# Patient Record
Sex: FEMALE | Race: WHITE | NOT HISPANIC OR LATINO | Employment: OTHER | URBAN - METROPOLITAN AREA
[De-identification: names, ages, dates, MRNs, and addresses within clinical notes are randomized per-mention and may not be internally consistent; named-entity substitution may affect disease eponyms.]

---

## 2017-01-03 ENCOUNTER — RADIANT APPOINTMENT (OUTPATIENT)
Dept: GENERAL RADIOLOGY | Facility: CLINIC | Age: 67
End: 2017-01-03
Attending: PHYSICIAN ASSISTANT
Payer: COMMERCIAL

## 2017-01-03 ENCOUNTER — OFFICE VISIT (OUTPATIENT)
Dept: FAMILY MEDICINE | Facility: CLINIC | Age: 67
End: 2017-01-03
Payer: COMMERCIAL

## 2017-01-03 VITALS
BODY MASS INDEX: 22.33 KG/M2 | HEIGHT: 65 IN | OXYGEN SATURATION: 98 % | HEART RATE: 64 BPM | TEMPERATURE: 97 F | SYSTOLIC BLOOD PRESSURE: 177 MMHG | WEIGHT: 134 LBS | DIASTOLIC BLOOD PRESSURE: 82 MMHG

## 2017-01-03 DIAGNOSIS — F17.200 SMOKER: ICD-10-CM

## 2017-01-03 DIAGNOSIS — M54.5 CHRONIC LOW BACK PAIN, UNSPECIFIED BACK PAIN LATERALITY, WITH SCIATICA PRESENCE UNSPECIFIED: ICD-10-CM

## 2017-01-03 DIAGNOSIS — M25.551 HIP PAIN, RIGHT: ICD-10-CM

## 2017-01-03 DIAGNOSIS — J20.9 ACUTE BRONCHITIS, UNSPECIFIED ORGANISM: Primary | ICD-10-CM

## 2017-01-03 DIAGNOSIS — G89.29 CHRONIC LOW BACK PAIN, UNSPECIFIED BACK PAIN LATERALITY, WITH SCIATICA PRESENCE UNSPECIFIED: ICD-10-CM

## 2017-01-03 DIAGNOSIS — I10 HTN (HYPERTENSION), BENIGN: ICD-10-CM

## 2017-01-03 PROCEDURE — 99214 OFFICE O/P EST MOD 30 MIN: CPT | Performed by: PHYSICIAN ASSISTANT

## 2017-01-03 PROCEDURE — 72170 X-RAY EXAM OF PELVIS: CPT

## 2017-01-03 RX ORDER — CEFUROXIME AXETIL 500 MG/1
500 TABLET ORAL 2 TIMES DAILY
Qty: 20 TABLET | Refills: 0 | Status: SHIPPED | OUTPATIENT
Start: 2017-01-03 | End: 2017-08-08

## 2017-01-03 RX ORDER — PREDNISONE 20 MG/1
20 TABLET ORAL 2 TIMES DAILY
Qty: 14 TABLET | Refills: 0 | Status: SHIPPED | OUTPATIENT
Start: 2017-01-03 | End: 2017-08-08

## 2017-01-03 RX ORDER — LEVOFLOXACIN 500 MG/1
500 TABLET, FILM COATED ORAL DAILY
Qty: 10 TABLET | Refills: 0 | Status: SHIPPED | OUTPATIENT
Start: 2017-01-03 | End: 2017-01-03

## 2017-01-03 ASSESSMENT — ANXIETY QUESTIONNAIRES
IF YOU CHECKED OFF ANY PROBLEMS ON THIS QUESTIONNAIRE, HOW DIFFICULT HAVE THESE PROBLEMS MADE IT FOR YOU TO DO YOUR WORK, TAKE CARE OF THINGS AT HOME, OR GET ALONG WITH OTHER PEOPLE: NOT DIFFICULT AT ALL
5. BEING SO RESTLESS THAT IT IS HARD TO SIT STILL: SEVERAL DAYS
7. FEELING AFRAID AS IF SOMETHING AWFUL MIGHT HAPPEN: SEVERAL DAYS
6. BECOMING EASILY ANNOYED OR IRRITABLE: SEVERAL DAYS
1. FEELING NERVOUS, ANXIOUS, OR ON EDGE: SEVERAL DAYS
GAD7 TOTAL SCORE: 7
3. WORRYING TOO MUCH ABOUT DIFFERENT THINGS: SEVERAL DAYS
2. NOT BEING ABLE TO STOP OR CONTROL WORRYING: SEVERAL DAYS

## 2017-01-03 ASSESSMENT — PATIENT HEALTH QUESTIONNAIRE - PHQ9: 5. POOR APPETITE OR OVEREATING: SEVERAL DAYS

## 2017-01-03 NOTE — NURSING NOTE
"Chief Complaint   Patient presents with     Cough     Recheck Medication     Hip right     pain      Hypertension     BP check  good readings at home on wrist cuff 120-130 / 70-80       Initial /82 mmHg  Pulse 64  Temp(Src) 97  F (36.1  C) (Oral)  Ht 5' 4.5\" (1.638 m)  Wt 134 lb (60.782 kg)  BMI 22.65 kg/m2  SpO2 98%  Breastfeeding? No Estimated body mass index is 22.65 kg/(m^2) as calculated from the following:    Height as of this encounter: 5' 4.5\" (1.638 m).    Weight as of this encounter: 134 lb (60.782 kg).  BP completed using cuff size: regular    "

## 2017-01-03 NOTE — MR AVS SNAPSHOT
After Visit Summary   1/3/2017    Karen Caban    MRN: [id number]           Patient Information     Date Of Birth          1950        Visit Information        Provider Department      1/3/2017 11:00 AM Lyudmila Escobar PA-C [facility]        Today's Diagnoses     Hip pain, right    -  1     Acute bronchitis, unspecified organism            Follow-ups after your visit        Future tests that were ordered for you today     Open Future Orders        Priority Expected Expires Ordered    XR Pelvis 1/2 Views Routine 1/3/2017 1/3/2018 1/3/2017            Who to contact     If you have questions or need follow up information about today's clinic visit or your schedule please contact [facility] directly at [phone].  Normal or non-critical lab and imaging results will be communicated to you by [employer], letter or phone within 4 business days after the clinic has received the results. If you do not hear from us within 7 days, please contact the clinic through [employer] or phone. If you have a critical or abnormal lab result, we will notify you by phone as soon as possible.  Submit refill requests through [employer] or call your pharmacy and they will forward the refill request to us. Please allow 3 business days for your refill to be completed.          Additional Information About Your Visit        MyChart Information     [employer] gives you secure access to your electronic health record. If you see a primary care provider, you can also send messages to your care team and make appointments. If you have questions, please call your primary care clinic.  If you do not have a primary care provider, please call [phone] and they will assist you.        Care EveryWhere ID     This is your Care EveryWhere ID. This could be used by other organizations to access your [facility] medical records  [id number]        Your Vitals Were     Pulse Temperature Height BMI (Body Mass Index)  "Pulse Oximetry Breastfeeding?    64 97  F (36.1  C) (Oral) 5' 4.5\" (1.638 m) 22.65 kg/m2 98% No       Blood Pressure from Last 3 Encounters:   01/03/17 177/82   12/13/16 138/76   12/05/16 120/70    Weight from Last 3 Encounters:   01/03/17 134 lb (60.782 kg)   12/13/16 136 lb (61.689 kg)   12/05/16 134 lb (60.782 kg)                 Today's Medication Changes          These changes are accurate as of: 1/3/17 11:30 AM.  If you have any questions, ask your nurse or doctor.               Start taking these medicines.        Dose/Directions    cefUROXime 500 MG tablet   Commonly known as:  CEFTIN   Used for:  Acute bronchitis, unspecified organism   Started by:  Lyudmila Escobar PA-C        Dose:  500 mg   Take 1 tablet (500 mg) by mouth 2 times daily   Quantity:  20 tablet   Refills:  0       predniSONE 20 MG tablet   Commonly known as:  DELTASONE   Used for:  Acute bronchitis, unspecified organism   Started by:  Lyudmila Escobar PA-C        Dose:  20 mg   Take 1 tablet (20 mg) by mouth 2 times daily   Quantity:  14 tablet   Refills:  0            Where to get your medicines      These medications were sent to [place], [place] - [address], [address]  [address], [address], [facility] [address]     Phone:  [phone]    - cefUROXime 500 MG tablet  - predniSONE 20 MG tablet             Primary Care Provider Office Phone # Fax #    Lyudmila Escobar PA-C [phone]       [facility] [address]  [facility] [address]        Thank you!     Thank you for choosing [facility]  for your care. Our goal is always to provide you with excellent care. Hearing back from our patients is one way we can continue to improve our services. Please take a few minutes to complete the written survey that you may receive in the mail after your visit with us. Thank you!             Your Updated Medication List - Protect others around you: Learn how to safely " use, store and throw away your medicines at www.disposemymeds.org.          This list is accurate as of: 1/3/17 11:30 AM.  Always use your most recent med list.                   Brand Name Dispense Instructions for use    aspirin 81 MG tablet      Take 2 tablets by mouth daily.       atorvastatin 40 MG tablet    LIPITOR    90 tablet    Take 1 tablet (40 mg) by mouth daily       cefUROXime 500 MG tablet    CEFTIN    20 tablet    Take 1 tablet (500 mg) by mouth 2 times daily       fluticasone 50 MCG/ACT spray    FLONASE    16 mL    SPRAY 1-2 SPRAYS INTO BOTH NOSTRILS DAILY       LORazepam 0.5 MG tablet    ATIVAN    20 tablet    Take 1 tablet (0.5 mg) by mouth every 8 hours as needed for anxiety - Due for office visit in December please       metoprolol 25 MG 24 hr tablet    TOPROL-XL    90 tablet    Take 1 tablet (25 mg) by mouth daily       omeprazole 40 MG capsule    priLOSEC    90 capsule    Take 1 capsule (40 mg) by mouth daily Take 30-60 minutes before a meal.       predniSONE 20 MG tablet    DELTASONE    14 tablet    Take 1 tablet (20 mg) by mouth 2 times daily       Vitamin D-3 5000 UNITS Tabs      Take 1 tablet by mouth daily.

## 2017-01-03 NOTE — PROGRESS NOTES
HPI: 65 yo female here with ongoing cough and yellow sputum prod x 2 weeks  She was tx for bronchitis with Zpack last month but never did take the prednisone that was prescribed.  She felt better for a week before her sxs returned.  Has R ear pain and chills  Also intermittent diarrhea x 2 weeks  Having 2 loose stools per day  Denies any blood in the stool.  She also has sore throat  Denies hemoptysis  She is not sob but is wheezing  She has tickle in her throat and then starts coughing and has a hard time catching her breath.    Also R hip pain kelly going up stairs  occas this feels like it is going out on her  She is leaving for [place] on 1/12/16 and wants to make sure she won't have a dislocated hip with snorkeling etc.    Past Medical History   Diagnosis Date     PUD (peptic ulcer disease) 1980s     DU     PAD (peripheral artery disease) (H)      s/p fem pop bypass; embolectomy     Chronic back pain      Fx low femur epiphy-closed (H)      HTN (hypertension), benign      Osteopenia      Vitamin D deficiency      Hyperlipidemia LDL goal < 100      Ankle fracture 2009     L     Normal nuclear stress test 12/09     EF 67%     Smoker      Colon polyp 2012     repeat colonoscopy 5 years.     GERD (gastroesophageal reflux disease)      Anxiety      Past Surgical History   Procedure Laterality Date     Tubal ligation and reversal       Miscarriages x 3       L achilles repair  12/4/08     Bypass graft aortofemoral  5/02     Dr. Turner     Embolectomy lower extremity  12/16/2011     Procedure:EMBOLECTOMY LOWER EXTREMITY; EMBOLECTOMY, RIGHT POPLITEAL WITH PATCH ANGIOPLASTY. EXCISION SKIN LESION RIGHT LEG. ; Surgeon:PARMINDER VELAZCO; Location: OR     Excise lesion lower extremity  12/16/2011     Procedure:EXCISE LESION LOWER EXTREMITY; Surgeon:PARMINDER VELAZCO; Location: OR     Bypass graft femoropopliteal  12/16/2011     Laparoscopic oophorectomy       L for cyst     Blood clot removal from stent       " 2011     Hernia repair  11/4/08     x2     Social History   Substance Use Topics     Smoking status: Current Every Day Smoker     Types: Cigarettes     Smokeless tobacco: Never Used      Comment: Trying to quit, using  Commit long's     Alcohol Use: 2.4 - 3.0 oz/week     4-5 Standard drinks or equivalent per week      Comment: Beer 3 times per week     Current Outpatient Prescriptions   Medication Sig Dispense Refill     predniSONE (DELTASONE) 20 MG tablet Take 1 tablet (20 mg) by mouth 2 times daily 14 tablet 0     cefUROXime (CEFTIN) 500 MG tablet Take 1 tablet (500 mg) by mouth 2 times daily 20 tablet 0     fluticasone (FLONASE) 50 MCG/ACT nasal spray SPRAY 1-2 SPRAYS INTO BOTH NOSTRILS DAILY 16 mL 0     LORazepam (ATIVAN) 0.5 MG tablet Take 1 tablet (0.5 mg) by mouth every 8 hours as needed for anxiety - Due for office visit in December please 20 tablet 1     atorvastatin (LIPITOR) 40 MG tablet Take 1 tablet (40 mg) by mouth daily 90 tablet 3     metoprolol (TOPROL-XL) 25 MG 24 hr tablet Take 1 tablet (25 mg) by mouth daily 90 tablet 3     aspirin 81 MG tablet Take 2 tablets by mouth daily.       Cholecalciferol (VITAMIN D-3) 5000 UNIT TABS Take 1 tablet by mouth daily.       omeprazole (PRILOSEC) 40 MG capsule Take 1 capsule (40 mg) by mouth daily Take 30-60 minutes before a meal. 90 capsule 3     Allergies   Allergen Reactions     Chantix [Varenicline] Nausea     Ciprofloxacin Other (See Comments)     hypertension     Decongestant [Cvs]      Erythromycin Nausea     Plavix [Clopidogrel Bisulfate]      Felt as if she was having a heart attack     Vioxx      FAMILY HISTORY NOTED AND REVIEWED    PHYSICAL EXAM:    /82 mmHg  Pulse 64  Temp(Src) 97  F (36.1  C) (Oral)  Ht 5' 4.5\" (1.638 m)  Wt 134 lb (60.782 kg)  BMI 22.65 kg/m2  SpO2 98%  Breastfeeding? No    Patient appears non toxic  R hip: able to elicit pain with int and ext rotation of the R hip  SLR pos on R.  Sensory exam in LE intact.  Gait is " normal.  Ears: TMs pearly grey  Throat: +erythematous without exudates  Neck: no LA  Lungs: bilat coarse wheezing throughout.  Heart: RRR  Extr: no edema.    Assessment and Plan:     (J20.9) Acute bronchitis, unspecified organism  (primary encounter diagnosis)  Comment: she was on a zpack last month and better for a week, so this may be new or continuation of that illness. She has a lot of wheezing so recd she take the prednisone now which may also help with her back and hip pain.  Plan: predniSONE (DELTASONE) 20 MG tablet, cefUROXime        (CEFTIN) 500 MG tablet BID x 10d.    (M25.551) Hip pain, right  Comment:   Plan: XR Pelvis 1/2 Views            (F17.200) Smoker  Comment:   Plan: pt not interested in quitting.    (M54.5,  G89.29) Chronic low back pain, unspecified back pain laterality, with sciatica presence unspecified  Comment:   Plan: Chronic, unchanged.    (I10) HTN (hypertension), benign  Comment: she brings in home wrist monitor and list of reading which are mostly around 125/70-80.  She is much higher on her cuff here supporting dx of white coat HTN.  Plan: No changes with her BP medication as home readings normal and her cuff appears to be accurate as matches the higher readings we are getting here.      Lyudmila Escobar PA-C

## 2017-01-04 ASSESSMENT — ANXIETY QUESTIONNAIRES: GAD7 TOTAL SCORE: 7

## 2017-01-04 NOTE — PROGRESS NOTES
Quick Note:    Humaira,    The radiologist read your hip xray as normal so that is good.   I suspect the pain you have is radiating from your back.  Hopefully the prednisone will help with that.    Lyudmila Escobar PA-C    ______

## 2017-01-25 DIAGNOSIS — F41.9 ANXIETY: Primary | ICD-10-CM

## 2017-01-25 RX ORDER — LORAZEPAM 0.5 MG/1
0.5 TABLET ORAL EVERY 8 HOURS PRN
Qty: 20 TABLET | Refills: 0 | Status: SHIPPED | OUTPATIENT
Start: 2017-01-25 | End: 2017-03-15

## 2017-01-25 NOTE — TELEPHONE ENCOUNTER
LORazepam (ATIVAN) 0.5 MG tablet      Last Written Prescription Date: 11/4/2016  Last Fill Quantity: 20,  # refills: 1   Last Office Visit with [facility], [facility] or [facility] prescribing provider: 1/3/2017

## 2017-03-02 DIAGNOSIS — I10 HTN (HYPERTENSION), BENIGN: ICD-10-CM

## 2017-03-02 DIAGNOSIS — E78.5 HYPERLIPIDEMIA LDL GOAL <100: ICD-10-CM

## 2017-03-02 RX ORDER — METOPROLOL SUCCINATE 25 MG/1
25 TABLET, EXTENDED RELEASE ORAL DAILY
Qty: 90 TABLET | Refills: 3 | Status: SHIPPED | OUTPATIENT
Start: 2017-03-02 | End: 2017-04-27 | Stop reason: DRUGHIGH

## 2017-03-02 RX ORDER — ATORVASTATIN CALCIUM 40 MG/1
40 TABLET, FILM COATED ORAL DAILY
Qty: 90 TABLET | Refills: 3 | Status: SHIPPED | OUTPATIENT
Start: 2017-03-02 | End: 2017-12-14

## 2017-03-02 NOTE — TELEPHONE ENCOUNTER
metoprolol (TOPROL-XL) 25 MG 24 hr tablet      Last Written Prescription Date: 12/8/2015  Last Fill Quantity: 90, # refills: 3    Last Office Visit with [facility], [facility] or [facility] prescribing provider:  1/3/2017   Future Office Visit:        BP Readings from Last 3 Encounters:   01/03/17 177/82   12/13/16 138/76   12/05/16 120/70     atorvastatin (LIPITOR) 40 MG tablet     Last Written Prescription Date: 12/8/2015  Last Fill Quantity: 90, # refills: 3  Last Office Visit with [facility], [facility] or [facility] prescribing provider: 1/3/2017       Lab Results   Component Value Date    CHOL 131 12/06/2016     Lab Results   Component Value Date    HDL 43 12/06/2016     Lab Results   Component Value Date    LDL 74 12/06/2016     Lab Results   Component Value Date    TRIG 72 12/06/2016     Lab Results   Component Value Date    CHOLHDLRATIO 2.1 11/07/2014

## 2017-03-15 DIAGNOSIS — F41.9 ANXIETY: ICD-10-CM

## 2017-03-15 RX ORDER — LORAZEPAM 0.5 MG/1
0.5 TABLET ORAL EVERY 8 HOURS PRN
Qty: 20 TABLET | Refills: 0 | Status: SHIPPED | OUTPATIENT
Start: 2017-03-15 | End: 2017-04-26

## 2017-03-15 NOTE — TELEPHONE ENCOUNTER
Pending Prescriptions:                       Disp   Refills    LORazepam (ATIVAN) 0.5 MG tablet          20 tab*0            Sig: Take 1 tablet (0.5 mg) by mouth every 8 hours as           needed for anxiety          Last Written Prescription Date: 1/25/17  Last Fill Quantity: 20,  # refills: 0   Last Office Visit with [facility], [facility] or [facility] prescribing provider: 1/3/17

## 2017-03-30 DIAGNOSIS — Z91.89 AT RISK FOR DENTAL PROBLEMS: ICD-10-CM

## 2017-03-30 RX ORDER — AMOXICILLIN 500 MG/1
CAPSULE ORAL
Qty: 4 CAPSULE | Refills: 0 | Status: SHIPPED | OUTPATIENT
Start: 2017-03-30 | End: 2017-08-08

## 2017-03-30 NOTE — TELEPHONE ENCOUNTER
Patient called back and I spoke with her.     She had 2 crowns drilled 1 week ago and needed the 3/15/17 script  Now needs additional ABX script because she goes back next Thursday to have a partial placed    Routing to PCP for refill of ABX    Annette Nicolas RN

## 2017-03-30 NOTE — TELEPHONE ENCOUNTER
Left VM for patient. This script was sent to pharmacy on 3/15/17 to Tonya  Looks like prescribed for Dental work  Is patient really needing another refill or was this an automated refill request coming from Pharmacy?    Annette Nicolas RN

## 2017-03-30 NOTE — TELEPHONE ENCOUNTER
amoxicillin (AMOXIL) 500 MG capsule 4 capsule 0 3/15/2017     Last Written Prescription Date: 03/15/2017  Last Fill Quantity: 4,  # refills: 0   Last Office Visit with [facility], [facility] or [facility] prescribing provider: 01/03/2017

## 2017-04-26 DIAGNOSIS — F41.9 ANXIETY: ICD-10-CM

## 2017-04-26 RX ORDER — LORAZEPAM 0.5 MG/1
0.5 TABLET ORAL EVERY 8 HOURS PRN
Qty: 20 TABLET | Refills: 0 | Status: SHIPPED | OUTPATIENT
Start: 2017-04-26 | End: 2017-06-06

## 2017-04-26 NOTE — TELEPHONE ENCOUNTER
LORazepam (ATIVAN) 0.5 MG tablet      Last Written Prescription Date: 3/15/2017  Last Fill Quantity: 20,  # refills: 0   Last Office Visit with [facility], [facility] or [facility] prescribing provider: 1/3/2017

## 2017-06-06 DIAGNOSIS — F41.9 ANXIETY: ICD-10-CM

## 2017-06-07 RX ORDER — LORAZEPAM 0.5 MG/1
0.5 TABLET ORAL EVERY 8 HOURS PRN
Qty: 20 TABLET | Refills: 0 | Status: SHIPPED | OUTPATIENT
Start: 2017-06-07 | End: 2017-07-19

## 2017-06-07 NOTE — TELEPHONE ENCOUNTER
Pending Prescriptions:                       Disp   Refills    LORazepam (ATIVAN) 0.5 MG tablet          20 tab*0            Sig: Take 1 tablet (0.5 mg) by mouth every 8 hours as           needed for anxiety          Last Written Prescription Date:  4/26/17  Last Fill Quantity: 20,   # refills: 0  Last Office Visit with [facility], [facility] or [facility] prescribing provider: 1/3/17 Luz  Future Office visit:       Routing refill request to provider for review/approval because:  Drug not on the [facility], [facility] or [facility] refill protocol or controlled substance    RT Familia(R)

## 2017-06-17 ENCOUNTER — HEALTH MAINTENANCE LETTER (OUTPATIENT)
Age: 67
End: 2017-06-17

## 2017-07-10 ENCOUNTER — ALLIED HEALTH/NURSE VISIT (OUTPATIENT)
Dept: NURSING | Facility: CLINIC | Age: 67
End: 2017-07-10
Payer: COMMERCIAL

## 2017-07-10 DIAGNOSIS — Z23 NEED FOR VACCINATION: Primary | ICD-10-CM

## 2017-07-10 PROCEDURE — 90471 IMMUNIZATION ADMIN: CPT

## 2017-07-10 PROCEDURE — 90632 HEPA VACCINE ADULT IM: CPT

## 2017-07-10 NOTE — MR AVS SNAPSHOT
After Visit Summary   7/10/2017    Karen Caban    MRN: [id number]           Patient Information     Date Of Birth          1950        Visit Information        Provider Department      7/10/2017 10:45 AM CS NURSE [place] [facility]        Today's Diagnoses     Need for vaccination    -  1       Follow-ups after your visit        Who to contact     If you have questions or need follow up information about today's clinic visit or your schedule please contact [facility] directly at [phone].  Normal or non-critical lab and imaging results will be communicated to you by [employer], letter or phone within 4 business days after the clinic has received the results. If you do not hear from us within 7 days, please contact the clinic through [employer] or phone. If you have a critical or abnormal lab result, we will notify you by phone as soon as possible.  Submit refill requests through [employer] or call your pharmacy and they will forward the refill request to us. Please allow 3 business days for your refill to be completed.          Additional Information About Your Visit        MyChart Information     [employer] gives you secure access to your electronic health record. If you see a primary care provider, you can also send messages to your care team and make appointments. If you have questions, please call your primary care clinic.  If you do not have a primary care provider, please call [phone] and they will assist you.        Care EveryWhere ID     This is your Care EveryWhere ID. This could be used by other organizations to access your [facility] medical records  [id number]         Blood Pressure from Last 3 Encounters:   01/03/17 177/82   12/13/16 138/76   12/05/16 120/70    Weight from Last 3 Encounters:   01/03/17 134 lb (60.8 kg)   12/13/16 136 lb (61.7 kg)   12/05/16 134 lb (60.8 kg)              We Performed the Following     1st  Administration  [88077]     HEPATITIS A  VACCINE, ADULT  [[id number]]        Primary Care Provider Office Phone # Fax #    Lyudmila Escobar PA-C [phone]       [facility] [address] [facility] [address]  [facility] [address]        Equal Access to Services     LUIS VERMA : Hadii aad ku hadasho Soomaali, waaxda luqadaha, qaybta kaalmada adeegyada, waxay idiin hayaan adevirginia lujanotisthomas lajeremy dowling. So [facility] [phone].    ATENCIÓN: Si habla español, tiene a umana disposición servicios gratuitos de asistencia lingüística. Llame al [phone].    We comply with applicable federal civil rights laws and [place] laws. We do not discriminate on the basis of race, color, national origin, age, disability sex, sexual orientation or gender identity.            Thank you!     Thank you for choosing [facility]  for your care. Our goal is always to provide you with excellent care. Hearing back from our patients is one way we can continue to improve our services. Please take a few minutes to complete the written survey that you may receive in the mail after your visit with us. Thank you!             Your Updated Medication List - Protect others around you: Learn how to safely use, store and throw away your medicines at www.disposemymeds.org.          This list is accurate as of: 7/10/17 10:52 AM.  Always use your most recent med list.                   Brand Name Dispense Instructions for use Diagnosis    amoxicillin 500 MG capsule    AMOXIL    4 capsule    TAKE 4 CAPSULES(2000 MG) BY MOUTH 1 TIME FOR 1 DOSE    At risk for dental problems       aspirin 81 MG tablet      Take 2 tablets by mouth daily.        atorvastatin 40 MG tablet    LIPITOR    90 tablet    Take 1 tablet (40 mg) by mouth daily    Hyperlipidemia LDL goal <100       cefuroxime 500 MG tablet    CEFTIN    20 tablet    Take 1 tablet (500 mg) by mouth 2 times daily    Acute bronchitis, unspecified organism       fluticasone 50 MCG/ACT spray    FLONASE    16 mL    SPRAY 1-2 SPRAYS INTO BOTH  NOSTRILS DAILY    Chronic rhinitis       LORazepam 0.5 MG tablet    ATIVAN    20 tablet    Take 1 tablet (0.5 mg) by mouth every 8 hours as needed for anxiety    Anxiety       metoprolol 50 MG 24 hr tablet    TOPROL-XL    90 tablet    Take 1 tablet (50 mg) by mouth daily    Benign essential hypertension       omeprazole 40 MG capsule    priLOSEC    90 capsule    Take 1 capsule (40 mg) by mouth daily Take 30-60 minutes before a meal.    Gastroesophageal reflux disease without esophagitis       predniSONE 20 MG tablet    DELTASONE    14 tablet    Take 1 tablet (20 mg) by mouth 2 times daily    Acute bronchitis, unspecified organism       Vitamin D-3 5000 UNITS Tabs      Take 1 tablet by mouth daily.

## 2017-07-10 NOTE — PROGRESS NOTES
Screening Questionnaire for Adult Immunization    Are you sick today?   No   Do you have allergies to medications, food, a vaccine component or latex?   No   Have you ever had a serious reaction after receiving a vaccination?   No   Do you have a long-term health problem with heart disease, lung disease, asthma, kidney disease, metabolic disease (e.g. diabetes), anemia, or other blood disorder?   No   Do you have cancer, leukemia, HIV/AIDS, or any other immune system problem?   No   In the past 3 months, have you taken medications that affect  your immune system, such as prednisone, other steroids, or anticancer drugs; drugs for the treatment of rheumatoid arthritis, Crohn s disease, or psoriasis; or have you had radiation treatments?   No   Have you had a seizure, or a brain or other nervous system problem?   No   During the past year, have you received a transfusion of blood or blood     products, or been given immune (gamma) globulin or antiviral drug?   No   For women: Are you pregnant or is there a chance you could become        pregnant during the next month?   No   Have you received any vaccinations in the past 4 weeks?   No     Immunization questionnaire answers were all negative.      MNVFC doesn't apply on this patient    Per orders of Dr. Lyudmila Escobar, injection of Hep A given by Hannae Reveles. Patient instructed to remain in clinic for 15 minutes afterwards, and to report any adverse reaction to me immediately.       Screening performed by Hanane Reveles on 7/10/2017 at 10:51 AM.      Prior to injection verified patient identity using patient's name and date of birth.

## 2017-07-19 DIAGNOSIS — F41.9 ANXIETY: ICD-10-CM

## 2017-07-19 RX ORDER — LORAZEPAM 0.5 MG/1
0.5 TABLET ORAL EVERY 8 HOURS PRN
Qty: 20 TABLET | Refills: 0 | Status: SHIPPED | OUTPATIENT
Start: 2017-07-19 | End: 2017-08-24

## 2017-08-08 ENCOUNTER — OFFICE VISIT (OUTPATIENT)
Dept: FAMILY MEDICINE | Facility: CLINIC | Age: 67
End: 2017-08-08
Payer: COMMERCIAL

## 2017-08-08 VITALS
TEMPERATURE: 98.2 F | OXYGEN SATURATION: 98 % | SYSTOLIC BLOOD PRESSURE: 175 MMHG | DIASTOLIC BLOOD PRESSURE: 93 MMHG | WEIGHT: 136 LBS | HEIGHT: 65 IN | BODY MASS INDEX: 22.66 KG/M2 | HEART RATE: 58 BPM

## 2017-08-08 DIAGNOSIS — F41.9 ANXIETY: ICD-10-CM

## 2017-08-08 DIAGNOSIS — R30.0 DYSURIA: Primary | ICD-10-CM

## 2017-08-08 DIAGNOSIS — F17.200 SMOKER: ICD-10-CM

## 2017-08-08 DIAGNOSIS — I10 HTN (HYPERTENSION), BENIGN: ICD-10-CM

## 2017-08-08 LAB
ALBUMIN UR-MCNC: NEGATIVE MG/DL
APPEARANCE UR: CLEAR
BILIRUB UR QL STRIP: NEGATIVE
COLOR UR AUTO: YELLOW
GLUCOSE UR STRIP-MCNC: NEGATIVE MG/DL
HGB UR QL STRIP: ABNORMAL
KETONES UR STRIP-MCNC: NEGATIVE MG/DL
LEUKOCYTE ESTERASE UR QL STRIP: NEGATIVE
NITRATE UR QL: NEGATIVE
PH UR STRIP: 6 PH (ref 5–7)
RBC #/AREA URNS AUTO: NORMAL /HPF (ref 0–2)
SP GR UR STRIP: <=1.005 (ref 1–1.03)
URN SPEC COLLECT METH UR: ABNORMAL
UROBILINOGEN UR STRIP-ACNC: 0.2 EU/DL (ref 0.2–1)
WBC #/AREA URNS AUTO: NORMAL /HPF (ref 0–2)

## 2017-08-08 PROCEDURE — 81001 URINALYSIS AUTO W/SCOPE: CPT | Performed by: PHYSICIAN ASSISTANT

## 2017-08-08 PROCEDURE — 99213 OFFICE O/P EST LOW 20 MIN: CPT | Performed by: PHYSICIAN ASSISTANT

## 2017-08-08 RX ORDER — SULFAMETHOXAZOLE/TRIMETHOPRIM 800-160 MG
1 TABLET ORAL 2 TIMES DAILY
Qty: 6 TABLET | Refills: 0 | Status: SHIPPED | OUTPATIENT
Start: 2017-08-08 | End: 2018-01-03

## 2017-08-08 NOTE — NURSING NOTE
"Chief Complaint   Patient presents with     UTI       Initial BP (!) 175/93 (BP Location: Left arm, Patient Position: Chair, Cuff Size: Adult Regular)  Pulse 58  Temp 98.2  F (36.8  C) (Oral)  Ht 5' 4.5\" (1.638 m)  Wt 136 lb (61.7 kg)  SpO2 98%  BMI 22.98 kg/m2 Estimated body mass index is 22.98 kg/(m^2) as calculated from the following:    Height as of this encounter: 5' 4.5\" (1.638 m).    Weight as of this encounter: 136 lb (61.7 kg).  Medication Reconciliation: complete   Donna Card MA  "

## 2017-08-08 NOTE — PATIENT INSTRUCTIONS
Try Pyridium (AZO) for 2 days  Push fluids  Fill rx for antibiotic to bring on the trip if worse  Lorazepam may help    Check more BP readings and send me a mychart msg with what you are getting after your trip

## 2017-08-08 NOTE — MR AVS SNAPSHOT
After Visit Summary   8/8/2017    Karen Caban    MRN: [id number]           Patient Information     Date Of Birth          1950        Visit Information        Provider Department      8/8/2017 12:00 PM Lyudmila Escobar PA-C [facility]        Today's Diagnoses     Dysuria    -  1      Care Instructions    Try Pyridium (AZO) for 2 days  Push fluids  Fill rx for antibiotic to bring on the trip if worse  Lorazepam may help    Check more BP readings and send me a MyRealTrip msg with what you are getting after your trip          Follow-ups after your visit        Who to contact     If you have questions or need follow up information about today's clinic visit or your schedule please contact [facility] directly at [phone].  Normal or non-critical lab and imaging results will be communicated to you by MyChart, letter or phone within 4 business days after the clinic has received the results. If you do not hear from us within 7 days, please contact the clinic through [employer] or phone. If you have a critical or abnormal lab result, we will notify you by phone as soon as possible.  Submit refill requests through [employer] or call your pharmacy and they will forward the refill request to us. Please allow 3 business days for your refill to be completed.          Additional Information About Your Visit        MyChart Information     [employer] gives you secure access to your electronic health record. If you see a primary care provider, you can also send messages to your care team and make appointments. If you have questions, please call your primary care clinic.  If you do not have a primary care provider, please call [phone] and they will assist you.        Care EveryWhere ID     This is your Care EveryWhere ID. This could be used by other organizations to access your [facility] medical records  [id number]        Your Vitals Were     Pulse Temperature Height Pulse Oximetry BMI  "(Body Mass Index)       58 98.2  F (36.8  C) (Oral) 5' 4.5\" (1.638 m) 98% 22.98 kg/m2        Blood Pressure from Last 3 Encounters:   08/08/17 (!) 175/93   01/03/17 177/82   12/13/16 138/76    Weight from Last 3 Encounters:   08/08/17 136 lb (61.7 kg)   01/03/17 134 lb (60.8 kg)   12/13/16 136 lb (61.7 kg)              We Performed the Following     UA reflex to Microscopic and Culture     Urine Microscopic          Today's Medication Changes          These changes are accurate as of: 8/8/17 12:04 PM.  If you have any questions, ask your nurse or doctor.               Start taking these medicines.        Dose/Directions    sulfamethoxazole-trimethoprim 800-160 MG per tablet   Commonly known as:  BACTRIM DS/SEPTRA DS   Used for:  Dysuria   Started by:  Lyudmila Escobar PA-C        Dose:  1 tablet   Take 1 tablet by mouth 2 times daily for 3 days   Quantity:  6 tablet   Refills:  0            Where to get your medicines      These medications were sent to [facility] [address] [place] [address] & [address], [place] [address]     Phone:  [phone]     sulfamethoxazole-trimethoprim 800-160 MG per tablet                Primary Care Provider Office Phone # Fax #    Lyudmila Escobar PA-C [phone]       [facility] [address] [address]        Equal Access to Services     [facility]: Hadii claudia parikh hadasho Soomaali, waaxda luqadaha, qaybta kaalmada adeegyasalma, layla mcconenll . So [facility] [phone].    ATENCIÓN: Si habla español, tiene a umana disposición servicios gratuitos de asistencia lingüística. Llame al [phone].    We comply with applicable federal civil rights laws and [place] laws. We do not discriminate on the basis of race, color, national origin, age, disability sex, sexual orientation or gender identity.            Thank you!     Thank you for choosing [facility]  for your care. Our goal is always to provide you with excellent care. Hearing back from our patients is one way we can continue to improve our services. Please take a few minutes to complete the written survey that you may receive in the mail after your visit with us. Thank you!             Your Updated Medication List - Protect others around you: Learn how to safely use, store and throw away your medicines at www.disposemymeds.org.          This list is accurate as of: 8/8/17 12:04 PM.  Always use your most recent med list.                   Brand Name Dispense Instructions for use Diagnosis    aspirin 81 MG tablet      Take 2 tablets by mouth daily.        atorvastatin 40 MG tablet    LIPITOR    90 tablet    Take 1 tablet (40 mg) by mouth daily    Hyperlipidemia LDL goal <100       fluticasone 50 MCG/ACT spray    FLONASE    16 mL    SPRAY 1-2 SPRAYS INTO BOTH NOSTRILS DAILY    Chronic rhinitis       LORazepam 0.5 MG tablet    ATIVAN    20 tablet    Take 1 tablet (0.5 mg) by mouth every 8 hours as needed for anxiety    Anxiety       metoprolol 50 MG 24 hr tablet    TOPROL-XL    90 tablet    Take 1 tablet (50 mg) by mouth daily    Benign essential hypertension       omeprazole 40 MG capsule    priLOSEC    90 capsule    Take 1 capsule (40 mg) by mouth daily Take 30-60 minutes before a meal.    Gastroesophageal reflux disease without esophagitis       sulfamethoxazole-trimethoprim 800-160 MG per tablet    BACTRIM DS/SEPTRA DS    6 tablet    Take 1 tablet by mouth 2 times daily for 3 days    Dysuria       Vitamin D-3 5000 UNITS Tabs      Take 1 tablet by mouth daily.

## 2017-08-08 NOTE — PROGRESS NOTES
HPI:   This is a 65 yo female smoker here with urinary urgency and dysuria x 2 weeks  Denies bladder spasms or pain in flank area  Denies vaginal d/c, odor or itching  She is not sexually active; no partners.  Denies any gross hematuria  Increased fluids without relief.  Denies fever, but has had chills at times  Has urinary frea q at [facility].  She drinks decaf soda and coffee     She is leaving for [place] in 3 days so a little nervous about that  Denies personal or FH of bladder ca.    BP at home running 135-140/80 on toprol xl 50mg/d.    Past Medical History:   Diagnosis Date     Ankle fracture 2009    L     Anxiety      Chronic back pain      Colon polyp 2012    repeat colonoscopy 5 years.     Fx low femur epiphy-closed (H)      GERD (gastroesophageal reflux disease)      HTN (hypertension), benign      Hyperlipidemia LDL goal < 100      Normal nuclear stress test 12/09    EF 67%     Osteopenia      PAD (peripheral artery disease) (H)     s/p fem pop bypass; embolectomy     PUD (peptic ulcer disease) 1980s    DU     Smoker      Vitamin D deficiency      Past Surgical History:   Procedure Laterality Date     Blood clot removal from Stent      2011     BYPASS GRAFT AORTOFEMORAL  5/02    Dr. Turner     BYPASS GRAFT FEMOROPOPLITEAL  12/16/2011     EMBOLECTOMY LOWER EXTREMITY  12/16/2011    Procedure:EMBOLECTOMY LOWER EXTREMITY; EMBOLECTOMY, RIGHT POPLITEAL WITH PATCH ANGIOPLASTY. EXCISION SKIN LESION RIGHT LEG. ; Surgeon:PARMINDER VELAZCO; Location: OR     EXCISE LESION LOWER EXTREMITY  12/16/2011    Procedure:EXCISE LESION LOWER EXTREMITY; Surgeon:PARMINDER VELAZCO; Location:[facility] OR     HERNIA REPAIR  11/4/08    x2     L achilles repair  12/4/08     LAPAROSCOPIC OOPHORECTOMY      L for cyst     miscarriages x 3       tubal ligation and reversal       Social History   Substance Use Topics     Smoking status: Current Every Day Smoker     Types: Cigarettes     Smokeless tobacco: Never Used      Comment: Trying to  "quit, using  Commit long's     Alcohol use 2.4 - 3.0 oz/week     4 - 5 Standard drinks or equivalent per week      Comment: Beer 3 times per week     Current Outpatient Prescriptions   Medication Sig Dispense Refill     sulfamethoxazole-trimethoprim (BACTRIM DS/SEPTRA DS) 800-160 MG per tablet Take 1 tablet by mouth 2 times daily for 3 days 6 tablet 0     LORazepam (ATIVAN) 0.5 MG tablet Take 1 tablet (0.5 mg) by mouth every 8 hours as needed for anxiety 20 tablet 0     metoprolol (TOPROL-XL) 50 MG 24 hr tablet Take 1 tablet (50 mg) by mouth daily 90 tablet 1     atorvastatin (LIPITOR) 40 MG tablet Take 1 tablet (40 mg) by mouth daily 90 tablet 3     omeprazole (PRILOSEC) 40 MG capsule Take 1 capsule (40 mg) by mouth daily Take 30-60 minutes before a meal. 90 capsule 3     fluticasone (FLONASE) 50 MCG/ACT nasal spray SPRAY 1-2 SPRAYS INTO BOTH NOSTRILS DAILY 16 mL 0     aspirin 81 MG tablet Take 2 tablets by mouth daily.       Cholecalciferol (VITAMIN D-3) 5000 UNIT TABS Take 1 tablet by mouth daily.       Allergies   Allergen Reactions     Chantix [Varenicline] Nausea     Ciprofloxacin Other (See Comments)     hypertension     Decongestant [Cvs]      Erythromycin Nausea     Plavix [Clopidogrel Bisulfate]      Felt as if she was having a heart attack     Vioxx      FAMILY HISTORY NOTED AND REVIEWED  PHYSICAL EXAM:    BP (!) 175/93 (BP Location: Left arm, Patient Position: Chair, Cuff Size: Adult Regular)  Pulse 58  Temp 98.2  F (36.8  C) (Oral)  Ht 5' 4.5\" (1.638 m)  Wt 136 lb (61.7 kg)  SpO2 98%  BMI 22.98 kg/m2    Patient appears non toxic  Rechecked BP and this remained elevated.  She does have known white coat HTN   : no vulvar lesions  +vaginal atrophy with some pain with speculum exam  No d/c or odor noted    Results for orders placed or performed in visit on 08/08/17   UA reflex to Microscopic and Culture   Result Value Ref Range    Color Urine Yellow     Appearance Urine Clear     Glucose Urine " Negative NEG mg/dL    Bilirubin Urine Negative NEG    Ketones Urine Negative NEG mg/dL    Specific Gravity Urine <=1.005 1.003 - 1.035    Blood Urine Trace (A) NEG    pH Urine 6.0 5.0 - 7.0 pH    Protein Albumin Urine Negative NEG mg/dL    Urobilinogen Urine 0.2 0.2 - 1.0 EU/dL    Nitrite Urine Negative NEG    Leukocyte Esterase Urine Negative NEG    Source Midstream Urine    Urine Microscopic   Result Value Ref Range    WBC Urine O - 2 0 - 2 /HPF    RBC Urine O - 2 0 - 2 /HPF     Assessment and Plan:     (R30.0) Dysuria  (primary encounter diagnosis)  Comment: ? Due to anxiety related to upcoming trip?  Try Pyridium for 2 days.  She did take lorazepam today and urinary sxs persist.  See urol for cyst if sxs persist.  Plan: UA reflex to Microscopic and Culture, Urine         Microscopic, sulfamethoxazole-trimethoprim         (BACTRIM DS/SEPTRA DS) 800-160 MG per tablet        One bid x 3d ONLY IF SXS WORSEN OR PERSIST VALERY WHEN OUT OF TOWN    (I10) HTN (hypertension), benign  Comment:   Plan: has known white coat HTN. Runs higher here. Will get more home readings and f/u via [employer]    (F17.200) Smoker  Comment:   Plan: increases her risk for bladder ca so see urol if sxs persist    (F41.9) Anxiety  Comment:   Plan: she takes prn lorazepam      Lyudmila Escobar PA-C

## 2017-08-23 DIAGNOSIS — R30.0 DYSURIA: ICD-10-CM

## 2017-08-23 DIAGNOSIS — N39.0 URINARY TRACT INFECTION, SITE UNSPECIFIED: Primary | ICD-10-CM

## 2017-08-23 LAB
ALBUMIN UR-MCNC: 100 MG/DL
APPEARANCE UR: CLEAR
BACTERIA #/AREA URNS HPF: ABNORMAL /HPF
BILIRUB UR QL STRIP: NEGATIVE
COLOR UR AUTO: YELLOW
GLUCOSE UR STRIP-MCNC: NEGATIVE MG/DL
HGB UR QL STRIP: ABNORMAL
KETONES UR STRIP-MCNC: NEGATIVE MG/DL
LEUKOCYTE ESTERASE UR QL STRIP: ABNORMAL
NITRATE UR QL: NEGATIVE
NON-SQ EPI CELLS #/AREA URNS LPF: ABNORMAL /LPF
PH UR STRIP: 5.5 PH (ref 5–7)
RBC #/AREA URNS AUTO: ABNORMAL /HPF
SOURCE: ABNORMAL
SP GR UR STRIP: 1.01 (ref 1–1.03)
UROBILINOGEN UR STRIP-ACNC: 0.2 EU/DL (ref 0.2–1)
WBC #/AREA URNS AUTO: ABNORMAL /HPF

## 2017-08-23 PROCEDURE — 81001 URINALYSIS AUTO W/SCOPE: CPT | Performed by: PHYSICIAN ASSISTANT

## 2017-08-23 PROCEDURE — 87088 URINE BACTERIA CULTURE: CPT | Performed by: PHYSICIAN ASSISTANT

## 2017-08-23 PROCEDURE — 87186 SC STD MICRODIL/AGAR DIL: CPT | Performed by: PHYSICIAN ASSISTANT

## 2017-08-23 PROCEDURE — 87086 URINE CULTURE/COLONY COUNT: CPT | Performed by: PHYSICIAN ASSISTANT

## 2017-08-24 DIAGNOSIS — F41.9 ANXIETY: ICD-10-CM

## 2017-08-24 RX ORDER — LORAZEPAM 0.5 MG/1
0.5 TABLET ORAL EVERY 8 HOURS PRN
Qty: 20 TABLET | Refills: 0 | Status: SHIPPED | OUTPATIENT
Start: 2017-08-24 | End: 2017-10-04

## 2017-08-24 NOTE — TELEPHONE ENCOUNTER
LORazepam (ATIVAN) 0.5 MG tablet      Last Written Prescription Date:  7/19/17  Last Fill Quantity: 20,   # refills: 0  [place] Visit with [facility] or [facility] prescribing provider: 8/8/17  Future Office visit:       Routing refill request to provider for review/approval because:  Drug not on the [facility], [facility] or [facility] refill protocol or controlled substance          Pati VÁSQUEZ(R)

## 2017-08-25 LAB
BACTERIA SPEC CULT: ABNORMAL
BACTERIA SPEC CULT: ABNORMAL
SPECIMEN SOURCE: ABNORMAL

## 2017-08-25 NOTE — PROGRESS NOTES
Humaira,    Your urine culture did grow out a bacteria that should be susceptible the antibiotic that I prescribed.    Lyudmila Escobar PA-C

## 2017-09-11 ENCOUNTER — TELEPHONE (OUTPATIENT)
Dept: FAMILY MEDICINE | Facility: CLINIC | Age: 67
End: 2017-09-11

## 2017-09-11 NOTE — TELEPHONE ENCOUNTER
Reason for Call:  Other call back    Detailed comments: Pt called this afternoon and would like to be traiged for a possible bladder infection. Pt had one 3 weeks ago and it went away for a little bit, but then her same exact symptoms came back on Friday 09/08/2017. Please give pt a call to triage and see if we need to have her come in or what. Thank you.    Phone Number Patient can be reached at: Home number on file [phone] (home)    Best Time:     Can we leave a detailed message on this number? YES    Call taken on 9/11/2017 at 1:18 PM by Margarita Tran

## 2017-09-11 NOTE — TELEPHONE ENCOUNTER
PT had similar sx on 8/8 and 8/23  Did complete round of abx, Keflex  This episode sx have been present since Friday, 9/8/17  Reports Intermittent burning, pressure, irritation, frequency, chills.    Denies fever, checked today.  Denies flank pain  Drinking plenty of fluids.    PCP,  Please advise next step for this pt. Another UA, Ov, etc?  Brenda Matthews RN

## 2017-09-12 ENCOUNTER — OFFICE VISIT (OUTPATIENT)
Dept: FAMILY MEDICINE | Facility: CLINIC | Age: 67
End: 2017-09-12
Payer: COMMERCIAL

## 2017-09-12 VITALS
HEIGHT: 65 IN | DIASTOLIC BLOOD PRESSURE: 86 MMHG | WEIGHT: 134 LBS | BODY MASS INDEX: 22.33 KG/M2 | TEMPERATURE: 98.3 F | HEART RATE: 60 BPM | OXYGEN SATURATION: 98 % | SYSTOLIC BLOOD PRESSURE: 172 MMHG

## 2017-09-12 DIAGNOSIS — N95.2 ATROPHIC VAGINITIS: ICD-10-CM

## 2017-09-12 DIAGNOSIS — R30.0 DYSURIA: Primary | ICD-10-CM

## 2017-09-12 DIAGNOSIS — I10 BENIGN ESSENTIAL HYPERTENSION: ICD-10-CM

## 2017-09-12 LAB
ALBUMIN UR-MCNC: NEGATIVE MG/DL
APPEARANCE UR: CLEAR
BILIRUB UR QL STRIP: NEGATIVE
COLOR UR AUTO: YELLOW
GLUCOSE UR STRIP-MCNC: NEGATIVE MG/DL
HGB UR QL STRIP: ABNORMAL
KETONES UR STRIP-MCNC: NEGATIVE MG/DL
LEUKOCYTE ESTERASE UR QL STRIP: NEGATIVE
NITRATE UR QL: NEGATIVE
NON-SQ EPI CELLS #/AREA URNS LPF: NORMAL /LPF
PH UR STRIP: 5.5 PH (ref 5–7)
RBC #/AREA URNS AUTO: NORMAL /HPF
SOURCE: ABNORMAL
SP GR UR STRIP: 1.02 (ref 1–1.03)
UROBILINOGEN UR STRIP-ACNC: 0.2 EU/DL (ref 0.2–1)
WBC #/AREA URNS AUTO: NORMAL /HPF

## 2017-09-12 PROCEDURE — 99213 OFFICE O/P EST LOW 20 MIN: CPT | Performed by: PHYSICIAN ASSISTANT

## 2017-09-12 PROCEDURE — 81001 URINALYSIS AUTO W/SCOPE: CPT | Performed by: PHYSICIAN ASSISTANT

## 2017-09-12 PROCEDURE — 87086 URINE CULTURE/COLONY COUNT: CPT | Performed by: PHYSICIAN ASSISTANT

## 2017-09-12 RX ORDER — AMOXICILLIN 500 MG/1
CAPSULE ORAL
Qty: 4 CAPSULE | Refills: 0 | Status: SHIPPED | OUTPATIENT
Start: 2017-09-12 | End: 2018-01-03

## 2017-09-12 RX ORDER — METOPROLOL SUCCINATE 50 MG/1
50 TABLET, EXTENDED RELEASE ORAL DAILY
Qty: 135 TABLET | Refills: 1 | Status: SHIPPED | OUTPATIENT
Start: 2017-09-12 | End: 2017-12-14 | Stop reason: DRUGHIGH

## 2017-09-12 NOTE — NURSING NOTE
"Chief Complaint   Patient presents with     Urinary Problem       Initial /86 (BP Location: Left arm, Patient Position: Sitting, Cuff Size: Adult Regular)  Pulse 60  Temp 98.3  F (36.8  C) (Oral)  Ht 5' 4.5\" (1.638 m)  Wt 134 lb (60.8 kg)  SpO2 98%  Breastfeeding? No  BMI 22.65 kg/m2 Estimated body mass index is 22.65 kg/(m^2) as calculated from the following:    Height as of this encounter: 5' 4.5\" (1.638 m).    Weight as of this encounter: 134 lb (60.8 kg).  Medication Reconciliation: complete   Colleen Faust- RACHNA      "

## 2017-09-12 NOTE — PROGRESS NOTES
HPI: This is a 65 yo female here with complaint of another UTI. She has had 2 positive cultures since July  Pt here with recurrent urinary sxs x 4d  Has burning, irritation, freq, small voids  Denies gross hematuria  She has chills, no fever  Denies flank pain  Smoker    Past Medical History:   Diagnosis Date     Ankle fracture 2009    L     Anxiety      Chronic back pain      Colon polyp 2012    repeat colonoscopy 5 years.     Fx low femur epiphy-closed (H)      GERD (gastroesophageal reflux disease)      HTN (hypertension), benign      Hyperlipidemia LDL goal < 100      Normal nuclear stress test 12/09    EF 67%     Osteopenia      PAD (peripheral artery disease) (H)     s/p fem pop bypass; embolectomy     PUD (peptic ulcer disease) 1980s    DU     Smoker      Vitamin D deficiency      Past Surgical History:   Procedure Laterality Date     Blood clot removal from Stent      2011     BYPASS GRAFT AORTOFEMORAL  5/02    Dr. Turner     BYPASS GRAFT FEMOROPOPLITEAL  12/16/2011     EMBOLECTOMY LOWER EXTREMITY  12/16/2011    Procedure:EMBOLECTOMY LOWER EXTREMITY; EMBOLECTOMY, RIGHT POPLITEAL WITH PATCH ANGIOPLASTY. EXCISION SKIN LESION RIGHT LEG. ; Surgeon:PARMINDER VELAZCO; Location:[facility] OR     EXCISE LESION LOWER EXTREMITY  12/16/2011    Procedure:EXCISE LESION LOWER EXTREMITY; Surgeon:PARMINDER VELAZCO; Location:[facility] OR     HERNIA REPAIR  11/4/08    x2     L achilles repair  12/4/08     LAPAROSCOPIC OOPHORECTOMY      L for cyst     miscarriages x 3       tubal ligation and reversal       Social History   Substance Use Topics     Smoking status: Current Every Day Smoker     Types: Cigarettes     Smokeless tobacco: Never Used      Comment: Trying to quit, using  Commit long's     Alcohol use 2.4 - 3.0 oz/week     4 - 5 Standard drinks or equivalent per week      Comment: Beer 3 times per week     Current Outpatient Prescriptions   Medication Sig Dispense Refill     metoprolol (TOPROL-XL) 50 MG 24 hr tablet Take 1  "tablet (50 mg) by mouth daily Patient is taking 1.5 tablets (75mg) daily 135 tablet 1     ESTRADIOL 0.1MG/GM CREAM Place vaginally At Bedtime Insert 1 gram vaginally 2-3 times per week 30 g 3     LORazepam (ATIVAN) 0.5 MG tablet Take 1 tablet (0.5 mg) by mouth every 8 hours as needed for anxiety 20 tablet 0     atorvastatin (LIPITOR) 40 MG tablet Take 1 tablet (40 mg) by mouth daily 90 tablet 3     omeprazole (PRILOSEC) 40 MG capsule Take 1 capsule (40 mg) by mouth daily Take 30-60 minutes before a meal. 90 capsule 3     fluticasone (FLONASE) 50 MCG/ACT nasal spray SPRAY 1-2 SPRAYS INTO BOTH NOSTRILS DAILY 16 mL 0     aspirin 81 MG tablet Take 2 tablets by mouth daily.       Cholecalciferol (VITAMIN D-3) 5000 UNIT TABS Take 1 tablet by mouth daily.       [DISCONTINUED] metoprolol (TOPROL-XL) 50 MG 24 hr tablet Take 1 tablet (50 mg) by mouth daily (Patient taking differently: Take 50 mg by mouth daily Patient is taking 1.5 tablets (75mg) daily) 90 tablet 1     Allergies   Allergen Reactions     Chantix [Varenicline] Nausea     Ciprofloxacin Other (See Comments)     hypertension     Decongestant [Cvs]      Erythromycin Nausea     Plavix [Clopidogrel Bisulfate]      Felt as if she was having a heart attack     Vioxx        PHYSICAL EXAM:    /86 (BP Location: Left arm, Patient Position: Sitting, Cuff Size: Adult Regular)  Pulse 60  Temp 98.3  F (36.8  C) (Oral)  Ht 5' 4.5\" (1.638 m)  Wt 134 lb (60.8 kg)  SpO2 98%  Breastfeeding? No  BMI 22.65 kg/m2    Patient appears non toxic  Rechecked BP and this did come down to 152/80  Home BPs that she brings in: 122/76, 129/70, 128/69, 128/80    Results for orders placed or performed in visit on 09/12/17   *UA reflex to Microscopic and Culture ([place] [facility] (except [place] and [place])   Result Value Ref Range    Color Urine Yellow     Appearance Urine Clear     Glucose Urine Negative NEG^Negative mg/dL    Bilirubin Urine Negative NEG^Negative    " Ketones Urine Negative NEG^Negative mg/dL    Specific Gravity Urine 1.020 1.003 - 1.035    Blood Urine Trace (A) NEG^Negative    pH Urine 5.5 5.0 - 7.0 pH    Protein Albumin Urine Negative NEG^Negative mg/dL    Urobilinogen Urine 0.2 0.2 - 1.0 EU/dL    Nitrite Urine Negative NEG^Negative    Leukocyte Esterase Urine Negative NEG^Negative    Source Midstream Urine    Urine Microscopic   Result Value Ref Range    WBC Urine O - 2 OTO2^O - 2 /HPF    RBC Urine O - 2 OTO2^O - 2 /HPF    Squamous Epithelial /LPF Urine Few FEW^Few /LPF     Assessment and Plan:     (R30.0) Dysuria  (primary encounter diagnosis)  Comment: she has leftover bactrim that I want her to take bid for 3 days while awaiting UC. Start vaginal estrogen cream twice per week for atrophic vaginitis and this may help prevent more UTIs  Plan: *UA reflex to Microscopic and Culture ([place]         and [facility] (except [place] and         [place]), Urine Microscopic, Urine Culture         Aerobic Bacterial            (I10) Benign essential hypertension  Comment: home readings in normal range  Plan: metoprolol (TOPROL-XL) 50 MG 24 hr tablet        Taking 75mg qd without side effects, cont same.    (N95.2) Atrophic vaginitis  Comment:   Plan: ESTRADIOL 0.1MG/GM CREAM              Lyudmila Escobar PA-C

## 2017-09-12 NOTE — PROGRESS NOTES
Humaira,    Your urine was negative but I did have them send for a culture.  I will let you know what that shows.  Start the estrogen cream twice per week right away.  You can take the bactrim that you have on hand while we are awaiting the culture.    Lyudmila Escobar PA-C

## 2017-09-12 NOTE — TELEPHONE ENCOUNTER
Pending Prescriptions:                       Disp   Refills    amoxicillin (AMOXIL) 500 MG capsule       4 caps*0            Sig: Take 4 capsules (2000mg) by mouth 1 time for 1           dose          Last Written Prescription Date: 3/30/17  Last Fill Quantity: 4,  # refills: 0   Last Office Visit with [facility] or [facility] prescribing provider: 8/8/17 Luz Flowers, RT(R)

## 2017-09-12 NOTE — TELEPHONE ENCOUNTER
Routing refill request to provider for review/approval because:  Drug not on the [facility] refill protocol   Drug not active on patient's medication list  Looks like pt uses for dental prophylaxis per last Rx's  Vilma JC RN

## 2017-09-12 NOTE — MR AVS SNAPSHOT
After Visit Summary   9/12/2017    Karen Caban    MRN: [id number]           Patient Information     Date Of Birth          1950        Visit Information        Provider Department      9/12/2017 11:00 AM Lyudmila Escobar PA-C [facility]        Today's Diagnoses     Dysuria    -  1    Benign essential hypertension        Atrophic vaginitis          Care Instructions      Preventive Health Recommendations    Female Ages 65 +    Yearly exam:     See your health care provider every year in order to  o Review health changes.   o Discuss preventive care.    o Review your medicines if your doctor has prescribed any.      You no longer need a yearly Pap test unless you've had an abnormal Pap test in the past 10 years. If you have vaginal symptoms, such as bleeding or discharge, be sure to talk with your provider about a Pap test.      Every 1 to 2 years, have a mammogram.  If you are over 69, talk with your health care provider about whether or not you want to continue having screening mammograms.      Every 10 years, have a colonoscopy. Or, have a yearly FIT test (stool test). These exams will check for colon cancer.       Have a cholesterol test every 5 years, or more often if your doctor advises it.       Have a diabetes test (fasting glucose) every three years. If you are at risk for diabetes, you should have this test more often.       At age 65, have a bone density scan (DEXA) to check for osteoporosis (brittle bone disease).    Shots:    Get a flu shot each year.    Get a tetanus shot every 10 years.    Talk to your doctor about your pneumonia vaccines. There are now two you should receive - Pneumovax (PPSV 23) and Prevnar (PCV 13).    Talk to your doctor about the shingles vaccine.    Talk to your doctor about the hepatitis B vaccine.    Nutrition:     Eat at least 5 servings of fruits and vegetables each day.      Eat whole-grain bread, whole-wheat pasta and brown rice instead  of white grains and rice.      Talk to your provider about Calcium and Vitamin D.     Lifestyle    Exercise at least 150 minutes a week (30 minutes a day, 5 days a week). This will help you control your weight and prevent disease.      Limit alcohol to one drink per day.      No smoking.       Wear sunscreen to prevent skin cancer.       See your dentist twice a year for an exam and cleaning.      See your eye doctor every 1 to 2 years to screen for conditions such as glaucoma, macular degeneration and cataracts.          Follow-ups after your visit        Who to contact     If you have questions or need follow up information about today's clinic visit or your schedule please contact [facility] directly at [phone].  Normal or non-critical lab and imaging results will be communicated to you by [employer], letter or phone within 4 business days after the clinic has received the results. If you do not hear from us within 7 days, please contact the clinic through Simple[employer]ot or phone. If you have a critical or abnormal lab result, we will notify you by phone as soon as possible.  Submit refill requests through Filement or call your pharmacy and they will forward the refill request to us. Please allow 3 business days for your refill to be completed.          Additional Information About Your Visit        Filement Information     Filement gives you secure access to your electronic health record. If you see a primary care provider, you can also send messages to your care team and make appointments. If you have questions, please call your primary care clinic.  If you do not have a primary care provider, please call [phone] and they will assist you.        Care EveryWhere ID     This is your Care EveryWhere ID. This could be used by other organizations to access your [place] medical records  [id number]        Your Vitals Were     Pulse Temperature Height Pulse Oximetry Breastfeeding? BMI (Body Mass Index)     "60 98.3  F (36.8  C) (Oral) 5' 4.5\" (1.638 m) 98% No 22.65 kg/m2       Blood Pressure from Last 3 Encounters:   09/12/17 172/86   08/08/17 (!) 175/93   01/03/17 177/82    Weight from Last 3 Encounters:   09/12/17 134 lb (60.8 kg)   08/08/17 136 lb (61.7 kg)   01/03/17 134 lb (60.8 kg)              We Performed the Following     *UA reflex to Microscopic and Culture ([place] [facility] (except [place] and [place])     Urine Culture Aerobic Bacterial     Urine Microscopic          Today's Medication Changes          These changes are accurate as of: 9/12/17 11:33 AM.  If you have any questions, ask your nurse or doctor.               Start taking these medicines.        Dose/Directions    ESTRADIOL 0.1MG/GM CREAM   Used for:  Atrophic vaginitis   Started by:  Lyudmila Escobar PA-C        Place vaginally At Bedtime Insert 1 gram vaginally 2-3 times per week   Quantity:  30 g   Refills:  3         These medicines have changed or have updated prescriptions.        Dose/Directions    metoprolol 50 MG 24 hr tablet   Commonly known as:  TOPROL-XL   This may have changed:  additional instructions   Used for:  Benign essential hypertension   Changed by:  Lyudmila Escobar PA-C        Dose:  50 mg   Take 1 tablet (50 mg) by mouth daily Patient is taking 1.5 tablets (75mg) daily   Quantity:  135 tablet   Refills:  1            Where to get your medicines      These medications were sent to [facility] - [address] [address]    Hours:  Mon-Fri 8:30am-5:00pm Toll Free [phone] Phone:  [phone]     metoprolol 50 MG 24 hr tablet         These medications were sent to [facility] [place], [place] - [address] [place] [address] & [address], [place] [address]     Phone:  [phone]     ESTRADIOL 0.1MG/GM CREAM                Primary Care Provider Office Phone # Fax #    Lyudmila Escobar, " KAMILAH [phone]       [address]  [facility] [address]        Equal Access to Services     LUIS VERMA : Hadsalomon claudia parikh sheila Rodney, nardada fermíndelmy, sergeyta kashainada minh, layla cotanargis dowling. So [facility] [phone].    ATENCIÓN: Si habla español, tiene a umana disposición servicios gratuitos de asistencia lingüística. Llame al [phone].    We comply with applicable federal civil rights laws and [place] laws. We do not discriminate on the basis of race, color, national origin, age, disability sex, sexual orientation or gender identity.            Thank you!     Thank you for choosing [facility]  for your care. Our goal is always to provide you with excellent care. Hearing back from our patients is one way we can continue to improve our services. Please take a few minutes to complete the written survey that you may receive in the mail after your visit with us. Thank you!             Your Updated Medication List - Protect others around you: Learn how to safely use, store and throw away your medicines at www.disposemymeds.org.          This list is accurate as of: 9/12/17 11:33 AM.  Always use your most recent med list.                   Brand Name Dispense Instructions for use Diagnosis    aspirin 81 MG tablet      Take 2 tablets by mouth daily.        atorvastatin 40 MG tablet    LIPITOR    90 tablet    Take 1 tablet (40 mg) by mouth daily    Hyperlipidemia LDL goal <100       ESTRADIOL 0.1MG/GM CREAM     30 g    Place vaginally At Bedtime Insert 1 gram vaginally 2-3 times per week    Atrophic vaginitis       fluticasone 50 MCG/ACT spray    FLONASE    16 mL    SPRAY 1-2 SPRAYS INTO BOTH NOSTRILS DAILY    Chronic rhinitis       LORazepam 0.5 MG tablet    ATIVAN    20 tablet    Take 1 tablet (0.5 mg) by mouth every 8 hours as needed for anxiety    Anxiety       metoprolol 50 MG 24 hr tablet    TOPROL-XL    135 tablet    Take 1 tablet (50 mg) by mouth  daily Patient is taking 1.5 tablets (75mg) daily    Benign essential hypertension       omeprazole 40 MG capsule    priLOSEC    90 capsule    Take 1 capsule (40 mg) by mouth daily Take 30-60 minutes before a meal.    Gastroesophageal reflux disease without esophagitis       Vitamin D-3 5000 UNITS Tabs      Take 1 tablet by mouth daily.

## 2017-09-13 LAB
BACTERIA SPEC CULT: NORMAL
SPECIMEN SOURCE: NORMAL

## 2017-09-13 NOTE — PROGRESS NOTES
Humaira,    The urine culture is negative.  How are you feeling?  You can stop the antibiotic.  If you have ongoing symptoms I want you to see urology so they can look around inside your bladder.  Keep me posted please.    Lyudmila Escobar PA-C

## 2017-10-04 DIAGNOSIS — F41.9 ANXIETY: ICD-10-CM

## 2017-10-04 RX ORDER — LORAZEPAM 0.5 MG/1
0.5 TABLET ORAL EVERY 8 HOURS PRN
Qty: 20 TABLET | Refills: 0 | Status: SHIPPED | OUTPATIENT
Start: 2017-10-04 | End: 2017-11-13

## 2017-10-04 NOTE — TELEPHONE ENCOUNTER
Lorazepam      Last Written Prescription Date:  8/24/2017  Last Fill Quantity: 20,   # refills: 0  Last Office Visit with [facility], [facility] or  Health prescribing provider: 9/12/2017  Future Office visit:       Routing refill request to provider for review/approval because:  Drug not on the [facility], [facility] or  [facility] refill protocol or controlled substance

## 2017-10-20 ENCOUNTER — TRANSFERRED RECORDS (OUTPATIENT)
Dept: HEALTH INFORMATION MANAGEMENT | Facility: CLINIC | Age: 67
End: 2017-10-20

## 2017-11-07 ENCOUNTER — DOCUMENTATION ONLY (OUTPATIENT)
Dept: LAB | Facility: CLINIC | Age: 67
End: 2017-11-07

## 2017-11-07 DIAGNOSIS — I10 HTN (HYPERTENSION), BENIGN: ICD-10-CM

## 2017-11-07 DIAGNOSIS — Z00.00 ENCOUNTER FOR ROUTINE ADULT HEALTH EXAMINATION WITHOUT ABNORMAL FINDINGS: Primary | ICD-10-CM

## 2017-11-07 DIAGNOSIS — E55.9 VITAMIN D DEFICIENCY: ICD-10-CM

## 2017-11-07 DIAGNOSIS — E78.5 HYPERLIPIDEMIA WITH TARGET LDL LESS THAN 100: ICD-10-CM

## 2017-11-07 NOTE — PROGRESS NOTES
This patient is coming in for pre physical labs . Please review, associate diagnosis and sign the orders. Thanks!  -Lab Staff

## 2017-11-13 DIAGNOSIS — F41.9 ANXIETY: ICD-10-CM

## 2017-11-13 RX ORDER — LORAZEPAM 0.5 MG/1
0.5 TABLET ORAL EVERY 8 HOURS PRN
Qty: 20 TABLET | Refills: 0 | Status: SHIPPED | OUTPATIENT
Start: 2017-11-13 | End: 2017-12-18

## 2017-11-13 NOTE — TELEPHONE ENCOUNTER
Pending Prescriptions:                       Disp   Refills    LORazepam (ATIVAN) 0.5 MG tablet          20 tab*0            Sig: Take 1 tablet (0.5 mg) by mouth every 8 hours as           needed for anxiety          LOV 9-12-17 Luz  Last Written Prescription Date:  10-4-17  Last Fill Quantity: 20,   # refills: 0  Future Office visit:    Next 5 appointments (look out 90 days)     Dec 14, 2017  9:30 AM CST   PHYSICAL with Lyudmila Escobar PA-C   [facility] ([facility])    [address] [facility] [address]   [phone]                   Routing refill request to provider for review/approval because:  Drug not on the [facility], [facility] or  Health refill protocol or controlled substance    RT Shamika (R)

## 2017-12-08 DIAGNOSIS — E55.9 VITAMIN D DEFICIENCY: ICD-10-CM

## 2017-12-08 DIAGNOSIS — I10 HTN (HYPERTENSION), BENIGN: ICD-10-CM

## 2017-12-08 DIAGNOSIS — E78.5 HYPERLIPIDEMIA WITH TARGET LDL LESS THAN 100: ICD-10-CM

## 2017-12-08 LAB
ALBUMIN SERPL-MCNC: 4 G/DL (ref 3.4–5)
ALP SERPL-CCNC: 94 U/L (ref 40–150)
ALT SERPL W P-5'-P-CCNC: 33 U/L (ref 0–50)
ANION GAP SERPL CALCULATED.3IONS-SCNC: 9 MMOL/L (ref 3–14)
AST SERPL W P-5'-P-CCNC: 29 U/L (ref 0–45)
BILIRUB SERPL-MCNC: 0.5 MG/DL (ref 0.2–1.3)
BUN SERPL-MCNC: 16 MG/DL (ref 7–30)
CALCIUM SERPL-MCNC: 9.1 MG/DL (ref 8.5–10.1)
CHLORIDE SERPL-SCNC: 110 MMOL/L (ref 94–109)
CHOLEST SERPL-MCNC: 165 MG/DL
CO2 SERPL-SCNC: 26 MMOL/L (ref 20–32)
CREAT SERPL-MCNC: 0.72 MG/DL (ref 0.52–1.04)
ERYTHROCYTE [DISTWIDTH] IN BLOOD BY AUTOMATED COUNT: 13.9 % (ref 10–15)
GFR SERPL CREATININE-BSD FRML MDRD: 81 ML/MIN/1.7M2
GLUCOSE SERPL-MCNC: 90 MG/DL (ref 70–99)
HCT VFR BLD AUTO: 49.4 % (ref 35–47)
HDLC SERPL-MCNC: 61 MG/DL
HGB BLD-MCNC: 16.5 G/DL (ref 11.7–15.7)
LDLC SERPL CALC-MCNC: 87 MG/DL
MCH RBC QN AUTO: 32.7 PG (ref 26.5–33)
MCHC RBC AUTO-ENTMCNC: 33.4 G/DL (ref 31.5–36.5)
MCV RBC AUTO: 98 FL (ref 78–100)
NONHDLC SERPL-MCNC: 104 MG/DL
PLATELET # BLD AUTO: 185 10E9/L (ref 150–450)
POTASSIUM SERPL-SCNC: 4 MMOL/L (ref 3.4–5.3)
PROT SERPL-MCNC: 6.9 G/DL (ref 6.8–8.8)
RBC # BLD AUTO: 5.05 10E12/L (ref 3.8–5.2)
SODIUM SERPL-SCNC: 145 MMOL/L (ref 133–144)
TRIGL SERPL-MCNC: 85 MG/DL
TSH SERPL DL<=0.005 MIU/L-ACNC: 1.38 MU/L (ref 0.4–4)
WBC # BLD AUTO: 5.8 10E9/L (ref 4–11)

## 2017-12-08 PROCEDURE — 80061 LIPID PANEL: CPT | Performed by: PHYSICIAN ASSISTANT

## 2017-12-08 PROCEDURE — 80053 COMPREHEN METABOLIC PANEL: CPT | Performed by: PHYSICIAN ASSISTANT

## 2017-12-08 PROCEDURE — 82306 VITAMIN D 25 HYDROXY: CPT | Performed by: PHYSICIAN ASSISTANT

## 2017-12-08 PROCEDURE — 36415 COLL VENOUS BLD VENIPUNCTURE: CPT | Performed by: PHYSICIAN ASSISTANT

## 2017-12-08 PROCEDURE — 85027 COMPLETE CBC AUTOMATED: CPT | Performed by: PHYSICIAN ASSISTANT

## 2017-12-08 PROCEDURE — 84443 ASSAY THYROID STIM HORMONE: CPT | Performed by: PHYSICIAN ASSISTANT

## 2017-12-11 LAB — DEPRECATED CALCIDIOL+CALCIFEROL SERPL-MC: 38 UG/L (ref 20–75)

## 2017-12-14 ENCOUNTER — HOSPITAL ENCOUNTER (OUTPATIENT)
Dept: MAMMOGRAPHY | Facility: CLINIC | Age: 67
Discharge: HOME | End: 2017-12-14
Attending: PHYSICIAN ASSISTANT | Admitting: PHYSICIAN ASSISTANT
Payer: MEDICARE

## 2017-12-14 ENCOUNTER — OFFICE VISIT (OUTPATIENT)
Dept: FAMILY MEDICINE | Facility: CLINIC | Age: 67
End: 2017-12-14
Payer: COMMERCIAL

## 2017-12-14 ENCOUNTER — HOSPITAL ENCOUNTER (OUTPATIENT)
Dept: MAMMOGRAPHY | Facility: CLINIC | Age: 67
End: 2017-12-14
Attending: PHYSICIAN ASSISTANT
Payer: MEDICARE

## 2017-12-14 VITALS
DIASTOLIC BLOOD PRESSURE: 83 MMHG | HEIGHT: 64 IN | TEMPERATURE: 97.3 F | SYSTOLIC BLOOD PRESSURE: 146 MMHG | WEIGHT: 135 LBS | BODY MASS INDEX: 23.05 KG/M2 | OXYGEN SATURATION: 95 % | HEART RATE: 61 BPM

## 2017-12-14 DIAGNOSIS — R92.8 ABNORMAL MAMMOGRAM: ICD-10-CM

## 2017-12-14 DIAGNOSIS — Z12.11 SCREEN FOR COLON CANCER: ICD-10-CM

## 2017-12-14 DIAGNOSIS — I10 HTN (HYPERTENSION), BENIGN: ICD-10-CM

## 2017-12-14 DIAGNOSIS — F17.200 SMOKER: ICD-10-CM

## 2017-12-14 DIAGNOSIS — Z00.00 ENCOUNTER FOR ROUTINE ADULT HEALTH EXAMINATION WITHOUT ABNORMAL FINDINGS: Primary | ICD-10-CM

## 2017-12-14 DIAGNOSIS — Z12.31 VISIT FOR SCREENING MAMMOGRAM: ICD-10-CM

## 2017-12-14 DIAGNOSIS — E78.5 HYPERLIPIDEMIA WITH TARGET LDL LESS THAN 100: ICD-10-CM

## 2017-12-14 PROCEDURE — 76642 ULTRASOUND BREAST LIMITED: CPT | Mod: LT

## 2017-12-14 PROCEDURE — G0202 SCR MAMMO BI INCL CAD: HCPCS

## 2017-12-14 PROCEDURE — G0439 PPPS, SUBSEQ VISIT: HCPCS | Performed by: PHYSICIAN ASSISTANT

## 2017-12-14 RX ORDER — ATORVASTATIN CALCIUM 40 MG/1
40 TABLET, FILM COATED ORAL DAILY
Qty: 90 TABLET | Refills: 3 | Status: SHIPPED | OUTPATIENT
Start: 2017-12-14 | End: 2018-12-17

## 2017-12-14 RX ORDER — UBIDECARENONE 200 MG
200 CAPSULE ORAL DAILY
COMMUNITY

## 2017-12-14 RX ORDER — METOPROLOL SUCCINATE 100 MG/1
100 TABLET, EXTENDED RELEASE ORAL DAILY
Qty: 90 TABLET | Refills: 3 | Status: SHIPPED | OUTPATIENT
Start: 2017-12-14 | End: 2018-12-04

## 2017-12-14 NOTE — NURSING NOTE
"No chief complaint on file.      Initial /83 (BP Location: Right arm, Cuff Size: Adult Regular)  Pulse 61  Temp 97.3  F (36.3  C) (Tympanic)  Ht 5' 4\" (1.626 m)  Wt 135 lb (61.2 kg)  SpO2 95%  Breastfeeding? No  BMI 23.17 kg/m2 Estimated body mass index is 23.17 kg/(m^2) as calculated from the following:    Height as of this encounter: 5' 4\" (1.626 m).    Weight as of this encounter: 135 lb (61.2 kg).  Medication Reconciliation: complete   Sara Cuevas MA      "

## 2017-12-14 NOTE — MR AVS SNAPSHOT
After Visit Summary   12/14/2017    Karen Caban    MRN: [id number]           Patient Information     Date Of Birth          1950        Visit Information        Provider Department      12/14/2017 9:30 AM Lyudmila Escobar PA-C [facility]        Today's Diagnoses     Encounter for routine adult health examination without abnormal findings    -  1    HTN (hypertension), benign        Hyperlipidemia with target LDL less than 100        Smoker        Screen for colon cancer        Hyperlipidemia LDL goal <100          Care Instructions      Preventive Health Recommendations    Female Ages 65 +    Yearly exam:     See your health care provider every year in order to  o Review health changes.   o Discuss preventive care.    o Review your medicines if your doctor has prescribed any.      You no longer need a yearly Pap test unless you've had an abnormal Pap test in the past 10 years. If you have vaginal symptoms, such as bleeding or discharge, be sure to talk with your provider about a Pap test.      Every 1 to 2 years, have a mammogram.  If you are over 69, talk with your health care provider about whether or not you want to continue having screening mammograms.      Every 10 years, have a colonoscopy. Or, have a yearly FIT test (stool test). These exams will check for colon cancer.       Have a cholesterol test every 5 years, or more often if your doctor advises it.       Have a diabetes test (fasting glucose) every three years. If you are at risk for diabetes, you should have this test more often.       At age 65, have a bone density scan (DEXA) to check for osteoporosis (brittle bone disease).    Shots:    Get a flu shot each year.    Get a tetanus shot every 10 years.    Talk to your doctor about your pneumonia vaccines. There are now two you should receive - Pneumovax (PPSV 23) and Prevnar (PCV 13).    Talk to your doctor about the shingles vaccine.    Talk to your doctor about  the hepatitis B vaccine.    Nutrition:     Eat at least 5 servings of fruits and vegetables each day.      Eat whole-grain bread, whole-wheat pasta and brown rice instead of white grains and rice.      Talk to your provider about Calcium and Vitamin D.     Lifestyle    Exercise at least 150 minutes a week (30 minutes a day, 5 days a week). This will help you control your weight and prevent disease.      Limit alcohol to one drink per day.      No smoking.       Wear sunscreen to prevent skin cancer.       See your dentist twice a year for an exam and cleaning.      See your eye doctor every 1 to 2 years to screen for conditions such as glaucoma, macular degeneration and cataracts.          Follow-ups after your visit        Your next 10 appointments already scheduled     Jan 16, 2018   Procedure with Efrain Hernadez MD   [facility] ([facility])    [address]  [place] [address] [phone]           [facility] is located at [address]              Who to contact     If you have questions or need follow up information about today's clinic visit or your schedule please contact [facility] directly at [phone].  Normal or non-critical lab and imaging results will be communicated to you by MyChart, letter or phone within 4 business days after the clinic has received the results. If you do not hear from us within 7 days, please contact the clinic through MyCoophart or phone. If you have a critical or abnormal lab result, we will notify you by phone as soon as possible.  Submit refill requests through dscovered or call your pharmacy and they will forward the refill request to us. Please allow 3 business days for your refill to be completed.          Additional Information About Your Visit        MyCoopharPersonally Information     dscovered gives you secure access to your electronic health record. If you see a primary care provider, you can  "also send messages to your care team and make appointments. If you have questions, please call your primary care clinic.  If you do not have a primary care provider, please call [phone] and they will assist you.        Care EveryWhere ID     This is your Care EveryWhere ID. This could be used by other organizations to access your [place] medical records  [id number]        Your Vitals Were     Pulse Temperature Height Pulse Oximetry Breastfeeding? BMI (Body Mass Index)    61 97.3  F (36.3  C) (Tympanic) 5' 4\" (1.626 m) 95% No 23.17 kg/m2       Blood Pressure from Last 3 Encounters:   12/14/17 146/83   09/12/17 172/86   08/08/17 (!) 175/93    Weight from Last 3 Encounters:   12/14/17 135 lb (61.2 kg)   09/12/17 134 lb (60.8 kg)   08/08/17 136 lb (61.7 kg)              Today, you had the following     No orders found for display         Today's Medication Changes          These changes are accurate as of: 12/14/17  9:46 AM.  If you have any questions, ask your nurse or doctor.               These medicines have changed or have updated prescriptions.        Dose/Directions    metoprolol 100 MG 24 hr tablet   Commonly known as:  TOPROL-XL   This may have changed:    - medication strength  - how much to take  - additional instructions   Used for:  HTN (hypertension), benign   Changed by:  Lyudmila Escobar PA-C        Dose:  100 mg   Take 1 tablet (100 mg) by mouth daily   Quantity:  90 tablet   Refills:  3            Where to get your medicines      These medications were sent to [facility] - [place], [place] - [address]   [address] [place] , [facility] [address]    Hours:  Mon-Fri 8:30am-5:00pm Toll Free [phone] Phone:  [phone]     atorvastatin 40 MG tablet    metoprolol 100 MG 24 hr tablet                Primary Care Provider Office Phone # Fax #    Lyudmila Escobar PA-C [phone] [address]  [facility] [address]        Equal Access " to Services     JAS [facility] Center[facility] : Areli Rodney, wagenia woodruff, qagerardo kaalmalayla rivas. So [facility] [phone].    ATENCIÓN: Si giancarlola mary, tiene a umana disposición servicios gratuitos de asistencia lingüística. Llame al [phone].    We comply with applicable federal civil rights laws and [place] laws. We do not discriminate on the basis of race, color, national origin, age, disability, sex, sexual orientation, or gender identity.            Thank you!     Thank you for choosing [facility]  for your care. Our goal is always to provide you with excellent care. Hearing back from our patients is one way we can continue to improve our services. Please take a few minutes to complete the written survey that you may receive in the mail after your visit with us. Thank you!             Your Updated Medication List - Protect others around you: Learn how to safely use, store and throw away your medicines at www.disposemymeds.org.          This list is accurate as of: 12/14/17  9:46 AM.  Always use your most recent med list.                   Brand Name Dispense Instructions for use Diagnosis    amoxicillin 500 MG capsule    AMOXIL    4 capsule    Take 4 capsules (2000mg) by mouth 1 time for 1 dose    Antibiotic prophylaxis for dental procedure indicated due to prior joint replacement       aspirin 81 MG tablet      Take 2 tablets by mouth daily.        atorvastatin 40 MG tablet    LIPITOR    90 tablet    Take 1 tablet (40 mg) by mouth daily    Hyperlipidemia LDL goal <100       coenzyme Q-10 200 MG Caps           ESTRADIOL 0.1MG/GM CREAM     30 g    Place vaginally At Bedtime Insert 1 gram vaginally 2-3 times per week    Atrophic vaginitis       fluticasone 50 MCG/ACT spray    FLONASE    16 mL    SPRAY 1-2 SPRAYS INTO BOTH NOSTRILS DAILY    Chronic rhinitis       LORazepam 0.5 MG tablet    ATIVAN    20 tablet    Take 1 tablet (0.5 mg) by mouth every  8 hours as needed for anxiety    Anxiety       metoprolol 100 MG 24 hr tablet    TOPROL-XL    90 tablet    Take 1 tablet (100 mg) by mouth daily    HTN (hypertension), benign       omeprazole 40 MG capsule    priLOSEC    90 capsule    Take 1 capsule (40 mg) by mouth daily Take 30-60 minutes before a meal.    Gastroesophageal reflux disease without esophagitis       PROBIOTIC ACIDOPHILUS PO           Vitamin D-3 5000 UNITS Tabs      Take 1 tablet by mouth daily.

## 2017-12-14 NOTE — PROGRESS NOTES
Corazon Gerardo, glad to see that your breast ultrasound was fine.    Thank you for all of the goodies!  You are amazing!    Lyudmila Escobar PA-C

## 2017-12-14 NOTE — PROGRESS NOTES
SUBJECTIVE:   Karen Caban is a 67 year old female who presents for Preventive Visit.  Pt feels well, retired  Not walking but will start with her sister who lives nearby  Smoker not interested in quitting.  Had mammogram earlier today and scheduled for colonoscopy next month.  Are you in the first 12 months of your Medicare Part B coverage?  No    Healthy Habits:    Do you get at least three servings of calcium containing foods daily (dairy, green leafy vegetables, etc.)? yes    Amount of exercise or daily activities, outside of work: 7 day(s) per week    Problems taking medications regularly No    Medication side effects: No    Have you had an eye exam in the past two years? yes    Do you see a dentist twice per year? yes    Do you have sleep apnea, excessive snoring or daytime drowsiness?no      Ability to successfully perform activities of daily living: Yes, no assistance needed    Home safety:  none identified     Hearing impairment: No    Fall risk:           COGNITIVE SCREEN  1) Repeat 3 items (Banana, Sunrise, Chair)    2) Clock draw: NORMAL  3) 3 item recall: Recalls 3 objects  Results: 3 items recalled: COGNITIVE IMPAIRMENT LESS LIKELY    Mini-CogTM Copyright S Alex. Licensed by the author for use in [facility]; reprinted with permission (soob@[email or web address]). All rights reserved.        Tobacco  Allergies         Reviewed and updated as needed this visit by Provider        Social History   Substance Use Topics     Smoking status: Current Every Day Smoker     Types: Cigarettes     Smokeless tobacco: Never Used      Comment: Trying to quit, using  Commit long's     Alcohol use 2.4 - 3.0 oz/week     4 - 5 Standard drinks or equivalent per week      Comment: Beer 3 times per week       If you drink alcohol do you typically have >3 drinks per day or >7 drinks per week? No                        Today's PHQ-2 Score:   PHQ-2 ( 1999 [employer]) 8/8/2017 1/3/2017   Q1: Little interest or pleasure in  doing things 0 0   Q2: Feeling down, depressed or hopeless 0 0   PHQ-2 Score 0 0         Do you feel safe in your environment - Yes    Do you have a Health Care Directive?: Yes: Patient states has Advance Directive and will bring in a copy to clinic.    Current providers sharing in care for this patient include: Patient Care Team:  Lyudmila Escobar PA-C as PCP - General (Internal Medicine)  Past Medical History:   Diagnosis Date     Ankle fracture 2009    L     Anxiety      Chronic back pain      Colon polyp 2012    repeat colonoscopy 5 years.     Fx low femur epiphy-closed (H)      GERD (gastroesophageal reflux disease)      History of UTI 2017    Cysto by Dr Agustin neg     HTN (hypertension), benign      Hyperlipidemia LDL goal < 100      Normal nuclear stress test 12/09    EF 67%     Osteopenia      PAD (peripheral artery disease) (H)     s/p fem pop bypass; embolectomy     PUD (peptic ulcer disease) 1980s    DU     Smoker      Vitamin D deficiency      Past Surgical History:   Procedure Laterality Date     Blood clot removal from Stent      2011     BYPASS GRAFT AORTOFEMORAL  5/02    Dr. Turner     BYPASS GRAFT FEMOROPOPLITEAL  12/16/2011     EMBOLECTOMY LOWER EXTREMITY  12/16/2011    Procedure:EMBOLECTOMY LOWER EXTREMITY; EMBOLECTOMY, RIGHT POPLITEAL WITH PATCH ANGIOPLASTY. EXCISION SKIN LESION RIGHT LEG. ; Surgeon:PARMINDER VELAZCO; Location:[facility] OR     EXCISE LESION LOWER EXTREMITY  12/16/2011    Procedure:EXCISE LESION LOWER EXTREMITY; Surgeon:PARMINDER VELAZCO; Location:[facility] OR     HERNIA REPAIR  11/4/08    x2     L achilles repair  12/4/08     LAPAROSCOPIC OOPHORECTOMY      L for cyst     miscarriages x 3       tubal ligation and reversal       Current Outpatient Prescriptions   Medication Sig Dispense Refill     coenzyme Q-10 200 MG CAPS        Lactobacillus (PROBIOTIC ACIDOPHILUS PO)        metoprolol (TOPROL-XL) 100 MG 24 hr tablet Take 1 tablet (100 mg) by mouth daily 90 tablet 3      "atorvastatin (LIPITOR) 40 MG tablet Take 1 tablet (40 mg) by mouth daily 90 tablet 3     LORazepam (ATIVAN) 0.5 MG tablet Take 1 tablet (0.5 mg) by mouth every 8 hours as needed for anxiety 20 tablet 0     amoxicillin (AMOXIL) 500 MG capsule Take 4 capsules (2000mg) by mouth 1 time for 1 dose 4 capsule 0     ESTRADIOL 0.1MG/GM CREAM Place vaginally At Bedtime Insert 1 gram vaginally 2-3 times per week 30 g 3     omeprazole (PRILOSEC) 40 MG capsule Take 1 capsule (40 mg) by mouth daily Take 30-60 minutes before a meal. 90 capsule 3     fluticasone (FLONASE) 50 MCG/ACT nasal spray SPRAY 1-2 SPRAYS INTO BOTH NOSTRILS DAILY 16 mL 0     aspirin 81 MG tablet Take 2 tablets by mouth daily.       Cholecalciferol (VITAMIN D-3) 5000 UNIT TABS Take 1 tablet by mouth daily.       [DISCONTINUED] metoprolol (TOPROL-XL) 50 MG 24 hr tablet Take 1 tablet (50 mg) by mouth daily Patient is taking 1.5 tablets (75mg) daily 135 tablet 1     [DISCONTINUED] atorvastatin (LIPITOR) 40 MG tablet Take 1 tablet (40 mg) by mouth daily 90 tablet 3         ROS:  Constitutional, HEENT, cardiovascular, pulmonary, GI, , musculoskeletal, neuro, skin, endocrine and psych systems are negative, except as otherwise noted.      OBJECTIVE:   /83 (BP Location: Right arm, Cuff Size: Adult Regular)  Pulse 61  Temp 97.3  F (36.3  C) (Tympanic)  Ht 5' 4\" (1.626 m)  Wt 135 lb (61.2 kg)  SpO2 95%  Breastfeeding? No  BMI 23.17 kg/m2 Estimated body mass index is 23.17 kg/(m^2) as calculated from the following:    Height as of this encounter: 5' 4\" (1.626 m).    Weight as of this encounter: 135 lb (61.2 kg).  EXAM:   GENERAL APPEARANCE: healthy, alert and no distress  EYES: Eyes grossly normal to inspection, PERRL and conjunctivae and sclerae normal  HENT: ear canals with some dry cerumen and TM's normal, nose and mouth without ulcers or lesions, oropharynx clear and oral mucous membranes moist  NECK: no adenopathy, no asymmetry, masses, or scars and " thyroid normal to palpation  RESP: lungs clear to auscultation - no rales, rhonchi or wheezes  BREAST: normal without masses, tenderness or nipple discharge and no palpable axillary masses or adenopathy  CV: regular rate and rhythm, normal S1 S2, no S3 or S4, no murmur, click or rub, no peripheral edema and peripheral pulses strong  ABDOMEN: soft, nontender, no hepatosplenomegaly, no masses and bowel sounds normal  MS: no musculoskeletal defects are noted and gait is age appropriate without ataxia  SKIN: no suspicious lesions or rashes  NEURO: Normal strength and tone, sensory exam grossly normal, mentation intact and speech normal  PSYCH: mentation appears normal and affect normal/bright    Results for orders placed or performed during the hospital encounter of 12/14/17   MA Screen Bilateral w/Jay    Narrative    SCREENING MAMMOGRAM, BILATERAL, DIGITAL w/CAD and TOMOSYNTHESIS -  December 14, 2017 8:32 AM    BREAST SYMPTOMS: No current breast complaints.     COMPARISON: 12/13/2016, 12/8/2015, 12/1/2014, 11/6/2013.    BREAST DENSITY: Scattered fibroglandular densities.    COMMENTS: There is a partially obscured asymmetry in the retroareolar  left breast, best seen on the CC view. This should be evaluated with  ultrasound. No finding of suspicion in the right breast.        Impression    IMPRESSION: BI-RADS CATEGORY: 0 - Incomplete - Need Additional Imaging  Evaluation and/or Prior Mammograms for Comparison.    RECOMMENDED FOLLOW-UP: Ultrasound.    Exam results letter mailed to patient.         ASSESSMENT / PLAN:   Assessment and Plan:     (Z00.00) Encounter for routine adult health examination without abnormal findings  (primary encounter diagnosis)  Comment:   Plan: Previsit labs reviewed with pt and copy given. Recd for high Na increase water intake.    (I10) HTN (hypertension), benign  Comment: she is getting some BP readings 130s-140 at home so increasing toprol from 75 to 100mg qd. Check BPs closely and let me  "know what she is getting  Plan: metoprolol (TOPROL-XL) 100 MG 24 hr tablet            (E78.5) Hyperlipidemia with target LDL less than 100  Comment: at goal on lipitor   Plan: cont current dose    (F17.200) Smoker  Comment:   Plan: not interested in quitting    (Z12.11) Screen for colon cancer  Comment: +hx of polyps  Plan: has colonoscopy scheduled for next month        End of Life Planning:  Patient currently has an advanced directive: No.  I have verified the patient's ablity to prepare an advanced directive/make health care decisions.  Literature was provided to assist patient in preparing an advanced directive.    COUNSELING:  Reviewed preventive health counseling, as reflected in patient instructions        Estimated body mass index is 23.17 kg/(m^2) as calculated from the following:    Height as of this encounter: 5' 4\" (1.626 m).    Weight as of this encounter: 135 lb (61.2 kg).       reports that she has been smoking Cigarettes.  She has never used smokeless tobacco.  Tobacco Cessation Action Plan: Information offered: Patient not interested at this time    Appropriate preventive services were discussed with this patient, including applicable screening as appropriate for cardiovascular disease, diabetes, osteopenia/osteoporosis, and glaucoma.  As appropriate for age/gender, discussed screening for colorectal cancer, prostate cancer, breast cancer, and cervical cancer. Checklist reviewing preventive services available has been given to the patient.    Reviewed patients plan of care and provided an AVS. The Basic Care Plan (routine screening as documented in Health Maintenance) for Karen meets the Care Plan requirement. This Care Plan has been established and reviewed with the Patient.    Counseling Resources:  ATP IV Guidelines  Pooled Cohorts Equation Calculator  Breast Cancer Risk Calculator  FRAX Risk Assessment  ICSI Preventive Guidelines  Dietary Guidelines for Americans, 2010  USDA's MyPlate  ASA " Prophylaxis  Lung CA Screening    Lyudmila Escobar PA-C  [facility]

## 2017-12-14 NOTE — LETTER
[facility]  [address], [address]  [place] [address]                                                                                                            Karen Caban  [address] [address]      December 14, 2017  Date of Exam:     Dear Karen:    Thank you for your recent visit.  Breast Imaging Result: We are pleased to inform you that the results of your recent breast imaging show no evidence of malignancy (cancer).    If you are experiencing any breast problems such as a lump or localized pain we request that you discuss this with your health care provider if you haven t already done so, as additional testing may be necessary.    As you know, early detection of cancer is very important. Although mammography is the most accurate method for early detection, not all cancers are found through mammography. A thorough examination includes a combination of mammography, physical examination and breast self-examination. Currently the American College of Radiology and the Society of Breast Imaging recommend an annual mammogram for all women beginning at the age of 40.    A report of your breast imaging results was sent to: Lyudmila Escobar    Your breast imaging will become part of your medical file here at [facility] for at least 10 years. You are responsible for informing any new health care provider or breast imaging facility of the date and location of this examination.    We appreciate the opportunity to participate in your health care.    Sincerely,    Amira Harris MD  Interpreting Radiologist  [facility]

## 2017-12-18 DIAGNOSIS — K21.9 GASTROESOPHAGEAL REFLUX DISEASE WITHOUT ESOPHAGITIS: ICD-10-CM

## 2017-12-18 DIAGNOSIS — F41.9 ANXIETY: ICD-10-CM

## 2017-12-20 ENCOUNTER — TELEPHONE (OUTPATIENT)
Dept: FAMILY MEDICINE | Facility: CLINIC | Age: 67
End: 2017-12-20

## 2017-12-20 ENCOUNTER — HOSPITAL ENCOUNTER (EMERGENCY)
Facility: CLINIC | Age: 67
Discharge: HOME | End: 2017-12-20
Attending: EMERGENCY MEDICINE | Admitting: EMERGENCY MEDICINE
Payer: MEDICARE

## 2017-12-20 VITALS
WEIGHT: 134 LBS | BODY MASS INDEX: 22.33 KG/M2 | SYSTOLIC BLOOD PRESSURE: 166 MMHG | OXYGEN SATURATION: 94 % | HEART RATE: 64 BPM | DIASTOLIC BLOOD PRESSURE: 103 MMHG | RESPIRATION RATE: 20 BRPM | TEMPERATURE: 98 F | HEIGHT: 65 IN

## 2017-12-20 DIAGNOSIS — I10 HYPERTENSION, UNSPECIFIED TYPE: ICD-10-CM

## 2017-12-20 DIAGNOSIS — K21.9 GASTROESOPHAGEAL REFLUX DISEASE WITHOUT ESOPHAGITIS: ICD-10-CM

## 2017-12-20 DIAGNOSIS — I10 BENIGN ESSENTIAL HYPERTENSION: ICD-10-CM

## 2017-12-20 LAB
ANION GAP SERPL CALCULATED.3IONS-SCNC: 8 MMOL/L (ref 3–14)
BASOPHILS # BLD AUTO: 0 10E9/L (ref 0–0.2)
BASOPHILS NFR BLD AUTO: 0.4 %
BUN SERPL-MCNC: 12 MG/DL (ref 7–30)
CALCIUM SERPL-MCNC: 8.7 MG/DL (ref 8.5–10.1)
CHLORIDE SERPL-SCNC: 109 MMOL/L (ref 94–109)
CO2 SERPL-SCNC: 25 MMOL/L (ref 20–32)
CREAT SERPL-MCNC: 0.65 MG/DL (ref 0.52–1.04)
DIFFERENTIAL METHOD BLD: ABNORMAL
EOSINOPHIL # BLD AUTO: 0.1 10E9/L (ref 0–0.7)
EOSINOPHIL NFR BLD AUTO: 1.2 %
ERYTHROCYTE [DISTWIDTH] IN BLOOD BY AUTOMATED COUNT: 13.1 % (ref 10–15)
GFR SERPL CREATININE-BSD FRML MDRD: >90 ML/MIN/1.7M2
GLUCOSE SERPL-MCNC: 125 MG/DL (ref 70–99)
HCT VFR BLD AUTO: 48.6 % (ref 35–47)
HGB BLD-MCNC: 16.9 G/DL (ref 11.7–15.7)
IMM GRANULOCYTES # BLD: 0 10E9/L (ref 0–0.4)
IMM GRANULOCYTES NFR BLD: 0.1 %
INTERPRETATION ECG - MUSE: NORMAL
LYMPHOCYTES # BLD AUTO: 2 10E9/L (ref 0.8–5.3)
LYMPHOCYTES NFR BLD AUTO: 27.6 %
MCH RBC QN AUTO: 33.3 PG (ref 26.5–33)
MCHC RBC AUTO-ENTMCNC: 34.8 G/DL (ref 31.5–36.5)
MCV RBC AUTO: 96 FL (ref 78–100)
MONOCYTES # BLD AUTO: 0.5 10E9/L (ref 0–1.3)
MONOCYTES NFR BLD AUTO: 6.4 %
NEUTROPHILS # BLD AUTO: 4.7 10E9/L (ref 1.6–8.3)
NEUTROPHILS NFR BLD AUTO: 64.3 %
NRBC # BLD AUTO: 0 10*3/UL
NRBC BLD AUTO-RTO: 0 /100
PLATELET # BLD AUTO: 197 10E9/L (ref 150–450)
POTASSIUM SERPL-SCNC: 3.8 MMOL/L (ref 3.4–5.3)
RBC # BLD AUTO: 5.08 10E12/L (ref 3.8–5.2)
SODIUM SERPL-SCNC: 142 MMOL/L (ref 133–144)
TROPONIN I SERPL-MCNC: <0.015 UG/L (ref 0–0.04)
WBC # BLD AUTO: 7.3 10E9/L (ref 4–11)

## 2017-12-20 PROCEDURE — 99284 EMERGENCY DEPT VISIT MOD MDM: CPT

## 2017-12-20 PROCEDURE — 84484 ASSAY OF TROPONIN QUANT: CPT | Performed by: EMERGENCY MEDICINE

## 2017-12-20 PROCEDURE — 80048 BASIC METABOLIC PNL TOTAL CA: CPT | Performed by: EMERGENCY MEDICINE

## 2017-12-20 PROCEDURE — 93005 ELECTROCARDIOGRAM TRACING: CPT

## 2017-12-20 PROCEDURE — 85025 COMPLETE CBC W/AUTO DIFF WBC: CPT | Performed by: EMERGENCY MEDICINE

## 2017-12-20 RX ORDER — OMEPRAZOLE 40 MG/1
40 CAPSULE, DELAYED RELEASE ORAL DAILY
Qty: 30 CAPSULE | Refills: 0 | Status: SHIPPED | OUTPATIENT
Start: 2017-12-20 | End: 2017-12-20

## 2017-12-20 ASSESSMENT — ENCOUNTER SYMPTOMS
SHORTNESS OF BREATH: 0
HEADACHES: 0
NERVOUS/ANXIOUS: 1
TROUBLE SWALLOWING: 1

## 2017-12-20 NOTE — ED AVS SNAPSHOT
Emergency Department    [address] [place] [address]    Phone:  [phone]    Fax:  [phone]                                       Karen Caban   MRN: [id number]    Department:   Emergency Department   Date of Visit:  12/20/2017           After Visit Summary Signature Page     I have received my discharge instructions, and my questions have been answered. I have discussed any challenges I see with this plan with the nurse or doctor.    ..........................................................................................................................................  Patient/Patient Representative Signature      ..........................................................................................................................................  Patient Representative Print Name and Relationship to Patient    ..................................................               ................................................  Date                                            Time    ..........................................................................................................................................  Reviewed by Signature/Title    ...................................................              ..............................................  Date                                                            Time

## 2017-12-20 NOTE — TELEPHONE ENCOUNTER
"    Metoprolol increased from 75 mg to 100 mg last Thurs  Reports feeling \"funny\" this AM, some tightness in chest, but partially relieved by lorazepam taken 1 hour ago.    This AM BP  7 am 209/120   6 am 200/103   5 am 145/82    Yesterday  AM: 174/118, recheck 194/126  Last night 151/85    Denies any sx such as SOB, CP, radiating pain, HA, light headedness, dizziness, n/v, blurred vision.    BP Readings from Last 6 Encounters:   12/14/17 146/83   09/12/17 172/86   08/08/17 (!) 175/93   01/03/17 177/82   12/13/16 138/76   12/05/16 120/70     Call back:   [phone]    Huddled with PCP, pt was sent to ER for eval.  Brenda Matthews RN          "

## 2017-12-20 NOTE — TELEPHONE ENCOUNTER
Medication is being filled for 1 time refill only due to:  Patient needs to be seen because it has been more than one year since last visit. - omeprazole

## 2017-12-20 NOTE — ED AVS SNAPSHOT
Emergency Department    [address] [address]    Phone:  [phone]    Fax:  [phone]                                       Karen Caban   MRN: [id number]    Department:   Emergency Department   Date of Visit:  12/20/2017           Patient Information     Date Of Birth          1950        Your diagnoses for this visit were:     Hypertension, unspecified type        You were seen by Lalo Joya DO.      Follow-up Information     Follow up with Lyudmila Escobar PA-C In 5 days.    Specialty:  Internal Medicine    Contact information:    [address]  [place] [address] [phone]          Follow up with  Emergency Department.    Specialty:  EMERGENCY MEDICINE    Why:  If symptoms worsen    Contact information:    [address] [address] [phone]      Discharge References/Attachments     HYPERTENSION, ESTABLISHED (ENGLISH)      24 Hour Appointment Hotline       To make an appointment at any [facility], call [phone] ([phone]). If you don't have a family doctor or clinic, we will help you find one. [facility] clinics are conveniently located to serve the needs of you and your family.             Review of your medicines      Our records show that you are taking the medicines listed below. If these are incorrect, please call your family doctor or clinic.        Dose / Directions Last dose taken    amoxicillin 500 MG capsule   Commonly known as:  AMOXIL   Quantity:  4 capsule        Take 4 capsules (2000mg) by mouth 1 time for 1 dose   Refills:  0        aspirin 81 MG tablet   Dose:  2 tablet        Take 2 tablets by mouth daily.   Refills:  0        atorvastatin 40 MG tablet   Commonly known as:  LIPITOR   Dose:  40 mg   Quantity:  90 tablet        Take 1 tablet (40 mg) by mouth daily   Refills:  3        coenzyme Q-10 200 MG Caps        Refills:  0        ESTRADIOL 0.1MG/GM CREAM   Quantity:   30 g        Place vaginally At Bedtime Insert 1 gram vaginally 2-3 times per week   Refills:  3        fluticasone 50 MCG/ACT spray   Commonly known as:  FLONASE   Quantity:  16 mL        SPRAY 1-2 SPRAYS INTO BOTH NOSTRILS DAILY   Refills:  0        LORazepam 0.5 MG tablet   Commonly known as:  ATIVAN   Dose:  0.5 mg   Quantity:  20 tablet        Take 1 tablet (0.5 mg) by mouth every 8 hours as needed for anxiety   Refills:  0        metoprolol 100 MG 24 hr tablet   Commonly known as:  TOPROL-XL   Dose:  100 mg   Quantity:  90 tablet        Take 1 tablet (100 mg) by mouth daily   Refills:  3        omeprazole 40 MG capsule   Commonly known as:  priLOSEC   Dose:  40 mg   Quantity:  90 capsule        Take 1 capsule (40 mg) by mouth daily Take 30-60 minutes before a meal.   Refills:  3        PROBIOTIC ACIDOPHILUS PO        Refills:  0        Vitamin D-3 5000 UNITS Tabs   Dose:  1 tablet        Take 1 tablet by mouth daily.   Refills:  0                Procedures and tests performed during your visit     Basic metabolic panel    CBC with platelets + differential    EKG 12 lead    Troponin I (now)      Orders Needing Specimen Collection     None      Pending Results     No orders found from 12/18/2017 to 12/21/2017.            Pending Culture Results     No orders found from 12/18/2017 to 12/21/2017.            Pending Results Instructions     If you had any lab results that were not finalized at the time of your Discharge, you can call the ED Lab Result RN at [phone]. You will be contacted by this team for any positive Lab results or changes in treatment. The nurses are available 7 days a week from 10A to 6:30P.  You can leave a message 24 hours per day and they will return your call.        Test Results From Your Hospital Stay        12/20/2017 10:01 AM      Component Results     Component Value Ref Range & Units Status    WBC 7.3 4.0 - 11.0 10e9/L Final    RBC Count 5.08 3.8 - 5.2 10e12/L Final    Hemoglobin  16.9 (H) 11.7 - 15.7 g/dL Final    Hematocrit 48.6 (H) 35.0 - 47.0 % Final    MCV 96 78 - 100 fl Final    MCH 33.3 (H) 26.5 - 33.0 pg Final    MCHC 34.8 31.5 - 36.5 g/dL Final    RDW 13.1 10.0 - 15.0 % Final    Platelet Count 197 150 - 450 10e9/L Final    Diff Method Automated Method  Final    % Neutrophils 64.3 % Final    % Lymphocytes 27.6 % Final    % Monocytes 6.4 % Final    % Eosinophils 1.2 % Final    % Basophils 0.4 % Final    % Immature Granulocytes 0.1 % Final    Nucleated RBCs 0 0 /100 Final    Absolute Neutrophil 4.7 1.6 - 8.3 10e9/L Final    Absolute Lymphocytes 2.0 0.8 - 5.3 10e9/L Final    Absolute Monocytes 0.5 0.0 - 1.3 10e9/L Final    Absolute Eosinophils 0.1 0.0 - 0.7 10e9/L Final    Absolute Basophils 0.0 0.0 - 0.2 10e9/L Final    Abs Immature Granulocytes 0.0 0 - 0.4 10e9/L Final    Absolute Nucleated RBC 0.0  Final         12/20/2017 10:17 AM      Component Results     Component Value Ref Range & Units Status    Sodium 142 133 - 144 mmol/L Final    Potassium 3.8 3.4 - 5.3 mmol/L Final    Chloride 109 94 - 109 mmol/L Final    Carbon Dioxide 25 20 - 32 mmol/L Final    Anion Gap 8 3 - 14 mmol/L Final    Glucose 125 (H) 70 - 99 mg/dL Final    Urea Nitrogen 12 7 - 30 mg/dL Final    Creatinine 0.65 0.52 - 1.04 mg/dL Final    GFR Estimate >90 >60 mL/min/1.7m2 Final    Non  GFR Calc    GFR Estimate If Black >90 >60 mL/min/1.7m2 Final    African American GFR Calc    Calcium 8.7 8.5 - 10.1 mg/dL Final         12/20/2017 10:21 AM      Component Results     Component Value Ref Range & Units Status    Troponin I ES <0.015 0.000 - 0.045 ug/L Final    The 99th percentile for upper reference range is 0.045 ug/L.  Troponin values   in the range of 0.045 - 0.120 ug/L may be associated with risks of adverse   clinical events.                  Clinical Quality Measure: Blood Pressure Screening     Your blood pressure was checked while you were in the emergency department today. The last reading we  obtained was  BP: (!) 160/94 . Please read the guidelines below about what these numbers mean and what you should do about them.  If your systolic blood pressure (the top number) is less than 120 and your diastolic blood pressure (the bottom number) is less than 80, then your blood pressure is normal. There is nothing more that you need to do about it.  If your systolic blood pressure (the top number) is 120-139 or your diastolic blood pressure (the bottom number) is 80-89, your blood pressure may be higher than it should be. You should have your blood pressure rechecked within a year by a primary care provider.  If your systolic blood pressure (the top number) is 140 or greater or your diastolic blood pressure (the bottom number) is 90 or greater, you may have high blood pressure. High blood pressure is treatable, but if left untreated over time it can put you at risk for heart attack, stroke, or kidney failure. You should have your blood pressure rechecked by a primary care provider within the next 4 weeks.  If your provider in the emergency department today gave you specific instructions to follow-up with your doctor or provider even sooner than that, you should follow that instruction and not wait for up to 4 weeks for your follow-up visit.        Thank you for choosing [facility]       Thank you for choosing [facility] for your care. Our goal is always to provide you with excellent care. Hearing back from our patients is one way we can continue to improve our services. Please take a few minutes to complete the written survey that you may receive in the mail after you visit with us. Thank you!        [employer] Information     [employer] gives you secure access to your electronic health record. If you see a primary care provider, you can also send messages to your care team and make appointments. If you have questions, please call your primary care clinic.  If you do not have a primary care provider, please call  [phone] and they will assist you.        Care EveryWhere ID     This is your Care EveryWhere ID. This could be used by other organizations to access your [facility] medical records  [id number]        Equal Access to Services     LUIS DOWLING: Areli Rodney, wagenia woodruff, qagerardo kaalmasalma wilkinson, layla dowling. So [facility] [phone].    ATENCIÓN: Si habla español, tiene a umana disposición servicios gratuitos de asistencia lingüística. Llame al [phone].    We comply with applicable federal civil rights laws and [place] laws. We do not discriminate on the basis of race, color, national origin, age, disability, sex, sexual orientation, or gender identity.            After Visit Summary       This is your record. Keep this with you and show to your community pharmacist(s) and doctor(s) at your next visit.

## 2017-12-20 NOTE — ED PROVIDER NOTES
History     Chief Complaint:  Hypertension     HPI   Karen Caban is a 67 year old female who presents to the emergency department for evaluation of her hypertension. The patient was told to come here by her primary care provider for evaluation because of her heightened blood pressure. The patient has been documenting her blood pressures since the medication changes. Her Metoprolol was recently increased to 100mg per day; she was originally on 75mg pre day. She endorses some new difficulty with swallowing and increased anxiety. The patient denies headache, chest pain and shortness of breath.     Primary PA-C: Lyudmila Escobar    Allergies:  Chantix [Varenicline]  Ciprofloxacin  Decongestant [Cvs]  Erythromycin  Plavix [Clopidogrel Bisulfate]  Vioxx      Medications:    Lactobacillus  Metoprolol  Atorvastatin  Ativan  Estradiol  Prilosec  Aspirin     Past Medical History:    Ankle surgery  Anxiety  Chronic back pain  Colon polyp  Low femur fracture epiphy-closed  GERD  UTI  Hypertension  Hyperlipidemia   Normal nuclear stress test  Osteopenia  Peripheral artery disease  Peptic ulcer disease  Smoker  Vitamin D deficiency     Past Surgical History:    Blood clot removal from stent  Bypass graft aortofemoral  Bypass graft femoropopliteal  Embolectomy lower extremity   Excise lesion lower extremity  Hernia repair x2  L achilles repair  Laparoscopic oophorectomy   Miscarriages x3  Tubal ligation and reversal    Family History:    Alzheimer's Disease - Mother  Osteoporosis - Mother  Liver cancer - Father     Social History:  The patient was alone.  Smoking Status: Current every day smoker  Smokeless Tobacco: Never  Alcohol Use: Beer three times per week   Marital Status:        Review of Systems   HENT: Positive for trouble swallowing.    Respiratory: Negative for shortness of breath.    Cardiovascular: Negative for chest pain.   Neurological: Negative for headaches.   Psychiatric/Behavioral: The patient is  "nervous/anxious.    All other systems reviewed and are negative.    Physical Exam     Patient Vitals for the past 24 hrs:   BP Temp Pulse Resp SpO2 Height Weight   12/20/17 1015 (!) 160/94 - - - 97 % - -   12/20/17 1000 (!) 192/135 - - - 96 % - -   12/20/17 0953 (!) 178/104 - - - 96 % - -   12/20/17 0940 (!) 218/102 - - - 98 % - -   12/20/17 0938 (!) 224/103 98  F (36.7  C) 64 20 99 % 1.651 m (5' 5\") 60.8 kg (134 lb)      Physical Exam  General: Alert and cooperative with exam. Patient in mild distress. Normal mentation. Patient is mildly anxious appearing.   Head:  Scalp is NC/AT  Eyes:  No scleral icterus, PERRL  ENT:  The external nose and ears are normal. The oropharynx is normal and without erythema; mucus membranes are moist. Uvula midline, no evidence of deep space infection.  Neck:  Normal range of motion without rigidity.  CV:  Regular rate and rhythm    No pathologic murmur   Resp:  Breath sounds are clear bilaterally    Non-labored, no retractions or accessory muscle use  GI:  Abdomen is soft, no distension, no tenderness. No peritoneal signs  MS:  No lower extremity edema   Skin:  Warm and dry, No rash or lesions noted.    Emergency Department Course     ECG:  Indication: Hypertension   Completed at 0948.  Read at 1000.   Normal sinus rhythm  Anteroseptal infarct, age undetermined  Abnormal ECG  Compared to old ECG   Rate 61 bpm. TX interval 142. QRS duration 80. QT/QTc 430/432. P-R-T axes 25 4 58.    Laboratory:  Laboratory findings were communicated with the patient who voiced understanding of the findings.    CBC: HGB 16.9 (H) o/w WNL. (WBC 7.3, )   BMP: Glucose 125 (H), o/w WNL (Creatinine 0.65)    Troponin (Collected 0950): <0.015    Emergency Department Course:  Nursing notes and vitals reviewed.  EKG obtained in the ED, see results above.   IV was inserted and blood was drawn for laboratory testing, results above.  0942: I performed an exam of the patient as documented above.   1038: " Patient rechecked and updated.   Findings and plan explained to the Patient. Patient discharged home with instructions regarding supportive care, medications, and reasons to return. The importance of close follow-up was reviewed.  I personally reviewed the laboratory results with the Patient and answered all related questions prior to discharge.    Impression & Plan      Medical Decision Making:  Karen Caban is a 67 year old female who presents for evaluation of elevated blood pressure.  There is a history of hypertension in the past.  The workup here is negative and the patient does not have any clinical, laboratory, ECG or historical signs of end-organ dysfunction.  There is no signs of hypertensive emergency.  Supportive outpatient management is therefore indicated with close follow-up of primary care physician.  Given data obtained here in ED, will encouraged serial blood pressure monitoring at home to aid primary in decision making regarding hypertension.      Diagnosis:    ICD-10-CM    1. Hypertension, unspecified type I10        Disposition:  Discharged to home.    Scribe Disclosure:  Annmarie BRODY, am serving as a scribe at 9:42 AM on 12/20/2017 to document services personally performed by Lalo Joya DO based on my observations and the provider's statements to me.   12/20/2017    EMERGENCY DEPARTMENT       Lalo Joya DO  12/21/17 2239

## 2017-12-21 RX ORDER — LORAZEPAM 0.5 MG/1
0.5 TABLET ORAL EVERY 8 HOURS PRN
Qty: 20 TABLET | Refills: 0 | Status: SHIPPED | OUTPATIENT
Start: 2017-12-21 | End: 2018-01-03

## 2017-12-21 NOTE — TELEPHONE ENCOUNTER
Lorazepam  Last Written Prescription Date:  11/13/17  Last Fill Quantity: 20,   # refills: 0  Last Office Visit: 12/14/17    Routing refill request to provider for review/approval because:  Drug not on the [facility], [facility] or [facility] refill protocol or controlled substance    Kailey KRUASE RN

## 2017-12-22 RX ORDER — OMEPRAZOLE 40 MG/1
CAPSULE, DELAYED RELEASE ORAL
Qty: 90 CAPSULE | Refills: 3 | Status: SHIPPED | OUTPATIENT
Start: 2017-12-22 | End: 2018-12-17

## 2017-12-22 RX ORDER — LISINOPRIL 5 MG/1
TABLET ORAL
OUTPATIENT
Start: 2017-12-22

## 2017-12-22 NOTE — TELEPHONE ENCOUNTER
Omeprazole Prescription [place] per [facility] [place].  Lisinopril Rx refused. Duplicate request. Pharmacy notified. Rx sent 12/20/17 #30 with 1 refill - gerardo KRAUSE RN            Requested Prescriptions   Pending Prescriptions Disp Refills     lisinopril (PRINIVIL/ZESTRIL) 5 MG tablet [Pharmacy Med Name: LISINOPRIL 5MG TABLETS] 90 tablet 1     Sig: TAKE 1 TABLET(5 MG) BY MOUTH DAILY    ACE Inhibitors (Including Combos) Protocol Failed    12/21/2017  9:36 AM       Failed - Blood pressure under 140/90    BP Readings from Last 3 Encounters:   12/20/17 (!) 166/103   12/14/17 146/83   09/12/17 172/86            Passed - Recent or future visit with authorizing provider's specialty    Patient had office visit in the last year or has a visit in the next 30 days with authorizing provider.  See chart review.              Passed - Patient is age 18 or older       Passed - No active pregnancy on record       Passed - Normal serum creatinine on file in past 12 months    Recent Labs   Lab Test  12/20/17   0950   CR  0.65            Passed - Normal serum potassium on file in past 12 months    Recent Labs   Lab Test  12/20/17   0950   POTASSIUM  3.8            Passed - No positive pregnancy test in past 12 months        omeprazole (PRILOSEC) 40 MG capsule [Pharmacy Med Name: OMEPRAZOLE 40MG CAPSULES] 90 capsule 0     Sig: TAKE ONE CAPSULE BY MOUTH EVERY DAY 30 TO 60 MINUTES BEFORE A MEAL    PPI Protocol Passed    12/21/2017  9:36 AM       Passed - Not on Clopidogrel (unless Pantoprazole ordered)       Passed - No diagnosis of osteoporosis on record       Passed - Recent or future visit with authorizing provider's specialty    Patient had office visit in the last year or has a visit in the next 30 days with authorizing provider.  See chart review.              Passed - Patient is age 18 or older       Passed - No active pregnacy on record       Passed - No positive pregnancy test in past 12 months

## 2018-01-03 DIAGNOSIS — F41.9 ANXIETY: ICD-10-CM

## 2018-01-03 DIAGNOSIS — R30.0 DYSURIA: ICD-10-CM

## 2018-01-03 NOTE — TELEPHONE ENCOUNTER
lorazepam      Last Written Prescription Date:  12/21/17  Last Fill Quantity: 20,   # refills: 0  Last Office Visit: 12/14/17  Future Office visit:       Routing refill request to provider for review/approval because:  Drug not on the [facility], [facility] or [facility] refill protocol or controlled substance    Bactrim ds      Last Written Prescription Date:    Last Fill Quantity: ,   # refills:   Last Office Visit: 12/14/17  Future Office visit:       Routing refill request to provider for review/approval because:  Drug not active on patient's medication list    amoxicillin      Last Written Prescription Date:  09/12/17  Last Fill Quantity: 4,   # refills: 0  Last Office Visit: 12/14/17  Future Office visit:       Routing refill request to provider for review/approval because:  Drug not on the [facility], [facility] or [facility] refill protocol or controlled substance

## 2018-01-04 DIAGNOSIS — R30.0 DYSURIA: ICD-10-CM

## 2018-01-05 DIAGNOSIS — R30.0 DYSURIA: ICD-10-CM

## 2018-01-05 RX ORDER — SULFAMETHOXAZOLE/TRIMETHOPRIM 800-160 MG
TABLET ORAL
Qty: 6 TABLET | Refills: 0 | Status: SHIPPED | OUTPATIENT
Start: 2018-01-05 | End: 2018-01-25

## 2018-01-05 RX ORDER — SULFAMETHOXAZOLE/TRIMETHOPRIM 800-160 MG
TABLET ORAL
Start: 2018-01-05

## 2018-01-05 RX ORDER — LORAZEPAM 0.5 MG/1
TABLET ORAL
Qty: 20 TABLET | Refills: 0 | Status: SHIPPED | OUTPATIENT
Start: 2018-01-05 | End: 2018-03-07

## 2018-01-05 RX ORDER — AMOXICILLIN 500 MG/1
CAPSULE ORAL
Qty: 4 CAPSULE | Refills: 0 | Status: SHIPPED | OUTPATIENT
Start: 2018-01-05 | End: 2018-01-29

## 2018-01-05 NOTE — TELEPHONE ENCOUNTER
Last Rx from Aug 2017 for dysuria  Denied  Needs appt for refill  Vilma JC RN      Requested Prescriptions   Pending Prescriptions Disp Refills     sulfamethoxazole-trimethoprim (BACTRIM DS/SEPTRA DS) 800-160 MG per tablet [Pharmacy Med Name: SULFAMETH//160MG TB] 6 tablet 0     Sig: TAKE 1 TABLET BY MOUTH TWICE DAILY FOR 3 DAYS    Oral Acne/Rosacea Medications Protocol Failed    1/4/2018  3:38 AM       Failed - Confirmation of diagnosis is required    Please confirm diagnosis is acne or rosacea.     If NOT acne or rosacea; refer request to provider for further evaluation.    If diagnosis IS acne or rosacea, OK to refill BASED ON PREVIOUS REFILL CLINICAL NOTE RECOMMENDATION.         Passed - Patient is 12 years of age or older       Passed - Recent or future visit with authorizing provider's specialty    Patient had office visit in the last year or has a visit in the next 30 days with authorizing provider.  See chart review.              Passed - No active pregnancy on record       Passed - No positive prenancy test is past 12 months

## 2018-01-06 DIAGNOSIS — R30.0 DYSURIA: ICD-10-CM

## 2018-01-06 NOTE — TELEPHONE ENCOUNTER
Requested Prescriptions   Pending Prescriptions Disp Refills     sulfamethoxazole-trimethoprim (BACTRIM DS/SEPTRA DS) 800-160 MG per tablet [Pharmacy Med Name: SULFAMETH//160MG TB] 6 tablet 0    Last Written Prescription Date:  1/05/18  Last Fill Quantity: 6 tablet,  # refills: 0   Last Office Visit with [facility] [place] [facility] or [facility] prescribing provider:  12/14/17 Luz  Future Office Visit:      Sig: TAKE 1 TABLET BY MOUTH TWICE DAILY FOR 3 DAYS    Oral Acne/Rosacea Medications Protocol Failed    1/6/2018  3:38 AM       Failed - Confirmation of diagnosis is required    Please confirm diagnosis is acne or rosacea.     If NOT acne or rosacea; refer request to provider for further evaluation.    If diagnosis IS acne or rosacea, OK to refill BASED ON PREVIOUS REFILL CLINICAL NOTE RECOMMENDATION.         Passed - Patient is 12 years of age or older       Passed - Recent or future visit with authorizing provider's specialty    Patient had office visit in the last year or has a visit in the next 30 days with authorizing provider.  See chart review.              Passed - No active pregnancy on record       Passed - No positive prenancy test is past 12 months

## 2018-01-08 RX ORDER — SULFAMETHOXAZOLE/TRIMETHOPRIM 800-160 MG
TABLET ORAL
Qty: 6 TABLET | Refills: 0 | OUTPATIENT
Start: 2018-01-08

## 2018-01-09 ENCOUNTER — MYC MEDICAL ADVICE (OUTPATIENT)
Dept: FAMILY MEDICINE | Facility: CLINIC | Age: 68
End: 2018-01-09

## 2018-01-09 DIAGNOSIS — I10 BENIGN ESSENTIAL HYPERTENSION: ICD-10-CM

## 2018-01-09 RX ORDER — SULFAMETHOXAZOLE/TRIMETHOPRIM 800-160 MG
TABLET ORAL
Qty: 6 TABLET | Refills: 0
Start: 2018-01-09

## 2018-01-09 RX ORDER — LISINOPRIL 10 MG/1
10 TABLET ORAL DAILY
Qty: 90 TABLET | Refills: 1 | Status: SHIPPED | OUTPATIENT
Start: 2018-01-09 | End: 2018-01-25

## 2018-01-09 NOTE — TELEPHONE ENCOUNTER
PCP see below BP readings   Pt asking if she should continue at 10mg?   Noted previous Rx was sent for 5mg tablets, 1 tab daily    Rx pended for 10mg tabs, 1 tab daily    Kailey KRAUSE RN

## 2018-01-17 RX ORDER — ONDANSETRON 2 MG/ML
4 INJECTION INTRAMUSCULAR; INTRAVENOUS
Status: CANCELLED | OUTPATIENT
Start: 2018-01-17

## 2018-01-17 RX ORDER — LIDOCAINE 40 MG/G
CREAM TOPICAL
Status: CANCELLED | OUTPATIENT
Start: 2018-01-17

## 2018-01-18 ENCOUNTER — HOSPITAL ENCOUNTER (OUTPATIENT)
Facility: CLINIC | Age: 68
Discharge: HOME | End: 2018-01-18
Attending: COLON & RECTAL SURGERY | Admitting: COLON & RECTAL SURGERY
Payer: MEDICARE

## 2018-01-18 ENCOUNTER — SURGERY (OUTPATIENT)
Age: 68
End: 2018-01-18

## 2018-01-18 VITALS
OXYGEN SATURATION: 96 % | SYSTOLIC BLOOD PRESSURE: 112 MMHG | DIASTOLIC BLOOD PRESSURE: 76 MMHG | RESPIRATION RATE: 12 BRPM

## 2018-01-18 LAB — COLONOSCOPY: NORMAL

## 2018-01-18 PROCEDURE — 45380 COLONOSCOPY AND BIOPSY: CPT | Performed by: COLON & RECTAL SURGERY

## 2018-01-18 PROCEDURE — 88305 TISSUE EXAM BY PATHOLOGIST: CPT | Mod: 26 | Performed by: COLON & RECTAL SURGERY

## 2018-01-18 PROCEDURE — 25000128 H RX IP 250 OP 636: Performed by: COLON & RECTAL SURGERY

## 2018-01-18 PROCEDURE — 88305 TISSUE EXAM BY PATHOLOGIST: CPT | Performed by: COLON & RECTAL SURGERY

## 2018-01-18 PROCEDURE — G0500 MOD SEDAT ENDO SERVICE >5YRS: HCPCS | Performed by: COLON & RECTAL SURGERY

## 2018-01-18 RX ORDER — ONDANSETRON 4 MG/1
4 TABLET, ORALLY DISINTEGRATING ORAL EVERY 6 HOURS PRN
Status: CANCELLED | OUTPATIENT
Start: 2018-01-18

## 2018-01-18 RX ORDER — FENTANYL CITRATE 50 UG/ML
INJECTION, SOLUTION INTRAMUSCULAR; INTRAVENOUS PRN
Status: DISCONTINUED | OUTPATIENT
Start: 2018-01-18 | End: 2018-01-18 | Stop reason: HOSPADM

## 2018-01-18 RX ORDER — NALOXONE HYDROCHLORIDE 0.4 MG/ML
.1-.4 INJECTION, SOLUTION INTRAMUSCULAR; INTRAVENOUS; SUBCUTANEOUS
Status: CANCELLED | OUTPATIENT
Start: 2018-01-18 | End: 2018-01-19

## 2018-01-18 RX ORDER — FLUMAZENIL 0.1 MG/ML
0.2 INJECTION, SOLUTION INTRAVENOUS
Status: CANCELLED | OUTPATIENT
Start: 2018-01-18 | End: 2018-01-19

## 2018-01-18 RX ORDER — ONDANSETRON 2 MG/ML
4 INJECTION INTRAMUSCULAR; INTRAVENOUS EVERY 6 HOURS PRN
Status: CANCELLED | OUTPATIENT
Start: 2018-01-18

## 2018-01-18 RX ADMIN — MIDAZOLAM 1 MG: 1 INJECTION INTRAMUSCULAR; INTRAVENOUS at 13:48

## 2018-01-18 RX ADMIN — FENTANYL CITRATE 50 MCG: 50 INJECTION, SOLUTION INTRAMUSCULAR; INTRAVENOUS at 13:48

## 2018-01-18 NOTE — H&P
[facility]    History and Physical  Colon and Rectal Surgery     Date of Admission:  1/18/2018      Assessment & Plan   Karen Caban is a 67 year old female who presents for colonoscopy.    Indication: history of colon polyps  Plan for Colonoscopy with possible biopsy, possible polypectomy. We discussed the risks, benefits and alternatives and the patient wished to proceed.    The above has been forwarded to the consulting provider.      Efrain Hernadez MD  [facility], [facility]  [phone]        Code Status   Full Code    Primary Care Physician   Lyudmila Escobar      History is obtained from the patient    History of Present Illness   Karen Caban is a 67 year old female who presents with a history of colon polyps    Past Medical History    I have reviewed this patient's medical history and updated it with pertinent information if needed.   Past Medical History:   Diagnosis Date     Ankle fracture 2009    L     Anxiety      Chronic back pain      Colon polyp 2012    repeat colonoscopy 5 years.     Fx low femur epiphy-closed (H)      GERD (gastroesophageal reflux disease)      History of UTI 2017    Cysto by Dr Agustin neg     HTN (hypertension), benign      Hyperlipidemia LDL goal < 100      Normal nuclear stress test 12/09    EF 67%     Osteopenia      PAD (peripheral artery disease) (H)     s/p fem pop bypass; embolectomy     PUD (peptic ulcer disease) 1980s    DU     Smoker      Vitamin D deficiency        Past Surgical History   I have reviewed this patient's surgical history and updated it with pertinent information if needed.  Past Surgical History:   Procedure Laterality Date     Blood clot removal from Stent      2011     BYPASS GRAFT AORTOFEMORAL  5/02    Dr. Turner     BYPASS GRAFT FEMOROPOPLITEAL  12/16/2011     EMBOLECTOMY LOWER EXTREMITY  12/16/2011    Procedure:EMBOLECTOMY LOWER EXTREMITY; EMBOLECTOMY, RIGHT POPLITEAL WITH PATCH ANGIOPLASTY.  EXCISION SKIN LESION RIGHT LEG. ; Surgeon:PARMINDER VELAZCO; Location:[facility] OR     EXCISE LESION LOWER EXTREMITY  12/16/2011    Procedure:EXCISE LESION LOWER EXTREMITY; Surgeon:PARMINDER VELAZCO; Location:[facility] OR     HERNIA REPAIR  11/4/08    x2     L achilles repair  12/4/08     LAPAROSCOPIC OOPHORECTOMY      L for cyst     miscarriages x 3       tubal ligation and reversal         Prior to Admission Medications   Prior to Admission Medications   Prescriptions Last Dose Informant Patient Reported? Taking?   Cholecalciferol (VITAMIN D-3) 5000 UNIT TABS   Yes No   Sig: Take 1 tablet by mouth daily.   ESTRADIOL 0.1MG/GM CREAM   No No   Sig: Place vaginally At Bedtime Insert 1 gram vaginally 2-3 times per week   LORazepam (ATIVAN) 0.5 MG tablet   No No   Sig: TAKE 1 TABLET(0.5 MG) BY MOUTH EVERY 8 HOURS AS NEEDED FOR ANXIETY   Lactobacillus (PROBIOTIC ACIDOPHILUS PO)   Yes No   amoxicillin (AMOXIL) 500 MG capsule   No No   Sig: TAKE 4 CAPSULES(2000 MG) BY MOUTH 1 TIME FOR 1 DOSE   aspirin 81 MG tablet   Yes No   Sig: Take 2 tablets by mouth daily.   atorvastatin (LIPITOR) 40 MG tablet   No No   Sig: Take 1 tablet (40 mg) by mouth daily   coenzyme Q-10 200 MG CAPS   Yes No   fluticasone (FLONASE) 50 MCG/ACT nasal spray   No No   Sig: SPRAY 1-2 SPRAYS INTO BOTH NOSTRILS DAILY   lisinopril (PRINIVIL/ZESTRIL) 10 MG tablet   No No   Sig: Take 1 tablet (10 mg) by mouth daily   metoprolol (TOPROL-XL) 100 MG 24 hr tablet   No No   Sig: Take 1 tablet (100 mg) by mouth daily   omeprazole (PRILOSEC) 40 MG capsule   No No   Sig: TAKE ONE CAPSULE BY MOUTH EVERY DAY 30 TO 60 MINUTES BEFORE A MEAL   sulfamethoxazole-trimethoprim (BACTRIM DS/SEPTRA DS) 800-160 MG per tablet   No No   Sig: TAKE 1 TABLET BY MOUTH TWICE DAILY FOR 3 DAYS      Facility-Administered Medications: None     Allergies   Allergies   Allergen Reactions     Chantix [Varenicline] Nausea     Ciprofloxacin Other (See Comments)     hypertension     Decongestant  [[facility]]      Erythromycin Nausea     Plavix [Clopidogrel Bisulfate]      Felt as if she was having a heart attack     Vioxx        Social History   I have reviewed this patient's social history and updated it with pertinent information if needed. Karen Caban  reports that she has been smoking Cigarettes.  She has never used smokeless tobacco. She reports that she drinks about 2.4 - 3.0 oz of alcohol per week  She reports that she does not use illicit drugs.    Family History   I have reviewed this patient's family history and updated it with pertinent information if needed.   Family History   Problem Relation Age of Onset     Alzheimer Disease Mother       of panc/GI ca age 83     OSTEOPOROSIS Mother      with hip fx     CANCER Father       age 64 liver ca       Review of Systems   C: NEGATIVE for fever, chills, change in weight  E/M: NEGATIVE for ear, mouth and throat problems  R: NEGATIVE for significant cough or SOB  CV: NEGATIVE for chest pain, palpitations or peripheral edema    Physical Exam                      Vital Signs with Ranges     0 lbs 0 oz    Constitutional: awake, alert, cooperative, no apparent distress, and appears stated age  AIRWAY EXAM: Mallampatti Class I (visualization of the soft palate, fauces, uvula, anterior and posterior pillars)  Respiratory: No increased work of breathing, good air exchange, clear to auscultation bilaterally, no crackles or wheezing  Cardiovascular: Normal apical impulse, regular rate and rhythm, normal S1 and S2, no S3 or S4, and no murmur noted  ASA Class: 2 - Mild systemic disease

## 2018-01-18 NOTE — BRIEF OP NOTE
[facility] Brief Operative Note    Pre-operative diagnosis: PERSONAL HISTORY OF POLYPS, FAMILY HISTORY OF COLORECTAL CANCER    Post-operative diagnosis Two rectal polyps   Procedure: Procedure(s):  COLONOSCOPY - Wound Class: II-Clean Contaminated   Surgeon(s): Surgeon(s) and Role:     * Efrain Hernadez MD - Primary   Estimated blood loss: * No values recorded between 1/18/2018 12:00 AM and 1/18/2018  2:06 PM *    Specimens:   ID Type Source Tests Collected by Time Destination   A : poolyps times 2 Polyp Large Intestine, Rectum SURGICAL PATHOLOGY EXAM Efrain Hernadez MD 1/18/2018  2:03 PM       Findings: Two rectal polyps  See provation procedure note in chart review.    Efrain Hernadez MD  [facility]  [phone]

## 2018-01-24 LAB — COPATH REPORT: NORMAL

## 2018-01-25 ENCOUNTER — OFFICE VISIT (OUTPATIENT)
Dept: FAMILY MEDICINE | Facility: CLINIC | Age: 68
End: 2018-01-25
Payer: COMMERCIAL

## 2018-01-25 ENCOUNTER — RADIANT APPOINTMENT (OUTPATIENT)
Dept: GENERAL RADIOLOGY | Facility: CLINIC | Age: 68
End: 2018-01-25
Attending: PHYSICIAN ASSISTANT
Payer: COMMERCIAL

## 2018-01-25 VITALS
DIASTOLIC BLOOD PRESSURE: 88 MMHG | SYSTOLIC BLOOD PRESSURE: 162 MMHG | BODY MASS INDEX: 21.99 KG/M2 | HEART RATE: 52 BPM | OXYGEN SATURATION: 99 % | TEMPERATURE: 96.6 F | WEIGHT: 132 LBS | HEIGHT: 65 IN

## 2018-01-25 DIAGNOSIS — F17.200 SMOKER: ICD-10-CM

## 2018-01-25 DIAGNOSIS — J30.0 CHRONIC VASOMOTOR RHINITIS: ICD-10-CM

## 2018-01-25 DIAGNOSIS — R05.9 COUGH: Primary | ICD-10-CM

## 2018-01-25 DIAGNOSIS — I10 HTN (HYPERTENSION), BENIGN: ICD-10-CM

## 2018-01-25 DIAGNOSIS — R05.9 COUGH: ICD-10-CM

## 2018-01-25 PROCEDURE — 71046 X-RAY EXAM CHEST 2 VIEWS: CPT

## 2018-01-25 PROCEDURE — 99213 OFFICE O/P EST LOW 20 MIN: CPT | Performed by: PHYSICIAN ASSISTANT

## 2018-01-25 RX ORDER — VALSARTAN 320 MG/1
320 TABLET ORAL DAILY
Qty: 30 TABLET | Refills: 1 | Status: SHIPPED | OUTPATIENT
Start: 2018-01-25 | End: 2018-03-20

## 2018-01-25 RX ORDER — FLUTICASONE PROPIONATE 50 MCG
SPRAY, SUSPENSION (ML) NASAL
Qty: 16 ML | Refills: 3 | Status: SHIPPED | OUTPATIENT
Start: 2018-01-25 | End: 2019-05-14

## 2018-01-25 NOTE — PROGRESS NOTES
HPI: 68yo female here with cough starting when dose of lisinopril increased to 10mg  She also has a runny nose which is new  She has chills, but no fever  Has slight sore throat from the cough  Cough worse in the day  Appetite is less as well  Cough is prod of clear phlegm  She feels hoarse, but not sob   not ill  Has slight wheezing, no hemoptysis  Smoker but less now down from 1 ppd to 1/2 ppd  She denies any chest pain    Past Medical History:   Diagnosis Date     Ankle fracture 2009    L     Anxiety      Chronic back pain      Colon polyp 2012    repeat colonoscopy 5 years.     Fx low femur epiphy-closed (H)      GERD (gastroesophageal reflux disease)      History of UTI 2017    Cysto by Dr Agustin neg     HTN (hypertension), benign      Hyperlipidemia LDL goal < 100      Normal nuclear stress test 12/09    EF 67%     Osteopenia      PAD (peripheral artery disease) (H)     s/p fem pop bypass; embolectomy     PUD (peptic ulcer disease) 1980s    DU     Smoker      Vitamin D deficiency      Past Surgical History:   Procedure Laterality Date     Blood clot removal from Stent      2011     BYPASS GRAFT AORTOFEMORAL  5/02    Dr. Turner     BYPASS GRAFT FEMOROPOPLITEAL  12/16/2011     COLONOSCOPY N/A 1/18/2018    Procedure: COMBINED COLONOSCOPY, SINGLE OR MULTIPLE BIOPSY/POLYPECTOMY BY BIOPSY;  COLONOSCOPY;  Surgeon: Efrain Hernadez MD;  Location:  GI     EMBOLECTOMY LOWER EXTREMITY  12/16/2011    Procedure:EMBOLECTOMY LOWER EXTREMITY; EMBOLECTOMY, RIGHT POPLITEAL WITH PATCH ANGIOPLASTY. EXCISION SKIN LESION RIGHT LEG. ; Surgeon:PARMINDER VELAZCO; Location: OR     EXCISE LESION LOWER EXTREMITY  12/16/2011    Procedure:EXCISE LESION LOWER EXTREMITY; Surgeon:PARMINDER VELAZCO; Location: OR     HERNIA REPAIR  11/4/08    x2     L achilles repair  12/4/08     LAPAROSCOPIC OOPHORECTOMY      L for cyst     miscarriages x 3       tubal ligation and reversal       Social History   Substance Use  "Topics     Smoking status: Current Every Day Smoker     Types: Cigarettes     Smokeless tobacco: Never Used      Comment: Trying to quit, using  Commit long's     Alcohol use 2.4 - 3.0 oz/week     4 - 5 Standard drinks or equivalent per week      Comment: Beer 3 times per week     Current Outpatient Prescriptions   Medication Sig Dispense Refill     fluticasone (FLONASE) 50 MCG/ACT spray SPRAY 1-2 SPRAYS INTO BOTH NOSTRILS DAILY 16 mL 3     valsartan (DIOVAN) 320 MG tablet Take 1 tablet (320 mg) by mouth daily 30 tablet 1     LORazepam (ATIVAN) 0.5 MG tablet TAKE 1 TABLET(0.5 MG) BY MOUTH EVERY 8 HOURS AS NEEDED FOR ANXIETY 20 tablet 0     amoxicillin (AMOXIL) 500 MG capsule TAKE 4 CAPSULES(2000 MG) BY MOUTH 1 TIME FOR 1 DOSE 4 capsule 0     omeprazole (PRILOSEC) 40 MG capsule TAKE ONE CAPSULE BY MOUTH EVERY DAY 30 TO 60 MINUTES BEFORE A MEAL 90 capsule 3     coenzyme Q-10 200 MG CAPS        Lactobacillus (PROBIOTIC ACIDOPHILUS PO)        metoprolol (TOPROL-XL) 100 MG 24 hr tablet Take 1 tablet (100 mg) by mouth daily 90 tablet 3     atorvastatin (LIPITOR) 40 MG tablet Take 1 tablet (40 mg) by mouth daily 90 tablet 3     ESTRADIOL 0.1MG/GM CREAM Place vaginally At Bedtime Insert 1 gram vaginally 2-3 times per week 30 g 3     aspirin 81 MG tablet Take 2 tablets by mouth daily.       Cholecalciferol (VITAMIN D-3) 5000 UNIT TABS Take 1 tablet by mouth daily.       Allergies   Allergen Reactions     Chantix [Varenicline] Nausea     Ciprofloxacin Other (See Comments)     hypertension     Decongestant [Cvs]      Erythromycin Nausea     Plavix [Clopidogrel Bisulfate]      Felt as if she was having a heart attack     Vioxx        PHYSICAL EXAM:    /88 (BP Location: Left arm, Patient Position: Sitting, Cuff Size: Adult Regular)  Pulse 52  Temp 96.6  F (35.9  C) (Oral)  Ht 5' 5\" (1.651 m)  Wt 132 lb (59.9 kg)  SpO2 99%  Breastfeeding? No  BMI 21.97 kg/m2    Patient appears non toxic  Ears: cerumen on L, clear TM " on R  Throat: slightly erythematous without exudates  Neck: supple without LA  Lungs: CTA bilat  Heart: RRR    Assessment and Plan:     (R05) Cough  (primary encounter diagnosis)  Comment: suspect due to ace inh. DC lisinopril.   Plan: XR Chest 2 Views            (F17.200) Smoker  Comment:   Plan: XR Chest 2 Views            (I10) HTN (hypertension), benign  Comment: cough likely due to lisinopril.  DC lisinopril and start diovan 320mg qhs.  Monitor BPs and cough and f/u via Mychart  Plan: valsartan (DIOVAN) 320 MG tablet            (J30.0) Chronic vasomotor rhinitis  Comment:   Plan: fluticasone (FLONASE) 50 MCG/ACT spray        Refilled.      Lyudmila Escobar PA-C

## 2018-01-25 NOTE — MR AVS SNAPSHOT
After Visit Summary   1/25/2018    Karen Caban    MRN: [id number]           Patient Information     Date Of Birth          1950        Visit Information        Provider Department      1/25/2018 9:30 AM Lyudmila Escobar PA-C [facility]        Today's Diagnoses     Cough    -  1    Smoker        HTN (hypertension), benign        Chronic vasomotor rhinitis           Follow-ups after your visit        Future tests that were ordered for you today     Open Future Orders        Priority Expected Expires Ordered    XR Chest 2 Views Routine 1/25/2018 1/25/2019 1/25/2018            Who to contact     If you have questions or need follow up information about today's clinic visit or your schedule please contact [facility] directly at [phone].  Normal or non-critical lab and imaging results will be communicated to you by Glycodehart, letter or phone within 4 business days after the clinic has received the results. If you do not hear from us within 7 days, please contact the clinic through Glycodehart or phone. If you have a critical or abnormal lab result, we will notify you by phone as soon as possible.  Submit refill requests through [employer] or call your pharmacy and they will forward the refill request to us. Please allow 3 business days for your refill to be completed.          Additional Information About Your Visit        MyChart Information     [employer] gives you secure access to your electronic health record. If you see a primary care provider, you can also send messages to your care team and make appointments. If you have questions, please call your primary care clinic.  If you do not have a primary care provider, please call [phone] and they will assist you.        Care EveryWhere ID     This is your Care EveryWhere ID. This could be used by other organizations to access your [place] medical records  [id number]        Your Vitals Were     Pulse Temperature Height  "Pulse Oximetry Breastfeeding? BMI (Body Mass Index)    52 96.6  F (35.9  C) (Oral) 5' 5\" (1.651 m) 99% No 21.97 kg/m2       Blood Pressure from Last 3 Encounters:   01/25/18 162/88   01/18/18 112/76   12/20/17 (!) 166/103    Weight from Last 3 Encounters:   01/25/18 132 lb (59.9 kg)   12/20/17 134 lb (60.8 kg)   12/14/17 135 lb (61.2 kg)                 Today's Medication Changes          These changes are accurate as of 1/25/18  9:44 AM.  If you have any questions, ask your nurse or doctor.               Start taking these medicines.        Dose/Directions    valsartan 320 MG tablet   Commonly known as:  DIOVAN   Used for:  HTN (hypertension), benign   Started by:  Lyudmila Escobar PA-C        Dose:  320 mg   Take 1 tablet (320 mg) by mouth daily   Quantity:  30 tablet   Refills:  1         These medicines have changed or have updated prescriptions.        Dose/Directions    fluticasone 50 MCG/ACT spray   Commonly known as:  FLONASE   This may have changed:  See the new instructions.   Used for:  Chronic vasomotor rhinitis   Changed by:  Lyudmila Escobar PA-C        SPRAY 1-2 SPRAYS INTO BOTH NOSTRILS DAILY   Quantity:  16 mL   Refills:  3         Stop taking these medicines if you haven't already. Please contact your care team if you have questions.     lisinopril 10 MG tablet   Commonly known as:  PRINIVIL/ZESTRIL   Stopped by:  Lyudmila Escobar PA-C                Where to get your medicines      These medications were sent to [facility] [id number] [address] & [address], [place] [address]     Phone:  [phone]     fluticasone 50 MCG/ACT spray    valsartan 320 MG tablet                Primary Care Provider Office Phone # Fax #    Lyudmila Escobar PA-C [phone] [address]  [facility] [address]        Equal Access to Services     JAS VERMA AH: jeff Barros, maximo croft " layla wilkinsonvirginia chen'aan ah. So [facility] [phone].    ATENCIÓN: Si giancarlola mary, tiene a umana disposición servicios gratuitos de asistencia lingüística. Tarun al [phone].    We comply with applicable federal civil rights laws and [place] laws. We do not discriminate on the basis of race, color, national origin, age, disability, sex, sexual orientation, or gender identity.            Thank you!     Thank you for choosing [facility]  for your care. Our goal is always to provide you with excellent care. Hearing back from our patients is one way we can continue to improve our services. Please take a few minutes to complete the written survey that you may receive in the mail after your visit with us. Thank you!             Your Updated Medication List - Protect others around you: Learn how to safely use, store and throw away your medicines at www.disposemymeds.org.          This list is accurate as of 1/25/18  9:44 AM.  Always use your most recent med list.                   Brand Name Dispense Instructions for use Diagnosis    amoxicillin 500 MG capsule    AMOXIL    4 capsule    TAKE 4 CAPSULES(2000 MG) BY MOUTH 1 TIME FOR 1 DOSE    Antibiotic prophylaxis for dental procedure indicated due to prior joint replacement       aspirin 81 MG tablet      Take 2 tablets by mouth daily.        atorvastatin 40 MG tablet    LIPITOR    90 tablet    Take 1 tablet (40 mg) by mouth daily        coenzyme Q-10 200 MG Caps           ESTRADIOL 0.1MG/GM CREAM     30 g    Place vaginally At Bedtime Insert 1 gram vaginally 2-3 times per week    Atrophic vaginitis       fluticasone 50 MCG/ACT spray    FLONASE    16 mL    SPRAY 1-2 SPRAYS INTO BOTH NOSTRILS DAILY    Chronic vasomotor rhinitis       LORazepam 0.5 MG tablet    ATIVAN    20 tablet    TAKE 1 TABLET(0.5 MG) BY MOUTH EVERY 8 HOURS AS NEEDED FOR ANXIETY    Anxiety       metoprolol succinate 100 MG 24 hr tablet    TOPROL-XL    90 tablet     Take 1 tablet (100 mg) by mouth daily    HTN (hypertension), benign       omeprazole 40 MG capsule    priLOSEC    90 capsule    TAKE ONE CAPSULE BY MOUTH EVERY DAY 30 TO 60 MINUTES BEFORE A MEAL    Gastroesophageal reflux disease without esophagitis       PROBIOTIC ACIDOPHILUS PO           valsartan 320 MG tablet    DIOVAN    30 tablet    Take 1 tablet (320 mg) by mouth daily    HTN (hypertension), benign       Vitamin D-3 5000 UNITS Tabs      Take 1 tablet by mouth daily.

## 2018-01-25 NOTE — NURSING NOTE
"Chief Complaint   Patient presents with     RECHECK     HTN       Initial /88 (BP Location: Left arm, Patient Position: Sitting, Cuff Size: Adult Regular)  Pulse 52  Temp 96.6  F (35.9  C) (Oral)  Ht 5' 5\" (1.651 m)  Wt 132 lb (59.9 kg)  SpO2 99%  Breastfeeding? No  BMI 21.97 kg/m2 Estimated body mass index is 21.97 kg/(m^2) as calculated from the following:    Height as of this encounter: 5' 5\" (1.651 m).    Weight as of this encounter: 132 lb (59.9 kg).  Medication Reconciliation: complete    "

## 2018-01-30 NOTE — TELEPHONE ENCOUNTER
"Last Written Prescription Date:  1/05/18  Last Fill Quantity: 4 capsule,  # refills: 0   Last Office Visit with [facility] provider:  1/25/18 Luz   Future Office Visit:       Requested Prescriptions   Pending Prescriptions Disp Refills     amoxicillin (AMOXIL) 500 MG capsule [Pharmacy Med Name: AMOXICILLIN 500MG CAPSULES] 4 capsule 0     Sig: TAKE 4 CAPSULES(2000 MG) BY MOUTH 1 TIME FOR 1 DOSE    Oral Acne/Rosacea Medications Protocol Failed    1/29/2018  8:40 AM       Failed - Confirmation of diagnosis is required    Please confirm diagnosis is acne or rosacea.     If NOT acne or rosacea; refer request to provider for further evaluation.    If diagnosis IS acne or rosacea, OK to refill BASED ON PREVIOUS REFILL CLINICAL NOTE RECOMMENDATION.         Passed - Patient is 12 years of age or older       Passed - Recent or future visit with authorizing provider's specialty    Patient had office visit in the last year or has a visit in the next 30 days with authorizing provider.  See \"Patient Info\" tab in inbasket, or \"Choose Columns\" in Meds & Orders section of the refill encounter.            Passed - No active pregnancy on record       Passed - No positive prenancy test is past 12 months          "

## 2018-01-31 RX ORDER — AMOXICILLIN 500 MG/1
CAPSULE ORAL
Qty: 4 CAPSULE | Refills: 0 | Status: SHIPPED | OUTPATIENT
Start: 2018-01-31 | End: 2018-03-16

## 2018-01-31 NOTE — TELEPHONE ENCOUNTER
Routing refill request to provider for review/approval because:  Drug not on the [facility] refill protocol   See below message  Vilma JC RN

## 2018-01-31 NOTE — TELEPHONE ENCOUNTER
Pt called back states she is having a root canal next Monday and after that  She will need the tooth pulled if they cannot do the root canal   Or a new crown and new partial

## 2018-01-31 NOTE — TELEPHONE ENCOUNTER
Rx was for dental procedure - was refilled 1/5/2018  Left message for patient to callback.  Is she having another dental procedure soon she needs this for?  Vilma JC RN

## 2018-03-07 DIAGNOSIS — F41.9 ANXIETY: ICD-10-CM

## 2018-03-08 RX ORDER — LORAZEPAM 0.5 MG/1
TABLET ORAL
Qty: 20 TABLET | Refills: 0 | Status: SHIPPED | OUTPATIENT
Start: 2018-03-08 | End: 2018-04-09

## 2018-03-08 NOTE — TELEPHONE ENCOUNTER
Requested Prescriptions   Pending Prescriptions Disp Refills     LORazepam (ATIVAN) 0.5 MG tablet [Pharmacy Med Name: LORAZEPAM 0.5MG TABLETS] 20 tablet 0     Sig: TAKE 1 TABLET BY MOUTH EVERY 8 HOURS AS NEEDED FOR ANXIETY    There is no refill protocol information for this order            Last Written Prescription Date:  1/05/18  Last Fill Quantity: 20 tablet,   # refills: 0  Last Office Visit: 1/25/18 (Luz)  Future Office visit:       Routing refill request to provider for review/approval because:  Drug not on the [facility], P or [facility] refill protocol or controlled substance

## 2018-03-16 ENCOUNTER — MEDICAL CORRESPONDENCE (OUTPATIENT)
Dept: HEALTH INFORMATION MANAGEMENT | Facility: CLINIC | Age: 68
End: 2018-03-16

## 2018-03-17 NOTE — TELEPHONE ENCOUNTER
"Last Written Prescription Date:  1/31/18  Last Fill Quantity: 4 capsule,  # refills: 0   Last office visit: 1/25/2018 with prescribing provider:  Luz   Future Office Visit:   Next 5 appointments (look out 90 days)     Mar 20, 2018 10:00 AM CDT   Office Visit with Lyudmila Escobar PA-C   [facility]    [address] [facility] [address] [phone]                 Requested Prescriptions   Pending Prescriptions Disp Refills     amoxicillin (AMOXIL) 500 MG capsule [Pharmacy Med Name: AMOXICILLIN 500MG CAPSULES] 4 capsule 0     Sig: TAKE 4 CAPSULES(2000 MG) BY MOUTH 1 TIME FOR 1 DOSE    Oral Acne/Rosacea Medications Protocol Failed    3/16/2018  6:42 PM       Failed - Confirmation of diagnosis is required    Please confirm diagnosis is acne or rosacea.     If NOT acne or rosacea; refer request to provider for further evaluation.    If diagnosis IS acne or rosacea, OK to refill BASED ON PREVIOUS REFILL CLINICAL NOTE RECOMMENDATION.         Passed - Patient is 12 years of age or older       Passed - Recent (12 mo) or future (30 days) visit within the authorizing provider's specialty    Patient had office visit in the last 12 months or has a visit in the next 30 days with authorizing provider or within the authorizing provider's specialty.  See \"Patient Info\" tab in inbasket, or \"Choose Columns\" in Meds & Orders section of the refill encounter.           Passed - No active pregnancy on record       Passed - No positive prenancy test is past 12 months          "

## 2018-03-20 ENCOUNTER — OFFICE VISIT (OUTPATIENT)
Dept: FAMILY MEDICINE | Facility: CLINIC | Age: 68
End: 2018-03-20
Payer: COMMERCIAL

## 2018-03-20 VITALS
WEIGHT: 131 LBS | DIASTOLIC BLOOD PRESSURE: 76 MMHG | OXYGEN SATURATION: 98 % | SYSTOLIC BLOOD PRESSURE: 117 MMHG | HEIGHT: 65 IN | HEART RATE: 56 BPM | BODY MASS INDEX: 21.83 KG/M2 | TEMPERATURE: 97.9 F

## 2018-03-20 DIAGNOSIS — Z78.9 VEGETARIAN DIET: ICD-10-CM

## 2018-03-20 DIAGNOSIS — I10 HTN (HYPERTENSION), BENIGN: Primary | ICD-10-CM

## 2018-03-20 LAB
ANION GAP SERPL CALCULATED.3IONS-SCNC: 5 MMOL/L (ref 3–14)
BUN SERPL-MCNC: 14 MG/DL (ref 7–30)
CALCIUM SERPL-MCNC: 9.3 MG/DL (ref 8.5–10.1)
CHLORIDE SERPL-SCNC: 104 MMOL/L (ref 94–109)
CO2 SERPL-SCNC: 31 MMOL/L (ref 20–32)
CREAT SERPL-MCNC: 0.67 MG/DL (ref 0.52–1.04)
GFR SERPL CREATININE-BSD FRML MDRD: 89 ML/MIN/1.7M2
GLUCOSE SERPL-MCNC: 79 MG/DL (ref 70–99)
POTASSIUM SERPL-SCNC: 3.7 MMOL/L (ref 3.4–5.3)
SODIUM SERPL-SCNC: 140 MMOL/L (ref 133–144)
VIT B12 SERPL-MCNC: 418 PG/ML (ref 193–986)

## 2018-03-20 PROCEDURE — 80048 BASIC METABOLIC PNL TOTAL CA: CPT | Performed by: PHYSICIAN ASSISTANT

## 2018-03-20 PROCEDURE — 82607 VITAMIN B-12: CPT | Performed by: PHYSICIAN ASSISTANT

## 2018-03-20 PROCEDURE — 36415 COLL VENOUS BLD VENIPUNCTURE: CPT | Performed by: PHYSICIAN ASSISTANT

## 2018-03-20 PROCEDURE — 99213 OFFICE O/P EST LOW 20 MIN: CPT | Performed by: PHYSICIAN ASSISTANT

## 2018-03-20 RX ORDER — CHLORTHALIDONE 25 MG/1
12.5 TABLET ORAL DAILY
Qty: 45 TABLET | Refills: 3 | Status: SHIPPED | OUTPATIENT
Start: 2018-03-20 | End: 2018-12-17

## 2018-03-20 RX ORDER — AMOXICILLIN 500 MG/1
CAPSULE ORAL
Qty: 4 CAPSULE | Refills: 1 | Status: SHIPPED | OUTPATIENT
Start: 2018-03-20 | End: 2018-11-27

## 2018-03-20 NOTE — TELEPHONE ENCOUNTER
Routing refill request to provider for review/approval because:  Drug not on the [facility] refill protocol   Please authorize if appropriate.  Thanks,  Stacey Sullivan RN

## 2018-03-20 NOTE — MR AVS SNAPSHOT
"              After Visit Summary   3/20/2018    Karen Caban    MRN: [id number]           Patient Information     Date Of Birth          1950        Visit Information        Provider Department      3/20/2018 10:00 AM Lyudmila Escobar PA-C [facility]        Today's Diagnoses     HTN (hypertension), benign    -  1    Vegetarian diet           Follow-ups after your visit        Who to contact     If you have questions or need follow up information about today's clinic visit or your schedule please contact [facility] directly at [phone].  Normal or non-critical lab and imaging results will be communicated to you by Picreelhart, letter or phone within 4 business days after the clinic has received the results. If you do not hear from us within 7 days, please contact the clinic through [employer] or phone. If you have a critical or abnormal lab result, we will notify you by phone as soon as possible.  Submit refill requests through [employer] or call your pharmacy and they will forward the refill request to us. Please allow 3 business days for your refill to be completed.          Additional Information About Your Visit        MyChart Information     [employer] gives you secure access to your electronic health record. If you see a primary care provider, you can also send messages to your care team and make appointments. If you have questions, please call your primary care clinic.  If you do not have a primary care provider, please call [phone] and they will assist you.        Care EveryWhere ID     This is your Care EveryWhere ID. This could be used by other organizations to access your [place] medical records  [id number]        Your Vitals Were     Pulse Temperature Height Pulse Oximetry Breastfeeding? BMI (Body Mass Index)    56 97.9  F (36.6  C) (Tympanic) 5' 5\" (1.651 m) 98% No 21.8 kg/m2       Blood Pressure from Last 3 Encounters:   03/20/18 117/76   01/25/18 162/88   01/18/18 " 112/76    Weight from Last 3 Encounters:   03/20/18 131 lb (59.4 kg)   01/25/18 132 lb (59.9 kg)   12/20/17 134 lb (60.8 kg)              We Performed the Following     Basic metabolic panel     Vitamin B12          Today's Medication Changes          These changes are accurate as of 3/20/18 10:27 AM.  If you have any questions, ask your nurse or doctor.               These medicines have changed or have updated prescriptions.        Dose/Directions    chlorthalidone 25 MG tablet   Commonly known as:  HYGROTON   This may have changed:  how much to take   Used for:  HTN (hypertension), benign   Changed by:  Lyudmila Escobar PA-C        Dose:  12.5 mg   Take 0.5 tablets (12.5 mg) by mouth daily   Quantity:  45 tablet   Refills:  3            Where to get your medicines      These medications were sent to [facility] [address] [place] [address] & [address], [place] [address]     Phone:  [phone]     chlorthalidone 25 MG tablet                Primary Care Provider Office Phone # Fax #    Lyudmila Escobar PA-C [phone] [address] [address]        Equal Access to Services     LUIS VERMA AH: Hadii claudia ku hadasho Soomaali, waaxda luqadaha, qaybta kaalmada adeegyada, waxay jaimein hayharris dowling. So [facility] [phone].    ATENCIÓN: Si habla español, tiene a umana disposición servicios gratuitos de asistencia lingüística. Llame al [phone].    We comply with applicable federal civil rights laws and [place] laws. We do not discriminate on the basis of race, color, national origin, age, disability, sex, sexual orientation, or gender identity.            Thank you!     Thank you for choosing [facility]  for your care. Our goal is always to provide you with excellent care. Hearing back from our patients is one way we can continue to improve our services. Please take a few minutes to complete the  written survey that you may receive in the mail after your visit with us. Thank you!             Your Updated Medication List - Protect others around you: Learn how to safely use, store and throw away your medicines at www.[employer]eds.org.          This list is accurate as of 3/20/18 10:27 AM.  Always use your most recent med list.                   Brand Name Dispense Instructions for use Diagnosis    amoxicillin 500 MG capsule    AMOXIL    4 capsule    TAKE 4 CAPSULES(2000 MG) BY MOUTH 1 TIME FOR 1 DOSE    Antibiotic prophylaxis for dental procedure indicated due to prior joint replacement       aspirin 81 MG tablet      Take 2 tablets by mouth daily.        atorvastatin 40 MG tablet    LIPITOR    90 tablet    Take 1 tablet (40 mg) by mouth daily        B-12 1000 MCG Tbcr     100 tablet    Take 1,000 mcg by mouth daily    Vegetarian diet       chlorthalidone 25 MG tablet    HYGROTON    45 tablet    Take 0.5 tablets (12.5 mg) by mouth daily    HTN (hypertension), benign       coenzyme Q-10 200 MG Caps           ESTRADIOL 0.1MG/GM CREAM     30 g    Place vaginally At Bedtime Insert 1 gram vaginally 2-3 times per week    Atrophic vaginitis       fluticasone 50 MCG/ACT spray    FLONASE    16 mL    SPRAY 1-2 SPRAYS INTO BOTH NOSTRILS DAILY    Chronic vasomotor rhinitis       LORazepam 0.5 MG tablet    ATIVAN    20 tablet    TAKE 1 TABLET BY MOUTH EVERY 8 HOURS AS NEEDED FOR ANXIETY    Anxiety       metoprolol succinate 100 MG 24 hr tablet    TOPROL-XL    90 tablet    Take 1 tablet (100 mg) by mouth daily    HTN (hypertension), benign       omeprazole 40 MG capsule    priLOSEC    90 capsule    TAKE ONE CAPSULE BY MOUTH EVERY DAY 30 TO 60 MINUTES BEFORE A MEAL    Gastroesophageal reflux disease without esophagitis       PROBIOTIC ACIDOPHILUS PO           Vitamin D-3 5000 UNITS Tabs      Take 1 tablet by mouth daily.

## 2018-03-20 NOTE — NURSING NOTE
"Chief Complaint   Patient presents with     RECHECK       Initial /76 (BP Location: Right arm, Cuff Size: Adult Regular)  Pulse 56  Temp 97.9  F (36.6  C) (Tympanic)  Ht 5' 5\" (1.651 m)  Wt 131 lb (59.4 kg)  SpO2 98%  Breastfeeding? No  BMI 21.8 kg/m2 Estimated body mass index is 21.8 kg/(m^2) as calculated from the following:    Height as of this encounter: 5' 5\" (1.651 m).    Weight as of this encounter: 131 lb (59.4 kg).  Medication Reconciliation: complete   Sara Cuevas MA      "

## 2018-03-20 NOTE — PROGRESS NOTES
HPI: This is a 66 yo female here for f/u HTN  We stopped her lisinopril due to cough and cough did resolve  She was prescribed chlorthalidone as her BP was running very high and this dropped her BP so much that she decreased the dose to 1/2 tab and was also able to go off of diovan since SBP<100.  She feels well on current regimen of toprol xl 100mg qd and chlorthalidone 12.5mg qd.  She brings in a list of BP readings with average 111/60  She denies any side effects from the chlorthalidone besides a slight increase in urination  Denies any chest pain, sob or palpit.  She is a vegetarian so does get plenty of potassium in her diet.   She is on a vitamin B12 supplement.    She is interested in getting Shingrex    Past Medical History:   Diagnosis Date     Ankle fracture 2009    L     Anxiety      Chronic back pain      Colon polyp 2012    repeat colonoscopy 5 years.     Fx low femur epiphy-closed (H)      GERD (gastroesophageal reflux disease)      History of UTI 2017    Cysto by Dr Agustin neg     HTN (hypertension), benign      Hyperlipidemia LDL goal < 100      Normal nuclear stress test 12/09    EF 67%     Osteopenia      PAD (peripheral artery disease) (H)     s/p fem pop bypass; embolectomy     PUD (peptic ulcer disease) 1980s    DU     Smoker      Vitamin D deficiency      Past Surgical History:   Procedure Laterality Date     Blood clot removal from Stent      2011     BYPASS GRAFT AORTOFEMORAL  5/02    Dr. Turner     BYPASS GRAFT FEMOROPOPLITEAL  12/16/2011     COLONOSCOPY N/A 1/18/2018    Procedure: COMBINED COLONOSCOPY, SINGLE OR MULTIPLE BIOPSY/POLYPECTOMY BY BIOPSY;  COLONOSCOPY;  Surgeon: Efrain Hernadez MD;  Location:  GI     EMBOLECTOMY LOWER EXTREMITY  12/16/2011    Procedure:EMBOLECTOMY LOWER EXTREMITY; EMBOLECTOMY, RIGHT POPLITEAL WITH PATCH ANGIOPLASTY. EXCISION SKIN LESION RIGHT LEG. ; Surgeon:PARMINDER VELAZCO; Location: OR     EXCISE LESION LOWER EXTREMITY  12/16/2011     Procedure:EXCISE LESION LOWER EXTREMITY; Surgeon:PARMINDER VELAZCO; Location:[facility] OR     HERNIA REPAIR  11/4/08    x2     L achilles repair  12/4/08     LAPAROSCOPIC OOPHORECTOMY      L for cyst     miscarriages x 3       tubal ligation and reversal       Social History   Substance Use Topics     Smoking status: Current Every Day Smoker     Types: Cigarettes     Smokeless tobacco: Never Used      Comment: Trying to quit, using  Commit long's     Alcohol use 2.4 - 3.0 oz/week     4 - 5 Standard drinks or equivalent per week      Comment: Beer 3 times per week     Current Outpatient Prescriptions   Medication Sig Dispense Refill     amoxicillin (AMOXIL) 500 MG capsule TAKE 4 CAPSULES(2000 MG) BY MOUTH 1 TIME FOR 1 DOSE 4 capsule 1     chlorthalidone (HYGROTON) 25 MG tablet Take 0.5 tablets (12.5 mg) by mouth daily 45 tablet 3     Cyanocobalamin (B-12) 1000 MCG TBCR Take 1,000 mcg by mouth daily 100 tablet 1     LORazepam (ATIVAN) 0.5 MG tablet TAKE 1 TABLET BY MOUTH EVERY 8 HOURS AS NEEDED FOR ANXIETY 20 tablet 0     fluticasone (FLONASE) 50 MCG/ACT spray SPRAY 1-2 SPRAYS INTO BOTH NOSTRILS DAILY 16 mL 3     omeprazole (PRILOSEC) 40 MG capsule TAKE ONE CAPSULE BY MOUTH EVERY DAY 30 TO 60 MINUTES BEFORE A MEAL 90 capsule 3     coenzyme Q-10 200 MG CAPS        Lactobacillus (PROBIOTIC ACIDOPHILUS PO)        metoprolol (TOPROL-XL) 100 MG 24 hr tablet Take 1 tablet (100 mg) by mouth daily 90 tablet 3     atorvastatin (LIPITOR) 40 MG tablet Take 1 tablet (40 mg) by mouth daily 90 tablet 3     ESTRADIOL 0.1MG/GM CREAM Place vaginally At Bedtime Insert 1 gram vaginally 2-3 times per week 30 g 3     aspirin 81 MG tablet Take 2 tablets by mouth daily.       Cholecalciferol (VITAMIN D-3) 5000 UNIT TABS Take 1 tablet by mouth daily.       [DISCONTINUED] chlorthalidone (HYGROTON) 25 MG tablet Take 1 tablet (25 mg) by mouth daily 30 tablet 3     Allergies   Allergen Reactions     Chantix [Varenicline] Nausea     Ciprofloxacin  "Other (See Comments)     hypertension     Decongestant [Cvs]      Erythromycin Nausea     Lisinopril      cough     Plavix [Clopidogrel Bisulfate]      Felt as if she was having a heart attack     Vioxx      FAMILY HISTORY NOTED AND REVIEWED    PHYSICAL EXAM:    /76 (BP Location: Right arm, Cuff Size: Adult Regular)  Pulse 56  Temp 97.9  F (36.6  C) (Tympanic)  Ht 5' 5\" (1.651 m)  Wt 131 lb (59.4 kg)  SpO2 98%  Breastfeeding? No  BMI 21.8 kg/m2    Patient appears non toxic  Heart: slightly bradycardic, no murmur, normal rhythm    Assessment and Plan:     (I10) HTN (hypertension), benign  (primary encounter diagnosis)  Comment: cont same. Will check labs  Plan: Basic metabolic panel, chlorthalidone         (HYGROTON) 25 MG tablet        Taking 1/2 tab QD    (Z78.9) Vegetarian diet  Comment:   Plan: Vitamin B12, Cyanocobalamin (B-12) 1000 MCG         TBCR              Lyudmila Escobar PA-C      "

## 2018-03-22 NOTE — PROGRESS NOTES
Humaira,    Your blood sugar, kidney function and electrolytes were all normal.   Your vitamin B12 level was low normal at 418.    Switch to the sublingual vitamin B12 1000mcg/day.    So happy to see your blood pressure under such good control.    Lyudmila Escobar PA-C

## 2018-04-09 DIAGNOSIS — F41.9 ANXIETY: ICD-10-CM

## 2018-04-10 RX ORDER — LORAZEPAM 0.5 MG/1
TABLET ORAL
Qty: 20 TABLET | Refills: 0 | Status: SHIPPED | OUTPATIENT
Start: 2018-04-10 | End: 2018-05-16

## 2018-04-10 NOTE — TELEPHONE ENCOUNTER
Ativan      Last Written Prescription Date:  3/8/2018  Last Fill Quantity: 20,   # refills: 0  Last Office Visit: 3/20/2018  Future Office visit:       Routing refill request to provider for review/approval because:  Drug not on the [facility], P or [facility] refill protocol or controlled substance

## 2018-04-25 DIAGNOSIS — F41.9 ANXIETY: ICD-10-CM

## 2018-04-25 RX ORDER — LORAZEPAM 0.5 MG/1
TABLET ORAL
Qty: 20 TABLET | Refills: 0 | OUTPATIENT
Start: 2018-04-25

## 2018-05-16 ENCOUNTER — MYC MEDICAL ADVICE (OUTPATIENT)
Dept: FAMILY MEDICINE | Facility: CLINIC | Age: 68
End: 2018-05-16

## 2018-05-16 DIAGNOSIS — F41.9 ANXIETY: ICD-10-CM

## 2018-05-16 RX ORDER — LORAZEPAM 0.5 MG/1
TABLET ORAL
Qty: 20 TABLET | Refills: 0 | Status: SHIPPED | OUTPATIENT
Start: 2018-05-16 | End: 2018-07-24

## 2018-05-16 NOTE — TELEPHONE ENCOUNTER
Requested Prescriptions   Pending Prescriptions Disp Refills     LORazepam (ATIVAN) 0.5 MG tablet 20 tablet 0     Sig: TAKE 1 TABLET BY MOUTH EVERY 8 HOURS AS NEEDED FOR ANXIETY    There is no refill protocol information for this order        Last Written Prescription Date:  4/20/18  Last Fill Quantity: 20,  # refills: 0   Last office visit: 3/20/2018 with prescribing provider:  3/20/18    Future Office Visit:

## 2018-07-24 DIAGNOSIS — F41.9 ANXIETY: ICD-10-CM

## 2018-07-25 NOTE — TELEPHONE ENCOUNTER
Pending Prescriptions:                       Disp   Refills    LORazepam (ATIVAN) 0.5 MG tablet [Pharmac*20 tab*0            Sig: TAKE 1 TABLET BY MOUTH EVERY 8 HOURS AS NEEDED           FOR ANXIETY      Last Written Prescription Date:  05/16/2018  Last Fill Quantity: 20,  # refills: 0   Last office visit: 3/20/2018 with prescribing provider:  Lyudmila Escobar   Future Office Visit:           Routing refill request to provider for review/approval because:  Drug not on the [facility] or [facility] refill protocol or controlled substance

## 2018-07-26 RX ORDER — LORAZEPAM 0.5 MG/1
TABLET ORAL
Qty: 20 TABLET | Refills: 0 | Status: SHIPPED | OUTPATIENT
Start: 2018-07-26 | End: 2018-09-24

## 2018-09-24 DIAGNOSIS — F41.9 ANXIETY: ICD-10-CM

## 2018-09-24 RX ORDER — LORAZEPAM 0.5 MG/1
TABLET ORAL
Qty: 20 TABLET | Refills: 0 | Status: SHIPPED | OUTPATIENT
Start: 2018-09-24 | End: 2018-11-28

## 2018-09-24 NOTE — TELEPHONE ENCOUNTER
Pending Prescriptions:                       Disp   Refills    LORazepam (ATIVAN) 0.5 MG tablet          20 tab*0            Sig: TAKE 1 TABLET BY MOUTH EVERY 8 HOURS AS NEEDED           FOR ANXIETY          Last Written Prescription Date:  7-26-18  Last Fill Quantity: 20,   # refills: 0  Last Office Visit: 3-20-18 Luz  Future Office visit:       Routing refill request to provider for review/approval because:  Drug not on the [facility] or [facility] refill protocol or controlled substance    RT Shamika (R)

## 2018-11-05 ENCOUNTER — OFFICE VISIT (OUTPATIENT)
Dept: FAMILY MEDICINE | Facility: CLINIC | Age: 68
End: 2018-11-05
Payer: COMMERCIAL

## 2018-11-05 VITALS
DIASTOLIC BLOOD PRESSURE: 76 MMHG | BODY MASS INDEX: 23.66 KG/M2 | WEIGHT: 142 LBS | HEIGHT: 65 IN | TEMPERATURE: 97.7 F | SYSTOLIC BLOOD PRESSURE: 130 MMHG | OXYGEN SATURATION: 97 %

## 2018-11-05 DIAGNOSIS — R35.0 URINARY FREQUENCY: Primary | ICD-10-CM

## 2018-11-05 DIAGNOSIS — Z23 NEED FOR VACCINATION: ICD-10-CM

## 2018-11-05 LAB

## 2018-11-05 PROCEDURE — 90471 IMMUNIZATION ADMIN: CPT | Performed by: PHYSICIAN ASSISTANT

## 2018-11-05 PROCEDURE — 99213 OFFICE O/P EST LOW 20 MIN: CPT | Mod: 25 | Performed by: PHYSICIAN ASSISTANT

## 2018-11-05 PROCEDURE — 81001 URINALYSIS AUTO W/SCOPE: CPT | Performed by: PHYSICIAN ASSISTANT

## 2018-11-05 PROCEDURE — 90714 TD VACC NO PRESV 7 YRS+ IM: CPT | Performed by: PHYSICIAN ASSISTANT

## 2018-11-05 ASSESSMENT — PATIENT HEALTH QUESTIONNAIRE - PHQ9
5. POOR APPETITE OR OVEREATING: SEVERAL DAYS
SUM OF ALL RESPONSES TO PHQ QUESTIONS 1-9: 0

## 2018-11-05 ASSESSMENT — ANXIETY QUESTIONNAIRES
3. WORRYING TOO MUCH ABOUT DIFFERENT THINGS: SEVERAL DAYS
GAD7 TOTAL SCORE: 6
1. FEELING NERVOUS, ANXIOUS, OR ON EDGE: SEVERAL DAYS
6. BECOMING EASILY ANNOYED OR IRRITABLE: SEVERAL DAYS
IF YOU CHECKED OFF ANY PROBLEMS ON THIS QUESTIONNAIRE, HOW DIFFICULT HAVE THESE PROBLEMS MADE IT FOR YOU TO DO YOUR WORK, TAKE CARE OF THINGS AT HOME, OR GET ALONG WITH OTHER PEOPLE: NOT DIFFICULT AT ALL
5. BEING SO RESTLESS THAT IT IS HARD TO SIT STILL: SEVERAL DAYS
2. NOT BEING ABLE TO STOP OR CONTROL WORRYING: SEVERAL DAYS
7. FEELING AFRAID AS IF SOMETHING AWFUL MIGHT HAPPEN: NOT AT ALL

## 2018-11-05 NOTE — MR AVS SNAPSHOT
After Visit Summary   11/5/2018    Karen Caban    MRN: [id number]           Patient Information     Date Of Birth          1950        Visit Information        Provider Department      11/5/2018 12:00 PM Lyudmila Escobar PA-C [facility]        Today's Diagnoses     Urinary frequency    -  1    Need for vaccination           Follow-ups after your visit        Your next 10 appointments already scheduled     Dec 12, 2018  7:15 AM CST   [facility]    [address] [id number] [phone]           Please do not eat 10-12 hours before your appointment if you are coming in fasting for labs on lipids, cholesterol, or glucose (sugar). This does not apply to pregnant women. Water, hot tea and black coffee (with nothing added) are okay. Do not drink other fluids, diet soda or chew gum.            Dec 17, 2018 10:00 AM CST   [facility] with [facility]    [address] [id number] [phone]              Who to contact     If you have questions or need follow up information about today's clinic visit or your schedule please contact [facility] directly at [phone].  Normal or non-critical lab and imaging results will be communicated to you by MyChart, letter or phone within 4 business days after the clinic has received the results. If you do not hear from us within 7 days, please contact the clinic through MyChart or phone. If you have a critical or abnormal lab result, we will notify you by phone as soon as possible.  Submit refill requests through [employer] or call your pharmacy and they will forward the refill request to us. Please allow 3 business days for your refill to be completed.          Additional Information About Your Visit        [employer] Information     [employer] gives you secure access to your electronic  "health record. If you see a primary care provider, you can also send messages to your care team and make appointments. If you have questions, please call your primary care clinic.  If you do not have a primary care provider, please call [phone] and they will assist you.        Care EveryWhere ID     This is your Care EveryWhere ID. This could be used by other organizations to access your [facility] medical records  [id number]        Your Vitals Were     Temperature Height Pulse Oximetry Breastfeeding? BMI (Body Mass Index)       97.7  F (36.5  C) (Tympanic) 5' 5\" (1.651 m) 97% No 23.63 kg/m2        Blood Pressure from Last 3 Encounters:   11/05/18 130/76   03/20/18 117/76   01/25/18 162/88    Weight from Last 3 Encounters:   11/05/18 142 lb (64.4 kg)   03/20/18 131 lb (59.4 kg)   01/25/18 132 lb (59.9 kg)              We Performed the Following     *UA reflex to Microscopic and Culture ([facility] (except [place] and [place])     1st  Administration  [[id number]]     TD PRSERV FREE >=7 YRS ADS IM [[id number]]     Urine Microscopic          Today's Medication Changes          These changes are accurate as of 11/5/18  3:25 PM.  If you have any questions, ask your nurse or doctor.               Start taking these medicines.        Dose/Directions    cephalexin 250 MG capsule   Commonly known as:  KEFLEX   Used for:  Urinary frequency   Started by:  Lyudmila Escobar PA-C        Dose:  250 mg   Take 1 capsule (250 mg) by mouth 3 times daily   Quantity:  21 capsule   Refills:  0         These medicines have changed or have updated prescriptions.        Dose/Directions    amoxicillin 500 MG capsule   Commonly known as:  AMOXIL   This may have changed:  See the new instructions.   Used for:  Antibiotic prophylaxis for dental procedure indicated due to prior joint replacement        TAKE 4 CAPSULES(2000 MG) BY MOUTH 1 TIME FOR 1 DOSE   Quantity:  4 capsule   Refills:  1       ESTRADIOL 0.1MG/GM CREAM   This " may have changed:  additional instructions   Used for:  Atrophic vaginitis        Place vaginally At Bedtime Insert 1 gram vaginally 2-3 times per week   Quantity:  30 g   Refills:  3            Where to get your medicines      These medications were sent to [facility], [place] - [address]  [address], [place] [address]     Phone:  [phone]     cephalexin 250 MG capsule                Primary Care Provider Office Phone # Fax #    Lyudmila Escobar PA-C [phone] [address]  [place] [address]        Equal Access to Services     [facility]: Hadii aad ku hadasho Soomaali, waaxda luqadaha, qaybta kaalmada adeegyada, waxdana mcconnell . So [facility] [phone].    ATENCIÓN: Si habla español, tiene a umana disposición servicios gratuitos de asistencia lingüística. Llame al [phone].    We comply with applicable federal civil rights laws and [place] laws. We do not discriminate on the basis of race, color, national origin, age, disability, sex, sexual orientation, or gender identity.            Thank you!     Thank you for choosing [facility]  for your care. Our goal is always to provide you with excellent care. Hearing back from our patients is one way we can continue to improve our services. Please take a few minutes to complete the written survey that you may receive in the mail after your visit with us. Thank you!             Your Updated Medication List - Protect others around you: Learn how to safely use, store and throw away your medicines at www.disposemymeds.org.          This list is accurate as of 11/5/18  3:25 PM.  Always use your most recent med list.                   Brand Name Dispense Instructions for use Diagnosis    amoxicillin 500 MG capsule    AMOXIL    4 capsule    TAKE 4 CAPSULES(2000 MG) BY MOUTH 1 TIME FOR 1 DOSE    Antibiotic prophylaxis for dental procedure  indicated due to prior joint replacement       aspirin 81 MG tablet      Take 2 tablets by mouth daily.        atorvastatin 40 MG tablet    LIPITOR    90 tablet    Take 1 tablet (40 mg) by mouth daily        B-12 1000 MCG Tbcr     100 tablet    Take 1,000 mcg by mouth daily    Vegetarian diet       cephalexin 250 MG capsule    KEFLEX    21 capsule    Take 1 capsule (250 mg) by mouth 3 times daily    Urinary frequency       chlorthalidone 25 MG tablet    HYGROTON    45 tablet    Take 0.5 tablets (12.5 mg) by mouth daily    HTN (hypertension), benign       coenzyme Q-10 200 MG Caps           ESTRADIOL 0.1MG/GM CREAM     30 g    Place vaginally At Bedtime Insert 1 gram vaginally 2-3 times per week    Atrophic vaginitis       fluticasone 50 MCG/ACT spray    FLONASE    16 mL    SPRAY 1-2 SPRAYS INTO BOTH NOSTRILS DAILY    Chronic vasomotor rhinitis       LORazepam 0.5 MG tablet    ATIVAN    20 tablet    TAKE 1 TABLET BY MOUTH EVERY 8 HOURS AS NEEDED FOR ANXIETY    Anxiety       metoprolol succinate 100 MG 24 hr tablet    TOPROL-XL    90 tablet    Take 1 tablet (100 mg) by mouth daily    HTN (hypertension), benign       omeprazole 40 MG capsule    priLOSEC    90 capsule    TAKE ONE CAPSULE BY MOUTH EVERY DAY 30 TO 60 MINUTES BEFORE A MEAL    Gastroesophageal reflux disease without esophagitis       PROBIOTIC ACIDOPHILUS PO           Vitamin D-3 5000 units Tabs      Take 1 tablet by mouth daily.

## 2018-11-05 NOTE — NURSING NOTE
Screening Questionnaire for Adult Immunization     Are you sick today?   No    Do you have allergies to medications, food or any vaccine?   Yes    Have you ever had a serious reaction after receiving a vaccination?   No    Do you have a long-term health problem with heart disease, lung disease,  asthma, kidney disease, diabetes, anemia, metabolic or blood disease?   No    Do you have cancer, leukemia, AIDS, or any immune system problem?   No    Do you take cortisone, prednisone, other steroids, or anticancer drugs, or  have you had any x-ray (radiation) treatments?   No    Have you had a seizure, brain, or other nervous system problem?   No    During the past year, have you received a transfusion of blood or blood       products, or been given a medicine called immune (gamma) globulin?   No    For women: Are you pregnant or is there a chance you could become         pregnant during the next month?   No    Have you received any vaccinations in the past 4 weeks?   No     Immunization questionnaire was positive for at least one answer.  Notified no one.      MNVFC doesn't apply on this patient         Screening performed by Maddie Comer on 11/5/2018 at 12:50 PM.

## 2018-11-05 NOTE — PROGRESS NOTES
HPI: 68 yo female here with one week of burning on urination, small void, no blood, is cloudy  Denies any flank pain   Tried Azo without relief  Denies vaginal discharge or odor  She notes her BP at home has been good 109/60    Past Medical History:   Diagnosis Date     Ankle fracture 2009    L     Anxiety      Chronic back pain      Colon polyp 2012    repeat colonoscopy 5 years.     Fx low femur epiphy-closed (H)      GERD (gastroesophageal reflux disease)      History of UTI 2017    Cysto by Dr Agustin neg     HTN (hypertension), benign      Hyperlipidemia LDL goal < 100      Normal nuclear stress test 12/09    EF 67%     Osteopenia      PAD (peripheral artery disease) (H)     s/p fem pop bypass; embolectomy     PUD (peptic ulcer disease) 1980s    DU     Smoker      Vitamin D deficiency      Past Surgical History:   Procedure Laterality Date     Blood clot removal from Stent      2011     BYPASS GRAFT AORTOFEMORAL  5/02    Dr. Turner     BYPASS GRAFT FEMOROPOPLITEAL  12/16/2011     COLONOSCOPY N/A 1/18/2018    Procedure: COMBINED COLONOSCOPY, SINGLE OR MULTIPLE BIOPSY/POLYPECTOMY BY BIOPSY;  COLONOSCOPY;  Surgeon: Efrain Hernadez MD;  Location:  GI     EMBOLECTOMY LOWER EXTREMITY  12/16/2011    Procedure:EMBOLECTOMY LOWER EXTREMITY; EMBOLECTOMY, RIGHT POPLITEAL WITH PATCH ANGIOPLASTY. EXCISION SKIN LESION RIGHT LEG. ; Surgeon:PARMINDER VELAZCO; Location: OR     EXCISE LESION LOWER EXTREMITY  12/16/2011    Procedure:EXCISE LESION LOWER EXTREMITY; Surgeon:PARMINDER VELAZCO; Location: OR     HERNIA REPAIR  11/4/08    x2     L achilles repair  12/4/08     LAPAROSCOPIC OOPHORECTOMY      L for cyst     miscarriages x 3       tubal ligation and reversal       Social History   Substance Use Topics     Smoking status: Current Every Day Smoker     Types: Cigarettes     Smokeless tobacco: Never Used      Comment: Trying to quit, using  Commit long's     Alcohol use 2.4 - 3.0 oz/week     4 - 5 Standard  "drinks or equivalent per week      Comment: Beer 3 times per week     Current Outpatient Prescriptions   Medication Sig Dispense Refill     amoxicillin (AMOXIL) 500 MG capsule TAKE 4 CAPSULES(2000 MG) BY MOUTH 1 TIME FOR 1 DOSE (Patient taking differently: TAKE 4 CAPSULES(2000 MG) BY MOUTH 1 TIME FOR 1 DOSE pre dental) 4 capsule 1     aspirin 81 MG tablet Take 2 tablets by mouth daily.       atorvastatin (LIPITOR) 40 MG tablet Take 1 tablet (40 mg) by mouth daily 90 tablet 3     chlorthalidone (HYGROTON) 25 MG tablet Take 0.5 tablets (12.5 mg) by mouth daily 45 tablet 3     Cholecalciferol (VITAMIN D-3) 5000 UNIT TABS Take 1 tablet by mouth daily.       coenzyme Q-10 200 MG CAPS        Cyanocobalamin (B-12) 1000 MCG TBCR Take 1,000 mcg by mouth daily 100 tablet 1     ESTRADIOL 0.1MG/GM CREAM Place vaginally At Bedtime Insert 1 gram vaginally 2-3 times per week (Patient taking differently: Place vaginally At Bedtime Insert 1 gram vaginally 2-3 times per week as needed) 30 g 3     fluticasone (FLONASE) 50 MCG/ACT spray SPRAY 1-2 SPRAYS INTO BOTH NOSTRILS DAILY 16 mL 3     Lactobacillus (PROBIOTIC ACIDOPHILUS PO)        LORazepam (ATIVAN) 0.5 MG tablet TAKE 1 TABLET BY MOUTH EVERY 8 HOURS AS NEEDED FOR ANXIETY 20 tablet 0     metoprolol (TOPROL-XL) 100 MG 24 hr tablet Take 1 tablet (100 mg) by mouth daily 90 tablet 3     omeprazole (PRILOSEC) 40 MG capsule TAKE ONE CAPSULE BY MOUTH EVERY DAY 30 TO 60 MINUTES BEFORE A MEAL 90 capsule 3     Allergies   Allergen Reactions     Chantix [Varenicline] Nausea     Ciprofloxacin Other (See Comments)     hypertension     Decongestant [Cvs]      Erythromycin Nausea     Lisinopril      cough     Plavix [Clopidogrel Bisulfate]      Felt as if she was having a heart attack     Vioxx      FAMILY HISTORY NOTED AND REVIEWED    PHYSICAL EXAM:    /76  Temp 97.7  F (36.5  C) (Tympanic)  Ht 5' 5\" (1.651 m)  Wt 142 lb (64.4 kg)  SpO2 97%  Breastfeeding? No  BMI 23.63 " kg/m2    Patient appears non toxic    Results for orders placed or performed in visit on 11/05/18   *UA reflex to Microscopic and Culture ([facility] (except [place] [facility])   Result Value Ref Range    Color Urine Yellow     Appearance Urine Clear     Glucose Urine Negative NEG^Negative mg/dL    Bilirubin Urine Negative NEG^Negative    Ketones Urine Negative NEG^Negative mg/dL    Specific Gravity Urine 1.015 1.003 - 1.035    Blood Urine Trace (A) NEG^Negative    pH Urine 7.5 (H) 5.0 - 7.0 pH    Protein Albumin Urine Negative NEG^Negative mg/dL    Urobilinogen Urine 0.2 0.2 - 1.0 EU/dL    Nitrite Urine Negative NEG^Negative    Leukocyte Esterase Urine Trace (A) NEG^Negative    Source Midstream Urine    Urine Microscopic   Result Value Ref Range    WBC Urine 5-10 (A) OTO5^0 - 5 /HPF    RBC Urine O - 2 OTO2^O - 2 /HPF    Squamous Epithelial /LPF Urine Few FEW^Few /LPF    Bacteria Urine Few (A) NEG^Negative /HPF     Assessment and Plan:     (R35.0) Urinary frequency  (primary encounter diagnosis)  Comment:   Plan: *UA reflex to Microscopic and Culture ([place]         [facility] (except [place] and         [facility]), Urine Microscopic, cephalexin         (KEFLEX) 250 MG capsule        Tid x 7d, push fluids    (Z23) Need for vaccination  Comment:   Plan: TD PRSERV FREE >=7 YRS ADS IM [63154], 1st          Administration  [76641]              Lyudmila Escobar PA-C

## 2018-11-06 ASSESSMENT — ANXIETY QUESTIONNAIRES: GAD7 TOTAL SCORE: 6

## 2018-11-27 DIAGNOSIS — N95.2 ATROPHIC VAGINITIS: ICD-10-CM

## 2018-11-27 NOTE — TELEPHONE ENCOUNTER
"amoxicillin (AMOXIL) 500 MG capsule  Last Written Prescription Date:  3/20/2018  Last Fill Quantity: 4 ,  # refills: 1   Last office visit: 11/5/2018 with prescribing provider:  Lyudmila Escobar   Future Office Visit:   Next 5 appointments (look out 90 days)     Dec 17, 2018 10:00 AM CST   PHYSICAL with Lyudmila Escobar PA-C   [facility]    [address] [address]   [phone]                 ESTRADIOL 0.1MG/GM CREAM  Last Written Prescription Date:  9/12/2017  Last Fill Quantity: 30 g,  # refills: 3   Last office visit: 11/5/2018 with prescribing provider:  Lyudmila Escobar   Future Office Visit:   Next 5 appointments (look out 90 days)     Dec 17, 2018 10:00 AM CST   PHYSICAL with Lyudmila Escobar PA-C   [facility]    [address] [address]   [phone]                 Requested Prescriptions   Pending Prescriptions Disp Refills     amoxicillin (AMOXIL) 500 MG capsule [Pharmacy Med Name: AMOXICILLIN 500MG CAPSULES] 4 capsule 0     Sig: TAKE 4 CAPSULES(2000 MG) BY MOUTH 1 TIME FOR 1 DOSE    Oral Acne/Rosacea Medications Protocol Failed    11/27/2018  7:05 AM       Failed - Confirmation of diagnosis is required    Please confirm diagnosis is acne or rosacea.     If NOT acne or rosacea; refer request to provider for further evaluation.    If diagnosis IS acne or rosacea, OK to refill BASED ON PREVIOUS REFILL CLINICAL NOTE RECOMMENDATION.         Passed - Patient is 12 years of age or older       Passed - Recent (12 mo) or future (30 days) visit within the authorizing provider's specialty    Patient had office visit in the last 12 months or has a visit in the next 30 days with authorizing provider or within the authorizing provider's specialty.  See \"Patient Info\" tab in inbasket, or \"Choose Columns\" in Meds & Orders section of the refill encounter.             Passed - No active pregnancy on record       Passed - No " "positive prenancy test is past 12 months        ESTRACE VAGINAL 0.1 MG/GM vaginal cream [Pharmacy Med Name: ESTRACE VAGINAL CREAM 42.5GM] 42.5 g 0     Sig: INSERT 1 GRAM VAGINALLY TWICE TO THREE TIMES PER WEEK    Hormone Replacement Therapy Passed    11/27/2018  7:05 AM       Passed - Blood pressure under 140/90 in past 12 months    BP Readings from Last 3 Encounters:   11/05/18 130/76   03/20/18 117/76   01/25/18 162/88                Passed - Recent (12 mo) or future (30 days) visit within the authorizing provider's specialty    Patient had office visit in the last 12 months or has a visit in the next 30 days with authorizing provider or within the authorizing provider's specialty.  See \"Patient Info\" tab in inbasket, or \"Choose Columns\" in Meds & Orders section of the refill encounter.             Passed - Patient has mammogram in past 2 years on file if age 50-75       Passed - Patient is 18 years of age or older       Passed - No active pregnancy on record       Passed - No positive pregnancy test on record in past 12 months          "

## 2018-11-28 ENCOUNTER — DOCUMENTATION ONLY (OUTPATIENT)
Dept: LAB | Facility: CLINIC | Age: 68
End: 2018-11-28

## 2018-11-28 DIAGNOSIS — F41.9 ANXIETY: ICD-10-CM

## 2018-11-28 DIAGNOSIS — Z00.00 ENCOUNTER FOR ROUTINE ADULT HEALTH EXAMINATION WITHOUT ABNORMAL FINDINGS: Primary | ICD-10-CM

## 2018-11-28 DIAGNOSIS — I10 HTN (HYPERTENSION), BENIGN: ICD-10-CM

## 2018-11-28 DIAGNOSIS — E78.5 HYPERLIPIDEMIA WITH TARGET LDL LESS THAN 100: ICD-10-CM

## 2018-11-28 NOTE — TELEPHONE ENCOUNTER
needs to be checked  CSA 1- with Lyudmila Escobar PAC    Pending Prescriptions:                       Disp   Refills    LORazepam (ATIVAN) 0.5 MG tablet          20 tab*0            Sig: TAKE 1 TABLET BY MOUTH EVERY 8 HOURS AS NEEDED           FOR ANXIETY          Last Written Prescription Date:  9-24-18  Last Fill Quantity: 20,   # refills: 0  Last Office Visit: 11-5-18 Luz  Future Office visit:    Next 5 appointments (look out 90 days)     Dec 17, 2018 10:00 AM CST   PHYSICAL with Lyudmila Escobar PA-C   [facility]    [address] [address]   [phone]                   Routing refill request to provider for review/approval because:  Drug not on the [facility], [facility] or  Health refill protocol or controlled substance    Ekaterina Goldberg RT (R)

## 2018-11-29 RX ORDER — ESTRADIOL 0.1 MG/G
CREAM VAGINAL
Qty: 42.5 G | Refills: 0 | Status: SHIPPED | OUTPATIENT
Start: 2018-11-29 | End: 2020-12-22

## 2018-11-29 RX ORDER — AMOXICILLIN 500 MG/1
CAPSULE ORAL
Qty: 4 CAPSULE | Refills: 0 | Status: SHIPPED | OUTPATIENT
Start: 2018-11-29 | End: 2019-03-13

## 2018-11-29 NOTE — TELEPHONE ENCOUNTER
TO PCP:     Routing refill request to provider for review/approval because:  Pt is requesting refill on Abx for prophylactic dental appts (is this still indicated/necessary based on current research?)     Estradiol is also pended. Has upcoming OV 12/18    Please review and authorize if appropriate,     Thank you,   Tahmina HERZOG RN

## 2018-11-30 RX ORDER — LORAZEPAM 0.5 MG/1
TABLET ORAL
Qty: 20 TABLET | Refills: 0 | Status: SHIPPED | OUTPATIENT
Start: 2018-11-30 | End: 2019-02-04

## 2018-11-30 NOTE — TELEPHONE ENCOUNTER
Patient is followed by JOSELITO BALDERAS for ongoing prescription of benzodiazepines.  All refills should be approved by this provider, or covering partner.    Medication(s): Lorazepam.   Maximum quantity per month: 20  Clinic visit frequency required:  Q6months    Benzodiazepine use reviewed by psychiatry:  No  CSA: 1/22/14    Last [facility] website verification:  11/30/2018 [place]    [email or web address]

## 2018-12-04 DIAGNOSIS — I10 HTN (HYPERTENSION), BENIGN: ICD-10-CM

## 2018-12-05 RX ORDER — METOPROLOL SUCCINATE 100 MG/1
100 TABLET, EXTENDED RELEASE ORAL DAILY
Qty: 90 TABLET | Refills: 0 | Status: SHIPPED | OUTPATIENT
Start: 2018-12-05 | End: 2019-02-08

## 2018-12-05 NOTE — TELEPHONE ENCOUNTER
Prescription approved per [facility] [place].  Vilma JC RN

## 2018-12-05 NOTE — TELEPHONE ENCOUNTER
"metoprolol (TOPROL-XL) 100 MG 24 hr tablet  Last Written Prescription Date:  12/14/17  Last Fill Quantity: 90,  # refills: 3   Last office visit: 11/5/2018 with prescribing provider:  tnug   Future Office Visit:   Next 5 appointments (look out 90 days)     Dec 17, 2018 10:00 AM CST   PHYSICAL with Lyudmila Escobar PA-C   [facility]    [address] [address] [phone]                     Requested Prescriptions   Pending Prescriptions Disp Refills     metoprolol succinate ER (TOPROL-XL) 100 MG 24 hr tablet 90 tablet 3     Sig: Take 1 tablet (100 mg) by mouth daily    Beta-Blockers Protocol Passed    12/4/2018  7:12 PM       Passed - Blood pressure under 140/90 in past 12 months    BP Readings from Last 3 Encounters:   11/05/18 130/76   03/20/18 117/76   01/25/18 162/88                Passed - Patient is age 6 or older       Passed - Recent (12 mo) or future (30 days) visit within the authorizing provider's specialty    Patient had office visit in the last 12 months or has a visit in the next 30 days with authorizing provider or within the authorizing provider's specialty.  See \"Patient Info\" tab in inbasket, or \"Choose Columns\" in Meds & Orders section of the refill encounter.                "

## 2018-12-12 DIAGNOSIS — Z00.00 ENCOUNTER FOR ROUTINE ADULT HEALTH EXAMINATION WITHOUT ABNORMAL FINDINGS: ICD-10-CM

## 2018-12-12 DIAGNOSIS — Z12.31 VISIT FOR SCREENING MAMMOGRAM: ICD-10-CM

## 2018-12-12 DIAGNOSIS — E78.5 HYPERLIPIDEMIA WITH TARGET LDL LESS THAN 100: ICD-10-CM

## 2018-12-12 DIAGNOSIS — I10 HTN (HYPERTENSION), BENIGN: ICD-10-CM

## 2018-12-12 LAB
ALBUMIN SERPL-MCNC: 3.9 G/DL (ref 3.4–5)
ALP SERPL-CCNC: 77 U/L (ref 40–150)
ALT SERPL W P-5'-P-CCNC: 32 U/L (ref 0–50)
ANION GAP SERPL CALCULATED.3IONS-SCNC: 7 MMOL/L (ref 3–14)
AST SERPL W P-5'-P-CCNC: 23 U/L (ref 0–45)
BILIRUB SERPL-MCNC: 0.6 MG/DL (ref 0.2–1.3)
BUN SERPL-MCNC: 13 MG/DL (ref 7–30)
CALCIUM SERPL-MCNC: 9.5 MG/DL (ref 8.5–10.1)
CHLORIDE SERPL-SCNC: 103 MMOL/L (ref 94–109)
CHOLEST SERPL-MCNC: 152 MG/DL
CO2 SERPL-SCNC: 29 MMOL/L (ref 20–32)
CREAT SERPL-MCNC: 0.76 MG/DL (ref 0.52–1.04)
ERYTHROCYTE [DISTWIDTH] IN BLOOD BY AUTOMATED COUNT: 13.6 % (ref 10–15)
GFR SERPL CREATININE-BSD FRML MDRD: 76 ML/MIN/1.7M2
GLUCOSE SERPL-MCNC: 87 MG/DL (ref 70–99)
HCT VFR BLD AUTO: 49.3 % (ref 35–47)
HDLC SERPL-MCNC: 54 MG/DL
HGB BLD-MCNC: 16.5 G/DL (ref 11.7–15.7)
LDLC SERPL CALC-MCNC: 83 MG/DL
MCH RBC QN AUTO: 32.7 PG (ref 26.5–33)
MCHC RBC AUTO-ENTMCNC: 33.5 G/DL (ref 31.5–36.5)
MCV RBC AUTO: 98 FL (ref 78–100)
NONHDLC SERPL-MCNC: 98 MG/DL
PLATELET # BLD AUTO: 195 10E9/L (ref 150–450)
POTASSIUM SERPL-SCNC: 4 MMOL/L (ref 3.4–5.3)
PROT SERPL-MCNC: 7 G/DL (ref 6.8–8.8)
RBC # BLD AUTO: 5.04 10E12/L (ref 3.8–5.2)
SODIUM SERPL-SCNC: 139 MMOL/L (ref 133–144)
TRIGL SERPL-MCNC: 75 MG/DL
WBC # BLD AUTO: 7.7 10E9/L (ref 4–11)

## 2018-12-12 PROCEDURE — 36415 COLL VENOUS BLD VENIPUNCTURE: CPT | Performed by: PHYSICIAN ASSISTANT

## 2018-12-12 PROCEDURE — 85027 COMPLETE CBC AUTOMATED: CPT | Performed by: PHYSICIAN ASSISTANT

## 2018-12-12 PROCEDURE — 80061 LIPID PANEL: CPT | Performed by: PHYSICIAN ASSISTANT

## 2018-12-12 PROCEDURE — 80053 COMPREHEN METABOLIC PANEL: CPT | Performed by: PHYSICIAN ASSISTANT

## 2018-12-17 ENCOUNTER — OFFICE VISIT (OUTPATIENT)
Dept: FAMILY MEDICINE | Facility: CLINIC | Age: 68
End: 2018-12-17
Payer: COMMERCIAL

## 2018-12-17 ENCOUNTER — HOSPITAL ENCOUNTER (OUTPATIENT)
Dept: MAMMOGRAPHY | Facility: CLINIC | Age: 68
Discharge: HOME | End: 2018-12-17
Attending: PHYSICIAN ASSISTANT | Admitting: PHYSICIAN ASSISTANT
Payer: MEDICARE

## 2018-12-17 VITALS
HEIGHT: 65 IN | BODY MASS INDEX: 23.49 KG/M2 | HEART RATE: 57 BPM | SYSTOLIC BLOOD PRESSURE: 112 MMHG | TEMPERATURE: 96.9 F | WEIGHT: 141 LBS | DIASTOLIC BLOOD PRESSURE: 64 MMHG | OXYGEN SATURATION: 98 %

## 2018-12-17 DIAGNOSIS — Z00.00 ENCOUNTER FOR MEDICARE ANNUAL WELLNESS EXAM: Primary | ICD-10-CM

## 2018-12-17 DIAGNOSIS — I10 HTN (HYPERTENSION), BENIGN: ICD-10-CM

## 2018-12-17 DIAGNOSIS — F17.200 SMOKER: ICD-10-CM

## 2018-12-17 DIAGNOSIS — J20.9 ACUTE BRONCHITIS, UNSPECIFIED ORGANISM: ICD-10-CM

## 2018-12-17 DIAGNOSIS — F17.210 CIGARETTE NICOTINE DEPENDENCE, UNCOMPLICATED: ICD-10-CM

## 2018-12-17 DIAGNOSIS — E78.5 HYPERLIPIDEMIA LDL GOAL <100: ICD-10-CM

## 2018-12-17 DIAGNOSIS — E53.8 VITAMIN B12 DEFICIENCY (NON ANEMIC): ICD-10-CM

## 2018-12-17 DIAGNOSIS — I73.9 PAD (PERIPHERAL ARTERY DISEASE) (H): ICD-10-CM

## 2018-12-17 DIAGNOSIS — K21.9 GASTROESOPHAGEAL REFLUX DISEASE WITHOUT ESOPHAGITIS: ICD-10-CM

## 2018-12-17 LAB — VIT B12 SERPL-MCNC: 2410 PG/ML (ref 193–986)

## 2018-12-17 PROCEDURE — G0296 VISIT TO DETERM LDCT ELIG: HCPCS | Performed by: PHYSICIAN ASSISTANT

## 2018-12-17 PROCEDURE — 36415 COLL VENOUS BLD VENIPUNCTURE: CPT | Performed by: PHYSICIAN ASSISTANT

## 2018-12-17 PROCEDURE — 77063 BREAST TOMOSYNTHESIS BI: CPT

## 2018-12-17 PROCEDURE — 99213 OFFICE O/P EST LOW 20 MIN: CPT | Mod: 25 | Performed by: PHYSICIAN ASSISTANT

## 2018-12-17 PROCEDURE — 99397 PER PM REEVAL EST PAT 65+ YR: CPT | Performed by: PHYSICIAN ASSISTANT

## 2018-12-17 PROCEDURE — 82607 VITAMIN B-12: CPT | Performed by: PHYSICIAN ASSISTANT

## 2018-12-17 RX ORDER — AZITHROMYCIN 250 MG/1
TABLET, FILM COATED ORAL
Qty: 6 TABLET | Refills: 0 | Status: SHIPPED | OUTPATIENT
Start: 2018-12-17 | End: 2019-05-14

## 2018-12-17 RX ORDER — OMEPRAZOLE 40 MG/1
CAPSULE, DELAYED RELEASE ORAL
Qty: 90 CAPSULE | Refills: 3 | Status: SHIPPED | OUTPATIENT
Start: 2018-12-17 | End: 2019-12-02

## 2018-12-17 RX ORDER — ATORVASTATIN CALCIUM 40 MG/1
40 TABLET, FILM COATED ORAL DAILY
Qty: 90 TABLET | Refills: 3 | Status: SHIPPED | OUTPATIENT
Start: 2018-12-17 | End: 2019-12-18

## 2018-12-17 RX ORDER — CHLORTHALIDONE 25 MG/1
25 TABLET ORAL DAILY
Qty: 90 TABLET | Refills: 3 | Status: SHIPPED | OUTPATIENT
Start: 2018-12-17 | End: 2019-12-18

## 2018-12-17 RX ORDER — CHLORTHALIDONE 25 MG/1
12.5 TABLET ORAL DAILY
Qty: 45 TABLET | Refills: 3 | Status: CANCELLED | OUTPATIENT
Start: 2018-12-17

## 2018-12-17 ASSESSMENT — MIFFLIN-ST. JEOR: SCORE: 1175.45

## 2018-12-17 NOTE — PROGRESS NOTES
"  SUBJECTIVE:   Karen Caban is a 68 year old female who presents for Preventive Visit.    Cough x 2 weeks  50 pack year hx smoking  Has sinus pressure with clear nasal discharge  Coughs up clear sputum  Denies fever or chills  Denies sxs of claudication or chest pain/sob.   Started on vitamin B12 and would like to see if increased  Are you in the first 12 months of your Medicare Part B coverage?  No    Physical Health:    In general, how would you rate your overall physical health? good    Outside of work, how many days during the week do you exercise? 6-7 days/week    Outside of work, approximately how many minutes a day do you exercise?45-60 minutes    If you drink alcohol do you typically have >3 drinks per day or >7 drinks per week? No    Do you usually eat at least 4 servings of fruit and vegetables a day, include whole grains & fiber and avoid regularly eating high fat or \"junk\" foods? Yes    Do you have any problems taking medications regularly?  No    Do you have any side effects from medications? none    Needs assistance for the following daily activities: no assistance needed    Which of the following safety concerns are present in your home?  none identified     Hearing impairment: No    In the past 6 months, have you been bothered by leaking of urine? no    Mental Health:    In general, how would you rate your overall mental or emotional health? good  PHQ-2 Score:      Do you feel safe in your environment? Yes    Do you have a Health Care Directive? Yes: Advance Directive has been received and scanned.    Additional concerns to address?  No    Fall risk:  Fallen 2 or more times in the past year?: No  Any fall with injury in the past year?: No    Cognitive Screenin) Repeat 3 items (Leader, Season, Table)    2) Clock draw: NORMAL  3) 3 item recall: Recalls 3 objects  Results: 3 items recalled: COGNITIVE IMPAIRMENT LESS LIKELY    Mini-CogTM Copyright S Alex. Licensed by the author for use in " [facility]; reprinted with permission (soob@[facility]). All rights reserved.      Do you have sleep apnea, excessive snoring or daytime drowsiness?: no        Reviewed and updated as needed this visit by clinical staff  Allergies  Meds         Reviewed and updated as needed this visit by Provider  Allergies        Social History     Tobacco Use     Smoking status: Current Every Day Smoker     Types: Cigarettes     Smokeless tobacco: Never Used     Tobacco comment: Trying to quit, using  Commit long's   Substance Use Topics     Alcohol use: Yes     Alcohol/week: 2.4 - 3.0 oz     Types: 4 - 5 Standard drinks or equivalent per week     Comment: Beer 3 times per week                           Current providers sharing in care for this patient include:   Patient Care Team:  Lyudmila Escobar PA-C as PCP - General (Internal Medicine)    The following health maintenance items are reviewed in Epic and correct as of today:  Health Maintenance   Topic Date Due     ADVANCE DIRECTIVE PLANNING Q5 YRS  11/26/2017     FALL RISK ASSESSMENT  12/13/2017     AIDA QUESTIONNAIRE 1 YEAR  11/05/2019     PHQ-9 Q1YR  11/05/2019     MAMMO SCREEN Q2 YR (SYSTEM ASSIGNED)  12/14/2019     COLONOSCOPY Q5 YR  01/18/2023     LIPID SCREEN Q5 YR FEMALE (SYSTEM ASSIGNED)  12/12/2023     DTAP/TDAP/TD IMMUNIZATION (2 - Td) 11/05/2028     DEXA SCAN SCREENING (SYSTEM ASSIGNED)  Completed     INFLUENZA VACCINE  Completed     ZOSTER IMMUNIZATION  Completed     HEPATITIS C SCREENING  Completed     IPV IMMUNIZATION  Aged Out     MENINGITIS IMMUNIZATION  Aged Out       Past Medical History:   Diagnosis Date     Ankle fracture 2009    L     Anxiety      Chronic back pain      Colon polyp 2012    repeat colonoscopy 5 years.     Fx low femur epiphy-closed (H)      GERD (gastroesophageal reflux disease)      History of UTI 2017    Cysto by Dr Agustin neg     HTN (hypertension), benign      Hyperlipidemia LDL goal < 100      Normal nuclear stress test  12/09    EF 67%     Osteopenia      PAD (peripheral artery disease) (H)     s/p fem pop bypass; embolectomy     PUD (peptic ulcer disease) 1980s    DU     Smoker      Vitamin D deficiency      Past Surgical History:   Procedure Laterality Date     Blood clot removal from Stent      2011     BYPASS GRAFT AORTOFEMORAL  5/02    Dr. Turner     BYPASS GRAFT FEMOROPOPLITEAL  12/16/2011     COLONOSCOPY N/A 1/18/2018    Procedure: COMBINED COLONOSCOPY, SINGLE OR MULTIPLE BIOPSY/POLYPECTOMY BY BIOPSY;  COLONOSCOPY;  Surgeon: Efrain Hernadez MD;  Location: [facility]     EMBOLECTOMY LOWER EXTREMITY  12/16/2011    Procedure:EMBOLECTOMY LOWER EXTREMITY; EMBOLECTOMY, RIGHT POPLITEAL WITH PATCH ANGIOPLASTY. EXCISION SKIN LESION RIGHT LEG. ; Surgeon:PARMINDER VELAZCO; Location:[facility]     EXCISE LESION LOWER EXTREMITY  12/16/2011    Procedure:EXCISE LESION LOWER EXTREMITY; Surgeon:PARMINDER VELAZCO; Location:[facility]     HERNIA REPAIR  11/4/08    x2     L achilles repair  12/4/08     LAPAROSCOPIC OOPHORECTOMY      L for cyst     miscarriages x 3       tubal ligation and reversal       Current Outpatient Medications   Medication Sig Dispense Refill     atorvastatin (LIPITOR) 40 MG tablet Take 1 tablet (40 mg) by mouth daily 90 tablet 3     azithromycin (ZITHROMAX) 250 MG tablet Two tablets first day, then one tablet daily for four days. 6 tablet 0     chlorthalidone (HYGROTON) 25 MG tablet Take 1 tablet (25 mg) by mouth daily 90 tablet 3     omeprazole (PRILOSEC) 40 MG DR capsule TAKE ONE CAPSULE BY MOUTH EVERY DAY 30 TO 60 MINUTES BEFORE A MEAL 90 capsule 3     amoxicillin (AMOXIL) 500 MG capsule TAKE 4 CAPSULES(2000 MG) BY MOUTH 1 TIME FOR 1 DOSE 4 capsule 0     aspirin 81 MG tablet Take 2 tablets by mouth daily.       Cholecalciferol (VITAMIN D-3) 5000 UNIT TABS Take 1 tablet by mouth daily.       coenzyme Q-10 200 MG CAPS        Cyanocobalamin (B-12) 1000 MCG TBCR Take 1,000 mcg by mouth daily 100 tablet 1     ESTRACE  "VAGINAL 0.1 MG/GM vaginal cream INSERT 1 GRAM VAGINALLY TWICE TO THREE TIMES PER WEEK 42.5 g 0     fluticasone (FLONASE) 50 MCG/ACT spray SPRAY 1-2 SPRAYS INTO BOTH NOSTRILS DAILY 16 mL 3     Lactobacillus (PROBIOTIC ACIDOPHILUS PO)        LORazepam (ATIVAN) 0.5 MG tablet TAKE 1 TABLET BY MOUTH EVERY 8 HOURS AS NEEDED FOR ANXIETY 20 tablet 0     metoprolol succinate ER (TOPROL-XL) 100 MG 24 hr tablet Take 1 tablet (100 mg) by mouth daily 90 tablet 0         ROS:  Constitutional, HEENT, cardiovascular, pulmonary, GI, , musculoskeletal, neuro, skin, endocrine and psych systems are negative, except as otherwise noted.    OBJECTIVE:   There were no vitals taken for this visit. Estimated body mass index is 23.63 kg/m  as calculated from the following:    Height as of 11/5/18: 1.651 m (5' 5\").    Weight as of 11/5/18: 64.4 kg (142 lb).  EXAM:   GENERAL APPEARANCE: healthy, alert and no distress  EYES: Eyes grossly normal to inspection, PERRL and conjunctivae and sclerae normal  HENT: ear canals and TM's normal, nose and mouth without ulcers or lesions, oropharynx clear and oral mucous membranes moist  NECK: no adenopathy, no asymmetry, masses, or scars and thyroid normal to palpation  RESP: lungs clear to auscultation - no rales, rhonchi or wheezes  BREAST: normal without masses, tenderness or nipple discharge and no palpable axillary masses or adenopathy  CV: regular rate and rhythm, normal S1 S2, no S3 or S4, no murmur, click or rub, no peripheral edema and peripheral pulses strong  ABDOMEN: soft, nontender, no hepatosplenomegaly, no masses and bowel sounds normal  MS: no musculoskeletal defects are noted and gait is age appropriate without ataxia  SKIN: no suspicious lesions or rashes  NEURO: Normal strength and tone, sensory exam grossly normal, mentation intact and speech normal  PSYCH: mentation appears normal and affect normal/bright        ASSESSMENT / PLAN:   Assessment and Plan:     (Z00.00) Encounter for " Medicare annual wellness exam  (primary encounter diagnosis)  Comment: immunizations up to date. Had her mammogram today. Colonoscopy up to date.  Plan: dexa next year. Has ear wax R ear that will be flushed    (I73.9) PAD (peripheral artery disease) (H)  Comment:   Plan: denies claudication. Not interested in quitting smoking. Walks for exercise    (I10) HTN (hypertension), benign  Comment:   Plan: chlorthalidone (HYGROTON) 25 MG tablet        Refilled. Changed from 1/2 to whole tab as this crumbles when she breaks it and runs out early    (K21.9) Gastroesophageal reflux disease without esophagitis  Comment:   Plan: omeprazole (PRILOSEC) 40 MG DR capsule        refillec    (F17.200) Smoker  Comment:   Plan: Prof fee: Shared Decisionmaking for Lung Cancer        Screening, CT Chest Lung Cancer Scrn Low Dose         wo, Okay for Smoking Cessation Study (PLUTO) to        Contact Patient            (E78.5) Hyperlipidemia LDL goal <100  Comment: at goal  Plan: atorvastatin (LIPITOR) 40 MG tablet        refilled    (E53.8) Vitamin B12 deficiency (non anemic)  Comment:   Plan: Vitamin B12            (F17.210) Cigarette nicotine dependence, uncomplicated   Comment:   Plan: Prof fee: Shared Decisionmaking for Lung Cancer        Screening, CT Chest Lung Cancer Scrn Low Dose         wo, Okay for Smoking Cessation Study (PLUTO) to        Contact Patient            (J20.9) Acute bronchitis, unspecified organism  Comment:   Plan: azithromycin (ZITHROMAX) 250 MG tablet        tad          End of Life Planning:  Patient currently has an advanced directive: No.  I have verified the patient's ablity to prepare an advanced directive/make health care decisions.  Literature was provided to assist patient in preparing an advanced directive.    COUNSELING:  Reviewed preventive health counseling, as reflected in patient instructions    BP Readings from Last 1 Encounters:   11/05/18 130/76     Estimated body mass index is 23.63 kg/m  as  "calculated from the following:    Height as of 11/5/18: 1.651 m (5' 5\").    Weight as of 11/5/18: 64.4 kg (142 lb).           reports that she has been smoking cigarettes.  she has never used smokeless tobacco.      Appropriate preventive services were discussed with this patient, including applicable screening as appropriate for cardiovascular disease, diabetes, osteopenia/osteoporosis, and glaucoma.  As appropriate for age/gender, discussed screening for colorectal cancer, prostate cancer, breast cancer, and cervical cancer. Checklist reviewing preventive services available has been given to the patient.    Reviewed patients plan of care and provided an AVS. The Basic Care Plan (routine screening as documented in Health Maintenance) for Karen meets the Care Plan requirement. This Care Plan has been established and reviewed with the Patient.    Counseling Resources:  ATP IV Guidelines  Pooled Cohorts Equation Calculator  Breast Cancer Risk Calculator  FRAX Risk Assessment  ICSI Preventive Guidelines  Dietary Guidelines for Americans, 2010  USDA's MyPlate  ASA Prophylaxis  Lung CA Screening    Lyudmila Escobar PA-C  [facility]  "

## 2018-12-17 NOTE — PROGRESS NOTES
Lung Cancer Screening Shared Decision Making Visit     Karen Caban is eligible for lung cancer screening on the basis of the information provided in my signed lung cancer screening order.     I have discussed with patient the risks and benefits of screening for lung cancer with low-dose CT.     The risks include:  radiation exposure: one low dose chest CT has as much ionizing radiation as about 15 chest x-rays or 6 months of background radiation living in [place]    false positives: 96% of positive findings/nodules are NOT cancer, but some might still require additional diagnostic evaluation, including biopsy  over-diagnosis: some slow growing cancers that might never have been clinically significant will be detected and treated unnecessarily     The benefit of early detection of lung cancer is contingent upon adherence to annual screening or more frequent follow up if indicated.     Furthermore, reaping the benefits of screening requires Karen Caban to be willing and physically able to undergo diagnostic procedures, if indicated. Although no specific guide is available for determining severity of comorbidities, it is reasonable to withhold screening in patients who have greater mortality risk from other diseases.     We did discuss that the only way to prevent lung cancer is to not smoke. Smoking cessation assistance was offered.    I did not offer risk estimation using a calculator such as this one:    ShouldIScreen

## 2018-12-17 NOTE — NURSING NOTE
Right ear wash attempted.  2 bottles plain warm water thru elephant ear.  Pt will try debrox at home.   Denisa MCKINLEY MA

## 2018-12-17 NOTE — PATIENT INSTRUCTIONS
Preventive Health Recommendations    See your health care provider every year to    Review health changes.     Discuss preventive care.      Review your medicines if your doctor has prescribed any.      You no longer need a yearly Pap test unless you've had an abnormal Pap test in the past 10 years. If you have vaginal symptoms, such as bleeding or discharge, be sure to talk with your provider about a Pap test.      Every 1 to 2 years, have a mammogram.  If you are over 69, talk with your health care provider about whether or not you want to continue having screening mammograms.      Every 10 years, have a colonoscopy. Or, have a yearly FIT test (stool test). These exams will check for colon cancer.       Have a cholesterol test every 5 years, or more often if your doctor advises it.       Have a diabetes test (fasting glucose) every three years. If you are at risk for diabetes, you should have this test more often.       At age 65, have a bone density scan (DEXA) to check for osteoporosis (brittle bone disease).    Shots:    Get a flu shot each year.    Get a tetanus shot every 10 years.    Talk to your doctor about your pneumonia vaccines. There are now two you should receive - Pneumovax (PPSV 23) and Prevnar (PCV 13).    Talk to your pharmacist about the shingles vaccine.    Talk to your doctor about the hepatitis B vaccine.    Nutrition:     Eat at least 5 servings of fruits and vegetables each day.      Eat whole-grain bread, whole-wheat pasta and brown rice instead of white grains and rice.      Get adequate Calcium and Vitamin D.     Lifestyle    Exercise at least 150 minutes a week (30 minutes a day, 5 days a week). This will help you control your weight and prevent disease.      Limit alcohol to one drink per day.      No smoking.       Wear sunscreen to prevent skin cancer.       See your dentist twice a year for an exam and cleaning.      See your eye doctor every 1 to 2 years to screen for conditions  such as glaucoma, macular degeneration and cataracts.    Personalized Prevention Plan  You are due for the preventive services outlined below.  Your care team is available to assist you in scheduling these services.  If you have already completed any of these items, please share that information with your care team to update in your medical record.  Health Maintenance Due   Topic Date Due     Discuss Advance Directive Planning  11/26/2017     FALL RISK ASSESSMENT  12/13/2017     Lung Cancer Screening   Frequently Asked Questions  If you are at high-risk for lung cancer, getting screened with low-dose computed tomography (LDCT) every year can help save your life. This handout offers answers to some of the most common questions about lung cancer screening. If you have other questions, please call [phone] ([phone]).     What is it?  Lung cancer screening uses special X-ray technology to create an image of your lung tissue. The exam is quick and easy and takes less than 10 seconds. We don t give you any medicine or use any needles. You can eat before and after the exam. You don t need to change your clothes as long as the clothing on your chest doesn t contain metal. But, you do need to be able to hold your breath for at least 6 seconds during the exam.    What is the goal of lung cancer screening?  The goal of lung cancer screening is to save lives. Many times, lung cancer is not found until a person starts having physical symptoms. Lung cancer screening can help detect lung cancer in the earliest stages when it may be easier to treat.    Who should be screened for lung cancer?  We suggest lung cancer screening for anyone who is at high-risk for lung cancer. You are in the high-risk group if you:      are between the ages of 55 and 79, and    have smoked at least 1 pack of cigarettes a day for 30 or more years, and    still smoke or have quit within the past 15 years.    However, if you have a new  cough or shortness of breath, you should talk to your doctor before being screened.    Some national lung health advocacy groups also recommend screening for people ages 50 to 79 who have smoked an average of 1 pack of cigarettes a day for 20 years. They must also have at least 1 other risk factor for lung cancer, not including exposure to secondhand smoke. Other risk factors are having had cancer in the past, emphysema, pulmonary fibrosis, COPD, a family history of lung cancer, or exposure to certain materials such as arsenic, asbestos, beryllium, cadmium, chromium, diesel fumes, nickel, radon or silica. Your care team can help you know if you have one of these risk factors.     Why does it matter if I have symptoms?  Certain symptoms can be a sign that you have a condition in your lungs that should be checked and treated by your doctor. These symptoms include fever, chest pain, a new or changing cough, shortness of breath that you have never felt before, coughing up blood or unexplained weight loss. Having any of these symptoms can greatly affect the results of lung cancer screening.       Should all smokers get an LDCT lung cancer screening exam?  It depends. Lung cancer screening is for a very specific group of men and women who have a history of heavy smoking over a long period of time (see  Who should be screened for lung cancer  above).  I am in the high-risk group, but have been diagnosed with cancer in the past. Is LDCT lung cancer screening right for me?  In some cases, you should not have LDCT lung screening, such as when your doctor is already following your cancer with CT scan studies. Your doctor will help you decide if LDCT lung screening is right for you.  Do I need to have a screening exam every year?  Yes. If you are in the high-risk group described earlier, you should get an LDCT lung cancer screening exam every year until you are 79, or are no longer willing or able to undergo screening and  possible procedures to diagnose and treat lung cancer.  How effective is LDCT at preventing death from lung cancer?  Studies have shown that LDCT lung cancer screening can lower the risk of death from lung cancer by 20 percent in people who are at high-risk.  What are the risks?  There are some risks and limitations of LDCT lung cancer screening. We want to make sure you understand the risks and benefits, so please let us know if you have any questions. Your doctor may want to talk with you more about these risks.    Radiation exposure: As with any exam that uses radiation, there is a very small increased risk of cancer. The amount of radiation in LDCT is small--about the same amount a person would get from a mammogram. Your doctor orders the exam when he or she feels the potential benefits outweigh the risks.    False negatives: No test is perfect, including LDCT. It is possible that you may have a medical condition, including lung cancer, that is not found during your exam. This is called a false negative result.    False positives and more testing: LDCT very often finds something in the lung that could be cancer, but in fact is not. This is called a false positive result. False positive tests often cause anxiety. To make sure these findings are not cancer, you may need to have more tests. These tests will be done only if you give us permission. Sometimes patients need a treatment that can have side effects, such as a biopsy. For more information on false positives, see  What can I expect from the results?     Findings not related to lung cancer: Your LDCT exam also takes pictures of areas of your body next to your lungs. In a very small number of cases, the CT scan will show an abnormal finding in one of these areas, such as your kidneys, adrenal glands, liver or thyroid. This finding may not be serious, but you may need more tests. Your doctor can help you decide what other tests you may need, if any.  What can  I expect from the results?  About 1 out of 4 LDCT exams will find something that may need more tests. Most of the time, these findings are lung nodules. Lung nodules are very small collections of tissue in the lung. These nodules are very common, and the vast majority--more than 97 percent--are not cancer (benign). Most are normal lymph nodes or small areas of scarring from past infections.  But, if a small lung nodule is found to be cancer, the cancer can be cured more than 90 percent of the time. To know if the nodule is cancer, we may need to get more images before your next yearly screening exam. If the nodule has suspicious features (for example, it is large, has an odd shape or grows over time), we will refer you to a specialist for further testing.  Will my doctor also get the results?  Yes. Your doctor will get a copy of your results.  Is it okay to keep smoking now that there s a cancer screening exam?  No. Tobacco is one of the strongest cancer-causing agents. It causes not only lung cancer, but other cancers and cardiovascular (heart) diseases as well. The damage caused by smoking builds over time. This means that the longer you smoke, the higher your risk of disease. While it is never too late to quit, the sooner you quit, the better.  Where can I find help to quit smoking?  The best way to prevent lung cancer is to stop smoking. If you have already quit smoking, congratulations and keep it up! For help on quitting smoking, please call [employer] at 3-176-992-BHFC ([phone]) or the American Cancer Society at 1-538.840.7144 to find local resources near you.  One-on-one health coaching:  If you d prefer to work individually with a health care provider on tobacco cessation, we offer:      Medication Therapy Management:  Our specially trained pharmacists work closely with you and your doctor to help you quit smoking.  Call [phone] or [phone] (toll free).     Can Do: Health coaching offered by [facility].  [email or web address]

## 2018-12-18 NOTE — RESULT ENCOUNTER NOTE
It was a pleasure seeing you.  I wanted to get back to you with your test results.     Your vitamin B12 level is very high now.  You can decrease the dose to 3 times per week.  Do not take the B12 for a few weeks to let it drift down some.    We are loving the goodies!  Thanks again!    Lyudmila Escobar PA-C

## 2019-01-01 DIAGNOSIS — K21.9 GASTROESOPHAGEAL REFLUX DISEASE WITHOUT ESOPHAGITIS: ICD-10-CM

## 2019-01-02 RX ORDER — OMEPRAZOLE 40 MG/1
CAPSULE, DELAYED RELEASE ORAL
Start: 2019-01-02

## 2019-01-02 NOTE — TELEPHONE ENCOUNTER
"omeprazole (PRILOSEC) 40 MG DR capsule 90 capsule 3 12/17/2018     Last Written Prescription Date:  12/17/18  Last Fill Quantity: 90,  # refills: 3   Last office visit: 12/17/2018 with prescribing provider:  Luz   Future Office Visit:  None  Requested Prescriptions   Pending Prescriptions Disp Refills     omeprazole (PRILOSEC) 40 MG DR capsule [Pharmacy Med Name: OMEPRAZOLE 40MG CAPSULES] 90 capsule 0     Sig: TAKE 1 CAPSULE BY MOUTH EVERY DAY 30 TO 60 MINUTES BEFORE A MEAL    PPI Protocol Passed - 1/1/2019  3:38 AM       Passed - Not on Clopidogrel (unless Pantoprazole ordered)       Passed - No diagnosis of osteoporosis on record       Passed - Recent (12 mo) or future (30 days) visit within the authorizing provider's specialty    Patient had office visit in the last 12 months or has a visit in the next 30 days with authorizing provider or within the authorizing provider's specialty.  See \"Patient Info\" tab in inbasket, or \"Choose Columns\" in Meds & Orders section of the refill encounter.             Passed - Patient is age 18 or older       Passed - No active pregnacy on record       Passed - No positive pregnancy test in past 12 months        [facility] Follow-up to PHQ 11/5/2018   PHQ-9 9. Suicide Ideation past 2 weeks Not at all         "

## 2019-01-09 ENCOUNTER — HOSPITAL ENCOUNTER (OUTPATIENT)
Dept: CT IMAGING | Facility: CLINIC | Age: 69
Discharge: HOME | End: 2019-01-09
Attending: PHYSICIAN ASSISTANT | Admitting: PHYSICIAN ASSISTANT
Payer: COMMERCIAL

## 2019-01-09 DIAGNOSIS — F17.200 SMOKER: ICD-10-CM

## 2019-01-09 DIAGNOSIS — F17.210 CIGARETTE NICOTINE DEPENDENCE, UNCOMPLICATED: ICD-10-CM

## 2019-01-09 PROCEDURE — G0297 LDCT FOR LUNG CA SCREEN: HCPCS

## 2019-01-10 ENCOUNTER — TELEPHONE (OUTPATIENT)
Dept: FAMILY MEDICINE | Facility: CLINIC | Age: 69
End: 2019-01-10

## 2019-01-10 DIAGNOSIS — Z87.891 PERSONAL HISTORY OF TOBACCO USE, PRESENTING HAZARDS TO HEALTH: Primary | ICD-10-CM

## 2019-01-10 DIAGNOSIS — R91.8 ABNORMAL CT LUNG SCREENING: ICD-10-CM

## 2019-01-10 NOTE — TELEPHONE ENCOUNTER
[facility] Lung Cancer Screening CT result notification:     LDCT/Lung Cancer Screening CT Exam date: 1/9/19  Radiologist Impression AND Recommendations:    ACR Assessment Category:  Lung-RADS Category 3. Probably benign finding(s)- short term follow up suggested with 6 month low dose CT (please order exam code IMG 2163).    Pertinent Findings:  Lungs: The lungs are mildly emphysematous. There is a new 3 mm nodule in the right lung apex (series 6, image 58). There is a new 4 mm nodule in the left upper lobe (series 6, image 112). No other pulmonary nodule or mass.     Ordering Provider: Lyudmila Escobar PA-C Geraldine A Frank did not receive the remaining radiology results from her provider.   CT Chest order placed to be completed within 6 months (Yes/No):  Yes, Image schedulers will call her 1 month prior to schedule  RN communicated the lung nodule finding to the patient (Yes/No):  No, left message requesting call back  RN ordered Lung nodule program referral (Yes/NA): No  The patient had the following questions: NA  Correct letter sent as per Lung nodule protocol (Yes/No):  Yes  Miscellaneous information:  NA    If scheduling LDCT only, RN will contact Image Scheduling Team  Hours available (all sites below):  Mon-Fri 7A to 8P; Sat 7A to 3P.  No schedulers available on Sunday.    [facility] and [place]): [phone]    North region ([place], [place], [place]): [phone]    South region ([facility]): [phone]    Left voicemail message requesting a call back to [phone] between 10 a.m. and 6:30 p.m. to discuss radiology finding.    [email or web address] RN  Lung Nodule and ED Lab Result F/u RN  Ph# [phone]

## 2019-01-10 NOTE — TELEPHONE ENCOUNTER
[place] Incidental Finding result - Return call    Patient/parent Name  Karen    Reason for call  Notify of CT result and recommendations    Radiology Result  See report  Recommendations/Instructions  Notified of CT results and RN reviewed information about Lung nodules.  CT order placed and image scheduling will call you 1 month prior to the due date.    [RN Name]  Brian Gilliam RN  [place] [facility] RN  Lung Nodule and ED Lab Result RN  Epic pool (ED late result f/u RN): [id number]  [facility] INCIDENTAL RADIOLOGY F/U NURSES: [id number]  # [phone]

## 2019-01-11 NOTE — RESULT ENCOUNTER NOTE
The following letter pertains to your most recent diagnostic tests:    Your CT scan did not show lung cancer, but it did show two small nodules that need to be rechecked in 6 months.  Please call [facility] at [phone] to schedule your follow up CT scan for July of 2019.      Sincerely,    Dr. Mejia, covering for Lyudmila Luz who is out of town this week

## 2019-01-11 NOTE — TELEPHONE ENCOUNTER
Patient calling back saying we left her a Message not seeing any notes to who  Called, please check and call pt back at # [phone]    Thank You

## 2019-02-04 DIAGNOSIS — E78.5 HYPERLIPIDEMIA LDL GOAL <100: ICD-10-CM

## 2019-02-04 DIAGNOSIS — F41.9 ANXIETY: ICD-10-CM

## 2019-02-05 RX ORDER — ATORVASTATIN CALCIUM 40 MG/1
TABLET, FILM COATED ORAL
Refills: 0
Start: 2019-02-05

## 2019-02-05 NOTE — TELEPHONE ENCOUNTER
Lipitor was a duplicate- message sent to pharm:  Duplicate- was ordered 12/17/18 #90 R3    Stacey Sullivan RN

## 2019-02-05 NOTE — TELEPHONE ENCOUNTER
"LORazepam (ATIVAN) 0.5 MG tablet  Last Written Prescription Date:  11/30/18  Last Fill Quantity: 20,  # refills: 0   Last office visit: 12/17/2018 with prescribing provider:  SHERRIE   Future Office Visit:            Requested Prescriptions   Pending Prescriptions Disp Refills     LORazepam (ATIVAN) 0.5 MG tablet [Pharmacy Med Name: LORAZEPAM 0.5MG TABLETS] 20 tablet 0     Sig: TAKE 1 TABLET BY MOUTH EVERY 8 HOURS AS NEEDED FOR ANXIETY    There is no refill protocol information for this order      Refused Prescriptions Disp Refills     atorvastatin (LIPITOR) 40 MG tablet [Pharmacy Med Name: ATORVASTATIN 40MG TABLETS]  0     Sig: TAKE 1 TABLET BY MOUTH EVERY DAY    Statins Protocol Passed - 2/5/2019  8:03 AM       Passed - LDL on file in past 12 months    Recent Labs   Lab Test 12/12/18  0715   LDL 83            Passed - No abnormal creatine kinase in past 12 months    No lab results found.            Passed - Recent (12 mo) or future (30 days) visit within the authorizing provider's specialty    Patient had office visit in the last 12 months or has a visit in the next 30 days with authorizing provider or within the authorizing provider's specialty.  See \"Patient Info\" tab in inbasket, or \"Choose Columns\" in Meds & Orders section of the refill encounter.             Passed - Medication is active on med list       Passed - Patient is age 18 or older       Passed - No active pregnancy on record       Passed - No positive pregnancy test in past 12 months          "

## 2019-02-06 ENCOUNTER — MYC REFILL (OUTPATIENT)
Dept: FAMILY MEDICINE | Facility: CLINIC | Age: 69
End: 2019-02-06

## 2019-02-06 DIAGNOSIS — I10 HTN (HYPERTENSION), BENIGN: ICD-10-CM

## 2019-02-06 DIAGNOSIS — E78.5 HYPERLIPIDEMIA LDL GOAL <100: ICD-10-CM

## 2019-02-06 RX ORDER — LORAZEPAM 0.5 MG/1
TABLET ORAL
Qty: 20 TABLET | Refills: 0 | Status: SHIPPED | OUTPATIENT
Start: 2019-02-06 | End: 2019-05-06

## 2019-02-06 NOTE — TELEPHONE ENCOUNTER
Last [facility] website verification:  11/30/2018 [place]    [email or web address]    Routing refill request to provider for review/approval because:  Drug not on the [facility] refill protocol     Kailey KRAUSE RN

## 2019-02-07 RX ORDER — CHLORTHALIDONE 25 MG/1
25 TABLET ORAL DAILY
Qty: 90 TABLET | Refills: 3 | OUTPATIENT
Start: 2019-02-07

## 2019-02-07 RX ORDER — ATORVASTATIN CALCIUM 40 MG/1
40 TABLET, FILM COATED ORAL DAILY
Qty: 90 TABLET | Refills: 3 | OUTPATIENT
Start: 2019-02-07

## 2019-02-07 NOTE — TELEPHONE ENCOUNTER
"chlorthalidone (HYGROTON) 25 MG tablet 90 tablet 3 12/17/2018     atorvastatin (LIPITOR) 40 MG tablet 90 tablet 3 12/17/2018         Last Written Prescription Date:  12/17/2018  Last Fill Quantity: 90,  # refills: 3   Last office visit: 12/17/2018 with prescribing provider: Lyudmila Escobar    Future Office Visit:  Unknown    Requested Prescriptions   Pending Prescriptions Disp Refills     atorvastatin (LIPITOR) 40 MG tablet 90 tablet 3     Sig: Take 1 tablet (40 mg) by mouth daily    Statins Protocol Passed - 2/7/2019  9:03 AM       Passed - LDL on file in past 12 months    Recent Labs   Lab Test 12/12/18  0715   LDL 83            Passed - No abnormal creatine kinase in past 12 months    No lab results found.            Passed - Recent (12 mo) or future (30 days) visit within the authorizing provider's specialty    Patient had office visit in the last 12 months or has a visit in the next 30 days with authorizing provider or within the authorizing provider's specialty.  See \"Patient Info\" tab in inbasket, or \"Choose Columns\" in Meds & Orders section of the refill encounter.             Passed - Medication is active on med list       Passed - Patient is age 18 or older       Passed - No active pregnancy on record       Passed - No positive pregnancy test in past 12 months        chlorthalidone (HYGROTON) 25 MG tablet 90 tablet 3     Sig: Take 1 tablet (25 mg) by mouth daily    Diuretics (Including Combos) Protocol Passed - 2/7/2019  9:03 AM       Passed - Blood pressure under 140/90 in past 12 months    BP Readings from Last 3 Encounters:   12/17/18 112/64   11/05/18 130/76   03/20/18 117/76                Passed - Recent (12 mo) or future (30 days) visit within the authorizing provider's specialty    Patient had office visit in the last 12 months or has a visit in the next 30 days with authorizing provider or within the authorizing provider's specialty.  See \"Patient Info\" tab in inbasket, or \"Choose Columns\" in Meds & " Orders section of the refill encounter.             Passed - Medication is active on med list       Passed - Patient is age 18 or older       Passed - No active pregancy on record       Passed - Normal serum creatinine on file in past 12 months    Recent Labs   Lab Test 12/12/18  0715   CR 0.76             Passed - Normal serum potassium on file in past 12 months    Recent Labs   Lab Test 12/12/18  0715   POTASSIUM 4.0                   Passed - Normal serum sodium on file in past 12 months    Recent Labs   Lab Test 12/12/18  0715                Passed - No positive pregnancy test in past 12 months

## 2019-04-04 NOTE — TELEPHONE ENCOUNTER
"Last Written Prescription Date:  3/14/19  Last Fill Quantity: 4 capsule,  # refills: 0   Last office visit: 12/17/2018 with prescribing provider:  Luz   Future Office Visit:      Requested Prescriptions   Pending Prescriptions Disp Refills     amoxicillin (AMOXIL) 500 MG capsule [Pharmacy Med Name: AMOXICILLIN 500MG CAPSULES] 4 capsule 0     Sig: TAKE 4 CAPSULES(2000 MG) BY MOUTH 1 TIME FOR 1 DOSE    Oral Acne/Rosacea Medications Protocol Failed - 4/4/2019  9:21 AM       Failed - Confirmation of diagnosis is required    Please confirm diagnosis is acne or rosacea.     If NOT acne or rosacea; refer request to provider for further evaluation.    If diagnosis IS acne or rosacea, OK to refill BASED ON PREVIOUS REFILL CLINICAL NOTE RECOMMENDATION.         Passed - Patient is 12 years of age or older       Passed - Recent (12 mo) or future (30 days) visit within the authorizing provider's specialty    Patient had office visit in the last 12 months or has a visit in the next 30 days with authorizing provider or within the authorizing provider's specialty.  See \"Patient Info\" tab in inbasket, or \"Choose Columns\" in Meds & Orders section of the refill encounter.             Passed - Medication is active on med list       Passed - No active pregnancy on record       Passed - No positive prenancy test is past 12 months          "

## 2019-04-05 NOTE — TELEPHONE ENCOUNTER
To PCP:     Pt requests abx for Dental Appt.     Is this still indicated for this patient?     Thank you,   Tahmina CHENG RN

## 2019-04-05 NOTE — TELEPHONE ENCOUNTER
Why is she taking it, does she have valve issues, prior joint replacement?    Jony Valentine M.D.

## 2019-04-05 NOTE — TELEPHONE ENCOUNTER
Spoke with patient     Taking for stents - placed about 3 years ago. 15 years ago also had [place]-bladimir into arteries     Kailey KRAUSE RN

## 2019-04-07 RX ORDER — AMOXICILLIN 500 MG/1
CAPSULE ORAL
Qty: 4 CAPSULE | Refills: 0 | Status: SHIPPED | OUTPATIENT
Start: 2019-04-07 | End: 2019-09-21

## 2019-05-06 DIAGNOSIS — F41.9 ANXIETY: ICD-10-CM

## 2019-05-07 RX ORDER — LORAZEPAM 0.5 MG/1
TABLET ORAL
Qty: 20 TABLET | Refills: 0 | Status: SHIPPED | OUTPATIENT
Start: 2019-05-07 | End: 2019-07-09

## 2019-05-07 NOTE — TELEPHONE ENCOUNTER
LORazepam (ATIVAN) 0.5 MG tablet 20 tablet 0 2/6/2019  No   Sig: TAKE 1 TABLET BY MOUTH EVERY 8 HOURS AS NEEDED FOR ANXIETY     Last Written Prescription Date:  02/06/2019  Last Fill Quantity: 20,  # refills: 0   Last office visit: 12/17/2018 with prescribing provider:     Future Office Visit:      Routing refill request to provider for review/approval because:  Drug not on the [facility], [facility] or [facility] refill protocol or controlled substance

## 2019-05-14 ENCOUNTER — OFFICE VISIT (OUTPATIENT)
Dept: FAMILY MEDICINE | Facility: CLINIC | Age: 69
End: 2019-05-14
Payer: COMMERCIAL

## 2019-05-14 VITALS
SYSTOLIC BLOOD PRESSURE: 135 MMHG | HEIGHT: 64 IN | OXYGEN SATURATION: 97 % | TEMPERATURE: 97.2 F | WEIGHT: 142 LBS | HEART RATE: 70 BPM | DIASTOLIC BLOOD PRESSURE: 85 MMHG | BODY MASS INDEX: 24.24 KG/M2

## 2019-05-14 DIAGNOSIS — J30.0 CHRONIC VASOMOTOR RHINITIS: ICD-10-CM

## 2019-05-14 DIAGNOSIS — H61.21 IMPACTED CERUMEN OF RIGHT EAR: Primary | ICD-10-CM

## 2019-05-14 PROCEDURE — 99212 OFFICE O/P EST SF 10 MIN: CPT | Mod: 25 | Performed by: PHYSICIAN ASSISTANT

## 2019-05-14 PROCEDURE — 69209 REMOVE IMPACTED EAR WAX UNI: CPT | Mod: RT | Performed by: PHYSICIAN ASSISTANT

## 2019-05-14 RX ORDER — FLUTICASONE PROPIONATE 50 MCG
SPRAY, SUSPENSION (ML) NASAL
Qty: 16 ML | Refills: 3 | Status: SHIPPED | OUTPATIENT
Start: 2019-05-14 | End: 2019-05-14

## 2019-05-14 ASSESSMENT — MIFFLIN-ST. JEOR: SCORE: 1163.79

## 2019-05-14 NOTE — PROGRESS NOTES
HPI: Humaira is a pleasant 69 yo female here with R ear feels fullness  Has hx of ear wax  She used Debrox and used that for the past 2 weeks.  L ear feels fine  No cold sxs.  Has allergies so has some eye tearing and congestion  When she made the appt she was having some R sided rib pain but took ibuprofen and that resolved  She felt this was pleurisy from a recent cold.    Past Medical History:   Diagnosis Date     Ankle fracture 2009    L     Anxiety      Chronic back pain      Colon polyp 2012    repeat colonoscopy 5 years.     Fx low femur epiphy-closed (H)      GERD (gastroesophageal reflux disease)      History of UTI 2017    Cysto by Dr Agustin neg     HTN (hypertension), benign      Hyperlipidemia LDL goal < 100      Normal nuclear stress test 12/09    EF 67%     Osteopenia      PAD (peripheral artery disease) (H)     s/p fem pop bypass; embolectomy     PUD (peptic ulcer disease) 1980s    DU     Smoker      Vitamin D deficiency      Past Surgical History:   Procedure Laterality Date     Blood clot removal from Stent      2011     BYPASS GRAFT AORTOFEMORAL  5/02    Dr. Turner     BYPASS GRAFT FEMOROPOPLITEAL  12/16/2011     COLONOSCOPY N/A 1/18/2018    Procedure: COMBINED COLONOSCOPY, SINGLE OR MULTIPLE BIOPSY/POLYPECTOMY BY BIOPSY;  COLONOSCOPY;  Surgeon: Efrain Hernadez MD;  Location:  GI     EMBOLECTOMY LOWER EXTREMITY  12/16/2011    Procedure:EMBOLECTOMY LOWER EXTREMITY; EMBOLECTOMY, RIGHT POPLITEAL WITH PATCH ANGIOPLASTY. EXCISION SKIN LESION RIGHT LEG. ; Surgeon:PARMINDER VELAZCO; Location: OR     EXCISE LESION LOWER EXTREMITY  12/16/2011    Procedure:EXCISE LESION LOWER EXTREMITY; Surgeon:PARMINDER VELAZCO; Location:[facility] OR     HERNIA REPAIR  11/4/08    x2     L achilles repair  12/4/08     LAPAROSCOPIC OOPHORECTOMY      L for cyst     miscarriages x 3       tubal ligation and reversal       Social History     Tobacco Use     Smoking status: Current Every Day Smoker     Types:  Cigarettes     Smokeless tobacco: Never Used     Tobacco comment: Trying to quit, using  Commit long's   Substance Use Topics     Alcohol use: Yes     Alcohol/week: 2.4 - 3.0 oz     Types: 4 - 5 Standard drinks or equivalent per week     Comment: Beer 3 times per week     Current Outpatient Medications   Medication Sig Dispense Refill     amoxicillin (AMOXIL) 500 MG capsule TAKE 4 CAPSULES(2000 MG) BY MOUTH 1 TIME FOR 1 DOSE (Patient taking differently: TAKE 4 CAPSULES(2000 MG) BY MOUTH 1 TIME FOR 1 DOSE PRIOR DENTAL WORK) 4 capsule 0     aspirin 81 MG tablet Take 2 tablets by mouth daily.       atorvastatin (LIPITOR) 40 MG tablet Take 1 tablet (40 mg) by mouth daily 90 tablet 3     chlorthalidone (HYGROTON) 25 MG tablet Take 1 tablet (25 mg) by mouth daily 90 tablet 3     Cholecalciferol (VITAMIN D-3) 5000 UNIT TABS Take 1 tablet by mouth daily.       coenzyme Q-10 200 MG CAPS        Cyanocobalamin (B-12) 1000 MCG TBCR Take 1,000 mcg by mouth daily 100 tablet 1     ESTRACE VAGINAL 0.1 MG/GM vaginal cream INSERT 1 GRAM VAGINALLY TWICE TO THREE TIMES PER WEEK 42.5 g 0     fluticasone (FLONASE) 50 MCG/ACT nasal spray SPRAY 1-2 SPRAYS INTO BOTH NOSTRILS DAILY 16 mL 3     Lactobacillus (PROBIOTIC ACIDOPHILUS PO)        LORazepam (ATIVAN) 0.5 MG tablet TAKE 1 TABLET BY MOUTH EVERY 8 HOURS AS NEEDED FOR ANXIETY 20 tablet 0     metoprolol succinate ER (TOPROL-XL) 100 MG 24 hr tablet TAKE 1 TABLET(100 MG) BY MOUTH DAILY 90 tablet 2     omeprazole (PRILOSEC) 40 MG DR capsule TAKE ONE CAPSULE BY MOUTH EVERY DAY 30 TO 60 MINUTES BEFORE A MEAL 90 capsule 3     Allergies   Allergen Reactions     Chantix [Varenicline] Nausea     Ciprofloxacin Other (See Comments)     hypertension     Decongestant [Cvs]      Erythromycin Nausea     Lisinopril      cough     Plavix [Clopidogrel Bisulfate]      Felt as if she was having a heart attack     Vioxx        PHYSICAL EXAM:    /85 (BP Location: Right arm, Patient Position: Sitting,  "Cuff Size: Adult Regular)   Pulse 70   Temp 97.2  F (36.2  C) (Oral)   Ht 1.625 m (5' 3.98\")   Wt 64.4 kg (142 lb)   SpO2 97%   BMI 24.39 kg/m      Patient appears non toxic  R ear: warm water irrig removed a large light yellow cerumen plug without complications. TM pearly grey.  L ear: normal exam.    Assessment and Plan:     (H61.21) Impacted cerumen of right ear  (primary encounter diagnosis)  Comment:   Plan: Irrig done and wax removed    (J30.0) Chronic vasomotor rhinitis  Comment:   Plan: fluticasone (FLONASE) 50 MCG/ACT nasal spray        Use regularly and add zyrtec 10mg at bedtime if needed      Lyudmila Escobar PA-C        "

## 2019-05-15 RX ORDER — FLUTICASONE PROPIONATE 50 MCG
SPRAY, SUSPENSION (ML) NASAL
Qty: 48 ML | Refills: 0 | Status: SHIPPED | OUTPATIENT
Start: 2019-05-15 | End: 2019-12-18

## 2019-05-15 NOTE — TELEPHONE ENCOUNTER
Pharmacy requesting 90 day  Prescription approved per [facility] Refill Protocol.  Vilma JC RN

## 2019-07-09 DIAGNOSIS — F41.9 ANXIETY: ICD-10-CM

## 2019-07-09 NOTE — TELEPHONE ENCOUNTER
Controlled Substance Refill Request for Pending Prescriptions:                       Disp   Refills    LORazepam (ATIVAN) 0.5 MG tablet [Pharmac*20 tab*0            Sig: TAKE 1 TABLET BY MOUTH EVERY 8 HOURS AS NEEDED           FOR ANXIETY.      Problem List Complete:  No         Last Written Prescription Date:  5/7/19  Last Fill Quantity: 20,   # refills: 0        Last Office Visit with [facility] primary care provider: 5/14/19    Future Office visit:     Controlled substance agreement:   Encounter-Level CSA - 01/22/2014:    Controlled Substance Agreement - Scan on 10/24/2014 10:51 AM: CONTROLLED MEDICATION AGREEMENT   1/22/14 (below)       Patient-Level CSA:    There are no patient-level csa.         Last Urine Drug Screen: No results found for: CDAUT, No results found for: COMDAT, No results found for: THC13, PCP13, COC13, MAMP13, OPI13, AMP13, BZO13, TCA13, MTD13, BAR13, OXY13, PPX13, BUP13     Processing:  Fax Rx to [facility]     [email or web address]       checked in past 3 months?  No, route to RN

## 2019-07-10 RX ORDER — LORAZEPAM 0.5 MG/1
TABLET ORAL
Qty: 20 TABLET | Refills: 0 | Status: SHIPPED | OUTPATIENT
Start: 2019-07-10 | End: 2019-09-06

## 2019-07-10 NOTE — TELEPHONE ENCOUNTER
Routing refill request to provider for review/approval because:  Drug not on the [facility] refill protocol     RX [place] reviewed:  reviewed- no concerns    [place]:  [email or web address]    Meghana NANCE RN,BSN

## 2019-07-15 ENCOUNTER — HOSPITAL ENCOUNTER (OUTPATIENT)
Dept: CT IMAGING | Facility: CLINIC | Age: 69
Discharge: HOME | End: 2019-07-15
Attending: INTERNAL MEDICINE | Admitting: INTERNAL MEDICINE
Payer: COMMERCIAL

## 2019-07-15 DIAGNOSIS — Z87.891 PERSONAL HISTORY OF TOBACCO USE, PRESENTING HAZARDS TO HEALTH: ICD-10-CM

## 2019-07-15 PROCEDURE — 71250 CT THORAX DX C-: CPT

## 2019-07-15 NOTE — LETTER
"[facility]  [address]  [address]  [place]  [address]  Tel: [phone]    July 16, 2019    Karen ZAPATA Arsh  [address] [place] [address] [address]        Dear Ms. Caban,    Good news! Your follow up lung cancer screening CT is stable.  The radiologist recommends rechecking in one year.    If you have any further questions or problems, please contact our office.      Sincerely,    Leodan Mejia MD/shannon    Enclosure: Radiology Results  Results for orders placed or performed during the hospital encounter of 07/15/19   CT Chest Low Dose Non Contrast    Narrative    CT CHEST LOW DOSE NONCONTRAST  7/15/2019 10:01 AM    HISTORY:  Lung cancer screen, asymptomatic. Smoker history within last  15 years (min. 30 pack-yrs). Personal history of tobacco use,  presenting hazards to health.    TECHNIQUE:  Scans obtained from the apices through the diaphragm  without IV contrast. Low dose CT chest technique was utilized.  Radiation dose for this scan was reduced using automated exposure  control, adjustment of the mA and/or kV according to patient size, or  iterative reconstruction technique.    COMPARISON:  None.    FINDINGS:    Lungs: The lungs are mildly emphysematous. There is a 3 mm nodule in  the right upper lobe (series 4, image 61). There is a 5 mm nodule in  the left upper lobe (series 4, image 38). No other pulmonary nodule or  mass.    Additional findings: No pleural or pericardial effusion. No thoracic  or axillary adenopathy. Minimal aortic atherosclerosis. Thyroid gland  is unremarkable. Visualized bones and upper abdomen are unremarkable.      Impression    IMPRESSION:   1. ACR Assessment Category:  Lung-RADS Category 2. Benign appearance  or behavior. Recommendation: Continue annual screening (please order  exam code IMG 2290).   2. Significant Incidental Finding(s):  Category S: No.    Download the \"LungRADS Assessment Categories\" table at this site: "   http://www.acr.org/Quality-Safety/Resources/LungRADS    PANCHO MATIAS MD

## 2019-07-16 NOTE — RESULT ENCOUNTER NOTE
The following letter pertains to your most recent diagnostic tests:    Good news! Your follow up lung cancer screening CT is stable.  The radiologist recommends rechecking in one year.        Sincerely,    Dr. Mejia

## 2019-09-06 DIAGNOSIS — F41.9 ANXIETY: ICD-10-CM

## 2019-09-06 NOTE — TELEPHONE ENCOUNTER
LORazepam (ATIVAN) 0.5 MG tablet  Last Written Prescription Date:  7/10/19  Last Fill Quantity: 20,   # refills: 0  Last Office Visit: 5/14/19 Luz  Future Office visit:       Routing refill request to provider for review/approval because:  Drug not on the [facility], [facility] or [facility] refill protocol or controlled substance    Overview Addendum 7/10/2019 11:45 AM by Meghana Montes RN   Patient is followed by JOSELITO BALDERAS for ongoing prescription of benzodiazepines.  All refills should be approved by this provider, or covering partner.     Medication(s): Lorazepam.   Maximum quantity per month: 20  Clinic visit frequency required:  Q6months     Benzodiazepine use reviewed by psychiatry:  [place]  CSA: 1/22/14     Last [facility] website verification:  7-10-19

## 2019-09-07 RX ORDER — LORAZEPAM 0.5 MG/1
TABLET ORAL
Qty: 20 TABLET | Refills: 0 | Status: SHIPPED | OUTPATIENT
Start: 2019-09-07 | End: 2019-10-20

## 2019-09-21 DIAGNOSIS — Z79.2 PROPHYLACTIC ANTIBIOTIC: Primary | ICD-10-CM

## 2019-09-23 RX ORDER — AMOXICILLIN 500 MG/1
CAPSULE ORAL
Qty: 4 CAPSULE | Refills: 0 | Status: SHIPPED | OUTPATIENT
Start: 2019-09-23 | End: 2019-12-18

## 2019-10-20 DIAGNOSIS — F41.9 ANXIETY: ICD-10-CM

## 2019-10-22 RX ORDER — LORAZEPAM 0.5 MG/1
TABLET ORAL
Qty: 20 TABLET | Refills: 0 | Status: SHIPPED | OUTPATIENT
Start: 2019-10-22 | End: 2019-12-02

## 2019-10-22 NOTE — TELEPHONE ENCOUNTER
Mn  checked today; no concerns noted    Lorazepam pended for review and sign PCP. Last OV 5/14/19.      Last RF #20 9/7/19.    Genevieve Ramirez RN on 10/22/2019 at 1:41 PM

## 2019-12-02 DIAGNOSIS — F41.9 ANXIETY: ICD-10-CM

## 2019-12-02 DIAGNOSIS — K21.9 GASTROESOPHAGEAL REFLUX DISEASE WITHOUT ESOPHAGITIS: ICD-10-CM

## 2019-12-03 RX ORDER — LORAZEPAM 0.5 MG/1
TABLET ORAL
Qty: 20 TABLET | Refills: 0 | Status: SHIPPED | OUTPATIENT
Start: 2019-12-03 | End: 2019-12-18

## 2019-12-03 RX ORDER — OMEPRAZOLE 40 MG/1
CAPSULE, DELAYED RELEASE ORAL
Qty: 90 CAPSULE | Refills: 3 | Status: SHIPPED | OUTPATIENT
Start: 2019-12-03 | End: 2020-11-09

## 2019-12-03 NOTE — TELEPHONE ENCOUNTER
LORazepam (ATIVAN) 0.5 MG tablet 20 tablet 0 10/22/2019   No   Sig: TAKE 1 TABLET BY MOUTH EVERY 8 HOURS AS NEEDED FOR ANXIETY      Last Written Prescription Date:  10/22/2019  Last Fill Quantity: 20,  # refills: 0   Last office visit: 05/14/2019 with prescribing provider:     Future Office Visit:  12/18/2019 with Lyudmila Escobar     Routing refill request to provider for review/approval because:  Drug not on the [facility] or [facility] refill protocol or controlled substance    Please refill as appropriate.  Unable to check .  Joanne Velasquez RN

## 2019-12-04 NOTE — TELEPHONE ENCOUNTER
Call to patient and informed er that she needs to  Ativan RX at 5th floor desk. She agrees to do that.  Joanne Velasquez RN

## 2019-12-05 ENCOUNTER — DOCUMENTATION ONLY (OUTPATIENT)
Dept: FAMILY MEDICINE | Facility: CLINIC | Age: 69
End: 2019-12-05

## 2019-12-05 DIAGNOSIS — I10 HTN (HYPERTENSION), BENIGN: Primary | ICD-10-CM

## 2019-12-05 DIAGNOSIS — E78.5 HYPERLIPIDEMIA WITH TARGET LDL LESS THAN 100: ICD-10-CM

## 2019-12-05 DIAGNOSIS — Z00.00 ENCOUNTER FOR MEDICARE ANNUAL WELLNESS EXAM: ICD-10-CM

## 2019-12-11 DIAGNOSIS — E78.5 HYPERLIPIDEMIA WITH TARGET LDL LESS THAN 100: ICD-10-CM

## 2019-12-11 DIAGNOSIS — I10 HTN (HYPERTENSION), BENIGN: ICD-10-CM

## 2019-12-11 DIAGNOSIS — Z00.00 ENCOUNTER FOR MEDICARE ANNUAL WELLNESS EXAM: ICD-10-CM

## 2019-12-11 LAB
ERYTHROCYTE [DISTWIDTH] IN BLOOD BY AUTOMATED COUNT: 14.1 % (ref 10–15)
HCT VFR BLD AUTO: 47.7 % (ref 35–47)
HGB BLD-MCNC: 16.1 G/DL (ref 11.7–15.7)
MCH RBC QN AUTO: 33.2 PG (ref 26.5–33)
MCHC RBC AUTO-ENTMCNC: 33.8 G/DL (ref 31.5–36.5)
MCV RBC AUTO: 98 FL (ref 78–100)
PLATELET # BLD AUTO: 180 10E9/L (ref 150–450)
RBC # BLD AUTO: 4.85 10E12/L (ref 3.8–5.2)
WBC # BLD AUTO: 9.8 10E9/L (ref 4–11)

## 2019-12-11 PROCEDURE — 80053 COMPREHEN METABOLIC PANEL: CPT | Performed by: PHYSICIAN ASSISTANT

## 2019-12-11 PROCEDURE — 36415 COLL VENOUS BLD VENIPUNCTURE: CPT | Performed by: PHYSICIAN ASSISTANT

## 2019-12-11 PROCEDURE — 80061 LIPID PANEL: CPT | Performed by: PHYSICIAN ASSISTANT

## 2019-12-11 PROCEDURE — 85027 COMPLETE CBC AUTOMATED: CPT | Performed by: PHYSICIAN ASSISTANT

## 2019-12-12 LAB
ALBUMIN SERPL-MCNC: 3.8 G/DL (ref 3.4–5)
ALP SERPL-CCNC: 77 U/L (ref 40–150)
ALT SERPL W P-5'-P-CCNC: 30 U/L (ref 0–50)
ANION GAP SERPL CALCULATED.3IONS-SCNC: 6 MMOL/L (ref 3–14)
AST SERPL W P-5'-P-CCNC: 26 U/L (ref 0–45)
BILIRUB SERPL-MCNC: 0.8 MG/DL (ref 0.2–1.3)
BUN SERPL-MCNC: 10 MG/DL (ref 7–30)
CALCIUM SERPL-MCNC: 8.9 MG/DL (ref 8.5–10.1)
CHLORIDE SERPL-SCNC: 101 MMOL/L (ref 94–109)
CHOLEST SERPL-MCNC: 152 MG/DL
CO2 SERPL-SCNC: 30 MMOL/L (ref 20–32)
CREAT SERPL-MCNC: 0.64 MG/DL (ref 0.52–1.04)
GFR SERPL CREATININE-BSD FRML MDRD: >90 ML/MIN/{1.73_M2}
GLUCOSE SERPL-MCNC: 93 MG/DL (ref 70–99)
HDLC SERPL-MCNC: 61 MG/DL
LDLC SERPL CALC-MCNC: 79 MG/DL
NONHDLC SERPL-MCNC: 91 MG/DL
POTASSIUM SERPL-SCNC: 3.4 MMOL/L (ref 3.4–5.3)
PROT SERPL-MCNC: 6.9 G/DL (ref 6.8–8.8)
SODIUM SERPL-SCNC: 137 MMOL/L (ref 133–144)
TRIGL SERPL-MCNC: 62 MG/DL

## 2019-12-16 ASSESSMENT — ACTIVITIES OF DAILY LIVING (ADL): CURRENT_FUNCTION: NO ASSISTANCE NEEDED

## 2019-12-18 ENCOUNTER — OFFICE VISIT (OUTPATIENT)
Dept: FAMILY MEDICINE | Facility: CLINIC | Age: 69
End: 2019-12-18
Payer: COMMERCIAL

## 2019-12-18 ENCOUNTER — HOSPITAL ENCOUNTER (OUTPATIENT)
Dept: MAMMOGRAPHY | Facility: CLINIC | Age: 69
Discharge: HOME | End: 2019-12-18
Attending: PHYSICIAN ASSISTANT | Admitting: PHYSICIAN ASSISTANT
Payer: COMMERCIAL

## 2019-12-18 VITALS
HEIGHT: 64 IN | SYSTOLIC BLOOD PRESSURE: 128 MMHG | OXYGEN SATURATION: 98 % | BODY MASS INDEX: 23.22 KG/M2 | WEIGHT: 136 LBS | TEMPERATURE: 97.6 F | HEART RATE: 54 BPM | DIASTOLIC BLOOD PRESSURE: 86 MMHG

## 2019-12-18 DIAGNOSIS — I73.9 PAD (PERIPHERAL ARTERY DISEASE) (H): ICD-10-CM

## 2019-12-18 DIAGNOSIS — I10 HTN (HYPERTENSION), BENIGN: ICD-10-CM

## 2019-12-18 DIAGNOSIS — Z00.00 ENCOUNTER FOR MEDICARE ANNUAL WELLNESS EXAM: Primary | ICD-10-CM

## 2019-12-18 DIAGNOSIS — F41.9 ANXIETY: ICD-10-CM

## 2019-12-18 DIAGNOSIS — E78.5 HYPERLIPIDEMIA LDL GOAL <100: ICD-10-CM

## 2019-12-18 DIAGNOSIS — Z12.31 VISIT FOR SCREENING MAMMOGRAM: ICD-10-CM

## 2019-12-18 PROCEDURE — 77063 BREAST TOMOSYNTHESIS BI: CPT

## 2019-12-18 PROCEDURE — 99397 PER PM REEVAL EST PAT 65+ YR: CPT | Performed by: PHYSICIAN ASSISTANT

## 2019-12-18 PROCEDURE — 99207 C PAF COMPLETED  NO CHARGE: CPT | Mod: 25 | Performed by: PHYSICIAN ASSISTANT

## 2019-12-18 RX ORDER — ATORVASTATIN CALCIUM 40 MG/1
40 TABLET, FILM COATED ORAL DAILY
Qty: 90 TABLET | Refills: 3 | Status: SHIPPED | OUTPATIENT
Start: 2019-12-18 | End: 2020-12-22

## 2019-12-18 RX ORDER — CLONAZEPAM 0.5 MG/1
0.5 TABLET, ORALLY DISINTEGRATING ORAL 2 TIMES DAILY PRN
Qty: 60 TABLET | Refills: 0 | Status: SHIPPED | OUTPATIENT
Start: 2019-12-18 | End: 2020-04-13

## 2019-12-18 RX ORDER — CHLORTHALIDONE 25 MG/1
25 TABLET ORAL DAILY
Qty: 90 TABLET | Refills: 3 | Status: SHIPPED | OUTPATIENT
Start: 2019-12-18 | End: 2020-12-22

## 2019-12-18 SDOH — HEALTH STABILITY: MENTAL HEALTH: HOW MANY STANDARD DRINKS CONTAINING ALCOHOL DO YOU HAVE ON A TYPICAL DAY?: 1 OR 2

## 2019-12-18 SDOH — HEALTH STABILITY: MENTAL HEALTH: HOW OFTEN DO YOU HAVE 6 OR MORE DRINKS ON ONE OCCASION?: NEVER

## 2019-12-18 SDOH — HEALTH STABILITY: MENTAL HEALTH: HOW OFTEN DO YOU HAVE A DRINK CONTAINING ALCOHOL?: 2-3 TIMES A WEEK

## 2019-12-18 ASSESSMENT — MIFFLIN-ST. JEOR: SCORE: 1131.89

## 2019-12-18 ASSESSMENT — ACTIVITIES OF DAILY LIVING (ADL): CURRENT_FUNCTION: NO ASSISTANCE NEEDED

## 2019-12-18 NOTE — PROGRESS NOTES
"SUBJECTIVE:   Karen Caban is a 69 year old female who presents for Preventive Visit.  Are you in the first 12 months of your Medicare coverage?  No    She is retired and no concerns.  She would refuse medication for osteopenia so declines dexa.  She is a smoker and not interested in quitting  She walks for exercise and denies claudication  She had her mammogram earlier today    Healthy Habits:     In general, how would you rate your overall health?  Good    Frequency of exercise:  4-5 days/week    Duration of exercise:  45-60 minutes    Do you usually eat at least 4 servings of fruit and vegetables a day, include whole grains    & fiber and avoid regularly eating high fat or \"junk\" foods?  Yes    Taking medications regularly:  Yes    Barriers to taking medications:  None    Medication side effects:  None    Ability to successfully perform activities of daily living:  No assistance needed    Home Safety:  No safety concerns identified    Hearing Impairment:  No hearing concerns    In the past 6 months, have you been bothered by leaking of urine?  No    In general, how would you rate your overall mental or emotional health?  Good      PHQ-2 Total Score: 0    Additional concerns today:  No       Do you feel safe in your environment? YES    Have you ever done Advance Care Planning? (For example, a Health Directive, POLST, or a discussion with a medical provider or your loved ones about your wishes): Yes, advance care planning is on file.      Fall risk  Fallen 2 or more times in the past year?: No  Any fall with injury in the past year?: No    Cognitive Screening   1) Repeat 3 items (Leader, Season, Table)    2) Clock draw: NORMAL  3) 3 item recall: Recalls 2 objects   Results: NORMAL clock, 2 items recalled: COGNITIVE IMPAIRMENT LESS LIKELY    Mini-CogTM Copyright TRAY Johnson. Licensed by the author for use in [employer]; reprinted with permission (jennifer@.[facility]). All rights reserved.      Do you have " sleep apnea, excessive snoring or daytime drowsiness?: no    Reviewed and updated as needed this visit by clinical staff  Tobacco  Allergies  Meds  Soc Hx        Reviewed and updated as needed this visit by Provider        Social History     Tobacco Use     Smoking status: Current Every Day Smoker     Types: Cigarettes     Smokeless tobacco: Never Used     Tobacco comment: Trying to quit, using  Commit long's   Substance Use Topics     Alcohol use: Yes     Alcohol/week: 4.0 - 5.0 standard drinks     Types: 4 - 5 Standard drinks or equivalent per week     Frequency: 2-3 times a week     Drinks per session: 1 or 2     Binge frequency: Never     Comment: Beer 3 times per week     If you drink alcohol do you typically have >3 drinks per day or >7 drinks per week? No    Alcohol Use 12/16/2019   Prescreen: >3 drinks/day or >7 drinks/week? No   Prescreen: >3 drinks/day or >7 drinks/week? -       Mammogram done on this date: 12/18/2019 (approximately), by this group: [facility], results were PENDING.         Current providers sharing in care for this patient include:   Patient Care Team:  Lyudmila Escobar PA-C as PCP - General (Internal Medicine)  Lyudmila Escobar PA-C as Assigned PCP    The following health maintenance items are reviewed in Epic and correct as of today:  Health Maintenance   Topic Date Due     ADVANCE CARE PLANNING  11/26/2017     PNEUMOCOCCAL IMMUNIZATION 65+ LOW/MEDIUM RISK (2 of 2 - PPSV23) 12/13/2017     MEDICARE ANNUAL WELLNESS VISIT  12/17/2019     FALL RISK ASSESSMENT  05/14/2020     LUNG CANCER SCREENING ANNUAL  07/15/2020     MAMMO SCREENING  12/17/2020     COLONOSCOPY  01/18/2023     LIPID  12/11/2024     DTAP/TDAP/TD IMMUNIZATION (3 - Td) 11/05/2028     DEXA  Completed     HEPATITIS C SCREENING  Completed     PHQ-2  Completed     INFLUENZA VACCINE  Completed     ZOSTER IMMUNIZATION  Completed     IPV IMMUNIZATION  Aged Out     MENINGITIS IMMUNIZATION  Aged Out     Past  Medical History:   Diagnosis Date     Ankle fracture 2009    L     Anxiety      Chronic back pain      Colon polyp 2012    repeat colonoscopy 5 years.     Fx low femur epiphy-closed (H)      GERD (gastroesophageal reflux disease)      History of UTI 2017    Cysto by Dr Agustin neg     HTN (hypertension), benign      Hyperlipidemia LDL goal < 100      Normal nuclear stress test 12/09    EF 67%     Osteopenia      PAD (peripheral artery disease) (H)     s/p fem pop bypass; embolectomy     PUD (peptic ulcer disease) 1980s    DU     Smoker      Vitamin D deficiency      Past Surgical History:   Procedure Laterality Date     Blood clot removal from Stent      2011     BYPASS GRAFT AORTOFEMORAL  5/02    Dr. Turner     BYPASS GRAFT FEMOROPOPLITEAL  12/16/2011     COLONOSCOPY N/A 1/18/2018    Procedure: COMBINED COLONOSCOPY, SINGLE OR MULTIPLE BIOPSY/POLYPECTOMY BY BIOPSY;  COLONOSCOPY;  Surgeon: Efrain Hernadez MD;  Location:  GI     EMBOLECTOMY LOWER EXTREMITY  12/16/2011    Procedure:EMBOLECTOMY LOWER EXTREMITY; EMBOLECTOMY, RIGHT POPLITEAL WITH PATCH ANGIOPLASTY. EXCISION SKIN LESION RIGHT LEG. ; Surgeon:PARMINDER VELAZCO; Location: OR     EXCISE LESION LOWER EXTREMITY  12/16/2011    Procedure:EXCISE LESION LOWER EXTREMITY; Surgeon:PARMINDER VELAZCO; Location: OR     HERNIA REPAIR  11/4/08    x2     L achilles repair  12/4/08     LAPAROSCOPIC OOPHORECTOMY      L for cyst     miscarriages x 3       tubal ligation and reversal       Current Outpatient Medications   Medication Sig Dispense Refill     aspirin 81 MG tablet Take 2 tablets by mouth daily.       atorvastatin (LIPITOR) 40 MG tablet Take 1 tablet (40 mg) by mouth daily 90 tablet 3     chlorthalidone (HYGROTON) 25 MG tablet Take 1 tablet (25 mg) by mouth daily 90 tablet 3     Cholecalciferol (VITAMIN D-3) 5000 UNIT TABS Take 1 tablet by mouth daily.       clonazePAM (KLONOPIN) 0.5 MG ODT Take 1 tablet (0.5 mg) by mouth 2 times daily as  "needed for anxiety 60 tablet 0     coenzyme Q-10 200 MG CAPS        Cyanocobalamin (B-12) 1000 MCG TBCR Take 1,000 mcg by mouth daily 100 tablet 1     ESTRACE VAGINAL 0.1 MG/GM vaginal cream INSERT 1 GRAM VAGINALLY TWICE TO THREE TIMES PER WEEK 42.5 g 0     Lactobacillus (PROBIOTIC ACIDOPHILUS PO)        metoprolol succinate ER (TOPROL-XL) 100 MG 24 hr tablet TAKE 1 TABLET(100 MG) BY MOUTH DAILY 90 tablet 2     omeprazole (PRILOSEC) 40 MG DR capsule TAKE 1 CAPSULE BY MOUTH EVERY DAY 30 TO 60 MINUTES BEFORE A MEAL 90 capsule 3         Review of Systems  Constitutional, HEENT, cardiovascular, pulmonary, GI, , musculoskeletal, neuro, skin, endocrine and psych systems are negative, except as otherwise noted.    OBJECTIVE:   /86 (BP Location: Right arm, Patient Position: Sitting, Cuff Size: Adult Regular)   Pulse 54   Temp 97.6  F (36.4  C) (Oral)   Ht 1.626 m (5' 4\")   Wt 61.7 kg (136 lb)   SpO2 98%   Breastfeeding No   BMI 23.34 kg/m   Estimated body mass index is 23.34 kg/m  as calculated from the following:    Height as of this encounter: 1.626 m (5' 4\").    Weight as of this encounter: 61.7 kg (136 lb).  Physical Exam  GENERAL APPEARANCE: healthy, alert and no distress  EYES: Eyes grossly normal to inspection, PERRL and conjunctivae and sclerae normal  HENT: ear canals and TM's normal, nose and mouth without ulcers or lesions, oropharynx clear and oral mucous membranes moist  NECK: no adenopathy, no asymmetry, masses, or scars and thyroid normal to palpation  RESP: lungs clear to auscultation - no rales, rhonchi or wheezes  BREAST: normal without masses, tenderness or nipple discharge and no palpable axillary masses or adenopathy  CV: regular rate and rhythm, normal S1 S2, no S3 or S4, no murmur, click or rub, no peripheral edema and peripheral pulses strong  ABDOMEN: soft, nontender, no hepatosplenomegaly, no masses and bowel sounds normal  MS: no musculoskeletal defects are noted and gait is age " "appropriate without ataxia  SKIN: no suspicious lesions or rashes  NEURO: Normal strength and tone, sensory exam grossly normal, mentation intact and speech normal  PSYCH: mentation appears normal and affect normal/bright      ASSESSMENT / PLAN:   Assessment and Plan:     (Z00.00) Encounter for Medicare annual wellness exam  (primary encounter diagnosis)  Comment:  Plan: pre visit labs discussed with pt and copy given. Mammogram and colonoscopy up to date. Immun up to date.  Declines dexa as not interested in medication for this    (I73.9) PAD (peripheral artery disease) (H)  Comment:   Plan: she continues to walk. Not interested in smoking cessation      (I10) HTN (hypertension), benign  Comment: BP well controlled, cont colt  Plan: chlorthalidone (HYGROTON) 25 MG tablet            (E78.5) Hyperlipidemia LDL goal <100  Comment:   Plan: atorvastatin (LIPITOR) 40 MG tablet            (F41.9) Anxiety  Comment: insurance will not cover lorazepam starting in January but clonazepam will be covered  Plan: clonazePAM (KLONOPIN) 0.5 MG ODT        #60 one bid prn        COUNSELING:  Reviewed preventive health counseling, as reflected in patient instructions    Estimated body mass index is 23.34 kg/m  as calculated from the following:    Height as of this encounter: 1.626 m (5' 4\").    Weight as of this encounter: 61.7 kg (136 lb).         reports that she has been smoking cigarettes. She has never used smokeless tobacco.      Appropriate preventive services were discussed with this patient, including applicable screening as appropriate for cardiovascular disease, diabetes, osteopenia/osteoporosis, and glaucoma.  As appropriate for age/gender, discussed screening for colorectal cancer, prostate cancer, breast cancer, and cervical cancer. Checklist reviewing preventive services available has been given to the patient.    Reviewed patients plan of care and provided an AVS. The Basic Care Plan (routine screening as documented in " Health Maintenance) for Karen meets the Care Plan requirement. This Care Plan has been established and reviewed with the Patient.    Counseling Resources:  ATP IV Guidelines  Pooled Cohorts Equation Calculator  Breast Cancer Risk Calculator  FRAX Risk Assessment  ICSI Preventive Guidelines  Dietary Guidelines for Americans, 2010  USDA's MyPlate  ASA Prophylaxis  Lung CA Screening    Lyudmila Escobar PA-C  [facility]    Identified Health Risks:

## 2019-12-18 NOTE — LETTER
[place] [address] [facility] [address]  Phone: [phone]    December 18, 2019        Karen ZAPATA Arsh  [address] [facility] [address]          To whom it may concern:    RE: Karen ZAPATA Arsh    Patient does not need prophylactic antibiotics prior to dental appointments/procedures.    Please contact me for questions or concerns.      Sincerely,        Lyudmila Escobar PA-C

## 2020-04-13 DIAGNOSIS — F41.9 ANXIETY: ICD-10-CM

## 2020-04-13 RX ORDER — CLONAZEPAM 0.5 MG/1
TABLET, ORALLY DISINTEGRATING ORAL
Qty: 60 TABLET | Refills: 0 | Status: SHIPPED | OUTPATIENT
Start: 2020-04-13 | End: 2020-09-24

## 2020-04-13 NOTE — TELEPHONE ENCOUNTER
clonazePAM (KLONOPIN) 0.5 MG ODT  60 tablet  0  12/18/2019            Last Written Prescription Date:  12/18/2019  Last Fill Quantity: 60,   # refills: 0  Last Office Visit: 12/18/2019  Future Office visit:   Unknown    Routing refill request to provider for review/approval because:  Drug not on the [facility], [facility] or [facility] refill protocol or controlled substance

## 2020-07-23 ENCOUNTER — HOSPITAL ENCOUNTER (OUTPATIENT)
Dept: CT IMAGING | Facility: CLINIC | Age: 70
Discharge: HOME | End: 2020-07-23
Attending: PHYSICIAN ASSISTANT | Admitting: PHYSICIAN ASSISTANT
Payer: COMMERCIAL

## 2020-07-23 ENCOUNTER — TELEPHONE (OUTPATIENT)
Dept: FAMILY MEDICINE | Facility: CLINIC | Age: 70
End: 2020-07-23

## 2020-07-23 DIAGNOSIS — R91.8 PULMONARY NODULES: ICD-10-CM

## 2020-07-23 DIAGNOSIS — F17.200 SMOKER: ICD-10-CM

## 2020-07-23 LAB — RADIOLOGIST FLAGS: ABNORMAL

## 2020-07-23 PROCEDURE — 71250 CT THORAX DX C-: CPT

## 2020-07-23 NOTE — TELEPHONE ENCOUNTER
See Chest CT results, called over as urgent finding.  Brenda Hensley RN                                                                    IMPRESSION:   1. ACR Assessment Category:  Lung-RADS Category 3. By criteria this is  technically category 3 assessment, and this relates to the finding of  numerous new but very small pulmonary nodules noted bilaterally.  However, neoplasm is within the differential including metastatic  disease versus an infectious or inflammatory cause. The category 3  short-term imaging follow-up suggest 6 months as the next CT scan, but  consideration should be made for shorter interval follow-up such as  within 3 months and/or further workup(please order exam code IMG  2163).   2. Significant Incidental Finding(s):  Category S: No.

## 2020-07-24 DIAGNOSIS — R91.8 PULMONARY NODULES: Primary | ICD-10-CM

## 2020-07-27 NOTE — TELEPHONE ENCOUNTER
Oncology/Surgical Oncology Referral Request:     Specialty Requested: Medical Oncology     Referring Provider: Lyudmila Escobar PA-C    Referring Clinic/Organization: [facility]    [facility]     Requested Provider (if specified): Not Specified

## 2020-07-28 NOTE — TELEPHONE ENCOUNTER
RECORDS STATUS - ALL OTHER DIAGNOSIS      RECORDS RECEIVED FROM: [facility] - Internal Referral   DATE RECEIVED: 7/28/20   NOTES STATUS DETAILS   OFFICE NOTE from referring provider Epic    OFFICE NOTE from medical oncologist     DISCHARGE SUMMARY from hospital     DISCHARGE REPORT from the ER     OPERATIVE REPORT     MEDICATION LIST     CLINICAL TRIAL TREATMENTS TO DATE     LABS     PATHOLOGY REPORTS     ANYTHING RELATED TO DIAGNOSIS     GENONOMIC TESTING     TYPE:     IMAGING (NEED IMAGES & REPORT)     CT SCANS PACS 7/23/20, 7/15/19, 1/9/19   MRI     MAMMO     ULTRASOUND     PET

## 2020-08-01 ENCOUNTER — HOSPITAL ENCOUNTER (EMERGENCY)
Facility: CLINIC | Age: 70
Discharge: HOME | End: 2020-08-01
Attending: EMERGENCY MEDICINE | Admitting: EMERGENCY MEDICINE
Payer: COMMERCIAL

## 2020-08-01 VITALS
SYSTOLIC BLOOD PRESSURE: 140 MMHG | BODY MASS INDEX: 22.82 KG/M2 | DIASTOLIC BLOOD PRESSURE: 83 MMHG | TEMPERATURE: 98.2 F | WEIGHT: 137 LBS | HEIGHT: 65 IN | RESPIRATION RATE: 17 BRPM | HEART RATE: 54 BPM | OXYGEN SATURATION: 95 %

## 2020-08-01 DIAGNOSIS — I10 HYPERTENSION, UNSPECIFIED TYPE: ICD-10-CM

## 2020-08-01 LAB — INTERPRETATION ECG - MUSE: NORMAL

## 2020-08-01 PROCEDURE — 93005 ELECTROCARDIOGRAM TRACING: CPT

## 2020-08-01 PROCEDURE — 99283 EMERGENCY DEPT VISIT LOW MDM: CPT

## 2020-08-01 RX ORDER — METOPROLOL TARTRATE 100 MG
100 TABLET ORAL
COMMUNITY
Start: 2020-03-22 | End: 2020-12-22

## 2020-08-01 RX ORDER — ALBUTEROL SULFATE 90 UG/1
AEROSOL, METERED RESPIRATORY (INHALATION)
COMMUNITY
Start: 2020-03-01 | End: 2020-12-22

## 2020-08-01 ASSESSMENT — ENCOUNTER SYMPTOMS
HEADACHES: 1
ARTHRALGIAS: 1
ABDOMINAL PAIN: 0

## 2020-08-01 ASSESSMENT — MIFFLIN-ST. JEOR: SCORE: 1152.31

## 2020-08-01 NOTE — ED AVS SNAPSHOT
Emergency Department  [address] [place] [address]  Phone:  [phone]  Fax:  [phone]                                    Karen Caban   MRN: [id number]    Department:   Emergency Department   Date of Visit:  8/1/2020           After Visit Summary Signature Page    I have received my discharge instructions, and my questions have been answered. I have discussed any challenges I see with this plan with the nurse or doctor.    ..........................................................................................................................................  Patient/Patient Representative Signature      ..........................................................................................................................................  Patient Representative Print Name and Relationship to Patient    ..................................................               ................................................  Date                                   Time    ..........................................................................................................................................  Reviewed by Signature/Title    ...................................................              ..............................................  Date                                               Time          22EPIC Rev 08/18

## 2020-08-01 NOTE — ED PROVIDER NOTES
History     Chief Complaint:  Hypertension      The history is provided by the patient.      Karen Caban is a 69 year old female who presents with hypertension. The patient reports she typically takes her blood pressure every day, but noted an elevated blood pressure both yesterday and today. This morning, she recorded a BP of 210/134, and called the nurse line, who recommended she present to the ED for evaluation. She had taken 100 mg metoprolol and another half pill prior to arrival to try and control her symptoms. The patient also complains of intermittent headaches yesterday and today, and here complains of stiff shoulders. She reports she had taken Tylenol for this prior to arrival. She denies any chest pain, abdominal pain, vision problems, and she is taking all of her meds as prescribed.      Allergies:  Varenicline  Ciprofloxacin  Clopidogrel  Decongestant  Erythromycin  Lisinopril  Plavix  Rofecoxib  Vioxx    Medications:    Albuterol inhaler  Aspirin 81 mg   Atorvastatin  Chlorthalidone  Coenzyme Q-10  Metoprolol succinate  Metoprolol tartrate  Omeprazole  Clonazepam  Vitamin D    Past Medical History:    Ankle fracture  Anxiety  Chronic back pain  Colon polyp  Low femur fracture  GERD   UTI Hypertension  Hyperlipidemia  Osteopenia  Peripheral Artery Disease  Peptic Ulcer Disease  Tobacco use  Vitamin D deficiency    Past Surgical History:    Blood clot removal from stent  Aortofemoral bypass graft  Femoropopliteal bypass graft  Colonoscopy  Lower extremity embolectomy  Excision of lesion in lower extremity  Hernia repair x2  Left achilles tendon repair  Oophorectomy  Miscarriages x3  Tubal ligation and reversal  Leg stent for pad    Family History:    Mother: Alzheimer's disease, Osteoporosis  Father: Cancer    Social History:  The patient was not accompanied to the ED.  Smoking Status: Current every day smoker  Smokeless Tobacco: Never used  Alcohol Use: Yes  Drug Use: No  Marital Status:   "    Review of Systems   Eyes: Negative for visual disturbance.   Cardiovascular: Negative for chest pain.   Gastrointestinal: Negative for abdominal pain.   Musculoskeletal: Positive for arthralgias (bilateral shoulders).   Neurological: Positive for headaches.   All other systems reviewed and are negative.    Physical Exam     Patient Vitals for the past 24 hrs:   BP Temp Temp src Pulse Heart Rate Resp SpO2 Height Weight   08/01/20 1900 (!) 140/83 -- -- 54 -- -- 95 % -- --   08/01/20 1840 (!) 164/95 -- -- 60 -- -- 95 % -- --   08/01/20 1812 -- 98.2  F (36.8  C) Oral -- -- -- -- -- --   08/01/20 1806 (!) 178/95 -- -- 66 66 17 98 % 1.651 m (5' 5\") 62.1 kg (137 lb)       Physical Exam  Vitals: reviewed by me  General: Pt seen on [facility], pleasant, cooperative, and alert to conversation  Eyes: Tracking well, clear conjunctiva BL  ENT: MMM, midline trachea.   Lungs: No tachypnea, no accessory muscle use. No respiratory distress.   CV: Rate as above, regular rhythm.    MSK: no peripheral edema or joint effusion.  No evidence of trauma  Skin: No rash, normal turgor and temperature  Neuro: Clear speech and no facial droop.  Psych: Not RIS, no e/o AH/      Emergency Department Course     ECG:  Indication: Hypertension  Completed at 1810.  Sinus rhythm  Possible Anteroseptal infarct (cited on or before 06-DEC-2009)  Abnormal ECG  When compared with ECG of 20-DEC-2017 09:48,  Questionable change in initial forces of Anterior leads.   Rate 66 bpm. WI interval 190. QRS duration 84. QT/QTc 418/438. P-R-T axes 70 41 83.  Agree with computer interpretation. Read by Dr. Coreas.    Procedures  None.    Emergency Department Course:  Past medical records, nursing notes, and vitals reviewed.    1836 I performed an exam of the patient as documented above.     EKG obtained in the ED, see results above.     1922 I rechecked the patient and discussed the results of her workup thus far.     Findings and plan explained " to the Patient. Patient discharged home with instructions regarding supportive care, medications, and reasons to return. The importance of close follow-up was reviewed.    I personally answered all related questions prior to discharge.     Impression & Plan     Medical Decision Making:  Karen Caban is a very pleasant 69 year old female who presents to the emergency department today with high blood pressure. She has no other symptoms apart from a mild headache that she sometimes gets independently of her blood pressure. This is not a thunderclap or severe headache, and she does not want medication for it. Her blood pressure has come don nicely here, without intervention, and the patient is feeling well enough to go home. We talked about how she may have to have her blood pressure medication titrated up, and she feels comfortable doing this in the outpatient setting in the coming week. Red flags for her to come back were discussed.    Diagnosis:    ICD-10-CM    1. Hypertension, unspecified type  I10        Disposition:  Discharged to home.    Discharge Medications:  None.      Scribe Disclosure:  I, Keisha Vergara, am serving as a scribe at 6:36 PM on 8/1/2020 to document services personally performed by Ez Coreas MD based on my observations and the provider's statements to me.     Keisha Vergara  8/1/2020    EMERGENCY DEPARTMENT       Ez Coreas MD  08/01/20 1602

## 2020-08-01 NOTE — ED NOTES
Pt reports over last 2 days she has had mira blood pressure with headache and neck pain. Pt has HTN hx and takes medication. Pt kept a log of her BPs Friday and today. Highest BP today at home was 206/138, pt repeatedly checked her BP multiple times over last 2 days. Pt denies chest pain or SOB. Pt did take an extra 1/2 pill of metoprolol this morning for her BP. BP's in the morning were relatively WNL and as she kept rechecking they went up.

## 2020-08-02 NOTE — DISCHARGE INSTRUCTIONS
It is important you see your regular doctor in the next 48 to 72 hours to see if you need to increase any of her blood pressure medications.  Come back to the ER with any other concerns.

## 2020-08-03 ENCOUNTER — MYC MEDICAL ADVICE (OUTPATIENT)
Dept: FAMILY MEDICINE | Facility: CLINIC | Age: 70
End: 2020-08-03

## 2020-08-03 DIAGNOSIS — I10 HTN (HYPERTENSION), BENIGN: Primary | ICD-10-CM

## 2020-08-10 ENCOUNTER — VIRTUAL VISIT (OUTPATIENT)
Dept: PULMONOLOGY | Facility: CLINIC | Age: 70
End: 2020-08-10
Attending: PHYSICIAN ASSISTANT
Payer: COMMERCIAL

## 2020-08-10 ENCOUNTER — PRE VISIT (OUTPATIENT)
Dept: PULMONOLOGY | Facility: CLINIC | Age: 70
End: 2020-08-10

## 2020-08-10 DIAGNOSIS — R91.8 PULMONARY NODULES: ICD-10-CM

## 2020-08-10 DIAGNOSIS — I10 HTN (HYPERTENSION), BENIGN: ICD-10-CM

## 2020-08-10 NOTE — LETTER
8/10/2020       RE: Karen Caban  [address]  [facility] [address]     Dear Colleague,    Thank you for referring your patient, Karen Caban, to the [facility] at [facility]. Please see a copy of my visit note below.    Karen Caban is a 69 year old female who is being evaluated via a billable telephone visit.        Patient did not have any vitals to report.  0/10 pain scale.     No refills needed.   No additional concerns.     Marybel Willson, CMA    69-year-old woman with a long history of cigarette smoking, previous small pulmonary nodules referred here for multiple pulmonary nodules on lung cancer screening CT.  She has a history of bronchitis in the past and had a chest cold in March that resolved after 2 courses of antibiotics, no prednisone.  Currently feeling okay.  Her CT scan had multiple nodules and radiology reports that some of them were new.  Because of there being multiple they recommended a CT scan in 3 months instead of 6.    We also discussed her cigarette smoking.  She is quit for 2 days.  She is using the patch at 7 mg and is thinking about using gum or lozenges as well.  I congratulated her on this start.    I discussed these findings with Seun.  We will repeat her CT scan in 3 months.  Based on these results we can consider going back to yearly scanning versus other follow-up.    CT ordered, 3-month follow-up with me.    Delonte Strong    11 minutes spent on this telephone call.        Again, thank you for allowing me to participate in the care of your patient.      Sincerely,    Delonte Strong MD

## 2020-08-10 NOTE — PROGRESS NOTES
"Karen Caban is a 69 year old female who is being evaluated via a billable telephone visit.      The patient has been notified of following:     \"This telephone visit will be conducted via a call between you and your physician/provider. We have found that certain health care needs can be provided without the need for a physical exam.  This service lets us provide the care you need with a short phone conversation.  If a prescription is necessary we can send it directly to your pharmacy.  If lab work is needed we can place an order for that and you can then stop by our lab to have the test done at a later time.    Telephone visits are billed at different rates depending on your insurance coverage. During this emergency period, for some insurers they may be billed the same as an in-person visit.  Please reach out to your insurance provider with any questions.    If during the course of the call the physician/provider feels a telephone visit is not appropriate, you will not be charged for this service.\"    Patient has given verbal consent for Telephone visit?  Yes    What phone number would you like to be contacted at? [phone]    How would you like to obtain your AVS? Guillermo    Patient did not have any vitals to report.  0/10 pain scale.     No refills needed.   No additional concerns.     Marybel Willson, CMA    69-year-old woman with a long history of cigarette smoking, previous small pulmonary nodules referred here for multiple pulmonary nodules on lung cancer screening CT.  She has a history of bronchitis in the past and had a chest cold in March that resolved after 2 courses of antibiotics, no prednisone.  Currently feeling okay.  Her CT scan had multiple nodules and radiology reports that some of them were new.  Because of there being multiple they recommended a CT scan in 3 months instead of 6.    We also discussed her cigarette smoking.  She is quit for 2 days.  She is using the patch at 7 mg and is " thinking about using gum or lozenges as well.  I congratulated her on this start.    I discussed these findings with Seun.  We will repeat her CT scan in 3 months.  Based on these results we can consider going back to yearly scanning versus other follow-up.    CT ordered, 3-month follow-up with me.    Delonte Strong    11 minutes spent on this telephone call.

## 2020-08-11 RX ORDER — METOPROLOL SUCCINATE 100 MG/1
TABLET, EXTENDED RELEASE ORAL
Qty: 90 TABLET | Refills: 1 | Status: SHIPPED | OUTPATIENT
Start: 2020-08-11 | End: 2020-12-22

## 2020-08-31 RX ORDER — METOPROLOL SUCCINATE 100 MG/1
150 TABLET, EXTENDED RELEASE ORAL DAILY
Qty: 135 TABLET | Refills: 1 | Status: SHIPPED | OUTPATIENT
Start: 2020-08-31 | End: 2020-12-22

## 2020-08-31 NOTE — TELEPHONE ENCOUNTER
See cornel with blood pressures.  Pt asking if she should continue on metoprolol 150mg, if so pended for review  Was previously rx'ed 100mg   Brenda Hensley RN

## 2020-09-24 DIAGNOSIS — F41.9 ANXIETY: ICD-10-CM

## 2020-09-25 NOTE — TELEPHONE ENCOUNTER
Clonazepam 0.5mg       Last Written Prescription Date:  4/13/2020  Last Fill Quantity: 60,   # refills: 0  Last Office Visit: 12/18/19 - follow up 1 year   Future Office visit:       Last [facility] website verification: 09/25/2020 [place]    [email or web address]    Routing refill request to provider for review/approval because:  Drug not on the [facility], UMP or  Health refill protocol or controlled substance

## 2020-09-28 RX ORDER — CLONAZEPAM 0.5 MG/1
TABLET, ORALLY DISINTEGRATING ORAL
Qty: 60 TABLET | Refills: 0 | Status: SHIPPED | OUTPATIENT
Start: 2020-09-28 | End: 2020-12-14

## 2020-11-09 DIAGNOSIS — K21.9 GASTROESOPHAGEAL REFLUX DISEASE WITHOUT ESOPHAGITIS: ICD-10-CM

## 2020-11-09 RX ORDER — OMEPRAZOLE 40 MG/1
CAPSULE, DELAYED RELEASE ORAL
Qty: 90 CAPSULE | Refills: 0 | Status: SHIPPED | OUTPATIENT
Start: 2020-11-09 | End: 2020-12-22

## 2020-11-09 NOTE — TELEPHONE ENCOUNTER
Next 5 appointments (look out 90 days)    Dec 22, 2020 10:30 AM  PHYSICAL with Lyudmila Escobar PA-C  [facility] ([facility]) [address] [facility] [address]  [phone]        RT Shamika (R)

## 2020-11-09 NOTE — TELEPHONE ENCOUNTER
Prescription approved per [facility] Refill Protocol.  Vilma JC RN

## 2020-11-10 ENCOUNTER — HOSPITAL ENCOUNTER (OUTPATIENT)
Dept: CT IMAGING | Facility: CLINIC | Age: 70
Discharge: HOME | End: 2020-11-10
Attending: INTERNAL MEDICINE | Admitting: INTERNAL MEDICINE
Payer: COMMERCIAL

## 2020-11-10 DIAGNOSIS — R91.8 PULMONARY NODULES: ICD-10-CM

## 2020-11-10 PROCEDURE — 71250 CT THORAX DX C-: CPT

## 2020-12-01 ENCOUNTER — VIRTUAL VISIT (OUTPATIENT)
Dept: PULMONOLOGY | Facility: CLINIC | Age: 70
End: 2020-12-01
Attending: INTERNAL MEDICINE
Payer: COMMERCIAL

## 2020-12-01 DIAGNOSIS — R91.1 LUNG NODULE: Primary | ICD-10-CM

## 2020-12-01 PROCEDURE — 99212 OFFICE O/P EST SF 10 MIN: CPT | Mod: TEL | Performed by: INTERNAL MEDICINE

## 2020-12-01 NOTE — LETTER
"12/1/2020       RE: Karen Caban  [address]  [facility] [address]     Dear Colleague,    Thank you for referring your patient, Karen Caban, to [facility] Please see a copy of my visit note below.    Karen Caban is a 69 year old female who is being evaluated via a billable telephone visit.      The patient has been notified of following:     \"This telephone visit will be conducted via a call between you and your physician/provider. We have found that certain health care needs can be provided without the need for a physical exam.  This service lets us provide the care you need with a short phone conversation.  If a prescription is necessary we can send it directly to your pharmacy.  If lab work is needed we can place an order for that and you can then stop by our lab to have the test done at a later time.    Telephone visits are billed at different rates depending on your insurance coverage. During this emergency period, for some insurers they may be billed the same as an in-person visit.  Please reach out to your insurance provider with any questions.    If during the course of the call the physician/provider feels a telephone visit is not appropriate, you will not be charged for this service.\"    Patient has given verbal consent for Telephone visit?  Yes    What phone number would you like to be contacted at? [phone]    How would you like to obtain your AVS? Guillermo    I have reviewed and updated the patient's allergies and medication list. Patient was asked if they had any patient reported vital signs to present, if yes, please see documented vitals.  Patient was also asked for their current weight and height, if presented, documented in vitals.      Concerns: No concerns    Refills: No refills       Guadalupe Morocho CMA (AAMA)    69-year-old woman with history of smoking here for follow-up of nodules.  " Previously identified nodules have resolved.  She still qualifies for yearly lung cancer screening.  We will order this now.  We also discussed smoking cessation and various methods.    CT scan reviewed and interpreted.    14 minutes spent on this telephone visit.      Again, thank you for allowing me to participate in the care of your patient.      Sincerely,    Delonte Strong MD

## 2020-12-01 NOTE — PROGRESS NOTES
"Karen Caban is a 69 year old female who is being evaluated via a billable telephone visit.      The patient has been notified of following:     \"This telephone visit will be conducted via a call between you and your physician/provider. We have found that certain health care needs can be provided without the need for a physical exam.  This service lets us provide the care you need with a short phone conversation.  If a prescription is necessary we can send it directly to your pharmacy.  If lab work is needed we can place an order for that and you can then stop by our lab to have the test done at a later time.    Telephone visits are billed at different rates depending on your insurance coverage. During this emergency period, for some insurers they may be billed the same as an in-person visit.  Please reach out to your insurance provider with any questions.    If during the course of the call the physician/provider feels a telephone visit is not appropriate, you will not be charged for this service.\"    Patient has given verbal consent for Telephone visit?  Yes    What phone number would you like to be contacted at? [phone]    How would you like to obtain your AVS? MyChart    I have reviewed and updated the patient's allergies and medication list. Patient was asked if they had any patient reported vital signs to present, if yes, please see documented vitals.  Patient was also asked for their current weight and height, if presented, documented in vitals.      Concerns: No concerns    Refills: No refills       Guadalupe Morocho CMA ([facility])    69-year-old woman with history of smoking here for follow-up of nodules.  Previously identified nodules have resolved.  She still qualifies for yearly lung cancer screening.  We will order this now.  We also discussed smoking cessation and various methods.    CT scan reviewed and interpreted.    14 minutes spent on this telephone visit.  "

## 2020-12-03 ENCOUNTER — DOCUMENTATION ONLY (OUTPATIENT)
Dept: FAMILY MEDICINE | Facility: CLINIC | Age: 70
End: 2020-12-03

## 2020-12-03 DIAGNOSIS — E78.5 HYPERLIPIDEMIA LDL GOAL <100: ICD-10-CM

## 2020-12-03 DIAGNOSIS — Z00.00 ENCOUNTER FOR MEDICARE ANNUAL WELLNESS EXAM: ICD-10-CM

## 2020-12-03 DIAGNOSIS — I10 HTN (HYPERTENSION), BENIGN: Primary | ICD-10-CM

## 2020-12-10 DIAGNOSIS — F41.9 ANXIETY: ICD-10-CM

## 2020-12-10 NOTE — TELEPHONE ENCOUNTER
clonazePAM (KLONOPIN) 0.5 MG ODT 60 tablet 0 9/28/2020  No   Sig: DISSOLVE 1 TABLET(0.5 MG) ON THE TONGUE TWICE DAILY AS NEEDED FOR ANXIETY     Controlled Substance Refill Request for clonazePAM  Problem List Complete:  Yes    Last Written Prescription Date:  9-  Last Fill Quantity: 60,   # refills: 0    THE MOST RECENT OFFICE VISIT MUST BE WITHIN THE PAST 3 MONTHS. AT LEAST ONE FACE TO FACE VISIT MUST OCCUR EVERY 6 MONTHS. ADDITIONAL VISITS CAN BE VIRTUAL.  (THIS STATEMENT SHOULD BE DELETED.)    Last Office Visit with [facility] primary care provider: 12-    Future Office visit:   Next 5 appointments (look out 90 days)    Dec 22, 2020 10:30 AM  PHYSICAL with Lyudmila Escobar PA-C  [facility] ([facility] [address] [facility] [address]  [phone]          Controlled substance agreement:   Encounter-Level CSA - 01/22/2014:    Controlled Substance Agreement - Scan on 10/24/2014 10:51 AM: CONTROLLED MEDICATION AGREEMENT   1/22/14     Patient-Level CSA:    There are no patient-level csa.         Last Urine Drug Screen: No results found for: CDAUT, No results found for: COMDAT, No results found for: THC13, PCP13, COC13, MAMP13, OPI13, AMP13, BZO13, TCA13, MTD13, BAR13, OXY13, PPX13, BUP13     Processing:  Rx to be electronically transmitted to pharmacy by provider     [email or web address]   checked in past 3 months?  No, route to RN

## 2020-12-14 RX ORDER — CLONAZEPAM 0.5 MG/1
TABLET, ORALLY DISINTEGRATING ORAL
Qty: 60 TABLET | Refills: 0 | Status: SHIPPED | OUTPATIENT
Start: 2020-12-14 | End: 2021-04-08

## 2020-12-17 DIAGNOSIS — Z00.00 ENCOUNTER FOR MEDICARE ANNUAL WELLNESS EXAM: ICD-10-CM

## 2020-12-17 DIAGNOSIS — E78.5 HYPERLIPIDEMIA LDL GOAL <100: ICD-10-CM

## 2020-12-17 DIAGNOSIS — I10 HTN (HYPERTENSION), BENIGN: ICD-10-CM

## 2020-12-17 LAB
ALBUMIN SERPL-MCNC: 3.8 G/DL (ref 3.4–5)
ALP SERPL-CCNC: 74 U/L (ref 40–150)
ALT SERPL W P-5'-P-CCNC: 32 U/L (ref 0–50)
ANION GAP SERPL CALCULATED.3IONS-SCNC: 3 MMOL/L (ref 3–14)
AST SERPL W P-5'-P-CCNC: 23 U/L (ref 0–45)
BILIRUB SERPL-MCNC: 0.6 MG/DL (ref 0.2–1.3)
BUN SERPL-MCNC: 11 MG/DL (ref 7–30)
CALCIUM SERPL-MCNC: 8.6 MG/DL (ref 8.5–10.1)
CHLORIDE SERPL-SCNC: 103 MMOL/L (ref 94–109)
CHOLEST SERPL-MCNC: 142 MG/DL
CO2 SERPL-SCNC: 31 MMOL/L (ref 20–32)
CREAT SERPL-MCNC: 0.7 MG/DL (ref 0.52–1.04)
ERYTHROCYTE [DISTWIDTH] IN BLOOD BY AUTOMATED COUNT: 13.8 % (ref 10–15)
GFR SERPL CREATININE-BSD FRML MDRD: 89 ML/MIN/{1.73_M2}
GLUCOSE SERPL-MCNC: 94 MG/DL (ref 70–99)
HCT VFR BLD AUTO: 47.1 % (ref 35–47)
HDLC SERPL-MCNC: 50 MG/DL
HGB BLD-MCNC: 16 G/DL (ref 11.7–15.7)
LDLC SERPL CALC-MCNC: 78 MG/DL
MCH RBC QN AUTO: 33.3 PG (ref 26.5–33)
MCHC RBC AUTO-ENTMCNC: 34 G/DL (ref 31.5–36.5)
MCV RBC AUTO: 98 FL (ref 78–100)
NONHDLC SERPL-MCNC: 92 MG/DL
PLATELET # BLD AUTO: 198 10E9/L (ref 150–450)
POTASSIUM SERPL-SCNC: 3.3 MMOL/L (ref 3.4–5.3)
PROT SERPL-MCNC: 6.7 G/DL (ref 6.8–8.8)
RBC # BLD AUTO: 4.8 10E12/L (ref 3.8–5.2)
SODIUM SERPL-SCNC: 137 MMOL/L (ref 133–144)
TRIGL SERPL-MCNC: 70 MG/DL
WBC # BLD AUTO: 16.3 10E9/L (ref 4–11)

## 2020-12-17 PROCEDURE — 85027 COMPLETE CBC AUTOMATED: CPT | Performed by: PHYSICIAN ASSISTANT

## 2020-12-17 PROCEDURE — 80053 COMPREHEN METABOLIC PANEL: CPT | Performed by: PHYSICIAN ASSISTANT

## 2020-12-17 PROCEDURE — 36415 COLL VENOUS BLD VENIPUNCTURE: CPT | Performed by: PHYSICIAN ASSISTANT

## 2020-12-17 PROCEDURE — 80061 LIPID PANEL: CPT | Performed by: PHYSICIAN ASSISTANT

## 2020-12-17 NOTE — RESULT ENCOUNTER NOTE
Humaira,    I tried to call you earlier today.  Your white blood cell count is elevated. This can be sign of infection.  Have your been sick or had a fever?  Have you recently been on a steroid like prednisone or given a cortisone shot?    Lyudmila Escobar PA-C

## 2020-12-19 ASSESSMENT — ACTIVITIES OF DAILY LIVING (ADL): CURRENT_FUNCTION: NO ASSISTANCE NEEDED

## 2020-12-22 ENCOUNTER — OFFICE VISIT (OUTPATIENT)
Dept: FAMILY MEDICINE | Facility: CLINIC | Age: 70
End: 2020-12-22
Payer: COMMERCIAL

## 2020-12-22 VITALS
HEART RATE: 69 BPM | BODY MASS INDEX: 21.99 KG/M2 | HEIGHT: 65 IN | SYSTOLIC BLOOD PRESSURE: 123 MMHG | TEMPERATURE: 97.3 F | WEIGHT: 132 LBS | DIASTOLIC BLOOD PRESSURE: 83 MMHG | OXYGEN SATURATION: 98 %

## 2020-12-22 DIAGNOSIS — Z00.00 ENCOUNTER FOR MEDICARE ANNUAL WELLNESS EXAM: Primary | ICD-10-CM

## 2020-12-22 DIAGNOSIS — F17.200 SMOKER: ICD-10-CM

## 2020-12-22 DIAGNOSIS — D72.829 LEUKOCYTOSIS, UNSPECIFIED TYPE: ICD-10-CM

## 2020-12-22 DIAGNOSIS — N95.2 ATROPHIC VAGINITIS: ICD-10-CM

## 2020-12-22 DIAGNOSIS — E78.5 HYPERLIPIDEMIA LDL GOAL <100: ICD-10-CM

## 2020-12-22 DIAGNOSIS — J20.9 ACUTE BRONCHITIS, UNSPECIFIED ORGANISM: ICD-10-CM

## 2020-12-22 DIAGNOSIS — Z78.0 POSTMENOPAUSAL STATUS: ICD-10-CM

## 2020-12-22 DIAGNOSIS — I73.9 PAD (PERIPHERAL ARTERY DISEASE) (H): ICD-10-CM

## 2020-12-22 DIAGNOSIS — K21.9 GASTROESOPHAGEAL REFLUX DISEASE WITHOUT ESOPHAGITIS: ICD-10-CM

## 2020-12-22 DIAGNOSIS — I10 HTN (HYPERTENSION), BENIGN: ICD-10-CM

## 2020-12-22 PROCEDURE — 99213 OFFICE O/P EST LOW 20 MIN: CPT | Mod: 25 | Performed by: PHYSICIAN ASSISTANT

## 2020-12-22 PROCEDURE — U0003 INFECTIOUS AGENT DETECTION BY NUCLEIC ACID (DNA OR RNA); SEVERE ACUTE RESPIRATORY SYNDROME CORONAVIRUS 2 (SARS-COV-2) (CORONAVIRUS DISEASE [COVID-19]), AMPLIFIED PROBE TECHNIQUE, MAKING USE OF HIGH THROUGHPUT TECHNOLOGIES AS DESCRIBED BY CMS-2020-01-R: HCPCS | Performed by: PHYSICIAN ASSISTANT

## 2020-12-22 PROCEDURE — 99397 PER PM REEVAL EST PAT 65+ YR: CPT | Performed by: PHYSICIAN ASSISTANT

## 2020-12-22 RX ORDER — ESTRADIOL 0.1 MG/G
CREAM VAGINAL
Qty: 42.5 G | Refills: 3 | Status: SHIPPED | OUTPATIENT
Start: 2020-12-22 | End: 2021-12-07

## 2020-12-22 RX ORDER — ALBUTEROL SULFATE 90 UG/1
1-2 POWDER, METERED RESPIRATORY (INHALATION) EVERY 4 HOURS PRN
Qty: 1 EACH | Refills: 3 | Status: SHIPPED | OUTPATIENT
Start: 2020-12-22 | End: 2022-12-28

## 2020-12-22 RX ORDER — ATORVASTATIN CALCIUM 40 MG/1
40 TABLET, FILM COATED ORAL DAILY
Qty: 90 TABLET | Refills: 3 | Status: SHIPPED | OUTPATIENT
Start: 2020-12-22 | End: 2021-12-07

## 2020-12-22 RX ORDER — METOPROLOL SUCCINATE 100 MG/1
150 TABLET, EXTENDED RELEASE ORAL DAILY
Qty: 135 TABLET | Refills: 1 | Status: SHIPPED | OUTPATIENT
Start: 2020-12-22 | End: 2021-06-14

## 2020-12-22 RX ORDER — OMEPRAZOLE 40 MG/1
CAPSULE, DELAYED RELEASE ORAL
Qty: 90 CAPSULE | Refills: 3 | Status: SHIPPED | OUTPATIENT
Start: 2020-12-22 | End: 2022-01-04

## 2020-12-22 RX ORDER — CHLORTHALIDONE 25 MG/1
25 TABLET ORAL DAILY
Qty: 90 TABLET | Refills: 3 | Status: SHIPPED | OUTPATIENT
Start: 2020-12-22 | End: 2021-12-17

## 2020-12-22 ASSESSMENT — ACTIVITIES OF DAILY LIVING (ADL): CURRENT_FUNCTION: NO ASSISTANCE NEEDED

## 2020-12-22 ASSESSMENT — MIFFLIN-ST. JEOR: SCORE: 1124.63

## 2020-12-22 NOTE — PROGRESS NOTES
"SUBJECTIVE:   Karen Caban is a 70 year old female who presents for Preventive Visit.    Mammogram is scheduled, colonoscopy up to date  She had previsit labs and had WBC of 16,300  Because if this decided to have a saliva covid test over the w/e which was neg  She was asymptomatic at that time.    However, since the w/e she has developed sxs of a sinus infection including HA and sinsu pressure.  She has wheezing and chest heaviness  She is using her Neti pot  Denies loss of smell or taste  Denies fever or chills or body aches or cough  She is a current smoker      Patient has been advised of split billing requirements and indicates understanding: Yes   Are you in the first 12 months of your Medicare coverage?  No    Healthy Habits:     In general, how would you rate your overall health?  Good    Frequency of exercise:  2-3 days/week    Do you usually eat at least 4 servings of fruit and vegetables a day, include whole grains    & fiber and avoid regularly eating high fat or \"junk\" foods?  Yes    Taking medications regularly:  Yes    Medication side effects:  Not applicable    Ability to successfully perform activities of daily living:  No assistance needed    Home Safety:  No safety concerns identified    Hearing Impairment:  No hearing concerns    In the past 6 months, have you been bothered by leaking of urine?  No    In general, how would you rate your overall mental or emotional health?  Good      PHQ-2 Total Score: 0    Additional concerns today:  Yes    Do you feel safe in your environment? Yes    Have you ever done Advance Care Planning? (For example, a Health Directive, POLST, or a discussion with a medical provider or your loved ones about your wishes): Yes, advance care planning is on file.      Fall risk  Fallen 2 or more times in the past year?: No  Any fall with injury in the past year?: No    Cognitive Screening   1) Repeat 3 items (Leader, Season, Table)    2) Clock draw: NORMAL  3) 3 item " recall: Recalls 3 objects  Results: 3 items recalled: COGNITIVE IMPAIRMENT LESS LIKELY    Mini-CogTM Copyright TRAY Johnson. Licensed by the author for use in [facility]; reprinted with permission (jennifer@[facility]). All rights reserved.      Do you have sleep apnea, excessive snoring or daytime drowsiness?: no    Reviewed and updated as needed this visit by clinical staff  Tobacco  Allergies  Meds              Reviewed and updated as needed this visit by Provider                Social History     Tobacco Use     Smoking status: Current Every Day Smoker     Packs/day: 0.00     Years: 0.00     Pack years: 0.00     Types: Cigarettes     Smokeless tobacco: Never Used   Substance Use Topics     Alcohol use: Yes     Alcohol/week: 4.0 - 5.0 standard drinks     Frequency: 2-3 times a week     Drinks per session: 1 or 2     Binge frequency: Never     Comment: Beer 3 times per week     If you drink alcohol do you typically have >3 drinks per day or >7 drinks per week? No    Alcohol Use 12/22/2020   Prescreen: >3 drinks/day or >7 drinks/week? -   Prescreen: >3 drinks/day or >7 drinks/week? No   No flowsheet data found.            Current providers sharing in care for this patient include:   Patient Care Team:  Lyudmila Escobar PA-C as PCP - General (Internal Medicine)  Lyudmila Escobar PA-C as Assigned PCP  Delonte Strong MD as Assigned Pulmonology Provider    The following health maintenance items are reviewed in Epic and correct as of today:  Health Maintenance   Topic Date Due     FALL RISK ASSESSMENT  12/18/2020     LUNG CANCER SCREENING ANNUAL  11/10/2021     MAMMO SCREENING  12/18/2021     MEDICARE ANNUAL WELLNESS VISIT  12/22/2021     COLORECTAL CANCER SCREENING  01/18/2023     LIPID  12/17/2025     ADVANCE CARE PLANNING  12/22/2025     DTAP/TDAP/TD IMMUNIZATION (3 - Td) 11/05/2028     DEXA  12/01/2029     HEPATITIS C SCREENING  Completed     PHQ-2  Completed     INFLUENZA VACCINE  Completed      Pneumococcal Vaccine: 65+ Years  Completed     ZOSTER IMMUNIZATION  Completed     Pneumococcal Vaccine: Pediatrics (0 to 5 Years) and At-Risk Patients (6 to 64 Years)  Aged Out     IPV IMMUNIZATION  Aged Out     MENINGITIS IMMUNIZATION  Aged Out     HEPATITIS B IMMUNIZATION  Aged Out       Past Medical History:   Diagnosis Date     Ankle fracture 2009    L     Anxiety      Chronic back pain      Colon polyp 2012    repeat colonoscopy 5 years.     Fx low femur epiphy-closed (H)      GERD (gastroesophageal reflux disease)      History of UTI 2017    Cysto by Dr Agustin neg     HTN (hypertension), benign      Hyperlipidemia LDL goal < 100      Normal nuclear stress test 12/09    EF 67%     Osteopenia      PAD (peripheral artery disease) (H)     s/p fem pop bypass; embolectomy     PUD (peptic ulcer disease) 1980s    DU     Smoker      Vitamin D deficiency      Past Surgical History:   Procedure Laterality Date     Blood clot removal from Stent      2011     BYPASS GRAFT AORTOFEMORAL  5/02    Dr. Turner     BYPASS GRAFT FEMOROPOPLITEAL  12/16/2011     COLONOSCOPY N/A 1/18/2018    Procedure: COMBINED COLONOSCOPY, SINGLE OR MULTIPLE BIOPSY/POLYPECTOMY BY BIOPSY;  COLONOSCOPY;  Surgeon: Efrain Hernadez MD;  Location:  GI     EMBOLECTOMY LOWER EXTREMITY  12/16/2011    Procedure:EMBOLECTOMY LOWER EXTREMITY; EMBOLECTOMY, RIGHT POPLITEAL WITH PATCH ANGIOPLASTY. EXCISION SKIN LESION RIGHT LEG. ; Surgeon:PARMINDER VELAZCO; Location: OR     EXCISE LESION LOWER EXTREMITY  12/16/2011    Procedure:EXCISE LESION LOWER EXTREMITY; Surgeon:PARMINDER VELAZCO; Location: OR     HERNIA REPAIR  11/4/08    x2     L achilles repair  12/4/08     LAPAROSCOPIC OOPHORECTOMY      L for cyst     miscarriages x 3       tubal ligation and reversal       Current Outpatient Medications   Medication Sig Dispense Refill     albuterol (PROAIR RESPICLICK) 108 (90 Base) MCG/ACT inhaler Inhale 1-2 puffs into the lungs every 4 hours as  "needed 1 each 3     amoxicillin-clavulanate (AUGMENTIN) 875-125 MG tablet Take 1 tablet by mouth 2 times daily 20 tablet 0     aspirin 81 MG tablet Take 2 tablets by mouth daily.       atorvastatin (LIPITOR) 40 MG tablet Take 1 tablet (40 mg) by mouth daily 90 tablet 3     chlorthalidone (HYGROTON) 25 MG tablet Take 1 tablet (25 mg) by mouth daily 90 tablet 3     Cholecalciferol (VITAMIN D-3) 5000 UNIT TABS Take 1 tablet by mouth daily.       clonazePAM (KLONOPIN) 0.5 MG ODT DISSOLVE 1 TABLET(0.5 MG) ON THE TONGUE TWICE DAILY AS NEEDED FOR ANXIETY 60 tablet 0     coenzyme Q-10 200 MG CAPS        Cyanocobalamin (B-12) 1000 MCG TBCR Take 1,000 mcg by mouth daily 100 tablet 1     estradiol (ESTRACE VAGINAL) 0.1 MG/GM vaginal cream INSERT 1 GRAM VAGINALLY TWICE TO THREE TIMES PER WEEK 42.5 g 3     Lactobacillus (PROBIOTIC ACIDOPHILUS PO)        metoprolol succinate ER (TOPROL-XL) 100 MG 24 hr tablet Take 1.5 tablets (150 mg) by mouth daily 135 tablet 1     omeprazole (PRILOSEC) 40 MG DR capsule TAKE 1 CAPSULE BY MOUTH EVERY DAY 30 TO 60 MINUTES BEFORE A MEAL 90 capsule 3         Review of Systems  Constitutional, HEENT, cardiovascular, pulmonary, GI, , musculoskeletal, neuro, skin, endocrine and psych systems are negative, except as otherwise noted.    OBJECTIVE:   /83 (BP Location: Right arm, Patient Position: Sitting)   Pulse 69   Temp 97.3  F (36.3  C) (Tympanic)   Ht 1.651 m (5' 5\")   Wt 59.9 kg (132 lb)   SpO2 98%   BMI 21.97 kg/m   Estimated body mass index is 21.97 kg/m  as calculated from the following:    Height as of this encounter: 1.651 m (5' 5\").    Weight as of this encounter: 59.9 kg (132 lb).  Physical Exam  GENERAL APPEARANCE: sounds somewhat congested, alert and no distress  EYES: Eyes grossly normal to inspection, PERRL and conjunctivae and sclerae normal  HENT: ear canals and TM's normal, nose and mouth without ulcers or lesions, oropharynx clear and oral mucous membranes moist  NECK: " no adenopathy, no asymmetry, masses, or scars and thyroid normal to palpation  RESP: lungs clear to auscultation - no rales, rhonchi or wheezes  BREAST: normal without masses, tenderness or nipple discharge and no palpable axillary masses or adenopathy  CV: regular rate and rhythm, normal S1 S2, no S3 or S4, no murmur, click or rub, no peripheral edema and peripheral pulses strong  ABDOMEN: soft, nontender, no hepatosplenomegaly, no masses and bowel sounds normal  MS: no musculoskeletal defects are noted and gait is age appropriate without ataxia  SKIN: no suspicious lesions or rashes  NEURO: Normal strength and tone, sensory exam grossly normal, mentation intact and speech normal  PSYCH: mentation appears normal and affect normal/bright    ASSESSMENT / PLAN:   Assessment and Plan:     (Z00.00) Encounter for Medicare annual wellness exam  (primary encounter diagnosis)  Comment:  Immun up to date.  Plan: mammogram is scheduled for next month. Reviewed previsit labs with pt. Colonoscopy is up to date.     (J20.9) Acute bronchitis, unspecified organism  Comment: will repeat covid test today  Plan: albuterol (PROAIR RESPICLICK) 108 (90 Base)         MCG/ACT inhaler, amoxicillin-clavulanate         (AUGMENTIN) 875-125 MG tablet one bid x 10d with food, Symptomatic         COVID-19 Virus (Coronavirus) by PCR            (D72.829) Leukocytosis, unspecified type  Comment:   Plan: CBC with platelets and differential (repeat 2 weeks.            (F17.200) Smoker  Comment:   Plan: not interested in quitting    (I73.9) PAD (peripheral artery disease) (H)  Comment:   Plan: denies sxs of claudication.  Pt on statin and asa.    (I10) HTN (hypertension), benign  Comment: well controlled, cont same.  Plan: chlorthalidone (HYGROTON) 25 MG tablet,         metoprolol succinate ER (TOPROL-XL) 100 MG 24         hr tablet            (E78.5) Hyperlipidemia LDL goal <100  Comment:   Plan: atorvastatin (LIPITOR) 40 MG tablet            (N95.2)  "Atrophic vaginitis  Comment:   Plan: estradiol (ESTRACE VAGINAL) 0.1 MG/GM vaginal         cream        refilled    (K21.9) Gastroesophageal reflux disease without esophagitis  Comment:   Plan: omeprazole (PRILOSEC) 40 MG DR capsule        refilled    (Z78.0) Postmenopausal status  Comment:   Plan: DX Hip/Pelvis/Spine                Patient has been advised of split billing requirements and indicates understanding: Yes  COUNSELING:  Reviewed preventive health counseling, as reflected in patient instructions    Estimated body mass index is 21.97 kg/m  as calculated from the following:    Height as of this encounter: 1.651 m (5' 5\").    Weight as of this encounter: 59.9 kg (132 lb).        She reports that she has been smoking cigarettes. She has been smoking about 0.00 packs per day for the past 0.00 years. She has never used smokeless tobacco.  Tobacco Cessation Action Plan:   Information offered: Patient not interested at this time      Appropriate preventive services were discussed with this patient, including applicable screening as appropriate for cardiovascular disease, diabetes, osteopenia/osteoporosis, and glaucoma.  As appropriate for age/gender, discussed screening for colorectal cancer, prostate cancer, breast cancer, and cervical cancer. Checklist reviewing preventive services available has been given to the patient.    Reviewed patients plan of care and provided an AVS. The Basic Care Plan (routine screening as documented in Health Maintenance) for Karen meets the Care Plan requirement. This Care Plan has been established and reviewed with the Patient.    Counseling Resources:  ATP IV Guidelines  Pooled Cohorts Equation Calculator  Breast Cancer Risk Calculator  Breast Cancer: Medication to Reduce Risk  FRAX Risk Assessment  ICSI Preventive Guidelines  Dietary Guidelines for Americans, 2010  USDA's MyPlate  ASA Prophylaxis  Lung CA Screening    Lyudmila Escobar PA-C  [facility] " [facility]    Identified Health Risks:

## 2020-12-23 LAB
SARS-COV-2 RNA SPEC QL NAA+PROBE: NOT DETECTED
SPECIMEN SOURCE: NORMAL

## 2020-12-23 NOTE — RESULT ENCOUNTER NOTE
Humaira,    Thank goodness your COVID test was negative!  Now I can take all of those goodies home and eat them:)    Thank you for your kindness.  Feel better soon.  Let me know if your breathing is worse as you may need steroids.      Lyudmila Escobar PA-C

## 2021-01-06 DIAGNOSIS — D72.829 LEUKOCYTOSIS, UNSPECIFIED TYPE: ICD-10-CM

## 2021-01-06 PROCEDURE — 85025 COMPLETE CBC W/AUTO DIFF WBC: CPT | Performed by: PHYSICIAN ASSISTANT

## 2021-01-06 PROCEDURE — 88189 FLOWCYTOMETRY/READ 16 & >: CPT | Performed by: PATHOLOGY

## 2021-01-06 PROCEDURE — 36415 COLL VENOUS BLD VENIPUNCTURE: CPT | Performed by: PHYSICIAN ASSISTANT

## 2021-01-07 ENCOUNTER — PRE VISIT (OUTPATIENT)
Facility: CLINIC | Age: 71
End: 2021-01-07

## 2021-01-07 DIAGNOSIS — D72.820 LYMPHOCYTOSIS: Primary | ICD-10-CM

## 2021-01-07 LAB
DIFFERENTIAL METHOD BLD: ABNORMAL
EOSINOPHIL # BLD AUTO: 0.7 10E9/L (ref 0–0.7)
EOSINOPHIL NFR BLD AUTO: 4 %
ERYTHROCYTE [DISTWIDTH] IN BLOOD BY AUTOMATED COUNT: 13.6 % (ref 10–15)
HCT VFR BLD AUTO: 47.9 % (ref 35–47)
HGB BLD-MCNC: 16.5 G/DL (ref 11.7–15.7)
LYMPHOCYTES # BLD AUTO: 13.8 10E9/L (ref 0.8–5.3)
LYMPHOCYTES NFR BLD AUTO: 74 %
MCH RBC QN AUTO: 33.3 PG (ref 26.5–33)
MCHC RBC AUTO-ENTMCNC: 34.4 G/DL (ref 31.5–36.5)
MCV RBC AUTO: 97 FL (ref 78–100)
MONOCYTES # BLD AUTO: 0.6 10E9/L (ref 0–1.3)
MONOCYTES NFR BLD AUTO: 3 %
NEUTROPHILS # BLD AUTO: 3.6 10E9/L (ref 1.6–8.3)
NEUTROPHILS NFR BLD AUTO: 19 %
PLATELET # BLD AUTO: 206 10E9/L (ref 150–450)
PLATELET # BLD EST: ABNORMAL 10*3/UL
RBC # BLD AUTO: 4.96 10E12/L (ref 3.8–5.2)
RBC MORPH BLD: NORMAL
SMUDGE CELLS BLD QL SMEAR: PRESENT
WBC # BLD AUTO: 18.7 10E9/L (ref 4–11)

## 2021-01-07 NOTE — TELEPHONE ENCOUNTER
ONCOLOGY INTAKE: Records Information      APPT INFORMATION:  Referring provider:  RISHABH Escobar  Referring provider s clinic:  [facility]  Reason for visit/diagnosis:  Lymphocytosis  Has patient been notified of appointment date and time?: yes    RECORDS INFORMATION:  Were the records received with the referral (via [employer])? no    Has patient been seen for any external appt for this diagnosis? no    If yes, where? n/a    Has patient had any imaging or procedures outside of [place] for this condition? no      If Yes, where? n/a    ADDITIONAL INFORMATION:  none

## 2021-01-07 NOTE — RESULT ENCOUNTER NOTE
Humaira,    Your white count is higher now.  How are you feeling? Any cough, fever, body aches, night sweats, weight loss?  Did you sinus infection resolve with the antibiotic?  Did you ever need to go on prednisone?    Lyudmila Escobar PA-C

## 2021-01-08 LAB — COPATH REPORT: NORMAL

## 2021-01-13 ENCOUNTER — PATIENT OUTREACH (OUTPATIENT)
Dept: ONCOLOGY | Facility: CLINIC | Age: 71
End: 2021-01-13

## 2021-01-13 NOTE — PROGRESS NOTES
New Patient Oncology Nurse Navigator Note     Referring provider:   Lyudmila Escobar PA-C [facility]     Referral Placed: January 7, 2021     Evaluation for : D72.820 (ICD-10-CM) - Lymphocytosis   Hematology  Preferred Location:  [facility] [place]    Referral updates and Plan:   January 13, 2021 call to pt who is currently scheduled at [facility] with Dr Sol, benign hematology. Introduced my role as nurse navigator with [facility] [place] [facility] and that we have recd the referral for dx of elevated white count. 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 We will have scheduling offer her [facility] options and find out if telephone visit is okay as she does not have video capabilities. Pt verbalized understanding and denied further questions, has our call-back number if needed.    1/27/2021  4:00 PM Maddie Rowland MD [facility]  - telephone consult, okayed by MD Graciela Garcia, RN, BSN, OCN  RN Care Coordinator / Oncology Nurse Navigator   [facility]  [phone]

## 2021-01-20 ENCOUNTER — HOSPITAL ENCOUNTER (OUTPATIENT)
Dept: MAMMOGRAPHY | Facility: CLINIC | Age: 71
End: 2021-01-20
Attending: PHYSICIAN ASSISTANT
Payer: COMMERCIAL

## 2021-01-20 ENCOUNTER — HOSPITAL ENCOUNTER (OUTPATIENT)
Dept: BONE DENSITY | Facility: CLINIC | Age: 71
End: 2021-01-20
Attending: PHYSICIAN ASSISTANT
Payer: COMMERCIAL

## 2021-01-20 DIAGNOSIS — Z12.31 VISIT FOR SCREENING MAMMOGRAM: ICD-10-CM

## 2021-01-20 DIAGNOSIS — Z78.0 POSTMENOPAUSAL STATUS: ICD-10-CM

## 2021-01-20 PROCEDURE — 77080 DXA BONE DENSITY AXIAL: CPT

## 2021-01-20 PROCEDURE — 77063 BREAST TOMOSYNTHESIS BI: CPT

## 2021-01-21 NOTE — RESULT ENCOUNTER NOTE
Humaira,    Your bone density shows that you have mild osteopenia (thinning of the bones) in your hips  Your spine score is normal but I suspect this is falsely elevated due to arthritis.     I would recommend you try to get 1200mg of calcium in your diet.  Continue your vitamin D supplement.  Stay active.     Let's repeat this in 2 years.    Lyudmila Escobar PA-C

## 2021-01-27 ENCOUNTER — VIRTUAL VISIT (OUTPATIENT)
Dept: ONCOLOGY | Facility: CLINIC | Age: 71
End: 2021-01-27
Attending: PHYSICIAN ASSISTANT
Payer: COMMERCIAL

## 2021-01-27 DIAGNOSIS — Z11.59 ENCOUNTER FOR SCREENING FOR OTHER VIRAL DISEASES: Primary | ICD-10-CM

## 2021-01-27 DIAGNOSIS — Z11.59 ENCOUNTER FOR SCREENING FOR OTHER VIRAL DISEASES: ICD-10-CM

## 2021-01-27 DIAGNOSIS — C91.10 CLL (CHRONIC LYMPHOCYTIC LEUKEMIA) (H): Primary | ICD-10-CM

## 2021-01-27 DIAGNOSIS — D72.820 LYMPHOCYTOSIS: ICD-10-CM

## 2021-01-27 PROCEDURE — 999N001193 HC VIDEO/TELEPHONE VISIT; NO CHARGE

## 2021-01-27 PROCEDURE — 99203 OFFICE O/P NEW LOW 30 MIN: CPT | Mod: 95 | Performed by: INTERNAL MEDICINE

## 2021-01-27 ASSESSMENT — PAIN SCALES - GENERAL: PAINLEVEL: NO PAIN (0)

## 2021-01-27 NOTE — LETTER
1/27/2021         RE: Karen Caban  [address]  [facility] [address]        Dear Colleague,    Thank you for referring your patient, Karen Caban, to the [facility] [place]. Please see a copy of my visit note below.    Karen is a 70 year old who is being evaluated via a billable telephone visit.      What phone number would you like to be contacted at? [phone]  How would you like to obtain your AVS? Guillermo     [facility]    Hematology/Oncology New Patient Note      Today's Date: 01/27/21    Reason for Consult: lymphocytosis      HISTORY OF PRESENT ILLNESS: Karen Caban is a 70 year old female with PMHx of PAD s/p fem pop bypass, chronic back pain, HTN, smoking history, who presents with lymphocytosis.   She saw her PCP in December 2020 for a sinus infection.  She took antibiotics, and the infection has resolved.  She did not take steroids.  On lab evaluation, she was found to have elevated WBC of 16.  Repeat CBC in January 2021 showed WBC of 18.7, with ALC of 13.8.  There was note of smudge cells and atypical lymphocytes.  Peripheral flow showed CD5-positive kappa monotypic B-cells, favoring CLL.      Karen says that she feels fine currently.  She doesn't get frequent infections.  She denies fever/chills, night sweats, unintentional weight loss, lymphadenopathy.          REVIEW OF SYSTEMS:   14 point ROS was reviewed and is negative other than as noted above in HPI.       HOME MEDICATIONS:  Current Outpatient Medications   Medication Sig Dispense Refill     albuterol (PROAIR RESPICLICK) 108 (90 Base) MCG/ACT inhaler Inhale 1-2 puffs into the lungs every 4 hours as needed 1 each 3     amoxicillin-clavulanate (AUGMENTIN) 875-125 MG tablet Take 1 tablet by mouth 2 times daily 20 tablet 0     aspirin 81 MG tablet Take 2 tablets by mouth daily.       atorvastatin (LIPITOR) 40 MG tablet Take 1 tablet (40 mg) by mouth daily 90 tablet 3      chlorthalidone (HYGROTON) 25 MG tablet Take 1 tablet (25 mg) by mouth daily 90 tablet 3     Cholecalciferol (VITAMIN D-3) 5000 UNIT TABS Take 1 tablet by mouth daily.       clonazePAM (KLONOPIN) 0.5 MG ODT DISSOLVE 1 TABLET(0.5 MG) ON THE TONGUE TWICE DAILY AS NEEDED FOR ANXIETY 60 tablet 0     coenzyme Q-10 200 MG CAPS        Cyanocobalamin (B-12) 1000 MCG TBCR Take 1,000 mcg by mouth daily 100 tablet 1     estradiol (ESTRACE VAGINAL) 0.1 MG/GM vaginal cream INSERT 1 GRAM VAGINALLY TWICE TO THREE TIMES PER WEEK 42.5 g 3     Lactobacillus (PROBIOTIC ACIDOPHILUS PO)        metoprolol succinate ER (TOPROL-XL) 100 MG 24 hr tablet Take 1.5 tablets (150 mg) by mouth daily 135 tablet 1     omeprazole (PRILOSEC) 40 MG DR capsule TAKE 1 CAPSULE BY MOUTH EVERY DAY 30 TO 60 MINUTES BEFORE A MEAL 90 capsule 3         ALLERGIES:  Allergies   Allergen Reactions     Chantix [Varenicline] Nausea     Ciprofloxacin Other (See Comments)     hypertension     Clopidogrel      Other reaction(s): Hypertension     Decongestant [Cvs]      Erythromycin Nausea     Lisinopril      cough     Plavix [Clopidogrel Bisulfate]      Felt as if she was having a heart attack     Rofecoxib Unknown     Vioxx          PAST MEDICAL HISTORY:  Past Medical History:   Diagnosis Date     Ankle fracture 2009    L     Anxiety      Chronic back pain      Colon polyp 2012    repeat colonoscopy 5 years.     Fx low femur epiphy-closed (H)      GERD (gastroesophageal reflux disease)      History of UTI 2017    Cysto by Dr Agustin neg     HTN (hypertension), benign      Hyperlipidemia LDL goal < 100      Normal nuclear stress test 12/09    EF 67%     Osteopenia      PAD (peripheral artery disease) (H)     s/p fem pop bypass; embolectomy     PUD (peptic ulcer disease) 1980s    DU     Smoker      Vitamin D deficiency          PAST SURGICAL HISTORY:  Past Surgical History:   Procedure Laterality Date     Blood clot removal from Stent      2011     BYPASS GRAFT  AORTOFEMORAL  5/02    Dr. Turner     BYPASS GRAFT FEMOROPOPLITEAL  12/16/2011     COLONOSCOPY N/A 1/18/2018    Procedure: COMBINED COLONOSCOPY, SINGLE OR MULTIPLE BIOPSY/POLYPECTOMY BY BIOPSY;  COLONOSCOPY;  Surgeon: Efrain Hernadez MD;  Location:  GI     EMBOLECTOMY LOWER EXTREMITY  12/16/2011    Procedure:EMBOLECTOMY LOWER EXTREMITY; EMBOLECTOMY, RIGHT POPLITEAL WITH PATCH ANGIOPLASTY. EXCISION SKIN LESION RIGHT LEG. ; Surgeon:PARMINDER VELAZCO; Location: OR     EXCISE LESION LOWER EXTREMITY  12/16/2011    Procedure:EXCISE LESION LOWER EXTREMITY; Surgeon:PARMINDER VELAZCO; Location: OR     HERNIA REPAIR  11/4/08    x2     L achilles repair  12/4/08     LAPAROSCOPIC OOPHORECTOMY      L for cyst     miscarriages x 3       tubal ligation and reversal           SOCIAL HISTORY:  Social History     Socioeconomic History     Marital status:      Spouse name: Not on file     Number of children: Not on file     Years of education: Not on file     Highest education level: Not on file   Occupational History     Not on file   Social Needs     Financial resource strain: Not on file     Food insecurity     Worry: Not on file     Inability: Not on file     Transportation needs     Medical: Not on file     Non-medical: Not on file   Tobacco Use     Smoking status: Current Every Day Smoker     Packs/day: 0.00     Years: 0.00     Pack years: 0.00     Types: Cigarettes     Smokeless tobacco: Never Used   Substance and Sexual Activity     Alcohol use: Yes     Alcohol/week: 4.0 - 5.0 standard drinks     Frequency: 2-3 times a week     Drinks per session: 1 or 2     Binge frequency: Never     Comment: Beer 3 times per week     Drug use: No     Sexual activity: Not Currently     Partners: Male   Lifestyle     Physical activity     Days per week: Not on file     Minutes per session: Not on file     Stress: Not on file   Relationships     Social connections     Talks on phone: Not on file     Gets together:  Not on file     Attends Quaker service: Not on file     Active member of club or organization: Not on file     Attends meetings of clubs or organizations: Not on file     Relationship status: Not on file     Intimate partner violence     Fear of current or ex partner: Not on file     Emotionally abused: Not on file     Physically abused: Not on file     Forced sexual activity: Not on file   Other Topics Concern     Parent/sibling w/ CABG, MI or angioplasty before 65F 55M? Not Asked   Social History Narrative    , working FT for a [employer] company.  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  Had 3 miscarriages.  No living children.   is Dustin.  Walks 2miles per day.         FAMILY HISTORY:  Family History   Problem Relation Age of Onset     Alzheimer Disease Mother          of panc/GI ca age 83     Osteoporosis Mother         with hip fx     Other Cancer Mother      Cancer Father          age 64 liver ca     Colon Cancer Maternal Grandfather          PHYSICAL EXAM:  Phone visit.      LABS:  CBC RESULTS:   Recent Labs   Lab Test 21  0957   WBC 18.7*   RBC 4.96   HGB 16.5*   HCT 47.9*   MCV 97   MCH 33.3*   MCHC 34.4   RDW 13.6          Recent Labs   Lab Test 20  0858 19  0715    137   POTASSIUM 3.3* 3.4   CHLORIDE 103 101   CO2 31 30   ANIONGAP 3 6   GLC 94 93   BUN 11 10   CR 0.70 0.64   CHANDAN 8.6 8.9         PATHOLOGY:  Peripheral flow 21:  INTERPRETATION:   Blood:        CD5-positive kappa monotypic B cells (28%)        No aberrant immunophenotype on T cells        See comment     COMMENT:   A monotypic B cell population with the immunophenotype of chronic   lymphocytic leukemia/small lymphocytic   lymphoma (CLL/SLL) is present. Based on the reported WBC of 18.7 x 10^9/L   (absolute count of monotypic B   cells 5.2), CLL is favored.   Clinical correlation to evaluate for lymph node involvement is   Recommended.      ASSESSMENT/PLAN:  Karen Caban is a 70 year old female  with:    1) Lymphocytosis: Likely chronic lymphocytic anemia.  She has a mild lymphocytosis, no anemia, no thrombocytopenia, no B-symptoms.  We will proceed with bone marrow biopsy, as well as obtain CT c/a/p to assess for lymphadenopathy and organomegaly, to complete staging.  Most likely, she will not need treatment, and we can proceed with watch-and-wait approach.    I discussed this with patient, and she expressed understanding and agrees to proceed with plans for further diagnostic evaluation and staging.    -labs: CBC/diff, CMP, reticulocyte count, LDH, uric acid, hepatitis B screen, serum immunoglobulins  -CT c/a/p  -bone marrow biopsy  -RTC after the above is resulted    2) Polycythemia: suspect that it is secondary to her smoking.  -will monitor CBC/diff, check iron, ferritin  -getting bone marrow biopsy for CLL anyway as above    3) Pulmonary nodules: long history of smoking and current smoker  -following with lung nodule clinic    Maddie Rowland MD  Hematology/Oncology  [facility]      Phone call duration: 11 minutes    Total time spent on day of visit, including review of tests, obtaining/reviewing separately obtained history, ordering medications/tests/procedures, communicating with PCP/consultants, and documenting in electronic medical record: 45 minutes        Again, thank you for allowing me to participate in the care of your patient.        Sincerely,        Maddie Rowland MD

## 2021-01-27 NOTE — H&P (VIEW-ONLY)
Karen is a 70 year old who is being evaluated via a billable telephone visit.      What phone number would you like to be contacted at? [phone]  How would you like to obtain your AVS? Guillermo     [facility]    Hematology/Oncology New Patient Note      Today's Date: 01/27/21    Reason for Consult: lymphocytosis      HISTORY OF PRESENT ILLNESS: Karen Caban is a 70 year old female with PMHx of PAD s/p fem pop bypass, chronic back pain, HTN, smoking history, who presents with lymphocytosis.   She saw her PCP in December 2020 for a sinus infection.  She took antibiotics, and the infection has resolved.  She did not take steroids.  On lab evaluation, she was found to have elevated WBC of 16.  Repeat CBC in January 2021 showed WBC of 18.7, with ALC of 13.8.  There was note of smudge cells and atypical lymphocytes.  Peripheral flow showed CD5-positive kappa monotypic B-cells, favoring CLL.      Karen says that she feels fine currently.  She doesn't get frequent infections.  She denies fever/chills, night sweats, unintentional weight loss, lymphadenopathy.          REVIEW OF SYSTEMS:   14 point ROS was reviewed and is negative other than as noted above in HPI.       HOME MEDICATIONS:  Current Outpatient Medications   Medication Sig Dispense Refill     albuterol (PROAIR RESPICLICK) 108 (90 Base) MCG/ACT inhaler Inhale 1-2 puffs into the lungs every 4 hours as needed 1 each 3     amoxicillin-clavulanate (AUGMENTIN) 875-125 MG tablet Take 1 tablet by mouth 2 times daily 20 tablet 0     aspirin 81 MG tablet Take 2 tablets by mouth daily.       atorvastatin (LIPITOR) 40 MG tablet Take 1 tablet (40 mg) by mouth daily 90 tablet 3     chlorthalidone (HYGROTON) 25 MG tablet Take 1 tablet (25 mg) by mouth daily 90 tablet 3     Cholecalciferol (VITAMIN D-3) 5000 UNIT TABS Take 1 tablet by mouth daily.       clonazePAM (KLONOPIN) 0.5 MG ODT DISSOLVE 1 TABLET(0.5 MG) ON THE TONGUE TWICE DAILY AS  NEEDED FOR ANXIETY 60 tablet 0     coenzyme Q-10 200 MG CAPS        Cyanocobalamin (B-12) 1000 MCG TBCR Take 1,000 mcg by mouth daily 100 tablet 1     estradiol (ESTRACE VAGINAL) 0.1 MG/GM vaginal cream INSERT 1 GRAM VAGINALLY TWICE TO THREE TIMES PER WEEK 42.5 g 3     Lactobacillus (PROBIOTIC ACIDOPHILUS PO)        metoprolol succinate ER (TOPROL-XL) 100 MG 24 hr tablet Take 1.5 tablets (150 mg) by mouth daily 135 tablet 1     omeprazole (PRILOSEC) 40 MG DR capsule TAKE 1 CAPSULE BY MOUTH EVERY DAY 30 TO 60 MINUTES BEFORE A MEAL 90 capsule 3         ALLERGIES:  Allergies   Allergen Reactions     Chantix [Varenicline] Nausea     Ciprofloxacin Other (See Comments)     hypertension     Clopidogrel      Other reaction(s): Hypertension     Decongestant [Cvs]      Erythromycin Nausea     Lisinopril      cough     Plavix [Clopidogrel Bisulfate]      Felt as if she was having a heart attack     Rofecoxib Unknown     Vioxx          PAST MEDICAL HISTORY:  Past Medical History:   Diagnosis Date     Ankle fracture 2009    L     Anxiety      Chronic back pain      Colon polyp 2012    repeat colonoscopy 5 years.     Fx low femur epiphy-closed (H)      GERD (gastroesophageal reflux disease)      History of UTI 2017    Cysto by Dr Agustin neg     HTN (hypertension), benign      Hyperlipidemia LDL goal < 100      Normal nuclear stress test 12/09    EF 67%     Osteopenia      PAD (peripheral artery disease) (H)     s/p fem pop bypass; embolectomy     PUD (peptic ulcer disease) 1980s    DU     Smoker      Vitamin D deficiency          PAST SURGICAL HISTORY:  Past Surgical History:   Procedure Laterality Date     Blood clot removal from Stent      2011     BYPASS GRAFT AORTOFEMORAL  5/02    Dr. Turner     BYPASS GRAFT FEMOROPOPLITEAL  12/16/2011     COLONOSCOPY N/A 1/18/2018    Procedure: COMBINED COLONOSCOPY, SINGLE OR MULTIPLE BIOPSY/POLYPECTOMY BY BIOPSY;  COLONOSCOPY;  Surgeon: Efrain Hernadez MD;  Location:  GI      EMBOLECTOMY LOWER EXTREMITY  12/16/2011    Procedure:EMBOLECTOMY LOWER EXTREMITY; EMBOLECTOMY, RIGHT POPLITEAL WITH PATCH ANGIOPLASTY. EXCISION SKIN LESION RIGHT LEG. ; Surgeon:PARMINDER VELAZCO; Location:[facility] OR     EXCISE LESION LOWER EXTREMITY  12/16/2011    Procedure:EXCISE LESION LOWER EXTREMITY; Surgeon:PARMINDER VELAZCO; Location:[facility] OR     HERNIA REPAIR  11/4/08    x2     L achilles repair  12/4/08     LAPAROSCOPIC OOPHORECTOMY      L for cyst     miscarriages x 3       tubal ligation and reversal           SOCIAL HISTORY:  Social History     Socioeconomic History     Marital status:      Spouse name: Not on file     Number of children: Not on file     Years of education: Not on file     Highest education level: Not on file   Occupational History     Not on file   Social Needs     Financial resource strain: Not on file     Food insecurity     Worry: Not on file     Inability: Not on file     Transportation needs     Medical: Not on file     Non-medical: Not on file   Tobacco Use     Smoking status: Current Every Day Smoker     Packs/day: 0.00     Years: 0.00     Pack years: 0.00     Types: Cigarettes     Smokeless tobacco: Never Used   Substance and Sexual Activity     Alcohol use: Yes     Alcohol/week: 4.0 - 5.0 standard drinks     Frequency: 2-3 times a week     Drinks per session: 1 or 2     Binge frequency: Never     Comment: Beer 3 times per week     Drug use: No     Sexual activity: Not Currently     Partners: Male   Lifestyle     Physical activity     Days per week: Not on file     Minutes per session: Not on file     Stress: Not on file   Relationships     Social connections     Talks on phone: Not on file     Gets together: Not on file     Attends Oriental orthodox service: Not on file     Active member of club or organization: Not on file     Attends meetings of clubs or organizations: Not on file     Relationship status: Not on file     Intimate partner violence     Fear of current or ex  partner: Not on file     Emotionally abused: Not on file     Physically abused: Not on file     Forced sexual activity: Not on file   Other Topics Concern     Parent/sibling w/ CABG, MI or angioplasty before 65F 55M? 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  Had 3 miscarriages.  No living children.   is Dustin.  Walks 2miles per day.         FAMILY HISTORY:  Family History   Problem Relation Age of Onset     Alzheimer Disease Mother          of panc/GI ca age 83     Osteoporosis Mother         with hip fx     Other Cancer Mother      Cancer Father          age 64 liver ca     Colon Cancer Maternal Grandfather          PHYSICAL EXAM:  Phone visit.      LABS:  CBC RESULTS:   Recent Labs   Lab Test 21  0957   WBC 18.7*   RBC 4.96   HGB 16.5*   HCT 47.9*   MCV 97   MCH 33.3*   MCHC 34.4   RDW 13.6          Recent Labs   Lab Test 20  0858 19  0715    137   POTASSIUM 3.3* 3.4   CHLORIDE 103 101   CO2 31 30   ANIONGAP 3 6   GLC 94 93   BUN 11 10   CR 0.70 0.64   CHANDAN 8.6 8.9         PATHOLOGY:  Peripheral flow 21:  INTERPRETATION:   Blood:        CD5-positive kappa monotypic B cells (28%)        No aberrant immunophenotype on T cells        See comment     COMMENT:   A monotypic B cell population with the immunophenotype of chronic   lymphocytic leukemia/small lymphocytic   lymphoma (CLL/SLL) is present. Based on the reported WBC of 18.7 x 10^9/L   (absolute count of monotypic B   cells 5.2), CLL is favored.   Clinical correlation to evaluate for lymph node involvement is   Recommended.      ASSESSMENT/PLAN:  Karen Caban is a 70 year old female with:    1) Lymphocytosis: Likely chronic lymphocytic anemia.  She has a mild lymphocytosis, no anemia, no thrombocytopenia, no B-symptoms.  We will proceed with bone marrow biopsy, as well as obtain CT c/a/p to assess for lymphadenopathy and organomegaly, to  complete staging.  Most likely, she will not need treatment, and we can proceed with watch-and-wait approach.    I discussed this with patient, and she expressed understanding and agrees to proceed with plans for further diagnostic evaluation and staging.    -labs: CBC/diff, CMP, reticulocyte count, LDH, uric acid, hepatitis B screen, serum immunoglobulins  -CT c/a/p  -bone marrow biopsy  -RTC after the above is resulted    2) Polycythemia: suspect that it is secondary to her smoking.  -will monitor CBC/diff, check iron, ferritin  -getting bone marrow biopsy for CLL anyway as above    3) Pulmonary nodules: long history of smoking and current smoker  -following with lung nodule clinic    Maddie Rowland MD  Hematology/Oncology  [facility]      Phone call duration: 11 minutes    Total time spent on day of visit, including review of tests, obtaining/reviewing separately obtained history, ordering medications/tests/procedures, communicating with PCP/consultants, and documenting in electronic medical record: 45 minutes

## 2021-01-27 NOTE — LETTER
1/27/2021         RE: Karen Caban  [address]  [facility] [address]        Dear Colleague,    Thank you for referring your patient, Karen Caban, to the [facility] [place]. Please see a copy of my visit note below.    Karen is a 70 year old who is being evaluated via a billable telephone visit.      What phone number would you like to be contacted at? [phone]  How would you like to obtain your AVS? Guillermo     [facility]    Hematology/Oncology New Patient Note      Today's Date: 01/27/21    Reason for Consult: lymphocytosis      HISTORY OF PRESENT ILLNESS: Karen Caban is a 70 year old female with PMHx of PAD s/p fem pop bypass, chronic back pain, HTN, smoking history, who presents with lymphocytosis.   She saw her PCP in December 2020 for a sinus infection.  She took antibiotics, and the infection has resolved.  She did not take steroids.  On lab evaluation, she was found to have elevated WBC of 16.  Repeat CBC in January 2021 showed WBC of 18.7, with ALC of 13.8.  There was note of smudge cells and atypical lymphocytes.  Peripheral flow showed CD5-positive kappa monotypic B-cells, favoring CLL.      Karen says that she feels fine currently.  She doesn't get frequent infections.  She denies fever/chills, night sweats, unintentional weight loss, lymphadenopathy.          REVIEW OF SYSTEMS:   14 point ROS was reviewed and is negative other than as noted above in HPI.       HOME MEDICATIONS:  Current Outpatient Medications   Medication Sig Dispense Refill     albuterol (PROAIR RESPICLICK) 108 (90 Base) MCG/ACT inhaler Inhale 1-2 puffs into the lungs every 4 hours as needed 1 each 3     amoxicillin-clavulanate (AUGMENTIN) 875-125 MG tablet Take 1 tablet by mouth 2 times daily 20 tablet 0     aspirin 81 MG tablet Take 2 tablets by mouth daily.       atorvastatin (LIPITOR) 40 MG tablet Take 1 tablet (40 mg) by mouth daily 90 tablet 3      chlorthalidone (HYGROTON) 25 MG tablet Take 1 tablet (25 mg) by mouth daily 90 tablet 3     Cholecalciferol (VITAMIN D-3) 5000 UNIT TABS Take 1 tablet by mouth daily.       clonazePAM (KLONOPIN) 0.5 MG ODT DISSOLVE 1 TABLET(0.5 MG) ON THE TONGUE TWICE DAILY AS NEEDED FOR ANXIETY 60 tablet 0     coenzyme Q-10 200 MG CAPS        Cyanocobalamin (B-12) 1000 MCG TBCR Take 1,000 mcg by mouth daily 100 tablet 1     estradiol (ESTRACE VAGINAL) 0.1 MG/GM vaginal cream INSERT 1 GRAM VAGINALLY TWICE TO THREE TIMES PER WEEK 42.5 g 3     Lactobacillus (PROBIOTIC ACIDOPHILUS PO)        metoprolol succinate ER (TOPROL-XL) 100 MG 24 hr tablet Take 1.5 tablets (150 mg) by mouth daily 135 tablet 1     omeprazole (PRILOSEC) 40 MG DR capsule TAKE 1 CAPSULE BY MOUTH EVERY DAY 30 TO 60 MINUTES BEFORE A MEAL 90 capsule 3         ALLERGIES:  Allergies   Allergen Reactions     Chantix [Varenicline] Nausea     Ciprofloxacin Other (See Comments)     hypertension     Clopidogrel      Other reaction(s): Hypertension     Decongestant [Cvs]      Erythromycin Nausea     Lisinopril      cough     Plavix [Clopidogrel Bisulfate]      Felt as if she was having a heart attack     Rofecoxib Unknown     Vioxx          PAST MEDICAL HISTORY:  Past Medical History:   Diagnosis Date     Ankle fracture 2009    L     Anxiety      Chronic back pain      Colon polyp 2012    repeat colonoscopy 5 years.     Fx low femur epiphy-closed (H)      GERD (gastroesophageal reflux disease)      History of UTI 2017    Cysto by Dr Agustin neg     HTN (hypertension), benign      Hyperlipidemia LDL goal < 100      Normal nuclear stress test 12/09    EF 67%     Osteopenia      PAD (peripheral artery disease) (H)     s/p fem pop bypass; embolectomy     PUD (peptic ulcer disease) 1980s    DU     Smoker      Vitamin D deficiency          PAST SURGICAL HISTORY:  Past Surgical History:   Procedure Laterality Date     Blood clot removal from Stent      2011     BYPASS GRAFT  AORTOFEMORAL  5/02    Dr. Turner     BYPASS GRAFT FEMOROPOPLITEAL  12/16/2011     COLONOSCOPY N/A 1/18/2018    Procedure: COMBINED COLONOSCOPY, SINGLE OR MULTIPLE BIOPSY/POLYPECTOMY BY BIOPSY;  COLONOSCOPY;  Surgeon: Efrain Hernadez MD;  Location:  GI     EMBOLECTOMY LOWER EXTREMITY  12/16/2011    Procedure:EMBOLECTOMY LOWER EXTREMITY; EMBOLECTOMY, RIGHT POPLITEAL WITH PATCH ANGIOPLASTY. EXCISION SKIN LESION RIGHT LEG. ; Surgeon:PARMINDER VELAZCO; Location: OR     EXCISE LESION LOWER EXTREMITY  12/16/2011    Procedure:EXCISE LESION LOWER EXTREMITY; Surgeon:PARMINDER VELAZCO; Location: OR     HERNIA REPAIR  11/4/08    x2     L achilles repair  12/4/08     LAPAROSCOPIC OOPHORECTOMY      L for cyst     miscarriages x 3       tubal ligation and reversal           SOCIAL HISTORY:  Social History     Socioeconomic History     Marital status:      Spouse name: Not on file     Number of children: Not on file     Years of education: Not on file     Highest education level: Not on file   Occupational History     Not on file   Social Needs     Financial resource strain: Not on file     Food insecurity     Worry: Not on file     Inability: Not on file     Transportation needs     Medical: Not on file     Non-medical: Not on file   Tobacco Use     Smoking status: Current Every Day Smoker     Packs/day: 0.00     Years: 0.00     Pack years: 0.00     Types: Cigarettes     Smokeless tobacco: Never Used   Substance and Sexual Activity     Alcohol use: Yes     Alcohol/week: 4.0 - 5.0 standard drinks     Frequency: 2-3 times a week     Drinks per session: 1 or 2     Binge frequency: Never     Comment: Beer 3 times per week     Drug use: No     Sexual activity: Not Currently     Partners: Male   Lifestyle     Physical activity     Days per week: Not on file     Minutes per session: Not on file     Stress: Not on file   Relationships     Social connections     Talks on phone: Not on file     Gets together:  Not on file     Attends Nondenominational service: Not on file     Active member of club or organization: Not on file     Attends meetings of clubs or organizations: Not on file     Relationship status: Not on file     Intimate partner violence     Fear of current or ex partner: Not on file     Emotionally abused: Not on file     Physically abused: Not on file     Forced sexual activity: Not on file   Other Topics Concern     Parent/sibling w/ CABG, MI or angioplasty before 65F 55M? Not Asked   Social History Narrative    , working FT for a [employer] company.  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  Had 3 miscarriages.  No living children.   is Dustin.  Walks 2miles per day.         FAMILY HISTORY:  Family History   Problem Relation Age of Onset     Alzheimer Disease Mother          of panc/GI ca age 83     Osteoporosis Mother         with hip fx     Other Cancer Mother      Cancer Father          age 64 liver ca     Colon Cancer Maternal Grandfather          PHYSICAL EXAM:  Phone visit.      LABS:  CBC RESULTS:   Recent Labs   Lab Test 21  0957   WBC 18.7*   RBC 4.96   HGB 16.5*   HCT 47.9*   MCV 97   MCH 33.3*   MCHC 34.4   RDW 13.6          Recent Labs   Lab Test 20  0858 19  0715    137   POTASSIUM 3.3* 3.4   CHLORIDE 103 101   CO2 31 30   ANIONGAP 3 6   GLC 94 93   BUN 11 10   CR 0.70 0.64   CHANDAN 8.6 8.9         PATHOLOGY:  Peripheral flow 21:  INTERPRETATION:   Blood:        CD5-positive kappa monotypic B cells (28%)        No aberrant immunophenotype on T cells        See comment     COMMENT:   A monotypic B cell population with the immunophenotype of chronic   lymphocytic leukemia/small lymphocytic   lymphoma (CLL/SLL) is present. Based on the reported WBC of 18.7 x 10^9/L   (absolute count of monotypic B   cells 5.2), CLL is favored.   Clinical correlation to evaluate for lymph node involvement is   Recommended.      ASSESSMENT/PLAN:  Karen Caban is a 70 year old female  with:    1) Lymphocytosis: Likely chronic lymphocytic anemia.  She has a mild lymphocytosis, no anemia, no thrombocytopenia, no B-symptoms.  We will proceed with bone marrow biopsy, as well as obtain CT c/a/p to assess for lymphadenopathy and organomegaly, to complete staging.  Most likely, she will not need treatment, and we can proceed with watch-and-wait approach.    I discussed this with patient, and she expressed understanding and agrees to proceed with plans for further diagnostic evaluation and staging.    -labs: CBC/diff, CMP, reticulocyte count, LDH, uric acid, hepatitis B screen, serum immunoglobulins  -CT c/a/p  -bone marrow biopsy  -RTC after the above is resulted    2) Polycythemia: suspect that it is secondary to her smoking.  -will monitor CBC/diff, check iron, ferritin  -getting bone marrow biopsy for CLL anyway as above    3) Pulmonary nodules: long history of smoking and current smoker  -following with lung nodule clinic    Maddie Rowland MD  Hematology/Oncology  [facility]      Phone call duration: 11 minutes    Total time spent on day of visit, including review of tests, obtaining/reviewing separately obtained history, ordering medications/tests/procedures, communicating with PCP/consultants, and documenting in electronic medical record: 45 minutes        Again, thank you for allowing me to participate in the care of your patient.        Sincerely,        Maddie Rowland MD

## 2021-01-27 NOTE — PROGRESS NOTES
Karen is a 70 year old who is being evaluated via a billable telephone visit.      What phone number would you like to be contacted at? [phone]  How would you like to obtain your AVS? Guillermo     [facility]    Hematology/Oncology New Patient Note      Today's Date: 01/27/21    Reason for Consult: lymphocytosis      HISTORY OF PRESENT ILLNESS: Karen Caban is a 70 year old female with PMHx of PAD s/p fem pop bypass, chronic back pain, HTN, smoking history, who presents with lymphocytosis.   She saw her PCP in December 2020 for a sinus infection.  She took antibiotics, and the infection has resolved.  She did not take steroids.  On lab evaluation, she was found to have elevated WBC of 16.  Repeat CBC in January 2021 showed WBC of 18.7, with ALC of 13.8.  There was note of smudge cells and atypical lymphocytes.  Peripheral flow showed CD5-positive kappa monotypic B-cells, favoring CLL.      Karen says that she feels fine currently.  She doesn't get frequent infections.  She denies fever/chills, night sweats, unintentional weight loss, lymphadenopathy.          REVIEW OF SYSTEMS:   14 point ROS was reviewed and is negative other than as noted above in HPI.       HOME MEDICATIONS:  Current Outpatient Medications   Medication Sig Dispense Refill     albuterol (PROAIR RESPICLICK) 108 (90 Base) MCG/ACT inhaler Inhale 1-2 puffs into the lungs every 4 hours as needed 1 each 3     amoxicillin-clavulanate (AUGMENTIN) 875-125 MG tablet Take 1 tablet by mouth 2 times daily 20 tablet 0     aspirin 81 MG tablet Take 2 tablets by mouth daily.       atorvastatin (LIPITOR) 40 MG tablet Take 1 tablet (40 mg) by mouth daily 90 tablet 3     chlorthalidone (HYGROTON) 25 MG tablet Take 1 tablet (25 mg) by mouth daily 90 tablet 3     Cholecalciferol (VITAMIN D-3) 5000 UNIT TABS Take 1 tablet by mouth daily.       clonazePAM (KLONOPIN) 0.5 MG ODT DISSOLVE 1 TABLET(0.5 MG) ON THE TONGUE TWICE DAILY AS  NEEDED FOR ANXIETY 60 tablet 0     coenzyme Q-10 200 MG CAPS        Cyanocobalamin (B-12) 1000 MCG TBCR Take 1,000 mcg by mouth daily 100 tablet 1     estradiol (ESTRACE VAGINAL) 0.1 MG/GM vaginal cream INSERT 1 GRAM VAGINALLY TWICE TO THREE TIMES PER WEEK 42.5 g 3     Lactobacillus (PROBIOTIC ACIDOPHILUS PO)        metoprolol succinate ER (TOPROL-XL) 100 MG 24 hr tablet Take 1.5 tablets (150 mg) by mouth daily 135 tablet 1     omeprazole (PRILOSEC) 40 MG DR capsule TAKE 1 CAPSULE BY MOUTH EVERY DAY 30 TO 60 MINUTES BEFORE A MEAL 90 capsule 3         ALLERGIES:  Allergies   Allergen Reactions     Chantix [Varenicline] Nausea     Ciprofloxacin Other (See Comments)     hypertension     Clopidogrel      Other reaction(s): Hypertension     Decongestant [Cvs]      Erythromycin Nausea     Lisinopril      cough     Plavix [Clopidogrel Bisulfate]      Felt as if she was having a heart attack     Rofecoxib Unknown     Vioxx          PAST MEDICAL HISTORY:  Past Medical History:   Diagnosis Date     Ankle fracture 2009    L     Anxiety      Chronic back pain      Colon polyp 2012    repeat colonoscopy 5 years.     Fx low femur epiphy-closed (H)      GERD (gastroesophageal reflux disease)      History of UTI 2017    Cysto by Dr Agustin neg     HTN (hypertension), benign      Hyperlipidemia LDL goal < 100      Normal nuclear stress test 12/09    EF 67%     Osteopenia      PAD (peripheral artery disease) (H)     s/p fem pop bypass; embolectomy     PUD (peptic ulcer disease) 1980s    DU     Smoker      Vitamin D deficiency          PAST SURGICAL HISTORY:  Past Surgical History:   Procedure Laterality Date     Blood clot removal from Stent      2011     BYPASS GRAFT AORTOFEMORAL  5/02    Dr. Turner     BYPASS GRAFT FEMOROPOPLITEAL  12/16/2011     COLONOSCOPY N/A 1/18/2018    Procedure: COMBINED COLONOSCOPY, SINGLE OR MULTIPLE BIOPSY/POLYPECTOMY BY BIOPSY;  COLONOSCOPY;  Surgeon: Efrain Hernadez MD;  Location:  GI      EMBOLECTOMY LOWER EXTREMITY  12/16/2011    Procedure:EMBOLECTOMY LOWER EXTREMITY; EMBOLECTOMY, RIGHT POPLITEAL WITH PATCH ANGIOPLASTY. EXCISION SKIN LESION RIGHT LEG. ; Surgeon:PARMINDER VELAZCO; Location:[facility] OR     EXCISE LESION LOWER EXTREMITY  12/16/2011    Procedure:EXCISE LESION LOWER EXTREMITY; Surgeon:PARMINDER VELAZCO; Location:[facility] OR     HERNIA REPAIR  11/4/08    x2     L achilles repair  12/4/08     LAPAROSCOPIC OOPHORECTOMY      L for cyst     miscarriages x 3       tubal ligation and reversal           SOCIAL HISTORY:  Social History     Socioeconomic History     Marital status:      Spouse name: Not on file     Number of children: Not on file     Years of education: Not on file     Highest education level: Not on file   Occupational History     Not on file   Social Needs     Financial resource strain: Not on file     Food insecurity     Worry: Not on file     Inability: Not on file     Transportation needs     Medical: Not on file     Non-medical: Not on file   Tobacco Use     Smoking status: Current Every Day Smoker     Packs/day: 0.00     Years: 0.00     Pack years: 0.00     Types: Cigarettes     Smokeless tobacco: Never Used   Substance and Sexual Activity     Alcohol use: Yes     Alcohol/week: 4.0 - 5.0 standard drinks     Frequency: 2-3 times a week     Drinks per session: 1 or 2     Binge frequency: Never     Comment: Beer 3 times per week     Drug use: No     Sexual activity: Not Currently     Partners: Male   Lifestyle     Physical activity     Days per week: Not on file     Minutes per session: Not on file     Stress: Not on file   Relationships     Social connections     Talks on phone: Not on file     Gets together: Not on file     Attends Restorationism service: Not on file     Active member of club or organization: Not on file     Attends meetings of clubs or organizations: Not on file     Relationship status: Not on file     Intimate partner violence     Fear of current or ex  partner: Not on file     Emotionally abused: Not on file     Physically abused: Not on file     Forced sexual activity: Not on file   Other Topics Concern     Parent/sibling w/ CABG, MI or angioplasty before 65F 55M? 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  Had 3 miscarriages.  No living children.   is Dustin.  Walks 2miles per day.         FAMILY HISTORY:  Family History   Problem Relation Age of Onset     Alzheimer Disease Mother          of panc/GI ca age 83     Osteoporosis Mother         with hip fx     Other Cancer Mother      Cancer Father          age 64 liver ca     Colon Cancer Maternal Grandfather          PHYSICAL EXAM:  Phone visit.      LABS:  CBC RESULTS:   Recent Labs   Lab Test 21  0957   WBC 18.7*   RBC 4.96   HGB 16.5*   HCT 47.9*   MCV 97   MCH 33.3*   MCHC 34.4   RDW 13.6          Recent Labs   Lab Test 20  0858 19  0715    137   POTASSIUM 3.3* 3.4   CHLORIDE 103 101   CO2 31 30   ANIONGAP 3 6   GLC 94 93   BUN 11 10   CR 0.70 0.64   CHANDAN 8.6 8.9         PATHOLOGY:  Peripheral flow 21:  INTERPRETATION:   Blood:        CD5-positive kappa monotypic B cells (28%)        No aberrant immunophenotype on T cells        See comment     COMMENT:   A monotypic B cell population with the immunophenotype of chronic   lymphocytic leukemia/small lymphocytic   lymphoma (CLL/SLL) is present. Based on the reported WBC of 18.7 x 10^9/L   (absolute count of monotypic B   cells 5.2), CLL is favored.   Clinical correlation to evaluate for lymph node involvement is   Recommended.      ASSESSMENT/PLAN:  Karen Caban is a 70 year old female with:    1) Lymphocytosis: Likely chronic lymphocytic anemia.  She has a mild lymphocytosis, no anemia, no thrombocytopenia, no B-symptoms.  We will proceed with bone marrow biopsy, as well as obtain CT c/a/p to assess for lymphadenopathy and organomegaly, to  complete staging.  Most likely, she will not need treatment, and we can proceed with watch-and-wait approach.    I discussed this with patient, and she expressed understanding and agrees to proceed with plans for further diagnostic evaluation and staging.    -labs: CBC/diff, CMP, reticulocyte count, LDH, uric acid, hepatitis B screen, serum immunoglobulins  -CT c/a/p  -bone marrow biopsy  -RTC after the above is resulted    2) Polycythemia: suspect that it is secondary to her smoking.  -will monitor CBC/diff, check iron, ferritin  -getting bone marrow biopsy for CLL anyway as above    3) Pulmonary nodules: long history of smoking and current smoker  -following with lung nodule clinic    Maddie Rowland MD  Hematology/Oncology  [facility]      Phone call duration: 11 minutes    Total time spent on day of visit, including review of tests, obtaining/reviewing separately obtained history, ordering medications/tests/procedures, communicating with PCP/consultants, and documenting in electronic medical record: 45 minutes

## 2021-01-29 DIAGNOSIS — Z11.59 ENCOUNTER FOR SCREENING FOR OTHER VIRAL DISEASES: ICD-10-CM

## 2021-01-29 LAB
LABORATORY COMMENT REPORT: NORMAL
SARS-COV-2 RNA RESP QL NAA+PROBE: NEGATIVE
SARS-COV-2 RNA RESP QL NAA+PROBE: NORMAL
SPECIMEN SOURCE: NORMAL
SPECIMEN SOURCE: NORMAL

## 2021-01-29 PROCEDURE — U0003 INFECTIOUS AGENT DETECTION BY NUCLEIC ACID (DNA OR RNA); SEVERE ACUTE RESPIRATORY SYNDROME CORONAVIRUS 2 (SARS-COV-2) (CORONAVIRUS DISEASE [COVID-19]), AMPLIFIED PROBE TECHNIQUE, MAKING USE OF HIGH THROUGHPUT TECHNOLOGIES AS DESCRIBED BY CMS-2020-01-R: HCPCS | Performed by: PATHOLOGY

## 2021-01-29 PROCEDURE — U0005 INFEC AGEN DETEC AMPLI PROBE: HCPCS | Performed by: PATHOLOGY

## 2021-02-02 ENCOUNTER — ANESTHESIA EVENT (OUTPATIENT)
Dept: GASTROENTEROLOGY | Facility: CLINIC | Age: 71
End: 2021-02-02
Payer: COMMERCIAL

## 2021-02-02 ENCOUNTER — ANESTHESIA (OUTPATIENT)
Dept: GASTROENTEROLOGY | Facility: CLINIC | Age: 71
End: 2021-02-02
Payer: COMMERCIAL

## 2021-02-02 ENCOUNTER — HOSPITAL ENCOUNTER (OUTPATIENT)
Facility: CLINIC | Age: 71
Discharge: HOME | End: 2021-02-02
Attending: PATHOLOGY | Admitting: PATHOLOGY
Payer: COMMERCIAL

## 2021-02-02 VITALS
RESPIRATION RATE: 24 BRPM | OXYGEN SATURATION: 93 % | SYSTOLIC BLOOD PRESSURE: 122 MMHG | HEART RATE: 55 BPM | DIASTOLIC BLOOD PRESSURE: 70 MMHG | HEIGHT: 65 IN | BODY MASS INDEX: 21.66 KG/M2 | WEIGHT: 130 LBS | TEMPERATURE: 97.3 F

## 2021-02-02 DIAGNOSIS — D72.820 LYMPHOCYTOSIS: Primary | ICD-10-CM

## 2021-02-02 LAB
BASOPHILS # BLD AUTO: 0 10E9/L (ref 0–0.2)
BASOPHILS NFR BLD AUTO: 0 %
DIFFERENTIAL METHOD BLD: ABNORMAL
EOSINOPHIL # BLD AUTO: 0.1 10E9/L (ref 0–0.7)
EOSINOPHIL NFR BLD AUTO: 1 %
ERYTHROCYTE [DISTWIDTH] IN BLOOD BY AUTOMATED COUNT: 13.2 % (ref 10–15)
HCT VFR BLD AUTO: 47.2 % (ref 35–47)
HGB BLD-MCNC: 16 G/DL (ref 11.7–15.7)
LYMPHOCYTES # BLD AUTO: 9.8 10E9/L (ref 0.8–5.3)
LYMPHOCYTES NFR BLD AUTO: 74 %
MCH RBC QN AUTO: 32.1 PG (ref 26.5–33)
MCHC RBC AUTO-ENTMCNC: 33.9 G/DL (ref 31.5–36.5)
MCV RBC AUTO: 95 FL (ref 78–100)
MONOCYTES # BLD AUTO: 0.1 10E9/L (ref 0–1.3)
MONOCYTES NFR BLD AUTO: 1 %
NEUTROPHILS # BLD AUTO: 3.2 10E9/L (ref 1.6–8.3)
NEUTROPHILS NFR BLD AUTO: 24 %
PLATELET # BLD AUTO: 190 10E9/L (ref 150–450)
PLATELET # BLD EST: ABNORMAL 10*3/UL
RBC # BLD AUTO: 4.99 10E12/L (ref 3.8–5.2)
RBC MORPH BLD: ABNORMAL
RETICS # AUTO: 77.8 10E9/L (ref 25–95)
RETICS/RBC NFR AUTO: 1.6 % (ref 0.5–2)
SMUDGE CELLS BLD QL SMEAR: PRESENT
WBC # BLD AUTO: 13.2 10E9/L (ref 4–11)

## 2021-02-02 PROCEDURE — 88185 FLOWCYTOMETRY/TC ADD-ON: CPT | Mod: TC | Performed by: PATHOLOGY

## 2021-02-02 PROCEDURE — 88275 CYTOGENETICS 100-300: CPT | Performed by: PATHOLOGY

## 2021-02-02 PROCEDURE — 88305 TISSUE EXAM BY PATHOLOGIST: CPT | Mod: 26 | Performed by: PATHOLOGY

## 2021-02-02 PROCEDURE — 999N001102 HC STATISTIC DNA PROCESS AND HOLD: Performed by: PATHOLOGY

## 2021-02-02 PROCEDURE — 88342 IMHCHEM/IMCYTCHM 1ST ANTB: CPT | Mod: 26 | Performed by: PATHOLOGY

## 2021-02-02 PROCEDURE — 88280 CHROMOSOME KARYOTYPE STUDY: CPT | Performed by: PATHOLOGY

## 2021-02-02 PROCEDURE — 250N000009 HC RX 250: Performed by: NURSE ANESTHETIST, CERTIFIED REGISTERED

## 2021-02-02 PROCEDURE — 999N001136 HC STATISTIC FLOW INT 9-15 ABY TC 88188: Performed by: PATHOLOGY

## 2021-02-02 PROCEDURE — 999N001002 HC STATISTIC ADDL BM ASP PERF PF 38220: Performed by: INTERNAL MEDICINE

## 2021-02-02 PROCEDURE — 38221 DX BONE MARROW BIOPSIES: CPT | Performed by: PATHOLOGY

## 2021-02-02 PROCEDURE — 88313 SPECIAL STAINS GROUP 2: CPT | Mod: 26 | Performed by: PATHOLOGY

## 2021-02-02 PROCEDURE — 36415 COLL VENOUS BLD VENIPUNCTURE: CPT | Performed by: PATHOLOGY

## 2021-02-02 PROCEDURE — 88237 TISSUE CULTURE BONE MARROW: CPT | Performed by: PATHOLOGY

## 2021-02-02 PROCEDURE — 88184 FLOWCYTOMETRY/ TC 1 MARKER: CPT | Mod: TC | Performed by: PATHOLOGY

## 2021-02-02 PROCEDURE — 85045 AUTOMATED RETICULOCYTE COUNT: CPT | Performed by: PATHOLOGY

## 2021-02-02 PROCEDURE — 88313 SPECIAL STAINS GROUP 2: CPT | Mod: TC | Performed by: INTERNAL MEDICINE

## 2021-02-02 PROCEDURE — 88311 DECALCIFY TISSUE: CPT | Mod: 26 | Performed by: PATHOLOGY

## 2021-02-02 PROCEDURE — 88264 CHROMOSOME ANALYSIS 20-25: CPT | Performed by: PATHOLOGY

## 2021-02-02 PROCEDURE — 85025 COMPLETE CBC W/AUTO DIFF WBC: CPT | Performed by: PATHOLOGY

## 2021-02-02 PROCEDURE — 88341 IMHCHEM/IMCYTCHM EA ADD ANTB: CPT | Mod: 26 | Performed by: PATHOLOGY

## 2021-02-02 PROCEDURE — 88311 DECALCIFY TISSUE: CPT | Mod: TC | Performed by: INTERNAL MEDICINE

## 2021-02-02 PROCEDURE — 88188 FLOWCYTOMETRY/READ 9-15: CPT | Performed by: PATHOLOGY

## 2021-02-02 PROCEDURE — 999N000010 HC STATISTIC ANES STAT CODE-CRNA PER MINUTE: Performed by: PATHOLOGY

## 2021-02-02 PROCEDURE — 88341 IMHCHEM/IMCYTCHM EA ADD ANTB: CPT | Mod: TC | Performed by: INTERNAL MEDICINE

## 2021-02-02 PROCEDURE — 250N000009 HC RX 250: Performed by: PATHOLOGY

## 2021-02-02 PROCEDURE — 88342 IMHCHEM/IMCYTCHM 1ST ANTB: CPT | Mod: TC,XU | Performed by: INTERNAL MEDICINE

## 2021-02-02 PROCEDURE — 88271 CYTOGENETICS DNA PROBE: CPT | Performed by: PATHOLOGY

## 2021-02-02 PROCEDURE — 88305 TISSUE EXAM BY PATHOLOGIST: CPT | Mod: TC | Performed by: INTERNAL MEDICINE

## 2021-02-02 PROCEDURE — 85097 BONE MARROW INTERPRETATION: CPT | Performed by: PATHOLOGY

## 2021-02-02 PROCEDURE — 38222 DX BONE MARROW BX & ASPIR: CPT | Performed by: PATHOLOGY

## 2021-02-02 PROCEDURE — 999N001124 HC STATISTIC BONE MARROW ASP TC 85097: Performed by: INTERNAL MEDICINE

## 2021-02-02 PROCEDURE — 999N001068 HC STATISTIC BONE MARROW CORE PERF TC 38221: Performed by: INTERNAL MEDICINE

## 2021-02-02 PROCEDURE — 999N001109 HC STATISTIC MORPHOLOGY W/INTERP HISTOLOGY TC 85060: Performed by: INTERNAL MEDICINE

## 2021-02-02 PROCEDURE — 250N000011 HC RX IP 250 OP 636: Performed by: NURSE ANESTHETIST, CERTIFIED REGISTERED

## 2021-02-02 PROCEDURE — 258N000003 HC RX IP 258 OP 636: Performed by: NURSE ANESTHETIST, CERTIFIED REGISTERED

## 2021-02-02 PROCEDURE — 370N000017 HC ANESTHESIA TECHNICAL FEE, PER MIN: Performed by: PATHOLOGY

## 2021-02-02 PROCEDURE — 999N001010 HC STATISTIC BONE MARROW ASP PERF TC 38220: Performed by: INTERNAL MEDICINE

## 2021-02-02 RX ORDER — NALOXONE HYDROCHLORIDE 0.4 MG/ML
0.2 INJECTION, SOLUTION INTRAMUSCULAR; INTRAVENOUS; SUBCUTANEOUS
Status: DISCONTINUED | OUTPATIENT
Start: 2021-02-02 | End: 2021-02-02 | Stop reason: HOSPADM

## 2021-02-02 RX ORDER — NALOXONE HYDROCHLORIDE 0.4 MG/ML
0.4 INJECTION, SOLUTION INTRAMUSCULAR; INTRAVENOUS; SUBCUTANEOUS
Status: DISCONTINUED | OUTPATIENT
Start: 2021-02-02 | End: 2021-02-02 | Stop reason: HOSPADM

## 2021-02-02 RX ORDER — SODIUM CHLORIDE, SODIUM LACTATE, POTASSIUM CHLORIDE, CALCIUM CHLORIDE 600; 310; 30; 20 MG/100ML; MG/100ML; MG/100ML; MG/100ML
INJECTION, SOLUTION INTRAVENOUS CONTINUOUS PRN
Status: DISCONTINUED | OUTPATIENT
Start: 2021-02-02 | End: 2021-02-02

## 2021-02-02 RX ORDER — ASPIRIN 81 MG/1
162 TABLET, CHEWABLE ORAL DAILY
Status: ON HOLD | COMMUNITY
Start: 2020-03-22 | End: 2022-11-18

## 2021-02-02 RX ORDER — ASCORBIC ACID 1000 MG
10 TABLET ORAL
Status: ON HOLD | COMMUNITY
Start: 2020-03-22 | End: 2021-02-02

## 2021-02-02 RX ORDER — CHLORTHALIDONE 25 MG/1
25 TABLET ORAL
Status: ON HOLD | COMMUNITY
Start: 2020-03-22 | End: 2021-02-02

## 2021-02-02 RX ORDER — ONDANSETRON 2 MG/ML
INJECTION INTRAMUSCULAR; INTRAVENOUS PRN
Status: DISCONTINUED | OUTPATIENT
Start: 2021-02-02 | End: 2021-02-02

## 2021-02-02 RX ORDER — LIDOCAINE HYDROCHLORIDE 10 MG/ML
8-10 INJECTION, SOLUTION EPIDURAL; INFILTRATION; INTRACAUDAL; PERINEURAL
Status: DISCONTINUED | OUTPATIENT
Start: 2021-02-02 | End: 2021-02-02 | Stop reason: HOSPADM

## 2021-02-02 RX ORDER — FLUMAZENIL 0.1 MG/ML
0.2 INJECTION, SOLUTION INTRAVENOUS
Status: DISCONTINUED | OUTPATIENT
Start: 2021-02-02 | End: 2021-02-02 | Stop reason: HOSPADM

## 2021-02-02 RX ORDER — ATORVASTATIN CALCIUM 40 MG/1
40 TABLET, FILM COATED ORAL
Status: ON HOLD | COMMUNITY
Start: 2020-03-22 | End: 2021-02-02

## 2021-02-02 RX ORDER — PROPOFOL 10 MG/ML
INJECTION, EMULSION INTRAVENOUS PRN
Status: DISCONTINUED | OUTPATIENT
Start: 2021-02-02 | End: 2021-02-02

## 2021-02-02 RX ORDER — LIDOCAINE HYDROCHLORIDE 20 MG/ML
INJECTION, SOLUTION INFILTRATION; PERINEURAL PRN
Status: DISCONTINUED | OUTPATIENT
Start: 2021-02-02 | End: 2021-02-02

## 2021-02-02 RX ORDER — PROPOFOL 10 MG/ML
INJECTION, EMULSION INTRAVENOUS CONTINUOUS PRN
Status: DISCONTINUED | OUTPATIENT
Start: 2021-02-02 | End: 2021-02-02

## 2021-02-02 RX ADMIN — LIDOCAINE HYDROCHLORIDE 40 MG: 20 INJECTION, SOLUTION INFILTRATION; PERINEURAL at 12:30

## 2021-02-02 RX ADMIN — PROPOFOL 150 MCG/KG/MIN: 10 INJECTION, EMULSION INTRAVENOUS at 12:30

## 2021-02-02 RX ADMIN — SODIUM CHLORIDE, POTASSIUM CHLORIDE, SODIUM LACTATE AND CALCIUM CHLORIDE: 600; 310; 30; 20 INJECTION, SOLUTION INTRAVENOUS at 12:27

## 2021-02-02 RX ADMIN — PROPOFOL 30 MG: 10 INJECTION, EMULSION INTRAVENOUS at 12:32

## 2021-02-02 RX ADMIN — ONDANSETRON 4 MG: 2 INJECTION INTRAMUSCULAR; INTRAVENOUS at 12:30

## 2021-02-02 RX ADMIN — PROPOFOL 10 MG: 10 INJECTION, EMULSION INTRAVENOUS at 12:39

## 2021-02-02 RX ADMIN — DEXMEDETOMIDINE HYDROCHLORIDE 8 MCG: 100 INJECTION, SOLUTION INTRAVENOUS at 12:27

## 2021-02-02 ASSESSMENT — MIFFLIN-ST. JEOR: SCORE: 1115.56

## 2021-02-02 ASSESSMENT — ENCOUNTER SYMPTOMS: SEIZURES: 0

## 2021-02-02 ASSESSMENT — LIFESTYLE VARIABLES: TOBACCO_USE: 0

## 2021-02-02 NOTE — ANESTHESIA CARE TRANSFER NOTE
Patient: Karen Caban    Procedure(s):  bone marrow biopsy    Diagnosis: Lymphocytosis [D72.820]  Diagnosis Additional Information: No value filed.    Anesthesia Type:   MAC     Note:    Oropharynx: oropharynx clear of all foreign objects and spontaneously breathing  Level of Consciousness: awake  Oxygen Supplementation: room air    Independent Airway: airway patency satisfactory and stable  Dentition: dentition unchanged  Vital Signs Stable: post-procedure vital signs reviewed and stable  Report to RN Given: handoff report given  Patient transferred to: PACU    Handoff Report: Identifed the Patient, Identified the Reponsible Provider, Reviewed the pertinent medical history, Discussed the surgical course, Reviewed Intra-OP anesthesia mangement and issues during anesthesia, Set expectations for post-procedure period and Allowed opportunity for questions and acknowledgement of understanding      Vitals: (Last set prior to Anesthesia Care Transfer)  CRNA VITALS  2/2/2021 1224 - 2/2/2021 1257      2/2/2021             Resp Rate (set):  10        Electronically Signed By: KWESI Jeong CRNA  February 2, 2021  12:57 PM

## 2021-02-02 NOTE — ANESTHESIA PREPROCEDURE EVALUATION
Anesthesia Pre-Procedure Evaluation    Patient: Karen Caban   MRN: [id number] : 1950        Preoperative Diagnosis: Lymphocytosis [D72.820]   Procedure : Procedure(s):  bone marrow biopsy     Past Medical History:   Diagnosis Date     Ankle fracture     L     Anxiety      Chronic back pain      Colon polyp     repeat colonoscopy 5 years.     Fx low femur epiphy-closed (H)      GERD (gastroesophageal reflux disease)      History of UTI     Cysto by Dr Agustin neg     HTN (hypertension), benign      Hyperlipidemia LDL goal < 100      Normal nuclear stress test     EF 67%     Osteopenia      PAD (peripheral artery disease) (H)     s/p fem pop bypass; embolectomy     PUD (peptic ulcer disease)     DU     Smoker      Vitamin D deficiency       Past Surgical History:   Procedure Laterality Date     Blood clot removal from Stent           BYPASS GRAFT AORTOFEMORAL      Dr. Turner     BYPASS GRAFT FEMOROPOPLITEAL  2011     COLONOSCOPY N/A 2018    Procedure: COMBINED COLONOSCOPY, SINGLE OR MULTIPLE BIOPSY/POLYPECTOMY BY BIOPSY;  COLONOSCOPY;  Surgeon: Efrain Hernadez MD;  Location:  GI     EMBOLECTOMY LOWER EXTREMITY  2011    Procedure:EMBOLECTOMY LOWER EXTREMITY; EMBOLECTOMY, RIGHT POPLITEAL WITH PATCH ANGIOPLASTY. EXCISION SKIN LESION RIGHT LEG. ; Surgeon:PARMINDER VELAZCO; Location: OR     EXCISE LESION LOWER EXTREMITY  2011    Procedure:EXCISE LESION LOWER EXTREMITY; Surgeon:PARMINDER VELAZCO; Location: OR     HERNIA REPAIR  11/4/08    x2     L achilles repair  08     LAPAROSCOPIC OOPHORECTOMY      L for cyst     miscarriages x 3       tubal ligation and reversal        Allergies   Allergen Reactions     Chantix [Varenicline] Nausea     Ciprofloxacin Other (See Comments)     hypertension     Clopidogrel      Other reaction(s): Hypertension     Decongestant [Cvs]      Erythromycin Nausea     Lisinopril      cough     Plavix  [Clopidogrel Bisulfate]      Felt as if she was having a heart attack     Rofecoxib Unknown     Vioxx       Social History     Tobacco Use     Smoking status: Current Every Day Smoker     Packs/day: 1.00     Years: 50.00     Pack years: 50.00     Types: Cigarettes     Smokeless tobacco: Never Used     Tobacco comment: Nicorette gum and patch, trying to quit 2021   Substance Use Topics     Alcohol use: Yes     Alcohol/week: 4.0 - 5.0 standard drinks     Frequency: 2-3 times a week     Drinks per session: 1 or 2     Binge frequency: Never     Comment: Beer once in a while      Wt Readings from Last 1 Encounters:   02/02/21 59 kg (130 lb)        Anesthesia Evaluation   Pt has had prior anesthetic.     No history of anesthetic complications       ROS/MED HX  ENT/Pulmonary:    (-) tobacco use, asthma and sleep apnea   Neurologic:    (-) no seizures and no CVA   Cardiovascular:     (+) hypertension-----    METS/Exercise Tolerance:     Hematologic:       Musculoskeletal:       GI/Hepatic:     (+) GERD,     Renal/Genitourinary:       Endo:    (-) Type II DM   Psychiatric/Substance Use:       Infectious Disease:       Malignancy:       Other:            Physical Exam    Airway        Mallampati: II       Respiratory Devices and Support         Dental  no notable dental history         Cardiovascular   cardiovascular exam normal          Pulmonary           breath sounds clear to auscultation           OUTSIDE LABS:  CBC:   Lab Results   Component Value Date    WBC 13.2 (H) 02/02/2021    WBC 18.7 (H) 01/06/2021    HGB 16.0 (H) 02/02/2021    HGB 16.5 (H) 01/06/2021    HCT 47.2 (H) 02/02/2021    HCT 47.9 (H) 01/06/2021     02/02/2021     01/06/2021     BMP:   Lab Results   Component Value Date     12/17/2020     12/11/2019    POTASSIUM 3.3 (L) 12/17/2020    POTASSIUM 3.4 12/11/2019    CHLORIDE 103 12/17/2020    CHLORIDE 101 12/11/2019    CO2 31 12/17/2020    CO2 30 12/11/2019    BUN 11 12/17/2020     BUN 10 12/11/2019    CR 0.70 12/17/2020    CR 0.64 12/11/2019    GLC 94 12/17/2020    GLC 93 12/11/2019     COAGS:   Lab Results   Component Value Date    PTT 30 12/10/2012    INR 0.99 12/10/2012     POC:   Lab Results   Component Value Date    BGM 82 12/17/2011     HEPATIC:   Lab Results   Component Value Date    ALBUMIN 3.8 12/17/2020    PROTTOTAL 6.7 (L) 12/17/2020    ALT 32 12/17/2020    AST 23 12/17/2020    ALKPHOS 74 12/17/2020    BILITOTAL 0.6 12/17/2020     OTHER:   Lab Results   Component Value Date    A1C 5.2 12/16/2011    CHANDAN 8.6 12/17/2020    PHOS 4.2 10/23/2012    TSH 1.38 12/08/2017       Anesthesia Plan     History & Physical Review      ASA Status:  2.   Plan for MAC with Intravenous induction.         PONV prophylaxis:  Ondansetron (or other 5HT-3) and Dexamethasone or Solumedrol.       Consents  Anesthesia Plan(s) and associated risks, benefits, and realistic alternatives discussed.    Questions answered and patient/representative(s) expressed understanding.    Discussed with:  Patient.       Extended Intubation/Ventilatory Support Discussed No No     Use of blood products discussed: No.          Postoperative Care  Postoperative pain management: IV analgesics and Oral pain medications.           Anjelica Bruce

## 2021-02-02 NOTE — ANESTHESIA POSTPROCEDURE EVALUATION
Patient: Karen Caban    Procedure(s):  bone marrow biopsy    Diagnosis:Lymphocytosis [D72.820]  Diagnosis Additional Information: No value filed.    Anesthesia Type:  MAC    Note:  Disposition: Outpatient   Postop Pain Control: Uneventful            Sign Out: Well controlled pain   PONV: No   Neuro/Psych: Uneventful            Sign Out: Acceptable/Baseline neuro status   Airway/Respiratory: Uneventful            Sign Out: Acceptable/Baseline resp. status   CV/Hemodynamics: Uneventful            Sign Out: Acceptable CV status   Other NRE: NONE   DID A NON-ROUTINE EVENT OCCUR? No         Last vitals:  Vitals:    02/02/21 1258 02/02/21 1300 02/02/21 1301   BP:  122/70    Pulse:   55   Resp: 18  24   Temp:      SpO2:   93%       Electronically Signed By: Anjelica Bruce  February 2, 2021  1:12 PM

## 2021-02-02 NOTE — PROCEDURES
The patient was positively identified and informed consent was obtained (see the completed Affirmation of Consent for Bone Marrow Aspiration and/or Biopsy Procedure(s) form in the patient's chart). The patient was placed in the prone position and the bony landmarks of the pelvis were identified. Medical staff reconfirmed the patient's name, date of birth and procedure. The skin over the posterior iliac crest was scrubbed and draped in a sterile fashion. The local area of the procedure was anesthetized with a total of 10 mL of 1% Lidocaine and a small incision was made.  The patient did receive conscious sedation.    Trephine bone marrow core(s) was/were obtained from the left posterior iliac crest. Bone marrow aspirate was obtained from the left posterior iliac crest for: morphology with immunophenotyping, cytogenetics, and molecular diagnostics (process and hold).    Direct pressure was applied to the biopsy site with sterile gauze. The biopsy site was cleaned with alcohol and a sterile dressing was placed over the biopsy incision using a pressure bandage. The patient was then placed in the supine position to maintain pressure on the biopsy site. Post-procedure wound care instructions, including routine dressing instructions and analgesia, were given to the patient. The procedure was completed without complication.

## 2021-02-03 ENCOUNTER — HOSPITAL ENCOUNTER (OUTPATIENT)
Dept: LAB | Facility: CLINIC | Age: 71
End: 2021-02-03
Attending: INTERNAL MEDICINE
Payer: COMMERCIAL

## 2021-02-03 ENCOUNTER — HOSPITAL ENCOUNTER (OUTPATIENT)
Dept: CT IMAGING | Facility: CLINIC | Age: 71
End: 2021-02-03
Attending: INTERNAL MEDICINE
Payer: COMMERCIAL

## 2021-02-03 DIAGNOSIS — C91.10 CLL (CHRONIC LYMPHOCYTIC LEUKEMIA) (H): ICD-10-CM

## 2021-02-03 DIAGNOSIS — Z11.59 ENCOUNTER FOR SCREENING FOR OTHER VIRAL DISEASES: ICD-10-CM

## 2021-02-03 LAB
ALBUMIN SERPL-MCNC: 3.8 G/DL (ref 3.4–5)
ALP SERPL-CCNC: 75 U/L (ref 40–150)
ALT SERPL W P-5'-P-CCNC: 29 U/L (ref 0–50)
ANION GAP SERPL CALCULATED.3IONS-SCNC: 4 MMOL/L (ref 3–14)
AST SERPL W P-5'-P-CCNC: 22 U/L (ref 0–45)
BASOPHILS # BLD AUTO: 0 10E9/L (ref 0–0.2)
BASOPHILS NFR BLD AUTO: 0 %
BILIRUB SERPL-MCNC: 0.8 MG/DL (ref 0.2–1.3)
BUN SERPL-MCNC: 10 MG/DL (ref 7–30)
CALCIUM SERPL-MCNC: 9 MG/DL (ref 8.5–10.1)
CHLORIDE SERPL-SCNC: 100 MMOL/L (ref 94–109)
CO2 SERPL-SCNC: 33 MMOL/L (ref 20–32)
COPATH REPORT: NORMAL
CREAT BLD-MCNC: 0.8 MG/DL (ref 0.52–1.04)
CREAT SERPL-MCNC: 0.71 MG/DL (ref 0.52–1.04)
DAT IGG-SP REAG RBC-IMP: NORMAL
DIFFERENTIAL METHOD BLD: ABNORMAL
EOSINOPHIL # BLD AUTO: 0 10E9/L (ref 0–0.7)
EOSINOPHIL NFR BLD AUTO: 0 %
ERYTHROCYTE [DISTWIDTH] IN BLOOD BY AUTOMATED COUNT: 13.3 % (ref 10–15)
FERRITIN SERPL-MCNC: 11 NG/ML (ref 8–252)
GFR SERPL CREATININE-BSD FRML MDRD: 71 ML/MIN/{1.73_M2}
GFR SERPL CREATININE-BSD FRML MDRD: 87 ML/MIN/{1.73_M2}
GLUCOSE SERPL-MCNC: 85 MG/DL (ref 70–99)
HBV CORE AB SERPL QL IA: NONREACTIVE
HBV SURFACE AB SERPL IA-ACNC: 0 M[IU]/ML
HBV SURFACE AG SERPL QL IA: NONREACTIVE
HCT VFR BLD AUTO: 47.8 % (ref 35–47)
HGB BLD-MCNC: 16.3 G/DL (ref 11.7–15.7)
IRON SATN MFR SERPL: 29 % (ref 15–46)
IRON SERPL-MCNC: 121 UG/DL (ref 35–180)
LDH SERPL L TO P-CCNC: 208 U/L (ref 81–234)
LYMPHOCYTES # BLD AUTO: 8.8 10E9/L (ref 0.8–5.3)
LYMPHOCYTES NFR BLD AUTO: 55 %
MCH RBC QN AUTO: 32.7 PG (ref 26.5–33)
MCHC RBC AUTO-ENTMCNC: 34.1 G/DL (ref 31.5–36.5)
MCV RBC AUTO: 96 FL (ref 78–100)
MONOCYTES # BLD AUTO: 1 10E9/L (ref 0–1.3)
MONOCYTES NFR BLD AUTO: 6 %
NEUTROPHILS # BLD AUTO: 6.2 10E9/L (ref 1.6–8.3)
NEUTROPHILS NFR BLD AUTO: 39 %
PLATELET # BLD AUTO: 209 10E9/L (ref 150–450)
PLATELET # BLD EST: ABNORMAL 10*3/UL
POTASSIUM SERPL-SCNC: 2.8 MMOL/L (ref 3.4–5.3)
PROT SERPL-MCNC: 6.8 G/DL (ref 6.8–8.8)
RBC # BLD AUTO: 4.98 10E12/L (ref 3.8–5.2)
RBC MORPH BLD: NORMAL
RETICS # AUTO: 75.7 10E9/L (ref 25–95)
RETICS/RBC NFR AUTO: 1.5 % (ref 0.5–2)
SMUDGE CELLS BLD QL SMEAR: PRESENT
SODIUM SERPL-SCNC: 137 MMOL/L (ref 133–144)
TIBC SERPL-MCNC: 417 UG/DL (ref 240–430)
URATE SERPL-MCNC: 4.1 MG/DL (ref 2.6–6)
WBC # BLD AUTO: 16 10E9/L (ref 4–11)

## 2021-02-03 PROCEDURE — 250N000009 HC RX 250: Performed by: INTERNAL MEDICINE

## 2021-02-03 PROCEDURE — 82784 ASSAY IGA/IGD/IGG/IGM EACH: CPT | Performed by: INTERNAL MEDICINE

## 2021-02-03 PROCEDURE — 86706 HEP B SURFACE ANTIBODY: CPT | Performed by: INTERNAL MEDICINE

## 2021-02-03 PROCEDURE — 80053 COMPREHEN METABOLIC PANEL: CPT | Performed by: INTERNAL MEDICINE

## 2021-02-03 PROCEDURE — 83010 ASSAY OF HAPTOGLOBIN QUANT: CPT | Performed by: INTERNAL MEDICINE

## 2021-02-03 PROCEDURE — 82565 ASSAY OF CREATININE: CPT

## 2021-02-03 PROCEDURE — 86704 HEP B CORE ANTIBODY TOTAL: CPT | Mod: GZ | Performed by: INTERNAL MEDICINE

## 2021-02-03 PROCEDURE — 87340 HEPATITIS B SURFACE AG IA: CPT | Mod: GZ | Performed by: INTERNAL MEDICINE

## 2021-02-03 PROCEDURE — 83540 ASSAY OF IRON: CPT | Performed by: INTERNAL MEDICINE

## 2021-02-03 PROCEDURE — 250N000011 HC RX IP 250 OP 636: Performed by: INTERNAL MEDICINE

## 2021-02-03 PROCEDURE — 71260 CT THORAX DX C+: CPT

## 2021-02-03 PROCEDURE — 82728 ASSAY OF FERRITIN: CPT | Performed by: INTERNAL MEDICINE

## 2021-02-03 PROCEDURE — 36415 COLL VENOUS BLD VENIPUNCTURE: CPT | Performed by: INTERNAL MEDICINE

## 2021-02-03 PROCEDURE — 83550 IRON BINDING TEST: CPT | Performed by: INTERNAL MEDICINE

## 2021-02-03 PROCEDURE — 86880 COOMBS TEST DIRECT: CPT | Performed by: INTERNAL MEDICINE

## 2021-02-03 PROCEDURE — 85025 COMPLETE CBC W/AUTO DIFF WBC: CPT | Performed by: INTERNAL MEDICINE

## 2021-02-03 PROCEDURE — 83615 LACTATE (LD) (LDH) ENZYME: CPT | Performed by: INTERNAL MEDICINE

## 2021-02-03 PROCEDURE — 85045 AUTOMATED RETICULOCYTE COUNT: CPT | Performed by: INTERNAL MEDICINE

## 2021-02-03 PROCEDURE — 84550 ASSAY OF BLOOD/URIC ACID: CPT | Performed by: INTERNAL MEDICINE

## 2021-02-03 RX ORDER — IOPAMIDOL 755 MG/ML
64 INJECTION, SOLUTION INTRAVASCULAR ONCE
Status: COMPLETED | OUTPATIENT
Start: 2021-02-03 | End: 2021-02-03

## 2021-02-03 RX ADMIN — IOPAMIDOL 64 ML: 755 INJECTION, SOLUTION INTRAVENOUS at 11:20

## 2021-02-03 RX ADMIN — SODIUM CHLORIDE 58 ML: 9 INJECTION, SOLUTION INTRAVENOUS at 11:20

## 2021-02-04 LAB
HAPTOGLOB SERPL-MCNC: 116 MG/DL (ref 32–197)
IGA SERPL-MCNC: 119 MG/DL (ref 84–499)
IGG SERPL-MCNC: 675 MG/DL (ref 610–1616)
IGM SERPL-MCNC: 50 MG/DL (ref 35–242)

## 2021-02-17 LAB — COPATH REPORT: NORMAL

## 2021-02-19 ENCOUNTER — VIRTUAL VISIT (OUTPATIENT)
Dept: ONCOLOGY | Facility: CLINIC | Age: 71
End: 2021-02-19
Attending: INTERNAL MEDICINE
Payer: COMMERCIAL

## 2021-02-19 DIAGNOSIS — C91.10 CLL (CHRONIC LYMPHOCYTIC LEUKEMIA) (H): Primary | ICD-10-CM

## 2021-02-19 PROCEDURE — G0463 HOSPITAL OUTPT CLINIC VISIT: HCPCS

## 2021-02-19 PROCEDURE — 99214 OFFICE O/P EST MOD 30 MIN: CPT | Mod: 95 | Performed by: INTERNAL MEDICINE

## 2021-02-19 ASSESSMENT — PAIN SCALES - GENERAL: PAINLEVEL: NO PAIN (0)

## 2021-02-19 NOTE — LETTER
2/19/2021         RE: Karen Caban  [address]  [facility] [address]        Dear Colleague,    Thank you for referring your patient, Karen Caban, to the [facility] [place]. Please see a copy of my visit note below.    Karen is a 70 year old who is being evaluated via a billable telephone visit.      What phone number would you like to be contacted at? [phone]  How would you like to obtain your AVS? Guillermo       [facility]    Hematology/Oncology Established Patient Note      Today's Date: 02/19/21    Reason for Follow-up: CLL      HISTORY OF PRESENT ILLNESS: Karen Caban is a 70 year old female with PMHx of PAD s/p fem pop bypass, chronic back pain, HTN, smoking history, who presents with lymphocytosis.   She saw her PCP in December 2020 for a sinus infection.  She took antibiotics, and the infection has resolved.  She did not take steroids.  On lab evaluation, she was found to have elevated WBC of 16.  Repeat CBC in January 2021 showed WBC of 18.7, with ALC of 13.8.  There was note of smudge cells and atypical lymphocytes.  Peripheral flow showed CD5-positive kappa monotypic B-cells, favoring CLL.      She doesn't get frequent infections.  She denies fever/chills, night sweats, unintentional weight loss, lymphadenopathy.        INTERIM HISTORY: Karen says that she is doing well.  She did fine with the bone marrow biopsy.      REVIEW OF SYSTEMS:   14 point ROS was reviewed and is negative other than as noted above in HPI.       HOME MEDICATIONS:  Current Outpatient Medications   Medication Sig Dispense Refill     albuterol (PROAIR RESPICLICK) 108 (90 Base) MCG/ACT inhaler Inhale 1-2 puffs into the lungs every 4 hours as needed 1 each 3     aspirin (ASA) 81 MG chewable tablet Take 81 mg by mouth       atorvastatin (LIPITOR) 40 MG tablet Take 1 tablet (40 mg) by mouth daily 90 tablet 3     chlorthalidone (HYGROTON) 25 MG tablet Take  1 tablet (25 mg) by mouth daily 90 tablet 3     Cholecalciferol (VITAMIN D-3) 5000 UNIT TABS Take 1 tablet by mouth daily.       clonazePAM (KLONOPIN) 0.5 MG ODT DISSOLVE 1 TABLET(0.5 MG) ON THE TONGUE TWICE DAILY AS NEEDED FOR ANXIETY 60 tablet 0     coenzyme Q-10 200 MG CAPS        Cyanocobalamin (B-12) 1000 MCG TBCR Take 1,000 mcg by mouth daily 100 tablet 1     estradiol (ESTRACE VAGINAL) 0.1 MG/GM vaginal cream INSERT 1 GRAM VAGINALLY TWICE TO THREE TIMES PER WEEK 42.5 g 3     Lactobacillus (PROBIOTIC ACIDOPHILUS PO)        metoprolol succinate ER (TOPROL-XL) 100 MG 24 hr tablet Take 1.5 tablets (150 mg) by mouth daily 135 tablet 1     omeprazole (PRILOSEC) 40 MG DR capsule TAKE 1 CAPSULE BY MOUTH EVERY DAY 30 TO 60 MINUTES BEFORE A MEAL 90 capsule 3     vitamin D3 (CHOLECALCIFEROL) 125 MCG (5000 UT) tablet Take 5,000 Units by mouth       amoxicillin-clavulanate (AUGMENTIN) 875-125 MG tablet Take 1 tablet by mouth 2 times daily (Patient not taking: Reported on 2/19/2021) 20 tablet 0         ALLERGIES:  Allergies   Allergen Reactions     Chantix [Varenicline] Nausea     Ciprofloxacin Other (See Comments)     hypertension     Clopidogrel      Other reaction(s): Hypertension     Decongestant [Cvs]      Erythromycin Nausea     Lisinopril      cough     Plavix [Clopidogrel Bisulfate]      Felt as if she was having a heart attack     Rofecoxib Unknown     Vioxx          PAST MEDICAL HISTORY:  Past Medical History:   Diagnosis Date     Ankle fracture 2009    L     Anxiety      Chronic back pain      Colon polyp 2012    repeat colonoscopy 5 years.     Fx low femur epiphy-closed (H)      GERD (gastroesophageal reflux disease)      History of UTI 2017    Cysto by Dr Agustin neg     HTN (hypertension), benign      Hyperlipidemia LDL goal < 100      Normal nuclear stress test 12/09    EF 67%     Osteopenia      PAD (peripheral artery disease) (H)     s/p fem pop bypass; embolectomy     PUD (peptic ulcer disease) 1980s    TYRA      Smoker      Vitamin D deficiency          PAST SURGICAL HISTORY:  Past Surgical History:   Procedure Laterality Date     Blood clot removal from Stent      2011     BONE MARROW BIOPSY, BONE SPECIMEN, NEEDLE/TROCAR N/A 2/2/2021    Procedure: bone marrow biopsy;  Surgeon: Kailey Cota MD;  Location:  GI     BYPASS GRAFT AORTOFEMORAL  5/02    Dr. Turner     BYPASS GRAFT FEMOROPOPLITEAL  12/16/2011     COLONOSCOPY N/A 1/18/2018    Procedure: COMBINED COLONOSCOPY, SINGLE OR MULTIPLE BIOPSY/POLYPECTOMY BY BIOPSY;  COLONOSCOPY;  Surgeon: Efrain Hernadez MD;  Location:  GI     EMBOLECTOMY LOWER EXTREMITY  12/16/2011    Procedure:EMBOLECTOMY LOWER EXTREMITY; EMBOLECTOMY, RIGHT POPLITEAL WITH PATCH ANGIOPLASTY. EXCISION SKIN LESION RIGHT LEG. ; Surgeon:PARMINDER VELAZCO; Location: OR     EXCISE LESION LOWER EXTREMITY  12/16/2011    Procedure:EXCISE LESION LOWER EXTREMITY; Surgeon:PARMINDER VELAZCO; Location: OR     HERNIA REPAIR  11/4/08    x2     L achilles repair  12/4/08     LAPAROSCOPIC OOPHORECTOMY      L for cyst     miscarriages x 3       tubal ligation and reversal           SOCIAL HISTORY:  Social History     Socioeconomic History     Marital status:      Spouse name: Not on file     Number of children: Not on file     Years of education: Not on file     Highest education level: Not on file   Occupational History     Not on file   Social Needs     Financial resource strain: Not on file     Food insecurity     Worry: Not on file     Inability: Not on file     Transportation needs     Medical: Not on file     Non-medical: Not on file   Tobacco Use     Smoking status: Current Every Day Smoker     Packs/day: 1.00     Years: 50.00     Pack years: 50.00     Types: Cigarettes     Smokeless tobacco: Never Used     Tobacco comment: Nicorette gum and patch, trying to quit 2021   Substance and Sexual Activity     Alcohol use: Yes     Alcohol/week: 4.0 - 5.0 standard drinks      Frequency: 2-3 times a week     Drinks per session: 1 or 2     Binge frequency: Never     Comment: Beer once in a while     Drug use: No     Sexual activity: Not Currently     Partners: Male   Lifestyle     Physical activity     Days per week: Not on file     Minutes per session: Not on file     Stress: Not on file   Relationships     Social connections     Talks on phone: Not on file     Gets together: Not on file     Attends Bahai service: Not on file     Active member of club or organization: Not on file     Attends meetings of clubs or organizations: Not on file     Relationship status: Not on file     Intimate partner violence     Fear of current or ex partner: Not on file     Emotionally abused: Not on file     Physically abused: Not on file     Forced sexual activity: Not on file   Other Topics Concern     Parent/sibling w/ CABG, MI or angioplasty before 65F 55M? 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  Had 3 miscarriages.  No living children.   is Dustin.  Walks 2miles per day.         FAMILY HISTORY:  Family History   Problem Relation Age of Onset     Alzheimer Disease Mother          of panc/GI ca age 83     Osteoporosis Mother         with hip fx     Other Cancer Mother      Cancer Father          age 64 lymphoma     Colon Cancer Maternal Grandfather          PHYSICAL EXAM:  Phone visit.      LABS:  CBC RESULTS:   Recent Labs   Lab Test 21  1028   WBC 16.0*   RBC 4.98   HGB 16.3*   HCT 47.8*   MCV 96   MCH 32.7   MCHC 34.1   RDW 13.3        Recent Labs   Lab Test 21  1028 20  0858    137   POTASSIUM 2.8* 3.3*   CHLORIDE 100 103   CO2 33* 31   ANIONGAP 4 3   GLC 85 94   BUN 10 11   CR 0.71 0.70   CHANDAN 9.0 8.6     Lab Results   Component Value Date    AST 2021     Lab Results   Component Value Date    ALT 2021     Lab Results   Component Value Date    BILICONJ 0.0 2009       Lab Results   Component Value Date    BILITOTAL 0.8 02/03/2021     Lab Results   Component Value Date    ALBUMIN 3.8 02/03/2021     Lab Results   Component Value Date    PROTTOTAL 6.8 02/03/2021      Lab Results   Component Value Date    ALKPHOS 75 02/03/2021     Component      Latest Ref Rng & Units 2/3/2021   IGG      610 - 1,616 mg/dL 675   IGA      84 - 499 mg/dL 119   IGM      35 - 242 mg/dL 50   Iron      35 - 180 ug/dL 121   Iron Binding Cap      240 - 430 ug/dL 417   Iron Saturation Index      15 - 46 % 29   % Retic      0.5 - 2.0 % 1.5   Absolute Retic      25 - 95 10e9/L 75.7   Hepatitis B Surface Antibody      <8.00 m[IU]/mL 0.00   Hep B Surface Agn      NR:Nonreactive Nonreactive   Hepatitis B Core Mayela      NR:Nonreactive Nonreactive   Haptoglobin      32 - 197 mg/dL 116   Uric Acid      2.6 - 6.0 mg/dL 4.1   Lactate Dehydrogenase      81 - 234 U/L 208       PATHOLOGY:  Bone marrow biopsy 2/2/21:  FINAL DIAGNOSIS:   Bone marrow aspirate, biopsy, clot section, and peripheral blood:     - Chronic lymphocytic leukemia (CLL), kappa light chain restricted,   involving approximately 40% of the marrow   cellularity.  See comment.   - Hypercellular bone marrow with trilineage hematopoiesis.   - Cytogenetic studies are pending and results will be reported separately   by the performing laboratory.   - Peripheral blood with mild leukocytosis and absolute lymphocytosis.     COMMENT: These findings support B-cell lymphoma/leukemia morphologically   and immunophenotypically consistent   with chronic lymphocytic leukemia (CLL).  Cytogenetic studies are pending.     The degree of marrow involvement   by the CLL in this case varies by method (manual aspirate differential,   flow cytometry, and   immunohistochemistry on both the core and clot sections) but overall is   estimated to involve approximately 40%   of the marrow cellularity.     FISH:  RESULTS:   ABNORMAL   - Loss of E15E694 (13q14.3) (56%)     NORMAL   - No  losses of MYB (6q), FRANCIS (11q) or TP53 (17p)   - No gain of chromosome 12   - No IGH-CCND1 gene fusion     INTERPRETATION:   These findings are consistent with a diagnosis of chronic lymphocytic   leukemia/small lymphocytic lymphoma.   Loss of 13q14 is a well-documented albeit nonspecific abnormality in   CLL/SLL.     INTERPRETATION:   Bone Marrow, Left:     CD5-positive kappa-monotypic B cells (38%)        See comment     COMMENT:   The immunophenotypic findings are consistent with the patient's recently   diagnosed chronic lymphocytic   leukemia (CLL). Final interpretation requires correlation with results of   other ancillary studies,   morphologic, and clinical features.       IMAGING:  CT c/a/p 2/3/21:  1.  Scattered mildly prominent lymph nodes in the chest, abdomen, and  pelvis. No lymphadenopathy by size criteria. Normal spleen.         ASSESSMENT/PLAN:  Karen Caban is a 70 year old female with:    1) Chronic lymphocytic leukemia: MASSEY stage 0. She has a mild lymphocytosis, no anemia, no thrombocytopenia, no B-symptoms.  CT c/a/p on 2/3/21 was done, and I reviewed the images.  There is no lymphadenopathy, and spleen is normal.  Bone marrow biopsy was done on 2/2/21, which showed CLL, kappa light chain restricted, involving ~40% of the marrow cellularity.  FISH found loss of 13q (favorable).  No loss of 11q or 17p, no gain of chromosome 12.  No IGH-CCND1 fusion.      We discussed the diagnosis and staging.  There is no indication for treatment at this point.  She would be appropriate for watch-and-wait approach.  We discussed things to watch for that might indicate treatment is needed, including worsening cytopenias, worsening splenomegaly, progressive or symptomatic lymphadenopathy, lymphocyte doubling time of <6 months, constitutional symptoms.    Patient expressed understanding, and we will continue to monitor her labs.    -RTC in ~3-4 months with CBC/diff, CMP, LDH.  If she remains stable, can  potentially go out to every 6 months after that.    2) Polycythemia: suspect that it is secondary to her smoking. Stable.    3) Pulmonary nodules: long history of smoking and current smoker  -following with lung nodule clinic    Maddie Rowland MD  Hematology/Oncology  [facility]      Phone call duration: 5 minutes    Total time spent on day of visit, including review of tests, obtaining/reviewing separately obtained history, ordering medications/tests/procedures, communicating with PCP/consultants, and documenting in electronic medical record: 25 minutes        Again, thank you for allowing me to participate in the care of your patient.        Sincerely,        Maddie Rowland MD

## 2021-02-19 NOTE — LETTER
2/19/2021         RE: Karen Caban  [address]  [facility] [address]        Dear Colleague,    Thank you for referring your patient, Karen Caban, to the [facility] [place]. Please see a copy of my visit note below.    Karen is a 70 year old who is being evaluated via a billable telephone visit.      What phone number would you like to be contacted at? [phone]  How would you like to obtain your AVS? Guillermo       [facility]    Hematology/Oncology Established Patient Note      Today's Date: 02/19/21    Reason for Follow-up: CLL      HISTORY OF PRESENT ILLNESS: Karen Caban is a 70 year old female with PMHx of PAD s/p fem pop bypass, chronic back pain, HTN, smoking history, who presents with lymphocytosis.   She saw her PCP in December 2020 for a sinus infection.  She took antibiotics, and the infection has resolved.  She did not take steroids.  On lab evaluation, she was found to have elevated WBC of 16.  Repeat CBC in January 2021 showed WBC of 18.7, with ALC of 13.8.  There was note of smudge cells and atypical lymphocytes.  Peripheral flow showed CD5-positive kappa monotypic B-cells, favoring CLL.      She doesn't get frequent infections.  She denies fever/chills, night sweats, unintentional weight loss, lymphadenopathy.        INTERIM HISTORY: Karen says that she is doing well.  She did fine with the bone marrow biopsy.      REVIEW OF SYSTEMS:   14 point ROS was reviewed and is negative other than as noted above in HPI.       HOME MEDICATIONS:  Current Outpatient Medications   Medication Sig Dispense Refill     albuterol (PROAIR RESPICLICK) 108 (90 Base) MCG/ACT inhaler Inhale 1-2 puffs into the lungs every 4 hours as needed 1 each 3     aspirin (ASA) 81 MG chewable tablet Take 81 mg by mouth       atorvastatin (LIPITOR) 40 MG tablet Take 1 tablet (40 mg) by mouth daily 90 tablet 3     chlorthalidone (HYGROTON) 25 MG tablet Take  1 tablet (25 mg) by mouth daily 90 tablet 3     Cholecalciferol (VITAMIN D-3) 5000 UNIT TABS Take 1 tablet by mouth daily.       clonazePAM (KLONOPIN) 0.5 MG ODT DISSOLVE 1 TABLET(0.5 MG) ON THE TONGUE TWICE DAILY AS NEEDED FOR ANXIETY 60 tablet 0     coenzyme Q-10 200 MG CAPS        Cyanocobalamin (B-12) 1000 MCG TBCR Take 1,000 mcg by mouth daily 100 tablet 1     estradiol (ESTRACE VAGINAL) 0.1 MG/GM vaginal cream INSERT 1 GRAM VAGINALLY TWICE TO THREE TIMES PER WEEK 42.5 g 3     Lactobacillus (PROBIOTIC ACIDOPHILUS PO)        metoprolol succinate ER (TOPROL-XL) 100 MG 24 hr tablet Take 1.5 tablets (150 mg) by mouth daily 135 tablet 1     omeprazole (PRILOSEC) 40 MG DR capsule TAKE 1 CAPSULE BY MOUTH EVERY DAY 30 TO 60 MINUTES BEFORE A MEAL 90 capsule 3     vitamin D3 (CHOLECALCIFEROL) 125 MCG (5000 UT) tablet Take 5,000 Units by mouth       amoxicillin-clavulanate (AUGMENTIN) 875-125 MG tablet Take 1 tablet by mouth 2 times daily (Patient not taking: Reported on 2/19/2021) 20 tablet 0         ALLERGIES:  Allergies   Allergen Reactions     Chantix [Varenicline] Nausea     Ciprofloxacin Other (See Comments)     hypertension     Clopidogrel      Other reaction(s): Hypertension     Decongestant [Cvs]      Erythromycin Nausea     Lisinopril      cough     Plavix [Clopidogrel Bisulfate]      Felt as if she was having a heart attack     Rofecoxib Unknown     Vioxx          PAST MEDICAL HISTORY:  Past Medical History:   Diagnosis Date     Ankle fracture 2009    L     Anxiety      Chronic back pain      Colon polyp 2012    repeat colonoscopy 5 years.     Fx low femur epiphy-closed (H)      GERD (gastroesophageal reflux disease)      History of UTI 2017    Cysto by Dr Agustin neg     HTN (hypertension), benign      Hyperlipidemia LDL goal < 100      Normal nuclear stress test 12/09    EF 67%     Osteopenia      PAD (peripheral artery disease) (H)     s/p fem pop bypass; embolectomy     PUD (peptic ulcer disease) 1980s    TYRA      Smoker      Vitamin D deficiency          PAST SURGICAL HISTORY:  Past Surgical History:   Procedure Laterality Date     Blood clot removal from Stent      2011     BONE MARROW BIOPSY, BONE SPECIMEN, NEEDLE/TROCAR N/A 2/2/2021    Procedure: bone marrow biopsy;  Surgeon: Kailey Cota MD;  Location:  GI     BYPASS GRAFT AORTOFEMORAL  5/02    Dr. Turner     BYPASS GRAFT FEMOROPOPLITEAL  12/16/2011     COLONOSCOPY N/A 1/18/2018    Procedure: COMBINED COLONOSCOPY, SINGLE OR MULTIPLE BIOPSY/POLYPECTOMY BY BIOPSY;  COLONOSCOPY;  Surgeon: Efrain Hernadez MD;  Location:  GI     EMBOLECTOMY LOWER EXTREMITY  12/16/2011    Procedure:EMBOLECTOMY LOWER EXTREMITY; EMBOLECTOMY, RIGHT POPLITEAL WITH PATCH ANGIOPLASTY. EXCISION SKIN LESION RIGHT LEG. ; Surgeon:PARMINDER VELAZCO; Location: OR     EXCISE LESION LOWER EXTREMITY  12/16/2011    Procedure:EXCISE LESION LOWER EXTREMITY; Surgeon:PARMINDER VELAZCO; Location: OR     HERNIA REPAIR  11/4/08    x2     L achilles repair  12/4/08     LAPAROSCOPIC OOPHORECTOMY      L for cyst     miscarriages x 3       tubal ligation and reversal           SOCIAL HISTORY:  Social History     Socioeconomic History     Marital status:      Spouse name: Not on file     Number of children: Not on file     Years of education: Not on file     Highest education level: Not on file   Occupational History     Not on file   Social Needs     Financial resource strain: Not on file     Food insecurity     Worry: Not on file     Inability: Not on file     Transportation needs     Medical: Not on file     Non-medical: Not on file   Tobacco Use     Smoking status: Current Every Day Smoker     Packs/day: 1.00     Years: 50.00     Pack years: 50.00     Types: Cigarettes     Smokeless tobacco: Never Used     Tobacco comment: Nicorette gum and patch, trying to quit 2021   Substance and Sexual Activity     Alcohol use: Yes     Alcohol/week: 4.0 - 5.0 standard drinks      Frequency: 2-3 times a week     Drinks per session: 1 or 2     Binge frequency: Never     Comment: Beer once in a while     Drug use: No     Sexual activity: Not Currently     Partners: Male   Lifestyle     Physical activity     Days per week: Not on file     Minutes per session: Not on file     Stress: Not on file   Relationships     Social connections     Talks on phone: Not on file     Gets together: Not on file     Attends Confucianist service: Not on file     Active member of club or organization: Not on file     Attends meetings of clubs or organizations: Not on file     Relationship status: Not on file     Intimate partner violence     Fear of current or ex partner: Not on file     Emotionally abused: Not on file     Physically abused: Not on file     Forced sexual activity: Not on file   Other Topics Concern     Parent/sibling w/ CABG, MI or angioplasty before 65F 55M? 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  Had 3 miscarriages.  No living children.   is Dust[place].  Walks 2miles per day.         FAMILY HISTORY:  Family History   Problem Relation Age of Onset     Alzheimer Disease Mother          of panc/GI ca age 83     Osteoporosis Mother         with hip fx     Other Cancer Mother      Cancer Father          age 64 lymphoma     Colon Cancer Maternal Grandfather          PHYSICAL EXAM:  Phone visit.      LABS:  CBC RESULTS:   Recent Labs   Lab Test 21  1028   WBC 16.0*   RBC 4.98   HGB 16.3*   HCT 47.8*   MCV 96   MCH 32.7   MCHC 34.1   RDW 13.3        Recent Labs   Lab Test 21  1028 20  0858    137   POTASSIUM 2.8* 3.3*   CHLORIDE 100 103   CO2 33* 31   ANIONGAP 4 3   GLC 85 94   BUN 10 11   CR 0.71 0.70   CHANDAN 9.0 8.6     Lab Results   Component Value Date    AST 2021     Lab Results   Component Value Date    ALT 2021     Lab Results   Component Value Date    BILICONJ 0.0 2009       Lab Results   Component Value Date    BILITOTAL 0.8 02/03/2021     Lab Results   Component Value Date    ALBUMIN 3.8 02/03/2021     Lab Results   Component Value Date    PROTTOTAL 6.8 02/03/2021      Lab Results   Component Value Date    ALKPHOS 75 02/03/2021     Component      Latest Ref Rng & Units 2/3/2021   IGG      610 - 1,616 mg/dL 675   IGA      84 - 499 mg/dL 119   IGM      35 - 242 mg/dL 50   Iron      35 - 180 ug/dL 121   Iron Binding Cap      240 - 430 ug/dL 417   Iron Saturation Index      15 - 46 % 29   % Retic      0.5 - 2.0 % 1.5   Absolute Retic      25 - 95 10e9/L 75.7   Hepatitis B Surface Antibody      <8.00 m[IU]/mL 0.00   Hep B Surface Agn      NR:Nonreactive Nonreactive   Hepatitis B Core Mayela      NR:Nonreactive Nonreactive   Haptoglobin      32 - 197 mg/dL 116   Uric Acid      2.6 - 6.0 mg/dL 4.1   Lactate Dehydrogenase      81 - 234 U/L 208       PATHOLOGY:  Bone marrow biopsy 2/2/21:  FINAL DIAGNOSIS:   Bone marrow aspirate, biopsy, clot section, and peripheral blood:     - Chronic lymphocytic leukemia (CLL), kappa light chain restricted,   involving approximately 40% of the marrow   cellularity.  See comment.   - Hypercellular bone marrow with trilineage hematopoiesis.   - Cytogenetic studies are pending and results will be reported separately   by the performing laboratory.   - Peripheral blood with mild leukocytosis and absolute lymphocytosis.     COMMENT: These findings support B-cell lymphoma/leukemia morphologically   and immunophenotypically consistent   with chronic lymphocytic leukemia (CLL).  Cytogenetic studies are pending.     The degree of marrow involvement   by the CLL in this case varies by method (manual aspirate differential,   flow cytometry, and   immunohistochemistry on both the core and clot sections) but overall is   estimated to involve approximately 40%   of the marrow cellularity.     FISH:  RESULTS:   ABNORMAL   - Loss of C83V275 (13q14.3) (56%)     NORMAL   - No  losses of MYB (6q), FRANCIS (11q) or TP53 (17p)   - No gain of chromosome 12   - No IGH-CCND1 gene fusion     INTERPRETATION:   These findings are consistent with a diagnosis of chronic lymphocytic   leukemia/small lymphocytic lymphoma.   Loss of 13q14 is a well-documented albeit nonspecific abnormality in   CLL/SLL.     INTERPRETATION:   Bone Marrow, Left:     CD5-positive kappa-monotypic B cells (38%)        See comment     COMMENT:   The immunophenotypic findings are consistent with the patient's recently   diagnosed chronic lymphocytic   leukemia (CLL). Final interpretation requires correlation with results of   other ancillary studies,   morphologic, and clinical features.       IMAGING:  CT c/a/p 2/3/21:  1.  Scattered mildly prominent lymph nodes in the chest, abdomen, and  pelvis. No lymphadenopathy by size criteria. Normal spleen.         ASSESSMENT/PLAN:  Karen Caban is a 70 year old female with:    1) Chronic lymphocytic leukemia: MASSEY stage 0. She has a mild lymphocytosis, no anemia, no thrombocytopenia, no B-symptoms.  CT c/a/p on 2/3/21 was done, and I reviewed the images.  There is no lymphadenopathy, and spleen is normal.  Bone marrow biopsy was done on 2/2/21, which showed CLL, kappa light chain restricted, involving ~40% of the marrow cellularity.  FISH found loss of 13q (favorable).  No loss of 11q or 17p, no gain of chromosome 12.  No IGH-CCND1 fusion.      We discussed the diagnosis and staging.  There is no indication for treatment at this point.  She would be appropriate for watch-and-wait approach.  We discussed things to watch for that might indicate treatment is needed, including worsening cytopenias, worsening splenomegaly, progressive or symptomatic lymphadenopathy, lymphocyte doubling time of <6 months, constitutional symptoms.    Patient expressed understanding, and we will continue to monitor her labs.    -RTC in ~3-4 months with CBC/diff, CMP, LDH.  If she remains stable, can  potentially go out to every 6 months after that.    2) Polycythemia: suspect that it is secondary to her smoking. Stable.    3) Pulmonary nodules: long history of smoking and current smoker  -following with lung nodule clinic    Maddie Rowland MD  Hematology/Oncology  [facility]      Phone call duration: 5 minutes    Total time spent on day of visit, including review of tests, obtaining/reviewing separately obtained history, ordering medications/tests/procedures, communicating with PCP/consultants, and documenting in electronic medical record: 25 minutes        Again, thank you for allowing me to participate in the care of your patient.        Sincerely,        Maddie Rowland MD

## 2021-02-19 NOTE — PROGRESS NOTES
Karen is a 70 year old who is being evaluated via a billable telephone visit.      What phone number would you like to be contacted at? [phone]  How would you like to obtain your AVS? Guillermo       [facility]    Hematology/Oncology Established Patient Note      Today's Date: 02/19/21    Reason for Follow-up: CLL      HISTORY OF PRESENT ILLNESS: Karen Caban is a 70 year old female with PMHx of PAD s/p fem pop bypass, chronic back pain, HTN, smoking history, who presents with lymphocytosis.   She saw her PCP in December 2020 for a sinus infection.  She took antibiotics, and the infection has resolved.  She did not take steroids.  On lab evaluation, she was found to have elevated WBC of 16.  Repeat CBC in January 2021 showed WBC of 18.7, with ALC of 13.8.  There was note of smudge cells and atypical lymphocytes.  Peripheral flow showed CD5-positive kappa monotypic B-cells, favoring CLL.      She doesn't get frequent infections.  She denies fever/chills, night sweats, unintentional weight loss, lymphadenopathy.        INTERIM HISTORY: Karen says that she is doing well.  She did fine with the bone marrow biopsy.      REVIEW OF SYSTEMS:   14 point ROS was reviewed and is negative other than as noted above in HPI.       HOME MEDICATIONS:  Current Outpatient Medications   Medication Sig Dispense Refill     albuterol (PROAIR RESPICLICK) 108 (90 Base) MCG/ACT inhaler Inhale 1-2 puffs into the lungs every 4 hours as needed 1 each 3     aspirin (ASA) 81 MG chewable tablet Take 81 mg by mouth       atorvastatin (LIPITOR) 40 MG tablet Take 1 tablet (40 mg) by mouth daily 90 tablet 3     chlorthalidone (HYGROTON) 25 MG tablet Take 1 tablet (25 mg) by mouth daily 90 tablet 3     Cholecalciferol (VITAMIN D-3) 5000 UNIT TABS Take 1 tablet by mouth daily.       clonazePAM (KLONOPIN) 0.5 MG ODT DISSOLVE 1 TABLET(0.5 MG) ON THE TONGUE TWICE DAILY AS NEEDED FOR ANXIETY 60 tablet 0     coenzyme  Q-10 200 MG CAPS        Cyanocobalamin (B-12) 1000 MCG TBCR Take 1,000 mcg by mouth daily 100 tablet 1     estradiol (ESTRACE VAGINAL) 0.1 MG/GM vaginal cream INSERT 1 GRAM VAGINALLY TWICE TO THREE TIMES PER WEEK 42.5 g 3     Lactobacillus (PROBIOTIC ACIDOPHILUS PO)        metoprolol succinate ER (TOPROL-XL) 100 MG 24 hr tablet Take 1.5 tablets (150 mg) by mouth daily 135 tablet 1     omeprazole (PRILOSEC) 40 MG DR capsule TAKE 1 CAPSULE BY MOUTH EVERY DAY 30 TO 60 MINUTES BEFORE A MEAL 90 capsule 3     vitamin D3 (CHOLECALCIFEROL) 125 MCG (5000 UT) tablet Take 5,000 Units by mouth       amoxicillin-clavulanate (AUGMENTIN) 875-125 MG tablet Take 1 tablet by mouth 2 times daily (Patient not taking: Reported on 2/19/2021) 20 tablet 0         ALLERGIES:  Allergies   Allergen Reactions     Chantix [Varenicline] Nausea     Ciprofloxacin Other (See Comments)     hypertension     Clopidogrel      Other reaction(s): Hypertension     Decongestant [Cvs]      Erythromycin Nausea     Lisinopril      cough     Plavix [Clopidogrel Bisulfate]      Felt as if she was having a heart attack     Rofecoxib Unknown     Vioxx          PAST MEDICAL HISTORY:  Past Medical History:   Diagnosis Date     Ankle fracture 2009    L     Anxiety      Chronic back pain      Colon polyp 2012    repeat colonoscopy 5 years.     Fx low femur epiphy-closed (H)      GERD (gastroesophageal reflux disease)      History of UTI 2017    Cysto by Dr Agustin neg     HTN (hypertension), benign      Hyperlipidemia LDL goal < 100      Normal nuclear stress test 12/09    EF 67%     Osteopenia      PAD (peripheral artery disease) (H)     s/p fem pop bypass; embolectomy     PUD (peptic ulcer disease) 1980s    DU     Smoker      Vitamin D deficiency          PAST SURGICAL HISTORY:  Past Surgical History:   Procedure Laterality Date     Blood clot removal from Stent      2011     BONE MARROW BIOPSY, BONE SPECIMEN, NEEDLE/TROCAR N/A 2/2/2021    Procedure: bone marrow  biopsy;  Surgeon: Kailey Cota MD;  Location:  GI     BYPASS GRAFT AORTOFEMORAL  5/02    Dr. Turner     BYPASS GRAFT FEMOROPOPLITEAL  12/16/2011     COLONOSCOPY N/A 1/18/2018    Procedure: COMBINED COLONOSCOPY, SINGLE OR MULTIPLE BIOPSY/POLYPECTOMY BY BIOPSY;  COLONOSCOPY;  Surgeon: Efrain Hernadez MD;  Location:  GI     EMBOLECTOMY LOWER EXTREMITY  12/16/2011    Procedure:EMBOLECTOMY LOWER EXTREMITY; EMBOLECTOMY, RIGHT POPLITEAL WITH PATCH ANGIOPLASTY. EXCISION SKIN LESION RIGHT LEG. ; Surgeon:PARMINDER VELAZCO; Location: OR     EXCISE LESION LOWER EXTREMITY  12/16/2011    Procedure:EXCISE LESION LOWER EXTREMITY; Surgeon:PARMINDER VELAZCO; Location: OR     HERNIA REPAIR  11/4/08    x2     L achilles repair  12/4/08     LAPAROSCOPIC OOPHORECTOMY      L for cyst     miscarriages x 3       tubal ligation and reversal           SOCIAL HISTORY:  Social History     Socioeconomic History     Marital status:      Spouse name: Not on file     Number of children: Not on file     Years of education: Not on file     Highest education level: Not on file   Occupational History     Not on file   Social Needs     Financial resource strain: Not on file     Food insecurity     Worry: Not on file     Inability: Not on file     Transportation needs     Medical: Not on file     Non-medical: Not on file   Tobacco Use     Smoking status: Current Every Day Smoker     Packs/day: 1.00     Years: 50.00     Pack years: 50.00     Types: Cigarettes     Smokeless tobacco: Never Used     Tobacco comment: Nicorette gum and patch, trying to quit 2021   Substance and Sexual Activity     Alcohol use: Yes     Alcohol/week: 4.0 - 5.0 standard drinks     Frequency: 2-3 times a week     Drinks per session: 1 or 2     Binge frequency: Never     Comment: Beer once in a while     Drug use: No     Sexual activity: Not Currently     Partners: Male   Lifestyle     Physical activity     Days per week: Not on file      Minutes per session: Not on file     Stress: Not on file   Relationships     Social connections     Talks on phone: Not on file     Gets together: Not on file     Attends Quaker service: Not on file     Active member of club or organization: Not on file     Attends meetings of clubs or organizations: Not on file     Relationship status: Not on file     Intimate partner violence     Fear of current or ex partner: Not on file     Emotionally abused: Not on file     Physically abused: Not on file     Forced sexual activity: Not on file   Other Topics Concern     Parent/sibling w/ CABG, MI or angioplasty before 65F 55M? 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  Had 3 miscarriages.  No living children.   is Dust[place].  Walks 2miles per day.         FAMILY HISTORY:  Family History   Problem Relation Age of Onset     Alzheimer Disease Mother          of panc/GI ca age 83     Osteoporosis Mother         with hip fx     Other Cancer Mother      Cancer Father          age 64 lymphoma     Colon Cancer Maternal Grandfather          PHYSICAL EXAM:  Phone visit.      LABS:  CBC RESULTS:   Recent Labs   Lab Test 21  1028   WBC 16.0*   RBC 4.98   HGB 16.3*   HCT 47.8*   MCV 96   MCH 32.7   MCHC 34.1   RDW 13.3        Recent Labs   Lab Test 21  1028 20  0858    137   POTASSIUM 2.8* 3.3*   CHLORIDE 100 103   CO2 33* 31   ANIONGAP 4 3   GLC 85 94   BUN 10 11   CR 0.71 0.70   CHANDAN 9.0 8.6     Lab Results   Component Value Date    AST 22 2021     Lab Results   Component Value Date    ALT 29 2021     Lab Results   Component Value Date    BILICONJ 0.0 2009      Lab Results   Component Value Date    BILITOTAL 0.8 2021     Lab Results   Component Value Date    ALBUMIN 3.8 2021     Lab Results   Component Value Date    PROTTOTAL 6.8 2021      Lab Results   Component Value Date    ALKPHOS 75  02/03/2021     Component      Latest Ref Rng & Units 2/3/2021   IGG      610 - 1,616 mg/dL 675   IGA      84 - 499 mg/dL 119   IGM      35 - 242 mg/dL 50   Iron      35 - 180 ug/dL 121   Iron Binding Cap      240 - 430 ug/dL 417   Iron Saturation Index      15 - 46 % 29   % Retic      0.5 - 2.0 % 1.5   Absolute Retic      25 - 95 10e9/L 75.7   Hepatitis B Surface Antibody      <8.00 m[IU]/mL 0.00   Hep B Surface Agn      NR:Nonreactive Nonreactive   Hepatitis B Core Mayela      NR:Nonreactive Nonreactive   Haptoglobin      32 - 197 mg/dL 116   Uric Acid      2.6 - 6.0 mg/dL 4.1   Lactate Dehydrogenase      81 - 234 U/L 208       PATHOLOGY:  Bone marrow biopsy 2/2/21:  FINAL DIAGNOSIS:   Bone marrow aspirate, biopsy, clot section, and peripheral blood:     - Chronic lymphocytic leukemia (CLL), kappa light chain restricted,   involving approximately 40% of the marrow   cellularity.  See comment.   - Hypercellular bone marrow with trilineage hematopoiesis.   - Cytogenetic studies are pending and results will be reported separately   by the performing laboratory.   - Peripheral blood with mild leukocytosis and absolute lymphocytosis.     COMMENT: These findings support B-cell lymphoma/leukemia morphologically   and immunophenotypically consistent   with chronic lymphocytic leukemia (CLL).  Cytogenetic studies are pending.     The degree of marrow involvement   by the CLL in this case varies by method (manual aspirate differential,   flow cytometry, and   immunohistochemistry on both the core and clot sections) but overall is   estimated to involve approximately 40%   of the marrow cellularity.     FISH:  RESULTS:   ABNORMAL   - Loss of [id number] (13q14.3) (56%)     NORMAL   - No losses of MYB (6q), FRANCIS (11q) or TP53 (17p)   - No gain of chromosome 12   - No IGH-CCND1 gene fusion     INTERPRETATION:   These findings are consistent with a diagnosis of chronic lymphocytic   leukemia/small lymphocytic lymphoma.   Loss of 13q14  is a well-documented albeit nonspecific abnormality in   CLL/SLL.     INTERPRETATION:   Bone Marrow, Left:     CD5-positive kappa-monotypic B cells (38%)        See comment     COMMENT:   The immunophenotypic findings are consistent with the patient's recently   diagnosed chronic lymphocytic   leukemia (CLL). Final interpretation requires correlation with results of   other ancillary studies,   morphologic, and clinical features.       IMAGING:  CT c/a/p 2/3/21:  1.  Scattered mildly prominent lymph nodes in the chest, abdomen, and  pelvis. No lymphadenopathy by size criteria. Normal spleen.         ASSESSMENT/PLAN:  Karen Caban is a 70 year old female with:    1) Chronic lymphocytic leukemia: MASSEY stage 0. She has a mild lymphocytosis, no anemia, no thrombocytopenia, no B-symptoms.  CT c/a/p on 2/3/21 was done, and I reviewed the images.  There is no lymphadenopathy, and spleen is normal.  Bone marrow biopsy was done on 2/2/21, which showed CLL, kappa light chain restricted, involving ~40% of the marrow cellularity.  FISH found loss of 13q (favorable).  No loss of 11q or 17p, no gain of chromosome 12.  No IGH-CCND1 fusion.      We discussed the diagnosis and staging.  There is no indication for treatment at this point.  She would be appropriate for watch-and-wait approach.  We discussed things to watch for that might indicate treatment is needed, including worsening cytopenias, worsening splenomegaly, progressive or symptomatic lymphadenopathy, lymphocyte doubling time of <6 months, constitutional symptoms.    Patient expressed understanding, and we will continue to monitor her labs.    -RTC in ~3-4 months with CBC/diff, CMP, LDH.  If she remains stable, can potentially go out to every 6 months after that.    2) Polycythemia: suspect that it is secondary to her smoking. Stable.    3) Pulmonary nodules: long history of smoking and current smoker  -following with lung nodule clinic    Maddie Rowland  MD  Hematology/Oncology  [facility]      Phone call duration: 5 minutes    Total time spent on day of visit, including review of tests, obtaining/reviewing separately obtained history, ordering medications/tests/procedures, communicating with PCP/consultants, and documenting in electronic medical record: 25 minutes

## 2021-02-22 LAB — COPATH REPORT: NORMAL

## 2021-04-08 DIAGNOSIS — F41.9 ANXIETY: ICD-10-CM

## 2021-04-08 RX ORDER — CLONAZEPAM 0.5 MG/1
TABLET, ORALLY DISINTEGRATING ORAL
Qty: 60 TABLET | Refills: 0 | Status: SHIPPED | OUTPATIENT
Start: 2021-04-08 | End: 2021-08-16

## 2021-04-08 NOTE — TELEPHONE ENCOUNTER
Routing refill request to provider for review/approval because:  Drug not on the [facility] refill protocol     Pending Prescriptions:                       Disp   Refills    clonazePAM (KLONOPIN) 0.5 MG ODT          60 tab*0            Sig: DISSOLVE 1 TABLET(0.5 MG) ON THE TONGUE TWICE           DAILY AS NEEDED FOR ANXIETY    Last Written Prescription Date:  12/14/2020  Last Fill Quantity: 60,  # refills: 0   Last office visit: 12/22/2020 with prescribing provider:  Lyudmila KEATING   Future Office Visit:      Daniel Melchor RN  [facility]

## 2021-05-24 ENCOUNTER — TELEPHONE (OUTPATIENT)
Dept: NURSING | Facility: CLINIC | Age: 71
End: 2021-05-24

## 2021-05-24 NOTE — TELEPHONE ENCOUNTER
Karen calls in to report that she has a urinary infection and has an elevated white count. She is not supposed to do labs with these issues so she is wondering if she should keep next week's appointments for lab and visit with Dr. Rowland. She would like a call from Dr. Rowland's nurse.  Can call at [phone].

## 2021-05-24 NOTE — TELEPHONE ENCOUNTER
Returned call. Patient reports she is being treated for UTI (Cephalexin 500mg, will be done with ABX 5/30/21) and wondering if she should adjust lab appointment currently scheduled 6/1/21.     Will review with RISHABH Martinez who is currently covering for Dr. Rowland and update patient on plan.     Kaila Fulton, JJN, RN, PHN, OCN  Oncology Care Coordinator  [facility]

## 2021-06-01 ENCOUNTER — HOSPITAL ENCOUNTER (OUTPATIENT)
Facility: CLINIC | Age: 71
Setting detail: SPECIMEN
Discharge: HOME | End: 2021-06-01
Attending: INTERNAL MEDICINE | Admitting: INTERNAL MEDICINE
Payer: COMMERCIAL

## 2021-06-01 ENCOUNTER — INFUSION THERAPY VISIT (OUTPATIENT)
Dept: INFUSION THERAPY | Facility: CLINIC | Age: 71
End: 2021-06-01
Attending: INTERNAL MEDICINE
Payer: COMMERCIAL

## 2021-06-01 DIAGNOSIS — C91.10 CLL (CHRONIC LYMPHOCYTIC LEUKEMIA) (H): ICD-10-CM

## 2021-06-01 LAB
ALBUMIN SERPL-MCNC: 3.9 G/DL (ref 3.4–5)
ALP SERPL-CCNC: 73 U/L (ref 40–150)
ALT SERPL W P-5'-P-CCNC: 31 U/L (ref 0–50)
ANION GAP SERPL CALCULATED.3IONS-SCNC: 3 MMOL/L (ref 3–14)
AST SERPL W P-5'-P-CCNC: 23 U/L (ref 0–45)
BASOPHILS # BLD AUTO: 0 10E9/L (ref 0–0.2)
BASOPHILS NFR BLD AUTO: 0 %
BILIRUB SERPL-MCNC: 0.8 MG/DL (ref 0.2–1.3)
BUN SERPL-MCNC: 10 MG/DL (ref 7–30)
CALCIUM SERPL-MCNC: 9.1 MG/DL (ref 8.5–10.1)
CHLORIDE SERPL-SCNC: 101 MMOL/L (ref 94–109)
CO2 SERPL-SCNC: 35 MMOL/L (ref 20–32)
CREAT SERPL-MCNC: 0.71 MG/DL (ref 0.52–1.04)
DIFFERENTIAL METHOD BLD: ABNORMAL
EOSINOPHIL # BLD AUTO: 0.5 10E9/L (ref 0–0.7)
EOSINOPHIL NFR BLD AUTO: 3 %
ERYTHROCYTE [DISTWIDTH] IN BLOOD BY AUTOMATED COUNT: 13.2 % (ref 10–15)
GFR SERPL CREATININE-BSD FRML MDRD: 87 ML/MIN/{1.73_M2}
GLUCOSE SERPL-MCNC: 105 MG/DL (ref 70–99)
HCT VFR BLD AUTO: 47.2 % (ref 35–47)
HGB BLD-MCNC: 15.9 G/DL (ref 11.7–15.7)
LDH SERPL L TO P-CCNC: 232 U/L (ref 81–234)
LYMPHOCYTES # BLD AUTO: 13.1 10E9/L (ref 0.8–5.3)
LYMPHOCYTES NFR BLD AUTO: 74 %
MCH RBC QN AUTO: 31.7 PG (ref 26.5–33)
MCHC RBC AUTO-ENTMCNC: 33.7 G/DL (ref 31.5–36.5)
MCV RBC AUTO: 94 FL (ref 78–100)
MONOCYTES # BLD AUTO: 0.5 10E9/L (ref 0–1.3)
MONOCYTES NFR BLD AUTO: 3 %
NEUTROPHILS # BLD AUTO: 3.5 10E9/L (ref 1.6–8.3)
NEUTROPHILS NFR BLD AUTO: 20 %
PLATELET # BLD AUTO: 209 10E9/L (ref 150–450)
PLATELET # BLD EST: ABNORMAL 10*3/UL
POTASSIUM SERPL-SCNC: 3.1 MMOL/L (ref 3.4–5.3)
PROT SERPL-MCNC: 6.9 G/DL (ref 6.8–8.8)
RBC # BLD AUTO: 5.01 10E12/L (ref 3.8–5.2)
RBC MORPH BLD: NORMAL
SMUDGE CELLS BLD QL SMEAR: PRESENT
SODIUM SERPL-SCNC: 139 MMOL/L (ref 133–144)
WBC # BLD AUTO: 17.7 10E9/L (ref 4–11)

## 2021-06-01 PROCEDURE — 36415 COLL VENOUS BLD VENIPUNCTURE: CPT

## 2021-06-01 PROCEDURE — 80053 COMPREHEN METABOLIC PANEL: CPT | Performed by: INTERNAL MEDICINE

## 2021-06-01 PROCEDURE — 83615 LACTATE (LD) (LDH) ENZYME: CPT | Performed by: INTERNAL MEDICINE

## 2021-06-01 PROCEDURE — 85025 COMPLETE CBC W/AUTO DIFF WBC: CPT | Performed by: INTERNAL MEDICINE

## 2021-06-01 NOTE — PROGRESS NOTES
Medical Assistant Note:  Karen Caban presents today for blood draw.    Patient seen by provider today: No.   present during visit today: Not Applicable.    Concerns: No Concerns.    Procedure:  Lab draw site: rac, Needle type: bf, Gauge: 23.    Post Assessment:  Labs drawn without difficulty: Yes.    Discharge Plan:  Departure Mode: Ambulatory.    Face to Face Time: 5 min  .    Jamaica Bruno, CMA

## 2021-06-02 ENCOUNTER — VIRTUAL VISIT (OUTPATIENT)
Dept: ONCOLOGY | Facility: CLINIC | Age: 71
End: 2021-06-02
Attending: INTERNAL MEDICINE
Payer: COMMERCIAL

## 2021-06-02 VITALS — BODY MASS INDEX: 21.8 KG/M2 | WEIGHT: 131 LBS

## 2021-06-02 DIAGNOSIS — C91.10 CLL (CHRONIC LYMPHOCYTIC LEUKEMIA) (H): Primary | ICD-10-CM

## 2021-06-02 DIAGNOSIS — E87.6 HYPOKALEMIA: ICD-10-CM

## 2021-06-02 PROCEDURE — 99214 OFFICE O/P EST MOD 30 MIN: CPT | Mod: TEL | Performed by: INTERNAL MEDICINE

## 2021-06-02 RX ORDER — POTASSIUM CHLORIDE 1500 MG/1
20 TABLET, EXTENDED RELEASE ORAL DAILY
Qty: 3 TABLET | Refills: 0 | Status: SHIPPED | OUTPATIENT
Start: 2021-06-02 | End: 2021-06-05

## 2021-06-02 ASSESSMENT — PAIN SCALES - GENERAL: PAINLEVEL: NO PAIN (0)

## 2021-06-02 NOTE — PROGRESS NOTES
Humaira is a 70 year old who is being evaluated via a billable telephone visit.      What phone number would you like to be contacted at? # [phone]  How would you like to obtain your AVS? Mail a copy       Iram Cannon CMA        [facility]    Hematology/Oncology Established Patient Note      Today's Date: 06/02/21    Reason for Follow-up: CLL      HISTORY OF PRESENT ILLNESS: Karen Caban is a 70 year old female with PMHx of PAD s/p fem pop bypass, chronic back pain, HTN, smoking history, who presents with lymphocytosis.   She saw her PCP in December 2020 for a sinus infection.  She took antibiotics, and the infection has resolved.  She did not take steroids.  On lab evaluation, she was found to have elevated WBC of 16.  Repeat CBC in January 2021 showed WBC of 18.7, with ALC of 13.8.  There was note of smudge cells and atypical lymphocytes.  Peripheral flow showed CD5-positive kappa monotypic B-cells, favoring CLL.      She doesn't get frequent infections.  She denies fever/chills, night sweats, unintentional weight loss, lymphadenopathy.        INTERIM HISTORY: Karen says that she feels fine.  She notes that she had a UTI about a week and half ago.  She went to urgent care, finished antibiotics about 4 days ago, and it is completely resolved.          REVIEW OF SYSTEMS:   14 point ROS was reviewed and is negative other than as noted above in HPI.       HOME MEDICATIONS:  Current Outpatient Medications   Medication Sig Dispense Refill     albuterol (PROAIR RESPICLICK) 108 (90 Base) MCG/ACT inhaler Inhale 1-2 puffs into the lungs every 4 hours as needed 1 each 3     amoxicillin-clavulanate (AUGMENTIN) 875-125 MG tablet Take 1 tablet by mouth 2 times daily 20 tablet 0     aspirin (ASA) 81 MG chewable tablet Take 81 mg by mouth       atorvastatin (LIPITOR) 40 MG tablet Take 1 tablet (40 mg) by mouth daily 90 tablet 3     chlorthalidone (HYGROTON) 25 MG tablet Take 1 tablet (25  mg) by mouth daily 90 tablet 3     Cholecalciferol (VITAMIN D-3) 5000 UNIT TABS Take 1 tablet by mouth daily.       clonazePAM (KLONOPIN) 0.5 MG ODT DISSOLVE 1 TABLET(0.5 MG) ON THE TONGUE TWICE DAILY AS NEEDED FOR ANXIETY 60 tablet 0     coenzyme Q-10 200 MG CAPS        Cyanocobalamin (B-12) 1000 MCG TBCR Take 1,000 mcg by mouth daily 100 tablet 1     estradiol (ESTRACE VAGINAL) 0.1 MG/GM vaginal cream INSERT 1 GRAM VAGINALLY TWICE TO THREE TIMES PER WEEK 42.5 g 3     Lactobacillus (PROBIOTIC ACIDOPHILUS PO)        metoprolol succinate ER (TOPROL-XL) 100 MG 24 hr tablet Take 1.5 tablets (150 mg) by mouth daily 135 tablet 1     omeprazole (PRILOSEC) 40 MG DR capsule TAKE 1 CAPSULE BY MOUTH EVERY DAY 30 TO 60 MINUTES BEFORE A MEAL 90 capsule 3     vitamin D3 (CHOLECALCIFEROL) 125 MCG (5000 UT) tablet Take 5,000 Units by mouth           ALLERGIES:  Allergies   Allergen Reactions     Chantix [Varenicline] Nausea     Ciprofloxacin Other (See Comments)     hypertension     Clopidogrel      Other reaction(s): Hypertension     Decongestant [Cvs]      Erythromycin Nausea     Lisinopril      cough     Plavix [Clopidogrel Bisulfate]      Felt as if she was having a heart attack     Rofecoxib Unknown     Vioxx          PAST MEDICAL HISTORY:  Past Medical History:   Diagnosis Date     Ankle fracture 2009    L     Anxiety      Chronic back pain      Colon polyp 2012    repeat colonoscopy 5 years.     Fx low femur epiphy-closed (H)      GERD (gastroesophageal reflux disease)      History of UTI 2017    Cysto by Dr Agustin neg     HTN (hypertension), benign      Hyperlipidemia LDL goal < 100      Normal nuclear stress test 12/09    EF 67%     Osteopenia      PAD (peripheral artery disease) (H)     s/p fem pop bypass; embolectomy     PUD (peptic ulcer disease) 1980s    DU     Smoker      Vitamin D deficiency          PAST SURGICAL HISTORY:  Past Surgical History:   Procedure Laterality Date     Blood clot removal from Stent       2011     BONE MARROW BIOPSY, BONE SPECIMEN, NEEDLE/TROCAR N/A 2/2/2021    Procedure: bone marrow biopsy;  Surgeon: Kailey Cota MD;  Location:  GI     BYPASS GRAFT AORTOFEMORAL  5/02    Dr. Turner     BYPASS GRAFT FEMOROPOPLITEAL  12/16/2011     COLONOSCOPY N/A 1/18/2018    Procedure: COMBINED COLONOSCOPY, SINGLE OR MULTIPLE BIOPSY/POLYPECTOMY BY BIOPSY;  COLONOSCOPY;  Surgeon: Efrain Hernadez MD;  Location:  GI     EMBOLECTOMY LOWER EXTREMITY  12/16/2011    Procedure:EMBOLECTOMY LOWER EXTREMITY; EMBOLECTOMY, RIGHT POPLITEAL WITH PATCH ANGIOPLASTY. EXCISION SKIN LESION RIGHT LEG. ; Surgeon:PARMINDER VELAZCO; Location: OR     EXCISE LESION LOWER EXTREMITY  12/16/2011    Procedure:EXCISE LESION LOWER EXTREMITY; Surgeon:PARMINDER VELAZCO; Location: OR     HERNIA REPAIR  11/4/08    x2     L achilles repair  12/4/08     LAPAROSCOPIC OOPHORECTOMY      L for cyst     miscarriages x 3       tubal ligation and reversal           SOCIAL HISTORY:  Social History     Socioeconomic History     Marital status:      Spouse name: Not on file     Number of children: Not on file     Years of education: Not on file     Highest education level: Not on file   Occupational History     Not on file   Social Needs     Financial resource strain: Not on file     Food insecurity     Worry: Not on file     Inability: Not on file     Transportation needs     Medical: Not on file     Non-medical: Not on file   Tobacco Use     Smoking status: Current Every Day Smoker     Packs/day: 1.00     Years: 50.00     Pack years: 50.00     Types: Cigarettes     Smokeless tobacco: Never Used     Tobacco comment: Nicorette gum and patch, trying to quit 2021   Substance and Sexual Activity     Alcohol use: Yes     Alcohol/week: 4.0 - 5.0 standard drinks     Frequency: 2-3 times a week     Drinks per session: 1 or 2     Binge frequency: Never     Comment: Beer once in a while     Drug use: No     Sexual activity: Not  Currently     Partners: Male   Lifestyle     Physical activity     Days per week: Not on file     Minutes per session: Not on file     Stress: Not on file   Relationships     Social connections     Talks on phone: Not on file     Gets together: Not on file     Attends Mormonism service: Not on file     Active member of club or organization: Not on file     Attends meetings of clubs or organizations: Not on file     Relationship status: Not on file     Intimate partner violence     Fear of current or ex partner: Not on file     Emotionally abused: Not on file     Physically abused: Not on file     Forced sexual activity: Not on file   Other Topics Concern     Parent/sibling w/ CABG, MI or angioplasty before 65F 55M? 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  Had 3 miscarriages.  No living children.   is Dustin.  Walks 2miles per day.         FAMILY HISTORY:  Family History   Problem Relation Age of Onset     Alzheimer Disease Mother          of panc/GI ca age 83     Osteoporosis Mother         with hip fx     Other Cancer Mother      Cancer Father          age 64 lymphoma     Colon Cancer Maternal Grandfather          PHYSICAL EXAM:  Phone visit.      LABS:  CBC RESULTS:   Recent Labs   Lab Test 21  0909   WBC 17.7*   RBC 5.01   HGB 15.9*   HCT 47.2*   MCV 94   MCH 31.7   MCHC 33.7   RDW 13.2        Recent Labs   Lab Test 21  0909 21  1028    137   POTASSIUM 3.1* 2.8*   CHLORIDE 101 100   CO2 35* 33*   ANIONGAP 3 4   * 85   BUN 10 10   CR 0.71 0.71   CHANDAN 9.1 9.0     Lab Results   Component Value Date    AST 23 2021     Lab Results   Component Value Date    ALT 31 2021     Lab Results   Component Value Date    BILICONJ 0.0 2009      Lab Results   Component Value Date    BILITOTAL 0.8 2021     Lab Results   Component Value Date    ALBUMIN 3.9 2021     Lab Results   Component  Value Date    PROTTOTAL 6.9 06/01/2021      Lab Results   Component Value Date    ALKPHOS 73 06/01/2021     Component      Latest Ref Rng & Units 6/1/2021   Lactate Dehydrogenase      81 - 234 U/L 232           PATHOLOGY:  Bone marrow biopsy 2/2/21:  FINAL DIAGNOSIS:   Bone marrow aspirate, biopsy, clot section, and peripheral blood:     - Chronic lymphocytic leukemia (CLL), kappa light chain restricted,   involving approximately 40% of the marrow   cellularity.  See comment.   - Hypercellular bone marrow with trilineage hematopoiesis.   - Cytogenetic studies are pending and results will be reported separately   by the performing laboratory.   - Peripheral blood with mild leukocytosis and absolute lymphocytosis.     COMMENT: These findings support B-cell lymphoma/leukemia morphologically   and immunophenotypically consistent   with chronic lymphocytic leukemia (CLL).  Cytogenetic studies are pending.     The degree of marrow involvement   by the CLL in this case varies by method (manual aspirate differential,   flow cytometry, and   immunohistochemistry on both the core and clot sections) but overall is   estimated to involve approximately 40%   of the marrow cellularity.     FISH:  RESULTS:   ABNORMAL   - Loss of [id number] (13q14.3) (56%)     NORMAL   - No losses of MYB (6q), FRANCIS (11q) or TP53 (17p)   - No gain of chromosome 12   - No IGH-CCND1 gene fusion     INTERPRETATION:   These findings are consistent with a diagnosis of chronic lymphocytic   leukemia/small lymphocytic lymphoma.   Loss of 13q14 is a well-documented albeit nonspecific abnormality in   CLL/SLL.     INTERPRETATION:   Bone Marrow, Left:     CD5-positive kappa-monotypic B cells (38%)        See comment     COMMENT:   The immunophenotypic findings are consistent with the patient's recently   diagnosed chronic lymphocytic   leukemia (CLL). Final interpretation requires correlation with results of   other ancillary studies,   morphologic, and clinical  features.       IMAGING:  CT c/a/p 2/3/21:  1.  Scattered mildly prominent lymph nodes in the chest, abdomen, and  pelvis. No lymphadenopathy by size criteria. Normal spleen.         ASSESSMENT/PLAN:  Karen Caban is a 70 year old female with:    1) Chronic lymphocytic leukemia: MASSEY stage 0. She has a mild lymphocytosis, no anemia, no thrombocytopenia, no B-symptoms.  CT c/a/p on 2/3/21 was done.  There is no lymphadenopathy, and spleen is normal.  Bone marrow biopsy was done on 2/2/21, which showed CLL, kappa light chain restricted, involving ~40% of the marrow cellularity.  FISH found loss of 13q (favorable).  No loss of 11q or 17p, no gain of chromosome 12.  No IGH-CCND1 fusion.      We discussed the diagnosis and staging.  There is no indication for treatment at this point.  She would be appropriate for watch-and-wait approach.  We discussed things to watch for that might indicate treatment is needed, including worsening cytopenias, worsening splenomegaly, progressive or symptomatic lymphadenopathy, lymphocyte doubling time of <6 months, constitutional symptoms.    Patient expressed understanding, and we will continue to monitor her labs.    CBC/diff on 6/1/21 is overall stable.  Lymphocyte count may be up a bit due to recent infection.      -RTC in ~6 months with CBC/diff, CMP, LDH.      2) Polycythemia: suspect that it is secondary to her smoking. Stable.  -advised smoking cessation    3) Pulmonary nodules: long history of smoking and current smoker  -following with lung nodule clinic    4) Hypokalemia: mild  -replaced  -follow-up with PCP      Maddie Rowland MD  Hematology/Oncology  [facility]      Phone call duration: 5 minutes    Total time spent on day of visit, including review of tests, obtaining/reviewing separately obtained history, ordering medications/tests/procedures, communicating with PCP/consultants, and documenting in electronic medical record: 20 minutes

## 2021-06-02 NOTE — LETTER
6/2/2021         RE: Karen Caban  [address]  [facility] [address]        Dear Colleague,    Thank you for referring your patient, Karen Caban, to the [facility] [place]. Please see a copy of my visit note below.    Humaira is a 70 year old who is being evaluated via a billable telephone visit.      What phone number would you like to be contacted at? # [phone]  How would you like to obtain your AVS? Mail a copy       Iram Cannon CMA        [facility]    Hematology/Oncology Established Patient Note      Today's Date: 06/02/21    Reason for Follow-up: CLL      HISTORY OF PRESENT ILLNESS: Karen Caban is a 70 year old female with PMHx of PAD s/p fem pop bypass, chronic back pain, HTN, smoking history, who presents with lymphocytosis.   She saw her PCP in December 2020 for a sinus infection.  She took antibiotics, and the infection has resolved.  She did not take steroids.  On lab evaluation, she was found to have elevated WBC of 16.  Repeat CBC in January 2021 showed WBC of 18.7, with ALC of 13.8.  There was note of smudge cells and atypical lymphocytes.  Peripheral flow showed CD5-positive kappa monotypic B-cells, favoring CLL.      She doesn't get frequent infections.  She denies fever/chills, night sweats, unintentional weight loss, lymphadenopathy.        INTERIM HISTORY: Karen says that she feels fine.  She notes that she had a UTI about a week and half ago.  She went to urgent care, finished antibiotics about 4 days ago, and it is completely resolved.          REVIEW OF SYSTEMS:   14 point ROS was reviewed and is negative other than as noted above in HPI.       HOME MEDICATIONS:  Current Outpatient Medications   Medication Sig Dispense Refill     albuterol (PROAIR RESPICLICK) 108 (90 Base) MCG/ACT inhaler Inhale 1-2 puffs into the lungs every 4 hours as needed 1 each 3     amoxicillin-clavulanate (AUGMENTIN) 875-125 MG tablet  Take 1 tablet by mouth 2 times daily 20 tablet 0     aspirin (ASA) 81 MG chewable tablet Take 81 mg by mouth       atorvastatin (LIPITOR) 40 MG tablet Take 1 tablet (40 mg) by mouth daily 90 tablet 3     chlorthalidone (HYGROTON) 25 MG tablet Take 1 tablet (25 mg) by mouth daily 90 tablet 3     Cholecalciferol (VITAMIN D-3) 5000 UNIT TABS Take 1 tablet by mouth daily.       clonazePAM (KLONOPIN) 0.5 MG ODT DISSOLVE 1 TABLET(0.5 MG) ON THE TONGUE TWICE DAILY AS NEEDED FOR ANXIETY 60 tablet 0     coenzyme Q-10 200 MG CAPS        Cyanocobalamin (B-12) 1000 MCG TBCR Take 1,000 mcg by mouth daily 100 tablet 1     estradiol (ESTRACE VAGINAL) 0.1 MG/GM vaginal cream INSERT 1 GRAM VAGINALLY TWICE TO THREE TIMES PER WEEK 42.5 g 3     Lactobacillus (PROBIOTIC ACIDOPHILUS PO)        metoprolol succinate ER (TOPROL-XL) 100 MG 24 hr tablet Take 1.5 tablets (150 mg) by mouth daily 135 tablet 1     omeprazole (PRILOSEC) 40 MG DR capsule TAKE 1 CAPSULE BY MOUTH EVERY DAY 30 TO 60 MINUTES BEFORE A MEAL 90 capsule 3     vitamin D3 (CHOLECALCIFEROL) 125 MCG (5000 UT) tablet Take 5,000 Units by mouth           ALLERGIES:  Allergies   Allergen Reactions     Chantix [Varenicline] Nausea     Ciprofloxacin Other (See Comments)     hypertension     Clopidogrel      Other reaction(s): Hypertension     Decongestant [Cvs]      Erythromycin Nausea     Lisinopril      cough     Plavix [Clopidogrel Bisulfate]      Felt as if she was having a heart attack     Rofecoxib Unknown     Vioxx          PAST MEDICAL HISTORY:  Past Medical History:   Diagnosis Date     Ankle fracture 2009    L     Anxiety      Chronic back pain      Colon polyp 2012    repeat colonoscopy 5 years.     Fx low femur epiphy-closed (H)      GERD (gastroesophageal reflux disease)      History of UTI 2017    Cysto by Dr Agustin neg     HTN (hypertension), benign      Hyperlipidemia LDL goal < 100      Normal nuclear stress test 12/09    EF 67%     Osteopenia      PAD (peripheral  artery disease) (H)     s/p fem pop bypass; embolectomy     PUD (peptic ulcer disease) 1980s    DU     Smoker      Vitamin D deficiency          PAST SURGICAL HISTORY:  Past Surgical History:   Procedure Laterality Date     Blood clot removal from Stent      2011     BONE MARROW BIOPSY, BONE SPECIMEN, NEEDLE/TROCAR N/A 2/2/2021    Procedure: bone marrow biopsy;  Surgeon: Kailey Cota MD;  Location:  GI     BYPASS GRAFT AORTOFEMORAL  5/02    Dr. Turner     BYPASS GRAFT FEMOROPOPLITEAL  12/16/2011     COLONOSCOPY N/A 1/18/2018    Procedure: COMBINED COLONOSCOPY, SINGLE OR MULTIPLE BIOPSY/POLYPECTOMY BY BIOPSY;  COLONOSCOPY;  Surgeon: Efrain Hernadez MD;  Location:  GI     EMBOLECTOMY LOWER EXTREMITY  12/16/2011    Procedure:EMBOLECTOMY LOWER EXTREMITY; EMBOLECTOMY, RIGHT POPLITEAL WITH PATCH ANGIOPLASTY. EXCISION SKIN LESION RIGHT LEG. ; Surgeon:PARMINDER VELAZCO; Location: OR     EXCISE LESION LOWER EXTREMITY  12/16/2011    Procedure:EXCISE LESION LOWER EXTREMITY; Surgeon:PARMINDER VELAZCO; Location: OR     HERNIA REPAIR  11/4/08    x2     L achilles repair  12/4/08     LAPAROSCOPIC OOPHORECTOMY      L for cyst     miscarriages x 3       tubal ligation and reversal           SOCIAL HISTORY:  Social History     Socioeconomic History     Marital status:      Spouse name: Not on file     Number of children: Not on file     Years of education: Not on file     Highest education level: Not on file   Occupational History     Not on file   Social Needs     Financial resource strain: Not on file     Food insecurity     Worry: Not on file     Inability: Not on file     Transportation needs     Medical: Not on file     Non-medical: Not on file   Tobacco Use     Smoking status: Current Every Day Smoker     Packs/day: 1.00     Years: 50.00     Pack years: 50.00     Types: Cigarettes     Smokeless tobacco: Never Used     Tobacco comment: Nicorette gum and patch, trying to quit 2021    Substance and Sexual Activity     Alcohol use: Yes     Alcohol/week: 4.0 - 5.0 standard drinks     Frequency: 2-3 times a week     Drinks per session: 1 or 2     Binge frequency: Never     Comment: Beer once in a while     Drug use: No     Sexual activity: Not Currently     Partners: Male   Lifestyle     Physical activity     Days per week: Not on file     Minutes per session: Not on file     Stress: Not on file   Relationships     Social connections     Talks on phone: Not on file     Gets together: Not on file     Attends Christianity service: Not on file     Active member of club or organization: Not on file     Attends meetings of clubs or organizations: Not on file     Relationship status: Not on file     Intimate partner violence     Fear of current or ex partner: Not on file     Emotionally abused: Not on file     Physically abused: Not on file     Forced sexual activity: Not on file   Other Topics Concern     Parent/sibling w/ CABG, MI or angioplasty before 65F 55M? 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  Had 3 miscarriages.  No living children.   is Dustin.  Walks 2miles per day.         FAMILY HISTORY:  Family History   Problem Relation Age of Onset     Alzheimer Disease Mother          of panc/GI ca age 83     Osteoporosis Mother         with hip fx     Other Cancer Mother      Cancer Father          age 64 lymphoma     Colon Cancer Maternal Grandfather          PHYSICAL EXAM:  Phone visit.      LABS:  CBC RESULTS:   Recent Labs   Lab Test 21  0909   WBC 17.7*   RBC 5.01   HGB 15.9*   HCT 47.2*   MCV 94   MCH 31.7   MCHC 33.7   RDW 13.2        Recent Labs   Lab Test 21  0909 21  1028    137   POTASSIUM 3.1* 2.8*   CHLORIDE 101 100   CO2 35* 33*   ANIONGAP 3 4   * 85   BUN 10 10   CR 0.71 0.71   CHANDAN 9.1 9.0     Lab Results   Component Value Date    AST 23 2021     Lab Results    Component Value Date    ALT 31 06/01/2021     Lab Results   Component Value Date    BILICONJ 0.0 12/06/2009      Lab Results   Component Value Date    BILITOTAL 0.8 06/01/2021     Lab Results   Component Value Date    ALBUMIN 3.9 06/01/2021     Lab Results   Component Value Date    PROTTOTAL 6.9 06/01/2021      Lab Results   Component Value Date    ALKPHOS 73 06/01/2021     Component      Latest Ref Rng & Units 6/1/2021   Lactate Dehydrogenase      81 - 234 U/L 232           PATHOLOGY:  Bone marrow biopsy 2/2/21:  FINAL DIAGNOSIS:   Bone marrow aspirate, biopsy, clot section, and peripheral blood:     - Chronic lymphocytic leukemia (CLL), kappa light chain restricted,   involving approximately 40% of the marrow   cellularity.  See comment.   - Hypercellular bone marrow with trilineage hematopoiesis.   - Cytogenetic studies are pending and results will be reported separately   by the performing laboratory.   - Peripheral blood with mild leukocytosis and absolute lymphocytosis.     COMMENT: These findings support B-cell lymphoma/leukemia morphologically   and immunophenotypically consistent   with chronic lymphocytic leukemia (CLL).  Cytogenetic studies are pending.     The degree of marrow involvement   by the CLL in this case varies by method (manual aspirate differential,   flow cytometry, and   immunohistochemistry on both the core and clot sections) but overall is   estimated to involve approximately 40%   of the marrow cellularity.     FISH:  RESULTS:   ABNORMAL   - Loss of B30Q122 (13q14.3) (56%)     NORMAL   - No losses of MYB (6q), FRANCIS (11q) or TP53 (17p)   - No gain of chromosome 12   - No IGH-CCND1 gene fusion     INTERPRETATION:   These findings are consistent with a diagnosis of chronic lymphocytic   leukemia/small lymphocytic lymphoma.   Loss of 13q14 is a well-documented albeit nonspecific abnormality in   CLL/SLL.     INTERPRETATION:   Bone Marrow, Left:     CD5-positive kappa-monotypic B cells (38%)         See comment     COMMENT:   The immunophenotypic findings are consistent with the patient's recently   diagnosed chronic lymphocytic   leukemia (CLL). Final interpretation requires correlation with results of   other ancillary studies,   morphologic, and clinical features.       IMAGING:  CT c/a/p 2/3/21:  1.  Scattered mildly prominent lymph nodes in the chest, abdomen, and  pelvis. No lymphadenopathy by size criteria. Normal spleen.         ASSESSMENT/PLAN:  Karen Caban is a 70 year old female with:    1) Chronic lymphocytic leukemia: MASSEY stage 0. She has a mild lymphocytosis, no anemia, no thrombocytopenia, no B-symptoms.  CT c/a/p on 2/3/21 was done.  There is no lymphadenopathy, and spleen is normal.  Bone marrow biopsy was done on 2/2/21, which showed CLL, kappa light chain restricted, involving ~40% of the marrow cellularity.  FISH found loss of 13q (favorable).  No loss of 11q or 17p, no gain of chromosome 12.  No IGH-CCND1 fusion.      We discussed the diagnosis and staging.  There is no indication for treatment at this point.  She would be appropriate for watch-and-wait approach.  We discussed things to watch for that might indicate treatment is needed, including worsening cytopenias, worsening splenomegaly, progressive or symptomatic lymphadenopathy, lymphocyte doubling time of <6 months, constitutional symptoms.    Patient expressed understanding, and we will continue to monitor her labs.    CBC/diff on 6/1/21 is overall stable.  Lymphocyte count may be up a bit due to recent infection.      -RTC in ~6 months with CBC/diff, CMP, LDH.      2) Polycythemia: suspect that it is secondary to her smoking. Stable.  -advised smoking cessation    3) Pulmonary nodules: long history of smoking and current smoker  -following with lung nodule clinic    4) Hypokalemia: mild  -replaced  -follow-up with PCP      Maddie Rowland MD  Hematology/Oncology  [facility]      Phone call  duration: 5 minutes    Total time spent on day of visit, including review of tests, obtaining/reviewing separately obtained history, ordering medications/tests/procedures, communicating with PCP/consultants, and documenting in electronic medical record: 20 minutes          Again, thank you for allowing me to participate in the care of your patient.        Sincerely,        Maddie Rowland MD

## 2021-06-02 NOTE — LETTER
6/2/2021         RE: Karen Caban  [address]  [facility] [address]        Dear Colleague,    Thank you for referring your patient, Karen Caban, to the [facility] [place]. Please see a copy of my visit note below.    Humaira is a 70 year old who is being evaluated via a billable telephone visit.      What phone number would you like to be contacted at? # [phone]  How would you like to obtain your AVS? Mail a copy       Iram Cannon CMA        [facility]    Hematology/Oncology Established Patient Note      Today's Date: 06/02/21    Reason for Follow-up: CLL      HISTORY OF PRESENT ILLNESS: Karen Caban is a 70 year old female with PMHx of PAD s/p fem pop bypass, chronic back pain, HTN, smoking history, who presents with lymphocytosis.   She saw her PCP in December 2020 for a sinus infection.  She took antibiotics, and the infection has resolved.  She did not take steroids.  On lab evaluation, she was found to have elevated WBC of 16.  Repeat CBC in January 2021 showed WBC of 18.7, with ALC of 13.8.  There was note of smudge cells and atypical lymphocytes.  Peripheral flow showed CD5-positive kappa monotypic B-cells, favoring CLL.      She doesn't get frequent infections.  She denies fever/chills, night sweats, unintentional weight loss, lymphadenopathy.        INTERIM HISTORY: Karen says that she feels fine.  She notes that she had a UTI about a week and half ago.  She went to urgent care, finished antibiotics about 4 days ago, and it is completely resolved.          REVIEW OF SYSTEMS:   14 point ROS was reviewed and is negative other than as noted above in HPI.       HOME MEDICATIONS:  Current Outpatient Medications   Medication Sig Dispense Refill     albuterol (PROAIR RESPICLICK) 108 (90 Base) MCG/ACT inhaler Inhale 1-2 puffs into the lungs every 4 hours as needed 1 each 3     amoxicillin-clavulanate (AUGMENTIN) 875-125 MG tablet  Take 1 tablet by mouth 2 times daily 20 tablet 0     aspirin (ASA) 81 MG chewable tablet Take 81 mg by mouth       atorvastatin (LIPITOR) 40 MG tablet Take 1 tablet (40 mg) by mouth daily 90 tablet 3     chlorthalidone (HYGROTON) 25 MG tablet Take 1 tablet (25 mg) by mouth daily 90 tablet 3     Cholecalciferol (VITAMIN D-3) 5000 UNIT TABS Take 1 tablet by mouth daily.       clonazePAM (KLONOPIN) 0.5 MG ODT DISSOLVE 1 TABLET(0.5 MG) ON THE TONGUE TWICE DAILY AS NEEDED FOR ANXIETY 60 tablet 0     coenzyme Q-10 200 MG CAPS        Cyanocobalamin (B-12) 1000 MCG TBCR Take 1,000 mcg by mouth daily 100 tablet 1     estradiol (ESTRACE VAGINAL) 0.1 MG/GM vaginal cream INSERT 1 GRAM VAGINALLY TWICE TO THREE TIMES PER WEEK 42.5 g 3     Lactobacillus (PROBIOTIC ACIDOPHILUS PO)        metoprolol succinate ER (TOPROL-XL) 100 MG 24 hr tablet Take 1.5 tablets (150 mg) by mouth daily 135 tablet 1     omeprazole (PRILOSEC) 40 MG DR capsule TAKE 1 CAPSULE BY MOUTH EVERY DAY 30 TO 60 MINUTES BEFORE A MEAL 90 capsule 3     vitamin D3 (CHOLECALCIFEROL) 125 MCG (5000 UT) tablet Take 5,000 Units by mouth           ALLERGIES:  Allergies   Allergen Reactions     Chantix [Varenicline] Nausea     Ciprofloxacin Other (See Comments)     hypertension     Clopidogrel      Other reaction(s): Hypertension     Decongestant [Cvs]      Erythromycin Nausea     Lisinopril      cough     Plavix [Clopidogrel Bisulfate]      Felt as if she was having a heart attack     Rofecoxib Unknown     Vioxx          PAST MEDICAL HISTORY:  Past Medical History:   Diagnosis Date     Ankle fracture 2009    L     Anxiety      Chronic back pain      Colon polyp 2012    repeat colonoscopy 5 years.     Fx low femur epiphy-closed (H)      GERD (gastroesophageal reflux disease)      History of UTI 2017    Cysto by Dr Agustin neg     HTN (hypertension), benign      Hyperlipidemia LDL goal < 100      Normal nuclear stress test 12/09    EF 67%     Osteopenia      PAD (peripheral  artery disease) (H)     s/p fem pop bypass; embolectomy     PUD (peptic ulcer disease) 1980s    DU     Smoker      Vitamin D deficiency          PAST SURGICAL HISTORY:  Past Surgical History:   Procedure Laterality Date     Blood clot removal from Stent      2011     BONE MARROW BIOPSY, BONE SPECIMEN, NEEDLE/TROCAR N/A 2/2/2021    Procedure: bone marrow biopsy;  Surgeon: Kailey Cota MD;  Location:  GI     BYPASS GRAFT AORTOFEMORAL  5/02    Dr. Turner     BYPASS GRAFT FEMOROPOPLITEAL  12/16/2011     COLONOSCOPY N/A 1/18/2018    Procedure: COMBINED COLONOSCOPY, SINGLE OR MULTIPLE BIOPSY/POLYPECTOMY BY BIOPSY;  COLONOSCOPY;  Surgeon: Efrain Hernadez MD;  Location:  GI     EMBOLECTOMY LOWER EXTREMITY  12/16/2011    Procedure:EMBOLECTOMY LOWER EXTREMITY; EMBOLECTOMY, RIGHT POPLITEAL WITH PATCH ANGIOPLASTY. EXCISION SKIN LESION RIGHT LEG. ; Surgeon:PARMINDER VELAZCO; Location: OR     EXCISE LESION LOWER EXTREMITY  12/16/2011    Procedure:EXCISE LESION LOWER EXTREMITY; Surgeon:PARMINDER VELAZCO; Location: OR     HERNIA REPAIR  11/4/08    x2     L achilles repair  12/4/08     LAPAROSCOPIC OOPHORECTOMY      L for cyst     miscarriages x 3       tubal ligation and reversal           SOCIAL HISTORY:  Social History     Socioeconomic History     Marital status:      Spouse name: Not on file     Number of children: Not on file     Years of education: Not on file     Highest education level: Not on file   Occupational History     Not on file   Social Needs     Financial resource strain: Not on file     Food insecurity     Worry: Not on file     Inability: Not on file     Transportation needs     Medical: Not on file     Non-medical: Not on file   Tobacco Use     Smoking status: Current Every Day Smoker     Packs/day: 1.00     Years: 50.00     Pack years: 50.00     Types: Cigarettes     Smokeless tobacco: Never Used     Tobacco comment: Nicorette gum and patch, trying to quit 2021    Substance and Sexual Activity     Alcohol use: Yes     Alcohol/week: 4.0 - 5.0 standard drinks     Frequency: 2-3 times a week     Drinks per session: 1 or 2     Binge frequency: Never     Comment: Beer once in a while     Drug use: No     Sexual activity: Not Currently     Partners: Male   Lifestyle     Physical activity     Days per week: Not on file     Minutes per session: Not on file     Stress: Not on file   Relationships     Social connections     Talks on phone: Not on file     Gets together: Not on file     Attends Anabaptist service: Not on file     Active member of club or organization: Not on file     Attends meetings of clubs or organizations: Not on file     Relationship status: Not on file     Intimate partner violence     Fear of current or ex partner: Not on file     Emotionally abused: Not on file     Physically abused: Not on file     Forced sexual activity: Not on file   Other Topics Concern     Parent/sibling w/ CABG, MI or angioplasty before 65F 55M? 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  Had 3 miscarriages.  No living children.   is Dust[place].  Walks 2miles per day.         FAMILY HISTORY:  Family History   Problem Relation Age of Onset     Alzheimer Disease Mother          of panc/GI ca age 83     Osteoporosis Mother         with hip fx     Other Cancer Mother      Cancer Father          age 64 lymphoma     Colon Cancer Maternal Grandfather          PHYSICAL EXAM:  Phone visit.      LABS:  CBC RESULTS:   Recent Labs   Lab Test 21  0909   WBC 17.7*   RBC 5.01   HGB 15.9*   HCT 47.2*   MCV 94   MCH 31.7   MCHC 33.7   RDW 13.2        Recent Labs   Lab Test 21  0909 21  1028    137   POTASSIUM 3.1* 2.8*   CHLORIDE 101 100   CO2 35* 33*   ANIONGAP 3 4   * 85   BUN 10 10   CR 0.71 0.71   CHANDAN 9.1 9.0     Lab Results   Component Value Date    AST 23 2021     Lab Results    Component Value Date    ALT 31 06/01/2021     Lab Results   Component Value Date    BILICONJ 0.0 12/06/2009      Lab Results   Component Value Date    BILITOTAL 0.8 06/01/2021     Lab Results   Component Value Date    ALBUMIN 3.9 06/01/2021     Lab Results   Component Value Date    PROTTOTAL 6.9 06/01/2021      Lab Results   Component Value Date    ALKPHOS 73 06/01/2021     Component      Latest Ref Rng & Units 6/1/2021   Lactate Dehydrogenase      81 - 234 U/L 232           PATHOLOGY:  Bone marrow biopsy 2/2/21:  FINAL DIAGNOSIS:   Bone marrow aspirate, biopsy, clot section, and peripheral blood:     - Chronic lymphocytic leukemia (CLL), kappa light chain restricted,   involving approximately 40% of the marrow   cellularity.  See comment.   - Hypercellular bone marrow with trilineage hematopoiesis.   - Cytogenetic studies are pending and results will be reported separately   by the performing laboratory.   - Peripheral blood with mild leukocytosis and absolute lymphocytosis.     COMMENT: These findings support B-cell lymphoma/leukemia morphologically   and immunophenotypically consistent   with chronic lymphocytic leukemia (CLL).  Cytogenetic studies are pending.     The degree of marrow involvement   by the CLL in this case varies by method (manual aspirate differential,   flow cytometry, and   immunohistochemistry on both the core and clot sections) but overall is   estimated to involve approximately 40%   of the marrow cellularity.     FISH:  RESULTS:   ABNORMAL   - Loss of [id number] (13q14.3) (56%)     NORMAL   - No losses of MYB (6q), FRANCIS (11q) or TP53 (17p)   - No gain of chromosome 12   - No IGH-CCND1 gene fusion     INTERPRETATION:   These findings are consistent with a diagnosis of chronic lymphocytic   leukemia/small lymphocytic lymphoma.   Loss of 13q14 is a well-documented albeit nonspecific abnormality in   CLL/SLL.     INTERPRETATION:   Bone Marrow, Left:     CD5-positive kappa-monotypic B cells (38%)         See comment     COMMENT:   The immunophenotypic findings are consistent with the patient's recently   diagnosed chronic lymphocytic   leukemia (CLL). Final interpretation requires correlation with results of   other ancillary studies,   morphologic, and clinical features.       IMAGING:  CT c/a/p 2/3/21:  1.  Scattered mildly prominent lymph nodes in the chest, abdomen, and  pelvis. No lymphadenopathy by size criteria. Normal spleen.         ASSESSMENT/PLAN:  Karen Caban is a 70 year old female with:    1) Chronic lymphocytic leukemia: MASSEY stage 0. She has a mild lymphocytosis, no anemia, no thrombocytopenia, no B-symptoms.  CT c/a/p on 2/3/21 was done.  There is no lymphadenopathy, and spleen is normal.  Bone marrow biopsy was done on 2/2/21, which showed CLL, kappa light chain restricted, involving ~40% of the marrow cellularity.  FISH found loss of 13q (favorable).  No loss of 11q or 17p, no gain of chromosome 12.  No IGH-CCND1 fusion.      We discussed the diagnosis and staging.  There is no indication for treatment at this point.  She would be appropriate for watch-and-wait approach.  We discussed things to watch for that might indicate treatment is needed, including worsening cytopenias, worsening splenomegaly, progressive or symptomatic lymphadenopathy, lymphocyte doubling time of <6 months, constitutional symptoms.    Patient expressed understanding, and we will continue to monitor her labs.    CBC/diff on 6/1/21 is overall stable.  Lymphocyte count may be up a bit due to recent infection.      -RTC in ~6 months with CBC/diff, CMP, LDH.      2) Polycythemia: suspect that it is secondary to her smoking. Stable.  -advised smoking cessation    3) Pulmonary nodules: long history of smoking and current smoker  -following with lung nodule clinic    4) Hypokalemia: mild  -replaced  -follow-up with PCP      Maddie Rowland MD  Hematology/Oncology  [facility]      Phone call  duration: 5 minutes    Total time spent on day of visit, including review of tests, obtaining/reviewing separately obtained history, ordering medications/tests/procedures, communicating with PCP/consultants, and documenting in electronic medical record: 20 minutes          Again, thank you for allowing me to participate in the care of your patient.        Sincerely,        Maddie Rowland MD

## 2021-06-12 DIAGNOSIS — I10 HTN (HYPERTENSION), BENIGN: ICD-10-CM

## 2021-06-12 NOTE — TELEPHONE ENCOUNTER
Metoprolol ER succinate 100 mg tablet    Summary: Take 1.5 tablets (150 mg) by mouth daily, Disp-135 tablet, R-1, E-Prescribe  Profile only, fill when pt calls     Dose, Route, Frequency: 150 mg, Oral, DAILY  Start: 12/22/2020  Ord/Sold: 12/22/2020

## 2021-06-14 RX ORDER — METOPROLOL SUCCINATE 100 MG/1
150 TABLET, EXTENDED RELEASE ORAL DAILY
Qty: 135 TABLET | Refills: 0 | Status: SHIPPED | OUTPATIENT
Start: 2021-06-14 | End: 2021-09-13

## 2021-08-16 DIAGNOSIS — F41.9 ANXIETY: ICD-10-CM

## 2021-08-16 RX ORDER — CLONAZEPAM 0.5 MG/1
TABLET, ORALLY DISINTEGRATING ORAL
Qty: 60 TABLET | Refills: 0 | Status: SHIPPED | OUTPATIENT
Start: 2021-08-16 | End: 2021-10-27

## 2021-08-16 NOTE — TELEPHONE ENCOUNTER
Pending Prescriptions:                       Disp   Refills    clonazePAM (KLONOPIN) 0.5 MG ODT [Pharmac*60 tab*             Sig: DISSOLVE 1 TABLET(0.5 MG) ON THE TONGUE TWICE           DAILY AS NEEDED FOR ANXIETY          Last Written Prescription Date:  4-8-2021  Last Fill Quantity: 60,   # refills: 0  Last Office Visit: 12- LL for routine physical  Future Office visit:   None    Routing refill request to provider for review/approval because:  Drug not on the [facility] or [facility] refill protocol or controlled substance    RT Shamika (R)

## 2021-09-04 ENCOUNTER — HEALTH MAINTENANCE LETTER (OUTPATIENT)
Age: 71
End: 2021-09-04

## 2021-09-10 DIAGNOSIS — I10 HTN (HYPERTENSION), BENIGN: ICD-10-CM

## 2021-09-13 RX ORDER — METOPROLOL SUCCINATE 100 MG/1
150 TABLET, EXTENDED RELEASE ORAL DAILY
Qty: 135 TABLET | Refills: 0 | Status: SHIPPED | OUTPATIENT
Start: 2021-09-13 | End: 2021-12-17

## 2021-10-22 DIAGNOSIS — K21.9 GASTROESOPHAGEAL REFLUX DISEASE WITHOUT ESOPHAGITIS: ICD-10-CM

## 2021-10-25 RX ORDER — OMEPRAZOLE 40 MG/1
CAPSULE, DELAYED RELEASE ORAL
Qty: 90 CAPSULE | Refills: 3 | OUTPATIENT
Start: 2021-10-25

## 2021-10-27 DIAGNOSIS — F41.9 ANXIETY: ICD-10-CM

## 2021-10-27 RX ORDER — CLONAZEPAM 0.5 MG/1
TABLET, ORALLY DISINTEGRATING ORAL
Qty: 60 TABLET | Refills: 0 | Status: SHIPPED | OUTPATIENT
Start: 2021-10-27 | End: 2022-01-04

## 2021-10-27 NOTE — TELEPHONE ENCOUNTER
Routing refill request to provider for review/approval because:  Drug not on the [facility] refill protocol   Marylou Sandoval RN

## 2021-11-30 ENCOUNTER — LAB (OUTPATIENT)
Dept: INFUSION THERAPY | Facility: CLINIC | Age: 71
End: 2021-11-30
Attending: INTERNAL MEDICINE
Payer: COMMERCIAL

## 2021-11-30 DIAGNOSIS — C91.10 CLL (CHRONIC LYMPHOCYTIC LEUKEMIA) (H): ICD-10-CM

## 2021-11-30 LAB
ALBUMIN SERPL-MCNC: 3.5 G/DL (ref 3.4–5)
ALP SERPL-CCNC: 57 U/L (ref 40–150)
ALT SERPL W P-5'-P-CCNC: 33 U/L (ref 0–50)
ANION GAP SERPL CALCULATED.3IONS-SCNC: 4 MMOL/L (ref 3–14)
AST SERPL W P-5'-P-CCNC: 27 U/L (ref 0–45)
BASOPHILS # BLD MANUAL: 0 10E3/UL (ref 0–0.2)
BASOPHILS NFR BLD MANUAL: 0 %
BILIRUB SERPL-MCNC: 0.7 MG/DL (ref 0.2–1.3)
BUN SERPL-MCNC: 12 MG/DL (ref 7–30)
CALCIUM SERPL-MCNC: 8.8 MG/DL (ref 8.5–10.1)
CHLORIDE BLD-SCNC: 102 MMOL/L (ref 94–109)
CO2 SERPL-SCNC: 31 MMOL/L (ref 20–32)
CREAT SERPL-MCNC: 0.67 MG/DL (ref 0.52–1.04)
EOSINOPHIL # BLD MANUAL: 0.3 10E3/UL (ref 0–0.7)
EOSINOPHIL NFR BLD MANUAL: 2 %
ERYTHROCYTE [DISTWIDTH] IN BLOOD BY AUTOMATED COUNT: 14.2 % (ref 10–15)
GFR SERPL CREATININE-BSD FRML MDRD: 90 ML/MIN/1.73M2
GLUCOSE BLD-MCNC: 101 MG/DL (ref 70–99)
HCT VFR BLD AUTO: 45.9 % (ref 35–47)
HGB BLD-MCNC: 15.1 G/DL (ref 11.7–15.7)
LDH SERPL L TO P-CCNC: 302 U/L (ref 81–234)
LYMPHOCYTES # BLD MANUAL: 10.8 10E3/UL (ref 0.8–5.3)
LYMPHOCYTES NFR BLD MANUAL: 68 %
MCH RBC QN AUTO: 30.2 PG (ref 26.5–33)
MCHC RBC AUTO-ENTMCNC: 32.9 G/DL (ref 31.5–36.5)
MCV RBC AUTO: 92 FL (ref 78–100)
MONOCYTES # BLD MANUAL: 0.6 10E3/UL (ref 0–1.3)
MONOCYTES NFR BLD MANUAL: 4 %
NEUTROPHILS # BLD MANUAL: 4.1 10E3/UL (ref 1.6–8.3)
NEUTROPHILS NFR BLD MANUAL: 26 %
NRBC # BLD AUTO: 0.2 10E3/UL
NRBC BLD MANUAL-RTO: 1 %
PLAT MORPH BLD: ABNORMAL
PLATELET # BLD AUTO: 187 10E3/UL (ref 150–450)
POTASSIUM BLD-SCNC: 3.6 MMOL/L (ref 3.4–5.3)
PROT SERPL-MCNC: 6.5 G/DL (ref 6.8–8.8)
RBC # BLD AUTO: 5 10E6/UL (ref 3.8–5.2)
RBC MORPH BLD: ABNORMAL
SMUDGE CELLS BLD QL SMEAR: PRESENT
SODIUM SERPL-SCNC: 137 MMOL/L (ref 133–144)
WBC # BLD AUTO: 15.9 10E3/UL (ref 4–11)

## 2021-11-30 PROCEDURE — 36415 COLL VENOUS BLD VENIPUNCTURE: CPT

## 2021-11-30 PROCEDURE — 85027 COMPLETE CBC AUTOMATED: CPT | Performed by: INTERNAL MEDICINE

## 2021-11-30 PROCEDURE — 83615 LACTATE (LD) (LDH) ENZYME: CPT | Performed by: INTERNAL MEDICINE

## 2021-11-30 PROCEDURE — 80053 COMPREHEN METABOLIC PANEL: CPT | Performed by: INTERNAL MEDICINE

## 2021-11-30 NOTE — PROGRESS NOTES
Medical Assistant Note:  Karen Caban presents today for lab draw.    Patient seen by provider today: No.   present during visit today: Not Applicable.    Concerns: No Concerns.    Procedure:  Lab draw site: RAC, Needle type: Butterfly, Gauge: 23.    Post Assessment:  Labs drawn without difficulty: Yes.    Discharge Plan:  Departure Mode: Ambulatory.    Face to Face Time: 4 min.    Shari Schoenberger, CMA

## 2021-12-03 ENCOUNTER — HOSPITAL ENCOUNTER (OUTPATIENT)
Dept: CT IMAGING | Facility: CLINIC | Age: 71
Discharge: HOME | End: 2021-12-03
Attending: INTERNAL MEDICINE | Admitting: INTERNAL MEDICINE
Payer: COMMERCIAL

## 2021-12-03 ENCOUNTER — VIRTUAL VISIT (OUTPATIENT)
Dept: ONCOLOGY | Facility: CLINIC | Age: 71
End: 2021-12-03
Attending: INTERNAL MEDICINE
Payer: COMMERCIAL

## 2021-12-03 DIAGNOSIS — C91.10 CLL (CHRONIC LYMPHOCYTIC LEUKEMIA) (H): Primary | ICD-10-CM

## 2021-12-03 DIAGNOSIS — R91.1 LUNG NODULE: ICD-10-CM

## 2021-12-03 PROCEDURE — 71250 CT THORAX DX C-: CPT

## 2021-12-03 PROCEDURE — 99441 PR PHYSICIAN TELEPHONE EVALUATION 5-10 MIN: CPT | Performed by: INTERNAL MEDICINE

## 2021-12-03 NOTE — LETTER
12/3/2021         RE: Karen Caban  [address]  [address]  [place] [address]        Dear Colleague,    Thank you for referring your patient, Karen Caban, to the [facility]. Please see a copy of my visit note below.    Humaira is a 71 year old who is being evaluated via a billable telephone visit.      What phone number would you like to be contacted at? [phone]  How would you like to obtain your AVS? Guillermo       [facility]    Hematology/Oncology Established Patient Note      Today's Date: 12/03/21    Reason for Follow-up: CLL      HISTORY OF PRESENT ILLNESS: Karen Caban is a 71 year old female with PMHx of PAD s/p fem pop bypass, chronic back pain, HTN, smoking history, who presents with lymphocytosis.   She saw her PCP in December 2020 for a sinus infection.  She took antibiotics, and the infection has resolved.  She did not take steroids.  On lab evaluation, she was found to have elevated WBC of 16.  Repeat CBC in January 2021 showed WBC of 18.7, with ALC of 13.8.  There was note of smudge cells and atypical lymphocytes.  Peripheral flow showed CD5-positive kappa monotypic B-cells, favoring CLL.      She doesn't get frequent infections.  She denies fever/chills, night sweats, unintentional weight loss, lymphadenopathy.        INTERIM HISTORY: Humaira says that she is feeling very good.  She has no complaints today.        REVIEW OF SYSTEMS:   14 point ROS was reviewed and is negative other than as noted above in HPI.       HOME MEDICATIONS:  Current Outpatient Medications   Medication Sig Dispense Refill     albuterol (PROAIR RESPICLICK) 108 (90 Base) MCG/ACT inhaler Inhale 1-2 puffs into the lungs every 4 hours as needed 1 each 3     aspirin (ASA) 81 MG chewable tablet Take 81 mg by mouth       atorvastatin (LIPITOR) 40 MG tablet Take 1 tablet (40 mg) by mouth daily 90 tablet 3     chlorthalidone (HYGROTON) 25 MG tablet Take 1 tablet (25  mg) by mouth daily 90 tablet 3     Cholecalciferol (VITAMIN D-3) 5000 UNIT TABS Take 1 tablet by mouth daily.       clonazePAM (KLONOPIN) 0.5 MG ODT DISSOLVE 1 TABLET(0.5 MG) ON THE TONGUE TWICE DAILY AS NEEDED FOR ANXIETY 60 tablet 0     coenzyme Q-10 200 MG CAPS        Cyanocobalamin (B-12) 1000 MCG TBCR Take 1,000 mcg by mouth daily 100 tablet 1     estradiol (ESTRACE VAGINAL) 0.1 MG/GM vaginal cream INSERT 1 GRAM VAGINALLY TWICE TO THREE TIMES PER WEEK 42.5 g 3     Lactobacillus (PROBIOTIC ACIDOPHILUS PO)        metoprolol succinate ER (TOPROL-XL) 100 MG 24 hr tablet Take 1.5 tablets (150 mg) by mouth daily 135 tablet 0     omeprazole (PRILOSEC) 40 MG DR capsule TAKE 1 CAPSULE BY MOUTH EVERY DAY 30 TO 60 MINUTES BEFORE A MEAL 90 capsule 3     vitamin D3 (CHOLECALCIFEROL) 125 MCG (5000 UT) tablet Take 5,000 Units by mouth       amoxicillin-clavulanate (AUGMENTIN) 875-125 MG tablet Take 1 tablet by mouth 2 times daily (Patient not taking: Reported on 12/3/2021) 20 tablet 0         ALLERGIES:  Allergies   Allergen Reactions     Chantix [Varenicline] Nausea     Ciprofloxacin Other (See Comments)     hypertension     Clopidogrel      Other reaction(s): Hypertension     Decongestant [Cvs]      Erythromycin Nausea     Lisinopril      cough     Plavix [Clopidogrel Bisulfate]      Felt as if she was having a heart attack     Rofecoxib Unknown     Vioxx          PAST MEDICAL HISTORY:  Past Medical History:   Diagnosis Date     Ankle fracture 2009    L     Anxiety      Chronic back pain      Colon polyp 2012    repeat colonoscopy 5 years.     Fx low femur epiphy-closed (H)      GERD (gastroesophageal reflux disease)      History of UTI 2017    Cysto by Dr Agustin neg     HTN (hypertension), benign      Hyperlipidemia LDL goal < 100      Normal nuclear stress test 12/09    EF 67%     Osteopenia      PAD (peripheral artery disease) (H)     s/p fem pop bypass; embolectomy     PUD (peptic ulcer disease) 1980s    DU     Smoker       Vitamin D deficiency          PAST SURGICAL HISTORY:  Past Surgical History:   Procedure Laterality Date     Blood clot removal from Stent      2011     BONE MARROW BIOPSY, BONE SPECIMEN, NEEDLE/TROCAR N/A 2/2/2021    Procedure: bone marrow biopsy;  Surgeon: Kailey Cota MD;  Location:  GI     BYPASS GRAFT AORTOFEMORAL  5/02    Dr. Turner     BYPASS GRAFT FEMOROPOPLITEAL  12/16/2011     COLONOSCOPY N/A 1/18/2018    Procedure: COMBINED COLONOSCOPY, SINGLE OR MULTIPLE BIOPSY/POLYPECTOMY BY BIOPSY;  COLONOSCOPY;  Surgeon: Efrain Hernadez MD;  Location:  GI     EMBOLECTOMY LOWER EXTREMITY  12/16/2011    Procedure:EMBOLECTOMY LOWER EXTREMITY; EMBOLECTOMY, RIGHT POPLITEAL WITH PATCH ANGIOPLASTY. EXCISION SKIN LESION RIGHT LEG. ; Surgeon:PARMINDER VELAZCO; Location: OR     EXCISE LESION LOWER EXTREMITY  12/16/2011    Procedure:EXCISE LESION LOWER EXTREMITY; Surgeon:PARMINDER VELAZCO; Location: OR     HERNIA REPAIR  11/4/08    x2     L achilles repair  12/4/08     LAPAROSCOPIC OOPHORECTOMY      L for cyst     miscarriages x 3       tubal ligation and reversal           SOCIAL HISTORY:  Social History     Socioeconomic History     Marital status:      Spouse name: Not on file     Number of children: Not on file     Years of education: Not on file     Highest education level: Not on file   Occupational History     Not on file   Tobacco Use     Smoking status: Current Every Day Smoker     Packs/day: 1.00     Years: 50.00     Pack years: 50.00     Types: Cigarettes     Smokeless tobacco: Never Used     Tobacco comment: Nicorette gum and patch, trying to quit 2021   Substance and Sexual Activity     Alcohol use: Yes     Alcohol/week: 4.0 - 5.0 standard drinks     Comment: Beer once in a while     Drug use: No     Sexual activity: Not Currently     Partners: Male   Other Topics Concern     Parent/sibling w/ CABG, MI or angioplasty before 65F 55M? Not Asked   Social History  Narrative    , working FT for a [employer] company.  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  Had 3 miscarriages.  No living children.   is Dustin.  Walks 2miles per day.     Social Determinants of Health     Financial Resource Strain: Not on file   Food Insecurity: Not on file   Transportation Needs: Not on file   Physical Activity: Not on file   Stress: Not on file   Social Connections: Not on file   Intimate Partner Violence: Not on file   Housing Stability: Not on file         FAMILY HISTORY:  Family History   Problem Relation Age of Onset     Alzheimer Disease Mother          of panc/GI ca age 83     Osteoporosis Mother         with hip fx     Other Cancer Mother      Cancer Father          age 64 lymphoma     Colon Cancer Maternal Grandfather          PHYSICAL EXAM:  Phone visit.      LABS:  CBC RESULTS: Recent Labs   Lab Test 21  0900   WBC 15.9*   RBC 5.00   HGB 15.1   HCT 45.9   MCV 92   MCH 30.2   MCHC 32.9   RDW 14.2        Recent Labs   Lab Test 21  0900 21  0909    139   POTASSIUM 3.6 3.1*   CHLORIDE 102 101   CO2 31 35*   ANIONGAP 4 3   * 105*   BUN 12 10   CR 0.67 0.71   CHANDAN 8.8 9.1     Lab Results   Component Value Date    AST 27 2021    AST 23 2021     Lab Results   Component Value Date    ALT 33 2021    ALT 31 2021     Lab Results   Component Value Date    BILICONJ 0.0 2009      Lab Results   Component Value Date    BILITOTAL 0.7 2021    BILITOTAL 0.8 2021     Lab Results   Component Value Date    ALBUMIN 3.5 2021    ALBUMIN 3.9 2021     Lab Results   Component Value Date    PROTTOTAL 6.5 2021    PROTTOTAL 6.9 2021      Lab Results   Component Value Date    ALKPHOS 57 2021    ALKPHOS 73 2021             PATHOLOGY:  Bone marrow biopsy 21:  FINAL DIAGNOSIS:   Bone marrow aspirate, biopsy, clot section, and peripheral blood:     - Chronic lymphocytic leukemia (CLL), kappa  light chain restricted,   involving approximately 40% of the marrow   cellularity.  See comment.   - Hypercellular bone marrow with trilineage hematopoiesis.   - Cytogenetic studies are pending and results will be reported separately   by the performing laboratory.   - Peripheral blood with mild leukocytosis and absolute lymphocytosis.     COMMENT: These findings support B-cell lymphoma/leukemia morphologically   and immunophenotypically consistent   with chronic lymphocytic leukemia (CLL).  Cytogenetic studies are pending.     The degree of marrow involvement   by the CLL in this case varies by method (manual aspirate differential,   flow cytometry, and   immunohistochemistry on both the core and clot sections) but overall is   estimated to involve approximately 40%   of the marrow cellularity.     FISH:  RESULTS:   ABNORMAL   - Loss of P65V214 (13q14.3) (56%)     NORMAL   - No losses of MYB (6q), FRANCIS (11q) or TP53 (17p)   - No gain of chromosome 12   - No IGH-CCND1 gene fusion     INTERPRETATION:   These findings are consistent with a diagnosis of chronic lymphocytic   leukemia/small lymphocytic lymphoma.   Loss of 13q14 is a well-documented albeit nonspecific abnormality in   CLL/SLL.     INTERPRETATION:   Bone Marrow, Left:     CD5-positive kappa-monotypic B cells (38%)        See comment     COMMENT:   The immunophenotypic findings are consistent with the patient's recently   diagnosed chronic lymphocytic   leukemia (CLL). Final interpretation requires correlation with results of   other ancillary studies,   morphologic, and clinical features.       IMAGING:  CT c/a/p 2/3/21:  1.  Scattered mildly prominent lymph nodes in the chest, abdomen, and  pelvis. No lymphadenopathy by size criteria. Normal spleen.     CT chest without contrast 12/3/21:  1.  Emphysema. Scattered diminutive pulmonary nodules are unchanged  from prior examinations and should be benign.        ASSESSMENT/PLAN:  Karen Caban is a 71  year old female with:    1) Chronic lymphocytic leukemia: MASSEY stage 0. She has a mild lymphocytosis, no anemia, no thrombocytopenia, no B-symptoms.  CT c/a/p on 2/3/21 was done.  There is no lymphadenopathy, and spleen is normal.  Bone marrow biopsy was done on 2/2/21, which showed CLL, kappa light chain restricted, involving ~40% of the marrow cellularity.  FISH found loss of 13q (favorable).  No loss of 11q or 17p, no gain of chromosome 12.  No IGH-CCND1 fusion.      We discussed the diagnosis and staging.  There is no indication for treatment at this point.  She would be appropriate for watch-and-wait approach.  We discussed things to watch for that might indicate treatment is needed, including worsening cytopenias, worsening splenomegaly, progressive or symptomatic lymphadenopathy, lymphocyte doubling time of <6 months, constitutional symptoms.    Patient expressed understanding, and we will continue to monitor her labs.    CBC/diff on 11/30/21 is overall stable.  LDH is up a bit, but may be non-specific.      -RTC in ~6 months with CBC/diff, CMP, LDH.      2) Polycythemia: suspect that it is secondary to her smoking. Stable.  -advised smoking cessation    3) Pulmonary nodules: long history of smoking and current smoker  -following with lung nodule clinic        Maddie Rowland MD  Hematology/Oncology  [facility]      Phone call duration: 5 minutes    Total time spent on day of visit, including review of tests, obtaining/reviewing separately obtained history, ordering medications/tests/procedures, communicating with PCP/consultants, and documenting in electronic medical record: 15 minutes            Again, thank you for allowing me to participate in the care of your patient.        Sincerely,        Maddie Rowland MD

## 2021-12-03 NOTE — LETTER
12/3/2021         RE: Karen Caban  [address]  [address]  [place] [address]        Dear Colleague,    Thank you for referring your patient, Karen Caban, to the [facility]. Please see a copy of my visit note below.    Humaira is a 71 year old who is being evaluated via a billable telephone visit.      What phone number would you like to be contacted at? [phone]  How would you like to obtain your AVS? Guillermo       [facility]    Hematology/Oncology Established Patient Note      Today's Date: 12/03/21    Reason for Follow-up: CLL      HISTORY OF PRESENT ILLNESS: Karen Caban is a 71 year old female with PMHx of PAD s/p fem pop bypass, chronic back pain, HTN, smoking history, who presents with lymphocytosis.   She saw her PCP in December 2020 for a sinus infection.  She took antibiotics, and the infection has resolved.  She did not take steroids.  On lab evaluation, she was found to have elevated WBC of 16.  Repeat CBC in January 2021 showed WBC of 18.7, with ALC of 13.8.  There was note of smudge cells and atypical lymphocytes.  Peripheral flow showed CD5-positive kappa monotypic B-cells, favoring CLL.      She doesn't get frequent infections.  She denies fever/chills, night sweats, unintentional weight loss, lymphadenopathy.        INTERIM HISTORY: Humaira says that she is feeling very good.  She has no complaints today.        REVIEW OF SYSTEMS:   14 point ROS was reviewed and is negative other than as noted above in HPI.       HOME MEDICATIONS:  Current Outpatient Medications   Medication Sig Dispense Refill     albuterol (PROAIR RESPICLICK) 108 (90 Base) MCG/ACT inhaler Inhale 1-2 puffs into the lungs every 4 hours as needed 1 each 3     aspirin (ASA) 81 MG chewable tablet Take 81 mg by mouth       atorvastatin (LIPITOR) 40 MG tablet Take 1 tablet (40 mg) by mouth daily 90 tablet 3     chlorthalidone (HYGROTON) 25 MG tablet Take 1 tablet (25  mg) by mouth daily 90 tablet 3     Cholecalciferol (VITAMIN D-3) 5000 UNIT TABS Take 1 tablet by mouth daily.       clonazePAM (KLONOPIN) 0.5 MG ODT DISSOLVE 1 TABLET(0.5 MG) ON THE TONGUE TWICE DAILY AS NEEDED FOR ANXIETY 60 tablet 0     coenzyme Q-10 200 MG CAPS        Cyanocobalamin (B-12) 1000 MCG TBCR Take 1,000 mcg by mouth daily 100 tablet 1     estradiol (ESTRACE VAGINAL) 0.1 MG/GM vaginal cream INSERT 1 GRAM VAGINALLY TWICE TO THREE TIMES PER WEEK 42.5 g 3     Lactobacillus (PROBIOTIC ACIDOPHILUS PO)        metoprolol succinate ER (TOPROL-XL) 100 MG 24 hr tablet Take 1.5 tablets (150 mg) by mouth daily 135 tablet 0     omeprazole (PRILOSEC) 40 MG DR capsule TAKE 1 CAPSULE BY MOUTH EVERY DAY 30 TO 60 MINUTES BEFORE A MEAL 90 capsule 3     vitamin D3 (CHOLECALCIFEROL) 125 MCG (5000 UT) tablet Take 5,000 Units by mouth       amoxicillin-clavulanate (AUGMENTIN) 875-125 MG tablet Take 1 tablet by mouth 2 times daily (Patient not taking: Reported on 12/3/2021) 20 tablet 0         ALLERGIES:  Allergies   Allergen Reactions     Chantix [Varenicline] Nausea     Ciprofloxacin Other (See Comments)     hypertension     Clopidogrel      Other reaction(s): Hypertension     Decongestant [Cvs]      Erythromycin Nausea     Lisinopril      cough     Plavix [Clopidogrel Bisulfate]      Felt as if she was having a heart attack     Rofecoxib Unknown     Vioxx          PAST MEDICAL HISTORY:  Past Medical History:   Diagnosis Date     Ankle fracture 2009    L     Anxiety      Chronic back pain      Colon polyp 2012    repeat colonoscopy 5 years.     Fx low femur epiphy-closed (H)      GERD (gastroesophageal reflux disease)      History of UTI 2017    Cysto by Dr Agustin neg     HTN (hypertension), benign      Hyperlipidemia LDL goal < 100      Normal nuclear stress test 12/09    EF 67%     Osteopenia      PAD (peripheral artery disease) (H)     s/p fem pop bypass; embolectomy     PUD (peptic ulcer disease) 1980s    DU     Smoker       Vitamin D deficiency          PAST SURGICAL HISTORY:  Past Surgical History:   Procedure Laterality Date     Blood clot removal from Stent      2011     BONE MARROW BIOPSY, BONE SPECIMEN, NEEDLE/TROCAR N/A 2/2/2021    Procedure: bone marrow biopsy;  Surgeon: Kailey Cota MD;  Location:  GI     BYPASS GRAFT AORTOFEMORAL  5/02    Dr. Turner     BYPASS GRAFT FEMOROPOPLITEAL  12/16/2011     COLONOSCOPY N/A 1/18/2018    Procedure: COMBINED COLONOSCOPY, SINGLE OR MULTIPLE BIOPSY/POLYPECTOMY BY BIOPSY;  COLONOSCOPY;  Surgeon: Efrain Hernadez MD;  Location:  GI     EMBOLECTOMY LOWER EXTREMITY  12/16/2011    Procedure:EMBOLECTOMY LOWER EXTREMITY; EMBOLECTOMY, RIGHT POPLITEAL WITH PATCH ANGIOPLASTY. EXCISION SKIN LESION RIGHT LEG. ; Surgeon:PARMINDER VELAZCO; Location: OR     EXCISE LESION LOWER EXTREMITY  12/16/2011    Procedure:EXCISE LESION LOWER EXTREMITY; Surgeon:PARMINDER VELAZCO; Location: OR     HERNIA REPAIR  11/4/08    x2     L achilles repair  12/4/08     LAPAROSCOPIC OOPHORECTOMY      L for cyst     miscarriages x 3       tubal ligation and reversal           SOCIAL HISTORY:  Social History     Socioeconomic History     Marital status:      Spouse name: Not on file     Number of children: Not on file     Years of education: Not on file     Highest education level: Not on file   Occupational History     Not on file   Tobacco Use     Smoking status: Current Every Day Smoker     Packs/day: 1.00     Years: 50.00     Pack years: 50.00     Types: Cigarettes     Smokeless tobacco: Never Used     Tobacco comment: Nicorette gum and patch, trying to quit 2021   Substance and Sexual Activity     Alcohol use: Yes     Alcohol/week: 4.0 - 5.0 standard drinks     Comment: Beer once in a while     Drug use: No     Sexual activity: Not Currently     Partners: Male   Other Topics Concern     Parent/sibling w/ CABG, MI or angioplasty before 65F 55M? Not Asked   Social History  Narrative    , working FT for a [employer] company.  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  Had 3 miscarriages.  No living children.   is Dustin.  Walks 2miles per day.     Social Determinants of Health     Financial Resource Strain: Not on file   Food Insecurity: Not on file   Transportation Needs: Not on file   Physical Activity: Not on file   Stress: Not on file   Social Connections: Not on file   Intimate Partner Violence: Not on file   Housing Stability: Not on file         FAMILY HISTORY:  Family History   Problem Relation Age of Onset     Alzheimer Disease Mother          of panc/GI ca age 83     Osteoporosis Mother         with hip fx     Other Cancer Mother      Cancer Father          age 64 lymphoma     Colon Cancer Maternal Grandfather          PHYSICAL EXAM:  Phone visit.      LABS:  CBC RESULTS: Recent Labs   Lab Test 21  0900   WBC 15.9*   RBC 5.00   HGB 15.1   HCT 45.9   MCV 92   MCH 30.2   MCHC 32.9   RDW 14.2        Recent Labs   Lab Test 21  0900 21  0909    139   POTASSIUM 3.6 3.1*   CHLORIDE 102 101   CO2 31 35*   ANIONGAP 4 3   * 105*   BUN 12 10   CR 0.67 0.71   CHANDAN 8.8 9.1     Lab Results   Component Value Date    AST 27 2021    AST 23 2021     Lab Results   Component Value Date    ALT 33 2021    ALT 31 2021     Lab Results   Component Value Date    BILICONJ 0.0 2009      Lab Results   Component Value Date    BILITOTAL 0.7 2021    BILITOTAL 0.8 2021     Lab Results   Component Value Date    ALBUMIN 3.5 2021    ALBUMIN 3.9 2021     Lab Results   Component Value Date    PROTTOTAL 6.5 2021    PROTTOTAL 6.9 2021      Lab Results   Component Value Date    ALKPHOS 57 2021    ALKPHOS 73 2021             PATHOLOGY:  Bone marrow biopsy 21:  FINAL DIAGNOSIS:   Bone marrow aspirate, biopsy, clot section, and peripheral blood:     - Chronic lymphocytic leukemia (CLL), kappa  light chain restricted,   involving approximately 40% of the marrow   cellularity.  See comment.   - Hypercellular bone marrow with trilineage hematopoiesis.   - Cytogenetic studies are pending and results will be reported separately   by the performing laboratory.   - Peripheral blood with mild leukocytosis and absolute lymphocytosis.     COMMENT: These findings support B-cell lymphoma/leukemia morphologically   and immunophenotypically consistent   with chronic lymphocytic leukemia (CLL).  Cytogenetic studies are pending.     The degree of marrow involvement   by the CLL in this case varies by method (manual aspirate differential,   flow cytometry, and   immunohistochemistry on both the core and clot sections) but overall is   estimated to involve approximately 40%   of the marrow cellularity.     FISH:  RESULTS:   ABNORMAL   - Loss of B35V973 (13q14.3) (56%)     NORMAL   - No losses of MYB (6q), FRANCIS (11q) or TP53 (17p)   - No gain of chromosome 12   - No IGH-CCND1 gene fusion     INTERPRETATION:   These findings are consistent with a diagnosis of chronic lymphocytic   leukemia/small lymphocytic lymphoma.   Loss of 13q14 is a well-documented albeit nonspecific abnormality in   CLL/SLL.     INTERPRETATION:   Bone Marrow, Left:     CD5-positive kappa-monotypic B cells (38%)        See comment     COMMENT:   The immunophenotypic findings are consistent with the patient's recently   diagnosed chronic lymphocytic   leukemia (CLL). Final interpretation requires correlation with results of   other ancillary studies,   morphologic, and clinical features.       IMAGING:  CT c/a/p 2/3/21:  1.  Scattered mildly prominent lymph nodes in the chest, abdomen, and  pelvis. No lymphadenopathy by size criteria. Normal spleen.     CT chest without contrast 12/3/21:  1.  Emphysema. Scattered diminutive pulmonary nodules are unchanged  from prior examinations and should be benign.        ASSESSMENT/PLAN:  Karen Caban is a 71  year old female with:    1) Chronic lymphocytic leukemia: MASSEY stage 0. She has a mild lymphocytosis, no anemia, no thrombocytopenia, no B-symptoms.  CT c/a/p on 2/3/21 was done.  There is no lymphadenopathy, and spleen is normal.  Bone marrow biopsy was done on 2/2/21, which showed CLL, kappa light chain restricted, involving ~40% of the marrow cellularity.  FISH found loss of 13q (favorable).  No loss of 11q or 17p, no gain of chromosome 12.  No IGH-CCND1 fusion.      We discussed the diagnosis and staging.  There is no indication for treatment at this point.  She would be appropriate for watch-and-wait approach.  We discussed things to watch for that might indicate treatment is needed, including worsening cytopenias, worsening splenomegaly, progressive or symptomatic lymphadenopathy, lymphocyte doubling time of <6 months, constitutional symptoms.    Patient expressed understanding, and we will continue to monitor her labs.    CBC/diff on 11/30/21 is overall stable.  LDH is up a bit, but may be non-specific.      -RTC in ~6 months with CBC/diff, CMP, LDH.      2) Polycythemia: suspect that it is secondary to her smoking. Stable.  -advised smoking cessation    3) Pulmonary nodules: long history of smoking and current smoker  -following with lung nodule clinic        Maddie Rowland MD  Hematology/Oncology  [facility]      Phone call duration: 5 minutes    Total time spent on day of visit, including review of tests, obtaining/reviewing separately obtained history, ordering medications/tests/procedures, communicating with PCP/consultants, and documenting in electronic medical record: 15 minutes            Again, thank you for allowing me to participate in the care of your patient.        Sincerely,        Maddie Rowland MD

## 2021-12-03 NOTE — PROGRESS NOTES
Humaira is a 71 year old who is being evaluated via a billable telephone visit.      What phone number would you like to be contacted at? [phone]  How would you like to obtain your AVS? Guillermo       [facility]    Hematology/Oncology Established Patient Note      Today's Date: 12/03/21    Reason for Follow-up: CLL      HISTORY OF PRESENT ILLNESS: Karen Caban is a 71 year old female with PMHx of PAD s/p fem pop bypass, chronic back pain, HTN, smoking history, who presents with lymphocytosis.   She saw her PCP in December 2020 for a sinus infection.  She took antibiotics, and the infection has resolved.  She did not take steroids.  On lab evaluation, she was found to have elevated WBC of 16.  Repeat CBC in January 2021 showed WBC of 18.7, with ALC of 13.8.  There was note of smudge cells and atypical lymphocytes.  Peripheral flow showed CD5-positive kappa monotypic B-cells, favoring CLL.      She doesn't get frequent infections.  She denies fever/chills, night sweats, unintentional weight loss, lymphadenopathy.        INTERIM HISTORY: Humaira says that she is feeling very good.  She has no complaints today.        REVIEW OF SYSTEMS:   14 point ROS was reviewed and is negative other than as noted above in HPI.       HOME MEDICATIONS:  Current Outpatient Medications   Medication Sig Dispense Refill     albuterol (PROAIR RESPICLICK) 108 (90 Base) MCG/ACT inhaler Inhale 1-2 puffs into the lungs every 4 hours as needed 1 each 3     aspirin (ASA) 81 MG chewable tablet Take 81 mg by mouth       atorvastatin (LIPITOR) 40 MG tablet Take 1 tablet (40 mg) by mouth daily 90 tablet 3     chlorthalidone (HYGROTON) 25 MG tablet Take 1 tablet (25 mg) by mouth daily 90 tablet 3     Cholecalciferol (VITAMIN D-3) 5000 UNIT TABS Take 1 tablet by mouth daily.       clonazePAM (KLONOPIN) 0.5 MG ODT DISSOLVE 1 TABLET(0.5 MG) ON THE TONGUE TWICE DAILY AS NEEDED FOR ANXIETY 60 tablet 0     coenzyme Q-10 200 MG  CAPS        Cyanocobalamin (B-12) 1000 MCG TBCR Take 1,000 mcg by mouth daily 100 tablet 1     estradiol (ESTRACE VAGINAL) 0.1 MG/GM vaginal cream INSERT 1 GRAM VAGINALLY TWICE TO THREE TIMES PER WEEK 42.5 g 3     Lactobacillus (PROBIOTIC ACIDOPHILUS PO)        metoprolol succinate ER (TOPROL-XL) 100 MG 24 hr tablet Take 1.5 tablets (150 mg) by mouth daily 135 tablet 0     omeprazole (PRILOSEC) 40 MG DR capsule TAKE 1 CAPSULE BY MOUTH EVERY DAY 30 TO 60 MINUTES BEFORE A MEAL 90 capsule 3     vitamin D3 (CHOLECALCIFEROL) 125 MCG (5000 UT) tablet Take 5,000 Units by mouth       amoxicillin-clavulanate (AUGMENTIN) 875-125 MG tablet Take 1 tablet by mouth 2 times daily (Patient not taking: Reported on 12/3/2021) 20 tablet 0         ALLERGIES:  Allergies   Allergen Reactions     Chantix [Varenicline] Nausea     Ciprofloxacin Other (See Comments)     hypertension     Clopidogrel      Other reaction(s): Hypertension     Decongestant [Cvs]      Erythromycin Nausea     Lisinopril      cough     Plavix [Clopidogrel Bisulfate]      Felt as if she was having a heart attack     Rofecoxib Unknown     Vioxx          PAST MEDICAL HISTORY:  Past Medical History:   Diagnosis Date     Ankle fracture 2009    L     Anxiety      Chronic back pain      Colon polyp 2012    repeat colonoscopy 5 years.     Fx low femur epiphy-closed (H)      GERD (gastroesophageal reflux disease)      History of UTI 2017    Cysto by Dr Nikole xiong     HTN (hypertension), benign      Hyperlipidemia LDL goal < 100      Normal nuclear stress test 12/09    EF 67%     Osteopenia      PAD (peripheral artery disease) (H)     s/p fem pop bypass; embolectomy     PUD (peptic ulcer disease) 1980s    DU     Smoker      Vitamin D deficiency          PAST SURGICAL HISTORY:  Past Surgical History:   Procedure Laterality Date     Blood clot removal from Stent      2011     BONE MARROW BIOPSY, BONE SPECIMEN, NEEDLE/TROCAR N/A 2/2/2021    Procedure: bone marrow biopsy;   Surgeon: Kailey Cota MD;  Location:  GI     BYPASS GRAFT AORTOFEMORAL  5/02    Dr. Turner     BYPASS GRAFT FEMOROPOPLITEAL  12/16/2011     COLONOSCOPY N/A 1/18/2018    Procedure: COMBINED COLONOSCOPY, SINGLE OR MULTIPLE BIOPSY/POLYPECTOMY BY BIOPSY;  COLONOSCOPY;  Surgeon: Efrain Hernadez MD;  Location:  GI     EMBOLECTOMY LOWER EXTREMITY  12/16/2011    Procedure:EMBOLECTOMY LOWER EXTREMITY; EMBOLECTOMY, RIGHT POPLITEAL WITH PATCH ANGIOPLASTY. EXCISION SKIN LESION RIGHT LEG. ; Surgeon:PARMINDER VELAZCO; Location: OR     EXCISE LESION LOWER EXTREMITY  12/16/2011    Procedure:EXCISE LESION LOWER EXTREMITY; Surgeon:PARMINDER VELAZCO; Location: OR     HERNIA REPAIR  11/4/08    x2     L achilles repair  12/4/08     LAPAROSCOPIC OOPHORECTOMY      L for cyst     miscarriages x 3       tubal ligation and reversal           SOCIAL HISTORY:  Social History     Socioeconomic History     Marital status:      Spouse name: Not on file     Number of children: Not on file     Years of education: Not on file     Highest education level: Not on file   Occupational History     Not on file   Tobacco Use     Smoking status: Current Every Day Smoker     Packs/day: 1.00     Years: 50.00     Pack years: 50.00     Types: Cigarettes     Smokeless tobacco: Never Used     Tobacco comment: Nicorette gum and patch, trying to quit 2021   Substance and Sexual Activity     Alcohol use: Yes     Alcohol/week: 4.0 - 5.0 standard drinks     Comment: Beer once in a while     Drug use: No     Sexual activity: Not Currently     Partners: Male   Other Topics Concern     Parent/sibling w/ CABG, MI or angioplasty before 65F 55M? Not Asked   Social History Narrative    , working FT for a [employer] company.  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  Had 3 miscarriages.  No living children.   is Dust[place].  Walks 2miles per day.     Social Determinants of Health     Financial Resource Strain: Not on file   Food Insecurity: Not  on file   Transportation Needs: Not on file   Physical Activity: Not on file   Stress: Not on file   Social Connections: Not on file   Intimate Partner Violence: Not on file   Housing Stability: Not on file         FAMILY HISTORY:  Family History   Problem Relation Age of Onset     Alzheimer Disease Mother          of panc/GI ca age 83     Osteoporosis Mother         with hip fx     Other Cancer Mother      Cancer Father          age 64 lymphoma     Colon Cancer Maternal Grandfather          PHYSICAL EXAM:  Phone visit.      LABS:  CBC RESULTS: Recent Labs   Lab Test 21  0900   WBC 15.9*   RBC 5.00   HGB 15.1   HCT 45.9   MCV 92   MCH 30.2   MCHC 32.9   RDW 14.2        Recent Labs   Lab Test 21  0900 21  0909    139   POTASSIUM 3.6 3.1*   CHLORIDE 102 101   CO2 31 35*   ANIONGAP 4 3   * 105*   BUN 12 10   CR 0.67 0.71   CHANDAN 8.8 9.1     Lab Results   Component Value Date    AST 27 2021    AST 23 2021     Lab Results   Component Value Date    ALT 33 2021    ALT 31 2021     Lab Results   Component Value Date    BILICONJ 0.0 2009      Lab Results   Component Value Date    BILITOTAL 0.7 2021    BILITOTAL 0.8 2021     Lab Results   Component Value Date    ALBUMIN 3.5 2021    ALBUMIN 3.9 2021     Lab Results   Component Value Date    PROTTOTAL 6.5 2021    PROTTOTAL 6.9 2021      Lab Results   Component Value Date    ALKPHOS 57 2021    ALKPHOS 73 2021             PATHOLOGY:  Bone marrow biopsy 21:  FINAL DIAGNOSIS:   Bone marrow aspirate, biopsy, clot section, and peripheral blood:     - Chronic lymphocytic leukemia (CLL), kappa light chain restricted,   involving approximately 40% of the marrow   cellularity.  See comment.   - Hypercellular bone marrow with trilineage hematopoiesis.   - Cytogenetic studies are pending and results will be reported separately   by the performing laboratory.    - Peripheral blood with mild leukocytosis and absolute lymphocytosis.     COMMENT: These findings support B-cell lymphoma/leukemia morphologically   and immunophenotypically consistent   with chronic lymphocytic leukemia (CLL).  Cytogenetic studies are pending.     The degree of marrow involvement   by the CLL in this case varies by method (manual aspirate differential,   flow cytometry, and   immunohistochemistry on both the core and clot sections) but overall is   estimated to involve approximately 40%   of the marrow cellularity.     FISH:  RESULTS:   ABNORMAL   - Loss of [id number] (13q14.3) (56%)     NORMAL   - No losses of MYB (6q), FRANCIS (11q) or TP53 (17p)   - No gain of chromosome 12   - No IGH-CCND1 gene fusion     INTERPRETATION:   These findings are consistent with a diagnosis of chronic lymphocytic   leukemia/small lymphocytic lymphoma.   Loss of 13q14 is a well-documented albeit nonspecific abnormality in   CLL/SLL.     INTERPRETATION:   Bone Marrow, Left:     CD5-positive kappa-monotypic B cells (38%)        See comment     COMMENT:   The immunophenotypic findings are consistent with the patient's recently   diagnosed chronic lymphocytic   leukemia (CLL). Final interpretation requires correlation with results of   other ancillary studies,   morphologic, and clinical features.       IMAGING:  CT c/a/p 2/3/21:  1.  Scattered mildly prominent lymph nodes in the chest, abdomen, and  pelvis. No lymphadenopathy by size criteria. Normal spleen.     CT chest without contrast 12/3/21:  1.  Emphysema. Scattered diminutive pulmonary nodules are unchanged  from prior examinations and should be benign.        ASSESSMENT/PLAN:  Karen Caban is a 71 year old female with:    1) Chronic lymphocytic leukemia: MASSEY stage 0. She has a mild lymphocytosis, no anemia, no thrombocytopenia, no B-symptoms.  CT c/a/p on 2/3/21 was done.  There is no lymphadenopathy, and spleen is normal.  Bone marrow biopsy was done on 2/2/21,  which showed CLL, kappa light chain restricted, involving ~40% of the marrow cellularity.  FISH found loss of 13q (favorable).  No loss of 11q or 17p, no gain of chromosome 12.  No IGH-CCND1 fusion.      We discussed the diagnosis and staging.  There is no indication for treatment at this point.  She would be appropriate for watch-and-wait approach.  We discussed things to watch for that might indicate treatment is needed, including worsening cytopenias, worsening splenomegaly, progressive or symptomatic lymphadenopathy, lymphocyte doubling time of <6 months, constitutional symptoms.    Patient expressed understanding, and we will continue to monitor her labs.    CBC/diff on 11/30/21 is overall stable.  LDH is up a bit, but may be non-specific.      -RTC in ~6 months with CBC/diff, CMP, LDH.      2) Polycythemia: suspect that it is secondary to her smoking. Stable.  -advised smoking cessation    3) Pulmonary nodules: long history of smoking and current smoker  -following with lung nodule clinic        Maddie Rowland MD  Hematology/Oncology  [facility]      Phone call duration: 5 minutes    Total time spent on day of visit, including review of tests, obtaining/reviewing separately obtained history, ordering medications/tests/procedures, communicating with PCP/consultants, and documenting in electronic medical record: 15 minutes

## 2021-12-04 DIAGNOSIS — E78.5 HYPERLIPIDEMIA LDL GOAL <100: ICD-10-CM

## 2021-12-04 DIAGNOSIS — N95.2 ATROPHIC VAGINITIS: ICD-10-CM

## 2021-12-07 RX ORDER — ATORVASTATIN CALCIUM 40 MG/1
TABLET, FILM COATED ORAL
Qty: 90 TABLET | Refills: 0 | Status: SHIPPED | OUTPATIENT
Start: 2021-12-07 | End: 2022-03-14

## 2021-12-07 RX ORDER — ESTRADIOL 0.1 MG/G
CREAM VAGINAL
Qty: 42.5 G | Refills: 0 | Status: SHIPPED | OUTPATIENT
Start: 2021-12-07 | End: 2022-11-17

## 2021-12-07 NOTE — TELEPHONE ENCOUNTER
Prescription approved per [facility] Refill Protocol.  Patient has visit scheduled with primary care physician in Jan.  Vinicio KruseD, [facility]  Medication Therapy Management Provider  Pager: [phone]

## 2021-12-20 DIAGNOSIS — I10 HTN (HYPERTENSION), BENIGN: ICD-10-CM

## 2021-12-22 RX ORDER — CHLORTHALIDONE 25 MG/1
TABLET ORAL
Qty: 90 TABLET | Refills: 0 | OUTPATIENT
Start: 2021-12-22

## 2021-12-22 RX ORDER — METOPROLOL SUCCINATE 100 MG/1
TABLET, EXTENDED RELEASE ORAL
Qty: 135 TABLET | Refills: 0 | OUTPATIENT
Start: 2021-12-22

## 2021-12-28 ENCOUNTER — LAB (OUTPATIENT)
Dept: LAB | Facility: CLINIC | Age: 71
End: 2021-12-28
Payer: COMMERCIAL

## 2021-12-28 DIAGNOSIS — E78.5 HYPERLIPIDEMIA WITH TARGET LDL LESS THAN 100: ICD-10-CM

## 2021-12-28 DIAGNOSIS — C91.10 CLL (CHRONIC LYMPHOCYTIC LEUKEMIA) (H): ICD-10-CM

## 2021-12-28 LAB
ALBUMIN SERPL-MCNC: 3.7 G/DL (ref 3.4–5)
ALP SERPL-CCNC: 64 U/L (ref 40–150)
ALT SERPL W P-5'-P-CCNC: 30 U/L (ref 0–50)
ANION GAP SERPL CALCULATED.3IONS-SCNC: 4 MMOL/L (ref 3–14)
AST SERPL W P-5'-P-CCNC: 24 U/L (ref 0–45)
BASOPHILS # BLD MANUAL: 0.2 10E3/UL (ref 0–0.2)
BASOPHILS NFR BLD MANUAL: 1 %
BILIRUB SERPL-MCNC: 0.6 MG/DL (ref 0.2–1.3)
BUN SERPL-MCNC: 13 MG/DL (ref 7–30)
CALCIUM SERPL-MCNC: 8.9 MG/DL (ref 8.5–10.1)
CHLORIDE BLD-SCNC: 105 MMOL/L (ref 94–109)
CHOLEST SERPL-MCNC: 148 MG/DL
CO2 SERPL-SCNC: 31 MMOL/L (ref 20–32)
CREAT SERPL-MCNC: 0.63 MG/DL (ref 0.52–1.04)
EOSINOPHIL # BLD MANUAL: 0.2 10E3/UL (ref 0–0.7)
EOSINOPHIL NFR BLD MANUAL: 1 %
ERYTHROCYTE [DISTWIDTH] IN BLOOD BY AUTOMATED COUNT: 14.6 % (ref 10–15)
FASTING STATUS PATIENT QL REPORTED: YES
GFR SERPL CREATININE-BSD FRML MDRD: >90 ML/MIN/1.73M2
GLUCOSE BLD-MCNC: 106 MG/DL (ref 70–99)
HCT VFR BLD AUTO: 47.6 % (ref 35–47)
HDLC SERPL-MCNC: 55 MG/DL
HGB BLD-MCNC: 15.7 G/DL (ref 11.7–15.7)
LDH SERPL L TO P-CCNC: 268 U/L (ref 81–234)
LDLC SERPL CALC-MCNC: 81 MG/DL
LYMPHOCYTES # BLD MANUAL: 11.2 10E3/UL (ref 0.8–5.3)
LYMPHOCYTES NFR BLD MANUAL: 56 %
MCH RBC QN AUTO: 30.7 PG (ref 26.5–33)
MCHC RBC AUTO-ENTMCNC: 33 G/DL (ref 31.5–36.5)
MCV RBC AUTO: 93 FL (ref 78–100)
MONOCYTES # BLD MANUAL: 1 10E3/UL (ref 0–1.3)
MONOCYTES NFR BLD MANUAL: 5 %
NEUTROPHILS # BLD MANUAL: 7.4 10E3/UL (ref 1.6–8.3)
NEUTROPHILS NFR BLD MANUAL: 37 %
NONHDLC SERPL-MCNC: 93 MG/DL
PLAT MORPH BLD: ABNORMAL
PLATELET # BLD AUTO: 182 10E3/UL (ref 150–450)
POTASSIUM BLD-SCNC: 3.4 MMOL/L (ref 3.4–5.3)
PROT SERPL-MCNC: 6.6 G/DL (ref 6.8–8.8)
RBC # BLD AUTO: 5.12 10E6/UL (ref 3.8–5.2)
RBC MORPH BLD: ABNORMAL
SMUDGE CELLS BLD QL SMEAR: PRESENT
SODIUM SERPL-SCNC: 140 MMOL/L (ref 133–144)
TRIGL SERPL-MCNC: 61 MG/DL
WBC # BLD AUTO: 20 10E3/UL (ref 4–11)

## 2021-12-28 PROCEDURE — 80053 COMPREHEN METABOLIC PANEL: CPT

## 2021-12-28 PROCEDURE — 83615 LACTATE (LD) (LDH) ENZYME: CPT

## 2021-12-28 PROCEDURE — 80061 LIPID PANEL: CPT

## 2021-12-28 PROCEDURE — 85027 COMPLETE CBC AUTOMATED: CPT

## 2021-12-28 PROCEDURE — 36415 COLL VENOUS BLD VENIPUNCTURE: CPT

## 2021-12-30 ASSESSMENT — ENCOUNTER SYMPTOMS
PARESTHESIAS: 0
MYALGIAS: 0
BREAST MASS: 0
JOINT SWELLING: 0
NAUSEA: 0
EYE PAIN: 0
DIARRHEA: 0
SHORTNESS OF BREATH: 0
DIZZINESS: 0
HEMATOCHEZIA: 0
PALPITATIONS: 0
CHILLS: 0
SORE THROAT: 0
ARTHRALGIAS: 0
DYSURIA: 0
FREQUENCY: 0
HEADACHES: 0
NERVOUS/ANXIOUS: 0
CONSTIPATION: 0
FEVER: 0
HEARTBURN: 0
HEMATURIA: 0
ABDOMINAL PAIN: 0
COUGH: 0
WEAKNESS: 0

## 2021-12-30 ASSESSMENT — ACTIVITIES OF DAILY LIVING (ADL): CURRENT_FUNCTION: NO ASSISTANCE NEEDED

## 2022-01-03 ASSESSMENT — ENCOUNTER SYMPTOMS
PARESTHESIAS: 0
HEMATURIA: 0
FREQUENCY: 0
DYSURIA: 0
BREAST MASS: 0
PALPITATIONS: 0
HEARTBURN: 0
CHILLS: 0
FEVER: 0
COUGH: 0
ABDOMINAL PAIN: 0
NAUSEA: 0
WEAKNESS: 0
SHORTNESS OF BREATH: 0
DIARRHEA: 0
MYALGIAS: 0
CONSTIPATION: 0
ARTHRALGIAS: 0
NERVOUS/ANXIOUS: 0
EYE PAIN: 0
JOINT SWELLING: 0
DIZZINESS: 0
SORE THROAT: 0
HEADACHES: 0
HEMATOCHEZIA: 0

## 2022-01-03 ASSESSMENT — ACTIVITIES OF DAILY LIVING (ADL): CURRENT_FUNCTION: NO ASSISTANCE NEEDED

## 2022-01-03 NOTE — PROGRESS NOTES
"SUBJECTIVE:   Karen Caban is a 71 year old female who presents for Preventive Visit.    {Split Bill scripting  The purpose of this visit is to discuss your medical history and prevent health problems before you are sick. You may be responsible for a co-pay, coinsurance, or deductible if your visit today includes services such as checking on a sore throat, having an x-ray or lab test, or treating and evaluating a new or existing condition :619430}  Patient has been advised of split billing requirements and indicates understanding: {Yes and No:745518}   Are you in the first 12 months of your Medicare coverage?  { :012988::\"No\"}    Healthy Habits:     In general, how would you rate your overall health?  Good    Frequency of exercise:  2-3 days/week    Duration of exercise:  15-30 minutes    Do you usually eat at least 4 servings of fruit and vegetables a day, include whole grains    & fiber and avoid regularly eating high fat or \"junk\" foods?  Yes    Taking medications regularly:  Yes    Medication side effects:  None    Ability to successfully perform activities of daily living:  No assistance needed    Home Safety:  No safety concerns identified    Hearing Impairment:  No hearing concerns    In the past 6 months, have you been bothered by leaking of urine?  No    In general, how would you rate your overall mental or emotional health?  Good      PHQ-2 Total Score: 0    Additional concerns today:  No    Do you feel safe in your environment? { :016474}    Have you ever done Advance Care Planning? (For example, a Health Directive, POLST, or a discussion with a medical provider or your loved ones about your wishes): { :545254}    {Hearing Test Done (Optional):917726}   Fall risk  { :624249}  {If any of the above assessments are answered yes, consider ordering appropriate referrals (Optional):028440::\"click delete button to remove this line now\"}  Cognitive Screening { :670760}    {Do you have sleep apnea, excessive " snoring or daytime drowsiness? (Optional):357015}    Reviewed and updated as needed this visit by clinical staff                Reviewed and updated as needed this visit by Provider               Social History     Tobacco Use     Smoking status: Current Every Day Smoker     Packs/day: 1.00     Years: 50.00     Pack years: 50.00     Types: Cigarettes     Smokeless tobacco: Never Used     Tobacco comment: Nicorette gum and patch, trying to quit 2021   Substance Use Topics     Alcohol use: Yes     Alcohol/week: 4.0 - 5.0 standard drinks     Comment: Beer once in a while     {Rooming Staff- Complete this question if Prescreen response is not shown below for today's visit. If you drink alcohol do you typically have >3 drinks per day or >7 drinks per week? (Optional):719570}    Alcohol Use 12/30/2021   Prescreen: >3 drinks/day or >7 drinks/week? No   Prescreen: >3 drinks/day or >7 drinks/week? -   {add AUDIT responses (Optional) (A score of 7 for adult men is an indication of hazardous drinking; a score of 8 or more is an indication of an alcohol use disorder.  A score of 7 or more for adult women is an indication of hazardous drinking or an alchohol use disorder):475591}    {Outside tests to abstract? :249116}    {additional problems to add (Optional):030498}    Current providers sharing in care for this patient include: {Rooming staff:  Please update Care Team in Rooming Activity, refresh this note and then delete this statement}  Patient Care Team:  Lyudmila Escobar PA-C as PCP - General (Internal Medicine)  Lyudmila Escobar PA-C as Assigned PCP  Delonte Strong MD as Assigned Pulmonology Provider  Maddie Rowland MD as Assigned Cancer Care Provider  Kaila Fulton RN as Specialty Care Coordinator (Oncology)    The following health maintenance items are reviewed in Epic and correct as of today:  Health Maintenance Due   Topic Date Due     ANNUAL REVIEW OF HM ORDERS  Never done     FALL RISK  "ASSESSMENT  2021     MEDICARE ANNUAL WELLNESS VISIT  2021     {Chronicprobdata (optional):576569}  {Decision Support (Optional):360193}        Review of Systems   Constitutional: Negative for chills and fever.   HENT: Negative for congestion, ear pain, hearing loss and sore throat.    Eyes: Negative for pain and visual disturbance.   Respiratory: Negative for cough and shortness of breath.    Cardiovascular: Negative for chest pain, palpitations and peripheral edema.   Gastrointestinal: Negative for abdominal pain, constipation, diarrhea, heartburn, hematochezia and nausea.   Breasts:  Negative for tenderness, breast mass and discharge.   Genitourinary: Negative for dysuria, frequency, genital sores, hematuria, pelvic pain, urgency and vaginal discharge.   Musculoskeletal: Negative for arthralgias, joint swelling and myalgias.   Skin: Negative for rash.   Neurological: Negative for dizziness, weakness, headaches and paresthesias.   Psychiatric/Behavioral: Negative for mood changes. The patient is not nervous/anxious.      {ROS COMP (Optional):618180}    OBJECTIVE:   There were no vitals taken for this visit. Estimated body mass index is 21.8 kg/m  as calculated from the following:    Height as of 21: 1.651 m (5' 5\").    Weight as of 21: 59.4 kg (131 lb).  Physical Exam  {Exam (Optional) :152013}    {Diagnostic Test Results (Optional):749123::\"Diagnostic Test Results:\",\"Labs reviewed in Epic\"}    ASSESSMENT / PLAN:   {Dia Picklist:290425}    Patient has been advised of split billing requirements and indicates understanding: {YES / NO:589807::\"Yes\"}  COUNSELING:  {Medicare Counselin}    Estimated body mass index is 21.8 kg/m  as calculated from the following:    Height as of 21: 1.651 m (5' 5\").    Weight as of 21: 59.4 kg (131 lb).    {Weight Management Plan (ACO) Complete if BMI is abnormal-  Ages 18-64  BMI >24.9.  Age 65+ with BMI <23 or >30 (Optional):418401}    She reports " that she has been smoking cigarettes. She has a 50.00 pack-year smoking history. She has never used smokeless tobacco.  Tobacco Cessation Action Plan:   {TOBACCO CESSATION ACTION PLAN:465066}      Appropriate preventive services were discussed with this patient, including applicable screening as appropriate for cardiovascular disease, diabetes, osteopenia/osteoporosis, and glaucoma.  As appropriate for age/gender, discussed screening for colorectal cancer, prostate cancer, breast cancer, and cervical cancer. Checklist reviewing preventive services available has been given to the patient.    Reviewed patients plan of care and provided an AVS. The {CarePlan:448629} for Karen meets the Care Plan requirement. This Care Plan has been established and reviewed with the {PATIENT, FAMILY MEMBER, CAREGIVER:672316}.    Counseling Resources:  ATP IV Guidelines  Pooled Cohorts Equation Calculator  Breast Cancer Risk Calculator  Breast Cancer: Medication to Reduce Risk  FRAX Risk Assessment  ICSI Preventive Guidelines  Dietary Guidelines for Americans, 2010  USDA's MyPlate  ASA Prophylaxis  Lung CA Screening    Lyudmila Escobar PA-C  [facility]    Identified Health Risks:

## 2022-01-04 ENCOUNTER — OFFICE VISIT (OUTPATIENT)
Dept: FAMILY MEDICINE | Facility: CLINIC | Age: 72
End: 2022-01-04
Payer: COMMERCIAL

## 2022-01-04 VITALS
OXYGEN SATURATION: 99 % | RESPIRATION RATE: 18 BRPM | HEIGHT: 64 IN | WEIGHT: 121 LBS | HEART RATE: 52 BPM | DIASTOLIC BLOOD PRESSURE: 73 MMHG | BODY MASS INDEX: 20.66 KG/M2 | TEMPERATURE: 96.9 F | SYSTOLIC BLOOD PRESSURE: 107 MMHG

## 2022-01-04 DIAGNOSIS — Z00.00 ENCOUNTER FOR MEDICARE ANNUAL WELLNESS EXAM: Primary | ICD-10-CM

## 2022-01-04 DIAGNOSIS — C91.10 CLL (CHRONIC LYMPHOCYTIC LEUKEMIA) (H): ICD-10-CM

## 2022-01-04 DIAGNOSIS — I10 HTN (HYPERTENSION), BENIGN: ICD-10-CM

## 2022-01-04 DIAGNOSIS — I73.9 PAD (PERIPHERAL ARTERY DISEASE) (H): ICD-10-CM

## 2022-01-04 DIAGNOSIS — F41.9 ANXIETY: ICD-10-CM

## 2022-01-04 DIAGNOSIS — K21.9 GASTROESOPHAGEAL REFLUX DISEASE WITHOUT ESOPHAGITIS: ICD-10-CM

## 2022-01-04 DIAGNOSIS — E78.5 HYPERLIPIDEMIA LDL GOAL <100: ICD-10-CM

## 2022-01-04 PROCEDURE — G0439 PPPS, SUBSEQ VISIT: HCPCS | Performed by: PHYSICIAN ASSISTANT

## 2022-01-04 RX ORDER — CLONAZEPAM 0.5 MG/1
TABLET, ORALLY DISINTEGRATING ORAL
Qty: 60 TABLET | Refills: 1 | Status: SHIPPED | OUTPATIENT
Start: 2022-01-04 | End: 2022-09-07

## 2022-01-04 RX ORDER — OMEPRAZOLE 40 MG/1
CAPSULE, DELAYED RELEASE ORAL
Qty: 90 CAPSULE | Refills: 3 | Status: ON HOLD | OUTPATIENT
Start: 2022-01-04 | End: 2022-10-06

## 2022-01-04 RX ORDER — METOPROLOL SUCCINATE 100 MG/1
TABLET, EXTENDED RELEASE ORAL
Qty: 135 TABLET | Refills: 3 | Status: SHIPPED | OUTPATIENT
Start: 2022-01-04 | End: 2022-11-17

## 2022-01-04 ASSESSMENT — MIFFLIN-ST. JEOR: SCORE: 1045.91

## 2022-01-04 ASSESSMENT — PAIN SCALES - GENERAL: PAINLEVEL: NO PAIN (0)

## 2022-01-04 ASSESSMENT — ACTIVITIES OF DAILY LIVING (ADL): CURRENT_FUNCTION: NO ASSISTANCE NEEDED

## 2022-01-04 NOTE — PROGRESS NOTES
"SUBJECTIVE:   Karen Caban is a 71 year old female who presents for Preventive Visit.    Continues to smoke 1ppd and not interested in quitting  Moved to a condo with her ; lives next door to her sister  She did get a small dog    HTN: she is checking BP at home and getting 120-150/80      Patient has been advised of split billing requirements and indicates understanding: Yes   Are you in the first 12 months of your Medicare coverage?  No    Healthy Habits:     In general, how would you rate your overall health?  Good    Frequency of exercise:  2-3 days/week    Duration of exercise:  15-30 minutes    Do you usually eat at least 4 servings of fruit and vegetables a day, include whole grains    & fiber and avoid regularly eating high fat or \"junk\" foods?  Yes    Taking medications regularly:  Yes    Barriers to taking medications:  None    Medication side effects:  None    Ability to successfully perform activities of daily living:  No assistance needed    Home Safety:  No safety concerns identified    Hearing Impairment:  No hearing concerns    In the past 6 months, have you been bothered by leaking of urine?  No    In general, how would you rate your overall mental or emotional health?  Good      PHQ-2 Total Score: 0    Additional concerns today:  No    Do you feel safe in your environment? Yes    Have you ever done Advance Care Planning? (For example, a Health Directive, POLST, or a discussion with a medical provider or your loved ones about your wishes): Yes, patient states has an Advance Care Planning document and will bring a copy to the clinic.       Fall risk  Fallen 2 or more times in the past year?: No  Any fall with injury in the past year?: No    Cognitive Screening   1) Repeat 3 items (Leader, Season, Table)    2) Clock draw: NORMAL  3) 3 item recall: Recalls 2 objects   Results: NORMAL clock, 1-2 items recalled: COGNITIVE IMPAIRMENT LESS LIKELY    Mini-CogTM Copyright TRAY Johnson. Licensed by " the author for use in [facility]; reprinted with permission (jennifer@[facility]). All rights reserved.      Do you have sleep apnea, excessive snoring or daytime drowsiness?: no    Reviewed and updated as needed this visit by clinical staff  Tobacco  Allergies  Meds             Reviewed and updated as needed this visit by Provider    Meds            Social History     Tobacco Use     Smoking status: Current Every Day Smoker     Packs/day: 1.00     Years: 50.00     Pack years: 50.00     Types: Cigarettes     Smokeless tobacco: Never Used     Tobacco comment: Nicorette gum and patch, trying to quit 2021   Substance Use Topics     Alcohol use: Yes     Alcohol/week: 4.0 - 5.0 standard drinks     Comment: Beer once in a while     If you drink alcohol do you typically have >3 drinks per day or >7 drinks per week? No      Current providers sharing in care for this patient include:   Patient Care Team:  Lyudmila Escobar PA-C as PCP - General (Internal Medicine)  Lyudmila Escobar PA-C as Assigned PCP  Delonte Strong MD as Assigned Pulmonology Provider  Maddie Rowland MD as Assigned Cancer Care Provider  Kaila Fulton RN as Specialty Care Coordinator (Oncology)    The following health maintenance items are reviewed in Epic and correct as of today:  Health Maintenance Due   Topic Date Due     ANNUAL REVIEW OF HM ORDERS  Never done     FALL RISK ASSESSMENT  12/22/2021       Past Medical History:   Diagnosis Date     Ankle fracture 2009    L     Anxiety      Chronic back pain      Colon polyp 2012    repeat colonoscopy 5 years.     Fx low femur epiphy-closed (H)      GERD (gastroesophageal reflux disease)      History of UTI 2017    Cysto by Dr Agustin neg     HTN (hypertension), benign      Hyperlipidemia LDL goal < 100      Normal nuclear stress test 12/09    EF 67%     Osteopenia      PAD (peripheral artery disease) (H)     s/p fem pop bypass; embolectomy     PUD (peptic ulcer disease) 1980s     DU     Smoker      Vitamin D deficiency      Past Surgical History:   Procedure Laterality Date     Blood clot removal from Stent      2011     BONE MARROW BIOPSY, BONE SPECIMEN, NEEDLE/TROCAR N/A 2/2/2021    Procedure: bone marrow biopsy;  Surgeon: Kailey Cota MD;  Location:  GI     BYPASS GRAFT AORTOFEMORAL  5/02    Dr. Turner     BYPASS GRAFT FEMOROPOPLITEAL  12/16/2011     COLONOSCOPY N/A 1/18/2018    Procedure: COMBINED COLONOSCOPY, SINGLE OR MULTIPLE BIOPSY/POLYPECTOMY BY BIOPSY;  COLONOSCOPY;  Surgeon: Efrain Hernadez MD;  Location:  GI     EMBOLECTOMY LOWER EXTREMITY  12/16/2011    Procedure:EMBOLECTOMY LOWER EXTREMITY; EMBOLECTOMY, RIGHT POPLITEAL WITH PATCH ANGIOPLASTY. EXCISION SKIN LESION RIGHT LEG. ; Surgeon:PARMINDER VELAZCO; Location: OR     EXCISE LESION LOWER EXTREMITY  12/16/2011    Procedure:EXCISE LESION LOWER EXTREMITY; Surgeon:PARMINDER VELAZCO; Location: OR     HERNIA REPAIR  11/4/08    x2     L achilles repair  12/4/08     LAPAROSCOPIC OOPHORECTOMY      L for cyst     miscarriages x 3       tubal ligation and reversal       Current Outpatient Medications   Medication Sig Dispense Refill     albuterol (PROAIR RESPICLICK) 108 (90 Base) MCG/ACT inhaler Inhale 1-2 puffs into the lungs every 4 hours as needed 1 each 3     aspirin (ASA) 81 MG chewable tablet Take 81 mg by mouth       atorvastatin (LIPITOR) 40 MG tablet TAKE 1 TABLET(40 MG) BY MOUTH DAILY 90 tablet 0     chlorthalidone (HYGROTON) 25 MG tablet TAKE 1 TABLET(25 MG) BY MOUTH DAILY 90 tablet 0     Cholecalciferol (VITAMIN D-3) 5000 UNIT TABS Take 1 tablet by mouth daily.       clonazePAM (KLONOPIN) 0.5 MG ODT Take 1/2-1 tab PO BID prn anxiety 60 tablet 1     coenzyme Q-10 200 MG CAPS        Cyanocobalamin (B-12) 1000 MCG TBCR Take 1,000 mcg by mouth daily 100 tablet 1     estradiol (ESTRACE) 0.1 MG/GM vaginal cream INSERT 1 GRAM VAGINALLY 2 TO 3 TIMES EVERY WEEK 42.5 g 0     Lactobacillus  "(PROBIOTIC ACIDOPHILUS PO)        metoprolol succinate ER (TOPROL-XL) 100 MG 24 hr tablet TAKE 1 AND 1/2 TABLETS(150 MG) BY MOUTH DAILY 135 tablet 3     omeprazole (PRILOSEC) 40 MG DR capsule TAKE 1 CAPSULE BY MOUTH EVERY DAY 30 TO 60 MINUTES BEFORE A MEAL 90 capsule 3             Review of Systems  Constitutional, HEENT, cardiovascular, pulmonary, GI, , musculoskeletal, neuro, skin, endocrine and psych systems are negative, except as otherwise noted.    OBJECTIVE:   /73 (BP Location: Right arm, Patient Position: Sitting, Cuff Size: Adult Regular)   Pulse 52   Temp 96.9  F (36.1  C) (Temporal)   Resp 18   Ht 1.613 m (5' 3.5\")   Wt 54.9 kg (121 lb)   SpO2 99%   BMI 21.10 kg/m   Estimated body mass index is 21.1 kg/m  as calculated from the following:    Height as of this encounter: 1.613 m (5' 3.5\").    Weight as of this encounter: 54.9 kg (121 lb).  Physical Exam  GENERAL: healthy, alert and no distress  EYES: Eyes grossly normal to inspection, PERRL and conjunctivae and sclerae normal  HENT: ear canals and TM's normal, nose and mouth without ulcers or lesions  NECK: no adenopathy, no asymmetry, masses, or scars and thyroid normal to palpation  RESP: lungs clear to auscultation - no rales, rhonchi or wheezes  BREAST: normal without masses, tenderness or nipple discharge and no palpable axillary masses or adenopathy  CV: regular rate and rhythm, normal S1 S2, no S3 or S4, no murmur, click or rub, no peripheral edema and peripheral pulses strong  ABDOMEN: soft, nontender, no hepatosplenomegaly, no masses and bowel sounds normal  MS: no gross musculoskeletal defects noted, no edema  SKIN: no suspicious lesions or rashes  NEURO: Normal strength and tone, mentation intact and speech normal  PSYCH: mentation appears normal, affect normal/bright        ASSESSMENT / PLAN:   Assessment and Plan:     (Z00.00) Encounter for Medicare annual wellness exam  (primary encounter diagnosis)  Comment: previsit labs " "reviewed with pt  Plan: pt not interested in quitting smoking. Mammogram scheduled. dexa and colonoscopy and immun up to date.      (F41.9) Anxiety  Comment:   Plan: clonazePAM (KLONOPIN) 0.5 MG ODT        refilled    (C91.10) CLL (chronic lymphocytic leukemia) (H)  Comment:   Plan: followed by Dr. Rowland in heme/onc    (K21.9) Gastroesophageal reflux disease without esophagitis  Comment:   Plan: omeprazole (PRILOSEC) 40 MG DR capsule refilled            (I10) HTN (hypertension), benign  Comment: well controlled mostly  Plan: metoprolol succinate ER (TOPROL-XL) 100 MG 24         hr tablet        refilled    (E78.5) Hyperlipidemia LDL goal <100  Comment:   Plan: lipids at goal, cont statin    (I73.9) PAD (peripheral artery disease) (H)  Comment:   Plan: pt did get a new dog (Ofelia) and is walking regularly      Patient has been advised of split billing requirements and indicates understanding: Yes  COUNSELING:  Reviewed preventive health counseling, as reflected in patient instructions    Estimated body mass index is 21.1 kg/m  as calculated from the following:    Height as of this encounter: 1.613 m (5' 3.5\").    Weight as of this encounter: 54.9 kg (121 lb).        She reports that she has been smoking cigarettes. She has a 50.00 pack-year smoking history. She has never used smokeless tobacco.  Tobacco Cessation Action Plan:   Information offered: Patient not interested at this time      Appropriate preventive services were discussed with this patient, including applicable screening as appropriate for cardiovascular disease, diabetes, osteopenia/osteoporosis, and glaucoma.  As appropriate for age/gender, discussed screening for colorectal cancer, prostate cancer, breast cancer, and cervical cancer. Checklist reviewing preventive services available has been given to the patient.    Reviewed patients plan of care and provided an AVS. The Basic Care Plan (routine screening as documented in Health Maintenance) for " Karen meets the Care Plan requirement. This Care Plan has been established and reviewed with the Patient.    Counseling Resources:  ATP IV Guidelines  Pooled Cohorts Equation Calculator  Breast Cancer Risk Calculator  Breast Cancer: Medication to Reduce Risk  FRAX Risk Assessment  ICSI Preventive Guidelines  Dietary Guidelines for Americans, 2010  USDA's MyPlate  ASA Prophylaxis  Lung CA Screening    KAMILAH Morgan [facility]    Identified Health Risks:

## 2022-01-10 ENCOUNTER — VIRTUAL VISIT (OUTPATIENT)
Dept: PULMONOLOGY | Facility: CLINIC | Age: 72
End: 2022-01-10
Attending: INTERNAL MEDICINE
Payer: COMMERCIAL

## 2022-01-10 DIAGNOSIS — F17.210 CIGARETTE NICOTINE DEPENDENCE, UNCOMPLICATED: ICD-10-CM

## 2022-01-10 DIAGNOSIS — R91.8 LUNG NODULES: Primary | ICD-10-CM

## 2022-01-10 PROCEDURE — 99213 OFFICE O/P EST LOW 20 MIN: CPT | Mod: 95 | Performed by: INTERNAL MEDICINE

## 2022-01-10 NOTE — PROGRESS NOTES
Humaira is a 71 year old who is being evaluated via a billable telephone visit.      What phone number would you like to be contacted at? [phone]  How would you like to obtain your AVS? Guillermo     71-year-old woman, currently smoking, here for follow-up.    No concerning changes on previous CT results.  She can go to yearly screening.    We discussed her smoking.  She is not at a point where she is ready to quit.    We discussed her CT scan results.  She understands.    Plan for yearly CT screening.  We discussed the risks and benefits of this.  She understands.    12-minute spent on this telephone visit.    Delonte Strong MD

## 2022-01-21 ENCOUNTER — HOSPITAL ENCOUNTER (OUTPATIENT)
Dept: MAMMOGRAPHY | Facility: CLINIC | Age: 72
Discharge: HOME | End: 2022-01-21
Attending: PHYSICIAN ASSISTANT | Admitting: PHYSICIAN ASSISTANT
Payer: COMMERCIAL

## 2022-01-21 DIAGNOSIS — Z12.31 VISIT FOR SCREENING MAMMOGRAM: ICD-10-CM

## 2022-01-21 PROCEDURE — 77067 SCR MAMMO BI INCL CAD: CPT

## 2022-01-27 ENCOUNTER — HOSPITAL ENCOUNTER (OUTPATIENT)
Dept: MAMMOGRAPHY | Facility: CLINIC | Age: 72
End: 2022-01-27
Attending: PHYSICIAN ASSISTANT
Payer: COMMERCIAL

## 2022-01-27 DIAGNOSIS — R92.8 ABNORMAL MAMMOGRAM: ICD-10-CM

## 2022-01-27 PROCEDURE — 76642 ULTRASOUND BREAST LIMITED: CPT | Mod: LT

## 2022-01-27 NOTE — LETTER
Karen Caban  [address]  [address]  [place] [address]              January 27, 2022  Date of Exam:     Dear Karen:    Thank you for your recent visit.  Breast Imaging Result: We are pleased to inform you that the results of your recent breast imaging show no evidence of malignancy (cancer).    If you are experiencing any breast problems such as a lump or localized pain we request that you discuss this with your health care team if you haven t already done so, as additional testing may be necessary.    As you know, early detection of cancer is very important. Although not all cancers are found through breast imaging, it is the most accurate method for early detection. A thorough examination includes a combination of breast imaging, physical examination and breast self-examination. Currently the American College of Radiology and the Society of Breast Imaging recommend breast imaging beginning at the age of 40.    A report of your breast imaging results was sent to: Lyudmila Escobar    Your breast imaging will become part of your medical file here at [facility] for at least 10 years. You are responsible for informing any new health care team or breast imaging facility of the date and location of this examination.    We appreciate the opportunity to participate in your health care.    Sincerely,  Heri Crawford MD  [facility]

## 2022-03-14 DIAGNOSIS — E78.5 HYPERLIPIDEMIA LDL GOAL <100: ICD-10-CM

## 2022-03-16 RX ORDER — ATORVASTATIN CALCIUM 40 MG/1
40 TABLET, FILM COATED ORAL DAILY
Qty: 90 TABLET | Refills: 2 | Status: SHIPPED | OUTPATIENT
Start: 2022-03-16 | End: 2022-11-30 | Stop reason: ALTCHOICE

## 2022-03-28 DIAGNOSIS — I10 HTN (HYPERTENSION), BENIGN: ICD-10-CM

## 2022-03-30 RX ORDER — CHLORTHALIDONE 25 MG/1
25 TABLET ORAL DAILY
Qty: 90 TABLET | Refills: 0 | Status: SHIPPED | OUTPATIENT
Start: 2022-03-30 | End: 2022-06-23

## 2022-03-30 NOTE — TELEPHONE ENCOUNTER
Prescription approved per [facility] Refill Protocol.  Inge Crenshaw RN  [facility]

## 2022-04-20 ENCOUNTER — APPOINTMENT (OUTPATIENT)
Dept: LAB | Facility: CLINIC | Age: 72
End: 2022-04-20
Payer: COMMERCIAL

## 2022-04-20 ENCOUNTER — VIRTUAL VISIT (OUTPATIENT)
Dept: FAMILY MEDICINE | Facility: CLINIC | Age: 72
End: 2022-04-20
Payer: COMMERCIAL

## 2022-04-20 DIAGNOSIS — R10.2 VAGINAL PAIN: ICD-10-CM

## 2022-04-20 DIAGNOSIS — R10.30 LOWER ABDOMINAL PAIN: ICD-10-CM

## 2022-04-20 DIAGNOSIS — R19.7 DIARRHEA, UNSPECIFIED TYPE: Primary | ICD-10-CM

## 2022-04-20 DIAGNOSIS — C91.10 CLL (CHRONIC LYMPHOCYTIC LEUKEMIA) (H): ICD-10-CM

## 2022-04-20 DIAGNOSIS — R39.15 URINARY URGENCY: ICD-10-CM

## 2022-04-20 LAB
ALBUMIN UR-MCNC: NEGATIVE MG/DL
APPEARANCE UR: CLEAR
BACTERIA #/AREA URNS HPF: ABNORMAL /HPF
BILIRUB UR QL STRIP: NEGATIVE
CLUE CELLS: NORMAL
COLOR UR AUTO: YELLOW
ERYTHROCYTE [DISTWIDTH] IN BLOOD BY AUTOMATED COUNT: 14.2 % (ref 10–15)
GLUCOSE UR STRIP-MCNC: NEGATIVE MG/DL
HCT VFR BLD AUTO: 46.7 % (ref 35–47)
HGB BLD-MCNC: 15.6 G/DL (ref 11.7–15.7)
HGB UR QL STRIP: ABNORMAL
KETONES UR STRIP-MCNC: NEGATIVE MG/DL
LEUKOCYTE ESTERASE UR QL STRIP: NEGATIVE
MCH RBC QN AUTO: 32 PG (ref 26.5–33)
MCHC RBC AUTO-ENTMCNC: 33.4 G/DL (ref 31.5–36.5)
MCV RBC AUTO: 96 FL (ref 78–100)
NITRATE UR QL: NEGATIVE
PH UR STRIP: 6.5 [PH] (ref 5–7)
PLATELET # BLD AUTO: 190 10E3/UL (ref 150–450)
RBC # BLD AUTO: 4.87 10E6/UL (ref 3.8–5.2)
RBC #/AREA URNS AUTO: ABNORMAL /HPF
SP GR UR STRIP: 1.01 (ref 1–1.03)
SQUAMOUS #/AREA URNS AUTO: ABNORMAL /LPF
TRICHOMONAS, WET PREP: NORMAL
UROBILINOGEN UR STRIP-ACNC: 0.2 E.U./DL
WBC # BLD AUTO: 23.8 10E3/UL (ref 4–11)
WBC #/AREA URNS AUTO: ABNORMAL /HPF
WBC'S/HIGH POWER FIELD, WET PREP: NORMAL
YEAST, WET PREP: NORMAL

## 2022-04-20 PROCEDURE — 85027 COMPLETE CBC AUTOMATED: CPT | Performed by: PHYSICIAN ASSISTANT

## 2022-04-20 PROCEDURE — 99214 OFFICE O/P EST MOD 30 MIN: CPT | Mod: 95 | Performed by: PHYSICIAN ASSISTANT

## 2022-04-20 PROCEDURE — 36415 COLL VENOUS BLD VENIPUNCTURE: CPT | Performed by: PHYSICIAN ASSISTANT

## 2022-04-20 PROCEDURE — 87210 SMEAR WET MOUNT SALINE/INK: CPT | Performed by: PHYSICIAN ASSISTANT

## 2022-04-20 PROCEDURE — 81001 URINALYSIS AUTO W/SCOPE: CPT | Performed by: PHYSICIAN ASSISTANT

## 2022-04-20 RX ORDER — CEPHALEXIN 500 MG/1
500 CAPSULE ORAL 3 TIMES DAILY
COMMUNITY
Start: 2022-04-13 | End: 2022-04-20

## 2022-04-20 RX ORDER — PHENAZOPYRIDINE HYDROCHLORIDE 200 MG/1
TABLET, FILM COATED ORAL
COMMUNITY
Start: 2022-04-13 | End: 2022-04-20

## 2022-04-20 NOTE — PROGRESS NOTES
Humaira is a 71 year old who is being evaluated via a billable telephone visit.      What phone number would you like to be contacted at? [phone]  How would you like to obtain your AVS? Guillermo Gerardo is a 71 year old who presents for the following health issues: urgent care visit    HPI       UC Followup:    Facility:  [facility]    Date of visit: 04/13/2022  Reason for visit: Dysuria (Frequency, pain, bloating)  Current Status: not good     She has abd pain, groin pain, vaginal pain and diarrhea   She's had the diarrhea for 2 weeks. She has BMs 2 stools per day.  She took keflex x 7 days without relief  Pain is located in the groin bilat.  She had a neg UA/UC  She also took pyridium for 2 days.  Denies vaginal discharge  No n/v  Able to eat  Has pain in the lower back        Objective           Vitals:  No vitals were obtained today due to virtual visit.    Physical Exam   healthy, alert and no distress  PSYCH: Alert and oriented times 3; coherent speech, normal   rate and volume, able to articulate logical thoughts, able   to abstract reason, no tangential thoughts, no hallucinations   or delusions  Her affect is normal  RESP: No cough, no audible wheezing, able to talk in full sentences  Remainder of exam unable to be completed due to telephone visits    Results for orders placed or performed in visit on 04/20/22   UA Macro with Reflex to Micro and Culture - lab collect     Status: Abnormal    Specimen: Urine, Midstream   Result Value Ref Range    Color Urine Yellow Colorless, Straw, Light Yellow, Yellow    Appearance Urine Clear Clear    Glucose Urine Negative Negative mg/dL    Bilirubin Urine Negative Negative    Ketones Urine Negative Negative mg/dL    Specific Gravity Urine 1.010 1.003 - 1.035    Blood Urine Trace (A) Negative    pH Urine 6.5 5.0 - 7.0    Protein Albumin Urine Negative Negative mg/dL    Urobilinogen Urine 0.2 0.2, 1.0 E.U./dL    Nitrite Urine Negative  Negative    Leukocyte Esterase Urine Negative Negative   CBC with platelets     Status: Abnormal   Result Value Ref Range    WBC Count 23.8 (H) 4.0 - 11.0 10e3/uL    RBC Count 4.87 3.80 - 5.20 10e6/uL    Hemoglobin 15.6 11.7 - 15.7 g/dL    Hematocrit 46.7 35.0 - 47.0 %    MCV 96 78 - 100 fL    MCH 32.0 26.5 - 33.0 pg    MCHC 33.4 31.5 - 36.5 g/dL    RDW 14.2 10.0 - 15.0 %    Platelet Count 190 150 - 450 10e3/uL   Urine Microscopic     Status: Abnormal   Result Value Ref Range    Bacteria Urine None Seen None Seen /HPF    RBC Urine 0-2 0-2 /HPF /HPF    WBC Urine 0-5 0-5 /HPF /HPF    Squamous Epithelials Urine Few (A) None Seen /LPF    Narrative    Urine Culture not indicated   Wet prep - lab collect     Status: Normal    Specimen: Vagina; Swab   Result Value Ref Range    Trichomonas Absent Absent    Yeast Absent Absent    Clue Cells Absent Absent    WBCs/high power field None None         Assessment and Plan:     (R19.7) Diarrhea, unspecified type  (primary encounter diagnosis)  Comment: CT abd/pelvis ordered stat. WBC elevated but that is not new as pt has CLL.  Plan: Comprehensive metabolic panel (BMP + Alb, Alk         Phos, ALT, AST, Total. Bili, TP), Enteric         Bacteria and Virus Panel by ANNABEL Stool,         Clostridium difficile Toxin B PCR            (R10.30) Lower abdominal pain  Comment:   Plan: CBC with platelets as above. Pt denies fever, chills, n/v. Able to eat. CT abd/pelvis ordered.            (R10.2) Vaginal pain  Comment:   Plan: Wet prep - lab collect            (R39.15) Urinary urgency  Comment:   Plan: UA Macro with Reflex to Micro and Culture - lab        collect              Lyudmila Escobar PA-C              Phone call duration: 30 minutes

## 2022-04-21 ENCOUNTER — HOSPITAL ENCOUNTER (OUTPATIENT)
Dept: CT IMAGING | Facility: CLINIC | Age: 72
Discharge: HOME | End: 2022-04-21
Attending: PHYSICIAN ASSISTANT | Admitting: PHYSICIAN ASSISTANT
Payer: COMMERCIAL

## 2022-04-21 ENCOUNTER — APPOINTMENT (OUTPATIENT)
Dept: LAB | Facility: CLINIC | Age: 72
End: 2022-04-21
Payer: COMMERCIAL

## 2022-04-21 DIAGNOSIS — C91.10 CLL (CHRONIC LYMPHOCYTIC LEUKEMIA) (H): ICD-10-CM

## 2022-04-21 DIAGNOSIS — R19.7 DIARRHEA, UNSPECIFIED TYPE: ICD-10-CM

## 2022-04-21 DIAGNOSIS — R10.30 LOWER ABDOMINAL PAIN: ICD-10-CM

## 2022-04-21 LAB
ALBUMIN SERPL-MCNC: 4.1 G/DL (ref 3.4–5)
ALP SERPL-CCNC: 70 U/L (ref 40–150)
ALT SERPL W P-5'-P-CCNC: 33 U/L (ref 0–50)
ANION GAP SERPL CALCULATED.3IONS-SCNC: 5 MMOL/L (ref 3–14)
AST SERPL W P-5'-P-CCNC: 26 U/L (ref 0–45)
BILIRUB SERPL-MCNC: 0.5 MG/DL (ref 0.2–1.3)
BUN SERPL-MCNC: 11 MG/DL (ref 7–30)
C COLI+JEJUNI+LARI FUSA STL QL NAA+PROBE: NOT DETECTED
C DIFF TOX B STL QL: NEGATIVE
CALCIUM SERPL-MCNC: 9.3 MG/DL (ref 8.5–10.1)
CHLORIDE BLD-SCNC: 102 MMOL/L (ref 94–109)
CO2 SERPL-SCNC: 29 MMOL/L (ref 20–32)
CREAT BLD-MCNC: 0.7 MG/DL (ref 0.5–1)
CREAT SERPL-MCNC: 0.58 MG/DL (ref 0.52–1.04)
EC STX1 GENE STL QL NAA+PROBE: NOT DETECTED
EC STX2 GENE STL QL NAA+PROBE: NOT DETECTED
GFR SERPL CREATININE-BSD FRML MDRD: >60 ML/MIN/1.73M2
GFR SERPL CREATININE-BSD FRML MDRD: >90 ML/MIN/1.73M2
GLUCOSE BLD-MCNC: 87 MG/DL (ref 70–99)
NOROV GI+II ORF1-ORF2 JNC STL QL NAA+PR: NOT DETECTED
POTASSIUM BLD-SCNC: 3.5 MMOL/L (ref 3.4–5.3)
PROT SERPL-MCNC: 6.8 G/DL (ref 6.8–8.8)
RADIOLOGIST FLAGS: NORMAL
RVA NSP5 STL QL NAA+PROBE: NOT DETECTED
SALMONELLA SP RPOD STL QL NAA+PROBE: NOT DETECTED
SHIGELLA SP+EIEC IPAH STL QL NAA+PROBE: NOT DETECTED
SODIUM SERPL-SCNC: 136 MMOL/L (ref 133–144)
V CHOL+PARA RFBL+TRKH+TNAA STL QL NAA+PR: NOT DETECTED
Y ENTERO RECN STL QL NAA+PROBE: NOT DETECTED

## 2022-04-21 PROCEDURE — 250N000011 HC RX IP 250 OP 636: Performed by: PHYSICIAN ASSISTANT

## 2022-04-21 PROCEDURE — 250N000009 HC RX 250: Performed by: PHYSICIAN ASSISTANT

## 2022-04-21 PROCEDURE — 87493 C DIFF AMPLIFIED PROBE: CPT | Mod: 59 | Performed by: PHYSICIAN ASSISTANT

## 2022-04-21 PROCEDURE — 87506 IADNA-DNA/RNA PROBE TQ 6-11: CPT | Performed by: PHYSICIAN ASSISTANT

## 2022-04-21 PROCEDURE — 74177 CT ABD & PELVIS W/CONTRAST: CPT

## 2022-04-21 PROCEDURE — 82565 ASSAY OF CREATININE: CPT

## 2022-04-21 PROCEDURE — 80053 COMPREHEN METABOLIC PANEL: CPT | Performed by: PHYSICIAN ASSISTANT

## 2022-04-21 RX ORDER — IOPAMIDOL 755 MG/ML
59 INJECTION, SOLUTION INTRAVASCULAR ONCE
Status: COMPLETED | OUTPATIENT
Start: 2022-04-21 | End: 2022-04-21

## 2022-04-21 RX ADMIN — IOPAMIDOL 59 ML: 755 INJECTION, SOLUTION INTRAVENOUS at 17:28

## 2022-04-21 RX ADMIN — SODIUM CHLORIDE 57 ML: 9 INJECTION, SOLUTION INTRAVENOUS at 17:28

## 2022-04-22 DIAGNOSIS — N83.209 CYST OF OVARY, UNSPECIFIED LATERALITY: Primary | ICD-10-CM

## 2022-04-22 NOTE — RESULT ENCOUNTER NOTE
Humaira,    Your labs are normal except the high WBC due to CLL (I assume).  I will order a pelvic ultrasound to better evaluate the ovarian cyst they see on the CT.    Lyudmila Escobar PA-C

## 2022-04-25 ENCOUNTER — HOSPITAL ENCOUNTER (OUTPATIENT)
Dept: ULTRASOUND IMAGING | Facility: CLINIC | Age: 72
Discharge: HOME | End: 2022-04-25
Attending: PHYSICIAN ASSISTANT | Admitting: PHYSICIAN ASSISTANT
Payer: COMMERCIAL

## 2022-04-25 ENCOUNTER — TELEPHONE (OUTPATIENT)
Dept: FAMILY MEDICINE | Facility: CLINIC | Age: 72
End: 2022-04-25
Payer: COMMERCIAL

## 2022-04-25 DIAGNOSIS — N83.209 CYST OF OVARY, UNSPECIFIED LATERALITY: ICD-10-CM

## 2022-04-25 DIAGNOSIS — R93.89 THICKENED ENDOMETRIUM: Primary | ICD-10-CM

## 2022-04-25 PROCEDURE — 76856 US EXAM PELVIC COMPLETE: CPT

## 2022-04-25 NOTE — RESULT ENCOUNTER NOTE
Humaira,    Your ultrasound shows a cyst which is likely benign.  It also shows that the lining of your uterus is thicker than it should be.  You will need to see an OB/GYN for this.    I will place a referral for you to go to gyn in [place].  The [facility] is full and not currently accepting new patients unfortunately.  They should be giving you a call to set up an appointment.  Let me know if you don't hear from them.    Lyudmila Escobar PA-C

## 2022-04-25 NOTE — TELEPHONE ENCOUNTER
[facility] calling to make sure the provider is aware of abnormal CT.     I see the ultrasound test was ordered.     Inge Crenshaw RN  -[facility]

## 2022-05-04 ENCOUNTER — OFFICE VISIT (OUTPATIENT)
Dept: OBGYN | Facility: CLINIC | Age: 72
End: 2022-05-04
Payer: COMMERCIAL

## 2022-05-04 VITALS
DIASTOLIC BLOOD PRESSURE: 70 MMHG | WEIGHT: 122 LBS | HEART RATE: 68 BPM | BODY MASS INDEX: 20.83 KG/M2 | HEIGHT: 64 IN | SYSTOLIC BLOOD PRESSURE: 128 MMHG

## 2022-05-04 DIAGNOSIS — Z78.0 MENOPAUSE: ICD-10-CM

## 2022-05-04 DIAGNOSIS — N83.291 COMPLEX CYST OF RIGHT OVARY: Primary | ICD-10-CM

## 2022-05-04 DIAGNOSIS — R93.89 THICKENED ENDOMETRIUM: ICD-10-CM

## 2022-05-04 PROCEDURE — 58100 BIOPSY OF UTERUS LINING: CPT | Performed by: OBSTETRICS & GYNECOLOGY

## 2022-05-04 PROCEDURE — 99203 OFFICE O/P NEW LOW 30 MIN: CPT | Mod: 25 | Performed by: OBSTETRICS & GYNECOLOGY

## 2022-05-04 PROCEDURE — 88305 TISSUE EXAM BY PATHOLOGIST: CPT | Performed by: PATHOLOGY

## 2022-05-04 NOTE — PROGRESS NOTES
"  Assessment & Plan     Complex cyst of right ovary  - New development on recent CT and U/S, which was not mentioned on prior CT from 2/2021.  - Need to have Gyn Onc consult to see if this requires surgical excision, and if so have them perform it in case a neoplasm is found.  - Adult Oncology/Hematology Referral; Future    Thickened endometrium  - Need to evaluate for endometrial hyperplasia or CA.  EMBx performed today with minimal tissue obtained. It may return inadequate for Dx given how little was aspirated.  - Will have Gyn Onc also evaluate Pt re: this in light of EMBx findings and right adnexal cystic lesion  - ENDOMETRIAL BIOPSY W/O CERVICAL DILATION  - Adult Oncology/Hematology Referral; Future  - Surgical Pathology Exam    Menopause  - Pt has been  x 20+ years on no HRT                   No follow-ups on file.    To Shipley MD  [facility] ANDERSON Gerardo is a 71 year old who presents for the following health issues     Pt who has been  x 20+ years on no HRT, here due to recent findings of thickened endometrium on U/S and right cystic adnexal mass on CT and U/S.  Pt saw PCP last month for bladder pressure sensation and urinary frequency.  UA Neg. Wet prep Neg.  Creat 0.7.  WBC 24 (Pt has known Dx CLL).  She also had diarrhea and had neg C.Diff and Neg Stool Cx.  Her diarrhea has since resolved.  She had a CT Abd/Pelvis 4/21/2022 showing a 3.7 x 2.0 cm right adnexal cystic mass.  Of note, a prior CT Abd/Pelvis in 2/2021 described the pelvis as normal.  A pelvic U/S 4/25/2022 showed a uterus 5 x 3 x 3 cm with a 7 mm heterogeneous endometrial stripe, as well as a Right ovarian 3.8 cm septated cyst.  Her left ovary was surgically removed around age 26 for a benign cyst.  She has had no VB or abn vag discharge.             Review of Systems         Objective    /70   Pulse 68   Ht 1.613 m (5' 3.5\")   Wt 55.3 kg (122 lb)   Breastfeeding No   BMI 21.27 kg/m    Body " mass index is 21.27 kg/m .  Physical Exam   Abdomen- Abdomen soft, non-tender. BS normal. No masses, organomegaly, positive findings: large midline vertical scar from sternum to symphysis  Pelvic- Exam chaperoned by nurse, External genitalia atrophic, [place]th[place]'s glands normal, [place]'s glands normal, Urethral meatus normal, Urethra normal, Bladder normal, Vagina atrophic, no abnormal lesions, no abnormal discharge, Atrophic cervix without lesions or mucopus, no cervical motion tenderness, EMBx performed as noted below, Uterus normal  size, shape, and contour, no masses, non-tender. Right adnexa has fullness that is non-tender. Anus normal      PROCEDURE:  Endometrial Biopsy      Indications for procedure:  Evaluation of Thickened endometrium    Pre-Procedure Medications:  None    The proposed procedure to diagnose the above condition was explained to the patient.  Risks, side effects, benefits, and alternatives were discussed. All questions were answered to patient's satisfaction. The patient gave informed consent.       The patient was placed in the dorsal lithotomy position and the perineum was exposed.  External genitalia appeared normal.  The uterus was mobile with normal size, shape, and consistency, without tenderness.  The adnexae palpated normally on bimanual exam without mass or tenderness. The uterus was anteverted  Appropriate sterile technique was used throughout the procedure.  A speculum was inserted and the cervix was visualized.  The cervix was cleansed with antiseptic solution.  A tenaculum was applied to the anterior lip of the cervix.  The uterus was sounded to 5 cm.  The [place] Endometrial Suction Curette was used and 1 rotating pass(s) were made between the fundus and the internal os. An scant sample was obtained and sent to pathology.  Instruments were removed.  The patient experienced mild cramping during the procedure.  This subsided rapidly after instruments were removed.  The patient  remained supine for a few moments after the procedure and had no other complications. No heavy bleeding was observed.       The patient was instructed to report any fever, cramping after 48 hours, or bleeding for 24-48 hours heavier than normal menses. OTC NSAIDs may be used for cramping PRN. Sexual relations may be resumed in 2-3 days, or after bleeding has stopped.   Pt. is to contact our office if she has not received the pathology report within 1-2 weeks of the procedure.         U/S:  Results for orders placed or performed during the hospital encounter of 04/25/22   US Pelvic Complete with Transvaginal    Narrative    ULTRASOUND PELVIS WITH TRANSVAGINAL IMAGING  4/25/2022 11:51 AM     HISTORY: Cyst of ovary, unspecified laterality.    COMPARISON: None.    FINDINGS:  Endovaginal sonography was added to the transabdominal  scans.    No fibroids. The uterus is 5.2 x 3.2 x 2.8 cm. Endometrial stripe  measures 7 mm and is thickened and heterogeneous for patient's age and  menstrual status. The right ovary demonstrates a 3.8 x 3.1 x 2.1 cm  cyst. The left ovary is surgically absent.  No right adnexal masses  are present. No free pelvic fluid is present.      Impression    IMPRESSION:   1. Abnormally thickened endometrium for age and menstrual status,  consider endometrial biopsy for further evaluation.  2. Lobular, possibly septated cystic lesion in the right ovary.    MARSHALL CHRISTIAN MD         SYSTEM ID:  [id number]

## 2022-05-04 NOTE — NURSING NOTE
"Chief Complaint   Patient presents with     Follow Up     Follow up to discuss US and EMB.        Initial /70   Pulse 68   Ht 1.613 m (5' 3.5\")   Wt 55.3 kg (122 lb)   Breastfeeding No   BMI 21.27 kg/m   Estimated body mass index is 21.27 kg/m  as calculated from the following:    Height as of this encounter: 1.613 m (5' 3.5\").    Weight as of this encounter: 55.3 kg (122 lb).  BP completed using cuff size: regular    Questioned patient about current smoking habits.  Pt. has never smoked.       P: 1    The following HM Due: NONE      The following patient reported/Care Every where data was sent to:  P ABSTRACT QUALITY INITIATIVES [[id number]]  Kalina Onofre LPN               "

## 2022-05-06 LAB
PATH REPORT.COMMENTS IMP SPEC: NORMAL
PATH REPORT.COMMENTS IMP SPEC: NORMAL
PATH REPORT.FINAL DX SPEC: NORMAL
PATH REPORT.GROSS SPEC: NORMAL
PATH REPORT.MICROSCOPIC SPEC OTHER STN: NORMAL
PATH REPORT.RELEVANT HX SPEC: NORMAL
PHOTO IMAGE: NORMAL

## 2022-05-06 NOTE — PROGRESS NOTES
RECORDS STATUS - ALL OTHER DIAGNOSIS      RECORDS RECEIVED FROM: [facility]   DATE RECEIVED: 5/16/2022   NOTES STATUS DETAILS   OFFICE NOTE from referring provider Complete Epic   To Shipley MD   OFFICE NOTE from medical oncologist Complete 12/3/2021    CLL (chronic lymphocytic leukemia) (H)    More in EPIC   DISCHARGE SUMMARY from hospital     DISCHARGE REPORT from the ER     MEDICATION LIST Complete [facility]   CLINICAL TRIAL TREATMENTS TO DATE     LABS     PATHOLOGY REPORTS     ANYTHING RELATED TO DIAGNOSIS IN Process     GENONOMIC TESTING     TYPE:     IMAGING (NEED IMAGES & REPORT)     CT SCANS Complete CT Abdomen Pelvis 4/21/2022   MRI     MAMMO     ULTRASOUND Complete US Transabdominal and Transvaginal 4/25/2022   PET

## 2022-05-16 ENCOUNTER — PRE VISIT (OUTPATIENT)
Dept: ONCOLOGY | Facility: CLINIC | Age: 72
End: 2022-05-16

## 2022-05-16 ENCOUNTER — ONCOLOGY VISIT (OUTPATIENT)
Dept: ONCOLOGY | Facility: CLINIC | Age: 72
End: 2022-05-16
Attending: OBSTETRICS & GYNECOLOGY
Payer: COMMERCIAL

## 2022-05-16 VITALS
OXYGEN SATURATION: 100 % | TEMPERATURE: 98.1 F | WEIGHT: 124.2 LBS | HEART RATE: 64 BPM | BODY MASS INDEX: 20.69 KG/M2 | RESPIRATION RATE: 18 BRPM | HEIGHT: 65 IN | SYSTOLIC BLOOD PRESSURE: 134 MMHG | DIASTOLIC BLOOD PRESSURE: 82 MMHG

## 2022-05-16 DIAGNOSIS — R93.89 THICKENED ENDOMETRIUM: ICD-10-CM

## 2022-05-16 DIAGNOSIS — N83.291 COMPLEX CYST OF RIGHT OVARY: Primary | ICD-10-CM

## 2022-05-16 DIAGNOSIS — R19.00 PELVIC MASS: ICD-10-CM

## 2022-05-16 DIAGNOSIS — R19.09 OTHER INTRA-ABDOMINAL AND PELVIC SWELLING, MASS AND LUMP: ICD-10-CM

## 2022-05-16 PROCEDURE — 99204 OFFICE O/P NEW MOD 45 MIN: CPT | Mod: 25 | Performed by: OBSTETRICS & GYNECOLOGY

## 2022-05-16 PROCEDURE — 58100 BIOPSY OF UTERUS LINING: CPT | Performed by: OBSTETRICS & GYNECOLOGY

## 2022-05-16 PROCEDURE — 58100 BIOPSY OF UTERUS LINING: CPT

## 2022-05-16 PROCEDURE — 88305 TISSUE EXAM BY PATHOLOGIST: CPT | Mod: TC | Performed by: OBSTETRICS & GYNECOLOGY

## 2022-05-16 PROCEDURE — 36415 COLL VENOUS BLD VENIPUNCTURE: CPT | Performed by: OBSTETRICS & GYNECOLOGY

## 2022-05-16 PROCEDURE — G0463 HOSPITAL OUTPT CLINIC VISIT: HCPCS

## 2022-05-16 PROCEDURE — 86304 IMMUNOASSAY TUMOR CA 125: CPT | Performed by: OBSTETRICS & GYNECOLOGY

## 2022-05-16 ASSESSMENT — PAIN SCALES - GENERAL: PAINLEVEL: NO PAIN (0)

## 2022-05-16 NOTE — PROGRESS NOTES
"Oncology Rooming Note    May 16, 2022 1:00 PM   Karen Caban is a 71 year old female who presents for:    Chief Complaint   Patient presents with     Oncology Clinic Visit     Initial Vitals: /82 (BP Location: Left arm, Patient Position: Sitting, Cuff Size: Adult Regular)   Pulse 64   Temp 98.1  F (36.7  C) (Oral)   Resp 18   Ht 1.638 m (5' 4.5\")   Wt 56.3 kg (124 lb 3.2 oz)   SpO2 100%   BMI 20.99 kg/m   Estimated body mass index is 20.99 kg/m  as calculated from the following:    Height as of this encounter: 1.638 m (5' 4.5\").    Weight as of this encounter: 56.3 kg (124 lb 3.2 oz). Body surface area is 1.6 meters squared.  No Pain (0) Comment: Data Unavailable   No LMP recorded. Patient is postmenopausal.  Allergies reviewed: Yes  Medications reviewed: Yes    Medications: Medication refills not needed today.  Pharmacy name entered into [facility]:    [facility] - [place], [place] - [address] [id number] - [place], [place] - [address] [id number] [place], [place] - [address]    Clinical concerns: no     Iram Cannon CMA              "

## 2022-05-16 NOTE — PROGRESS NOTES
GYNECOLOGIC  ONCOLOGY CONSULT    Referring provider:    To Shipley MD  [address] [place] [address]   RE: Karen Caban  : 1950  STEPHANIE: 2022    CC: Pelvic Mass    HPI: Ms Karen Caban is a 71 year old female who presents for consultation regarding complex ovarian mass.    Her past medical history is notable for PMHx of peripheral artery disease s/p fem pop bypass, chronic back pain, HTN, smoking history with emphysema.   Diagnosis of chronic lymphocytic leukemia not currently on treatment but on observation and follow up by Dr. Maddie Rowland.    Prior left ovary removal for cyst at age of 26.    She presented to her primary care clinic with report of bladder pressure in early 2022. Since then the bladder pressure resolved. She was referred to OB/GYn who performed the below evaluation and send her for consult. Denies any pelvic pain, no vaginal bleeding or discharge, no urinary concerns.    22 CT A/P  IMPRESSION:   1.  No acute findings or inflammatory changes in the abdomen or pelvis.     2.  Unchanged 1.2 cm cystic lesion in the pancreatic neck. Consider follow-up ACR recommendations: Repeat imaging with MRI/MRCP every 2 years x5.     3.  Incidental right adnexal cystic lesion measuring 3.7 cm. Consider ultrasound to exclude solid components.    22 Pelvic US  FINDINGS:  Endovaginal sonography was added to the transabdominal  scans.    No fibroids. The uterus is 5.2 x 3.2 x 2.8 cm. Endometrial stripe  measures 7 mm and is thickened and heterogeneous for patient's age and  menstrual status. The right ovary demonstrates a 3.8 x 3.1 x 2.1 cm  cyst. The left ovary is surgically absent.  No right adnexal masses  are present. No free pelvic fluid is present.      22 Office Endometrial Biopsy  Final Diagnosis  Endometrium, biopsy:  - A small amount of benign nonphasic endometrial tissue, suboptimal for evaluation.  - Multiple fragments of benign endocervical  tissue with microglandular hyperplasia and mild chronic inflammation.    OBGYN history and Health Maintenance:    Last Pap Smear:   negative  Last Mammogram: 2022 Benign BI-RADS 2  Last Colonoscopy:  2018, repeat in 5 yrs    Review of Systems:  Systemic:No weight changes.    Skin : No skin changes or new lesions.   Eye : No changes in vision.   Pulmonary: No cough or SOB.   Cardiovascular: No CP or palpitations  Gastrointestinal : No diarrhea, constipation, abdominal pain. Bowel habits normal.   Genitourinary: No dysuria, urgency or bleeding  Psychiatric: No depression or anxiety  Hematologic : No palpable lymph nodes.   Endocrine : No hot flashes. No heat/cold intolerance.      Neurological: No headaches, no numbness.     Past Medical History:   Diagnosis Date     Ankle fracture     L     Anxiety      Chronic back pain      Colon polyp     repeat colonoscopy 5 years.     Fx low femur epiphy-closed (H)      GERD (gastroesophageal reflux disease)      History of UTI     Cysto by Dr Nikole xiong     HTN (hypertension), benign      Hyperlipidemia LDL goal < 100      Normal nuclear stress test     EF 67%     Osteopenia      PAD (peripheral artery disease) (H)     s/p fem pop bypass; embolectomy     PUD (peptic ulcer disease)     DU     Smoker      Vitamin D deficiency        Past Surgical History:   Procedure Laterality Date     Blood clot removal from Stent           BONE MARROW BIOPSY, BONE SPECIMEN, NEEDLE/TROCAR N/A 2021    Procedure: bone marrow biopsy;  Surgeon: Kailey Cota MD;  Location:  GI     BYPASS GRAFT AORTOFEMORAL  2002    Dr. Turner     BYPASS GRAFT FEMOROPOPLITEAL  2011     COLONOSCOPY N/A 2018    Procedure: COMBINED COLONOSCOPY, SINGLE OR MULTIPLE BIOPSY/POLYPECTOMY BY BIOPSY;  COLONOSCOPY;  Surgeon: Efrain Hernadez MD;  Location:  GI     EMBOLECTOMY LOWER EXTREMITY  2011    Procedure:EMBOLECTOMY LOWER EXTREMITY;  EMBOLECTOMY, RIGHT POPLITEAL WITH PATCH ANGIOPLASTY. EXCISION SKIN LESION RIGHT LEG. ; Surgeon:PARMINDER VELAZCO; Location:[facility] OR     EXCISE LESION LOWER EXTREMITY  12/16/2011    Procedure:EXCISE LESION LOWER EXTREMITY; Surgeon:PARMINDER VELAZCO; Location:[facility] OR     HERNIA REPAIR  11/04/2008    x2     L achilles repair  12/04/2008     LAPAROSCOPIC OOPHORECTOMY Left     L for cyst age 26     miscarriages x 3       tubal ligation and reversal            Current Outpatient Medications   Medication Sig Dispense Refill     albuterol (PROAIR RESPICLICK) 108 (90 Base) MCG/ACT inhaler Inhale 1-2 puffs into the lungs every 4 hours as needed 1 each 3     aspirin (ASA) 81 MG chewable tablet Take 81 mg by mouth       atorvastatin (LIPITOR) 40 MG tablet Take 1 tablet (40 mg) by mouth daily 90 tablet 2     chlorthalidone (HYGROTON) 25 MG tablet Take 1 tablet (25 mg) by mouth daily 90 tablet 0     Cholecalciferol (VITAMIN D-3) 5000 UNIT TABS Take 1 tablet by mouth daily.       clonazePAM (KLONOPIN) 0.5 MG ODT Take 1/2-1 tab PO BID prn anxiety 60 tablet 1     coenzyme Q-10 200 MG CAPS        Cyanocobalamin (B-12) 1000 MCG TBCR Take 1,000 mcg by mouth daily 100 tablet 1     estradiol (ESTRACE) 0.1 MG/GM vaginal cream INSERT 1 GRAM VAGINALLY 2 TO 3 TIMES EVERY WEEK 42.5 g 0     Lactobacillus (PROBIOTIC ACIDOPHILUS PO)        metoprolol succinate ER (TOPROL-XL) 100 MG 24 hr tablet TAKE 1 AND 1/2 TABLETS(150 MG) BY MOUTH DAILY 135 tablet 3     omeprazole (PRILOSEC) 40 MG DR capsule TAKE 1 CAPSULE BY MOUTH EVERY DAY 30 TO 60 MINUTES BEFORE A MEAL 90 capsule 3         Allergies   Allergen Reactions     Chantix [Varenicline] Nausea     Ciprofloxacin Other (See Comments)     hypertension     Clopidogrel      Other reaction(s): Hypertension     Decongestant [Cvs]      Erythromycin Nausea     Lisinopril      cough     Plavix [Clopidogrel Bisulfate]      Felt as if she was having a heart attack     Rofecoxib Unknown     Vioxx   "      Social History:  Social History     Tobacco Use     Smoking status: Current Every Day Smoker     Packs/day: 1.00     Years: 50.00     Pack years: 50.00     Types: Cigarettes     Smokeless tobacco: Never Used     Tobacco comment: Nicorette gum and patch, trying to quit    Substance Use Topics     Alcohol use: Yes     Comment: Beer once in a while       Family History:   The patient's family history is notable for   Family History   Problem Relation Age of Onset     Alzheimer Disease Mother          of panc/GI ca age 83     Osteoporosis Mother      Other Cancer Mother      Cancer Father          age 64 lymphoma     Colon Cancer Maternal Grandfather          Physical Exam:     /82 (BP Location: Left arm, Patient Position: Sitting, Cuff Size: Adult Regular)   Pulse 64   Temp 98.1  F (36.7  C) (Oral)   Resp 18   Ht 1.638 m (5' 4.5\")   Wt 56.3 kg (124 lb 3.2 oz)   SpO2 100%   BMI 20.99 kg/m    Body mass index is 20.99 kg/m .    General: Alert and oriented, no acute distress  Psych: Mood stable  Cardiovascular: RRR, no murmors  Pulmonary: Lungs clear . Normal respiratory effort  GI: No distention. No masses. No hernia. Old midline and low transverse incisions  Lymph: No enlarge lymph nodes in neck or groin  : Normal external genitalia. Vagina without lesions, Cervix with no lesions, midline uterus, no adnexal mass.    Endometrial Biopsy  Performed after obtaining consent, uterus sounded to 5 cm, small amount of tissue obtained. No complications      Assessment: Karen Caban is a 71 year old woman with a new diagnosis of 3. X 3.8 cm cyst primarily simple in appearance.     Thickened endometrial stripe, minimal tissue obtained during prior biopsy    Vascular disease secondary to long term smoking history    Plan:     1.)   Discussed option for removal of ovary versus observation. The appearance on imaging is benign but any enlargement of ovary in menopause can be surgically removed to " confirm pathology. Discussed alternative of observation if normal . She prefers to follow up with imaging as surgery can have complications with her medical history.    Endometrial biopsy obtained today.  If biopsy result or  is elevated follow up sooner.  Scheduled follow up n 8 weeks with pelvic US if above testing is negative    2.) Labs and/or tests ordered include:  Pathology and  lab       Kari Busby M.D., MPH,  F.A.C.ORikkiG.  Professor  Department of Ob/Gyn and Women's Health  Division of Gynecologic Oncology  [facility] [place]  [phone]      Time: total time spent today, 5/16/22, including preparation, review of outside records, face to face counseling, and documentation was 60 minutes.

## 2022-05-16 NOTE — LETTER
"    2022         RE: Karen Caban  [address]  [address]  [place] [address]        Dear Colleague,    Thank you for referring your patient, Karen Caban, to the [facility]. Please see a copy of my visit note below.    Oncology Rooming Note    May 16, 2022 1:00 PM   Karen Caban is a 71 year old female who presents for:    Chief Complaint   Patient presents with     Oncology Clinic Visit     Initial Vitals: /82 (BP Location: Left arm, Patient Position: Sitting, Cuff Size: Adult Regular)   Pulse 64   Temp 98.1  F (36.7  C) (Oral)   Resp 18   Ht 1.638 m (5' 4.5\")   Wt 56.3 kg (124 lb 3.2 oz)   SpO2 100%   BMI 20.99 kg/m   Estimated body mass index is 20.99 kg/m  as calculated from the following:    Height as of this encounter: 1.638 m (5' 4.5\").    Weight as of this encounter: 56.3 kg (124 lb 3.2 oz). Body surface area is 1.6 meters squared.  No Pain (0) Comment: Data Unavailable   No LMP recorded. Patient is postmenopausal.  Allergies reviewed: Yes  Medications reviewed: Yes    Medications: Medication refills not needed today.  Pharmacy name entered into [facility]:    [facility] - [place], [place] - [address] [place] [address] [id number] - [place], [place] - [address] [facility][id number] - [place], [place] - [address]    Clinical concerns: no     Iram Cannon CMA                GYNECOLOGIC  ONCOLOGY CONSULT    Referring provider:    To Shipley MD  [facility] [place] [address]   RE: Karen Caban  : 1950  STEPHANIE: 2022    CC: Pelvic Mass    HPI: Ms Karen Caban is a 71 year old female who presents for consultation regarding complex ovarian mass.    Her past medical history is notable for PMHx of peripheral artery disease s/p fem pop bypass, chronic back pain, HTN, smoking history with emphysema.   Diagnosis of chronic " lymphocytic leukemia not currently on treatment but on observation and follow up by Dr. Maddie Rowland.    Prior left ovary removal for cyst at age of 26.    She presented to her primary care clinic with report of bladder pressure in early 2022. Since then the bladder pressure resolved. She was referred to OB/GYn who performed the below evaluation and send her for consult. Denies any pelvic pain, no vaginal bleeding or discharge, no urinary concerns.    22 CT A/P  IMPRESSION:   1.  No acute findings or inflammatory changes in the abdomen or pelvis.     2.  Unchanged 1.2 cm cystic lesion in the pancreatic neck. Consider follow-up ACR recommendations: Repeat imaging with MRI/MRCP every 2 years x5.     3.  Incidental right adnexal cystic lesion measuring 3.7 cm. Consider ultrasound to exclude solid components.    22 Pelvic US  FINDINGS:  Endovaginal sonography was added to the transabdominal  scans.    No fibroids. The uterus is 5.2 x 3.2 x 2.8 cm. Endometrial stripe  measures 7 mm and is thickened and heterogeneous for patient's age and  menstrual status. The right ovary demonstrates a 3.8 x 3.1 x 2.1 cm  cyst. The left ovary is surgically absent.  No right adnexal masses  are present. No free pelvic fluid is present.      22 Office Endometrial Biopsy  Final Diagnosis  Endometrium, biopsy:  - A small amount of benign nonphasic endometrial tissue, suboptimal for evaluation.  - Multiple fragments of benign endocervical tissue with microglandular hyperplasia and mild chronic inflammation.    OBGYN history and Health Maintenance:    Last Pap Smear:   negative  Last Mammogram: 2022 Benign BI-RADS 2  Last Colonoscopy:  2018, repeat in 5 yrs    Review of Systems:  Systemic:No weight changes.    Skin : No skin changes or new lesions.   Eye : No changes in vision.   Pulmonary: No cough or SOB.   Cardiovascular: No CP or palpitations  Gastrointestinal : No diarrhea, constipation, abdominal  pain. Bowel habits normal.   Genitourinary: No dysuria, urgency or bleeding  Psychiatric: No depression or anxiety  Hematologic : No palpable lymph nodes.   Endocrine : No hot flashes. No heat/cold intolerance.      Neurological: No headaches, no numbness.     Past Medical History:   Diagnosis Date     Ankle fracture 2009    L     Anxiety      Chronic back pain      Colon polyp 2012    repeat colonoscopy 5 years.     Fx low femur epiphy-closed (H)      GERD (gastroesophageal reflux disease)      History of UTI 2017    Cysto by Dr Agustin neg     HTN (hypertension), benign      Hyperlipidemia LDL goal < 100      Normal nuclear stress test 12/09    EF 67%     Osteopenia      PAD (peripheral artery disease) (H)     s/p fem pop bypass; embolectomy     PUD (peptic ulcer disease) 1980s    DU     Smoker      Vitamin D deficiency        Past Surgical History:   Procedure Laterality Date     Blood clot removal from Stent      2011     BONE MARROW BIOPSY, BONE SPECIMEN, NEEDLE/TROCAR N/A 02/02/2021    Procedure: bone marrow biopsy;  Surgeon: Kailey Cota MD;  Location:  GI     BYPASS GRAFT AORTOFEMORAL  05/2002    Dr. Turner     BYPASS GRAFT FEMOROPOPLITEAL  12/16/2011     COLONOSCOPY N/A 01/18/2018    Procedure: COMBINED COLONOSCOPY, SINGLE OR MULTIPLE BIOPSY/POLYPECTOMY BY BIOPSY;  COLONOSCOPY;  Surgeon: Efrain Hernadez MD;  Location:  GI     EMBOLECTOMY LOWER EXTREMITY  12/16/2011    Procedure:EMBOLECTOMY LOWER EXTREMITY; EMBOLECTOMY, RIGHT POPLITEAL WITH PATCH ANGIOPLASTY. EXCISION SKIN LESION RIGHT LEG. ; Surgeon:PARMINDER VELAZCO; Location: OR     EXCISE LESION LOWER EXTREMITY  12/16/2011    Procedure:EXCISE LESION LOWER EXTREMITY; Surgeon:PARMINDER VELAZCO; Location: OR     HERNIA REPAIR  11/04/2008    x2     L achilles repair  12/04/2008     LAPAROSCOPIC OOPHORECTOMY Left     L for cyst age 26     miscarriages x 3       tubal ligation and reversal            Current Outpatient  Medications   Medication Sig Dispense Refill     albuterol (PROAIR RESPICLICK) 108 (90 Base) MCG/ACT inhaler Inhale 1-2 puffs into the lungs every 4 hours as needed 1 each 3     aspirin (ASA) 81 MG chewable tablet Take 81 mg by mouth       atorvastatin (LIPITOR) 40 MG tablet Take 1 tablet (40 mg) by mouth daily 90 tablet 2     chlorthalidone (HYGROTON) 25 MG tablet Take 1 tablet (25 mg) by mouth daily 90 tablet 0     Cholecalciferol (VITAMIN D-3) 5000 UNIT TABS Take 1 tablet by mouth daily.       clonazePAM (KLONOPIN) 0.5 MG ODT Take 1/2-1 tab PO BID prn anxiety 60 tablet 1     coenzyme Q-10 200 MG CAPS        Cyanocobalamin (B-12) 1000 MCG TBCR Take 1,000 mcg by mouth daily 100 tablet 1     estradiol (ESTRACE) 0.1 MG/GM vaginal cream INSERT 1 GRAM VAGINALLY 2 TO 3 TIMES EVERY WEEK 42.5 g 0     Lactobacillus (PROBIOTIC ACIDOPHILUS PO)        metoprolol succinate ER (TOPROL-XL) 100 MG 24 hr tablet TAKE 1 AND 1/2 TABLETS(150 MG) BY MOUTH DAILY 135 tablet 3     omeprazole (PRILOSEC) 40 MG DR capsule TAKE 1 CAPSULE BY MOUTH EVERY DAY 30 TO 60 MINUTES BEFORE A MEAL 90 capsule 3         Allergies   Allergen Reactions     Chantix [Varenicline] Nausea     Ciprofloxacin Other (See Comments)     hypertension     Clopidogrel      Other reaction(s): Hypertension     Decongestant [Cvs]      Erythromycin Nausea     Lisinopril      cough     Plavix [Clopidogrel Bisulfate]      Felt as if she was having a heart attack     Rofecoxib Unknown     Vioxx        Social History:  Social History     Tobacco Use     Smoking status: Current Every Day Smoker     Packs/day: 1.00     Years: 50.00     Pack years: 50.00     Types: Cigarettes     Smokeless tobacco: Never Used     Tobacco comment: Nicorette gum and patch, trying to quit 2021   Substance Use Topics     Alcohol use: Yes     Comment: Beer once in a while       Family History:   The patient's family history is notable for   Family History   Problem Relation Age of Onset     Alzheimer  "Disease Mother          of panc/GI ca age 83     Osteoporosis Mother      Other Cancer Mother      Cancer Father          age 64 lymphoma     Colon Cancer Maternal Grandfather          Physical Exam:     /82 (BP Location: Left arm, Patient Position: Sitting, Cuff Size: Adult Regular)   Pulse 64   Temp 98.1  F (36.7  C) (Oral)   Resp 18   Ht 1.638 m (5' 4.5\")   Wt 56.3 kg (124 lb 3.2 oz)   SpO2 100%   BMI 20.99 kg/m    Body mass index is 20.99 kg/m .    General: Alert and oriented, no acute distress  Psych: Mood stable  Cardiovascular: RRR, no murmors  Pulmonary: Lungs clear . Normal respiratory effort  GI: No distention. No masses. No hernia. Old midline and low transverse incisions  Lymph: No enlarge lymph nodes in neck or groin  : Normal external genitalia. Vagina without lesions, Cervix with no lesions, midline uterus, no adnexal mass.    Endometrial Biopsy  Performed after obtaining consent, uterus sounded to 5 cm, small amount of tissue obtained. No complications      Assessment: Karen Caban is a 71 year old woman with a new diagnosis of 3. X 3.8 cm cyst primarily simple in appearance.     Thickened endometrial stripe, minimal tissue obtained during prior biopsy    Vascular disease secondary to long term smoking history    Plan:     1.)   Discussed option for removal of ovary versus observation. The appearance on imaging is benign but any enlargement of ovary in menopause can be surgically removed to confirm pathology. Discussed alternative of observation if normal . She prefers to follow up with imaging as surgery can have complications with her medical history.    Endometrial biopsy obtained today.  If biopsy result or  is elevated follow up sooner.  Scheduled follow up n 8 weeks with pelvic US if above testing is negative    2.) Labs and/or tests ordered include:  Pathology and  lab       Kari Busby M.D., MPH,  F.A.C.O.G.  Professor  Department of Ob/Gyn and " Women's Health  Division of Gynecologic Oncology  [facility]  [phone]      Time: total time spent today, 5/16/22, including preparation, review of outside records, face to face counseling, and documentation was 60 minutes.       Again, thank you for allowing me to participate in the care of your patient.        Sincerely,        Kari Busby MD

## 2022-05-18 LAB — CANCER AG125 SERPL-ACNC: 7 U/ML (ref 0–30)

## 2022-05-19 ENCOUNTER — PATIENT OUTREACH (OUTPATIENT)
Dept: ONCOLOGY | Facility: CLINIC | Age: 72
End: 2022-05-19

## 2022-05-19 LAB
PATH REPORT.COMMENTS IMP SPEC: NORMAL
PATH REPORT.FINAL DX SPEC: NORMAL
PATH REPORT.GROSS SPEC: NORMAL
PATH REPORT.MICROSCOPIC SPEC OTHER STN: NORMAL
PATH REPORT.RELEVANT HX SPEC: NORMAL
PHOTO IMAGE: NORMAL

## 2022-05-19 PROCEDURE — 88305 TISSUE EXAM BY PATHOLOGIST: CPT | Mod: 26 | Performed by: PATHOLOGY

## 2022-05-19 NOTE — PROGRESS NOTES
Left message for Humaira that her  is normal and her endometrial biopsy has no evidence of cancer or atypia.  Plan is to repeat her pelvic US in 8 weeks just need to know if she wants to follow up with Dr. Busby at the [facility] or if staying at the [facility] we can get her on Dr. Destiney Schaefer's schedule.    Thanks    Rufina Anand RN Care Coordinator  [facility]  Ph.[phone] Fax. [phone]

## 2022-05-25 DIAGNOSIS — C91.10 CLL (CHRONIC LYMPHOCYTIC LEUKEMIA) (H): Primary | ICD-10-CM

## 2022-05-31 ENCOUNTER — LAB (OUTPATIENT)
Dept: INFUSION THERAPY | Facility: CLINIC | Age: 72
End: 2022-05-31
Attending: INTERNAL MEDICINE
Payer: COMMERCIAL

## 2022-05-31 DIAGNOSIS — C91.10 CLL (CHRONIC LYMPHOCYTIC LEUKEMIA) (H): ICD-10-CM

## 2022-05-31 LAB
ALBUMIN SERPL-MCNC: 3.6 G/DL (ref 3.4–5)
ALP SERPL-CCNC: 63 U/L (ref 40–150)
ALT SERPL W P-5'-P-CCNC: 33 U/L (ref 0–50)
ANION GAP SERPL CALCULATED.3IONS-SCNC: 5 MMOL/L (ref 3–14)
AST SERPL W P-5'-P-CCNC: 26 U/L (ref 0–45)
BASOPHILS # BLD MANUAL: 0.5 10E3/UL (ref 0–0.2)
BASOPHILS NFR BLD MANUAL: 2 %
BILIRUB SERPL-MCNC: 0.6 MG/DL (ref 0.2–1.3)
BUN SERPL-MCNC: 11 MG/DL (ref 7–30)
CALCIUM SERPL-MCNC: 9 MG/DL (ref 8.5–10.1)
CHLORIDE BLD-SCNC: 99 MMOL/L (ref 94–109)
CO2 SERPL-SCNC: 33 MMOL/L (ref 20–32)
CREAT SERPL-MCNC: 0.56 MG/DL (ref 0.52–1.04)
EOSINOPHIL # BLD MANUAL: 0 10E3/UL (ref 0–0.7)
EOSINOPHIL NFR BLD MANUAL: 0 %
ERYTHROCYTE [DISTWIDTH] IN BLOOD BY AUTOMATED COUNT: 13.3 % (ref 10–15)
GFR SERPL CREATININE-BSD FRML MDRD: >90 ML/MIN/1.73M2
GLUCOSE BLD-MCNC: 99 MG/DL (ref 70–99)
HCT VFR BLD AUTO: 45.3 % (ref 35–47)
HGB BLD-MCNC: 15 G/DL (ref 11.7–15.7)
LDH SERPL L TO P-CCNC: 227 U/L (ref 81–234)
LYMPHOCYTES # BLD MANUAL: 19 10E3/UL (ref 0.8–5.3)
LYMPHOCYTES NFR BLD MANUAL: 81 %
MCH RBC QN AUTO: 31 PG (ref 26.5–33)
MCHC RBC AUTO-ENTMCNC: 33.1 G/DL (ref 31.5–36.5)
MCV RBC AUTO: 94 FL (ref 78–100)
MONOCYTES # BLD MANUAL: 0.2 10E3/UL (ref 0–1.3)
MONOCYTES NFR BLD MANUAL: 1 %
NEUTROPHILS # BLD MANUAL: 3.8 10E3/UL (ref 1.6–8.3)
NEUTROPHILS NFR BLD MANUAL: 16 %
PLAT MORPH BLD: ABNORMAL
PLATELET # BLD AUTO: 202 10E3/UL (ref 150–450)
POTASSIUM BLD-SCNC: 3.8 MMOL/L (ref 3.4–5.3)
PROT SERPL-MCNC: 6.5 G/DL (ref 6.8–8.8)
RBC # BLD AUTO: 4.84 10E6/UL (ref 3.8–5.2)
RBC MORPH BLD: ABNORMAL
SMUDGE CELLS BLD QL SMEAR: PRESENT
SODIUM SERPL-SCNC: 137 MMOL/L (ref 133–144)
WBC # BLD AUTO: 23.5 10E3/UL (ref 4–11)

## 2022-05-31 PROCEDURE — 85027 COMPLETE CBC AUTOMATED: CPT | Performed by: INTERNAL MEDICINE

## 2022-05-31 PROCEDURE — 80053 COMPREHEN METABOLIC PANEL: CPT | Performed by: INTERNAL MEDICINE

## 2022-05-31 PROCEDURE — 36415 COLL VENOUS BLD VENIPUNCTURE: CPT

## 2022-05-31 PROCEDURE — 83615 LACTATE (LD) (LDH) ENZYME: CPT | Performed by: INTERNAL MEDICINE

## 2022-05-31 PROCEDURE — 85007 BL SMEAR W/DIFF WBC COUNT: CPT | Performed by: INTERNAL MEDICINE

## 2022-05-31 NOTE — PROGRESS NOTES
Medical Assistant Note:  Karen Caban presents today for lab draw.    Patient seen by provider today: No.   present during visit today: Not Applicable.    Concerns: No Concerns.    Procedure:  Lab draw site: RAC, Needle type: BF, Gauge: 21. Gauze and coban applied    Post Assessment:  Labs drawn without difficulty: Yes.    Discharge Plan:  Departure Mode: Ambulatory.    Face to Face Time: 5.    Iram Cannon CMA

## 2022-06-01 ENCOUNTER — ONCOLOGY VISIT (OUTPATIENT)
Dept: ONCOLOGY | Facility: CLINIC | Age: 72
End: 2022-06-01
Attending: INTERNAL MEDICINE
Payer: COMMERCIAL

## 2022-06-01 VITALS
BODY MASS INDEX: 20.82 KG/M2 | DIASTOLIC BLOOD PRESSURE: 84 MMHG | OXYGEN SATURATION: 99 % | SYSTOLIC BLOOD PRESSURE: 161 MMHG | WEIGHT: 123.2 LBS | RESPIRATION RATE: 16 BRPM | HEART RATE: 63 BPM

## 2022-06-01 DIAGNOSIS — C91.10 CLL (CHRONIC LYMPHOCYTIC LEUKEMIA) (H): Primary | ICD-10-CM

## 2022-06-01 PROCEDURE — G0463 HOSPITAL OUTPT CLINIC VISIT: HCPCS

## 2022-06-01 PROCEDURE — 99215 OFFICE O/P EST HI 40 MIN: CPT | Performed by: INTERNAL MEDICINE

## 2022-06-01 ASSESSMENT — PAIN SCALES - GENERAL: PAINLEVEL: NO PAIN (0)

## 2022-06-01 NOTE — LETTER
6/1/2022         RE: Karen Caban  [address]  [address]  [place] [address]        Dear Colleague,    Thank you for referring your patient, Karen Caban, to the [facility]. Please see a copy of my visit note below.    Visit Date: 06/01/2022    HEMATOLOGIC HISTORY:  Ms. Caban is a 71-year-old female with CLL.  1.  On 12/11/2019, WBC of 9.8.  -- On 12/17/2020, WBC of 16.3.  -- On 01/06/2021, WBC of 18.6, hemoglobin of 16.5 and platelet of 206.  Neutrophil of 19%, lymphocyte of 74%.  2.  Flow cytometry on peripheral blood on 01/06/2021 revealed CD5 positive kappa monotypic B cells.  3.  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  -- FISH revealed a loss of 13q, 14.  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  4.  CT chest, abdomen, and pelvis on 02/03/2021 did not reveal any lymphadenopathy or splenomegaly.    Ms. Caban is a 71-year-old female with Webb stage 0 chronic lymphocytic leukemia.  The patient is on observation.    She is doing well.  No headache.  No dizziness.  No neck pain.  No chest pain.  No shortness of breath.  No abdominal pain, nausea or vomiting.  Appetite has been good.  No urinary or bowel complaints.  No recent weight loss.    The patient says that overall she is doing good.  The patient has a lung nodule and follows up with pulmonologist.    All other review of systems is negative.    PHYSICAL EXAM:  Normal.    LABORATORY:  CBC and CMP were reviewed.    ASSESSMENT:    1.  A 71-year-old female with Webb stage 0 chronic lymphocytic leukemia.  2.  Lung nodule, stable.  3.  Smoker.    PLAN:    1.  Discussed with her regarding CLL.  Natural history of disease explained in depth.  CBC reviewed.  I explained to her CLL is stable.    The patient has Webb stage 0 chronic lymphocytic leukemia.  No indication for treatment.  Different indications for treatment discussed.  2.  With regards to  CLL, we will continue to monitor.  I will see her in 6 months' time with labs.  3.  The patient has emphysema.  She has lung nodule.  She is a smoker.  I advised her to quit smoking.  She will continue to follow up with pulmonologist.  4.  She had few questions, which were all answered.  I advised the patient to see a physician if she has lump, unexplained weight loss, worsening weakness, recurrent infection or any other concerns.    TOTAL VISIT TIME:  Forty minutes.  Time spent in today's visit, review of chart/investigations today, and documentation.    Sancho Osorio MD        D: 2022   T: 2022   MT: [id number]    Name:     JANNETTE FRANKEL  MRN:      [id number]        Account:    [id number]   :      1950           Visit Date: 2022     Document: [id number]      Again, thank you for allowing me to participate in the care of your patient.        Sincerely,        Sancho Osorio MD

## 2022-06-01 NOTE — RESULT ENCOUNTER NOTE
Dear Ms. Caban,    Blood test is stable.    Please, call me with any questions.    Sancho Osorio MD

## 2022-06-11 NOTE — PROGRESS NOTES
HEMATOLOGIC HISTORY:  Ms. Caban is a 71-year-old female with CLL.  1.  On 12/11/2019, WBC of 9.8.  -On 12/17/2020, WBC of 16.3.  -On 01/06/2021, WBC of 18.6, hemoglobin of 16.5 and platelet of 206.  Neutrophil of 19%, lymphocyte of 74%.  2.  Flow cytometry on peripheral blood on 01/06/2021 revealed CD5 positive kappa monotypic B cells with immunophenotype of CLL/SLL .  3.  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  CT chest, abdomen, and pelvis on 02/03/2021 did not reveal any lymphadenopathy or splenomegaly.     SUBJECTIVE: Ms. Caban is a 71-year-old female with Webb stage 0 chronic lymphocytic leukemia.  The patient is on observation.     She is doing well.  No headache.  No dizziness.  No neck pain.  No chest pain.  No shortness of breath.  No abdominal pain, nausea or vomiting.  Appetite has been good.  No urinary or bowel complaints.  No recent weight loss.     The patient says that overall she is doing good.  The patient has a lung nodule and follows up with pulmonologist.     All other review of systems is negative.     PHYSICAL EXAMINATION:   GENERAL:  Alert and oriented x 3.   VITAL SIGNS:  Reviewed.  ECOG PS of 0.     EYES:  No icterus.   NECK:  Supple. No lymphadenopathy. No thyromegaly.   AXILLAE:  No lymphadenopathy.   LUNGS:  Good air entry bilaterally.  No crackles or wheezing.   HEART:  Regular.  No murmur.   ABDOMEN:  Soft.  Nontender. No mass.   EXTREMITIES:  No pedal edema.  No calf swelling or tenderness.   SKIN:  No rash.      LABORATORY:  CBC and CMP were reviewed.     ASSESSMENT:    1.  A 71-year-old female with Webb stage 0 chronic lymphocytic leukemia.  2.  Lung nodule, stable.  3.  Smoker.     PLAN:    1.  Discussed with her regarding CLL.  Natural history of disease  explained in depth.  CBC reviewed.  I explained to her CLL is stable. The patient has Webb stage 0 chronic lymphocytic leukemia.  No indication for treatment.  Different indications for treatment discussed.  2.  With regards to CLL, we will continue to monitor.  I will see her in 6 months' time with labs.  3.  The patient has emphysema.  She has lung nodule.  She is a smoker.  I advised her to quit smoking.  She will continue to follow up with pulmonologist.  4.  She had few questions, which were all answered.  I advised the patient to see a physician if she has lump, unexplained weight loss, worsening weakness, recurrent infection or any other concerns.     TOTAL VISIT TIME:  Forty minutes.  Time spent in today's visit, review of chart/investigations today, and documentation.

## 2022-06-15 ENCOUNTER — VIRTUAL VISIT (OUTPATIENT)
Dept: FAMILY MEDICINE | Facility: CLINIC | Age: 72
End: 2022-06-15
Payer: COMMERCIAL

## 2022-06-15 DIAGNOSIS — I73.9 PAD (PERIPHERAL ARTERY DISEASE) (H): ICD-10-CM

## 2022-06-15 DIAGNOSIS — Z78.9 MEDICALLY COMPLEX PATIENT: ICD-10-CM

## 2022-06-15 DIAGNOSIS — F41.9 ANXIETY: ICD-10-CM

## 2022-06-15 DIAGNOSIS — F17.200 SMOKER: ICD-10-CM

## 2022-06-15 DIAGNOSIS — K21.9 GASTROESOPHAGEAL REFLUX DISEASE, UNSPECIFIED WHETHER ESOPHAGITIS PRESENT: ICD-10-CM

## 2022-06-15 DIAGNOSIS — Z76.89 ENCOUNTER TO ESTABLISH CARE: Primary | ICD-10-CM

## 2022-06-15 DIAGNOSIS — M85.80 OSTEOPENIA, UNSPECIFIED LOCATION: ICD-10-CM

## 2022-06-15 DIAGNOSIS — E78.5 HYPERLIPIDEMIA WITH TARGET LDL LESS THAN 100: ICD-10-CM

## 2022-06-15 DIAGNOSIS — I10 HTN (HYPERTENSION), BENIGN: ICD-10-CM

## 2022-06-15 DIAGNOSIS — Z87.898 HISTORY OF MULTIPLE PULMONARY NODULES: ICD-10-CM

## 2022-06-15 PROBLEM — D45 POLYCYTHEMIA VERA (H): Status: ACTIVE | Noted: 2022-06-15

## 2022-06-15 PROCEDURE — 99215 OFFICE O/P EST HI 40 MIN: CPT | Mod: 95 | Performed by: INTERNAL MEDICINE

## 2022-06-15 NOTE — PROGRESS NOTES
Humaira is a 71 year old who is being evaluated via a billable telephone visit.      What phone number would you like to be contacted at? [phone]  How would you like to obtain your AVS? Guillermo    Assessment & Plan   Problem List Items Addressed This Visit        Digestive    GERD (gastroesophageal reflux disease)       Endocrine    Hyperlipidemia with target LDL less than 100       Circulatory    HTN (hypertension), benign    PAD (peripheral artery disease) (H)       Musculoskeletal and Integumentary    Osteopenia       Behavioral    Smoker       Other    Anxiety      Other Visit Diagnoses     Encounter to establish care    -  Primary    Medically complex patient        Relevant Orders    Med Therapy Management Referral    History of multiple pulmonary nodules             Discussed multiple health concerns.  Discussed weaning of clonazepam follow-up with MTM.  Discussed side effects of chronic benzo and risk of dependence  Smoking cessation counseling given she will need probably pharmacotherapy again she tried nicotine patches in the past follow-up with MTM  Discussed on osteopenia but is weightbearing exercises calcium and vitamin D supplements she will start on Caltrate in addition to vitamin D3 which she takes 4000 units a day she has history of vitamin D deficiency records  Colonoscopy due in 2023  She has history of peripheral vascular disease she was not aware of that, we reviewed her record she had a history of aorto-fem and fem-pop bypass continues to take 2 baby aspirin's a day she is on statins and does walking during the day with her sister.  Discussed on chronic use of PPI side effects she had never had an endoscopy in the past she gets symptoms whenever she misses a dose she may be candidate for doing an EGD.  She had history of pulmonary nodules that were stable now she is on yearly lung CT scanning she would like us to take over ordering the low-dose CT scan yearly her next wellness is in end of  the year.  Hypertension she reports blood pressure 120s over 70s advised us to call us with readings we discussed blood pressure medications.  She is on metoprolol 150 mg daily she reports no dizziness or side effects from that.  She has CLL stable follows with oncology.  All questions answered.         Work on weight loss  Regular exercise  See Patient Instructions    Return in about 2 months (around 8/15/2022), or if symptoms worsen or fail to improve, for As needed and if symptoms worsen, Physical Exam, BP Recheck.    Liane Claudio MD  [facility]  Total time spent was 43 minutes review of records virtual visit and recommendations  Yeni Gerardo is a 71 year old who presents for the following health issues   HPI     CLL--stable    Smoking counseled, was offered smoking MTM    sciatica on right Side    YEARLY ct SCAN , LING SCREEN , Dr Strong and oncologist, was going to send Lyudmila , was advised nodules are stable , yearly     Physicals at end of year    BP-- on metoprolol 150 mg xr , HR   On diuretic, K 3.8 kidney GFR   >90       Clonazepam-- 0.5 bid prn, takes a half pill a day (o.5 of 0.5 mg ), x 2 yrs, increase falls,   Anxiety,      has alzheimer takes half a day to calm,     On estrogen cream, 2-3 x a week, to prevent bladder    Dr Samuels--referral    Vit D replacement 5000 unit(s)    Bone study FRAX osteopenia, vit D and calcium, vegetarian,   Can calcium, 1-2 tabs,     colonoscopy in 2018 repeat , walks every night    Lipid in 12/2021, on statin lipitor 40 mg   ASA 81 Mg daily 2 tabs daily, due to surgery , Ao-Fem graft, no snoring , continues to smoke despite recommendations    COVID 4th Booster in couple weeks    Truthfully going to someone in 20 yrs,     Check BP, call us w BP readings; 120/70     Omeprazole 40 mg for yrs, endoscopy not done, gets Sx's immediately    Mom panc ca  Dad w lymphoma  No female cancer  2 brothers and 2 sisters    Review of Systems    Constitutional, HEENT, cardiovascular, pulmonary, GI, , musculoskeletal, neuro, skin, endocrine and psych systems are negative, except as otherwise noted.      Objective           Vitals:  No vitals were obtained today due to virtual visit.    Physical Exam   healthy, alert and no distress  PSYCH: Alert and oriented times 3; coherent speech, normal   rate and volume, able to articulate logical thoughts, able   to abstract reason, no tangential thoughts, no hallucinations   or delusions  Her affect is normal  RESP: No cough, no audible wheezing, able to talk in full sentences  Remainder of exam unable to be completed due to telephone visits    Lab on 05/31/2022   Component Date Value Ref Range Status     Sodium 05/31/2022 137  133 - 144 mmol/L Final     Potassium 05/31/2022 3.8  3.4 - 5.3 mmol/L Final     Chloride 05/31/2022 99  94 - 109 mmol/L Final     Carbon Dioxide (CO2) 05/31/2022 33 (A) 20 - 32 mmol/L Final     Anion Gap 05/31/2022 5  3 - 14 mmol/L Final     Urea Nitrogen 05/31/2022 11  7 - 30 mg/dL Final     Creatinine 05/31/2022 0.56  0.52 - 1.04 mg/dL Final     Calcium 05/31/2022 9.0  8.5 - 10.1 mg/dL Final     Glucose 05/31/2022 99  70 - 99 mg/dL Final     Alkaline Phosphatase 05/31/2022 63  40 - 150 U/L Final     AST 05/31/2022 26  0 - 45 U/L Final     ALT 05/31/2022 33  0 - 50 U/L Final     Protein Total 05/31/2022 6.5 (A) 6.8 - 8.8 g/dL Final     Albumin 05/31/2022 3.6  3.4 - 5.0 g/dL Final     Bilirubin Total 05/31/2022 0.6  0.2 - 1.3 mg/dL Final     GFR Estimate 05/31/2022 >90  >60 mL/min/1.73m2 Final    Effective December 21, 2021 eGFRcr in adults is calculated using the 2021 CKD-EPI creatinine equation which includes age and gender (Nevaeh et al., NEJ, DOI: 10.1056/IWVXwc8423527)     Lactate Dehydrogenase 05/31/2022 227  81 - 234 U/L Final     WBC Count 05/31/2022 23.5 (A) 4.0 - 11.0 10e3/uL Final     RBC Count 05/31/2022 4.84  3.80 - 5.20 10e6/uL Final     Hemoglobin 05/31/2022 15.0  11.7 - 15.7  g/dL Final     Hematocrit 05/31/2022 45.3  35.0 - 47.0 % Final     MCV 05/31/2022 94  78 - 100 fL Final     MCH 05/31/2022 31.0  26.5 - 33.0 pg Final     MCHC 05/31/2022 33.1  31.5 - 36.5 g/dL Final     RDW 05/31/2022 13.3  10.0 - 15.0 % Final     Platelet Count 05/31/2022 202  150 - 450 10e3/uL Final     % Neutrophils 05/31/2022 16  % Final     % Lymphocytes 05/31/2022 81  % Final    Differential done of albumin treated blood due to smudge cells     % Monocytes 05/31/2022 1  % Final     % Eosinophils 05/31/2022 0  % Final     % Basophils 05/31/2022 2  % Final     Absolute Neutrophils 05/31/2022 3.8  1.6 - 8.3 10e3/uL Final     Absolute Lymphocytes 05/31/2022 19.0 (A) 0.8 - 5.3 10e3/uL Final     Absolute Monocytes 05/31/2022 0.2  0.0 - 1.3 10e3/uL Final     Absolute Eosinophils 05/31/2022 0.0  0.0 - 0.7 10e3/uL Final     Absolute Basophils 05/31/2022 0.5 (A) 0.0 - 0.2 10e3/uL Final     RBC Morphology 05/31/2022 Confirmed RBC Indices   Final     Platelet Assessment 05/31/2022 Automated Count Confirmed. Platelet morphology is normal.  Automated Count Confirmed. Platelet morphology is normal. Final     Smudge Cells 05/31/2022 Present (A) None Seen Final               Phone call duration: 22 minutes  Total time spent was 43 minutes review of records virtual visit and recommendations

## 2022-06-22 DIAGNOSIS — I10 HTN (HYPERTENSION), BENIGN: ICD-10-CM

## 2022-06-22 NOTE — TELEPHONE ENCOUNTER
"Reason for Call:  Medication or medication refill:    Do you use a [facility]?  Name of the pharmacy and phone number for the current request: [facility] [phone]    Name of the medication requested: Chlorthalidone 25 mg 1x a day. Patient said she has enough for a \"week or two\" last refill was on 3/30/22. She saw Dr. Claudio last week and established care and did a med review then, but patient said that she did not send in rx for this medication.     Other request: No    Can we leave a detailed message on this number? Yes       Phone number patient can be reached at: [phone]    Best Time: Any     Call taken on 6/22/2022 at 3:02 PM by Winnie Chirinos      "

## 2022-06-23 RX ORDER — CHLORTHALIDONE 25 MG/1
25 TABLET ORAL DAILY
Qty: 90 TABLET | Refills: 0 | Status: SHIPPED | OUTPATIENT
Start: 2022-06-23 | End: 2022-09-19

## 2022-07-11 ENCOUNTER — VIRTUAL VISIT (OUTPATIENT)
Dept: PHARMACY | Facility: CLINIC | Age: 72
End: 2022-07-11
Attending: INTERNAL MEDICINE
Payer: COMMERCIAL

## 2022-07-11 DIAGNOSIS — F41.9 ANXIETY: Primary | ICD-10-CM

## 2022-07-11 DIAGNOSIS — R91.8 ABNORMAL CT LUNG SCREENING: ICD-10-CM

## 2022-07-11 DIAGNOSIS — N39.0 CHRONIC UTI: ICD-10-CM

## 2022-07-11 DIAGNOSIS — I73.9 PAD (PERIPHERAL ARTERY DISEASE) (H): ICD-10-CM

## 2022-07-11 DIAGNOSIS — E55.9 VITAMIN D DEFICIENCY: ICD-10-CM

## 2022-07-11 DIAGNOSIS — E78.5 HYPERLIPIDEMIA WITH TARGET LDL LESS THAN 100: ICD-10-CM

## 2022-07-11 DIAGNOSIS — Z78.9 TAKES DIETARY SUPPLEMENTS: ICD-10-CM

## 2022-07-11 DIAGNOSIS — I10 HTN (HYPERTENSION), BENIGN: ICD-10-CM

## 2022-07-11 DIAGNOSIS — K27.9 PUD (PEPTIC ULCER DISEASE): ICD-10-CM

## 2022-07-11 PROCEDURE — 99605 MTMS BY PHARM NP 15 MIN: CPT | Performed by: PHARMACIST

## 2022-07-11 RX ORDER — DIAPER,BRIEF,ADULT, DISPOSABLE
2 EACH MISCELLANEOUS DAILY
COMMUNITY

## 2022-07-11 NOTE — LETTER
July 14, 2022  Karen Caban  [address]  [address]  [place] [address]    Dear Ms. Caban, FERNANDO [facility]        Thank you for talking with me on Jul 11, 2022 about your health and medications. As a follow-up to our conversation, I have included two documents:      1. Your Recommended To-Do List has steps you should take to get the best results from your medications.  2. Your Medication List will help you keep track of your medications and how to take them.    If you want to talk about these documents, please call Abril Lopez PharmD at phone: [phone], Monday-Friday 8-4:30pm.    I look forward to working with you and your doctors to make sure your medications work well for you.    Sincerely,    Abril Lopez PharmD  [facility] Pharmacist, [facility]

## 2022-07-11 NOTE — LETTER
_  Medication List        Prepared on: 7/14/2022     Bring your Medication List when you go to the doctor, hospital, or   emergency room. And, share it with your family or caregivers.     Note any changes to how you take your medications.  Cross out medications when you no longer use them.    Medication How I take it Why I use it Prescriber   albuterol (PROAIR RESPICLICK) 108 (90 Base) MCG/ACT inhaler Inhale 1-2 puffs into the lungs every 4 hours as needed Acute bronchitis, unspecified organism Lyudmila Escobar PA-C   aspirin (ASA) 81 MG chewable tablet Take 162 mg by mouth daily PAD Patient Reported   atorvastatin (LIPITOR) 40 MG tablet Take 1 tablet (40 mg) by mouth daily Hyperlipidemia LDL Goal <100 Lyudmila Escobar PA-C   chlorthalidone (HYGROTON) 25 MG tablet Take 1 tablet (25 mg) by mouth daily HTN (Hypertension), Benign Lyudmila Escobar PA-C   Cholecalciferol (VITAMIN D-3) 5000 UNIT TABS Take 1 tablet by mouth daily. General Health  Self   clonazePAM (KLONOPIN) 0.5 MG ODT Take 1/2-1 tab PO BID prn anxiety Anxiety Lyudmila Escobar PA-C   coenzyme Q-10 200 MG CAPS Take 200 mg by mouth daily General Health  Self   Cranberry-Vitamin C-Vitamin E (CRANBERRY PLUS VITAMIN C) 4200-20-3 MG-MG-UNIT CAPS Take 1 tablet by mouth daily General Health  Self   Cyanocobalamin (B-12 PO) Take 5,000 mcg by mouth every other day Liquid form Vegetarian Diet Lyudmila Escobar PA-C   estradiol (ESTRACE) 0.1 MG/GM vaginal cream INSERT 1 GRAM VAGINALLY 2 TO 3 TIMES EVERY WEEK Atrophic Vaginitis Lyudmila Escobar PA-C   Lactobacillus (PROBIOTIC ACIDOPHILUS PO) Take 1 capsule by mouth daily General Health  Self   metoprolol succinate ER (TOPROL-XL) 100 MG 24 hr tablet TAKE 1 AND 1/2 TABLETS(150 MG) BY MOUTH DAILY HTN (Hypertension), Benign Lyudmila Escobar PA-C   Multiple Vitamins-Minerals (MULTIVITAMIN GUMMIES ADULT PO) Take 2 chew tab by mouth daily General Health  Self   omeprazole (PRILOSEC) 40 MG DR capsule TAKE 1 CAPSULE BY MOUTH  EVERY DAY 30 TO 60 MINUTES BEFORE A MEAL Gastroesophageal Reflux Disease without Esophagitis Lyudmila Escobar PA-C   sertraline (ZOLOFT) 50 MG tablet Take 1/2 tablet (25mg) daily for 7 days, then increase to 1 tablet (50mg) daily thereafter Anxiety Liane Claudio MD   Sodium Chloride-Xylitol (XLEAR SINUS CARE SPRAY NA) Spray 1 spray in nostril daily General Health  Self   Sodium Chloride-Xylitol (XLEAR SINUS CARE SPRAY) SOLN Spray 1 packet in nostril daily Nasal rinse General Health  Self         Add new medications, over-the-counter drugs, herbals, vitamins, or  minerals in the blank rows below.    Medication How I take it Why I use it Prescriber                          Allergies:      chantix [varenicline]; ciprofloxacin; clopidogrel; decongestant [cvs]; erythromycin; lisinopril; plavix [clopidogrel bisulfate]; rofecoxib; vioxx        Side effects I have had:               Other Information:              My notes and questions:

## 2022-07-11 NOTE — PROGRESS NOTES
Medication Therapy Management (MTM) Encounter    ASSESSMENT:                            Medication Adherence/Access: No issues identified    Anxiety: Patient would benefit from transitioning from benzodiazepine to selective serotonin reuptake inhibitor, and she's open to making this change.    Hypertension: Patient is not meeting blood pressure goal of < 140/90mmHg with last reading in clinic, but home readings are at goal.  Will continue to monitor.    Hyperlipidemia: Stable.  Patient is on high intensity statin which is indicated based on 2019 ACC/AHA guidelines for lipid management.      PAD:  Stable.    GERD: Stable. Current treatment is effective.     Shortness of Breath:  Stable.    Chronic UTIs:  Stable.    Supplements:  She likely doesn't need all of her supplements, but she finds them helpful and they likely aren't harmful.  May benefit from re-checking Vitamin D and Vitamin B12 levels to ensure current dosing is appropriate.    PLAN:                            1.  Started sertraline 25mg daily x1 week, then increase to 50mg daily thereafter.  Advised it'll take 6-8 weeks to see the full effect of this.  Recommended reducing clonazepam use as able, with the goal of being off completely in the next 2 months.  2.  Future orders placed for Vitamin D and Vitamin B12 to be checked with next set of labs.    Follow-up: Return in about 2 months (around 9/11/2022) for check in via [employer].    SUBJECTIVE/OBJECTIVE:                          Karen Caban is a 71 year old female called for an initial visit. She was referred to me from Dr. Claudio.      Reason for visit: Comprehensive medication review.    Allergies/ADRs: Reviewed in chart  Past Medical History: Reviewed in chart  Tobacco: She reports that she has been smoking cigarettes. She has a 37.50 pack-year smoking history. She has never used smokeless tobacco.Tobacco Cessation Action Plan:   Information offered: Patient not interested at this time  Alcohol: 4-6  beverages / week  Caffeine: 2 cups of coffee/day  Activity: She walks her dog regularly, also does some walking at the mall    Medication Adherence/Access: Patient uses pill box(es).  Patient takes medications 1 time(s) per day.  Per patient, misses medication 0 times per week.     Anxiety: Patient is currently using clonazepam 0.25mg as needed - generally every other day or so.  She finds this effective when needed.  Typical anxiety symptoms are feeling hyper.  She denies any depression symptoms, denies suicidal ideation.  She was previously on lorazepam, insurance wouldn't cover so it was changed to clonazepam.  She would be open to making a change.    Hypertension: Current medications include chlorthalidone 25mg daily and metoprolol ER 150mg daily.  Patient does self-monitor blood pressure. Home BP monitoring in range of 120's systolic over 70's diastolic.  Patient reports no current medication side effects.  BP Readings from Last 3 Encounters:   06/01/22 (!) 161/84   05/16/22 134/82   05/04/22 128/70     Hyperlipidemia: Current therapy includes atorvastatin 40mg daily.  Patient reports no significant myalgias or other side effects.  The 10-year ASCVD risk score (Doug MARCI Jr., et al., 2013) is: 27.5%    Values used to calculate the score:      Age: 71 years      Sex: Female      Is Non- : No      Diabetic: No      Tobacco smoker: Yes      Systolic Blood Pressure: 161 mmHg      Is BP treated: Yes      HDL Cholesterol: 55 mg/dL      Total Cholesterol: 148 mg/dL  Recent Labs   Lab Test 12/28/21  0830 12/17/20  0858 11/30/15  0752 11/07/14  0826   CHOL 148 142   < > 144   HDL 55 50   < > 68   LDL 81 78   < > 63   TRIG 61 70   < > 67   CHOLHDLRATIO  --   --   --  2.1    < > = values in this interval not displayed.     PAD:  Patient takes aspirin 162mg daily.  Per review of Epic - she did have lower extremity bypass in the past, appears she's been on 162mg of aspirin daily since completing Plavix  therapy.  She reports being told to remain on this dose.  She denies signs and symptoms of bruising/bleeding.    GERD: Current medications include: Prilosec (omeprazole) 40mg once daily. Patient reports no current symptoms. The patient does notice symptoms if they miss a dose. Patient feels that current regimen is effective.  Dr. Claudio's last note indicated patient may need EGD.    Shortness of Breath:  Humaira has an albuterol inhaler available for use, she reports use is rare but she finds it effective when needed.  No side effects reported.    Chronic UTIs:  Humaira uses Estrace cream as needed at onset of UTI symptoms.  She reports this is effective for her and she denies side effects.    Supplements:  Patient takes several supplements - Vitamin D 5000 international unit(s) daily, CoQ10 200mg daily, cranberry daily, Vitamin B12 5000mcg every other day, a daily probiotic and multivitamin.  She also uses a sinus spray and rinse daily.  She finds these effective and wishes to continue using.  She denies side effects.  Vitamin D Deficiency Screening Results:  Lab Results   Component Value Date    VITDT 38 12/08/2017    VITDT 60 10/25/2013      Lab Results   Component Value Date    B12 2,410 (H) 12/17/2018      Today's Vitals: There were no vitals taken for this visit.  ----------------    I spent 14 minutes with this patient today. All changes were made via collaborative practice agreement with Dr. Claudio. A copy of the visit note was provided to the patient's provider(s).    The patient was sent via [employer] a summary of these recommendations.     Abril Lopez, PharmD, [facility]  Medication Therapy Management Provider  Pager: [phone]     Telemedicine Visit Details  Type of service:  Telephone visit  Start Time: 3:12 PM  End Time: 3:26 PM  Originating Location (patient location): Home  Distant Location (provider location):  [facility]     Medication Therapy Recommendations  Anxiety    Current  Medication: clonazePAM (KLONOPIN) 0.5 MG ODT   Rationale: Unsafe medication for the patient - Adverse medication event - Safety   Recommendation: Change Medication - sertraline 50 MG tablet   Status: Accepted per CPA         Takes dietary supplements    Current Medication: Cholecalciferol (VITAMIN D-3) 5000 UNIT TABS   Rationale: Medication requires monitoring - Needs additional monitoring - Effectiveness   Recommendation: Order Lab   Status: Accepted per CPA

## 2022-07-11 NOTE — LETTER
"Recommended To-Do List      Prepared on: 7/14/2022     You can get the best results from your medications by completing the items on this \"To-Do List.\"      Bring your To-Do List when you go to your doctor. And, share it with your family or caregivers.    My To-Do List:  What we talked about: What I should do:   An issue with your medication    Change the medication you are taking from clonazePAM (klonoPIN) to sertraline (ZOLOFT)          What we talked about: What I should do:   Needing additional monitoring    Get the following lab test(s): Vitamin D, Vitamin B12           What we talked about: What I should do:                       "

## 2022-07-14 NOTE — PATIENT INSTRUCTIONS
"Recommendations from today's MTM visit:                                                    MTM (medication therapy management) is a service provided by a clinical pharmacist designed to help you get the most of out of your medicines.   Today we reviewed what your medicines are for, how to know if they are working, that your medicines are safe and how to make your medicine regimen as easy as possible.      We started sertraline 25mg daily for 1 week, then increase to 50mg daily.  This will take 6-8 weeks to see the full effect, but should help prevent the anxiety symptoms from occurring.  Please reduce clonazepam use as you're able, with the goal of stopping clonazepam completely in the next 8 weeks or so.  I put orders in your chart for Vitamin D and Vitamin B12 to be checked with your next set of labs.    Follow-up: Return in about 2 months (around 9/11/2022) for check in via [employer].    It was great speaking with you today.  I value your experience and would be very thankful for your time in providing feedback in our clinic survey. In the next few days, you may receive an email or text message from [employer] with a link to a survey related to your  clinical pharmacist.\"     To schedule another MTM appointment, please call the clinic directly or you may call the MTM scheduling line at [phone] or toll-free at [phone].     My Clinical Pharmacist's contact information:                                                      Please feel free to contact me with any questions or concerns you have.      Abril Lopez, PharmD, [facility]  Medication Therapy Management Provider  Pager: [phone]    "

## 2022-07-18 ENCOUNTER — HOSPITAL ENCOUNTER (OUTPATIENT)
Dept: ULTRASOUND IMAGING | Facility: CLINIC | Age: 72
Discharge: HOME | End: 2022-07-18
Attending: OBSTETRICS & GYNECOLOGY | Admitting: OBSTETRICS & GYNECOLOGY
Payer: COMMERCIAL

## 2022-07-18 DIAGNOSIS — N83.291 COMPLEX CYST OF RIGHT OVARY: ICD-10-CM

## 2022-07-18 DIAGNOSIS — R93.89 THICKENED ENDOMETRIUM: ICD-10-CM

## 2022-07-18 PROCEDURE — 76856 US EXAM PELVIC COMPLETE: CPT

## 2022-07-21 ENCOUNTER — VIRTUAL VISIT (OUTPATIENT)
Dept: ONCOLOGY | Facility: CLINIC | Age: 72
End: 2022-07-21
Attending: OBSTETRICS & GYNECOLOGY
Payer: COMMERCIAL

## 2022-07-21 DIAGNOSIS — R93.89 THICKENED ENDOMETRIUM: Primary | ICD-10-CM

## 2022-07-21 PROCEDURE — 99214 OFFICE O/P EST MOD 30 MIN: CPT | Mod: 95 | Performed by: OBSTETRICS & GYNECOLOGY

## 2022-07-21 NOTE — PROGRESS NOTES
Humaira is a 71 year old who is being evaluated via a billable telephone visit.      What phone number would you like to be contacted at? [phone]    Phone call duration: 20 minutes    GYNECOLOGIC  ONCOLOGY CLINIC NOTE    Referring provider:    Leland P Cheng,  [place] [address]   RE: Karen Caban  : 1950  STEPHANIE: 2022      CC: Thickened endometrial stripe    HPI: Ms Karen Caban is a 71 year old female who presents for follow up regarding ovarian cyst and thickened endometrial stripe.    Today's visit was completed via phone.    Her past medical history is notable for PMHx of peripheral artery disease s/p fem pop bypass, chronic back pain, HTN, smoking history with emphysema.   Diagnosis of chronic lymphocytic leukemia not currently on treatment but on observation and follow up by Dr. Maddie Rowland.    Prior left ovary removal for cyst at age of 26.    She presented to her primary care clinic with report of bladder pressure in early 2022. Since then the bladder pressure resolved. She was referred to OB/GYN who performed the below evaluation and send her for consult. She has not experienced vaginal bleeding or discharge.      22 CT A/P  IMPRESSION:   1.  No acute findings or inflammatory changes in the abdomen or pelvis.     2.  Unchanged 1.2 cm cystic lesion in the pancreatic neck. Consider follow-up ACR recommendations: Repeat imaging with MRI/MRCP every 2 years x5.     3.  Incidental right adnexal cystic lesion measuring 3.7 cm. Consider ultrasound to exclude solid components.    22 Pelvic US  FINDINGS:  Endovaginal sonography was added to the transabdominal  scans.    No fibroids. The uterus is 5.2 x 3.2 x 2.8 cm. Endometrial stripe  measures 7 mm and is thickened and heterogeneous for patient's age and  menstrual status. The right ovary demonstrates a 3.8 x 3.1 x 2.1 cm  cyst. The left ovary is surgically absent.  No right adnexal masses  are  present. No free pelvic fluid is present.      22 Office Endometrial Biopsy  Final Diagnosis  Endometrium, biopsy:  - A small amount of benign nonphasic endometrial tissue, suboptimal for evaluation.  - Multiple fragments of benign endocervical tissue with microglandular hyperplasia and mild chronic inflammation.    5/15/22 - 7  22 Endometrial Biopsy  Final Diagnosis  A. Endometrial biopsy:  - Benign endometrial polyp and fragments of benign inactive nonphasic endometrium without atypia    22 Pelvic US  IMPRESSION:  1.  Thickened endometrium measuring 14 mm, increased from previous 7  mm.  2.  Simple bilateral adnexal cysts, similar to previous.      OBGYN history and Health Maintenance:    Last Pap Smear:   negative  Last Mammogram: 2022 Benign BI-RADS 2  Last Colonoscopy:  2018, repeat in 5 yrs    Review of Systems:  Systemic:No weight changes.    Skin : No skin changes or new lesions.   Eye : No changes in vision.   Pulmonary: No cough or SOB.   Cardiovascular: No CP or palpitations  Gastrointestinal : No diarrhea, constipation, abdominal pain. Bowel habits normal.   Genitourinary: No dysuria, urgency or bleeding  Psychiatric: No depression or anxiety  Hematologic : No palpable lymph nodes.   Endocrine : No hot flashes. No heat/cold intolerance.      Neurological: No headaches, no numbness.     Past Medical History:   Diagnosis Date     Ankle fracture     L     Anxiety      Chronic back pain      Colon polyp 2012    repeat colonoscopy 5 years.     Fx low femur epiphy-closed (H)      GERD (gastroesophageal reflux disease)      History of UTI 2017    Cysto by Dr Agustin neg     HTN (hypertension), benign      Hyperlipidemia LDL goal < 100      Normal nuclear stress test     EF 67%     Osteopenia      PAD (peripheral artery disease) (H)     s/p fem pop bypass; embolectomy     Polycythemia vera (H) 6/15/2022     PUD (peptic ulcer disease) 1980s    DU     Smoker      Vitamin D  deficiency        Past Surgical History:   Procedure Laterality Date     Blood clot removal from Stent      2011     BONE MARROW BIOPSY, BONE SPECIMEN, NEEDLE/TROCAR N/A 02/02/2021    Procedure: bone marrow biopsy;  Surgeon: Kailey Cota MD;  Location:  GI     BYPASS GRAFT AORTOFEMORAL  05/2002    Dr. Turner     BYPASS GRAFT FEMOROPOPLITEAL  12/16/2011     COLONOSCOPY N/A 01/18/2018    Procedure: COMBINED COLONOSCOPY, SINGLE OR MULTIPLE BIOPSY/POLYPECTOMY BY BIOPSY;  COLONOSCOPY;  Surgeon: Efrain Hernadez MD;  Location:  GI     EMBOLECTOMY LOWER EXTREMITY  12/16/2011    Procedure:EMBOLECTOMY LOWER EXTREMITY; EMBOLECTOMY, RIGHT POPLITEAL WITH PATCH ANGIOPLASTY. EXCISION SKIN LESION RIGHT LEG. ; Surgeon:PARMINDER VELAZCO; Location: OR     EXCISE LESION LOWER EXTREMITY  12/16/2011    Procedure:EXCISE LESION LOWER EXTREMITY; Surgeon:PARMINDER VELAZCO; Location: OR     HERNIA REPAIR  11/04/2008    x2     L achilles repair  12/04/2008     LAPAROSCOPIC OOPHORECTOMY Left     L for cyst age 26     miscarriages x 3       tubal ligation and reversal            Current Outpatient Medications   Medication Sig Dispense Refill     albuterol (PROAIR RESPICLICK) 108 (90 Base) MCG/ACT inhaler Inhale 1-2 puffs into the lungs every 4 hours as needed (Patient not taking: Reported on 7/11/2022) 1 each 3     aspirin (ASA) 81 MG chewable tablet Take 162 mg by mouth daily       atorvastatin (LIPITOR) 40 MG tablet Take 1 tablet (40 mg) by mouth daily 90 tablet 2     chlorthalidone (HYGROTON) 25 MG tablet Take 1 tablet (25 mg) by mouth daily 90 tablet 0     Cholecalciferol (VITAMIN D-3) 5000 UNIT TABS Take 1 tablet by mouth daily.       clonazePAM (KLONOPIN) 0.5 MG ODT Take 1/2-1 tab PO BID prn anxiety 60 tablet 1     coenzyme Q-10 200 MG CAPS Take 200 mg by mouth daily       Cranberry-Vitamin C-Vitamin E (CRANBERRY PLUS VITAMIN C) 4200-20-3 MG-MG-UNIT CAPS Take 1 tablet by mouth daily        Cyanocobalamin (B-12 PO) Take 5,000 mcg by mouth every other day Liquid form 100 tablet 1     estradiol (ESTRACE) 0.1 MG/GM vaginal cream INSERT 1 GRAM VAGINALLY 2 TO 3 TIMES EVERY WEEK 42.5 g 0     Lactobacillus (PROBIOTIC ACIDOPHILUS PO) Take 1 capsule by mouth daily       metoprolol succinate ER (TOPROL-XL) 100 MG 24 hr tablet TAKE 1 AND 1/2 TABLETS(150 MG) BY MOUTH DAILY 135 tablet 3     Multiple Vitamins-Minerals (MULTIVITAMIN GUMMIES ADULT PO) Take 2 chew tab by mouth daily       omeprazole (PRILOSEC) 40 MG DR capsule TAKE 1 CAPSULE BY MOUTH EVERY DAY 30 TO 60 MINUTES BEFORE A MEAL 90 capsule 3     sertraline (ZOLOFT) 50 MG tablet Take 1/2 tablet (25mg) daily for 7 days, then increase to 1 tablet (50mg) daily thereafter 90 tablet 1     Sodium Chloride-Xylitol (XLEAR SINUS CARE SPRAY NA) Spray 1 spray in nostril daily       Sodium Chloride-Xylitol (XLEAR SINUS CARE SPRAY) SOLN Spray 1 packet in nostril daily Nasal rinse           Allergies   Allergen Reactions     Chantix [Varenicline] Nausea     Ciprofloxacin Other (See Comments)     hypertension     Clopidogrel      Other reaction(s): Hypertension     Decongestant [Cvs]      Erythromycin Nausea     Lisinopril      cough     Plavix [Clopidogrel Bisulfate]      Felt as if she was having a heart attack     Rofecoxib Unknown     Vioxx        Social History:  Social History     Tobacco Use     Smoking status: Current Every Day Smoker     Packs/day: 0.75     Years: 50.00     Pack years: 37.50     Types: Cigarettes     Smokeless tobacco: Never Used     Tobacco comment: Nicorette gum and patch, trying to quit    Substance Use Topics     Alcohol use: Yes     Comment: Beer once in a while       Family History:   The patient's family history is notable for   Family History   Problem Relation Age of Onset     Alzheimer Disease Mother          of panc/GI ca age 83     Osteoporosis Mother      Other Cancer Mother      Cancer Father          age 64 lymphoma      Colon Cancer Maternal Grandfather          Physical Exam:   General: communicative, no acute distress      Assessment: Karen Caban is a 71 year old woman who is following up for thickened endometrial stripe and bilateral ovarian cyst.    Ovarian cyst are stable, continue to observe, no surgery at this time.      Thickened endometrial stripe- US finding with possible endometrial polyp. Recommend D & C and hysteroscopy. Discussed indication for procedure, risks, benefit and alternatives. She would like to proceed with the procedure but only at [facility].    Needs pre-surgery medical clearance, history of vascular disease due to long term smoking.      aKri Busby M.D., MPH,  F.A.C.O.G.  Professor  Department of Ob/Gyn and Women's Health  Division of Gynecologic Oncology  [facility]/[employer] [place]  [phone]      Time: total time spent today, July 21, 2022, including preparation, review of outside records, face to face counseling, and documentation was 30 minutes.

## 2022-07-21 NOTE — LETTER
2022         RE: Karen Caban  [address]  [address]  [place] [address]        Dear Colleague,    Thank you for referring your patient, Karen Caban, to the [facility]. Please see a copy of my visit note below.      GYNECOLOGIC  ONCOLOGY CLINIC NOTE    Referring provider:    To Shipley MD  [address] [place] [address]   RE: Karen Caban  : 1950  STEPHANIE: 2022      CC: Thickened endometrial stripe    HPI: Ms Karen Caban is a 71 year old female who presents for follow up regarding ovarian cyst and thickened endometrial stripe.    Today's visit was completed via phone.    Her past medical history is notable for PMHx of peripheral artery disease s/p fem pop bypass, chronic back pain, HTN, smoking history with emphysema.   Diagnosis of chronic lymphocytic leukemia not currently on treatment but on observation and follow up by Dr. Maddie Rowland.    Prior left ovary removal for cyst at age of 26.    She presented to her primary care clinic with report of bladder pressure in early 2022. Since then the bladder pressure resolved. She was referred to OB/GYN who performed the below evaluation and send her for consult. She has not experienced vaginal bleeding or discharge.      22 CT A/P  IMPRESSION:   1.  No acute findings or inflammatory changes in the abdomen or pelvis.     2.  Unchanged 1.2 cm cystic lesion in the pancreatic neck. Consider follow-up ACR recommendations: Repeat imaging with MRI/MRCP every 2 years x5.     3.  Incidental right adnexal cystic lesion measuring 3.7 cm. Consider ultrasound to exclude solid components.    22 Pelvic US  FINDINGS:  Endovaginal sonography was added to the transabdominal  scans.    No fibroids. The uterus is 5.2 x 3.2 x 2.8 cm. Endometrial stripe  measures 7 mm and is thickened and heterogeneous for patient's age and  menstrual status. The right ovary demonstrates a 3.8 x 3.1 x  2.1 cm  cyst. The left ovary is surgically absent.  No right adnexal masses  are present. No free pelvic fluid is present.      22 Office Endometrial Biopsy  Final Diagnosis  Endometrium, biopsy:  - A small amount of benign nonphasic endometrial tissue, suboptimal for evaluation.  - Multiple fragments of benign endocervical tissue with microglandular hyperplasia and mild chronic inflammation.    5/15/22 - 7  22 Endometrial Biopsy  Final Diagnosis  A. Endometrial biopsy:  - Benign endometrial polyp and fragments of benign inactive nonphasic endometrium without atypia    22 Pelvic US  IMPRESSION:  1.  Thickened endometrium measuring 14 mm, increased from previous 7  mm.  2.  Simple bilateral adnexal cysts, similar to previous.      OBGYN history and Health Maintenance:    Last Pap Smear:   negative  Last Mammogram: 2022 Benign BI-RADS 2  Last Colonoscopy:  2018, repeat in 5 yrs    Review of Systems:  Systemic:No weight changes.    Skin : No skin changes or new lesions.   Eye : No changes in vision.   Pulmonary: No cough or SOB.   Cardiovascular: No CP or palpitations  Gastrointestinal : No diarrhea, constipation, abdominal pain. Bowel habits normal.   Genitourinary: No dysuria, urgency or bleeding  Psychiatric: No depression or anxiety  Hematologic : No palpable lymph nodes.   Endocrine : No hot flashes. No heat/cold intolerance.      Neurological: No headaches, no numbness.     Past Medical History:   Diagnosis Date     Ankle fracture     L     Anxiety      Chronic back pain      Colon polyp     repeat colonoscopy 5 years.     Fx low femur epiphy-closed (H)      GERD (gastroesophageal reflux disease)      History of UTI     Cysto by Dr Agustin neg     HTN (hypertension), benign      Hyperlipidemia LDL goal < 100      Normal nuclear stress test     EF 67%     Osteopenia      PAD (peripheral artery disease) (H)     s/p fem pop bypass; embolectomy     Polycythemia vera  (H) 6/15/2022     PUD (peptic ulcer disease) 1980s    DU     Smoker      Vitamin D deficiency        Past Surgical History:   Procedure Laterality Date     Blood clot removal from Stent      2011     BONE MARROW BIOPSY, BONE SPECIMEN, NEEDLE/TROCAR N/A 02/02/2021    Procedure: bone marrow biopsy;  Surgeon: Kailey Cota MD;  Location:  GI     BYPASS GRAFT AORTOFEMORAL  05/2002    Dr. Turner     BYPASS GRAFT FEMOROPOPLITEAL  12/16/2011     COLONOSCOPY N/A 01/18/2018    Procedure: COMBINED COLONOSCOPY, SINGLE OR MULTIPLE BIOPSY/POLYPECTOMY BY BIOPSY;  COLONOSCOPY;  Surgeon: Efrain Hernadez MD;  Location:  GI     EMBOLECTOMY LOWER EXTREMITY  12/16/2011    Procedure:EMBOLECTOMY LOWER EXTREMITY; EMBOLECTOMY, RIGHT POPLITEAL WITH PATCH ANGIOPLASTY. EXCISION SKIN LESION RIGHT LEG. ; Surgeon:PARMINDER VELAZCO; Location: OR     EXCISE LESION LOWER EXTREMITY  12/16/2011    Procedure:EXCISE LESION LOWER EXTREMITY; Surgeon:PARMINDER VELAZCO; Location: OR     HERNIA REPAIR  11/04/2008    x2     L achilles repair  12/04/2008     LAPAROSCOPIC OOPHORECTOMY Left     L for cyst age 26     miscarriages x 3       tubal ligation and reversal            Current Outpatient Medications   Medication Sig Dispense Refill     albuterol (PROAIR RESPICLICK) 108 (90 Base) MCG/ACT inhaler Inhale 1-2 puffs into the lungs every 4 hours as needed (Patient not taking: Reported on 7/11/2022) 1 each 3     aspirin (ASA) 81 MG chewable tablet Take 162 mg by mouth daily       atorvastatin (LIPITOR) 40 MG tablet Take 1 tablet (40 mg) by mouth daily 90 tablet 2     chlorthalidone (HYGROTON) 25 MG tablet Take 1 tablet (25 mg) by mouth daily 90 tablet 0     Cholecalciferol (VITAMIN D-3) 5000 UNIT TABS Take 1 tablet by mouth daily.       clonazePAM (KLONOPIN) 0.5 MG ODT Take 1/2-1 tab PO BID prn anxiety 60 tablet 1     coenzyme Q-10 200 MG CAPS Take 200 mg by mouth daily       Cranberry-Vitamin C-Vitamin E (CRANBERRY PLUS  VITAMIN C) 4200-20-3 MG-MG-UNIT CAPS Take 1 tablet by mouth daily       Cyanocobalamin (B-12 PO) Take 5,000 mcg by mouth every other day Liquid form 100 tablet 1     estradiol (ESTRACE) 0.1 MG/GM vaginal cream INSERT 1 GRAM VAGINALLY 2 TO 3 TIMES EVERY WEEK 42.5 g 0     Lactobacillus (PROBIOTIC ACIDOPHILUS PO) Take 1 capsule by mouth daily       metoprolol succinate ER (TOPROL-XL) 100 MG 24 hr tablet TAKE 1 AND 1/2 TABLETS(150 MG) BY MOUTH DAILY 135 tablet 3     Multiple Vitamins-Minerals (MULTIVITAMIN GUMMIES ADULT PO) Take 2 chew tab by mouth daily       omeprazole (PRILOSEC) 40 MG DR capsule TAKE 1 CAPSULE BY MOUTH EVERY DAY 30 TO 60 MINUTES BEFORE A MEAL 90 capsule 3     sertraline (ZOLOFT) 50 MG tablet Take 1/2 tablet (25mg) daily for 7 days, then increase to 1 tablet (50mg) daily thereafter 90 tablet 1     Sodium Chloride-Xylitol (XLEAR SINUS CARE SPRAY NA) Spray 1 spray in nostril daily       Sodium Chloride-Xylitol (XLEAR SINUS CARE SPRAY) SOLN Spray 1 packet in nostril daily Nasal rinse           Allergies   Allergen Reactions     Chantix [Varenicline] Nausea     Ciprofloxacin Other (See Comments)     hypertension     Clopidogrel      Other reaction(s): Hypertension     Decongestant [Cvs]      Erythromycin Nausea     Lisinopril      cough     Plavix [Clopidogrel Bisulfate]      Felt as if she was having a heart attack     Rofecoxib Unknown     Vioxx        Social History:  Social History     Tobacco Use     Smoking status: Current Every Day Smoker     Packs/day: 0.75     Years: 50.00     Pack years: 37.50     Types: Cigarettes     Smokeless tobacco: Never Used     Tobacco comment: Nicorette gum and patch, trying to quit    Substance Use Topics     Alcohol use: Yes     Comment: Beer once in a while       Family History:   The patient's family history is notable for   Family History   Problem Relation Age of Onset     Alzheimer Disease Mother          of panc/GI ca age 83     Osteoporosis Mother       Other Cancer Mother      Cancer Father          age 64 lymphoma     Colon Cancer Maternal Grandfather          Physical Exam:   General: communicative, no acute distress      Assessment: Karen Cbaan is a 71 year old woman who is following up for thickened endometrial stripe and bilateral ovarian cyst.    Ovarian cyst are stable, continue to observe, no surgery at this time.      Thickened endometrial stripe- US finding with possible endometrial polyp. Recommend D & C and hysteroscopy. Discussed indication for procedure, risks, benefit and alternatives. She would like to proceed with the procedure but only at [facility].    Needs pre-surgery medical clearance, history of vascular disease due to long term smoking.      Kari Busby M.D., MPH,  F.A.C.O.G.  Professor  Department of Ob/Gyn and Women's Health  Division of Gynecologic Oncology  [facility]  [phone]      Time: total time spent today, 2022, including preparation, review of outside records, face to face counseling, and documentation was 30 minutes.

## 2022-07-25 ENCOUNTER — TELEPHONE (OUTPATIENT)
Dept: ONCOLOGY | Facility: CLINIC | Age: 72
End: 2022-07-25

## 2022-07-25 NOTE — TELEPHONE ENCOUNTER
Spoke to Humaira about Dr. Busby's recommendation to have minor surgery which is D&C with hysteroscopy.  Since Dr. Busby is no longer coming to [facility] or doing surgeries at [facility], Humaira will have surgery with Dr. Destiney Schaefer.  I have Humaira scheduled to meet with Dr. Schaefer on 8/24 and to schedule surgery.    Thanks    Rufina Anand RN Care Coordinator  [facility]  Ph.[phone] Fax. [phone]

## 2022-07-27 DIAGNOSIS — N93.9 ABNORMAL UTERINE BLEEDING: Primary | ICD-10-CM

## 2022-07-27 RX ORDER — CEFAZOLIN SODIUM 2 G/100ML
2 INJECTION, SOLUTION INTRAVENOUS SEE ADMIN INSTRUCTIONS
Status: CANCELLED | OUTPATIENT
Start: 2022-07-27

## 2022-07-27 RX ORDER — CEFAZOLIN SODIUM 2 G/100ML
2 INJECTION, SOLUTION INTRAVENOUS
Status: CANCELLED | OUTPATIENT
Start: 2022-07-27

## 2022-08-08 ENCOUNTER — TELEPHONE (OUTPATIENT)
Dept: ONCOLOGY | Facility: CLINIC | Age: 72
End: 2022-08-08

## 2022-08-09 NOTE — TELEPHONE ENCOUNTER
RN Care Coordinator: Rufina Anand; [phone]    Surgery is scheduled with Dr. Schaefer on 9/15 at the [facility]  Scheduled per patient    H&P to be completed by Surgeon     COVID-19 test: patient to complete an at-home test 1-2 days prior to scheduled date and bring a picture of negative results or call [phone] option # 7 to schedule a PCR test within 4 days of the scheduled date     Post-op: will be scheduled by the clinic    Patient will receive a phone call from pre-admission nurses 1-2 days prior to surgery with arrival and start time.    I spoke with the patient and was able to confirm the scheduled information. No further action needed at this time.    Surgery packet to be provided by the [facility] during appointment

## 2022-08-09 NOTE — TELEPHONE ENCOUNTER
----- Message from Destiney Schaefer MD sent at 7/27/2022  3:05 PM CDT -----  Regarding: RE: Patient attached  Orders in for D&C/hysteroscopy    ----- Message -----  From: Lorna Anand, LAUREN  Sent: 7/27/2022   2:05 PM CDT  To: Kari Busby MD, Destiney Schaefer MD, #  Subject: RE: Patient attached                             I would put surgery orders and Humaira is good to go if Piper you even want to schedule her that way when she is here on 8/24 we can get everything wrapped up :-)    Zoe Almaraz  ----- Message -----  From: Destiney Schaefer MD  Sent: 7/27/2022   2:00 PM CDT  To: Kari Busby MD, Lorna Anand, RN, #  Subject: RE: Patient attached                             Should I put in orders?  ----- Message -----  From: Piper Antoine  Sent: 7/27/2022   8:25 AM CDT  To: Kari Busby MD, Destiney Schaefer MD, #  Subject: RE: Patient attached                             Keep me updated on the plan.     Piper   ----- Message -----  From: Lorna Anand RN  Sent: 7/27/2022   8:14 AM CDT  To: Kari Busby MD, Destiney Schaefer MD, #  Subject: RE: Patient attached                             She is scheduled in person on 8/24- patient wanted to meet you first :-)    Rufina  ----- Message -----  From: Destiney Schaefer MD  Sent: 7/26/2022  11:43 PM CDT  To: Kari Busby MD, Lorna Anand, RN, #  Subject: RE: Patient attached                             Ok to just schedule her with me at [facility]. We can do a virtual visit anytime before surgery or honestly if she just wants to meet me morning of surgery im find with that too if she is. thanks  ----- Message -----  From: Lorna Anand RN  Sent: 7/25/2022   9:38 AM CDT  To: Kari Busby MD, Destiney Schaefer MD, #  Subject: RE: Patient attached                             Hi-    I spoke to Humaira this morning and she is fine with waiting until Dr. Schaefer gets back from vacation and would like to meet Dr. Schaefer on 8/24.   "Unfortunately Dr. Schaefer only has \"New patient\" spots so I did book her at 1020 am for new patient but if needed could change it to a return patient and change the amount of time for the visit?  Humaira was fine with tentatively scheduling the surgery at [facility] prior to meeting Silvano on 8/24 or she can wait and schedule surgery when here in clinic on 8/24?    Thanks    Rufina Anand RN  ----- Message -----  From: Destiney Schaefer MD  Sent: 7/25/2022   7:54 AM CDT  To: Kari Busby MD, Lorna Anand, RN, #  Subject: RE: Patient attached                             I can schedule this for when I return mid august, unless you think we should get it done sooner (then maybe yesica could do it). shaquille Almaraz put surgery orders in for her and maybe we can do a quick phone call before her surgery. thanks  ----- Message -----  From: Kalina Melton RN  Sent: 7/21/2022   4:12 PM CDT  To: Kari Busby MD, Destiney Schaefer MD, #  Subject: Patient attached                                   ----- Message -----  From: Kari Busby MD  Sent: 7/21/2022   4:10 PM CDT  To: Destiney Schaefer MD, Lorna Anand, RN, #    Hello Dr. Schaefer    Humaira is a patient from [facility] who only wants to be seen at [facility]. She needs a D &C, Hysteroscopy. I had a phone visit with her today.    I am hoping you can take care of her.       Thank you,    Kari"

## 2022-08-23 ENCOUNTER — OFFICE VISIT (OUTPATIENT)
Dept: FAMILY MEDICINE | Facility: CLINIC | Age: 72
End: 2022-08-23
Payer: COMMERCIAL

## 2022-08-23 VITALS
WEIGHT: 127.4 LBS | BODY MASS INDEX: 21.23 KG/M2 | DIASTOLIC BLOOD PRESSURE: 78 MMHG | RESPIRATION RATE: 16 BRPM | HEART RATE: 57 BPM | TEMPERATURE: 96.9 F | OXYGEN SATURATION: 98 % | HEIGHT: 65 IN | SYSTOLIC BLOOD PRESSURE: 144 MMHG

## 2022-08-23 DIAGNOSIS — F41.9 ANXIETY: ICD-10-CM

## 2022-08-23 DIAGNOSIS — J43.9 PULMONARY EMPHYSEMA, UNSPECIFIED EMPHYSEMA TYPE (H): ICD-10-CM

## 2022-08-23 DIAGNOSIS — K86.2 PANCREATIC CYST: Primary | ICD-10-CM

## 2022-08-23 DIAGNOSIS — M85.80 OSTEOPENIA, UNSPECIFIED LOCATION: ICD-10-CM

## 2022-08-23 DIAGNOSIS — C91.10 CLL (CHRONIC LYMPHOCYTIC LEUKEMIA) (H): Chronic | ICD-10-CM

## 2022-08-23 DIAGNOSIS — E78.5 HYPERLIPIDEMIA WITH TARGET LDL LESS THAN 100: ICD-10-CM

## 2022-08-23 DIAGNOSIS — I10 HTN (HYPERTENSION), BENIGN: ICD-10-CM

## 2022-08-23 DIAGNOSIS — I73.9 PAD (PERIPHERAL ARTERY DISEASE) (H): ICD-10-CM

## 2022-08-23 PROCEDURE — 99214 OFFICE O/P EST MOD 30 MIN: CPT | Performed by: INTERNAL MEDICINE

## 2022-08-23 ASSESSMENT — PAIN SCALES - GENERAL: PAINLEVEL: MODERATE PAIN (5)

## 2022-08-23 NOTE — PROGRESS NOTES
Assessment & Plan   Problem List Items Addressed This Visit        Endocrine    Hyperlipidemia with target LDL less than 100       Circulatory    HTN (hypertension), benign    PAD (peripheral artery disease) (H)    Relevant Orders    Vascular Medicine Referral       Musculoskeletal and Integumentary    Osteopenia       Hematologic    CLL (chronic lymphocytic leukemia) (H) (Chronic)       Other    Anxiety      Other Visit Diagnoses     Pancreatic cyst    -  Primary    Relevant Orders    MR Pancreas wo & w Contrast    Pulmonary emphysema, unspecified emphysema type (H)        Relevant Orders    General PFT Lab (Please always keep checked)           Stopped taking clonazepam.  Discussed multiple health conditions  PFT , h/o emphysema  MR pancreas, presence of cyst , patient not aware of finding   Monitor BP  Vit d and ca maintenance  Follow with gyn onc, right ovarian complex cyst possible serous cystadenoma vs benign cyst  Had benign endometrial polyp, may need D&C given her thickened endometrial stripe.  Yearly low dose lung CT scan, next with her upcoming welleness   No concerning changes on previous CT results.  She can go to yearly screening.  Stressed Importance of smoking cessation, seen MTM was not interested or ready to quit.   Consider EGD in future.  her CLL is stable. The patient has Webb stage 0 chronic lymphocytic leukemia.  No indication for treatment as per oncology       See Patient Instructions    Return in about 3 months (around 11/23/2022), or if symptoms worsen or fail to improve, for As needed and if symptoms worsen, Physical Exam.    Liane Claudio MD  [facility]  Total time spent was 33 minutes spent review of records and addressing multiple health issues  Subjective   Humaira is a 71 year old, presenting for the following health issues:  Recheck Medication and Establish Care      History of Present Illness       Reason for visit:  Meet new     She eats 2-3 servings of  fruits and vegetables daily.She consumes 3 sweetened beverage(s) daily.She exercises with enough effort to increase her heart rate 10 to 19 minutes per day.    She is taking medications regularly.     Her past medical history is notable for PMHx of peripheral artery disease s/p fem pop bypass, chronic back pain, HTN, smoking history with emphysema.   Diagnosis of chronic lymphocytic leukemia not currently on treatment but on observation and follow up by Dr. Maddie Rowland.  Prior left ovary removal for cyst at age of 26.    Endometrial biopsy in may of 2022 showed scant benign tissue.  Denies any pelvic pain, no vaginal bleeding or discharge, no urinary concerns.Repeat biopsy on 5/2022 showed a benign endometrial polpy.     Hypertension: Patient is not meeting blood pressure goal of < 140/90mmHg with last reading in clinic, but home readings are at goal.  Will continue to monitor.      Anxiety: Patient would benefit from transitioning from benzodiazepine to selective serotonin reuptake inhibitor, and she's open to making this change. Was put on sertraline 50 mg daily by MTM    Shortness of Breath:  Humaira has an albuterol inhaler available for use, she reports use is rare but she finds it effective when needed.      She had history of pulmonary nodules that were stable now she is on yearly lung CT scanning she would like us to take over ordering the low-dose CT scan yearly her next wellness is in end of the year.    Vascular: She has a history of an aorto right iliac left femoral bypass graft in 2002.  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  Because of there being multiple they recommended a CT scan in 3 months instead of 6. No concerning changes on previous CT results.  She can go to  "yearly screening.    Review of Systems   Constitutional, HEENT, cardiovascular, pulmonary, gi and gu systems are negative, except as otherwise noted.      Objective    BP (!) 144/78   Pulse 57   Temp 96.9  F (36.1  C) (Temporal)   Resp 16   Ht 1.638 m (5' 4.5\")   Wt 57.8 kg (127 lb 6.4 oz)   SpO2 98%   BMI 21.53 kg/m    Body mass index is 21.53 kg/m .  Physical Exam   GENERAL: healthy, alert and no distress  EYES: Eyes grossly normal to inspection, PERRL and conjunctivae and sclerae normal  NECK: no adenopathy, no asymmetry, masses, or scars and thyroid normal to palpation  RESP: lungs clear to auscultation - no rales, rhonchi or wheezes  CV: regular rate and rhythm, normal S1 S2, no S3 or S4, no murmur, click or rub, no peripheral edema and peripheral pulses strong  ABDOMEN: soft, nontender, no hepatosplenomegaly, no masses and bowel sounds normal  MS: no gross musculoskeletal defects noted, no edema  SKIN: no suspicious lesions or rashes  NEURO: Normal strength and tone, mentation intact and speech normal  PSYCH: mentation appears normal, affect normal/bright    Lab on 05/31/2022   Component Date Value Ref Range Status     Sodium 05/31/2022 137  133 - 144 mmol/L Final     Potassium 05/31/2022 3.8  3.4 - 5.3 mmol/L Final     Chloride 05/31/2022 99  94 - 109 mmol/L Final     Carbon Dioxide (CO2) 05/31/2022 33 (A) 20 - 32 mmol/L Final     Anion Gap 05/31/2022 5  3 - 14 mmol/L Final     Urea Nitrogen 05/31/2022 11  7 - 30 mg/dL Final     Creatinine 05/31/2022 0.56  0.52 - 1.04 mg/dL Final     Calcium 05/31/2022 9.0  8.5 - 10.1 mg/dL Final     Glucose 05/31/2022 99  70 - 99 mg/dL Final     Alkaline Phosphatase 05/31/2022 63  40 - 150 U/L Final     AST 05/31/2022 26  0 - 45 U/L Final     ALT 05/31/2022 33  0 - 50 U/L Final     Protein Total 05/31/2022 6.5 (A) 6.8 - 8.8 g/dL Final     Albumin 05/31/2022 3.6  3.4 - 5.0 g/dL Final     Bilirubin Total 05/31/2022 0.6  0.2 - 1.3 mg/dL Final     GFR Estimate 05/31/2022 " >90  >60 mL/min/1.73m2 Final    Effective December 21, 2021 eGFRcr in adults is calculated using the 2021 CKD-EPI creatinine equation which includes age and gender (Nevaeh et al., NE, DOI: 10.1056/DQNYcy2736717)     Lactate Dehydrogenase 05/31/2022 227  81 - 234 U/L Final     WBC Count 05/31/2022 23.5 (A) 4.0 - 11.0 10e3/uL Final     RBC Count 05/31/2022 4.84  3.80 - 5.20 10e6/uL Final     Hemoglobin 05/31/2022 15.0  11.7 - 15.7 g/dL Final     Hematocrit 05/31/2022 45.3  35.0 - 47.0 % Final     MCV 05/31/2022 94  78 - 100 fL Final     MCH 05/31/2022 31.0  26.5 - 33.0 pg Final     MCHC 05/31/2022 33.1  31.5 - 36.5 g/dL Final     RDW 05/31/2022 13.3  10.0 - 15.0 % Final     Platelet Count 05/31/2022 202  150 - 450 10e3/uL Final     % Neutrophils 05/31/2022 16  % Final     % Lymphocytes 05/31/2022 81  % Final    Differential done of albumin treated blood due to smudge cells     % Monocytes 05/31/2022 1  % Final     % Eosinophils 05/31/2022 0  % Final     % Basophils 05/31/2022 2  % Final     Absolute Neutrophils 05/31/2022 3.8  1.6 - 8.3 10e3/uL Final     Absolute Lymphocytes 05/31/2022 19.0 (A) 0.8 - 5.3 10e3/uL Final     Absolute Monocytes 05/31/2022 0.2  0.0 - 1.3 10e3/uL Final     Absolute Eosinophils 05/31/2022 0.0  0.0 - 0.7 10e3/uL Final     Absolute Basophils 05/31/2022 0.5 (A) 0.0 - 0.2 10e3/uL Final     RBC Morphology 05/31/2022 Confirmed RBC Indices   Final     Platelet Assessment 05/31/2022 Automated Count Confirmed. Platelet morphology is normal.  Automated Count Confirmed. Platelet morphology is normal. Final     Smudge Cells 05/31/2022 Present (A) None Seen Final                   .  ..

## 2022-08-24 ENCOUNTER — TELEPHONE (OUTPATIENT)
Dept: OTHER | Facility: CLINIC | Age: 72
End: 2022-08-24

## 2022-08-24 ENCOUNTER — PATIENT OUTREACH (OUTPATIENT)
Dept: ONCOLOGY | Facility: CLINIC | Age: 72
End: 2022-08-24

## 2022-08-24 ENCOUNTER — ONCOLOGY VISIT (OUTPATIENT)
Dept: ONCOLOGY | Facility: CLINIC | Age: 72
End: 2022-08-24
Attending: NURSE PRACTITIONER
Payer: COMMERCIAL

## 2022-08-24 VITALS
TEMPERATURE: 97.7 F | DIASTOLIC BLOOD PRESSURE: 62 MMHG | HEART RATE: 63 BPM | RESPIRATION RATE: 18 BRPM | SYSTOLIC BLOOD PRESSURE: 119 MMHG | BODY MASS INDEX: 20.93 KG/M2 | HEIGHT: 64 IN | OXYGEN SATURATION: 99 % | WEIGHT: 122.6 LBS

## 2022-08-24 DIAGNOSIS — N83.291 COMPLEX CYST OF RIGHT OVARY: ICD-10-CM

## 2022-08-24 DIAGNOSIS — R19.00 PELVIC MASS: Primary | ICD-10-CM

## 2022-08-24 DIAGNOSIS — I73.9 PAD (PERIPHERAL ARTERY DISEASE) (H): Primary | ICD-10-CM

## 2022-08-24 PROCEDURE — G0463 HOSPITAL OUTPT CLINIC VISIT: HCPCS

## 2022-08-24 PROCEDURE — 99214 OFFICE O/P EST MOD 30 MIN: CPT | Performed by: OBSTETRICS & GYNECOLOGY

## 2022-08-24 ASSESSMENT — PAIN SCALES - GENERAL: PAINLEVEL: NO PAIN (0)

## 2022-08-24 NOTE — TELEPHONE ENCOUNTER
Pt referred to [facility] by Liane Claudio MD for PAD    Patient has no imaging     Pt needs to be scheduled for AZALEA US and consult with Kaila Navarro.  Will route to scheduling to coordinate an appointment at next available.    Appointment note:  Referred to [facility] by Liane Claudio MD for PAD. Needs AZALEA prior          Marylou GREWAL, RN    [facility]  Office: [phone]  Fax: [phone]

## 2022-08-24 NOTE — LETTER
2022         RE: Karen Caban  [address]  [address]  [place] [address]        Dear Colleague,    Thank you for referring your patient, Karen Caban, to the [facility]. Please see a copy of my visit note below.    GYNECOLOGIC  ONCOLOGY Followup    Referring provider:    To Shipley MD  [address] [place] [address]   RE: Karen Caban  : 1950  STEPHANIE: 2022     CC: Pelvic Mass, thickened endometrial stripe    HPI: Ms Karen Caban is a 71 year old female who presents for consultation regarding complex ovarian mass and thickened endometrial stripe    Her past medical history is notable for PMHx of peripheral artery disease s/p fem pop bypass, chronic back pain, HTN, smoking history with emphysema.   Diagnosis of chronic lymphocytic leukemia not currently on treatment but on observation and follow up by Dr. Maddie Rowland.    Prior left ovary removal for cyst at age of 26.    She presented to her primary care clinic with report of bladder pressure in early 2022. Since then the bladder pressure resolved. She was referred to OB/GYn who performed a CT and TVUS. Endometrial biopsy in may of 2022 showed scant benign tissue.  Denies any pelvic pain, no vaginal bleeding or discharge, no urinary concerns.Repeat biopsy with my partner 2022 showed a benign endometrial polpy.     She is transferring her care to me so that she can have further evaluation ongoing at [facility].         OBGYN history and Health Maintenance:    Last Pap Smear:   negative  Last Mammogram: 2022 Benign BI-RADS 2  Last Colonoscopy:  2018, repeat in 5 yrs      Past Medical History:   Diagnosis Date     Ankle fracture     L     Anxiety      Chronic back pain      Colon polyp 2012    repeat colonoscopy 5 years.     Fx low femur epiphy-closed (H)      GERD (gastroesophageal reflux disease)      History of UTI 2017    Cysto by Dr Agustin neg     HTN  (hypertension), benign      Hyperlipidemia LDL goal < 100      Normal nuclear stress test 12/09    EF 67%     Osteopenia      PAD (peripheral artery disease) (H)     s/p fem pop bypass; embolectomy     Polycythemia vera (H) 6/15/2022     PUD (peptic ulcer disease) 1980s    DU     Smoker      Vitamin D deficiency        Past Surgical History:   Procedure Laterality Date     Blood clot removal from Stent      2011     BONE MARROW BIOPSY, BONE SPECIMEN, NEEDLE/TROCAR N/A 02/02/2021    Procedure: bone marrow biopsy;  Surgeon: Kailey Cota MD;  Location:  GI     BYPASS GRAFT AORTOFEMORAL  05/2002    Dr. Turner     BYPASS GRAFT FEMOROPOPLITEAL  12/16/2011     COLONOSCOPY N/A 01/18/2018    Procedure: COMBINED COLONOSCOPY, SINGLE OR MULTIPLE BIOPSY/POLYPECTOMY BY BIOPSY;  COLONOSCOPY;  Surgeon: Efrain Hernadez MD;  Location:  GI     EMBOLECTOMY LOWER EXTREMITY  12/16/2011    Procedure:EMBOLECTOMY LOWER EXTREMITY; EMBOLECTOMY, RIGHT POPLITEAL WITH PATCH ANGIOPLASTY. EXCISION SKIN LESION RIGHT LEG. ; Surgeon:PARMINDER VELAZCO; Location: OR     EXCISE LESION LOWER EXTREMITY  12/16/2011    Procedure:EXCISE LESION LOWER EXTREMITY; Surgeon:PARMINDER VELAZCO; Location: OR     HERNIA REPAIR  11/04/2008    x2     L achilles repair  12/04/2008     LAPAROSCOPIC OOPHORECTOMY Left     L for cyst age 26     miscarriages x 3       tubal ligation and reversal            Current Outpatient Medications   Medication Sig Dispense Refill     albuterol (PROAIR RESPICLICK) 108 (90 Base) MCG/ACT inhaler Inhale 1-2 puffs into the lungs every 4 hours as needed 1 each 3     aspirin (ASA) 81 MG chewable tablet Take 162 mg by mouth daily 2 times elma       atorvastatin (LIPITOR) 40 MG tablet Take 1 tablet (40 mg) by mouth daily 90 tablet 2     chlorthalidone (HYGROTON) 25 MG tablet Take 1 tablet (25 mg) by mouth daily 90 tablet 0     Cholecalciferol (VITAMIN D-3) 5000 UNIT TABS Take 1 tablet by mouth daily.        clonazePAM (KLONOPIN) 0.5 MG ODT Take 1/2-1 tab PO BID prn anxiety (Patient not taking: Reported on 8/23/2022) 60 tablet 1     coenzyme Q-10 200 MG CAPS Take 200 mg by mouth daily       Cranberry-Vitamin C-Vitamin E (CRANBERRY PLUS VITAMIN C) 4200-20-3 MG-MG-UNIT CAPS Take 1 tablet by mouth daily       Cyanocobalamin (B-12 PO) Take 5,000 mcg by mouth every other day Liquid form 100 tablet 1     estradiol (ESTRACE) 0.1 MG/GM vaginal cream INSERT 1 GRAM VAGINALLY 2 TO 3 TIMES EVERY WEEK 42.5 g 0     Lactobacillus (PROBIOTIC ACIDOPHILUS PO) Take 1 capsule by mouth daily       metoprolol succinate ER (TOPROL-XL) 100 MG 24 hr tablet TAKE 1 AND 1/2 TABLETS(150 MG) BY MOUTH DAILY 135 tablet 3     Multiple Vitamins-Minerals (MULTIVITAMIN GUMMIES ADULT PO) Take 2 chew tab by mouth daily       omeprazole (PRILOSEC) 40 MG DR capsule TAKE 1 CAPSULE BY MOUTH EVERY DAY 30 TO 60 MINUTES BEFORE A MEAL 90 capsule 3     sertraline (ZOLOFT) 50 MG tablet Take 1/2 tablet (25mg) daily for 7 days, then increase to 1 tablet (50mg) daily thereafter 90 tablet 1     Sodium Chloride-Xylitol (XLEAR SINUS CARE SPRAY NA) Spray 1 spray in nostril daily       Sodium Chloride-Xylitol (XLEAR SINUS CARE SPRAY) SOLN Spray 1 packet in nostril daily Nasal rinse           Allergies   Allergen Reactions     Chantix [Varenicline] Nausea     Ciprofloxacin Other (See Comments)     hypertension     Clopidogrel      Other reaction(s): Hypertension     Decongestant [Cvs]      Erythromycin Nausea     Lisinopril      cough     Plavix [Clopidogrel Bisulfate]      Felt as if she was having a heart attack     Rofecoxib Unknown     Vioxx        Social History:  Social History     Tobacco Use     Smoking status: Current Every Day Smoker     Packs/day: 0.75     Years: 50.00     Pack years: 37.50     Types: Cigarettes     Smokeless tobacco: Never Used     Tobacco comment: Nicorette gum and patch, trying to quit 2021   Substance Use Topics     Alcohol use: Yes      Comment: Beer once in a while       Family History:   The patient's family history is notable for   Family History   Problem Relation Age of Onset     Alzheimer Disease Mother          of panc/GI ca age 83     Osteoporosis Mother      Other Cancer Mother      Cancer Father          age 64 lymphoma     Colon Cancer Maternal Grandfather          Physical Exam:     There were no vitals taken for this visit.  There is no height or weight on file to calculate BMI.    General: Alert and oriented, no acute distress  Psych: Mood stable  Cardiovascular: RRR, no murmors  Pulmonary: Lungs clear . Normal respiratory effort  GI: No distention. No masses. No hernia. Old midline and low transverse incisions  Lymph: No enlarge lymph nodes in neck or groin  : Normal external genitalia. Vagina without lesions, Cervix with no lesions, midline uterus, no adnexal mass.      22 Pelvic US  FINDINGS:  Endovaginal sonography was added to the transabdominal  scans.    No fibroids. The uterus is 5.2 x 3.2 x 2.8 cm. Endometrial stripe  measures 7 mm and is thickened and heterogeneous for patient's age and  menstrual status. The right ovary demonstrates a 3.8 x 3.1 x 2.1 cm  cyst. The left ovary is surgically absent.  No right adnexal masses  are present. No free pelvic fluid is present.    US PELVIC TRANSABDOMINAL AND TRANSVAGINAL 2022 10:27 AM      HISTORY: Thickened endometrium. Complex cyst of right ovary.  COMPARISON: 2022  TECHNIQUE: Transabdominal scans were performed. Endovaginal ultrasound  was performed to better visualize the adnexa.     FINDINGS:     UTERUS: 6 x 4 x 3 cm. Normal in size and position with no masses.     ENDOMETRIUM: 14 mm. Heterogeneous endometrium with small internal  cystic spaces.     RIGHT OVARY: 2.4 x 1.7 x 1.7 cm. 3.7 cm simple cyst adjacent to the  ovary.     LEFT OVARY: Not visualized and reportedly surgically absent. There is  a unilocular 2.4 cm cyst in the left adnexa.      No  significant free fluid.                                                                      IMPRESSION:  1.  Thickened endometrium measuring 14 mm, increased from previous 7  mm.  2.  Simple bilateral adnexal cysts, similar to previous.           SHAR ROCHE MD     Assessment: Karen Caban is a 71 year old woman with ovarian cysts and thickened endometrial stripe      Plan:     1.)   Thickened endometrial stripe: Discussed hysterectomy versus D&C. I favor D&C as she is a high risk surgical candidate given her peripheral artery disease. Likely polyp given recent biopsy. Discussed surgical risks not limited to bleeding, infection, damage to surrounding organs, need for blood transfusions and ICU stay.     -Plan D&C, hysteroscopy 9/15/22  -Home DOS  -Virutal PAC OK  -OK to stay on ASA perioperatively, do not hold for surgery.        2. ) complex pelvic mass:  7. Imaging consistent with serous cystadenoma versus simple cyst. Discussed option for removal of ovary versus observation. I favor non-surgical management given appearance and very low . Given stability over two TVUS studies, I do not feel further imaging is warranted unless she is symptomatic    -TVUS if pelvic symptoms (pressure, pain, etc)  - Endometrial biopsy obtained today.        Total time today was 30 minutes which included 20 minutes face to face with the patient and her family, 5 minutes reviewing images/labs, 5 minutes documenting.         Again, thank you for allowing me to participate in the care of your patient.        Sincerely,        Detsiney Schaefer MD

## 2022-08-24 NOTE — PROGRESS NOTES
GYN ONC Pre-Op Education - Nursing     Relevant Diagnosis: Pelvic Mass , Thickened endometrial stripe    Teaching Topic: Surgery D&C    Teaching Concerns addressed: Yes    Patient demonstrate understanding of the following:     Reason for the appointment, diagnosis and treatment plan:   Yes     Knowledge of proper use of medications and conditions for which they are ordered (with special attention to potential side effects or drug interactions): Yes     Which situations necessitate calling provider and whom to contact: Yes       Nutritional needs and diet plan:  Yes        Pain management techniques:  Yes    Diet:  Yes     Infection Prevention:  Patient demonstrate understanding of the following:     Pre-Op CHG Bathing Instructions: Yes    Surgical procedure site care taught:   Yes     Signs and symptoms of infection taught: Yes     Instructional Materials Used/Given:    Your Surgery Day    Preparing for Your Surgery    Showering before Surgery  o Provided patient with 2 bottles of CHG product.    Home Care after Gynecologic Surgery    Eating After Surgery Gynecologic Oncology    Tips to Increase the Protein in Your Diet     Map    Phone number for [facility]     Visiting a Loved One in the Hospital during the COVID-19 Outbreak    (Consents to be signed in pre-op.)      Met with patient  for pre-operative teaching.  Surgery scheduled on 9/15/2022  with Dr. Schaefer at [facility].       Pre-operative H&P:  Virtual PAC appointment     Pre-operative Labs/Imaging:  none      Reviewed pre-operative eating/drinking restrictions and showering instructions.    Reviewed medications that must be held for at least 7 days prior to surgery, including: NSAIDs, Aspirin (or any other product containing aspirin), Ibuprofen, Naproxen, and supplements/vitamins (Fish oil/Flax seed oil, and Multivitamin/Vit. E).    It is scheduled as a Same Day surgery, so she was informed that she will need someone  to drive her home after surgery and someone to stay with her for at least 24 hours after she arrives home.     Patient will need to see Dr. Schaefer, 1-2 weeks after her surgery.  Post-op visit 10/5/2022.    Reviewed post-operative care (including activity and lifting restrictions) and what to expect during recovery.    Reviewed signs and symptoms that would warrant contacting provider immediately and/or seeking emergency care.  o Reviewed after-hours/holiday/weekends contact: Patient should call clinic number and ask the answering service to connect them with the GYN Oncology Physician on-call.    Patient  had the opportunity to ask questions and all questions were answered to her satisfaction.  Patient verbalized understanding and agreement with plan.  Instructed to call the clinic with any questions, concerns, or worsening symptoms.     Thanks    Rufina Anand RN Care Coordinator  [facility]  Ph.[phone] Fax. [phone]

## 2022-08-24 NOTE — PROGRESS NOTES
GYNECOLOGIC  ONCOLOGY Followup    Referring provider:    [place] [address]   RE: Karen Caban  : 1950  STEPHANIE: 2022     CC: Pelvic Mass, thickened endometrial stripe    HPI: Ms Karen Caban is a 71 year old female who presents for consultation regarding complex ovarian mass and thickened endometrial stripe    Her past medical history is notable for PMHx of peripheral artery disease s/p fem pop bypass, chronic back pain, HTN, smoking history with emphysema.   Diagnosis of chronic lymphocytic leukemia not currently on treatment but on observation and follow up by Dr. Maddie Rowland.    Prior left ovary removal for cyst at age of 26.    She presented to her primary care clinic with report of bladder pressure in early 2022. Since then the bladder pressure resolved. She was referred to OB/GYn who performed a CT and TVUS. Endometrial biopsy in may of 2022 showed scant benign tissue.  Denies any pelvic pain, no vaginal bleeding or discharge, no urinary concerns.Repeat biopsy with my partner 2022 showed a benign endometrial polpy.     She is transferring her care to me so that she can have further evaluation ongoing at [facility].         OBGYN history and Health Maintenance:    Last Pap Smear:   negative  Last Mammogram: 2022 Benign BI-RADS 2  Last Colonoscopy:  2018, repeat in 5 yrs      Past Medical History:   Diagnosis Date     Ankle fracture     L     Anxiety      Chronic back pain      Colon polyp 2012    repeat colonoscopy 5 years.     Fx low femur epiphy-closed (H)      GERD (gastroesophageal reflux disease)      History of UTI 2017    Cysto by Dr Agustin neg     HTN (hypertension), benign      Hyperlipidemia LDL goal < 100      Normal nuclear stress test     EF 67%     Osteopenia      PAD (peripheral artery disease) (H)     s/p fem pop bypass; embolectomy     Polycythemia vera (H) 6/15/2022     PUD (peptic ulcer disease)  1980s    DU     Smoker      Vitamin D deficiency        Past Surgical History:   Procedure Laterality Date     Blood clot removal from Stent      2011     BONE MARROW BIOPSY, BONE SPECIMEN, NEEDLE/TROCAR N/A 02/02/2021    Procedure: bone marrow biopsy;  Surgeon: Kailey Cota MD;  Location:  GI     BYPASS GRAFT AORTOFEMORAL  05/2002    Dr. Turner     BYPASS GRAFT FEMOROPOPLITEAL  12/16/2011     COLONOSCOPY N/A 01/18/2018    Procedure: COMBINED COLONOSCOPY, SINGLE OR MULTIPLE BIOPSY/POLYPECTOMY BY BIOPSY;  COLONOSCOPY;  Surgeon: Efrain Hernadez MD;  Location:  GI     EMBOLECTOMY LOWER EXTREMITY  12/16/2011    Procedure:EMBOLECTOMY LOWER EXTREMITY; EMBOLECTOMY, RIGHT POPLITEAL WITH PATCH ANGIOPLASTY. EXCISION SKIN LESION RIGHT LEG. ; Surgeon:PARMINDER VELAZCO; Location: OR     EXCISE LESION LOWER EXTREMITY  12/16/2011    Procedure:EXCISE LESION LOWER EXTREMITY; Surgeon:PARMINDER VELAZCO; Location: OR     HERNIA REPAIR  11/04/2008    x2     L achilles repair  12/04/2008     LAPAROSCOPIC OOPHORECTOMY Left     L for cyst age 26     miscarriages x 3       tubal ligation and reversal            Current Outpatient Medications   Medication Sig Dispense Refill     albuterol (PROAIR RESPICLICK) 108 (90 Base) MCG/ACT inhaler Inhale 1-2 puffs into the lungs every 4 hours as needed 1 each 3     aspirin (ASA) 81 MG chewable tablet Take 162 mg by mouth daily 2 times elma       atorvastatin (LIPITOR) 40 MG tablet Take 1 tablet (40 mg) by mouth daily 90 tablet 2     chlorthalidone (HYGROTON) 25 MG tablet Take 1 tablet (25 mg) by mouth daily 90 tablet 0     Cholecalciferol (VITAMIN D-3) 5000 UNIT TABS Take 1 tablet by mouth daily.       clonazePAM (KLONOPIN) 0.5 MG ODT Take 1/2-1 tab PO BID prn anxiety (Patient not taking: Reported on 8/23/2022) 60 tablet 1     coenzyme Q-10 200 MG CAPS Take 200 mg by mouth daily       Cranberry-Vitamin C-Vitamin E (CRANBERRY PLUS VITAMIN C) 4200-20-3 MG-MG-UNIT  CAPS Take 1 tablet by mouth daily       Cyanocobalamin (B-12 PO) Take 5,000 mcg by mouth every other day Liquid form 100 tablet 1     estradiol (ESTRACE) 0.1 MG/GM vaginal cream INSERT 1 GRAM VAGINALLY 2 TO 3 TIMES EVERY WEEK 42.5 g 0     Lactobacillus (PROBIOTIC ACIDOPHILUS PO) Take 1 capsule by mouth daily       metoprolol succinate ER (TOPROL-XL) 100 MG 24 hr tablet TAKE 1 AND 1/2 TABLETS(150 MG) BY MOUTH DAILY 135 tablet 3     Multiple Vitamins-Minerals (MULTIVITAMIN GUMMIES ADULT PO) Take 2 chew tab by mouth daily       omeprazole (PRILOSEC) 40 MG DR capsule TAKE 1 CAPSULE BY MOUTH EVERY DAY 30 TO 60 MINUTES BEFORE A MEAL 90 capsule 3     sertraline (ZOLOFT) 50 MG tablet Take 1/2 tablet (25mg) daily for 7 days, then increase to 1 tablet (50mg) daily thereafter 90 tablet 1     Sodium Chloride-Xylitol (XLEAR SINUS CARE SPRAY NA) Spray 1 spray in nostril daily       Sodium Chloride-Xylitol (XLEAR SINUS CARE SPRAY) SOLN Spray 1 packet in nostril daily Nasal rinse           Allergies   Allergen Reactions     Chantix [Varenicline] Nausea     Ciprofloxacin Other (See Comments)     hypertension     Clopidogrel      Other reaction(s): Hypertension     Decongestant [Cvs]      Erythromycin Nausea     Lisinopril      cough     Plavix [Clopidogrel Bisulfate]      Felt as if she was having a heart attack     Rofecoxib Unknown     Vioxx        Social History:  Social History     Tobacco Use     Smoking status: Current Every Day Smoker     Packs/day: 0.75     Years: 50.00     Pack years: 37.50     Types: Cigarettes     Smokeless tobacco: Never Used     Tobacco comment: Nicorette gum and patch, trying to quit    Substance Use Topics     Alcohol use: Yes     Comment: Beer once in a while       Family History:   The patient's family history is notable for   Family History   Problem Relation Age of Onset     Alzheimer Disease Mother          of panc/GI ca age 83     Osteoporosis Mother      Other Cancer Mother      Cancer  Father          age 64 lymphoma     Colon Cancer Maternal Grandfather          Physical Exam:     There were no vitals taken for this visit.  There is no height or weight on file to calculate BMI.    General: Alert and oriented, no acute distress  Psych: Mood stable  Cardiovascular: RRR, no murmors  Pulmonary: Lungs clear . Normal respiratory effort  GI: No distention. No masses. No hernia. Old midline and low transverse incisions  Lymph: No enlarge lymph nodes in neck or groin  : Normal external genitalia. Vagina without lesions, Cervix with no lesions, midline uterus, no adnexal mass.      22 Pelvic US  FINDINGS:  Endovaginal sonography was added to the transabdominal  scans.    No fibroids. The uterus is 5.2 x 3.2 x 2.8 cm. Endometrial stripe  measures 7 mm and is thickened and heterogeneous for patient's age and  menstrual status. The right ovary demonstrates a 3.8 x 3.1 x 2.1 cm  cyst. The left ovary is surgically absent.  No right adnexal masses  are present. No free pelvic fluid is present.    US PELVIC TRANSABDOMINAL AND TRANSVAGINAL 2022 10:27 AM      HISTORY: Thickened endometrium. Complex cyst of right ovary.  COMPARISON: 2022  TECHNIQUE: Transabdominal scans were performed. Endovaginal ultrasound  was performed to better visualize the adnexa.     FINDINGS:     UTERUS: 6 x 4 x 3 cm. Normal in size and position with no masses.     ENDOMETRIUM: 14 mm. Heterogeneous endometrium with small internal  cystic spaces.     RIGHT OVARY: 2.4 x 1.7 x 1.7 cm. 3.7 cm simple cyst adjacent to the  ovary.     LEFT OVARY: Not visualized and reportedly surgically absent. There is  a unilocular 2.4 cm cyst in the left adnexa.      No significant free fluid.                                                                      IMPRESSION:  1.  Thickened endometrium measuring 14 mm, increased from previous 7  mm.  2.  Simple bilateral adnexal cysts, similar to previous.           SHAR ROCHE MD      Assessment: Karen Caban is a 71 year old woman with ovarian cysts and thickened endometrial stripe      Plan:     1.)   Thickened endometrial stripe: Discussed hysterectomy versus D&C. I favor D&C as she is a high risk surgical candidate given her peripheral artery disease. Likely polyp given recent biopsy. Discussed surgical risks not limited to bleeding, infection, damage to surrounding organs, need for blood transfusions and ICU stay.     -Plan D&C, hysteroscopy 9/15/22  -Home DOS  -Virutal PAC OK  -OK to stay on ASA perioperatively, do not hold for surgery.        2. ) complex pelvic mass:  7. Imaging consistent with serous cystadenoma versus simple cyst. Discussed option for removal of ovary versus observation. I favor non-surgical management given appearance and very low . Given stability over two TVUS studies, I do not feel further imaging is warranted unless she is symptomatic    -TVUS if pelvic symptoms (pressure, pain, etc)          Total time today was 30 minutes which included 20 minutes face to face with the patient and her family, 5 minutes reviewing images/labs, 5 minutes documenting.

## 2022-08-25 NOTE — TELEPHONE ENCOUNTER
Future Appointments   Date Time Provider Department Center   9/7/2022 12:30 PM [id number]   9/7/2022  1:20 PM Kaila Navarro PA-C [facility]

## 2022-08-29 PROBLEM — C91.10 CLL (CHRONIC LYMPHOCYTIC LEUKEMIA) (H): Chronic | Status: ACTIVE | Noted: 2022-08-29

## 2022-09-02 ENCOUNTER — ANCILLARY PROCEDURE (OUTPATIENT)
Dept: MRI IMAGING | Facility: CLINIC | Age: 72
End: 2022-09-02
Attending: INTERNAL MEDICINE
Payer: COMMERCIAL

## 2022-09-02 DIAGNOSIS — K86.2 PANCREATIC CYST: ICD-10-CM

## 2022-09-02 PROCEDURE — A9585 GADOBUTROL INJECTION: HCPCS | Performed by: INTERNAL MEDICINE

## 2022-09-02 PROCEDURE — 255N000002 HC RX 255 OP 636: Performed by: INTERNAL MEDICINE

## 2022-09-02 PROCEDURE — 74183 MRI ABD W/O CNTR FLWD CNTR: CPT

## 2022-09-02 RX ORDER — GADOBUTROL 604.72 MG/ML
6 INJECTION INTRAVENOUS ONCE
Status: COMPLETED | OUTPATIENT
Start: 2022-09-02 | End: 2022-09-02

## 2022-09-02 RX ADMIN — GADOBUTROL 6 ML: 604.72 INJECTION INTRAVENOUS at 14:20

## 2022-09-07 ENCOUNTER — TELEPHONE (OUTPATIENT)
Dept: ONCOLOGY | Facility: CLINIC | Age: 72
End: 2022-09-07

## 2022-09-07 ENCOUNTER — HOSPITAL ENCOUNTER (OUTPATIENT)
Dept: ULTRASOUND IMAGING | Facility: CLINIC | Age: 72
Discharge: HOME | End: 2022-09-07
Attending: PHYSICIAN ASSISTANT
Payer: COMMERCIAL

## 2022-09-07 ENCOUNTER — OFFICE VISIT (OUTPATIENT)
Dept: OTHER | Facility: CLINIC | Age: 72
End: 2022-09-07
Attending: INTERNAL MEDICINE
Payer: COMMERCIAL

## 2022-09-07 ENCOUNTER — MYC MEDICAL ADVICE (OUTPATIENT)
Dept: FAMILY MEDICINE | Facility: CLINIC | Age: 72
End: 2022-09-07

## 2022-09-07 VITALS
HEART RATE: 57 BPM | WEIGHT: 123 LBS | OXYGEN SATURATION: 100 % | DIASTOLIC BLOOD PRESSURE: 85 MMHG | HEIGHT: 65 IN | BODY MASS INDEX: 20.49 KG/M2 | SYSTOLIC BLOOD PRESSURE: 136 MMHG

## 2022-09-07 DIAGNOSIS — Z72.0 TOBACCO ABUSE: Primary | ICD-10-CM

## 2022-09-07 DIAGNOSIS — I73.9 PAD (PERIPHERAL ARTERY DISEASE) (H): ICD-10-CM

## 2022-09-07 DIAGNOSIS — E78.5 HYPERLIPIDEMIA LDL GOAL <70: ICD-10-CM

## 2022-09-07 DIAGNOSIS — K21.9 GASTROESOPHAGEAL REFLUX DISEASE, UNSPECIFIED WHETHER ESOPHAGITIS PRESENT: Primary | ICD-10-CM

## 2022-09-07 DIAGNOSIS — I65.23 CAROTID STENOSIS, ASYMPTOMATIC, BILATERAL: ICD-10-CM

## 2022-09-07 PROCEDURE — G0463 HOSPITAL OUTPT CLINIC VISIT: HCPCS | Mod: 25

## 2022-09-07 PROCEDURE — 93924 LWR XTR VASC STDY BILAT: CPT

## 2022-09-07 PROCEDURE — 99205 OFFICE O/P NEW HI 60 MIN: CPT | Performed by: PHYSICIAN ASSISTANT

## 2022-09-07 RX ORDER — BUPROPION HYDROCHLORIDE 150 MG/1
150 TABLET, FILM COATED, EXTENDED RELEASE ORAL 2 TIMES DAILY
Qty: 180 TABLET | Refills: 1 | Status: SHIPPED | OUTPATIENT
Start: 2022-09-07 | End: 2022-11-02

## 2022-09-07 RX ORDER — NICOTINE 21 MG/24HR
1 PATCH, TRANSDERMAL 24 HOURS TRANSDERMAL EVERY 24 HOURS
Qty: 30 PATCH | Refills: 1 | Status: SHIPPED | OUTPATIENT
Start: 2022-09-07 | End: 2022-10-05

## 2022-09-07 NOTE — TELEPHONE ENCOUNTER
Left message -let Humaira know that Dr. Schaefer does not feel it necessary to do a virtual or in person with PAC prior to surgery.    .Thanks    Rufina Anand RN Care Coordinator  [facility]  Ph.[phone] Fax. [phone]

## 2022-09-07 NOTE — PROGRESS NOTES
"Patient is here to discuss consult.    /85 (BP Location: Left arm, Patient Position: Chair, Cuff Size: Adult Regular)   Pulse 57   Ht 5' 5\" (1.651 m)   Wt 123 lb (55.8 kg)   SpO2 100%   BMI 20.47 kg/m      Questions patient would like addressed today are: N/A.    Refills are needed: N/A    Has homecare services and agency name:  Ava Albrecht  "

## 2022-09-07 NOTE — H&P (VIEW-ONLY)
[facility] INITIAL VASCULAR MEDICINE CONSULT      PRIMARY HEALTH CARE PROVIDER:  Liane Claudio      REFERRING HEALTH CARE PROVIDER;  Liane Claudio      REASON FOR CONSULT:  PAD with right buttock claudication      HPI: Karen Caban is a 71 year old female smoker with a history of CLL, GERD, polycythemia vera, hypertension, hyperlipidemia, and PAD. She underwent an aorto/right common iliac/left common femoral bypass in 2002 by Dr. Turner. Then in 11/2009 she underwent right iliac artery angioplasty and stenting. This was followed by embolic occlusion of the right distal popliteal and tibial arteries in 12/2011 requiring thromboembolectomy by Dr. Ryan. She had also been followed for a carotid bruit with last ultrasound in 2012 with <50% carotid stenosis bilaterally.     She has not been seen in follow-up in the vascular clinic since 2013. In the past year, she has developed increased right buttock cramping when ambulating. She also gets a numbness in her leg when walking, but this improves when bending over. She denies any rest pain or non-healing wounds. She has no complaints with her left leg.     She is presently on Aspirin (unable to tolerate Plavix), Atorvastatin 40 mg daily for hyperlipidemia, and chlorthalidone and metoprolol for hypertension. She is smoking 1/2 ppd of cigarettes but would like to quit.     PAST MEDICAL HISTORY  Past Medical History:   Diagnosis Date     Ankle fracture 2009    L     Anxiety      Chronic back pain      Chronic lymphocytic leukemia (H)      Colon polyp 2012    repeat colonoscopy 5 years.     Fx low femur epiphy-closed (H)      GERD (gastroesophageal reflux disease)      History of UTI 2017    Cysto by Dr Agustin neg     HTN (hypertension), benign      Hyperlipidemia LDL goal < 100      Normal nuclear stress test 12/2009    EF 67%     Osteopenia      PAD (peripheral artery disease) (H)     s/p fem pop bypass; embolectomy      Polycythemia vera (H) 06/15/2022     PUD (peptic ulcer disease) 1980s    DU     Smoker      Vitamin D deficiency        CURRENT MEDICATIONS  albuterol (PROAIR RESPICLICK) 108 (90 Base) MCG/ACT inhaler, Inhale 1-2 puffs into the lungs every 4 hours as needed  aspirin (ASA) 81 MG chewable tablet, Take 162 mg by mouth daily 2 times elma  atorvastatin (LIPITOR) 40 MG tablet, Take 1 tablet (40 mg) by mouth daily  chlorthalidone (HYGROTON) 25 MG tablet, Take 1 tablet (25 mg) by mouth daily  Cholecalciferol (VITAMIN D-3) 5000 UNIT TABS, Take 1 tablet by mouth daily.  clonazePAM (KLONOPIN) 0.5 MG ODT, Take 1/2-1 tab PO BID prn anxiety (Patient not taking: Reported on 8/23/2022)  coenzyme Q-10 200 MG CAPS, Take 200 mg by mouth daily  Cranberry-Vitamin C-Vitamin E (CRANBERRY PLUS VITAMIN C) 4200-20-3 MG-MG-UNIT CAPS, Take 1 tablet by mouth daily  Cyanocobalamin (B-12 PO), Take 5,000 mcg by mouth every other day Liquid form  estradiol (ESTRACE) 0.1 MG/GM vaginal cream, INSERT 1 GRAM VAGINALLY 2 TO 3 TIMES EVERY WEEK  Lactobacillus (PROBIOTIC ACIDOPHILUS PO), Take 1 capsule by mouth daily  metoprolol succinate ER (TOPROL-XL) 100 MG 24 hr tablet, TAKE 1 AND 1/2 TABLETS(150 MG) BY MOUTH DAILY  Multiple Vitamins-Minerals (MULTIVITAMIN GUMMIES ADULT PO), Take 2 chew tab by mouth daily  omeprazole (PRILOSEC) 40 MG DR capsule, TAKE 1 CAPSULE BY MOUTH EVERY DAY 30 TO 60 MINUTES BEFORE A MEAL  sertraline (ZOLOFT) 50 MG tablet, Take 1/2 tablet (25mg) daily for 7 days, then increase to 1 tablet (50mg) daily thereafter  Sodium Chloride-Xylitol (XLEAR SINUS CARE SPRAY NA), Spray 1 spray in nostril daily  Sodium Chloride-Xylitol (XLEAR SINUS CARE SPRAY) SOLN, Spray 1 packet in nostril daily Nasal rinse    No current facility-administered medications on file prior to visit.      PAST SURGICAL HISTORY:  Past Surgical History:   Procedure Laterality Date     Blood clot removal from Stent 2011     BONE MARROW BIOPSY, BONE SPECIMEN,  NEEDLE/TROCAR N/A 2021    Procedure: bone marrow biopsy;  Surgeon: Kailey Cota MD;  Location:  GI     BYPASS GRAFT AORTOFEMORAL  2002    Dr. Turner     BYPASS GRAFT FEMOROPOPLITEAL  2011     COLONOSCOPY N/A 2018    Procedure: COMBINED COLONOSCOPY, SINGLE OR MULTIPLE BIOPSY/POLYPECTOMY BY BIOPSY;  COLONOSCOPY;  Surgeon: Efrain Hernadez MD;  Location:  GI     EMBOLECTOMY LOWER EXTREMITY  2011    Procedure:EMBOLECTOMY LOWER EXTREMITY; EMBOLECTOMY, RIGHT POPLITEAL WITH PATCH ANGIOPLASTY. EXCISION SKIN LESION RIGHT LEG. ; Surgeon:PARMINDER VELAZCO; Location: OR     EXCISE LESION LOWER EXTREMITY  2011    Procedure:EXCISE LESION LOWER EXTREMITY; Surgeon:PARMINDER VELAZCO; Location: OR     HERNIA REPAIR  11/04/2008    x2 umbilical     L achilles repair  2008     LAPAROSCOPIC OOPHORECTOMY Left     L for cyst age 26     miscarriages x 3       tubal ligation and reversal         ALLERGIES     Allergies   Allergen Reactions     Chantix [Varenicline] Nausea     Ciprofloxacin Other (See Comments)     hypertension     Clopidogrel      Other reaction(s): Hypertension     Decongestant [Cvs]      Erythromycin Nausea     Lisinopril      cough     Plavix [Clopidogrel Bisulfate]      Felt as if she was having a heart attack     Rofecoxib Unknown     Vioxx        FAMILY HISTORY  Family History   Problem Relation Age of Onset     Alzheimer Disease Mother          of panc/GI ca age 83     Osteoporosis Mother      Other Cancer Mother      Cancer Father          age 64 lymphoma     Colon Cancer Maternal Grandfather          SOCIAL HISTORY  Social History     Socioeconomic History     Marital status:      Spouse name: Not on file     Number of children: Not on file     Years of education: Not on file     Highest education level: Not on file   Occupational History     Not on file   Tobacco Use     Smoking status: Current Every Day Smoker     Packs/day:  "0.75     Years: 50.00     Pack years: 37.50     Types: Cigarettes     Smokeless tobacco: Never Used     Tobacco comment: Nicorette gum and patch, trying to quit 2021   Substance and Sexual Activity     Alcohol use: Yes     Comment: Beer once in a while     Drug use: No     Sexual activity: Not Currently     Partners: Male   Other Topics Concern     Parent/sibling w/ CABG, MI or angioplasty before 65F 55M? 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  Had 3 miscarriages.  No living children.   is [place].  Walks 2miles per day.       ROS:   General: No change in weight, sleep or appetite.  Normal energy.  No fever or chills  Eyes: Negative for vision changes or eye problems  ENT: No problems with ears, nose or throat.  No difficulty swallowing.  Resp: No coughing, wheezing or shortness of breath  CV: No chest pains or palpitations  GI: No nausea, vomiting,  heartburn, abdominal pain, diarrhea, constipation or change in bowel habits  : No urinary frequency or dysuria, bladder or kidney problems. Going for D&C next week.   Musculoskeletal: No significant muscle or joint pains  Neurologic: No headaches, numbness, tingling, weakness, problems with balance or coordination.  Psychiatric: + Anxiety.   Heme/immune/allergy: No history of bleeding or clotting problems or anemia.  No allergies or immune system problems  Endocrine: No history of thyroid disease, diabetes or other endocrine disorders.  Skin: No rashes,worrisome lesions or skin problems  Vascular:  + Claudication; no ischemic rest pain; no non-healing ulcers. No weakness, No loss of sensation.    EXAM:  /85 (BP Location: Left arm, Patient Position: Chair, Cuff Size: Adult Regular)   Pulse 57   Ht 1.651 m (5' 5\")   Wt 55.8 kg (123 lb)   SpO2 100%   BMI 20.47 kg/m    In general, the patient is a pleasant female in no apparent distress.    HEENT: NC/AT.  MITCHELL.  SCOTT.  Sclerae white, not " injected.    Neck: Carotids +2/2 bilaterally without bruits.   Heart: RRR. Normal S1, S2 splits physiologically. No murmur, rub, click, or gallop.   Lungs: CTA.  No ronchi, wheezes, rales.    Abdomen: Soft, nontender, nondistended.   Extremities: No clubbing, cyanosis, or edema. Palpable pedal pulses bilaterally. No wounds.       Labs:  LIPID RESULTS:  Lab Results   Component Value Date    CHOL 148 12/28/2021    CHOL 142 12/17/2020    HDL 55 12/28/2021    HDL 50 12/17/2020    LDL 81 12/28/2021    LDL 78 12/17/2020    TRIG 61 12/28/2021    TRIG 70 12/17/2020    CHOLHDLRATIO 2.1 11/07/2014       LIVER ENZYME RESULTS:  Lab Results   Component Value Date    AST 26 05/31/2022    AST 23 06/01/2021    ALT 33 05/31/2022    ALT 31 06/01/2021       CBC RESULTS:  Lab Results   Component Value Date    WBC 23.5 (H) 05/31/2022    WBC 17.7 (H) 06/01/2021    RBC 4.84 05/31/2022    RBC 5.01 06/01/2021    HGB 15.0 05/31/2022    HGB 15.9 (H) 06/01/2021    HCT 45.3 05/31/2022    HCT 47.2 (H) 06/01/2021    MCV 94 05/31/2022    MCV 94 06/01/2021    MCH 31.0 05/31/2022    MCH 31.7 06/01/2021    MCHC 33.1 05/31/2022    MCHC 33.7 06/01/2021    RDW 13.3 05/31/2022    RDW 13.2 06/01/2021     05/31/2022     06/01/2021       BMP RESULTS:  Lab Results   Component Value Date     05/31/2022     06/01/2021    POTASSIUM 3.8 05/31/2022    POTASSIUM 3.1 (L) 06/01/2021    CHLORIDE 99 05/31/2022    CHLORIDE 101 06/01/2021    CO2 33 (H) 05/31/2022    CO2 35 (H) 06/01/2021    ANIONGAP 5 05/31/2022    ANIONGAP 3 06/01/2021    GLC 99 05/31/2022     (H) 06/01/2021    BUN 11 05/31/2022    BUN 10 06/01/2021    CR 0.56 05/31/2022    CR 0.71 06/01/2021    GFRESTIMATED >90 05/31/2022    GFRESTIMATED >60 04/21/2022    GFRESTIMATED 87 06/01/2021    GFRESTBLACK >90 06/01/2021    CHANDAN 9.0 05/31/2022    CHANDAN 9.1 06/01/2021          Procedures:   ULTRASOUND AZALEA DOPPLER WITH EXERCISE BILATERAL September 7, 2022 1:03 PM      HISTORY: PAD  (peripheral artery disease) (H).     COMPARISON: December 12, 2011.     FINDINGS:  Right AZALEA:   PT: 126, index of 0.81, previously 0.79.  DP: 132, index of 0.85, previously 0.66.     Left AZALEA:   PT: 142, index of 0.91, previously 0.94.  DP: 134, index of 0.86, previously 0.9.      Right Digital brachial index: 85, index of 0.54.  Left Digital Brachial index: 120, index of 0.77.     Waveforms: Right femoral, popliteal arterial waveforms are  biphasic/triphasic. Right posterior tibial and dorsalis pedis  waveforms are biphasic. Moderate reduction of right first digital  waveform. The left femoral, popliteal, PT, and DP are diffusely  triphasic/biphasic. Moderate reduction of left first digital waveform.     Exercise: The patient was exercised on a treadmill at 1.5 MPH at a 10%  incline for 5 minutes total. Right buttock pain noted and increased at  3 minutes.     Right exercise AZALEA: 0.63, previously 0.91.  Left exercise AZALEA: 0.89, previously 1.2.                                                                      IMPRESSION:   1. Moderate arterial insufficiency of the right lower extremity with  minimally diminished resting AZALEA value, though moderately diminished  postexercise AZALEA value which has decreased since previous exam.  2. Minimal arterial insufficiency in the left lower extremity with  near-normal resting and postexercise AZALEA values.  3. Both digital waveforms are moderately reduced, in both first  digits.       Assessment and Plan:   1. Peripheral arterial disease with history of:   -2002 Aorto/right common iliac/left common femoral bypass by Dr. Turner  -11/2009 Right iliac artery angioplasty and stenting by Dr. Trejo  -12/2011  Embolic occlusion right distal popliteal and tibial arteries s/p thromboembolectomy by Dr. Ryan    -LDL has been elevated/not at goal of less than 70 for at least the last 4 years.   -Continues to smoke.   -No imaging since 2013 and now with increasing right buttock  claudication. Denies rest pain or non-healing wounds. AZALEA's decreased in right leg post-exercise. ? Patency of right iliac stent.     Recommendations:   -Will obtain CTA of abdomen/pelvis with bilateral leg run-off. She should then follow-up with Dr. Tran of Vascular Surgery to review imaging results and assess if any intervention is needed.   -She must stop smoking and needs good control of her cholesterol.   -Walk as much as able.       2. Hyperlipidemia    -LDL goal of less than 70 given history of vascular disease-> not at goal for at least the past 4 years.   -On Atorvastatin 40 mg daily.     Recommendations:   -Repeat a lipid panel. If LDL is still above 70, will add Zetia 10 mg daily or change to Crestor 40 mg daily.       3. Hypertension    -BP at goal.     Recommendations:   -Continue the same.       4. Ongoing tobacco use    -Unable to tolerate Chantix.   -Feels ready to quit.     Recommendations:   -Discussed the importance of complete smoking cessation.   -She would like to try Wellbutrin and nicotine patches. Prescriptions sent to her pharmacy.       5. Asymptomatic bilateral carotid stenosis    -She had <50% stenosis bilaterally in 2012. No imaging since.     Recommendations:  -Given that she is a vasculopath with ongoing tobacco use and has not had repeat imaging in 10 years, will repeat a carotid duplex to re-eval for any progression.         Kaila Navarro PA-C      60 minutes spent on the date of the encounter doing chart review, history and exam, documentation, and further activities as noted above.

## 2022-09-07 NOTE — PATIENT INSTRUCTIONS
Get a CTA of your abdomen/pelvis with leg run-off. Then see Dr. Tran after to discuss results and possible interventions needed.     2.  Stop smoking. Utilize Wellbutrin (start with taking only 1 tablet a day for the first 3-5 days, then increase to 2 tablets a day - one every 12 hours). Also use nicotine patches - start with 14 mg patches for 30 days and then drop to 7 mg patches daily.     3. Continue to walk as much as possible.     4. Get an ultrasound of your carotid arteries.     5. Call us with any questions or concerns ([phone]).

## 2022-09-07 NOTE — PROGRESS NOTES
[facility] INITIAL VASCULAR MEDICINE CONSULT      PRIMARY HEALTH CARE PROVIDER:  Liane Claudio      REFERRING HEALTH CARE PROVIDER;  Liane Claudio      REASON FOR CONSULT:  PAD with right buttock claudication      HPI: Karen Caban is a 71 year old female smoker with a history of CLL, GERD, polycythemia vera, hypertension, hyperlipidemia, and PAD. She underwent an aorto/right common iliac/left common femoral bypass in 2002 by Dr. Turner. Then in 11/2009 she underwent right iliac artery angioplasty and stenting. This was followed by embolic occlusion of the right distal popliteal and tibial arteries in 12/2011 requiring thromboembolectomy by Dr. Ryan. She had also been followed for a carotid bruit with last ultrasound in 2012 with <50% carotid stenosis bilaterally.     She has not been seen in follow-up in the vascular clinic since 2013. In the past year, she has developed increased right buttock cramping when ambulating. She also gets a numbness in her leg when walking, but this improves when bending over. She denies any rest pain or non-healing wounds. She has no complaints with her left leg.     She is presently on Aspirin (unable to tolerate Plavix), Atorvastatin 40 mg daily for hyperlipidemia, and chlorthalidone and metoprolol for hypertension. She is smoking 1/2 ppd of cigarettes but would like to quit.     PAST MEDICAL HISTORY  Past Medical History:   Diagnosis Date     Ankle fracture 2009    L     Anxiety      Chronic back pain      Chronic lymphocytic leukemia (H)      Colon polyp 2012    repeat colonoscopy 5 years.     Fx low femur epiphy-closed (H)      GERD (gastroesophageal reflux disease)      History of UTI 2017    Cysto by Dr Agustin neg     HTN (hypertension), benign      Hyperlipidemia LDL goal < 100      Normal nuclear stress test 12/2009    EF 67%     Osteopenia      PAD (peripheral artery disease) (H)     s/p fem pop bypass; embolectomy      Polycythemia vera (H) 06/15/2022     PUD (peptic ulcer disease) 1980s    DU     Smoker      Vitamin D deficiency        CURRENT MEDICATIONS  albuterol (PROAIR RESPICLICK) 108 (90 Base) MCG/ACT inhaler, Inhale 1-2 puffs into the lungs every 4 hours as needed  aspirin (ASA) 81 MG chewable tablet, Take 162 mg by mouth daily 2 times elma  atorvastatin (LIPITOR) 40 MG tablet, Take 1 tablet (40 mg) by mouth daily  chlorthalidone (HYGROTON) 25 MG tablet, Take 1 tablet (25 mg) by mouth daily  Cholecalciferol (VITAMIN D-3) 5000 UNIT TABS, Take 1 tablet by mouth daily.  clonazePAM (KLONOPIN) 0.5 MG ODT, Take 1/2-1 tab PO BID prn anxiety (Patient not taking: Reported on 8/23/2022)  coenzyme Q-10 200 MG CAPS, Take 200 mg by mouth daily  Cranberry-Vitamin C-Vitamin E (CRANBERRY PLUS VITAMIN C) 4200-20-3 MG-MG-UNIT CAPS, Take 1 tablet by mouth daily  Cyanocobalamin (B-12 PO), Take 5,000 mcg by mouth every other day Liquid form  estradiol (ESTRACE) 0.1 MG/GM vaginal cream, INSERT 1 GRAM VAGINALLY 2 TO 3 TIMES EVERY WEEK  Lactobacillus (PROBIOTIC ACIDOPHILUS PO), Take 1 capsule by mouth daily  metoprolol succinate ER (TOPROL-XL) 100 MG 24 hr tablet, TAKE 1 AND 1/2 TABLETS(150 MG) BY MOUTH DAILY  Multiple Vitamins-Minerals (MULTIVITAMIN GUMMIES ADULT PO), Take 2 chew tab by mouth daily  omeprazole (PRILOSEC) 40 MG DR capsule, TAKE 1 CAPSULE BY MOUTH EVERY DAY 30 TO 60 MINUTES BEFORE A MEAL  sertraline (ZOLOFT) 50 MG tablet, Take 1/2 tablet (25mg) daily for 7 days, then increase to 1 tablet (50mg) daily thereafter  Sodium Chloride-Xylitol (XLEAR SINUS CARE SPRAY NA), Spray 1 spray in nostril daily  Sodium Chloride-Xylitol (XLEAR SINUS CARE SPRAY) SOLN, Spray 1 packet in nostril daily Nasal rinse    No current facility-administered medications on file prior to visit.      PAST SURGICAL HISTORY:  Past Surgical History:   Procedure Laterality Date     Blood clot removal from Stent 2011     BONE MARROW BIOPSY, BONE SPECIMEN,  NEEDLE/TROCAR N/A 2021    Procedure: bone marrow biopsy;  Surgeon: Kailey Cota MD;  Location:  GI     BYPASS GRAFT AORTOFEMORAL  2002    Dr. Turner     BYPASS GRAFT FEMOROPOPLITEAL  2011     COLONOSCOPY N/A 2018    Procedure: COMBINED COLONOSCOPY, SINGLE OR MULTIPLE BIOPSY/POLYPECTOMY BY BIOPSY;  COLONOSCOPY;  Surgeon: Efrain Hernadez MD;  Location:  GI     EMBOLECTOMY LOWER EXTREMITY  2011    Procedure:EMBOLECTOMY LOWER EXTREMITY; EMBOLECTOMY, RIGHT POPLITEAL WITH PATCH ANGIOPLASTY. EXCISION SKIN LESION RIGHT LEG. ; Surgeon:PARMINDER VLEAZCO; Location: OR     EXCISE LESION LOWER EXTREMITY  2011    Procedure:EXCISE LESION LOWER EXTREMITY; Surgeon:PARMINDER VELAZCO; Location: OR     HERNIA REPAIR  11/04/2008    x2 umbilical     L achilles repair  2008     LAPAROSCOPIC OOPHORECTOMY Left     L for cyst age 26     miscarriages x 3       tubal ligation and reversal         ALLERGIES     Allergies   Allergen Reactions     Chantix [Varenicline] Nausea     Ciprofloxacin Other (See Comments)     hypertension     Clopidogrel      Other reaction(s): Hypertension     Decongestant [Cvs]      Erythromycin Nausea     Lisinopril      cough     Plavix [Clopidogrel Bisulfate]      Felt as if she was having a heart attack     Rofecoxib Unknown     Vioxx        FAMILY HISTORY  Family History   Problem Relation Age of Onset     Alzheimer Disease Mother          of panc/GI ca age 83     Osteoporosis Mother      Other Cancer Mother      Cancer Father          age 64 lymphoma     Colon Cancer Maternal Grandfather          SOCIAL HISTORY  Social History     Socioeconomic History     Marital status:      Spouse name: Not on file     Number of children: Not on file     Years of education: Not on file     Highest education level: Not on file   Occupational History     Not on file   Tobacco Use     Smoking status: Current Every Day Smoker     Packs/day:  "0.75     Years: 50.00     Pack years: 37.50     Types: Cigarettes     Smokeless tobacco: Never Used     Tobacco comment: Nicorette gum and patch, trying to quit 2021   Substance and Sexual Activity     Alcohol use: Yes     Comment: Beer once in a while     Drug use: No     Sexual activity: Not Currently     Partners: Male   Other Topics Concern     Parent/sibling w/ CABG, MI or angioplasty before 65F 55M? 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  Had 3 miscarriages.  No living children.   is Dustin.  Walks 2miles per day.       ROS:   General: No change in weight, sleep or appetite.  Normal energy.  No fever or chills  Eyes: Negative for vision changes or eye problems  ENT: No problems with ears, nose or throat.  No difficulty swallowing.  Resp: No coughing, wheezing or shortness of breath  CV: No chest pains or palpitations  GI: No nausea, vomiting,  heartburn, abdominal pain, diarrhea, constipation or change in bowel habits  : No urinary frequency or dysuria, bladder or kidney problems. Going for D&C next week.   Musculoskeletal: No significant muscle or joint pains  Neurologic: No headaches, numbness, tingling, weakness, problems with balance or coordination.  Psychiatric: + Anxiety.   Heme/immune/allergy: No history of bleeding or clotting problems or anemia.  No allergies or immune system problems  Endocrine: No history of thyroid disease, diabetes or other endocrine disorders.  Skin: No rashes,worrisome lesions or skin problems  Vascular:  + Claudication; no ischemic rest pain; no non-healing ulcers. No weakness, No loss of sensation.    EXAM:  /85 (BP Location: Left arm, Patient Position: Chair, Cuff Size: Adult Regular)   Pulse 57   Ht 1.651 m (5' 5\")   Wt 55.8 kg (123 lb)   SpO2 100%   BMI 20.47 kg/m    In general, the patient is a pleasant female in no apparent distress.    HEENT: NC/AT.  MITCHELL.  SCOTT.  Sclerae white, not " injected.    Neck: Carotids +2/2 bilaterally without bruits.   Heart: RRR. Normal S1, S2 splits physiologically. No murmur, rub, click, or gallop.   Lungs: CTA.  No ronchi, wheezes, rales.    Abdomen: Soft, nontender, nondistended.   Extremities: No clubbing, cyanosis, or edema. Palpable pedal pulses bilaterally. No wounds.       Labs:  LIPID RESULTS:  Lab Results   Component Value Date    CHOL 148 12/28/2021    CHOL 142 12/17/2020    HDL 55 12/28/2021    HDL 50 12/17/2020    LDL 81 12/28/2021    LDL 78 12/17/2020    TRIG 61 12/28/2021    TRIG 70 12/17/2020    CHOLHDLRATIO 2.1 11/07/2014       LIVER ENZYME RESULTS:  Lab Results   Component Value Date    AST 26 05/31/2022    AST 23 06/01/2021    ALT 33 05/31/2022    ALT 31 06/01/2021       CBC RESULTS:  Lab Results   Component Value Date    WBC 23.5 (H) 05/31/2022    WBC 17.7 (H) 06/01/2021    RBC 4.84 05/31/2022    RBC 5.01 06/01/2021    HGB 15.0 05/31/2022    HGB 15.9 (H) 06/01/2021    HCT 45.3 05/31/2022    HCT 47.2 (H) 06/01/2021    MCV 94 05/31/2022    MCV 94 06/01/2021    MCH 31.0 05/31/2022    MCH 31.7 06/01/2021    MCHC 33.1 05/31/2022    MCHC 33.7 06/01/2021    RDW 13.3 05/31/2022    RDW 13.2 06/01/2021     05/31/2022     06/01/2021       BMP RESULTS:  Lab Results   Component Value Date     05/31/2022     06/01/2021    POTASSIUM 3.8 05/31/2022    POTASSIUM 3.1 (L) 06/01/2021    CHLORIDE 99 05/31/2022    CHLORIDE 101 06/01/2021    CO2 33 (H) 05/31/2022    CO2 35 (H) 06/01/2021    ANIONGAP 5 05/31/2022    ANIONGAP 3 06/01/2021    GLC 99 05/31/2022     (H) 06/01/2021    BUN 11 05/31/2022    BUN 10 06/01/2021    CR 0.56 05/31/2022    CR 0.71 06/01/2021    GFRESTIMATED >90 05/31/2022    GFRESTIMATED >60 04/21/2022    GFRESTIMATED 87 06/01/2021    GFRESTBLACK >90 06/01/2021    CHANDAN 9.0 05/31/2022    CHANDAN 9.1 06/01/2021          Procedures:   ULTRASOUND AZALEA DOPPLER WITH EXERCISE BILATERAL September 7, 2022 1:03 PM      HISTORY: PAD  (peripheral artery disease) (H).     COMPARISON: December 12, 2011.     FINDINGS:  Right AZALEA:   PT: 126, index of 0.81, previously 0.79.  DP: 132, index of 0.85, previously 0.66.     Left AZALEA:   PT: 142, index of 0.91, previously 0.94.  DP: 134, index of 0.86, previously 0.9.      Right Digital brachial index: 85, index of 0.54.  Left Digital Brachial index: 120, index of 0.77.     Waveforms: Right femoral, popliteal arterial waveforms are  biphasic/triphasic. Right posterior tibial and dorsalis pedis  waveforms are biphasic. Moderate reduction of right first digital  waveform. The left femoral, popliteal, PT, and DP are diffusely  triphasic/biphasic. Moderate reduction of left first digital waveform.     Exercise: The patient was exercised on a treadmill at 1.5 MPH at a 10%  incline for 5 minutes total. Right buttock pain noted and increased at  3 minutes.     Right exercise AZALEA: 0.63, previously 0.91.  Left exercise AZALEA: 0.89, previously 1.2.                                                                      IMPRESSION:   1. Moderate arterial insufficiency of the right lower extremity with  minimally diminished resting AZALEA value, though moderately diminished  postexercise AZALEA value which has decreased since previous exam.  2. Minimal arterial insufficiency in the left lower extremity with  near-normal resting and postexercise AZALEA values.  3. Both digital waveforms are moderately reduced, in both first  digits.       Assessment and Plan:   1. Peripheral arterial disease with history of:   -2002 Aorto/right common iliac/left common femoral bypass by Dr. Turner  -11/2009 Right iliac artery angioplasty and stenting by Dr. Trejo  -12/2011  Embolic occlusion right distal popliteal and tibial arteries s/p thromboembolectomy by Dr. Ryan    -LDL has been elevated/not at goal of less than 70 for at least the last 4 years.   -Continues to smoke.   -No imaging since 2013 and now with increasing right buttock  claudication. Denies rest pain or non-healing wounds. AZALEA's decreased in right leg post-exercise. ? Patency of right iliac stent.     Recommendations:   -Will obtain CTA of abdomen/pelvis with bilateral leg run-off. She should then follow-up with Dr. Tran of Vascular Surgery to review imaging results and assess if any intervention is needed.   -She must stop smoking and needs good control of her cholesterol.   -Walk as much as able.       2. Hyperlipidemia    -LDL goal of less than 70 given history of vascular disease-> not at goal for at least the past 4 years.   -On Atorvastatin 40 mg daily.     Recommendations:   -Repeat a lipid panel. If LDL is still above 70, will add Zetia 10 mg daily or change to Crestor 40 mg daily.       3. Hypertension    -BP at goal.     Recommendations:   -Continue the same.       4. Ongoing tobacco use    -Unable to tolerate Chantix.   -Feels ready to quit.     Recommendations:   -Discussed the importance of complete smoking cessation.   -She would like to try Wellbutrin and nicotine patches. Prescriptions sent to her pharmacy.       5. Asymptomatic bilateral carotid stenosis    -She had <50% stenosis bilaterally in 2012. No imaging since.     Recommendations:  -Given that she is a vasculopath with ongoing tobacco use and has not had repeat imaging in 10 years, will repeat a carotid duplex to re-eval for any progression.         Kaila Navarro PA-C      60 minutes spent on the date of the encounter doing chart review, history and exam, documentation, and further activities as noted above.

## 2022-09-10 ENCOUNTER — NURSE TRIAGE (OUTPATIENT)
Dept: NURSING | Facility: CLINIC | Age: 72
End: 2022-09-10

## 2022-09-10 NOTE — TELEPHONE ENCOUNTER
Patient reporting she is scheduled for a D & C Thursday 9/8/22, questioning if COVID 19 testing is required prior to procedure.    Out patient procedure. Reviewed [facility] information on COVID 19 testing with patient.          Caller verbalized understanding. Denies further questions.    Marilee Schuster RN  [facility]      Reason for Disposition    Health Information question, no triage required and triager able to answer question    Additional Information    Negative: [1] Caller is not with the adult (patient) AND [2] reporting urgent symptoms    Negative: Lab result questions    Negative: Medication questions    Negative: Caller can't be reached by phone    Negative: Caller has already spoken to PCP or another triager    Negative: RN needs further essential information from caller in order to complete triage    Negative: Requesting regular office appointment    Negative: [1] Caller requesting NON-URGENT health information AND [2] PCP's office is the best resource    Protocols used: INFORMATION ONLY CALL - NO TRIAGE-A-

## 2022-09-12 ENCOUNTER — TELEPHONE (OUTPATIENT)
Dept: GASTROENTEROLOGY | Facility: CLINIC | Age: 72
End: 2022-09-12

## 2022-09-12 NOTE — TELEPHONE ENCOUNTER
Screening Questions    BlueKIND OF PREP RedLOCATION [review exclusion criteria] GreenSEDATION TYPE    Have you had a positive covid test in the last 90 days? N  If yes, what date?     Do you have a legal guardian or medical Power of ?  Are you able to give consent for your medical care? Y  SELF (Sedation review/consideration needed)    1. Are you active on mychart? Y    2. What insurance is in the chart? MEDICA & MEDICARE     3.   Ordering/Referring Provider:     4. BMI 20.3 [BMI OVER 40-EXTENDED PREP]  If greater than 40 review exclusion criteria [PAC APPT IF (MAC) @ [facility]]      5.  Respiratory Screening:  [If yes to any of the following HOSPITAL setting only]     N  Do you use daily home oxygen?   N  Do you have mod to severe Obstructive Sleep Apnea?  [(OKAY @ MG if pt is not on OXYGEN) [facility] SH PH RI]   N   Do you have Pulmonary Hypertension?    N   Do you have UNCONTROLLED asthma?         6.   N Have you had a heart or lung transplant?    _____________________________    7.   N Are you currently on dialysis? [ If yes, G-PREP & HOSPITAL setting only]     8.   N  Do you have chronic kidney disease? [ If yes, G-PREP ]    9.   N  Do you have a diagnosis of diabetes? [ If yes, G-PREP ]    10. NN On a regular basis do you go 3-5 days between bowel movements?  [ If yes, EXTENDED PREP.]  _____________________________    11.    N  Have you had a stroke or Transient ischemic attack (TIA - aka  mini stroke ) within 6 months? (If yes, please review exclusion criteria)          N In the past 6 months, have you had any heart related issues including cardiomyopathy or heart attack?           N If yes, did it require cardiac stenting or other implantable device?       12.   N  Do you have any implantable devices in your body (pacemaker, defib, LVAD)? (If yes, please review exclusion criteria)    13.   N Do you take nitroglycerin?            N If yes, how often?  (if yes, HOSPITAL setting ONLY)    14.  2 BABY ASPIRIN   Are you currently taking any blood thinners?           [IF YES, INFORM PATIENT TO FOLLOW UP W/ ORDERING PROVIDER FOR BRIDGING INSTRUCTIONS]     15.   N Do you take Phentermine?      Yes-> Hold for 7 days before procedure.  Please consult your prescribing provider if you have questions about holding this medication.      16.   [FEMALES] Are you currently pregnant?     If yes, how many weeks?   ____________________________    17.   N  Are you taking any prescription pain medications on a routine schedule?  [ If yes, EXTENDED PREP.] [If yes, MAC]    18.   N Do you have any chemical dependencies such as alcohol, street drugs, or methadone?  [If yes, MAC]    19.   N  Do you have any history of post-traumatic stress syndrome, severe anxiety or history of psychosis?  [If yes, MAC]  ____________________________    20.   Do you transfer independently? (If NO, please HOSPITAL setting  only)  Y      21.   Preferred LOCAL Pharmacy for Pre Prescription:                            [facility] - [place], [place] - [address]  [id number] - [place], [place] - [address]    Scheduling Details      Caller: Karen Caban  (Please ask for phone number if not scheduled by patient)    Type of Procedure Scheduled: Upper Endoscopy [EGD]    Which Colonoscopy Prep was Sent?:   FADI CF PATIENTS & GROEN'S PATIENTS NEEDS EXTENDED PREP    Surgeon: FRANKIE  Date of Procedure: 10/7  Location:     Sedation Type: CS  per screening   Conscious Sedation- Needs  for 6 hours after the procedure  MAC/General-Needs  for 24 hours after procedure    Pre-op Required at [facility] [place], [place] and OR for MAC sedation:  N  (advise patient they will need a pre-op WITH IN 30 DAYS prior to procedure -)      Informed patient they will need an adult  Y  Cannot take any type of public or medical transportation alone    Pre-Procedure Covid test to be completed  at [facility] or Externally/Informed of at home test option: HOME    Confirmed Nurse will call to complete assessment Y    Additional comments:

## 2022-09-12 NOTE — TELEPHONE ENCOUNTER
Please see Signifydt message and advise.   In 8/23/22 it does state consider EGD in future? Order pended if needed.     Viky MARTINES RN  [facility]

## 2022-09-13 ENCOUNTER — LAB (OUTPATIENT)
Dept: LAB | Facility: CLINIC | Age: 72
End: 2022-09-13
Payer: COMMERCIAL

## 2022-09-13 DIAGNOSIS — I73.9 PAD (PERIPHERAL ARTERY DISEASE) (H): ICD-10-CM

## 2022-09-13 DIAGNOSIS — Z78.9 TAKES DIETARY SUPPLEMENTS: ICD-10-CM

## 2022-09-13 DIAGNOSIS — E55.9 VITAMIN D DEFICIENCY: ICD-10-CM

## 2022-09-13 DIAGNOSIS — E78.5 HYPERLIPIDEMIA LDL GOAL <70: ICD-10-CM

## 2022-09-13 LAB
DEPRECATED CALCIDIOL+CALCIFEROL SERPL-MC: 65 UG/L (ref 20–75)
VIT B12 SERPL-MCNC: 676 PG/ML (ref 232–1245)

## 2022-09-13 PROCEDURE — 82306 VITAMIN D 25 HYDROXY: CPT

## 2022-09-13 PROCEDURE — 80061 LIPID PANEL: CPT

## 2022-09-13 PROCEDURE — 82607 VITAMIN B-12: CPT

## 2022-09-13 PROCEDURE — 36415 COLL VENOUS BLD VENIPUNCTURE: CPT

## 2022-09-14 DIAGNOSIS — I10 HTN (HYPERTENSION), BENIGN: ICD-10-CM

## 2022-09-14 LAB
CHOLEST SERPL-MCNC: 149 MG/DL
FASTING STATUS PATIENT QL REPORTED: YES
HDLC SERPL-MCNC: 51 MG/DL
LDLC SERPL CALC-MCNC: 83 MG/DL
NONHDLC SERPL-MCNC: 98 MG/DL
TRIGL SERPL-MCNC: 75 MG/DL

## 2022-09-15 ENCOUNTER — HOSPITAL ENCOUNTER (OUTPATIENT)
Facility: CLINIC | Age: 72
Discharge: HOME | End: 2022-09-15
Attending: OBSTETRICS & GYNECOLOGY | Admitting: OBSTETRICS & GYNECOLOGY
Payer: COMMERCIAL

## 2022-09-15 ENCOUNTER — ANESTHESIA EVENT (OUTPATIENT)
Dept: SURGERY | Facility: CLINIC | Age: 72
End: 2022-09-15
Payer: COMMERCIAL

## 2022-09-15 ENCOUNTER — ANESTHESIA (OUTPATIENT)
Dept: SURGERY | Facility: CLINIC | Age: 72
End: 2022-09-15
Payer: COMMERCIAL

## 2022-09-15 VITALS
TEMPERATURE: 96.5 F | OXYGEN SATURATION: 95 % | RESPIRATION RATE: 16 BRPM | SYSTOLIC BLOOD PRESSURE: 148 MMHG | HEIGHT: 65 IN | WEIGHT: 121 LBS | BODY MASS INDEX: 20.16 KG/M2 | DIASTOLIC BLOOD PRESSURE: 77 MMHG | HEART RATE: 52 BPM

## 2022-09-15 LAB
ABO/RH(D): NORMAL
ANTIBODY SCREEN: NEGATIVE
POTASSIUM BLD-SCNC: 3.2 MMOL/L (ref 3.4–5.3)
SPECIMEN EXPIRATION DATE: NORMAL

## 2022-09-15 PROCEDURE — 360N000076 HC SURGERY LEVEL 3, PER MIN: Performed by: OBSTETRICS & GYNECOLOGY

## 2022-09-15 PROCEDURE — 36415 COLL VENOUS BLD VENIPUNCTURE: CPT | Performed by: OBSTETRICS & GYNECOLOGY

## 2022-09-15 PROCEDURE — 250N000011 HC RX IP 250 OP 636: Performed by: OBSTETRICS & GYNECOLOGY

## 2022-09-15 PROCEDURE — 86901 BLOOD TYPING SEROLOGIC RH(D): CPT | Performed by: OBSTETRICS & GYNECOLOGY

## 2022-09-15 PROCEDURE — 250N000011 HC RX IP 250 OP 636: Performed by: NURSE ANESTHETIST, CERTIFIED REGISTERED

## 2022-09-15 PROCEDURE — 250N000009 HC RX 250: Performed by: NURSE ANESTHETIST, CERTIFIED REGISTERED

## 2022-09-15 PROCEDURE — 370N000017 HC ANESTHESIA TECHNICAL FEE, PER MIN: Performed by: OBSTETRICS & GYNECOLOGY

## 2022-09-15 PROCEDURE — 86850 RBC ANTIBODY SCREEN: CPT | Performed by: OBSTETRICS & GYNECOLOGY

## 2022-09-15 PROCEDURE — 710N000012 HC RECOVERY PHASE 2, PER MINUTE: Performed by: OBSTETRICS & GYNECOLOGY

## 2022-09-15 PROCEDURE — 999N000141 HC STATISTIC PRE-PROCEDURE NURSING ASSESSMENT: Performed by: OBSTETRICS & GYNECOLOGY

## 2022-09-15 PROCEDURE — 258N000003 HC RX IP 258 OP 636: Performed by: ANESTHESIOLOGY

## 2022-09-15 PROCEDURE — 88305 TISSUE EXAM BY PATHOLOGIST: CPT | Mod: TC | Performed by: OBSTETRICS & GYNECOLOGY

## 2022-09-15 PROCEDURE — 84132 ASSAY OF SERUM POTASSIUM: CPT | Performed by: ANESTHESIOLOGY

## 2022-09-15 PROCEDURE — 710N000009 HC RECOVERY PHASE 1, LEVEL 1, PER MIN: Performed by: OBSTETRICS & GYNECOLOGY

## 2022-09-15 PROCEDURE — 250N000025 HC SEVOFLURANE, PER MIN: Performed by: OBSTETRICS & GYNECOLOGY

## 2022-09-15 PROCEDURE — 272N000001 HC OR GENERAL SUPPLY STERILE: Performed by: OBSTETRICS & GYNECOLOGY

## 2022-09-15 RX ORDER — CEFAZOLIN SODIUM/WATER 2 G/20 ML
2 SYRINGE (ML) INTRAVENOUS SEE ADMIN INSTRUCTIONS
Status: DISCONTINUED | OUTPATIENT
Start: 2022-09-15 | End: 2022-09-15 | Stop reason: HOSPADM

## 2022-09-15 RX ORDER — SODIUM CHLORIDE, SODIUM LACTATE, POTASSIUM CHLORIDE, CALCIUM CHLORIDE 600; 310; 30; 20 MG/100ML; MG/100ML; MG/100ML; MG/100ML
INJECTION, SOLUTION INTRAVENOUS CONTINUOUS
Status: DISCONTINUED | OUTPATIENT
Start: 2022-09-15 | End: 2022-09-15 | Stop reason: HOSPADM

## 2022-09-15 RX ORDER — ACETAMINOPHEN 325 MG/1
975 TABLET ORAL ONCE
Status: DISCONTINUED | OUTPATIENT
Start: 2022-09-15 | End: 2022-09-15 | Stop reason: HOSPADM

## 2022-09-15 RX ORDER — ONDANSETRON 2 MG/ML
INJECTION INTRAMUSCULAR; INTRAVENOUS PRN
Status: DISCONTINUED | OUTPATIENT
Start: 2022-09-15 | End: 2022-09-15

## 2022-09-15 RX ORDER — LIDOCAINE HYDROCHLORIDE 20 MG/ML
INJECTION, SOLUTION INFILTRATION; PERINEURAL PRN
Status: DISCONTINUED | OUTPATIENT
Start: 2022-09-15 | End: 2022-09-15

## 2022-09-15 RX ORDER — CEFAZOLIN SODIUM/WATER 2 G/20 ML
2 SYRINGE (ML) INTRAVENOUS
Status: DISCONTINUED | OUTPATIENT
Start: 2022-09-15 | End: 2022-09-15 | Stop reason: HOSPADM

## 2022-09-15 RX ORDER — FENTANYL CITRATE 50 UG/ML
INJECTION, SOLUTION INTRAMUSCULAR; INTRAVENOUS PRN
Status: DISCONTINUED | OUTPATIENT
Start: 2022-09-15 | End: 2022-09-15

## 2022-09-15 RX ORDER — OXYCODONE HYDROCHLORIDE 5 MG/1
5 TABLET ORAL EVERY 4 HOURS PRN
Status: DISCONTINUED | OUTPATIENT
Start: 2022-09-15 | End: 2022-09-15 | Stop reason: HOSPADM

## 2022-09-15 RX ORDER — EPHEDRINE SULFATE 50 MG/ML
INJECTION, SOLUTION INTRAMUSCULAR; INTRAVENOUS; SUBCUTANEOUS PRN
Status: DISCONTINUED | OUTPATIENT
Start: 2022-09-15 | End: 2022-09-15

## 2022-09-15 RX ORDER — PROPOFOL 10 MG/ML
INJECTION, EMULSION INTRAVENOUS PRN
Status: DISCONTINUED | OUTPATIENT
Start: 2022-09-15 | End: 2022-09-15

## 2022-09-15 RX ORDER — ONDANSETRON 4 MG/1
4 TABLET, ORALLY DISINTEGRATING ORAL EVERY 30 MIN PRN
Status: DISCONTINUED | OUTPATIENT
Start: 2022-09-15 | End: 2022-09-15 | Stop reason: HOSPADM

## 2022-09-15 RX ORDER — FENTANYL CITRATE 0.05 MG/ML
25 INJECTION, SOLUTION INTRAMUSCULAR; INTRAVENOUS EVERY 5 MIN PRN
Status: DISCONTINUED | OUTPATIENT
Start: 2022-09-15 | End: 2022-09-15 | Stop reason: HOSPADM

## 2022-09-15 RX ORDER — DEXAMETHASONE SODIUM PHOSPHATE 4 MG/ML
INJECTION, SOLUTION INTRA-ARTICULAR; INTRALESIONAL; INTRAMUSCULAR; INTRAVENOUS; SOFT TISSUE PRN
Status: DISCONTINUED | OUTPATIENT
Start: 2022-09-15 | End: 2022-09-15

## 2022-09-15 RX ORDER — LIDOCAINE 40 MG/G
CREAM TOPICAL
Status: DISCONTINUED | OUTPATIENT
Start: 2022-09-15 | End: 2022-09-15 | Stop reason: HOSPADM

## 2022-09-15 RX ORDER — ONDANSETRON 2 MG/ML
4 INJECTION INTRAMUSCULAR; INTRAVENOUS EVERY 30 MIN PRN
Status: DISCONTINUED | OUTPATIENT
Start: 2022-09-15 | End: 2022-09-15 | Stop reason: HOSPADM

## 2022-09-15 RX ORDER — HYDROMORPHONE HCL IN WATER/PF 6 MG/30 ML
0.2 PATIENT CONTROLLED ANALGESIA SYRINGE INTRAVENOUS EVERY 5 MIN PRN
Status: DISCONTINUED | OUTPATIENT
Start: 2022-09-15 | End: 2022-09-15 | Stop reason: HOSPADM

## 2022-09-15 RX ADMIN — SODIUM CHLORIDE, POTASSIUM CHLORIDE, SODIUM LACTATE AND CALCIUM CHLORIDE: 600; 310; 30; 20 INJECTION, SOLUTION INTRAVENOUS at 12:15

## 2022-09-15 RX ADMIN — Medication 5 MG: at 12:23

## 2022-09-15 RX ADMIN — FENTANYL CITRATE 100 MCG: 50 INJECTION, SOLUTION INTRAMUSCULAR; INTRAVENOUS at 12:19

## 2022-09-15 RX ADMIN — MIDAZOLAM 2 MG: 1 INJECTION INTRAMUSCULAR; INTRAVENOUS at 12:15

## 2022-09-15 RX ADMIN — ONDANSETRON 4 MG: 2 INJECTION INTRAMUSCULAR; INTRAVENOUS at 12:28

## 2022-09-15 RX ADMIN — LIDOCAINE HYDROCHLORIDE 100 MG: 20 INJECTION, SOLUTION INFILTRATION; PERINEURAL at 12:19

## 2022-09-15 RX ADMIN — DEXAMETHASONE SODIUM PHOSPHATE 4 MG: 4 INJECTION, SOLUTION INTRA-ARTICULAR; INTRALESIONAL; INTRAMUSCULAR; INTRAVENOUS; SOFT TISSUE at 12:28

## 2022-09-15 RX ADMIN — PROPOFOL 200 MG: 10 INJECTION, EMULSION INTRAVENOUS at 12:19

## 2022-09-15 RX ADMIN — Medication 2 G: at 12:23

## 2022-09-15 RX ADMIN — Medication 5 MG: at 12:33

## 2022-09-15 ASSESSMENT — LIFESTYLE VARIABLES: TOBACCO_USE: 1

## 2022-09-15 ASSESSMENT — ACTIVITIES OF DAILY LIVING (ADL)
ADLS_ACUITY_SCORE: 37
ADLS_ACUITY_SCORE: 37

## 2022-09-15 NOTE — ANESTHESIA CARE TRANSFER NOTE
Patient: Karen Caban    Procedure: Procedure(s):  HYSTEROSCOPY, WITH DILATION AND CURETTAGE OF UTERUS       Diagnosis: Abnormal uterine bleeding [N93.9]  Diagnosis Additional Information: No value filed.    Anesthesia Type:   General     Note:    Oropharynx: oropharynx clear of all foreign objects and spontaneously breathing  Level of Consciousness: awake  Oxygen Supplementation: face mask  Level of Supplemental Oxygen (L/min / FiO2): 6  Independent Airway: airway patency satisfactory and stable  Dentition: dentition unchanged  Vital Signs Stable: post-procedure vital signs reviewed and stable  Report to RN Given: handoff report given  Patient transferred to: PACU  Comments: At end of procedure, spontaneous respirations, adequate tidal volumes, followed commands to voice, LMA removed atraumatically, oropharynx suctioned, airway patent after LMA removal. Oxygen via facemask at 6 liters per minute to PACU. Oxygen tubing connected to wall O2 in PACU, SpO2, NiBP, and EKG monitors and alarms on and functioning, report on patient's clinical status given to PACU RN, RN questions answered.  Handoff Report: Identifed the Patient, Identified the Reponsible Provider, Reviewed the pertinent medical history, Discussed the surgical course, Reviewed Intra-OP anesthesia mangement and issues during anesthesia, Set expectations for post-procedure period and Allowed opportunity for questions and acknowledgement of understanding      Vitals:  Vitals Value Taken Time   BP     Temp     Pulse 56 09/15/22 1307   Resp 39 09/15/22 1307   SpO2 95 % 09/15/22 1307   Vitals shown include unvalidated device data.    Electronically Signed By: KWESI Pino CRNA  September 15, 2022  1:08 PM

## 2022-09-15 NOTE — ANESTHESIA PREPROCEDURE EVALUATION
Prior to Admission medications    Medication Sig Start Date End Date Taking? Authorizing Provider   albuterol (PROAIR RESPICLICK) 108 (90 Base) MCG/ACT inhaler Inhale 1-2 puffs into the lungs every 4 hours as needed 12/22/20  Yes Lyudmila Escobar PA-C   aspirin (ASA) 81 MG chewable tablet Take 162 mg by mouth daily 2 times elma 3/22/20  Yes Reported, Patient   atorvastatin (LIPITOR) 40 MG tablet Take 1 tablet (40 mg) by mouth daily 3/16/22  Yes Lyudmila Escobar PA-C   chlorthalidone (HYGROTON) 25 MG tablet Take 1 tablet (25 mg) by mouth daily 6/23/22  Yes Lyudmila Escobar PA-C   metoprolol succinate ER (TOPROL-XL) 100 MG 24 hr tablet TAKE 1 AND 1/2 TABLETS(150 MG) BY MOUTH DAILY 1/4/22  Yes Lyudmila Escobar PA-C   omeprazole (PRILOSEC) 40 MG DR capsule TAKE 1 CAPSULE BY MOUTH EVERY DAY 30 TO 60 MINUTES BEFORE A MEAL 1/4/22  Yes Lyudmila Escobar PA-C   buPROPion (ZYBAN) 150 MG 12 hr tablet Take 1 tablet (150 mg) by mouth 2 times daily 9/7/22   Kaila Navarro PA-C   Cholecalciferol (VITAMIN D-3) 5000 UNIT TABS Take 1 tablet by mouth daily.    Reported, Patient   coenzyme Q-10 200 MG CAPS Take 200 mg by mouth daily    Reported, Patient   Cranberry-Vitamin C-Vitamin E (CRANBERRY PLUS VITAMIN C) 4200-20-3 MG-MG-UNIT CAPS Take 1 tablet by mouth daily    Reported, Patient   Cyanocobalamin (B-12 PO) Take 5,000 mcg by mouth every other day Liquid form 3/20/18   Lyudmila Escobar PA-C   estradiol (ESTRACE) 0.1 MG/GM vaginal cream INSERT 1 GRAM VAGINALLY 2 TO 3 TIMES EVERY WEEK 12/7/21   Lyudmila Escobar PA-C   Lactobacillus (PROBIOTIC ACIDOPHILUS PO) Take 1 capsule by mouth daily    Reported, Patient   Multiple Vitamins-Minerals (MULTIVITAMIN GUMMIES ADULT PO) Take 2 chew tab by mouth daily    Reported, Patient   nicotine (NICODERM CQ) 14 MG/24HR 24 hr patch Place 1 patch onto the skin every 24 hours 9/7/22   Kaila Navarro PA-C   nicotine (NICODERM CQ) 7 MG/24HR 24 hr patch Place 1 patch onto the skin every  24 hours 9/7/22   Kaila Navarro PA-C   Sodium Chloride-Xylitol (XLEAR SINUS CARE SPRAY NA) Spray 1 spray in nostril daily    Reported, Patient   Sodium Chloride-Xylitol (XLEAR SINUS CARE SPRAY) SOLN Spray 1 packet in nostril daily Nasal rinse    Reported, Patient     Current Facility-Administered Medications Ordered in Epic   Medication Dose Route Frequency Last Rate Last Admin     ceFAZolin Sodium (ANCEF) injection 2 g  2 g Intravenous Pre-Op/Pre-procedure x 1 dose         ceFAZolin Sodium (ANCEF) injection 2 g  2 g Intravenous See Admin Instructions         lactated ringers infusion   Intravenous Continuous         lidocaine (LMX4) cream   Topical Q1H PRN         lidocaine 1 % 0.1-1 mL  0.1-1 mL Other Q1H PRN         sodium chloride (PF) 0.9% PF flush 3 mL  3 mL Intracatheter Q8H         sodium chloride (PF) 0.9% PF flush 3 mL  3 mL Intracatheter q1 min prn         No current [facility]-ordered outpatient medications on file.       lactated ringers       No results for input(s): ABO, RH in the last 85959 hours.  No results for input(s): HCG in the last 85766 hours.  Recent Results (from the past 744 hour(s))   MR Pancreas wo & w Contrast    Narrative    EXAM: MR PANCREAS W/O and W CONTRAST  LOCATION: [facility]  DATE/TIME: 9/2/2022 3:33 PM    INDICATION: Pancreatic cyst.  COMPARISON: CT abdomen and pelvis 04/21/2022.  TECHNIQUE: Routine MRI pancreas protocol including T1 in/out phase, diffusion, multiplane T2. Dynamic T1 post IV contrast. MRCP images and coronal 3-D thin section MRCP images were acquired on the scanner through the biliary tree. 3-D image reformatting   was performed on the acquisition scanner.  CONTRAST: 6mL Gadavist.    FINDINGS:    PANCREAS: Stable size of a cystic pancreatic lesion at the pancreas neck measuring 1.1 x 0.8 x 0.6 cm, series 4 image 24. After administration of contrast, no worrisome enhancement is seen. MRCP shows no evidence for pancreas duct dilatation.  This lesion   could communicate with a pancreas duct side branch. MRCP also shows no evidence for biliary ductal dilatation or choledocholithiasis. An additional tiny cyst is likely stable without suspicious enhancement that is 0.2 x 0.2 cm at the pancreas tail   series 4 image 29. No new pancreas lesion identified. No inflammatory change identified.    ADDITIONAL FINDINGS: Included imaging of the liver, gallbladder, spleen, adrenals, and kidneys do not show any significant abnormalities a few incidental small left renal cyst without specific imaging follow-up recommended.      Impression    IMPRESSION:  1.  Small pancreatic cystic lesions are stable since 02/03/2021. See below for follow-up imaging guidelines as clinically indicated.    REFERENCE:  Revisions of international consensus [place] guidelines for the management of IPMN of the pancreas. Pancreatology 2017;17(5):738-753.    Largest cyst between 10-20 mm: CT or MRI/MRCP every 6 months for 1 year and then yearly for 2 years and then every 2 years if no change.      US AZALEA Doppler with Exercise Bilateral    Narrative    ULTRASOUND AZALEA DOPPLER WITH EXERCISE BILATERAL September 7, 2022 1:03  PM     HISTORY: PAD (peripheral artery disease) (H).    COMPARISON: December 12, 2011.    FINDINGS:  Right AZALEA:   PT: 126, index of 0.81, previously 0.79.  DP: 132, index of 0.85, previously 0.66.    Left AZALEA:   PT: 142, index of 0.91, previously 0.94.  DP: 134, index of 0.86, previously 0.9.     Right Digital brachial index: 85, index of 0.54.  Left Digital Brachial index: 120, index of 0.77.    Waveforms: Right femoral, popliteal arterial waveforms are  biphasic/triphasic. Right posterior tibial and dorsalis pedis  waveforms are biphasic. Moderate reduction of right first digital  waveform. The left femoral, popliteal, PT, and DP are diffusely  triphasic/biphasic. Moderate reduction of left first digital waveform.    Exercise: The patient was exercised on a treadmill at  1.5 MPH at a 10%  incline for 5 minutes total. Right buttock pain noted and increased at  3 minutes.    Right exercise AZALEA: 0.63, previously 0.91.  Left exercise AZALEA: 0.89, previously 1.2.      Impression    IMPRESSION:   1. Moderate arterial insufficiency of the right lower extremity with  minimally diminished resting AZALEA value, though moderately diminished  postexercise AZALEA value which has decreased since previous exam.  2. Minimal arterial insufficiency in the left lower extremity with  near-normal resting and postexercise AZALEA values.  3. Both digital waveforms are moderately reduced, in both first  digits.    AZALEA CRITERIA:  >1.4 NC  0.95-1.4 Normal  0.90 - 0.94 Mild  0.5 - 0.89 Moderate  0.2 - 0.49 Severe  <0.2 Critical    AZEB NEWSOME MD         SYSTEM ID:  [id number]   Anesthesia Pre-Procedure Evaluation    Patient: Karen Caban   MRN: [id number] : 1950        Procedure : Procedure(s):  HYSTEROSCOPY, WITH DILATION AND CURETTAGE OF UTERUS          Past Medical History:   Diagnosis Date     Ankle fracture     L     Anxiety      Chronic back pain      Chronic lymphocytic leukemia (H)      Colon polyp 2012    repeat colonoscopy 5 years.     Fx low femur epiphy-closed (H)      GERD (gastroesophageal reflux disease)      History of UTI     Cysto by Dr Agustin neg     HTN (hypertension), benign      Hyperlipidemia LDL goal < 100      Normal nuclear stress test 2009    EF 67%     Osteopenia      PAD (peripheral artery disease) (H)     s/p fem pop bypass; embolectomy     Polycythemia vera (H) 06/15/2022     PONV (postoperative nausea and vomiting)      PUD (peptic ulcer disease)     DU     Smoker      Vitamin D deficiency       Past Surgical History:   Procedure Laterality Date     Blood clot removal from Stent           BONE MARROW BIOPSY, BONE SPECIMEN, NEEDLE/TROCAR N/A 2021    Procedure: bone marrow biopsy;  Surgeon: Kailey Cota MD;  Location:  GI     BYPASS  GRAFT AORTOFEMORAL  05/2002    Dr. Turner     BYPASS GRAFT FEMOROPOPLITEAL  12/16/2011     COLONOSCOPY N/A 01/18/2018    Procedure: COMBINED COLONOSCOPY, SINGLE OR MULTIPLE BIOPSY/POLYPECTOMY BY BIOPSY;  COLONOSCOPY;  Surgeon: Efrain Hernadez MD;  Location:  GI     EMBOLECTOMY LOWER EXTREMITY  12/16/2011    Procedure:EMBOLECTOMY LOWER EXTREMITY; EMBOLECTOMY, RIGHT POPLITEAL WITH PATCH ANGIOPLASTY. EXCISION SKIN LESION RIGHT LEG. ; Surgeon:PARMINDER VELAZCO; Location: OR     EXCISE LESION LOWER EXTREMITY  12/16/2011    Procedure:EXCISE LESION LOWER EXTREMITY; Surgeon:PARMINDER VELAZCO; Location:[facility] OR     HERNIA REPAIR  11/04/2008    x2 umbilical     L achilles repair  12/04/2008     LAPAROSCOPIC OOPHORECTOMY Left     L for cyst age 26     miscarriages x 3       tubal ligation and reversal        Allergies   Allergen Reactions     Chantix [Varenicline] Nausea     Ciprofloxacin Other (See Comments)     hypertension     Clopidogrel      Other reaction(s): Hypertension     Decongestant [Cvs]      Erythromycin Nausea     Lisinopril      cough     Plavix [Clopidogrel Bisulfate]      Felt as if she was having a heart attack     Rofecoxib Unknown     Vioxx      Increased BP      Social History     Tobacco Use     Smoking status: Current Every Day Smoker     Packs/day: 0.75     Years: 50.00     Pack years: 37.50     Types: Cigarettes     Smokeless tobacco: Never Used     Tobacco comment: Nicorette gum and patch, trying to quit 2021   Substance Use Topics     Alcohol use: Yes     Comment: Beer once in a while      Wt Readings from Last 1 Encounters:   09/15/22 54.9 kg (121 lb)        Anesthesia Evaluation   Pt has had prior anesthetic.     History of anesthetic complications  - PONV.      ROS/MED HX  ENT/Pulmonary:     (+) tobacco use, Current use,     Neurologic:       Cardiovascular:     (+) Dyslipidemia hypertension-Peripheral Vascular Disease----    METS/Exercise Tolerance:     Hematologic:        Musculoskeletal:       GI/Hepatic:     (+) GERD, Asymptomatic on medication,     Renal/Genitourinary:       Endo:       Psychiatric/Substance Use:       Infectious Disease:       Malignancy:       Other:            Physical Exam    Airway        Mallampati: I    Neck ROM: full     Respiratory Devices and Support         Dental       (+) upper dentures and lower dentures      Cardiovascular   cardiovascular exam normal          Pulmonary   pulmonary exam normal        (+) decreased breath sounds           OUTSIDE LABS:  CBC:   Lab Results   Component Value Date    WBC 23.5 (H) 05/31/2022    WBC 23.8 (H) 04/20/2022    HGB 15.0 05/31/2022    HGB 15.6 04/20/2022    HCT 45.3 05/31/2022    HCT 46.7 04/20/2022     05/31/2022     04/20/2022     BMP:   Lab Results   Component Value Date     05/31/2022     04/20/2022    POTASSIUM 3.2 (L) 09/15/2022    POTASSIUM 3.8 05/31/2022    CHLORIDE 99 05/31/2022    CHLORIDE 102 04/20/2022    CO2 33 (H) 05/31/2022    CO2 29 04/20/2022    BUN 11 05/31/2022    BUN 11 04/20/2022    CR 0.56 05/31/2022    CR 0.7 04/21/2022    GLC 99 05/31/2022    GLC 87 04/20/2022     COAGS:   Lab Results   Component Value Date    PTT 30 12/10/2012    INR 0.99 12/10/2012     POC:   Lab Results   Component Value Date    BGM 82 12/17/2011     HEPATIC:   Lab Results   Component Value Date    ALBUMIN 3.6 05/31/2022    PROTTOTAL 6.5 (L) 05/31/2022    ALT 33 05/31/2022    AST 26 05/31/2022    ALKPHOS 63 05/31/2022    BILITOTAL 0.6 05/31/2022     OTHER:   Lab Results   Component Value Date    A1C 5.2 12/16/2011    CHANDAN 9.0 05/31/2022    PHOS 4.2 10/23/2012    TSH 1.38 12/08/2017       Anesthesia Plan    ASA Status:  3   NPO Status:  NPO Appropriate    Anesthesia Type: General.     - Airway: LMA   Induction: Intravenous.   Maintenance: Balanced.        Consents    Anesthesia Plan(s) and associated risks, benefits, and realistic alternatives discussed. Questions answered and  patient/representative(s) expressed understanding.    - Discussed:     - Discussed with:  Patient         Postoperative Care    Pain management: IV analgesics.   PONV prophylaxis: Ondansetron (or other 5HT-3)     Comments:                Hayden Mendez MD

## 2022-09-15 NOTE — OR NURSING
Patient brought a picture of home covid antigen test on phone as directed.  I personally saw this result and it was time stamped 9/13/22.  Result was neg.   Patient brought in by ems due to fever. Per ems, no exposure to COVID. Patient is alert x2. Patient has dementia.

## 2022-09-15 NOTE — ANESTHESIA PROCEDURE NOTES
Airway       Patient location during procedure: OR  Staff -        Anesthesiologist:  Hayden Mendez MD       CRNA: Maddie Campbell APRN CRNA       Performed By: CRNA  Consent for Airway        Urgency: elective  Indications and Patient Condition       Indications for airway management: alber-procedural       Induction type:intravenous       Mask difficulty assessment: 0 - not attempted    Final Airway Details       Final airway type: supraglottic airway    Supraglottic Airway Details        Type: LMA       Brand: Ambu AuraGain       LMA size: 4    Post intubation assessment        Placement verified by: capnometry and chest rise        Number of attempts at approach: 1       Secured with: plastic tape       Ease of procedure: easy       Dentition: Intact and Unchanged

## 2022-09-15 NOTE — ANESTHESIA POSTPROCEDURE EVALUATION
Patient: Karen Caban    Procedure: Procedure(s):  HYSTEROSCOPY, WITH DILATION AND CURETTAGE OF UTERUS       Anesthesia Type:  General    Note:  Disposition: Outpatient   Postop Pain Control: Uneventful            Sign Out: Well controlled pain   PONV: No   Neuro/Psych: Uneventful            Sign Out: Acceptable/Baseline neuro status   Airway/Respiratory: Uneventful            Sign Out: Acceptable/Baseline resp. status   CV/Hemodynamics: Uneventful            Sign Out: Acceptable CV status; No obvious hypovolemia; No obvious fluid overload   Other NRE: NONE   DID A NON-ROUTINE EVENT OCCUR? No           Last vitals:  Vitals Value Taken Time   /91 09/15/22 1400   Temp     Pulse 56 09/15/22 1411   Resp 19 09/15/22 1411   SpO2 91 % 09/15/22 1411   Vitals shown include unvalidated device data.    Electronically Signed By: Hayden Mendez MD  September 15, 2022  3:06 PM

## 2022-09-15 NOTE — BRIEF OP NOTE
[facility]    Brief Operative Note    Pre-operative diagnosis: Abnormal uterine bleeding [N93.9]  Post-operative diagnosis Same as pre-operative diagnosis    Procedure: Procedure(s):  HYSTEROSCOPY, WITH DILATION AND CURETTAGE OF UTERUS  Surgeon: Surgeon(s) and Role:     * Destiney Schaefer MD - Primary  Anesthesia: General   Estimated Blood Loss: 1cc    Drains: None  Specimens:   ID Type Source Tests Collected by Time Destination   1 :  Tissue Endometrium SURGICAL PATHOLOGY EXAM Destiney Schaefer MD 9/15/2022 12:39 PM      Findings:   normal appering cervix. vulva with mild dysplasia. endometrium with posterior fundal polyp that was removed. otherwsie normal. .  Complications: None.  Implants: * No implants in log *      .Manuel Santos MD  OB/GYN PGY-3  09/15/2022 12:59 PM

## 2022-09-15 NOTE — OP NOTE
Procedure Date: 09/15/2022    PREOPERATIVE DIAGNOSES:   Thickened endometrial stripe.    POSTOPERATIVE DIAGNOSIS:  Thickened endometrial stripe.    PROCEDURE PERFORMED:  Hysteroscopy with dilation and curettage and polypectomy.    SURGEON:  Destiney Schaefer MD    ASSISTANT:  Manuel Santos MD    ANESTHESIA:  General.    ESTIMATED BLOOD LOSS:  1 mL    COMPLICATIONS:  None.    SPECIMENS REMOVED:  Endometrial polyp.    INDICATIONS FOR PROCEDURE:  Ms. Karen Caban is a 71-year-old woman with a history of thickened endometrial stripe on recent imaging.  Biopsy was suggestive of an endometrial polyp; however, the thickening persisted.  She, therefore, presented to the hospital for further surgical diagnosis and management.  Notably, she also has a benign-appearing pelvic mass, which was not removed.    DESCRIPTION OF PROCEDURE:  After informed consent, the patient was brought to the operating room where general anesthesia was administered.  She was prepped and draped in the dorsal lithotomy position.  We drained her bladder for 100 mL of clear urine.  A surgical timeout was performed, ensuring correct patient and procedure.  She was given 2 grams of Ancef for preoperative prophylactic antibiotics and SCDs were placed on her lower extremities.    I started by placing a speculum in her vagina.  The cervix was well visualized.  The anterior cervix was grasped with a single tooth tenaculum and was gently dilated to facilitate placement of the hysteroscope.  The hysteroscope was then inserted and the endometrial cavity was notable for a posterior polyp, which was filling the cavity.  It was benign appearing. After visualizing these findings, I then performed a polypectomy.  I used the polyp forceps and I grasped the polyp along the posterior wall of the uterus and I removed the polyp without complication.  I then performed sharp curettage to ensure that all the polyp was removed.  I then looked again with the hysteroscope to  ensure that the entire polyp was removed.  The fluid deficit was minimal.  The patient tolerated the procedure well.  I removed the tenaculum from the anterior lip of the cervix and I removed the speculum.  The patient was then awoken from anesthesia and brought to recovery room in stable condition.    Destiney Schaefer MD        D: 09/15/2022   T: 09/15/2022   MT: [id number]    Name:     JANNETTE FRANKEL  MRN:      [id number]        Account:        [id number]   :      1950           Procedure Date: 09/15/2022     Document: [id number]

## 2022-09-16 PROCEDURE — 88305 TISSUE EXAM BY PATHOLOGIST: CPT | Mod: 26 | Performed by: PATHOLOGY

## 2022-09-19 ENCOUNTER — TELEPHONE (OUTPATIENT)
Dept: ONCOLOGY | Facility: CLINIC | Age: 72
End: 2022-09-19

## 2022-09-19 RX ORDER — CHLORTHALIDONE 25 MG/1
TABLET ORAL
Qty: 90 TABLET | Refills: 0 | Status: SHIPPED | OUTPATIENT
Start: 2022-09-19 | End: 2022-12-29

## 2022-09-19 NOTE — TELEPHONE ENCOUNTER
Called to review benign pathology   Left message.   followup as scheduled, ok for virtual postop    Destiney Schaefer MD  Gynecologic Oncology  Pager [phone]

## 2022-10-04 ENCOUNTER — APPOINTMENT (OUTPATIENT)
Dept: GENERAL RADIOLOGY | Facility: CLINIC | Age: 72
End: 2022-10-04
Attending: EMERGENCY MEDICINE
Payer: COMMERCIAL

## 2022-10-04 ENCOUNTER — HOSPITAL ENCOUNTER (EMERGENCY)
Facility: CLINIC | Age: 72
Discharge: HOME | End: 2022-10-05
Attending: EMERGENCY MEDICINE | Admitting: EMERGENCY MEDICINE
Payer: COMMERCIAL

## 2022-10-04 DIAGNOSIS — R07.9 CHEST PAIN, UNSPECIFIED TYPE: ICD-10-CM

## 2022-10-04 DIAGNOSIS — K21.9 GASTROESOPHAGEAL REFLUX DISEASE, UNSPECIFIED WHETHER ESOPHAGITIS PRESENT: ICD-10-CM

## 2022-10-04 LAB
ANION GAP SERPL CALCULATED.3IONS-SCNC: 9 MMOL/L (ref 3–14)
ATRIAL RATE - MUSE: 65 BPM
BASOPHILS # BLD MANUAL: 0 10E3/UL (ref 0–0.2)
BASOPHILS NFR BLD MANUAL: 0 %
BUN SERPL-MCNC: 10 MG/DL (ref 7–30)
CALCIUM SERPL-MCNC: 9.2 MG/DL (ref 8.5–10.1)
CHLORIDE BLD-SCNC: 103 MMOL/L (ref 94–109)
CO2 SERPL-SCNC: 25 MMOL/L (ref 20–32)
CREAT SERPL-MCNC: 0.57 MG/DL (ref 0.52–1.04)
DIASTOLIC BLOOD PRESSURE - MUSE: NORMAL MMHG
EOSINOPHIL # BLD MANUAL: 0.3 10E3/UL (ref 0–0.7)
EOSINOPHIL NFR BLD MANUAL: 1 %
ERYTHROCYTE [DISTWIDTH] IN BLOOD BY AUTOMATED COUNT: 14.1 % (ref 10–15)
GFR SERPL CREATININE-BSD FRML MDRD: >90 ML/MIN/1.73M2
GLUCOSE BLD-MCNC: 123 MG/DL (ref 70–99)
HCT VFR BLD AUTO: 47.1 % (ref 35–47)
HGB BLD-MCNC: 15.5 G/DL (ref 11.7–15.7)
HOLD SPECIMEN: NORMAL
INTERPRETATION ECG - MUSE: NORMAL
LYMPHOCYTES # BLD MANUAL: 22 10E3/UL (ref 0.8–5.3)
LYMPHOCYTES NFR BLD MANUAL: 81 %
MCH RBC QN AUTO: 31 PG (ref 26.5–33)
MCHC RBC AUTO-ENTMCNC: 32.9 G/DL (ref 31.5–36.5)
MCV RBC AUTO: 94 FL (ref 78–100)
MONOCYTES # BLD MANUAL: 0.5 10E3/UL (ref 0–1.3)
MONOCYTES NFR BLD MANUAL: 2 %
NEUTROPHILS # BLD MANUAL: 4.3 10E3/UL (ref 1.6–8.3)
NEUTROPHILS NFR BLD MANUAL: 16 %
P AXIS - MUSE: 57 DEGREES
PLAT MORPH BLD: ABNORMAL
PLATELET # BLD AUTO: 171 10E3/UL (ref 150–450)
POTASSIUM BLD-SCNC: 3.1 MMOL/L (ref 3.4–5.3)
PR INTERVAL - MUSE: 166 MS
QRS DURATION - MUSE: 82 MS
QT - MUSE: 412 MS
QTC - MUSE: 428 MS
R AXIS - MUSE: -38 DEGREES
RBC # BLD AUTO: 5 10E6/UL (ref 3.8–5.2)
RBC MORPH BLD: ABNORMAL
SMUDGE CELLS BLD QL SMEAR: PRESENT
SODIUM SERPL-SCNC: 137 MMOL/L (ref 133–144)
SYSTOLIC BLOOD PRESSURE - MUSE: NORMAL MMHG
T AXIS - MUSE: 80 DEGREES
TROPONIN I SERPL HS-MCNC: 5 NG/L
TROPONIN I SERPL HS-MCNC: 6 NG/L
VENTRICULAR RATE- MUSE: 65 BPM
WBC # BLD AUTO: 27.1 10E3/UL (ref 4–11)

## 2022-10-04 PROCEDURE — 36415 COLL VENOUS BLD VENIPUNCTURE: CPT | Performed by: EMERGENCY MEDICINE

## 2022-10-04 PROCEDURE — 84484 ASSAY OF TROPONIN QUANT: CPT | Mod: 91 | Performed by: EMERGENCY MEDICINE

## 2022-10-04 PROCEDURE — 85007 BL SMEAR W/DIFF WBC COUNT: CPT | Performed by: EMERGENCY MEDICINE

## 2022-10-04 PROCEDURE — 85027 COMPLETE CBC AUTOMATED: CPT | Performed by: EMERGENCY MEDICINE

## 2022-10-04 PROCEDURE — 250N000009 HC RX 250: Performed by: EMERGENCY MEDICINE

## 2022-10-04 PROCEDURE — 85014 HEMATOCRIT: CPT | Performed by: EMERGENCY MEDICINE

## 2022-10-04 PROCEDURE — 99285 EMERGENCY DEPT VISIT HI MDM: CPT | Mod: 25

## 2022-10-04 PROCEDURE — 93005 ELECTROCARDIOGRAM TRACING: CPT

## 2022-10-04 PROCEDURE — 82310 ASSAY OF CALCIUM: CPT | Performed by: EMERGENCY MEDICINE

## 2022-10-04 PROCEDURE — 80048 BASIC METABOLIC PNL TOTAL CA: CPT | Performed by: EMERGENCY MEDICINE

## 2022-10-04 PROCEDURE — 71046 X-RAY EXAM CHEST 2 VIEWS: CPT

## 2022-10-04 PROCEDURE — 250N000013 HC RX MED GY IP 250 OP 250 PS 637: Performed by: EMERGENCY MEDICINE

## 2022-10-04 PROCEDURE — 84484 ASSAY OF TROPONIN QUANT: CPT | Performed by: EMERGENCY MEDICINE

## 2022-10-04 RX ADMIN — LIDOCAINE HYDROCHLORIDE 30 ML: 20 SOLUTION ORAL; TOPICAL at 15:48

## 2022-10-04 NOTE — ED NOTES
Rapid Assessment Note    History:   Karen Caban is a 71 year old female who presents with 4/10 heart burn since yesterday which has been improving. She has frequent heart burn but this is worse than is normal. She also notes nausea but denies fever, cough, leg swelling, and history of heart disease. Patient has history of tobacco use and had a negative covid test.     Exam:   General:  Alert, interactive  Cardiovascular:  Well perfused  Lungs:  No respiratory distress, no accessory muscle use  Neuro:  Moving all 4 extremities  Skin:  Warm, dry  Psych:  Normal affect      Plan of Care:   I evaluated the patient and developed an initial plan of care. I discussed this plan and explained that I, or one of my partners, would be returning to complete the evaluation.     Presents with heartburn.  Rule out ACS.  EKG, labs x-ray ordered.  Patient provided GI cocktail.  EKG without evidence of STEMI or significant change from previous.    10/4/2022  [employer]    Portions of this medical record were completed by a scribe. UPON MY REVIEW AND AUTHENTICATION BY ELECTRONIC SIGNATURE, this confirms (a) I performed the applicable clinical services, and (b) the record is accurate.        Lalo Joya, DO  10/04/22 1547

## 2022-10-04 NOTE — ED TRIAGE NOTES
Pt here today for 2 days of CP. Started yesterday. Having diaphoresis, heart burn, and BP elevated. Pt denies cardiac hx besides hypertension. Pt A&Ox4     Triage Assessment     Row Name 10/04/22 1523       Triage Assessment (Adult)    Airway WDL WDL       Respiratory WDL    Respiratory WDL WDL       Skin Circulation/Temperature WDL    Skin Circulation/Temperature WDL WDL       Cardiac WDL    Cardiac WDL X       Chest Pain Assessment    Chest Pain Location midsternal    Character heart burn    Associated Signs/Symptoms diaphoresis;hypertension;nausea       Peripheral/Neurovascular WDL    Peripheral Neurovascular WDL WDL       Cognitive/Neuro/Behavioral WDL    Cognitive/Neuro/Behavioral WDL WDL

## 2022-10-05 ENCOUNTER — VIRTUAL VISIT (OUTPATIENT)
Dept: ONCOLOGY | Facility: CLINIC | Age: 72
End: 2022-10-05
Attending: OBSTETRICS & GYNECOLOGY
Payer: COMMERCIAL

## 2022-10-05 ENCOUNTER — HOSPITAL ENCOUNTER (OUTPATIENT)
Facility: CLINIC | Age: 72
Setting detail: OBSERVATION
Discharge: HOME | End: 2022-10-06
Attending: EMERGENCY MEDICINE | Admitting: INTERNAL MEDICINE
Payer: COMMERCIAL

## 2022-10-05 VITALS
RESPIRATION RATE: 16 BRPM | DIASTOLIC BLOOD PRESSURE: 90 MMHG | WEIGHT: 120 LBS | OXYGEN SATURATION: 97 % | SYSTOLIC BLOOD PRESSURE: 143 MMHG | HEIGHT: 65 IN | TEMPERATURE: 98.1 F | BODY MASS INDEX: 19.99 KG/M2 | HEART RATE: 54 BPM

## 2022-10-05 DIAGNOSIS — I73.9 PAD (PERIPHERAL ARTERY DISEASE) (H): ICD-10-CM

## 2022-10-05 DIAGNOSIS — Z72.0 TOBACCO ABUSE: ICD-10-CM

## 2022-10-05 DIAGNOSIS — R07.9 CHEST PAIN, UNSPECIFIED TYPE: ICD-10-CM

## 2022-10-05 DIAGNOSIS — R93.89 THICKENED ENDOMETRIUM: Primary | ICD-10-CM

## 2022-10-05 DIAGNOSIS — K21.9 GASTROESOPHAGEAL REFLUX DISEASE WITHOUT ESOPHAGITIS: ICD-10-CM

## 2022-10-05 LAB
ALBUMIN SERPL-MCNC: 3.8 G/DL (ref 3.4–5)
ALP SERPL-CCNC: 79 U/L (ref 40–150)
ALT SERPL W P-5'-P-CCNC: 34 U/L (ref 0–50)
ANION GAP SERPL CALCULATED.3IONS-SCNC: 6 MMOL/L (ref 3–14)
AST SERPL W P-5'-P-CCNC: 31 U/L (ref 0–45)
ATRIAL RATE - MUSE: 71 BPM
BASOPHILS # BLD MANUAL: 0 10E3/UL (ref 0–0.2)
BASOPHILS NFR BLD MANUAL: 0 %
BILIRUB DIRECT SERPL-MCNC: 0.2 MG/DL (ref 0–0.2)
BILIRUB SERPL-MCNC: 0.7 MG/DL (ref 0.2–1.3)
BUN SERPL-MCNC: 7 MG/DL (ref 7–30)
CALCIUM SERPL-MCNC: 9.1 MG/DL (ref 8.5–10.1)
CHLORIDE BLD-SCNC: 98 MMOL/L (ref 94–109)
CO2 SERPL-SCNC: 32 MMOL/L (ref 20–32)
CREAT SERPL-MCNC: 0.56 MG/DL (ref 0.52–1.04)
DIASTOLIC BLOOD PRESSURE - MUSE: NORMAL MMHG
EOSINOPHIL # BLD MANUAL: 0.5 10E3/UL (ref 0–0.7)
EOSINOPHIL NFR BLD MANUAL: 2 %
ERYTHROCYTE [DISTWIDTH] IN BLOOD BY AUTOMATED COUNT: 14.1 % (ref 10–15)
GFR SERPL CREATININE-BSD FRML MDRD: >90 ML/MIN/1.73M2
GLUCOSE BLD-MCNC: 115 MG/DL (ref 70–99)
HCT VFR BLD AUTO: 46.7 % (ref 35–47)
HGB BLD-MCNC: 15.6 G/DL (ref 11.7–15.7)
HOLD SPECIMEN: NORMAL
HOLD SPECIMEN: NORMAL
INTERPRETATION ECG - MUSE: NORMAL
LIPASE SERPL-CCNC: 128 U/L (ref 73–393)
LYMPHOCYTES # BLD MANUAL: 16.9 10E3/UL (ref 0.8–5.3)
LYMPHOCYTES NFR BLD MANUAL: 70 %
MCH RBC QN AUTO: 31.1 PG (ref 26.5–33)
MCHC RBC AUTO-ENTMCNC: 33.4 G/DL (ref 31.5–36.5)
MCV RBC AUTO: 93 FL (ref 78–100)
MONOCYTES # BLD MANUAL: 0.2 10E3/UL (ref 0–1.3)
MONOCYTES NFR BLD MANUAL: 1 %
NEUTROPHILS # BLD MANUAL: 6.5 10E3/UL (ref 1.6–8.3)
NEUTROPHILS NFR BLD MANUAL: 27 %
P AXIS - MUSE: 64 DEGREES
PLAT MORPH BLD: ABNORMAL
PLATELET # BLD AUTO: 182 10E3/UL (ref 150–450)
POTASSIUM BLD-SCNC: 3.9 MMOL/L (ref 3.4–5.3)
PR INTERVAL - MUSE: 180 MS
PROT SERPL-MCNC: 6.9 G/DL (ref 6.8–8.8)
QRS DURATION - MUSE: 82 MS
QT - MUSE: 432 MS
QTC - MUSE: 469 MS
R AXIS - MUSE: -6 DEGREES
RBC # BLD AUTO: 5.01 10E6/UL (ref 3.8–5.2)
RBC MORPH BLD: ABNORMAL
SARS-COV-2 RNA RESP QL NAA+PROBE: NEGATIVE
SMUDGE CELLS BLD QL SMEAR: PRESENT
SODIUM SERPL-SCNC: 136 MMOL/L (ref 133–144)
SYSTOLIC BLOOD PRESSURE - MUSE: NORMAL MMHG
T AXIS - MUSE: 97 DEGREES
TROPONIN I SERPL HS-MCNC: 4 NG/L
VENTRICULAR RATE- MUSE: 71 BPM
WBC # BLD AUTO: 24.2 10E3/UL (ref 4–11)

## 2022-10-05 PROCEDURE — 99285 EMERGENCY DEPT VISIT HI MDM: CPT | Mod: 25

## 2022-10-05 PROCEDURE — 83690 ASSAY OF LIPASE: CPT | Performed by: EMERGENCY MEDICINE

## 2022-10-05 PROCEDURE — C9803 HOPD COVID-19 SPEC COLLECT: HCPCS

## 2022-10-05 PROCEDURE — 82248 BILIRUBIN DIRECT: CPT | Performed by: EMERGENCY MEDICINE

## 2022-10-05 PROCEDURE — 85027 COMPLETE CBC AUTOMATED: CPT | Performed by: EMERGENCY MEDICINE

## 2022-10-05 PROCEDURE — U0005 INFEC AGEN DETEC AMPLI PROBE: HCPCS | Performed by: EMERGENCY MEDICINE

## 2022-10-05 PROCEDURE — 84484 ASSAY OF TROPONIN QUANT: CPT | Performed by: EMERGENCY MEDICINE

## 2022-10-05 PROCEDURE — 85007 BL SMEAR W/DIFF WBC COUNT: CPT | Performed by: EMERGENCY MEDICINE

## 2022-10-05 PROCEDURE — 36415 COLL VENOUS BLD VENIPUNCTURE: CPT | Performed by: EMERGENCY MEDICINE

## 2022-10-05 PROCEDURE — 250N000013 HC RX MED GY IP 250 OP 250 PS 637: Performed by: EMERGENCY MEDICINE

## 2022-10-05 PROCEDURE — 250N000009 HC RX 250: Performed by: EMERGENCY MEDICINE

## 2022-10-05 PROCEDURE — G0378 HOSPITAL OBSERVATION PER HR: HCPCS

## 2022-10-05 PROCEDURE — 99220 PR INITIAL OBSERVATION CARE,LEVEL III: CPT | Performed by: INTERNAL MEDICINE

## 2022-10-05 PROCEDURE — 93005 ELECTROCARDIOGRAM TRACING: CPT

## 2022-10-05 PROCEDURE — 82310 ASSAY OF CALCIUM: CPT | Performed by: EMERGENCY MEDICINE

## 2022-10-05 PROCEDURE — 99024 POSTOP FOLLOW-UP VISIT: CPT | Performed by: OBSTETRICS & GYNECOLOGY

## 2022-10-05 RX ADMIN — LIDOCAINE HYDROCHLORIDE 30 ML: 20 SOLUTION ORAL; TOPICAL at 18:31

## 2022-10-05 ASSESSMENT — ENCOUNTER SYMPTOMS
FREQUENCY: 0
DYSURIA: 0
FEVER: 0
DIAPHORESIS: 0
DIFFICULTY URINATING: 0
CONSTIPATION: 0
HEADACHES: 1
CHILLS: 1
DIARRHEA: 0
NUMBNESS: 1
SHORTNESS OF BREATH: 0
COUGH: 0
ABDOMINAL PAIN: 0

## 2022-10-05 ASSESSMENT — ACTIVITIES OF DAILY LIVING (ADL)
ADLS_ACUITY_SCORE: 37

## 2022-10-05 NOTE — ED NOTES
Rapid Assessment Note    History:   Karen Caban is a 71 year old female who presents with chest pain. Patient reports that she was here yesterday for chest pain, had an EKG done and blood work, and was discharged. She states that she was told last night to return today for a dye stress test if her chest pain does not resolve. She notes that her chest pain has been worsening throughout the day today with associated headache, arm pain, bilateral jaw tingling, nausea and chills. Nothing exacerbates or alleviates her pain.    Exam:   General:  Alert, interactive, ambulatory  Cardiovascular:  Well perfused, nl S1S2, no m/r/g  Lungs:  No respiratory distress, no accessory muscle use, CTAB, no increased WOB  Neuro:  Moving all 4 extremities  Skin:  Warm, dry  Psych:  Normal affect      Plan of Care:   I evaluated the patient and developed an initial plan of care. I discussed this plan and explained that I, or one of my partners, would be returning to complete the evaluation.     I, Maddie Sullivan MD, am serving as a scribe to document services personally performed by No att. providers found , based on my observations and the provider's statements to me.    10/5/2022  [employer]    Portions of this medical record were completed by a scribe. UPON MY REVIEW AND AUTHENTICATION BY ELECTRONIC SIGNATURE, this confirms (a) I performed the applicable clinical services, and (b) the record is accurate.        Maddie Sullivan MD  10/05/22 4714

## 2022-10-05 NOTE — ED TRIAGE NOTES
Pt states that she was told to come back if her symptoms did not get better.  Pt notes that she has CP, jaw pain, heart burn, arm tingling and nausea.     Triage Assessment     Row Name 10/05/22 1410       Triage Assessment (Adult)    Airway WDL WDL       Respiratory WDL    Respiratory WDL WDL       Cardiac WDL    Cardiac WDL X       Peripheral/Neurovascular WDL    Peripheral Neurovascular WDL WDL       Cognitive/Neuro/Behavioral WDL    Cognitive/Neuro/Behavioral WDL WDL

## 2022-10-05 NOTE — DISCHARGE INSTRUCTIONS
Return to the emergency department for recurrent chest pain especially if it is associated with exertion or has radiation to your arms or jaw or is different than your previous GERD.

## 2022-10-05 NOTE — ED PROVIDER NOTES
History     Chief Complaint:  Chest Pain       HPI   Karen Caban is a 71 year old female who presents with chest pain that started this morning while she was cleaning her house.  She states it radiated with tingling into bilateral shoulders and her jaw.  She did feel that there was some similarities to previous GERD and she took 2 Prilosec at home without significant relief.  She came here to the emergency department over 8 hours ago and was initially given a GI cocktail that resolved her pain.  Does state this feels somewhat different than previous reflux episodes.  She has a scheduled upper endoscopy on Friday for acid reflux.  She does smoke cigarettes, history of high blood pressure and high cholesterol.  No previous heart disease history but does have peripheral vascular disease.  No family history of early heart disease.  She is now been pain-free for several hours.    ROS:  Review of Systems  Constitutional: No fevers, sweats, chills  Cardiovascular: Chest pain radiating to bilateral shoulders and jaw  GI: Acid reflux, no vomiting or diarrhea  Remainder of systems reviewed and negative    Allergies:  Chantix [Varenicline]  Ciprofloxacin  Clopidogrel  Decongestant [Cvs]  Erythromycin  Lisinopril  Plavix [Clopidogrel Bisulfate]  Rofecoxib  Vioxx     Medications:    albuterol (PROAIR RESPICLICK) 108 (90 Base) MCG/ACT inhaler  aspirin (ASA) 81 MG chewable tablet  atorvastatin (LIPITOR) 40 MG tablet  buPROPion (ZYBAN) 150 MG 12 hr tablet  chlorthalidone (HYGROTON) 25 MG tablet  Cholecalciferol (VITAMIN D-3) 5000 UNIT TABS  coenzyme Q-10 200 MG CAPS  Cranberry-Vitamin C-Vitamin E (CRANBERRY PLUS VITAMIN C) 4200-20-3 MG-MG-UNIT CAPS  Cyanocobalamin (B-12 PO)  estradiol (ESTRACE) 0.1 MG/GM vaginal cream  Lactobacillus (PROBIOTIC ACIDOPHILUS PO)  metoprolol succinate ER (TOPROL-XL) 100 MG 24 hr tablet  Multiple Vitamins-Minerals (MULTIVITAMIN GUMMIES ADULT PO)  nicotine (NICODERM CQ) 14 MG/24HR 24 hr  patch  nicotine (NICODERM CQ) 7 MG/24HR 24 hr patch  omeprazole (PRILOSEC) 40 MG DR capsule  Sodium Chloride-Xylitol (XLEAR SINUS CARE SPRAY NA)  Sodium Chloride-Xylitol (XLEAR SINUS CARE SPRAY) SOLN        Past Medical History:    Past Medical History:   Diagnosis Date     Ankle fracture 2009     Anxiety      Chronic back pain      Chronic lymphocytic leukemia (H)      Colon polyp 2012     Fx low femur epiphy-closed (H)      GERD (gastroesophageal reflux disease)      History of UTI 2017     HTN (hypertension), benign      Hyperlipidemia LDL goal < 100      Normal nuclear stress test 12/2009     Osteopenia      PAD (peripheral artery disease) (H)      Polycythemia vera (H) 06/15/2022     PONV (postoperative nausea and vomiting)      PUD (peptic ulcer disease) 1980s     Smoker      Vitamin D deficiency        Past Surgical History:    Past Surgical History:   Procedure Laterality Date     Blood clot removal from Stent      2011     BONE MARROW BIOPSY, BONE SPECIMEN, NEEDLE/TROCAR N/A 02/02/2021    Procedure: bone marrow biopsy;  Surgeon: Kailey Cota MD;  Location:  GI     BYPASS GRAFT AORTOFEMORAL  05/2002    Dr. Turner     BYPASS GRAFT FEMOROPOPLITEAL  12/16/2011     COLONOSCOPY N/A 01/18/2018    Procedure: COMBINED COLONOSCOPY, SINGLE OR MULTIPLE BIOPSY/POLYPECTOMY BY BIOPSY;  COLONOSCOPY;  Surgeon: Efrain Hernadez MD;  Location:  GI     DILATION AND CURETTAGE, OPERATIVE HYSTEROSCOPY, COMBINED N/A 9/15/2022    Procedure: HYSTEROSCOPY, WITH DILATION AND CURETTAGE OF UTERUS;  Surgeon: Destiney Schaefer MD;  Location:  OR     EMBOLECTOMY LOWER EXTREMITY  12/16/2011    Procedure:EMBOLECTOMY LOWER EXTREMITY; EMBOLECTOMY, RIGHT POPLITEAL WITH PATCH ANGIOPLASTY. EXCISION SKIN LESION RIGHT LEG. ; Surgeon:PARMINDER VELAZCO; Location: OR     EXCISE LESION LOWER EXTREMITY  12/16/2011    Procedure:EXCISE LESION LOWER EXTREMITY; Surgeon:PARMINDER VELAZCO; Location: OR     HERNIA REPAIR  " 11/04/2008    x2 umbilical     L achilles repair  12/04/2008     LAPAROSCOPIC OOPHORECTOMY Left     L for cyst age 26     miscarriages x 3       tubal ligation and reversal          Family History:    family history includes Alzheimer Disease in her mother; Cancer in her father; Colon Cancer in her maternal grandfather; Osteoporosis in her mother; Other Cancer in her mother.    Social History:   reports that she has been smoking cigarettes. She has a 37.50 pack-year smoking history. She has never used smokeless tobacco. She reports current alcohol use. She reports that she does not use drugs.  PCP: Liane Claudio     Physical Exam     Patient Vitals for the past 24 hrs:   BP Temp Temp src Pulse Resp SpO2 Height Weight   10/04/22 2330 (!) 140/83 -- -- 52 -- 98 % -- --   10/04/22 2320 (!) 145/78 -- -- 52 16 98 % -- --   10/04/22 1521 (!) 184/86 98.1  F (36.7  C) Temporal 73 17 95 % 1.651 m (5' 5\") 54.4 kg (120 lb)        Physical Exam  General: Patient is alert and normal appearing.  HEENT: Head atraumatic    Eyes: pupils equal and reactive. Conjunctiva clear   Nares: patent   Oropharynx: no lesions, uvula midline, no palatal draping, normal voice, no trismus  Neck: Supple without lymphadenopathy, no meningismus  Chest: Heart regular rate and rhythm.   Lungs: Equal clear to auscultation with no wheeze or rales  Abdomen: Soft, non tender, nondistended, normal bowel sounds  Back: No costovertebral angle tenderness, no midline C, T or L spine tenderness  Neuro: Grossly nonfocal, normal speech, strength equal bilaterally, CN 2-12 intact  Extremities: No deformities, equal radial and DP pulses. No clubbing, cyanosis.  No edema  Skin: Warm and dry with no rash.       Emergency Department Course   ECG:  ECG results from 10/04/22   EKG 12 lead     Value    Systolic Blood Pressure     Diastolic Blood Pressure     Ventricular Rate 65    Atrial Rate 65    KY Interval 166    QRS Duration 82        QTc 428    P Axis 57 "    R AXIS -38    T Axis 80    Interpretation ECG      Sinus rhythm with Premature atrial complexes  Left axis deviation  Septal infarct (cited on or before 06-DEC-2009)  Abnormal ECG  When compared with ECG of 01-AUG-2020 18:10,  Premature atrial complexes are now Present  QRS axis Shifted left  Confirmed by GENERATED REPORT, COMPUTER ([id number]),  GABINO OG ([id number]) on 10/4/2022 3:52:01 PM     EKG obtained 10/4/2022 at 1535 and read by me at 2355 reveals sinus rhythm with PACs.  Left axis deviation.  Septal infarct age-indeterminate.  Unchanged from previous EKG dated 8/1/2020 ventricular rate 65 bpm, IN interval 166 ms, QRS duration 82 ms,  ms    Imaging:  Chest XR,  PA & LAT   Final Result   IMPRESSION: Since December 3, 2021, heart size is normal. No pleural   effusion, pneumothorax, or abnormal area of consolidation. Overall,   unchanged relative to CT exam.      AZEB NEWSOME MD            SYSTEM ID:  [id number]         Report per radiology    Laboratory:  Labs Ordered and Resulted from Time of ED Arrival to Time of ED Departure   BASIC METABOLIC PANEL - Abnormal       Result Value    Sodium 137      Potassium 3.1 (*)     Chloride 103      Carbon Dioxide (CO2) 25      Anion Gap 9      Urea Nitrogen 10      Creatinine 0.57      Calcium 9.2      Glucose 123 (*)     GFR Estimate >90     CBC WITH PLATELETS AND DIFFERENTIAL - Abnormal    WBC Count 27.1 (*)     RBC Count 5.00      Hemoglobin 15.5      Hematocrit 47.1 (*)     MCV 94      MCH 31.0      MCHC 32.9      RDW 14.1      Platelet Count 171     DIFFERENTIAL - Abnormal    % Neutrophils 16      % Lymphocytes 81      % Monocytes 2      % Eosinophils 1      % Basophils 0      Absolute Neutrophils 4.3      Absolute Lymphocytes 22.0 (*)     Absolute Monocytes 0.5      Absolute Eosinophils 0.3      Absolute Basophils 0.0      RBC Morphology Confirmed RBC Indices      Platelet Assessment        Value: Automated Count Confirmed. Platelet morphology is  normal.    Smudge Cells Present (*)    TROPONIN I - Normal    Troponin I High Sensitivity 6     TROPONIN I - Normal    Troponin I High Sensitivity 5            Emergency Department Course:             Reviewed:  I reviewed nursing notes, vitals and past medical history    Assessments:  11:29 PM I obtained history and examined the patient as noted above.   1209 AM I rechecked the patient and explained findings.  Patient remained chest pain-free and now over 10 hours from the onset of pain this morning with negative troponin.      Interventions:  Medications   lidocaine (viscous) (XYLOCAINE) 2 % 15 mL, alum & mag hydroxide-simethicone (MAALOX) 15 mL GI Cocktail (30 mLs Oral Given 10/4/22 4740)        Disposition:  The patient was discharged to home.     Impression & Plan    CMS Diagnoses: None      Medical Decision Making:  Patient is a 71-year-old female who presented to the emergency department with chest pain.  She has a history of acid reflux with a planned EGD on Friday.  She took 2 Prilosec and then came to the emergency department as her pain did not improve.  Pain was resolved with GI cocktail here in the emergency department.  Patient's initial troponin was negative.  She had an over 8 hours wait in the emergency department waiting room and delta troponin remains negative.  This effectively rules out acute coronary syndrome at this time.  I do think there was some factors to this chest pain that were different than her previous GERD including some numbness and tingling to her jaw and shoulders.  Because of this I recommended outpatient stress test.  Recommend obtaining this within the next 72 hours.  If she has recurrent chest pain she is recommended to return to the emergency department for admission as she does have a high heart score however she is now over 9 hours from her pain and remains pain-free following GI cocktail.  No evidence of unstable angina, NSTEMI or acute coronary syndrome but would benefit  from provocative testing and this was discussed with her.  She and her family member at bedside with her comfortable with this plan.  Her white blood cell count is elevated and she has known CLL.  She has no fever, cough or pneumonia on chest x-ray and doubt infectious etiology.  No reproducible abdominal pain and doubt acute cholecystitis.  Return precautions emergency department were reviewed patient expressed agreement understanding of the plan and follow-up needed.        Diagnosis:    ICD-10-CM    1. Chest pain, unspecified type  R07.9 Dobutamine Stress Echocardiogram   2. Gastroesophageal reflux disease, unspecified whether esophagitis present  K21.9         Discharge Medications:  New Prescriptions    No medications on file        10/4/2022   Rebecca Givens MD Neuner, Maria Bea, MD  10/05/22 0019

## 2022-10-05 NOTE — ED PROVIDER NOTES
History   Chief Complaint:  Chest Pain       HPI   Karen Caban is a 71 year old female with history of hypertension, hyperlipidemia, and PAD who presents with persistent chest pain following a visit here in the ED yesterday; she was told to return if her symptoms persisted. Her pain is a diffuse sensation of burning and pressure which radiates throughout her chest towards her shoulders. It is accompanied by nausea, jaw pain, arm tingling, numbness, headache, and chills, all of which have been present since yesterday. Today her chest pain has been constant, although it was intermittent yesterday, and upon evaluation is 5/10 in intensity. It has no exacerbating factors. She reports compared to her regular GERD pain this is more diffuse. She denies any denies any diaphoresis, shortness of breath, abdominal pain, fever, cough, changes in micturition, or changes in bowel movements. She does smoke tobacco and uses alcohol occasionally. She denies any family history of heart disease. She had an aortic stent placed 10 years ago.       Review of Systems   Constitutional: Positive for chills. Negative for diaphoresis and fever.   Respiratory: Negative for cough and shortness of breath.    Cardiovascular: Positive for chest pain.   Gastrointestinal: Negative for abdominal pain, constipation and diarrhea.   Genitourinary: Negative for decreased urine volume, difficulty urinating, dysuria, frequency and urgency.   Neurological: Positive for numbness and headaches.       Allergies:  Chantix  Ciprofloxacin  Clopidogrel  Decongestant   Erythromycin  Lisinopril  Plavix   Rofecoxib  Vioxx    Medications:  Albuterol   Atorvastatin   Bupropion   Chlorthalidone   Estradiol   Metoprolol succinate   Nicotine path   Omeprazole   Asprin 81 mg   Cholecalciferol   Fluticasone     Past Medical History:     Vitamin D deficiency   Hypertension   Hyperlipidemia   Osteopenia   Chronic low back pain   PAD   Smoker   Colon polyp   GERD  "  Anxiety   Polycythemia vera   Chronic lymphocytic leukemia     Past Surgical History:    Leg stent   Laparotomy oophorectomy   Hernia repair  Achilles tendon repair   Tubal ligation and reversal   Embolectomy lower extremity   Dilation and curettage  Bypass graft femoropopliteal     Family History:    Mother: alzheimer's disease, GI cancer, osteoporosis  Father: lymphoma     Social History:  The patient presents to the ED with sister.  alcohol use: occasional   Tobacco use: smokes   PCP: Liane Claudio       Physical Exam     Patient Vitals for the past 24 hrs:   BP Temp Temp src Pulse Resp SpO2 Height Weight   10/05/22 1818 115/64 98.1  F (36.7  C) Oral 88 16 100 % -- --   10/05/22 1408 (!) 162/80 97.5  F (36.4  C) Temporal 60 16 97 % 1.651 m (5' 5\") 55.8 kg (123 lb)       Physical Exam  Constitutional: Well developed, nontox appearance  Head: Atraumatic.   Neck:  no stridor  Eyes: no scleral icterus  Cardiovascular: RRR, 2+ bilat radial pulses  Pulmonary/Chest: nml resp effort  Abdominal: ND, soft, NT, no rebound or guarding   Ext: Warm, well perfused, no edema  Neurological: A&O, symmetric facies, moves ext x4  Skin: Skin is warm and dry.   Psychiatric: Behavior is normal. Thought content normal.   Nursing note and vitals reviewed.       Emergency Department Course   ECG  ECG results from 10/05/22   EKG 12-lead, tracing only     Value    Systolic Blood Pressure     Diastolic Blood Pressure     Ventricular Rate 71    Atrial Rate 71    KS Interval 180    QRS Duration 82        QTc 469    P Axis 64    R AXIS -6    T Axis 97    Interpretation ECG      Sinus rhythm with Premature supraventricular complexes  Possible Left atrial enlargement  Anteroseptal infarct (cited on or before 06-DEC-2009)  ST & T wave abnormality, consider lateral ischemia  Abnormal ECG  No significant change when compared to EKG dated 10/4/22       Laboratory:  Labs Ordered and Resulted from Time of ED Arrival to Time of ED " Departure   BASIC METABOLIC PANEL - Abnormal       Result Value    Sodium 136      Potassium 3.9      Chloride 98      Carbon Dioxide (CO2) 32      Anion Gap 6      Urea Nitrogen 7      Creatinine 0.56      Calcium 9.1      Glucose 115 (*)     GFR Estimate >90     CBC WITH PLATELETS AND DIFFERENTIAL - Abnormal    WBC Count 24.2 (*)     RBC Count 5.01      Hemoglobin 15.6      Hematocrit 46.7      MCV 93      MCH 31.1      MCHC 33.4      RDW 14.1      Platelet Count 182     DIFFERENTIAL - Abnormal    % Neutrophils 27      % Lymphocytes 70      % Monocytes 1      % Eosinophils 2      % Basophils 0      Absolute Neutrophils 6.5      Absolute Lymphocytes 16.9 (*)     Absolute Monocytes 0.2      Absolute Eosinophils 0.5      Absolute Basophils 0.0      RBC Morphology Confirmed RBC Indices      Platelet Assessment        Value: Automated Count Confirmed. Platelet morphology is normal.    Smudge Cells Present (*)    TROPONIN I - Normal    Troponin I High Sensitivity 4     COVID-19 VIRUS (CORONAVIRUS) BY PCR   HEPATIC FUNCTION PANEL   LIPASE        Emergency Department Course:  Reviewed:  I reviewed nursing notes, vitals, past medical history and Care Everywhere    Assessments:   I obtained history and examined the patient as noted above.        Consults:   I consulted the hospitalist Dr. Pace regarding admission.    Interventions:   lidocaine (viscous) (XYLOCAINE) 2 % 15 mL, alum & mag hydroxide-simethicone (MAALOX) 15 mL GI Cocktail 30 mL PO    Disposition:  The patient was admitted to the hospital under the care of Dr. Pace.     Impression & Plan     Medical Decision Makin year old female presenting w/ chest discomfort     DDx includes ACS, unstable angina, chest wall pain, GERD, pneumothorax, pneumonia.  EKG interpretation as noted above.  Labs and imaging ordered as noted above.  Labs significant for neg trop.  Imaging sig for no acute cardiopulmonary disease.  Interventions as noted above with  improvement in symptoms.  Given patient's risk factors and description of symptoms (Heart score 6), I think it would be reasonable to have them admitted to observation under the hospitalist service for serial troponin levels and stress test prior to discharge.  Pt counseled on all results, diagnosis and disposition.  They are understanding and agreeable to plan. Patient discharged in stable condition.        Diagnosis:    ICD-10-CM    1. Chest pain, unspecified type  R07.9        Scribe Disclosure:  RONN, Joshua Kjer, am serving as a scribe at 6:21 PM on 10/5/2022 to document services personally performed by Lalo Birmingham MD based on my observations and the provider's statements to me.            Lalo Birmingham MD  10/05/22 2014

## 2022-10-05 NOTE — LETTER
10/5/2022         RE: Karen Caban  [address]  [address]  [place] [address]        Dear Colleague,    Thank you for referring your patient, Karen Caban, to the [facility]. Please see a copy of my visit note below.    Humaira is a 71 year old who is being evaluated via a billable telephone visit.      What phone number would you like to be contacted at? [phone]  How would you like to obtain your AVS? Guillermo Simeon VF    Phone call duration: 8 minutes    Postop Gyn Onc Note     S: Doing well after surgery. No bleeding, no pelvic pain.   Normal GI/ function.   No bleeding  Having a stress test given recent cardiac symptoms.     O:   There were no vitals taken for this visit.       Pathology:     A.  Endometrium, biopsy:  -Fragments of benign endometrial polyp.            A/P:     71 year old  Year old Woman Postop from [facility]. Doing well. Reviewed benign path.     -Followup Gyn Onc PRN  - Pelvic Ultrasound only as needed (known likely benign pelvic mass).     Destiney Schaefer MD  Gynecologic Oncology  Pager [phone]        Again, thank you for allowing me to participate in the care of your patient.        Sincerely,        Destiney Schaefer MD

## 2022-10-05 NOTE — PROGRESS NOTES
Humaira is a 71 year old who is being evaluated via a billable telephone visit.      What phone number would you like to be contacted at? [phone]  How would you like to obtain your AVS? Guillermo Simeon VF    Phone call duration: 8 minutes    Postop Gyn Onc Note     S: Doing well after surgery. No bleeding, no pelvic pain.   Normal GI/ function.   No bleeding  Having a stress test given recent cardiac symptoms.     O:   There were no vitals taken for this visit.       Pathology:     A.  Endometrium, biopsy:  -Fragments of benign endometrial polyp.            A/P:     71 year old  Year old Woman Postop from [facility]. Doing well. Reviewed benign path.     -Followup Gyn Onc PRN  - Pelvic Ultrasound only as needed (known likely benign pelvic mass).     Destiney Schaefer MD  Gynecologic Oncology  Pager [phone]

## 2022-10-05 NOTE — LETTER
10/5/2022         RE: Karen Caban  [address]  [address]  [place] [address]        Dear Colleague,    Thank you for referring your patient, Karen Caban, to the [facility]. Please see a copy of my visit note below.    Humaira is a 71 year old who is being evaluated via a billable telephone visit.      What phone number would you like to be contacted at? [phone]  How would you like to obtain your AVS? Guillermo Simeon VF    Phone call duration: 8 minutes    Postop Gyn Onc Note     S: Doing well after surgery. No bleeding, no pelvic pain.   Normal GI/ function.   No bleeding  Having a stress test given recent cardiac symptoms.     O:   There were no vitals taken for this visit.       Pathology:     A.  Endometrium, biopsy:  -Fragments of benign endometrial polyp.            A/P:     71 year old  Year old Woman Postop from [facility]. Doing well. Reviewed benign path.     -Followup Gyn Onc PRN  - Pelvic Ultrasound only as needed (known likely benign pelvic mass).     Destiney Schaefer MD  Gynecologic Oncology  Pager [phone]        Again, thank you for allowing me to participate in the care of your patient.        Sincerely,        Destiney Schaefer MD

## 2022-10-06 ENCOUNTER — APPOINTMENT (OUTPATIENT)
Dept: NUCLEAR MEDICINE | Facility: CLINIC | Age: 72
End: 2022-10-06
Attending: INTERNAL MEDICINE
Payer: COMMERCIAL

## 2022-10-06 VITALS
BODY MASS INDEX: 20.49 KG/M2 | SYSTOLIC BLOOD PRESSURE: 121 MMHG | RESPIRATION RATE: 16 BRPM | OXYGEN SATURATION: 96 % | DIASTOLIC BLOOD PRESSURE: 75 MMHG | TEMPERATURE: 97.4 F | WEIGHT: 123 LBS | HEIGHT: 65 IN | HEART RATE: 71 BPM

## 2022-10-06 LAB
CV BLOOD PRESSURE: 74 MMHG
CV STRESS MAX HR HE: 89
RATE PRESSURE PRODUCT: NORMAL
STRESS ECHO BASELINE DIASTOLIC HE: 73
STRESS ECHO BASELINE HR: 53 BPM
STRESS ECHO BASELINE SYSTOLIC BP: 151
STRESS ECHO CALCULATED PERCENT HR: 59 %
STRESS ECHO LAST STRESS DIASTOLIC BP: 83
STRESS ECHO LAST STRESS SYSTOLIC BP: 141
STRESS ECHO TARGET HR: 150
TROPONIN I SERPL HS-MCNC: 7 NG/L
TROPONIN I SERPL HS-MCNC: 8 NG/L

## 2022-10-06 PROCEDURE — A9502 TC99M TETROFOSMIN: HCPCS | Performed by: INTERNAL MEDICINE

## 2022-10-06 PROCEDURE — 250N000011 HC RX IP 250 OP 636: Performed by: INTERNAL MEDICINE

## 2022-10-06 PROCEDURE — 343N000001 HC RX 343: Performed by: INTERNAL MEDICINE

## 2022-10-06 PROCEDURE — 99217 PR OBSERVATION CARE DISCHARGE: CPT | Performed by: PHYSICIAN ASSISTANT

## 2022-10-06 PROCEDURE — 93016 CV STRESS TEST SUPVJ ONLY: CPT | Performed by: INTERNAL MEDICINE

## 2022-10-06 PROCEDURE — G0378 HOSPITAL OBSERVATION PER HR: HCPCS

## 2022-10-06 PROCEDURE — 93018 CV STRESS TEST I&R ONLY: CPT | Performed by: INTERNAL MEDICINE

## 2022-10-06 PROCEDURE — 250N000013 HC RX MED GY IP 250 OP 250 PS 637: Performed by: INTERNAL MEDICINE

## 2022-10-06 PROCEDURE — 36415 COLL VENOUS BLD VENIPUNCTURE: CPT | Performed by: INTERNAL MEDICINE

## 2022-10-06 PROCEDURE — 999N000049 HC STATISTIC ECHO STRESS OR NM NPI

## 2022-10-06 PROCEDURE — 78452 HT MUSCLE IMAGE SPECT MULT: CPT | Mod: 26 | Performed by: INTERNAL MEDICINE

## 2022-10-06 PROCEDURE — 84484 ASSAY OF TROPONIN QUANT: CPT | Performed by: INTERNAL MEDICINE

## 2022-10-06 RX ORDER — NITROGLYCERIN 0.4 MG/1
0.4 TABLET SUBLINGUAL EVERY 5 MIN PRN
Status: DISCONTINUED | OUTPATIENT
Start: 2022-10-06 | End: 2022-10-06 | Stop reason: HOSPADM

## 2022-10-06 RX ORDER — ALBUTEROL SULFATE 90 UG/1
2 AEROSOL, METERED RESPIRATORY (INHALATION) EVERY 5 MIN PRN
Status: DISCONTINUED | OUTPATIENT
Start: 2022-10-06 | End: 2022-10-06

## 2022-10-06 RX ORDER — NICOTINE 21 MG/24HR
1 PATCH, TRANSDERMAL 24 HOURS TRANSDERMAL EVERY 24 HOURS
Qty: 14 PATCH | Refills: 0 | Status: SHIPPED | OUTPATIENT
Start: 2022-10-06 | End: 2023-04-20

## 2022-10-06 RX ORDER — CALCIUM CARBONATE 500 MG/1
500 TABLET, CHEWABLE ORAL 2 TIMES DAILY PRN
Status: DISCONTINUED | OUTPATIENT
Start: 2022-10-06 | End: 2022-10-06 | Stop reason: HOSPADM

## 2022-10-06 RX ORDER — PANTOPRAZOLE SODIUM 40 MG/1
40 TABLET, DELAYED RELEASE ORAL
Status: DISCONTINUED | OUTPATIENT
Start: 2022-10-06 | End: 2022-10-06 | Stop reason: HOSPADM

## 2022-10-06 RX ORDER — CHLORTHALIDONE 25 MG/1
25 TABLET ORAL DAILY
Status: DISCONTINUED | OUTPATIENT
Start: 2022-10-06 | End: 2022-10-06 | Stop reason: HOSPADM

## 2022-10-06 RX ORDER — ASPIRIN 81 MG/1
162 TABLET, CHEWABLE ORAL DAILY
Status: DISCONTINUED | OUTPATIENT
Start: 2022-10-06 | End: 2022-10-06 | Stop reason: HOSPADM

## 2022-10-06 RX ORDER — LIDOCAINE 40 MG/G
CREAM TOPICAL
Status: DISCONTINUED | OUTPATIENT
Start: 2022-10-06 | End: 2022-10-06 | Stop reason: HOSPADM

## 2022-10-06 RX ORDER — ACETAMINOPHEN 325 MG/1
650 TABLET ORAL EVERY 6 HOURS PRN
Status: DISCONTINUED | OUTPATIENT
Start: 2022-10-06 | End: 2022-10-06 | Stop reason: HOSPADM

## 2022-10-06 RX ORDER — POLYETHYLENE GLYCOL 3350 17 G
2 POWDER IN PACKET (EA) ORAL
Qty: 72 LOZENGE | Refills: 0 | Status: SHIPPED | OUTPATIENT
Start: 2022-10-06 | End: 2023-04-20

## 2022-10-06 RX ORDER — NITROGLYCERIN 0.4 MG/1
TABLET SUBLINGUAL
Qty: 20 TABLET | Refills: 0 | Status: SHIPPED | OUTPATIENT
Start: 2022-10-06

## 2022-10-06 RX ORDER — OMEPRAZOLE 40 MG/1
40 CAPSULE, DELAYED RELEASE ORAL 2 TIMES DAILY
Qty: 90 CAPSULE | Refills: 0 | Status: SHIPPED | OUTPATIENT
Start: 2022-10-06 | End: 2023-09-25

## 2022-10-06 RX ORDER — CAFFEINE CITRATE 20 MG/ML
60 SOLUTION INTRAVENOUS
Status: DISCONTINUED | OUTPATIENT
Start: 2022-10-06 | End: 2022-10-06

## 2022-10-06 RX ORDER — ACYCLOVIR 200 MG/1
0-1 CAPSULE ORAL
Status: DISCONTINUED | OUTPATIENT
Start: 2022-10-06 | End: 2022-10-06

## 2022-10-06 RX ORDER — REGADENOSON 0.08 MG/ML
0.4 INJECTION, SOLUTION INTRAVENOUS ONCE
Status: COMPLETED | OUTPATIENT
Start: 2022-10-06 | End: 2022-10-06

## 2022-10-06 RX ORDER — MAGNESIUM HYDROXIDE/ALUMINUM HYDROXICE/SIMETHICONE 120; 1200; 1200 MG/30ML; MG/30ML; MG/30ML
30 SUSPENSION ORAL EVERY 4 HOURS PRN
Status: DISCONTINUED | OUTPATIENT
Start: 2022-10-06 | End: 2022-10-06 | Stop reason: HOSPADM

## 2022-10-06 RX ORDER — ACETAMINOPHEN 650 MG/1
650 SUPPOSITORY RECTAL EVERY 6 HOURS PRN
Status: DISCONTINUED | OUTPATIENT
Start: 2022-10-06 | End: 2022-10-06 | Stop reason: HOSPADM

## 2022-10-06 RX ORDER — AMINOPHYLLINE 25 MG/ML
50-100 INJECTION, SOLUTION INTRAVENOUS
Status: DISCONTINUED | OUTPATIENT
Start: 2022-10-06 | End: 2022-10-06

## 2022-10-06 RX ADMIN — TETROFOSMIN 9.27 MCI.: 1.38 INJECTION, POWDER, LYOPHILIZED, FOR SOLUTION INTRAVENOUS at 09:15

## 2022-10-06 RX ADMIN — CHLORTHALIDONE 25 MG: 25 TABLET ORAL at 08:20

## 2022-10-06 RX ADMIN — PANTOPRAZOLE SODIUM 40 MG: 40 TABLET, DELAYED RELEASE ORAL at 08:19

## 2022-10-06 RX ADMIN — TETROFOSMIN 25.1 MCI.: 1.38 INJECTION, POWDER, LYOPHILIZED, FOR SOLUTION INTRAVENOUS at 11:20

## 2022-10-06 RX ADMIN — REGADENOSON 0.4 MG: 0.08 INJECTION, SOLUTION INTRAVENOUS at 11:17

## 2022-10-06 RX ADMIN — ASPIRIN 81 MG CHEWABLE TABLET 162 MG: 81 TABLET CHEWABLE at 08:20

## 2022-10-06 ASSESSMENT — ACTIVITIES OF DAILY LIVING (ADL)
ADLS_ACUITY_SCORE: 33
ADLS_ACUITY_SCORE: 37

## 2022-10-06 NOTE — ED NOTES
[facility]  ED Nurse Handoff Report    ED Chief complaint: Chest Pain      ED Diagnosis:   Final diagnoses:   Chest pain, unspecified type       Code Status: UNK    Allergies:   Allergies   Allergen Reactions     Chantix [Varenicline] Nausea     Ciprofloxacin Other (See Comments)     hypertension     Clopidogrel      Other reaction(s): Hypertension     Decongestant [Cvs]      Erythromycin Nausea     Lisinopril      cough     Plavix [Clopidogrel Bisulfate]      Felt as if she was having a heart attack     Rofecoxib Unknown     Vioxx      Increased BP       Patient Story: Pt with hx peptic ulcer disease presented to ED  Yesterday 10/4 with chest pain. GI cocktail administered. Discharge paperwork stated to return to ED if symptoms recurred. Chest pain today, 5/10 after GI cocktail.   Focused Assessment:  Non-stress test tomorrow 10/6    Treatments and/or interventions provided: PIV; GI cocktail; labs; EKG  Labs Ordered and Resulted from Time of ED Arrival to Time of ED Departure   BASIC METABOLIC PANEL - Abnormal       Result Value    Sodium 136      Potassium 3.9      Chloride 98      Carbon Dioxide (CO2) 32      Anion Gap 6      Urea Nitrogen 7      Creatinine 0.56      Calcium 9.1      Glucose 115 (*)     GFR Estimate >90     CBC WITH PLATELETS AND DIFFERENTIAL - Abnormal    WBC Count 24.2 (*)     RBC Count 5.01      Hemoglobin 15.6      Hematocrit 46.7      MCV 93      MCH 31.1      MCHC 33.4      RDW 14.1      Platelet Count 182     DIFFERENTIAL - Abnormal    % Neutrophils 27      % Lymphocytes 70      % Monocytes 1      % Eosinophils 2      % Basophils 0      Absolute Neutrophils 6.5      Absolute Lymphocytes 16.9 (*)     Absolute Monocytes 0.2      Absolute Eosinophils 0.5      Absolute Basophils 0.0      RBC Morphology Confirmed RBC Indices      Platelet Assessment        Value: Automated Count Confirmed. Platelet morphology is normal.    Smudge Cells Present (*)    TROPONIN I - Normal     "Troponin I High Sensitivity 4     HEPATIC FUNCTION PANEL - Normal    Bilirubin Total 0.7      Bilirubin Direct 0.2      Protein Total 6.9      Albumin 3.8      Alkaline Phosphatase 79      AST 31      ALT 34     LIPASE - Normal    Lipase 128     COVID-19 VIRUS (CORONAVIRUS) BY PCR      Patient's response to treatments and/or interventions: Tolerated    To be done/followed up on inpatient unit:  Observation    Does this patient have any cognitive concerns?: N/A    Activity level - Baseline/Home:  Independent  Activity Level - Current:   Independent    Patient's Preferred language: English   Needed?: No    Isolation: None  Infection: Not Applicable  Patient tested for COVID 19 prior to admission: YES  Bariatric?: No    Vital Signs:   Vitals:    10/05/22 1408 10/05/22 1818 10/05/22 2130   BP: (!) 162/80 115/64 106/77   BP Location:  Right arm    Pulse: 60 88 67   Resp: 16 16 12   Temp: 97.5  F (36.4  C) 98.1  F (36.7  C)    TempSrc: Temporal Oral    SpO2: 97% 100% 95%   Weight: 55.8 kg (123 lb)     Height: 1.651 m (5' 5\")         Cardiac Rhythm:     Was the PSS-3 completed:   Yes  What interventions are required if any?               Family Comments: N/A  OBS brochure/video discussed/provided to patient/family: Yes              Name of person given brochure if not patient:               Relationship to patient:     For the majority of the shift this patient's behavior was Green.   Behavioral interventions performed were .    ED NURSE PHONE NUMBER: [phone]       "

## 2022-10-06 NOTE — DISCHARGE SUMMARY
"[facility]  Hospitalist Discharge Summary      Date of Admission:  10/5/2022  Date of Discharge:  10/6/2022  4:32 PM  Discharging Provider: JoAnna K. Barthell, PA-C  Discharge Service: Hospitalist Service    Discharge Diagnoses   Atypical chest pain.  Concern for dyspepsia and PUD.  HTN.  HLD.  PAD w/ hx femoral popliteal bypass.  Tobacco use disorder.  CLL.  GERD.  Follow-ups Needed After Discharge   Follow-up Appointments     Follow-up and recommended labs and tests      Please call to schedule follow up with Liane Ridley to occur   within 2 weeks. Inform this is a \"hospital follow up visit\" to help get in   during recommended time. OK to see a colleague of primary if needed.  - Increase Prilosec to twice daily.  - Nicotine patch and gum prescribed.  - Nitroglycerine as needed for recurrent chest pain.    Go to scheduled upper endoscopy tomorrow as planned.           Discharge Disposition   Discharged to home  Condition at discharge: Stable    Hospital Course   Karen Caban is a 71 year old female with past medical history of peripheral arterial disease status post femoropopliteal bypass, hypertension, hyperlipidemia, GERD and CLL who is presenting for evaluation of chest pain.  She is registered under observation for ACS rule out     Chest pain  Burning sensation over anterior chest that radiates into her neck and feels like \"something cold coming up\". More intense than her usual heartburn symptoms.  Second presentation for similar in 2 days.  Symptoms improved with GI cocktail. Risk factors: hypertension, peripheral arterial disease, and an active smoker.  -EKG with ST depression in the lateral leads.  -No significant events on telemetry  -Troponin trend <10 x 3.  -Lexiscan stress negative for inducible myocardial ischemia or infarction; noted to have suboptimal images in the basal-mid inferior/inferolateral walls due to significant visceral tracer uptake (suspect breast " tissue attenuation). EKG also negative for inducible ischemic changes; did show frequent PVCs and occasional PACs.  -It is possible she has PUD given her tobacco use and  mg daily.  She does not describe a significant amount of alcohol or NSAID use.  Increase Prilosec from 40mg daily to BID.  Coincidently has previously scheduled EGD for tomorrow 10/7.  -Have discharged with sublingual nitroglycerin with instructions for use and warned of side effects.     Hypertension  Hyperlipidemia  Beta-blocker held for cardiac stress testing and statin held while under observation status.  -Continue PTA chlorthalidone, metoprolol, and statin at discharge.     Peripheral arterial disease with history of femoral popliteal bypass  -Continue with PTA aspirin 162mg and statin at discharge     CLL  -Follows with Dr. Osorio and this is being monitored     Tobacco use disorder  Current half pack per day smoker.  Recently started on bupropion to help with smoking cessation and depression.  Was also prescribed nicotine patch at 7 mg.  -Applauded efforts for smoking cessation and discussed recommendation for a patch at 14 mg and nicotine lozenge as needed.  These were both prescribed at discharge and she will speak with her primary provider or vascular medicine team about how to taper.     GERD  -Continue with daily PPI and Tums as needed available as above    Consultations This Hospital Stay   None    Code Status   Full Code    Time Spent on this Encounter   I, JoAnna K. Barthell, PA-C, personally saw the patient today and spent greater than 30 minutes discharging this patient.  Than 50% of time was spent in patient education and counseling.     This patient was discussed with Dr. Scott of the Hospitalist Service who agrees with current plans as outlined above.    JoAnna K. Barthell, PA-C  [facility]TEND[facility] STAY  [address] [place] [id number]  Phone:  "[phone]  ______________________________________________________________________    Physical Exam   Temp: 97.4  F (36.3  C) Temp src: Oral BP: 121/75 Pulse: 71   Resp: 16 SpO2: 96 % O2 Device: None (Room air)    Vitals:    10/05/22 1408   Weight: 55.8 kg (123 lb)   Constitutional: Appears stated age, no acute distress.  Respiratory: No increased work of breathing.  Skin: Warm, dry, no rashes or lesions.       Primary Care Physician   Liane Claudio    Discharge Orders      Reason for your hospital stay    Further evaluation and management of chest discomfort. Reassuring cardiac evaluation. Do wonder if GI illness like an ulcer or reflux is contributing.     Follow-up and recommended labs and tests    Please call to schedule follow up with Liane Ridley to occur within 2 weeks. Inform this is a \"hospital follow up visit\" to help get in during recommended time. OK to see a colleague of primary if needed.  - Increase Prilosec to twice daily.  - Nicotine patch and gum prescribed.  - Nitroglycerine as needed for recurrent chest pain.    Go to scheduled upper endoscopy tomorrow as planned.     Activity    Your activity upon discharge: activity as tolerated     Discharge Instructions    Good luck with smoking cessation! Talk with your provider about how to taper nicotine replacement therapy.    1. Nicotine patch will provide low dose of nicotine throughout day. Apply one patch each morning to any non-hairy skin site; rotate the site daily to avoid skin irritation. Remove at bedtime.    2. Nicotine lozenge: Do not chew or swallow; allow to dissolve slowly between cheek and gum (~20 to 30 minutes); minimize swallowing and occasionally move lozenge from one side of the mouth to the other until completely dissolved. Do not eat or drink 15 minutes before using or while lozenge is in mouth.    Nitroglycerin side effects include low blood pressure, headache, lightheadedness.     Diet    Follow this diet upon " discharge:     Regular Diet Adult     Significant Results and Procedures   Most Recent 3 CBC's:Recent Labs   Lab Test 10/05/22  1418 10/04/22  1533 05/31/22  0903   WBC 24.2* 27.1* 23.5*   HGB 15.6 15.5 15.0   MCV 93 94 94    171 202     Most Recent 3 BMP's:Recent Labs   Lab Test 10/05/22  1418 10/04/22  1533 09/15/22  0946 05/31/22  0903    137  --  137   POTASSIUM 3.9 3.1* 3.2* 3.8   CHLORIDE 98 103  --  99   CO2 32 25  --  33*   BUN 7 10  --  11   CR 0.56 0.57  --  0.56   ANIONGAP 6 9  --  5   CHANDAN 9.1 9.2  --  9.0   * 123*  --  99     Most Recent 2 LFT's:Recent Labs   Lab Test 10/05/22  1418 05/31/22  0903   AST 31 26   ALT 34 33   ALKPHOS 79 63   BILITOTAL 0.7 0.6     Most Recent 3 Troponin's:Recent Labs   Lab Test 12/20/17  0950   TROPI <0.015     Most Recent 3 BNP's:No lab results found.  Most Recent D-dimer:No lab results found.  Most Recent TSH and T4:Recent Labs   Lab Test 12/08/17  0732   TSH 1.38   ,   Results for orders placed or performed during the hospital encounter of 10/05/22   NM Lexiscan stress test (nuc card)     Value    Target     Baseline Systolic     Baseline Diastolic BP 73    Last Stress Systolic     Last Stress Diastolic BP 83    Baseline HR 53    Max HR  89    Max Predicted HR  59    Rate Pressure Product 12,549.0    BP 74    Narrative       The nuclear stress test is negative for inducible myocardial ischemia   or infarction. Suboptimal images due to significant visceral tracer uptake   adjacent to basal to mid inferior/inferolateral walls on resting images.     The left ventricular ejection fraction at rest is 74%. Left ventricular   function is hyperdynamic.     There is no prior study for comparison.           Discharge Medications   Current Discharge Medication List      START taking these medications    Details   nicotine (COMMIT) 2 MG lozenge Place 1 lozenge (2 mg) inside cheek every hour as needed for smoking cessation  Qty: 72 lozenge,  Refills: 0    Associated Diagnoses: Tobacco abuse      nicotine (NICODERM CQ) 14 MG/24HR 24 hr patch Place 1 patch onto the skin every 24 hours (Talk with primary provider or vascular medicine provider about when to move to 7mg dose)  Qty: 14 patch, Refills: 0    Associated Diagnoses: Tobacco abuse      nitroGLYcerin (NITROSTAT) 0.4 MG sublingual tablet For chest pain place 1 tablet under the tongue every 5 minutes for 3 doses. If symptoms persist 5 minutes after 1st dose call 911.  Qty: 20 tablet, Refills: 0    Associated Diagnoses: Chest pain, unspecified type         CONTINUE these medications which have CHANGED    Details   nicotine (NICODERM CQ) 7 MG/24HR 24 hr patch Place 1 patch onto the skin every 24 hours (Talk with your primary provider or vascular medicine provider about when to start this dose)  Qty: 30 patch, Refills: 1    Associated Diagnoses: Tobacco abuse; PAD (peripheral artery disease) (H)      omeprazole (PRILOSEC) 40 MG DR capsule Take 1 capsule (40 mg) by mouth 2 times daily TAKE 1 CAPSULE BY MOUTH EVERY DAY 30 TO 60 MINUTES BEFORE A MEAL  Qty: 90 capsule, Refills: 0    Associated Diagnoses: Gastroesophageal reflux disease without esophagitis         CONTINUE these medications which have NOT CHANGED    Details   albuterol (PROAIR RESPICLICK) 108 (90 Base) MCG/ACT inhaler Inhale 1-2 puffs into the lungs every 4 hours as needed  Qty: 1 each, Refills: 3    Associated Diagnoses: Acute bronchitis, unspecified organism      aspirin (ASA) 81 MG chewable tablet Take 162 mg by mouth daily      atorvastatin (LIPITOR) 40 MG tablet Take 1 tablet (40 mg) by mouth daily  Qty: 90 tablet, Refills: 2    Associated Diagnoses: Hyperlipidemia LDL goal <100      chlorthalidone (HYGROTON) 25 MG tablet TAKE 1 TABLET(25 MG) BY MOUTH DAILY  Qty: 90 tablet, Refills: 0    Associated Diagnoses: HTN (hypertension), benign      Cholecalciferol (VITAMIN D-3) 5000 UNIT TABS Take 1 tablet by mouth daily.      coenzyme Q-10 200  MG CAPS Take 200 mg by mouth daily      Cranberry-Vitamin C-Vitamin E (CRANBERRY PLUS VITAMIN C) 4200-20-3 MG-MG-UNIT CAPS Take 1 tablet by mouth daily      Cyanocobalamin (B-12 PO) Take 5,000 mcg by mouth every other day Liquid form  Qty: 100 tablet, Refills: 1    Associated Diagnoses: Vegetarian diet      estradiol (ESTRACE) 0.1 MG/GM vaginal cream INSERT 1 GRAM VAGINALLY 2 TO 3 TIMES EVERY WEEK  Qty: 42.5 g, Refills: 0    Associated Diagnoses: Atrophic vaginitis      Lactobacillus (PROBIOTIC ACIDOPHILUS PO) Take 1 capsule by mouth daily      metoprolol succinate ER (TOPROL-XL) 100 MG 24 hr tablet TAKE 1 AND 1/2 TABLETS(150 MG) BY MOUTH DAILY  Qty: 135 tablet, Refills: 3    Associated Diagnoses: HTN (hypertension), benign      Multiple Vitamins-Minerals (MULTIVITAMIN GUMMIES ADULT PO) Take 2 chew tab by mouth daily      Sodium Chloride-Xylitol (XLEAR SINUS CARE SPRAY NA) Spray 1 spray in nostril daily as needed      buPROPion (ZYBAN) 150 MG 12 hr tablet Take 1 tablet (150 mg) by mouth 2 times daily  Qty: 180 tablet, Refills: 1    Associated Diagnoses: PAD (peripheral artery disease) (H); Tobacco abuse           Allergies   Allergies   Allergen Reactions     Chantix [Varenicline] Nausea     Ciprofloxacin Other (See Comments)     hypertension     Clopidogrel      Other reaction(s): Hypertension     Decongestant [Cvs]      Erythromycin Nausea     Lisinopril      cough     Plavix [Clopidogrel Bisulfate]      Felt as if she was having a heart attack     Rofecoxib Unknown     Vioxx      Increased BP

## 2022-10-06 NOTE — PHARMACY-ADMISSION MEDICATION HISTORY
Pharmacy Medication History  Admission medication history interview status for the 10/5/2022  admission is complete. See EPIC admission navigator for prior to admission medications     Location of Interview: Patient room  Medication history sources: Patient    Significant changes made to the medication list:    In the past week, patient estimated taking medication this percent of the time: greater than 90%    Additional medication history information:   Patient states she has not yet started the bupropion. States she plans to start taking next week.     Medication reconciliation completed by provider prior to medication history? No    Time spent in this activity: 15 minutes     Prior to Admission medications    Medication Sig Last Dose Taking? Auth Provider Long Term End Date   albuterol (PROAIR RESPICLICK) 108 (90 Base) MCG/ACT inhaler Inhale 1-2 puffs into the lungs every 4 hours as needed More than a month at Unknown time Yes Lyudmila Escobar PA-C Yes    aspirin (ASA) 81 MG chewable tablet Take 162 mg by mouth daily 10/5/2022 at Unknown time Yes Reported, Patient     atorvastatin (LIPITOR) 40 MG tablet Take 1 tablet (40 mg) by mouth daily 10/5/2022 at Unknown time Yes Lyudmila Escobar PA-C Yes    chlorthalidone (HYGROTON) 25 MG tablet TAKE 1 TABLET(25 MG) BY MOUTH DAILY 10/5/2022 at Unknown time Yes Liane Claudio MD Yes    Cholecalciferol (VITAMIN D-3) 5000 UNIT TABS Take 1 tablet by mouth daily. 10/5/2022 at Unknown time Yes Reported, Patient     coenzyme Q-10 200 MG CAPS Take 200 mg by mouth daily 10/5/2022 at Unknown time Yes Reported, Patient     Cranberry-Vitamin C-Vitamin E (CRANBERRY PLUS VITAMIN C) 4200-20-3 MG-MG-UNIT CAPS Take 1 tablet by mouth daily 10/5/2022 at Unknown time Yes Reported, Patient     Cyanocobalamin (B-12 PO) Take 5,000 mcg by mouth every other day Liquid form 10/4/2022 Yes Lyudmila Escobar PA-C     estradiol (ESTRACE) 0.1 MG/GM vaginal cream INSERT 1 GRAM VAGINALLY 2 TO 3  TIMES EVERY WEEK More than a month at Unknown time Yes Lydumila Escobar PA-C     Lactobacillus (PROBIOTIC ACIDOPHILUS PO) Take 1 capsule by mouth daily 10/5/2022 at Unknown time Yes Reported, Patient     metoprolol succinate ER (TOPROL-XL) 100 MG 24 hr tablet TAKE 1 AND 1/2 TABLETS(150 MG) BY MOUTH DAILY 10/5/2022 at Unknown time Yes Lyudmila Escobar PA-C Yes    Multiple Vitamins-Minerals (MULTIVITAMIN GUMMIES ADULT PO) Take 2 chew tab by mouth daily 10/5/2022 at Unknown time Yes Reported, Patient     nicotine (NICODERM CQ) 7 MG/24HR 24 hr patch Place 1 patch onto the skin every 24 hours Past Month at Unknown time Yes Kaila Navarro PA-C     omeprazole (PRILOSEC) 40 MG DR capsule TAKE 1 CAPSULE BY MOUTH EVERY DAY 30 TO 60 MINUTES BEFORE A MEAL 10/5/2022 at Unknown time Yes Lyudmila Escobar PA-C     Sodium Chloride-Xylitol (XLEAR SINUS CARE SPRAY NA) Spray 1 spray in nostril daily as needed More than a month at Unknown time Yes Reported, Patient     buPROPion (ZYBAN) 150 MG 12 hr tablet Take 1 tablet (150 mg) by mouth 2 times daily  Patient not taking: Reported on 10/5/2022 Not Taking at Unknown time  Kaila Navarro PA-C         The information provided in this note is only as accurate as the sources available at the time of update(s)

## 2022-10-06 NOTE — H&P
[facility]    History and Physical - Hospitalist Service       Date of Admission:  10/5/2022    Assessment & Plan      Karen Caban is a 71 year old female with past medical history of peripheral arterial disease status post femoropopliteal bypass, hypertension, hyperlipidemia, GERD and CLL who is presenting for evaluation of chest pain.  She is registered under observation for ACS rule out    Chest pain  Possible etiologies include her typical GERD symptoms given improvement with GI cocktail.  Will admit under observation for ACS rule out and stress testing.  She has risk factors including hypertension, peripheral arterial disease, and an active smoker.  -Troponin x2 ordered  -Lexiscan stress testing in the morning  -Continue her prior to admission PPI daily and will have Tums as needed available  -Telemetry    Hypertension  Hyperlipidemia  -Continue with her prior to admission aspirin.  -Continue PTA chlorthalidone.  -Resume PTA metoprolol after stress testing, currently on hold  -Resume statin at discharge since being registered under observation    Peripheral arterial disease with history of femoral popliteal bypass  -Continue with PTA aspirin, resume statin at discharge    CLL  -Follows with Dr. Osorio and this is being monitored    Tobacco use disorder  - tobacco cessation recommended    GERD  -Continue with daily PPI and Tums as needed available as above         Diet:  Regular diet  DVT Prophylaxis: Ambulate every shift  Uribe Catheter: Not present  Central Lines: None  Cardiac Monitoring: None  Code Status:   Full code, discussed with patient    Clinically Significant Risk Factors Present on Admission                          Disposition Plan       Anticipate discharge tomorrow pending stress test results    The patient's care was discussed with the Patient.    Suman Pace MD  Hospitalist Service  [facility]  Securely message with the [employer]  Web Console (learn more here)  Text page via [facility] Paging/Directory         ______________________________________________________________________    Chief Complaint   Chest pain    History is obtained from the patient    History of Present Illness   Karen Caban is a 71 year old female with past medical history of peripheral arterial disease status post femoropopliteal bypass, hypertension, hyperlipidemia, GERD and CLL who is presenting for evaluation of chest pain.  Patient was in the ED yesterday for similar chest pain.  EKG and troponin were negative.  Sounds like her symptoms improved after GI cocktail.  She was discharged home with outpatient stress testing and advised to return if symptoms return.  Patient presents to the ED with similar chest pain which she describes as a burning sensation over her anterior chest.  Patient starts this pain started again this morning and similar to what brought her to the ED yesterday.  She reports this pain radiates up to her neck which she describes it as a feeling of something cold coming up.  She states its more intense than her typical heartburn symptoms.  She endorses nausea.  She denies vomiting.  She felt some chills may be some sweaty.  Denies shortness of breath cough, abdominal pain.    In the ED, initial blood pressure 162/82 had trended down to normal.  CMP and lipase within normal range.  Troponin 4.  Was 6 then 5 yesterday prior to discharge.  EKG shows sinus rhythm and nonspecific ST abnormality, consider lateral ischemia.  Patient received GI cocktail and her symptoms have resolved.  She is currently being admitted under observation for ACS rule out and stress testing given recurrent presentation to the ED with similar symptoms.      Review of Systems    The 10 point Review of Systems is negative other than noted in the HPI    Past Medical History    I have reviewed this patient's medical history and updated it with pertinent information if needed.    Past Medical History:   Diagnosis Date     Ankle fracture 2009    L     Anxiety      Chronic back pain      Chronic lymphocytic leukemia (H)      Colon polyp 2012    repeat colonoscopy 5 years.     Fx low femur epiphy-closed (H)      GERD (gastroesophageal reflux disease)      History of UTI 2017    Cysto by Dr Nikole xiong     HTN (hypertension), benign      Hyperlipidemia LDL goal < 100      Normal nuclear stress test 12/2009    EF 67%     Osteopenia      PAD (peripheral artery disease) (H)     s/p fem pop bypass; embolectomy     Polycythemia vera (H) 06/15/2022     PONV (postoperative nausea and vomiting)      PUD (peptic ulcer disease) 1980s    DU     Smoker      Vitamin D deficiency        Past Surgical History   I have reviewed this patient's surgical history and updated it with pertinent information if needed.  Past Surgical History:   Procedure Laterality Date     Blood clot removal from Stent      2011     BONE MARROW BIOPSY, BONE SPECIMEN, NEEDLE/TROCAR N/A 02/02/2021    Procedure: bone marrow biopsy;  Surgeon: Kailey Cota MD;  Location:  GI     BYPASS GRAFT AORTOFEMORAL  05/2002    Dr. Turner     BYPASS GRAFT FEMOROPOPLITEAL  12/16/2011     COLONOSCOPY N/A 01/18/2018    Procedure: COMBINED COLONOSCOPY, SINGLE OR MULTIPLE BIOPSY/POLYPECTOMY BY BIOPSY;  COLONOSCOPY;  Surgeon: Efrain Hernadez MD;  Location:  GI     DILATION AND CURETTAGE, OPERATIVE HYSTEROSCOPY, COMBINED N/A 9/15/2022    Procedure: HYSTEROSCOPY, WITH DILATION AND CURETTAGE OF UTERUS;  Surgeon: Destiney Schaefer MD;  Location:  OR     EMBOLECTOMY LOWER EXTREMITY  12/16/2011    Procedure:EMBOLECTOMY LOWER EXTREMITY; EMBOLECTOMY, RIGHT POPLITEAL WITH PATCH ANGIOPLASTY. EXCISION SKIN LESION RIGHT LEG. ; Surgeon:PARMINDER VELAZCO; Location: OR     EXCISE LESION LOWER EXTREMITY  12/16/2011    Procedure:EXCISE LESION LOWER EXTREMITY; Surgeon:PARMINDER VELAZCO; Location: OR     HERNIA REPAIR  11/04/2008     x2 umbilical     L achilles repair  2008     LAPAROSCOPIC OOPHORECTOMY Left     L for cyst age 26     miscarriages x 3       tubal ligation and reversal         Social History   I have reviewed this patient's social history and updated it with pertinent information if needed.  Social History     Tobacco Use     Smoking status: Current Every Day Smoker     Packs/day: 0.75     Years: 50.00     Pack years: 37.50     Types: Cigarettes     Smokeless tobacco: Never Used     Tobacco comment: Nicorette gum and patch, trying to quit    Vaping Use     Vaping Use: Some days   Substance Use Topics     Alcohol use: Yes     Comment: Beer once in a while     Drug use: No       Family History   I have reviewed this patient's family history and updated it with pertinent information if needed.  Family History   Problem Relation Age of Onset     Alzheimer Disease Mother          of panc/GI ca age 83     Osteoporosis Mother      Other Cancer Mother      Cancer Father          age 64 lymphoma     Colon Cancer Maternal Grandfather        Prior to Admission Medications   Prior to Admission Medications   Prescriptions Last Dose Informant Patient Reported? Taking?   Cholecalciferol (VITAMIN D-3) 5000 UNIT TABS 10/5/2022 at Unknown time  Yes Yes   Sig: Take 1 tablet by mouth daily.   Cranberry-Vitamin C-Vitamin E (CRANBERRY PLUS VITAMIN C) 4200-20-3 MG-MG-UNIT CAPS 10/5/2022 at Unknown time  Yes Yes   Sig: Take 1 tablet by mouth daily   Cyanocobalamin (B-12 PO) 10/4/2022  Yes Yes   Sig: Take 5,000 mcg by mouth every other day Liquid form   Lactobacillus (PROBIOTIC ACIDOPHILUS PO) 10/5/2022 at Unknown time  Yes Yes   Sig: Take 1 capsule by mouth daily   Multiple Vitamins-Minerals (MULTIVITAMIN GUMMIES ADULT PO) 10/5/2022 at Unknown time  Yes Yes   Sig: Take 2 chew tab by mouth daily   Sodium Chloride-Xylitol (XLEAR SINUS CARE SPRAY NA) More than a month at Unknown time  Yes Yes   Sig: Spray 1 spray in nostril daily as needed    albuterol (PROAIR RESPICLICK) 108 (90 Base) MCG/ACT inhaler More than a month at Unknown time  No Yes   Sig: Inhale 1-2 puffs into the lungs every 4 hours as needed   aspirin (ASA) 81 MG chewable tablet 10/5/2022 at Unknown time  Yes Yes   Sig: Take 162 mg by mouth daily   atorvastatin (LIPITOR) 40 MG tablet 10/5/2022 at Unknown time  No Yes   Sig: Take 1 tablet (40 mg) by mouth daily   buPROPion (ZYBAN) 150 MG 12 hr tablet Not Taking at Unknown time  No No   Sig: Take 1 tablet (150 mg) by mouth 2 times daily   Patient not taking: Reported on 10/5/2022   chlorthalidone (HYGROTON) 25 MG tablet 10/5/2022 at Unknown time  No Yes   Sig: TAKE 1 TABLET(25 MG) BY MOUTH DAILY   coenzyme Q-10 200 MG CAPS 10/5/2022 at Unknown time  Yes Yes   Sig: Take 200 mg by mouth daily   estradiol (ESTRACE) 0.1 MG/GM vaginal cream More than a month at Unknown time  No Yes   Sig: INSERT 1 GRAM VAGINALLY 2 TO 3 TIMES EVERY WEEK   metoprolol succinate ER (TOPROL-XL) 100 MG 24 hr tablet 10/5/2022 at Unknown time  No Yes   Sig: TAKE 1 AND 1/2 TABLETS(150 MG) BY MOUTH DAILY   nicotine (NICODERM CQ) 7 MG/24HR 24 hr patch Past Month at Unknown time  No Yes   Sig: Place 1 patch onto the skin every 24 hours   omeprazole (PRILOSEC) 40 MG DR capsule 10/5/2022 at Unknown time  No Yes   Sig: TAKE 1 CAPSULE BY MOUTH EVERY DAY 30 TO 60 MINUTES BEFORE A MEAL      Facility-Administered Medications: None     Allergies   Allergies   Allergen Reactions     Chantix [Varenicline] Nausea     Ciprofloxacin Other (See Comments)     hypertension     Clopidogrel      Other reaction(s): Hypertension     Decongestant [Cvs]      Erythromycin Nausea     Lisinopril      cough     Plavix [Clopidogrel Bisulfate]      Felt as if she was having a heart attack     Rofecoxib Unknown     Vioxx      Increased BP       Physical Exam   Vital Signs: Temp: 98.1  F (36.7  C) Temp src: Oral BP: 115/64 Pulse: 88   Resp: 16 SpO2: 100 % O2 Device: None (Room air)    Weight: 123 lbs 0  oz    General Appearance: alert, awake and no apparent distress  HEENT: Normocephalic, atraumatic, oral mucosa is moist, no pharyngeal erythema or exudates  Respiratory: Clear to auscultation bilaterally no wheezing  Cardiovascular: Regular rate and rhythm  GI: Soft and nontender  Skin: Warm and dry  Musculoskeletal: No lower extremity edema  Neurologic: Alert and oriented, moving all extremities equally  Psychiatric: Mood and affect are normal    Data   Data reviewed today: I reviewed all medications, new labs and imaging results over the last 24 hours. I personally reviewed the EKG tracing showing As mentioned above.    Recent Labs   Lab 10/05/22  1418 10/04/22  1533   WBC 24.2* 27.1*   HGB 15.6 15.5   MCV 93 94    171    137   POTASSIUM 3.9 3.1*   CHLORIDE 98 103   CO2 32 25   BUN 7 10   CR 0.56 0.57   ANIONGAP 6 9   CHANDAN 9.1 9.2   * 123*   ALBUMIN 3.8  --    PROTTOTAL 6.9  --    BILITOTAL 0.7  --    ALKPHOS 79  --    ALT 34  --    AST 31  --    LIPASE 128  --      No results found for this or any previous visit (from the past 24 hour(s)).

## 2022-10-06 NOTE — PROGRESS NOTES
List all goals to be met before discharge home:   - Serial troponins and stress test complete. No  - Seen and cleared by consultant if applicable NA  - Adequate pain control on oral analgesia  Yes  - Vital signs normal or at patient baseline  Yes  - Safe disposition plan has been identified  yes

## 2022-10-06 NOTE — PROGRESS NOTES
Patient reports no pain.  Smoker. Occasional inhaler usage with viral infections.  No caffeine in the last 24hrs.

## 2022-10-06 NOTE — PLAN OF CARE
Goal Outcome Evaluation:  A&OX4, VSS on RA. Denies pain, n6V. All discharge instructions reviewed with pt and sister, both verbalized understanding. All belongings returned to pt. Stable at time of discharge.

## 2022-10-06 NOTE — PROGRESS NOTES
No chest pain.  Just shortness of breath initially.  Patient to go back to room prior to last set of pictures.

## 2022-10-06 NOTE — PLAN OF CARE
Goal Outcome Evaluation:   RECEIVING UNIT ED HANDOFF REVIEW    ED Nurse Handoff Report was reviewed by: Nirav Jules RN on October 6, 2022 at 7:48 AM

## 2022-10-07 ENCOUNTER — HOSPITAL ENCOUNTER (OUTPATIENT)
Facility: CLINIC | Age: 72
Discharge: HOME | End: 2022-10-07
Attending: INTERNAL MEDICINE | Admitting: INTERNAL MEDICINE
Payer: COMMERCIAL

## 2022-10-07 VITALS
DIASTOLIC BLOOD PRESSURE: 90 MMHG | SYSTOLIC BLOOD PRESSURE: 118 MMHG | OXYGEN SATURATION: 98 % | RESPIRATION RATE: 17 BRPM | HEART RATE: 56 BPM

## 2022-10-07 LAB — UPPER GI ENDOSCOPY: NORMAL

## 2022-10-07 PROCEDURE — 250N000011 HC RX IP 250 OP 636: Performed by: INTERNAL MEDICINE

## 2022-10-07 PROCEDURE — 250N000009 HC RX 250: Performed by: INTERNAL MEDICINE

## 2022-10-07 PROCEDURE — 999N000099 HC STATISTIC MODERATE SEDATION < 10 MIN: Performed by: INTERNAL MEDICINE

## 2022-10-07 PROCEDURE — G0500 MOD SEDAT ENDO SERVICE >5YRS: HCPCS | Performed by: INTERNAL MEDICINE

## 2022-10-07 PROCEDURE — 88305 TISSUE EXAM BY PATHOLOGIST: CPT | Mod: TC | Performed by: INTERNAL MEDICINE

## 2022-10-07 PROCEDURE — 43239 EGD BIOPSY SINGLE/MULTIPLE: CPT | Performed by: INTERNAL MEDICINE

## 2022-10-07 RX ORDER — NALOXONE HYDROCHLORIDE 0.4 MG/ML
0.4 INJECTION, SOLUTION INTRAMUSCULAR; INTRAVENOUS; SUBCUTANEOUS
Status: DISCONTINUED | OUTPATIENT
Start: 2022-10-07 | End: 2022-10-07 | Stop reason: HOSPADM

## 2022-10-07 RX ORDER — ONDANSETRON 4 MG/1
4 TABLET, ORALLY DISINTEGRATING ORAL EVERY 6 HOURS PRN
Status: DISCONTINUED | OUTPATIENT
Start: 2022-10-07 | End: 2022-10-07 | Stop reason: HOSPADM

## 2022-10-07 RX ORDER — FLUMAZENIL 0.1 MG/ML
0.2 INJECTION, SOLUTION INTRAVENOUS
Status: DISCONTINUED | OUTPATIENT
Start: 2022-10-07 | End: 2022-10-07 | Stop reason: HOSPADM

## 2022-10-07 RX ORDER — NALOXONE HYDROCHLORIDE 0.4 MG/ML
0.2 INJECTION, SOLUTION INTRAMUSCULAR; INTRAVENOUS; SUBCUTANEOUS
Status: DISCONTINUED | OUTPATIENT
Start: 2022-10-07 | End: 2022-10-07 | Stop reason: HOSPADM

## 2022-10-07 RX ORDER — LIDOCAINE 40 MG/G
CREAM TOPICAL
Status: DISCONTINUED | OUTPATIENT
Start: 2022-10-07 | End: 2022-10-07 | Stop reason: HOSPADM

## 2022-10-07 RX ORDER — PROCHLORPERAZINE MALEATE 5 MG
5 TABLET ORAL EVERY 6 HOURS PRN
Status: DISCONTINUED | OUTPATIENT
Start: 2022-10-07 | End: 2022-10-07 | Stop reason: HOSPADM

## 2022-10-07 RX ORDER — ONDANSETRON 2 MG/ML
4 INJECTION INTRAMUSCULAR; INTRAVENOUS EVERY 6 HOURS PRN
Status: DISCONTINUED | OUTPATIENT
Start: 2022-10-07 | End: 2022-10-07 | Stop reason: HOSPADM

## 2022-10-07 RX ORDER — ONDANSETRON 2 MG/ML
4 INJECTION INTRAMUSCULAR; INTRAVENOUS
Status: DISCONTINUED | OUTPATIENT
Start: 2022-10-07 | End: 2022-10-07 | Stop reason: HOSPADM

## 2022-10-07 RX ORDER — EPINEPHRINE 1 MG/ML
0.1 INJECTION, SOLUTION, CONCENTRATE INTRAVENOUS
Status: DISCONTINUED | OUTPATIENT
Start: 2022-10-07 | End: 2022-10-07 | Stop reason: HOSPADM

## 2022-10-07 RX ORDER — SIMETHICONE 40MG/0.6ML
133 SUSPENSION, DROPS(FINAL DOSAGE FORM)(ML) ORAL
Status: DISCONTINUED | OUTPATIENT
Start: 2022-10-07 | End: 2022-10-07 | Stop reason: HOSPADM

## 2022-10-07 RX ORDER — FENTANYL CITRATE 50 UG/ML
INJECTION, SOLUTION INTRAMUSCULAR; INTRAVENOUS PRN
Status: COMPLETED | OUTPATIENT
Start: 2022-10-07 | End: 2022-10-07

## 2022-10-07 RX ORDER — ATROPINE SULFATE 0.1 MG/ML
1 INJECTION INTRAVENOUS
Status: DISCONTINUED | OUTPATIENT
Start: 2022-10-07 | End: 2022-10-07 | Stop reason: HOSPADM

## 2022-10-07 RX ORDER — FENTANYL CITRATE 50 UG/ML
50-100 INJECTION, SOLUTION INTRAMUSCULAR; INTRAVENOUS EVERY 5 MIN PRN
Status: DISCONTINUED | OUTPATIENT
Start: 2022-10-07 | End: 2022-10-07 | Stop reason: HOSPADM

## 2022-10-07 RX ORDER — DIPHENHYDRAMINE HYDROCHLORIDE 50 MG/ML
25-50 INJECTION INTRAMUSCULAR; INTRAVENOUS
Status: DISCONTINUED | OUTPATIENT
Start: 2022-10-07 | End: 2022-10-07 | Stop reason: HOSPADM

## 2022-10-07 RX ADMIN — MIDAZOLAM 2 MG: 1 INJECTION INTRAMUSCULAR; INTRAVENOUS at 11:35

## 2022-10-07 RX ADMIN — TOPICAL ANESTHETIC 1 SPRAY: 200 SPRAY DENTAL; PERIODONTAL at 11:33

## 2022-10-07 RX ADMIN — FENTANYL CITRATE 100 MCG: 50 INJECTION, SOLUTION INTRAMUSCULAR; INTRAVENOUS at 11:33

## 2022-10-07 ASSESSMENT — ACTIVITIES OF DAILY LIVING (ADL): ADLS_ACUITY_SCORE: 37

## 2022-10-08 ENCOUNTER — PATIENT OUTREACH (OUTPATIENT)
Dept: CARE COORDINATION | Facility: CLINIC | Age: 72
End: 2022-10-08

## 2022-10-08 NOTE — PROGRESS NOTES
Clinic Care Coordination Contact  [facility]/Voicemail       Clinical Data: Care Coordinator Outreach  Outreach attempted x 2.  Unable to lvm    Plan: Care Coordinator will do no further outreaches at this time.          GERRI Archuleta  [phone]  [facility]

## 2022-10-10 PROCEDURE — 88305 TISSUE EXAM BY PATHOLOGIST: CPT | Mod: 26 | Performed by: PATHOLOGY

## 2022-10-11 ENCOUNTER — HOSPITAL ENCOUNTER (OUTPATIENT)
Dept: CT IMAGING | Facility: CLINIC | Age: 72
Discharge: HOME | End: 2022-10-11
Attending: PHYSICIAN ASSISTANT | Admitting: PHYSICIAN ASSISTANT
Payer: COMMERCIAL

## 2022-10-11 ENCOUNTER — HOSPITAL ENCOUNTER (OUTPATIENT)
Dept: ULTRASOUND IMAGING | Facility: CLINIC | Age: 72
Discharge: HOME | End: 2022-10-11
Attending: PHYSICIAN ASSISTANT
Payer: COMMERCIAL

## 2022-10-11 DIAGNOSIS — I73.9 PAD (PERIPHERAL ARTERY DISEASE) (H): ICD-10-CM

## 2022-10-11 DIAGNOSIS — I65.23 CAROTID STENOSIS, ASYMPTOMATIC, BILATERAL: ICD-10-CM

## 2022-10-11 PROCEDURE — 250N000009 HC RX 250: Performed by: PHYSICIAN ASSISTANT

## 2022-10-11 PROCEDURE — 93880 EXTRACRANIAL BILAT STUDY: CPT

## 2022-10-11 PROCEDURE — 75635 CT ANGIO ABDOMINAL ARTERIES: CPT

## 2022-10-11 PROCEDURE — 250N000011 HC RX IP 250 OP 636: Performed by: PHYSICIAN ASSISTANT

## 2022-10-11 RX ORDER — IOPAMIDOL 755 MG/ML
100 INJECTION, SOLUTION INTRAVASCULAR ONCE
Status: COMPLETED | OUTPATIENT
Start: 2022-10-11 | End: 2022-10-11

## 2022-10-11 RX ADMIN — IOPAMIDOL 100 ML: 755 INJECTION, SOLUTION INTRAVENOUS at 09:45

## 2022-10-11 RX ADMIN — SODIUM CHLORIDE 80 ML: 9 INJECTION, SOLUTION INTRAVENOUS at 09:45

## 2022-10-17 ENCOUNTER — OFFICE VISIT (OUTPATIENT)
Dept: OTHER | Facility: CLINIC | Age: 72
End: 2022-10-17
Attending: PHYSICIAN ASSISTANT
Payer: COMMERCIAL

## 2022-10-17 VITALS — SYSTOLIC BLOOD PRESSURE: 159 MMHG | DIASTOLIC BLOOD PRESSURE: 92 MMHG | HEART RATE: 71 BPM | OXYGEN SATURATION: 97 %

## 2022-10-17 DIAGNOSIS — I73.9 PAD (PERIPHERAL ARTERY DISEASE) (H): ICD-10-CM

## 2022-10-17 DIAGNOSIS — Z72.0 TOBACCO ABUSE: ICD-10-CM

## 2022-10-17 DIAGNOSIS — I65.23 CAROTID STENOSIS, ASYMPTOMATIC, BILATERAL: ICD-10-CM

## 2022-10-17 PROCEDURE — G0463 HOSPITAL OUTPT CLINIC VISIT: HCPCS

## 2022-10-17 PROCEDURE — 99213 OFFICE O/P EST LOW 20 MIN: CPT | Performed by: SURGERY

## 2022-10-17 NOTE — PROGRESS NOTES
Karen Caban is 71 years old.  Sometime in 2008 of 2009 she underwent aorto by iliac bypass.  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  This was recanalized and stented.    Recent imaging shows developing stenosis in the proximal extent of the previous stent and in-stent stenosis of the external iliac artery stent.    She is asymptomatic.    I also went back to look at the old imaging but none of these were CT angiogram.  It is hard to say if any of the stenosis or intimal hyperplasia was present on the previous imaging or not.    Interestingly it has once embolized before and required a tibioperoneal embolectomy in 2011.    We will plan to proceed with right femoral artery cutdown to prevent against distal embolization and balloon angioplasty of the areas of stenosis and possible relining with stent.  We will also perform intravascular ultrasonography at the same time.    Explained plan and rationale to the patient and she has verbalized her understanding.

## 2022-10-17 NOTE — NURSING NOTE
Patient Education    Procedure: Right femoral cutdown, aortoiliac angiogram, intravascular ultrasound, iliac angioplasty.  Diagnosis: Iliac in stent stenosis.   Anticoagulation Instruction: continue aspirin.  Pre-Operative Physical Exam: You need to have a pre-op physical exam within 30 days of your procedure. Your procedure may be cancelled if you do not have a current History and Physical. Call your PCP's office to schedule.  Allergies:  Updated in Epic  Bowel Prep: n/a  NPO per protocol.   Post Procedure Education: [facility]ascular [facility] patient post-procedure fact sheet reviewed with patient.    COVID-19 testing for AM Admit procedures:    2-4 days before your procedure, please get a PCR test from a lab. To schedule a PCR test with [facility], call [phone]. Or, visit [email or web address].   Please ask the testing location to fax your results to us at [phone].     Once COVID test is obtained, pt to isolate to reduce risk of exposure up to date of surgery.    Showering instructions reviewed: Yes,   Chlorhexidine soap supplied.     Learner(s):patient  Method: Listening  Barriers to Learning:No Barrier  Outcome: Patient did verbalize understanding of above education.    URI Briones, RN  [facility]  Office:  [phone] Fax: [phone]

## 2022-10-17 NOTE — PROGRESS NOTES
Patient is here to discuss consult.    BP (!) 159/92 (BP Location: Right arm, Patient Position: Chair, Cuff Size: Adult Regular)   Pulse 71   SpO2 97%     Questions patient would like addressed today are: N/A.    Refills are needed: N/A    Has homecare services and agency name:  [facility] Albre[facility]

## 2022-10-21 ENCOUNTER — TELEPHONE (OUTPATIENT)
Dept: OTHER | Facility: CLINIC | Age: 72
End: 2022-10-21

## 2022-10-21 DIAGNOSIS — I73.9 PAD (PERIPHERAL ARTERY DISEASE) (H): Primary | ICD-10-CM

## 2022-10-21 DIAGNOSIS — I77.1 ILIAC ARTERY STENOSIS, RIGHT (H): Primary | ICD-10-CM

## 2022-10-21 NOTE — TELEPHONE ENCOUNTER
Case received for   ENDARTERECTOMY, FEMORAL RIGHT FEMORAL CUTDOWN (Right)   ANGIOGRAM AORTO ILIAC ANGIOGRAM (Right)     Case ID: [id number]      Lorna Weaver, Surgery Scheduling   [facility]

## 2022-10-24 NOTE — TELEPHONE ENCOUNTER
LVM for pt to call back to discuss available dates/times for surgery.       Lorna Weaver, Surgery Scheduling   [facility]  [facility]

## 2022-10-26 NOTE — TELEPHONE ENCOUNTER
LVM providing surgery date/time, pre-op, covid testing, and post-op instructions.     Encouraged pt to call back if she had questions.       Lorna Weaver, Surgery Scheduling   [facility]

## 2022-11-02 ENCOUNTER — OFFICE VISIT (OUTPATIENT)
Dept: FAMILY MEDICINE | Facility: CLINIC | Age: 72
End: 2022-11-02
Payer: COMMERCIAL

## 2022-11-02 VITALS
TEMPERATURE: 98.5 F | RESPIRATION RATE: 16 BRPM | DIASTOLIC BLOOD PRESSURE: 84 MMHG | HEIGHT: 65 IN | HEART RATE: 71 BPM | SYSTOLIC BLOOD PRESSURE: 132 MMHG | WEIGHT: 120.9 LBS | BODY MASS INDEX: 20.14 KG/M2

## 2022-11-02 DIAGNOSIS — F17.200 SMOKER: ICD-10-CM

## 2022-11-02 DIAGNOSIS — I77.1 ILIAC ARTERY STENOSIS, RIGHT (H): ICD-10-CM

## 2022-11-02 DIAGNOSIS — Z01.818 PREOP GENERAL PHYSICAL EXAM: Primary | ICD-10-CM

## 2022-11-02 PROCEDURE — 99214 OFFICE O/P EST MOD 30 MIN: CPT | Performed by: NURSE PRACTITIONER

## 2022-11-02 ASSESSMENT — PAIN SCALES - GENERAL: PAINLEVEL: NO PAIN (0)

## 2022-11-02 NOTE — H&P (VIEW-ONLY)
[address], [address]  [facility] [address]  Phone: [phone]  Primary Provider: Liane Claudio  Pre-op Performing Provider: HEBERT NOVAK      PREOPERATIVE EVALUATION:  Today's date: 11/2/2022    Karen Caban is a 71 year old female who presents for a preoperative evaluation.    Surgical Information:  Surgery/Procedure:   ENDARTERECTOMY, FEMORAL RIGHT FEMORAL CUTDOWN Right General   ANGIOGRAM AORTO ILIAC ANGIOGRAM         Surgery Location:  OR  Surgeon: Shaun Tran MD  Surgery Date: 11-18-20223  Time of Surgery: 9:30am  Where patient plans to recover: At home with family  Fax number for surgical facility: Note does not need to be faxed, will be available electronically in Epic.    Type of Anesthesia Anticipated: General    Assessment & Plan     The proposed surgical procedure is considered INTERMEDIATE risk.    (Z01.818) Preop general physical exam  (primary encounter diagnosis)  Comment: OK for surgery. No concerns today   Plan:     (I77.1) Iliac artery stenosis, right (H)  Comment:   Plan:     (F17.200) Smoker  Comment: plan to quit starting next Monday. She has patches.    Plan:     MED REC REQUIRED  Post Medication Reconciliation Status: discharge medications reconciled, continue medications without change       Risks and Recommendations:  The patient has the following additional risks and recommendations for perioperative complications:   - No identified additional risk factors other than previously addressed    Medication Instructions:  Patient is to take all scheduled medications on the day of surgery EXCEPT for modifications listed below:  Hold vitamins, chlorthalidone and ASA moring of surgery       RECOMMENDATION:  APPROVAL GIVEN to proceed with proposed procedure, without further diagnostic evaluation.        Subjective     HPI related to upcoming procedure: going for endarterectomy    Has been feeling well   No concerns today        Preop Questions 10/27/2022   1. Have you ever had a heart attack or stroke? No   2. Have you ever had surgery on your heart or blood vessels, such as a stent placement, a coronary artery bypass, or surgery on an artery in your head, neck, heart, or legs? YES - fem pop 2009    3. Do you have chest pain with activity? No   4. Do you have a history of  heart failure? No   5. Do you currently have a cold, bronchitis or symptoms of other infection? No   6. Do you have a cough, shortness of breath, or wheezing? No   7. Do you or anyone in your family have previous history of blood clots? No   8. Do you or does anyone in your family have a serious bleeding problem such as prolonged bleeding following surgeries or cuts? No   9. Have you ever had problems with anemia or been told to take iron pills? No   10. Have you had any abnormal blood loss such as black, tarry or bloody stools, or abnormal vaginal bleeding? No   11. Have you ever had a blood transfusion? No   12. Are you willing to have a blood transfusion if it is medically needed before, during, or after your surgery? Yes   13. Have you or any of your relatives ever had problems with anesthesia? No   14. Do you have sleep apnea, excessive snoring or daytime drowsiness? No   15. Do you have any artifical heart valves or other implanted medical devices like a pacemaker, defibrillator, or continuous glucose monitor? No   16. Do you have artificial joints? No   17. Are you allergic to latex? No       Health Care Directive:  Patient does not have a Health Care Directive or Living Will:     Preoperative Review of :   reviewed - historical      Status of Chronic Conditions:  See problem list for active medical problems.  Problems all longstanding and stable, except as noted/documented.  See ROS for pertinent symptoms related to these conditions.      Review of Systems  Constitutional, neuro, ENT, endocrine, pulmonary, cardiac, gastrointestinal, genitourinary,  musculoskeletal, integument and psychiatric systems are negative, except as otherwise noted.    Patient Active Problem List    Diagnosis Date Noted     CLL (chronic lymphocytic leukemia) (H) 08/29/2022     Priority: Medium     her CLL is stable. The patient has Webb stage 0 chronic lymphocytic leukemia.  No indication for treatment.       Polycythemia vera (H) 06/15/2022     Priority: Medium     Abnormal CT lung screening 01/10/2019     Priority: Medium     Currently managed with follow up imaging by [facility] Team.         Gastroesophageal reflux disease without esophagitis 12/08/2015     Priority: Medium     Anxiety      Priority: Medium     Patient is followed by LYUDMILA BALDERAS for ongoing prescription of benzodiazepines.  All refills should be approved by this provider, or covering partner.    Medication(s): Lorazepam.   Maximum quantity per month: 20  Clinic visit frequency required:  Q6months    Benzodiazepine use reviewed by psychiatry:  No  CSA: 1/22/14    Last [facility] website verification: 09/25/2020 [place]    [email or web address]         Controlled substance agreement signed on 1- with Lyudmila Balderas PAC 01/22/2014     Priority: Medium     GERD (gastroesophageal reflux disease)      Priority: Medium     Colon polyp      Priority: Medium     repeat colonoscopy 5 years.       Advanced directives, counseling/discussion 11/26/2012     Priority: Medium     Received outside advance directive.  HCD:Previously signed by patient and notarized by .  scanned into EMR as Advance Directive/Living Will document. View document and details in Code Status History Report. Please see advance directive for specifics.   Health Care Directive reviewed and documented.  11/26/12 HENRI Pacheco LPN         Vitamin D deficiency      Priority: Medium     Osteopenia      Priority: Medium     HTN (hypertension), benign      Priority: Medium     Hyperlipidemia with target LDL less than  100      Priority: Medium     Diagnosis updated by automated process. Provider to review and confirm.       Fx low femur epiphy-closed (H)      Priority: Medium     Chronic back pain      Priority: Medium     PAD (peripheral artery disease) (H)      Priority: Medium     PUD (peptic ulcer disease)      Priority: Medium     DU       Normal nuclear stress test      Priority: Medium     EF 67%       Smoker      Priority: Medium      Past Medical History:   Diagnosis Date     Ankle fracture 2009    L     Anxiety      Chronic back pain      Chronic lymphocytic leukemia (H)      Colon polyp 2012    repeat colonoscopy 5 years.     Fx low femur epiphy-closed (H)      GERD (gastroesophageal reflux disease)      History of UTI 2017    Cysto by Dr Agustin neg     HTN (hypertension), benign      Hyperlipidemia LDL goal < 100      Normal nuclear stress test 12/2009    EF 67%     Osteopenia      PAD (peripheral artery disease) (H)     s/p fem pop bypass; embolectomy     Polycythemia vera (H) 06/15/2022     PONV (postoperative nausea and vomiting)      PUD (peptic ulcer disease) 1980s    DU     Smoker      Vitamin D deficiency      Past Surgical History:   Procedure Laterality Date     Blood clot removal from Stent      2011     BONE MARROW BIOPSY, BONE SPECIMEN, NEEDLE/TROCAR N/A 02/02/2021    Procedure: bone marrow biopsy;  Surgeon: Kailey Cota MD;  Location:  GI     BYPASS GRAFT AORTOFEMORAL  05/2002    Dr. Turner     BYPASS GRAFT FEMOROPOPLITEAL  12/16/2011     COLONOSCOPY N/A 01/18/2018    Procedure: COMBINED COLONOSCOPY, SINGLE OR MULTIPLE BIOPSY/POLYPECTOMY BY BIOPSY;  COLONOSCOPY;  Surgeon: Efrain Hernadez MD;  Location:  GI     DILATION AND CURETTAGE, OPERATIVE HYSTEROSCOPY, COMBINED N/A 9/15/2022    Procedure: HYSTEROSCOPY, WITH DILATION AND CURETTAGE OF UTERUS;  Surgeon: Destiney Schaefer MD;  Location:  OR     EMBOLECTOMY LOWER EXTREMITY  12/16/2011    Procedure:EMBOLECTOMY LOWER EXTREMITY;  EMBOLECTOMY, RIGHT POPLITEAL WITH PATCH ANGIOPLASTY. EXCISION SKIN LESION RIGHT LEG. ; Surgeon:PARMINDER VELAZCO; Location:[facility] OR     ESOPHAGOSCOPY, GASTROSCOPY, DUODENOSCOPY (EGD), COMBINED N/A 10/7/2022    Procedure: ESOPHAGOGASTRODUODENOSCOPY, WITH BIOPSY;  Surgeon: Felix Carmona MD;  Location: [facility] GI     EXCISE LESION LOWER EXTREMITY  12/16/2011    Procedure:EXCISE LESION LOWER EXTREMITY; Surgeon:PARMINDER VELAZCO; Location:[facility] OR     HERNIA REPAIR  11/04/2008    x2 umbilical     L achilles repair  12/04/2008     LAPAROSCOPIC OOPHORECTOMY Left     L for cyst age 26     miscarriages x 3       tubal ligation and reversal       Current Outpatient Medications   Medication Sig Dispense Refill     albuterol (PROAIR RESPICLICK) 108 (90 Base) MCG/ACT inhaler Inhale 1-2 puffs into the lungs every 4 hours as needed (Patient not taking: Reported on 10/17/2022) 1 each 3     aspirin (ASA) 81 MG chewable tablet Take 162 mg by mouth daily       atorvastatin (LIPITOR) 40 MG tablet Take 1 tablet (40 mg) by mouth daily 90 tablet 2     buPROPion (ZYBAN) 150 MG 12 hr tablet Take 1 tablet (150 mg) by mouth 2 times daily (Patient not taking: Reported on 10/5/2022) 180 tablet 1     chlorthalidone (HYGROTON) 25 MG tablet TAKE 1 TABLET(25 MG) BY MOUTH DAILY 90 tablet 0     Cholecalciferol (VITAMIN D-3) 5000 UNIT TABS Take 1 tablet by mouth daily.       coenzyme Q-10 200 MG CAPS Take 200 mg by mouth daily       Cranberry-Vitamin C-Vitamin E (CRANBERRY PLUS VITAMIN C) 4200-20-3 MG-MG-UNIT CAPS Take 1 tablet by mouth daily       Cyanocobalamin (B-12 PO) Take 5,000 mcg by mouth every other day Liquid form 100 tablet 1     estradiol (ESTRACE) 0.1 MG/GM vaginal cream INSERT 1 GRAM VAGINALLY 2 TO 3 TIMES EVERY WEEK 42.5 g 0     Lactobacillus (PROBIOTIC ACIDOPHILUS PO) Take 1 capsule by mouth daily       metoprolol succinate ER (TOPROL-XL) 100 MG 24 hr tablet TAKE 1 AND 1/2 TABLETS(150 MG) BY MOUTH DAILY 135 tablet 3     Multiple  Vitamins-Minerals (MULTIVITAMIN GUMMIES ADULT PO) Take 2 chew tab by mouth daily       nicotine (COMMIT) 2 MG lozenge Place 1 lozenge (2 mg) inside cheek every hour as needed for smoking cessation 72 lozenge 0     nicotine (NICODERM CQ) 14 MG/24HR 24 hr patch Place 1 patch onto the skin every 24 hours (Talk with primary provider or vascular medicine provider about when to move to 7mg dose) (Patient not taking: Reported on 10/17/2022) 14 patch 0     nicotine (NICODERM CQ) 7 MG/24HR 24 hr patch Place 1 patch onto the skin every 24 hours (Talk with your primary provider or vascular medicine provider about when to start this dose) 30 patch 1     nitroGLYcerin (NITROSTAT) 0.4 MG sublingual tablet For chest pain place 1 tablet under the tongue every 5 minutes for 3 doses. If symptoms persist 5 minutes after 1st dose call 911. 20 tablet 0     omeprazole (PRILOSEC) 40 MG DR capsule Take 1 capsule (40 mg) by mouth 2 times daily TAKE 1 CAPSULE BY MOUTH EVERY DAY 30 TO 60 MINUTES BEFORE A MEAL 90 capsule 0     sertraline (ZOLOFT) 50 MG tablet Take 50 mg by mouth daily       Sodium Chloride-Xylitol (XLEAR SINUS CARE SPRAY NA) Spray 1 spray in nostril daily as needed         Allergies   Allergen Reactions     Chantix [Varenicline] Nausea     Ciprofloxacin Other (See Comments)     hypertension     Clopidogrel      Other reaction(s): Hypertension     Decongestant [Cvs]      Erythromycin Nausea     Lisinopril      cough     Plavix [Clopidogrel Bisulfate]      Felt as if she was having a heart attack     Rofecoxib Unknown     Vioxx      Increased BP        Social History     Tobacco Use     Smoking status: Every Day     Packs/day: 0.75     Years: 50.00     Pack years: 37.50     Types: Cigarettes     Smokeless tobacco: Never     Tobacco comments:     Nicorette gum and patch, trying to quit 2021   Substance Use Topics     Alcohol use: Yes     Comment: Beer once in a while     Family History   Problem Relation Age of Onset      "Alzheimer Disease Mother          of panc/GI ca age 83     Osteoporosis Mother      Other Cancer Mother      Cancer Father          age 64 lymphoma     Colon Cancer Maternal Grandfather      History   Drug Use No         Objective     /84 (BP Location: Right arm, Patient Position: Sitting, Cuff Size: Adult Regular)   Pulse 71   Temp 98.5  F (36.9  C) (Tympanic)   Resp 16   Ht 1.638 m (5' 4.5\")   Wt 54.8 kg (120 lb 14.4 oz)   PF 99 L/min   BMI 20.43 kg/m      Physical Exam    GENERAL APPEARANCE: healthy, alert and no distress     EYES: EOMI,      RESP: lungs clear to auscultation - no rales, rhonchi or wheezes     CV: regular rates and rhythm, normal S1 S2, no S3 or S4 and no murmur, click or rub     MS: extremities normal- no gross deformities noted, no evidence of inflammation in joints, FROM in all extremities.     SKIN: no suspicious lesions or rashes     NEURO: Normal strength and tone, sensory exam grossly normal, mentation intact and speech normal     PSYCH: mentation appears normal. and affect normal/bright    Recent Labs   Lab Test 10/05/22  1418 10/04/22  1533   HGB 15.6 15.5    171    137   POTASSIUM 3.9 3.1*   CR 0.56 0.57        Diagnostics:  No labs were ordered during this visit.   No EKG this visit, completed in the last 90 days.    Revised Cardiac Risk Index (RCRI):  The patient has the following serious cardiovascular risks for perioperative complications:   - No serious cardiac risks = 0 points     RCRI Interpretation: 0 points: Class I (very low risk - 0.4% complication rate)           Signed Electronically by: KWESI Kern CNP  Copy of this evaluation report is provided to requesting physician.      "

## 2022-11-02 NOTE — PATIENT INSTRUCTIONS
Preparing for Your Surgery  Getting started  A nurse will call you to review your health history and instructions. They will give you an arrival time based on your scheduled surgery time. Please be ready to share:  Your doctor s clinic name and phone number  Your medical, surgical, and anesthesia history  A list of allergies and sensitivities  A list of medicines, including herbal treatments and over-the-counter drugs  Whether the patient has a legal guardian (ask how to send us the papers in advance)  Please tell us if you re pregnant--or if there s any chance you might be pregnant. Some surgeries may injure a fetus (unborn baby), so they require a pregnancy test. Surgeries that are safe for a fetus don t always need a test, and you can choose whether to have one.   If you have a child who s having surgery, please ask for a copy of Preparing for Your Child s Surgery.    Preparing for surgery  Within 10 to 30 days of surgery: Have a pre-op exam (sometimes called an H&P, or History and Physical). This can be done at a clinic or pre-operative center.  If you re having a , you may not need this exam. Talk to your care team.  At your pre-op exam, talk to your care team about all medicines you take. If you need to stop any medicines before surgery, ask when to start taking them again.  We do this for your safety. Many medicines can make you bleed too much during surgery. Some change how well surgery (anesthesia) drugs work.  Call your insurance company to let them know you re having surgery. (If you don t have insurance, call [phone].)  Call your clinic if there s any change in your health. This includes signs of a cold or flu (sore throat, runny nose, cough, rash, fever). It also includes a scrape or scratch near the surgery site.  If you have questions on the day of surgery, call your hospital or surgery center.  COVID testing  You may need to be tested for COVID-19 before having surgery. If so, we will  give you instructions (or click here).  Eating and drinking guidelines  For your safety: Unless your surgeon tells you otherwise, follow the guidelines below.  Eat and drink as usual until 8 hours before you arrive for surgery. After that, no food or milk.  Drink clear liquids until 2 hours before you arrive. These are liquids you can see through, like water, Gatorade, and Propel Water. They also include plain black coffee and tea (no cream or milk), candy, and breath mints. You can spit out gum when you arrive.  If you drink alcohol: Stop drinking it the night before surgery.  If your care team tells you to take medicine on the morning of surgery, it s okay to take it with a sip of water.  Preventing infection  Shower or bathe the night before and morning of your surgery. Follow the instructions your clinic gave you. (If no instructions, use regular soap.)  Don t shave or clip hair near your surgery site. We ll remove the hair if needed.  Don t smoke or vape the morning of surgery. You may chew nicotine gum up to 2 hours before surgery. A nicotine patch is okay.  Note: Some surgeries require you to completely quit smoking and nicotine. Check with your surgeon.  Your care team will make every effort to keep you safe from infection. We will:  Clean our hands often with soap and water (or an alcohol-based hand rub).  Clean the skin at your surgery site with a special soap that kills germs.  Give you a special gown to keep you warm. (Cold raises the risk of infection.)  Wear special hair covers, masks, gowns and gloves during surgery.  Give antibiotic medicine, if prescribed. Not all surgeries need antibiotics.  What to bring on the day of surgery  Photo ID and insurance card  Copy of your health care directive, if you have one  Glasses and hearing aids (bring cases)  You can t wear contacts during surgery  Inhaler and eye drops, if you use them (tell us about these when you arrive)  CPAP machine or breathing device,  if you use them  A few personal items, if spending the night  If you have . . .  A pacemaker, ICD (cardiac defibrillator) or other implant: Bring the ID card.  An implanted stimulator: Bring the remote control.  A legal guardian: Bring a copy of the certified (court-stamped) guardianship papers.  Please remove any jewelry, including body piercings. Leave jewelry and other valuables at home.  If you re going home the day of surgery  You must have a responsible adult drive you home. They should stay with you overnight as well.  If you don t have someone to stay with you, and you aren t safe to go home alone, we may keep you overnight. Insurance often won t pay for this.  After surgery  If it s hard to control your pain or you need more pain medicine, please call your surgeon s office.  Questions?   If you have any questions for your care team, list them here:   ____________________________________________________________________________________________________________________________________________________________________________________________________________________________________________________________________  For informational purposes only. Not to replace the advice of your health care provider. Copyright   2003, 2019 [facility]. All rights reserved. Clinically reviewed by Lexii Morales MD. [employer] [id number] - REV 10/22.        NO vitamins, chlorthalidone morning of surgery     No aspirin morning of surgery on an empty stomach

## 2022-11-02 NOTE — PROGRESS NOTES
[address], [address]  [facility] [address]  Phone: [phone]  Primary Provider: Liane Claudio  Pre-op Performing Provider: HEBERT NOVAK      PREOPERATIVE EVALUATION:  Today's date: 11/2/2022    Karen Caban is a 71 year old female who presents for a preoperative evaluation.    Surgical Information:  Surgery/Procedure:   ENDARTERECTOMY, FEMORAL RIGHT FEMORAL CUTDOWN Right General   ANGIOGRAM AORTO ILIAC ANGIOGRAM         Surgery Location:  OR  Surgeon: Shaun Tran MD  Surgery Date: 11-18-20223  Time of Surgery: 9:30am  Where patient plans to recover: At home with family  Fax number for surgical facility: Note does not need to be faxed, will be available electronically in Epic.    Type of Anesthesia Anticipated: General    Assessment & Plan     The proposed surgical procedure is considered INTERMEDIATE risk.    (Z01.818) Preop general physical exam  (primary encounter diagnosis)  Comment: OK for surgery. No concerns today   Plan:     (I77.1) Iliac artery stenosis, right (H)  Comment:   Plan:     (F17.200) Smoker  Comment: plan to quit starting next Monday. She has patches.    Plan:     MED REC REQUIRED  Post Medication Reconciliation Status: discharge medications reconciled, continue medications without change       Risks and Recommendations:  The patient has the following additional risks and recommendations for perioperative complications:   - No identified additional risk factors other than previously addressed    Medication Instructions:  Patient is to take all scheduled medications on the day of surgery EXCEPT for modifications listed below:  Hold vitamins, chlorthalidone and ASA moring of surgery       RECOMMENDATION:  APPROVAL GIVEN to proceed with proposed procedure, without further diagnostic evaluation.        Subjective     HPI related to upcoming procedure: going for endarterectomy    Has been feeling well   No concerns today        Preop Questions 10/27/2022   1. Have you ever had a heart attack or stroke? No   2. Have you ever had surgery on your heart or blood vessels, such as a stent placement, a coronary artery bypass, or surgery on an artery in your head, neck, heart, or legs? YES - fem pop 2009    3. Do you have chest pain with activity? No   4. Do you have a history of  heart failure? No   5. Do you currently have a cold, bronchitis or symptoms of other infection? No   6. Do you have a cough, shortness of breath, or wheezing? No   7. Do you or anyone in your family have previous history of blood clots? No   8. Do you or does anyone in your family have a serious bleeding problem such as prolonged bleeding following surgeries or cuts? No   9. Have you ever had problems with anemia or been told to take iron pills? No   10. Have you had any abnormal blood loss such as black, tarry or bloody stools, or abnormal vaginal bleeding? No   11. Have you ever had a blood transfusion? No   12. Are you willing to have a blood transfusion if it is medically needed before, during, or after your surgery? Yes   13. Have you or any of your relatives ever had problems with anesthesia? No   14. Do you have sleep apnea, excessive snoring or daytime drowsiness? No   15. Do you have any artifical heart valves or other implanted medical devices like a pacemaker, defibrillator, or continuous glucose monitor? No   16. Do you have artificial joints? No   17. Are you allergic to latex? No       Health Care Directive:  Patient does not have a Health Care Directive or Living Will:     Preoperative Review of :   reviewed - historical      Status of Chronic Conditions:  See problem list for active medical problems.  Problems all longstanding and stable, except as noted/documented.  See ROS for pertinent symptoms related to these conditions.      Review of Systems  Constitutional, neuro, ENT, endocrine, pulmonary, cardiac, gastrointestinal, genitourinary,  musculoskeletal, integument and psychiatric systems are negative, except as otherwise noted.    Patient Active Problem List    Diagnosis Date Noted     CLL (chronic lymphocytic leukemia) (H) 08/29/2022     Priority: Medium     her CLL is stable. The patient has Webb stage 0 chronic lymphocytic leukemia.  No indication for treatment.       Polycythemia vera (H) 06/15/2022     Priority: Medium     Abnormal CT lung screening 01/10/2019     Priority: Medium     Currently managed with follow up imaging by [facility] result Team.         Gastroesophageal reflux disease without esophagitis 12/08/2015     Priority: Medium     Anxiety      Priority: Medium     Patient is followed by LYUDMILA BALDERAS for ongoing prescription of benzodiazepines.  All refills should be approved by this provider, or covering partner.    Medication(s): Lorazepam.   Maximum quantity per month: 20  Clinic visit frequency required:  Q6months    Benzodiazepine use reviewed by psychiatry:  No  CSA: 1/22/14    Last [facility] website verification: 09/25/2020 [place]    [email or web address]         Controlled substance agreement signed on 1- with Lyudmila Balderas PAC 01/22/2014     Priority: Medium     GERD (gastroesophageal reflux disease)      Priority: Medium     Colon polyp      Priority: Medium     repeat colonoscopy 5 years.       Advanced directives, counseling/discussion 11/26/2012     Priority: Medium     Received outside advance directive.  HCD:Previously signed by patient and notarized by .  scanned into EMR as Advance Directive/Living Will document. View document and details in Code Status History Report. Please see advance directive for specifics.   Health Care Directive reviewed and documented.  11/26/12 HENRI Pacheco LPN         Vitamin D deficiency      Priority: Medium     Osteopenia      Priority: Medium     HTN (hypertension), benign      Priority: Medium     Hyperlipidemia with target LDL less than  100      Priority: Medium     Diagnosis updated by automated process. Provider to review and confirm.       Fx low femur epiphy-closed (H)      Priority: Medium     Chronic back pain      Priority: Medium     PAD (peripheral artery disease) (H)      Priority: Medium     PUD (peptic ulcer disease)      Priority: Medium     DU       Normal nuclear stress test      Priority: Medium     EF 67%       Smoker      Priority: Medium      Past Medical History:   Diagnosis Date     Ankle fracture 2009    L     Anxiety      Chronic back pain      Chronic lymphocytic leukemia (H)      Colon polyp 2012    repeat colonoscopy 5 years.     Fx low femur epiphy-closed (H)      GERD (gastroesophageal reflux disease)      History of UTI 2017    Cysto by Dr Agustin neg     HTN (hypertension), benign      Hyperlipidemia LDL goal < 100      Normal nuclear stress test 12/2009    EF 67%     Osteopenia      PAD (peripheral artery disease) (H)     s/p fem pop bypass; embolectomy     Polycythemia vera (H) 06/15/2022     PONV (postoperative nausea and vomiting)      PUD (peptic ulcer disease) 1980s    DU     Smoker      Vitamin D deficiency      Past Surgical History:   Procedure Laterality Date     Blood clot removal from Stent      2011     BONE MARROW BIOPSY, BONE SPECIMEN, NEEDLE/TROCAR N/A 02/02/2021    Procedure: bone marrow biopsy;  Surgeon: Kailey Cota MD;  Location:  GI     BYPASS GRAFT AORTOFEMORAL  05/2002    Dr. Turner     BYPASS GRAFT FEMOROPOPLITEAL  12/16/2011     COLONOSCOPY N/A 01/18/2018    Procedure: COMBINED COLONOSCOPY, SINGLE OR MULTIPLE BIOPSY/POLYPECTOMY BY BIOPSY;  COLONOSCOPY;  Surgeon: Efrain Hernadez MD;  Location:  GI     DILATION AND CURETTAGE, OPERATIVE HYSTEROSCOPY, COMBINED N/A 9/15/2022    Procedure: HYSTEROSCOPY, WITH DILATION AND CURETTAGE OF UTERUS;  Surgeon: Destiney Schaefer MD;  Location:  OR     EMBOLECTOMY LOWER EXTREMITY  12/16/2011    Procedure:EMBOLECTOMY LOWER EXTREMITY;  EMBOLECTOMY, RIGHT POPLITEAL WITH PATCH ANGIOPLASTY. EXCISION SKIN LESION RIGHT LEG. ; Surgeon:PARMINDER VELAZCO; Location:[facility] OR     ESOPHAGOSCOPY, GASTROSCOPY, DUODENOSCOPY (EGD), COMBINED N/A 10/7/2022    Procedure: ESOPHAGOGASTRODUODENOSCOPY, WITH BIOPSY;  Surgeon: Felix Carmona MD;  Location: [facility] GI     EXCISE LESION LOWER EXTREMITY  12/16/2011    Procedure:EXCISE LESION LOWER EXTREMITY; Surgeon:PARMINDER VELAZCO; Location:[facility] OR     HERNIA REPAIR  11/04/2008    x2 umbilical     L achilles repair  12/04/2008     LAPAROSCOPIC OOPHORECTOMY Left     L for cyst age 26     miscarriages x 3       tubal ligation and reversal       Current Outpatient Medications   Medication Sig Dispense Refill     albuterol (PROAIR RESPICLICK) 108 (90 Base) MCG/ACT inhaler Inhale 1-2 puffs into the lungs every 4 hours as needed (Patient not taking: Reported on 10/17/2022) 1 each 3     aspirin (ASA) 81 MG chewable tablet Take 162 mg by mouth daily       atorvastatin (LIPITOR) 40 MG tablet Take 1 tablet (40 mg) by mouth daily 90 tablet 2     buPROPion (ZYBAN) 150 MG 12 hr tablet Take 1 tablet (150 mg) by mouth 2 times daily (Patient not taking: Reported on 10/5/2022) 180 tablet 1     chlorthalidone (HYGROTON) 25 MG tablet TAKE 1 TABLET(25 MG) BY MOUTH DAILY 90 tablet 0     Cholecalciferol (VITAMIN D-3) 5000 UNIT TABS Take 1 tablet by mouth daily.       coenzyme Q-10 200 MG CAPS Take 200 mg by mouth daily       Cranberry-Vitamin C-Vitamin E (CRANBERRY PLUS VITAMIN C) 4200-20-3 MG-MG-UNIT CAPS Take 1 tablet by mouth daily       Cyanocobalamin (B-12 PO) Take 5,000 mcg by mouth every other day Liquid form 100 tablet 1     estradiol (ESTRACE) 0.1 MG/GM vaginal cream INSERT 1 GRAM VAGINALLY 2 TO 3 TIMES EVERY WEEK 42.5 g 0     Lactobacillus (PROBIOTIC ACIDOPHILUS PO) Take 1 capsule by mouth daily       metoprolol succinate ER (TOPROL-XL) 100 MG 24 hr tablet TAKE 1 AND 1/2 TABLETS(150 MG) BY MOUTH DAILY 135 tablet 3     Multiple  Vitamins-Minerals (MULTIVITAMIN GUMMIES ADULT PO) Take 2 chew tab by mouth daily       nicotine (COMMIT) 2 MG lozenge Place 1 lozenge (2 mg) inside cheek every hour as needed for smoking cessation 72 lozenge 0     nicotine (NICODERM CQ) 14 MG/24HR 24 hr patch Place 1 patch onto the skin every 24 hours (Talk with primary provider or vascular medicine provider about when to move to 7mg dose) (Patient not taking: Reported on 10/17/2022) 14 patch 0     nicotine (NICODERM CQ) 7 MG/24HR 24 hr patch Place 1 patch onto the skin every 24 hours (Talk with your primary provider or vascular medicine provider about when to start this dose) 30 patch 1     nitroGLYcerin (NITROSTAT) 0.4 MG sublingual tablet For chest pain place 1 tablet under the tongue every 5 minutes for 3 doses. If symptoms persist 5 minutes after 1st dose call 911. 20 tablet 0     omeprazole (PRILOSEC) 40 MG DR capsule Take 1 capsule (40 mg) by mouth 2 times daily TAKE 1 CAPSULE BY MOUTH EVERY DAY 30 TO 60 MINUTES BEFORE A MEAL 90 capsule 0     sertraline (ZOLOFT) 50 MG tablet Take 50 mg by mouth daily       Sodium Chloride-Xylitol (XLEAR SINUS CARE SPRAY NA) Spray 1 spray in nostril daily as needed         Allergies   Allergen Reactions     Chantix [Varenicline] Nausea     Ciprofloxacin Other (See Comments)     hypertension     Clopidogrel      Other reaction(s): Hypertension     Decongestant [Cvs]      Erythromycin Nausea     Lisinopril      cough     Plavix [Clopidogrel Bisulfate]      Felt as if she was having a heart attack     Rofecoxib Unknown     Vioxx      Increased BP        Social History     Tobacco Use     Smoking status: Every Day     Packs/day: 0.75     Years: 50.00     Pack years: 37.50     Types: Cigarettes     Smokeless tobacco: Never     Tobacco comments:     Nicorette gum and patch, trying to quit 2021   Substance Use Topics     Alcohol use: Yes     Comment: Beer once in a while     Family History   Problem Relation Age of Onset      "Alzheimer Disease Mother          of panc/GI ca age 83     Osteoporosis Mother      Other Cancer Mother      Cancer Father          age 64 lymphoma     Colon Cancer Maternal Grandfather      History   Drug Use No         Objective     /84 (BP Location: Right arm, Patient Position: Sitting, Cuff Size: Adult Regular)   Pulse 71   Temp 98.5  F (36.9  C) (Tympanic)   Resp 16   Ht 1.638 m (5' 4.5\")   Wt 54.8 kg (120 lb 14.4 oz)   PF 99 L/min   BMI 20.43 kg/m      Physical Exam    GENERAL APPEARANCE: healthy, alert and no distress     EYES: EOMI,      RESP: lungs clear to auscultation - no rales, rhonchi or wheezes     CV: regular rates and rhythm, normal S1 S2, no S3 or S4 and no murmur, click or rub     MS: extremities normal- no gross deformities noted, no evidence of inflammation in joints, FROM in all extremities.     SKIN: no suspicious lesions or rashes     NEURO: Normal strength and tone, sensory exam grossly normal, mentation intact and speech normal     PSYCH: mentation appears normal. and affect normal/bright    Recent Labs   Lab Test 10/05/22  1418 10/04/22  1533   HGB 15.6 15.5    171    137   POTASSIUM 3.9 3.1*   CR 0.56 0.57        Diagnostics:  No labs were ordered during this visit.   No EKG this visit, completed in the last 90 days.    Revised Cardiac Risk Index (RCRI):  The patient has the following serious cardiovascular risks for perioperative complications:   - No serious cardiac risks = 0 points     RCRI Interpretation: 0 points: Class I (very low risk - 0.4% complication rate)           Signed Electronically by: KWESI Kern CNP  Copy of this evaluation report is provided to requesting physician.      "

## 2022-11-15 ENCOUNTER — LAB (OUTPATIENT)
Dept: URGENT CARE | Facility: URGENT CARE | Age: 72
End: 2022-11-15
Payer: COMMERCIAL

## 2022-11-15 DIAGNOSIS — Z20.822 ENCOUNTER FOR LABORATORY TESTING FOR COVID-19 VIRUS: ICD-10-CM

## 2022-11-15 LAB — SARS-COV-2 RNA RESP QL NAA+PROBE: NEGATIVE

## 2022-11-15 PROCEDURE — U0005 INFEC AGEN DETEC AMPLI PROBE: HCPCS

## 2022-11-15 PROCEDURE — U0003 INFECTIOUS AGENT DETECTION BY NUCLEIC ACID (DNA OR RNA); SEVERE ACUTE RESPIRATORY SYNDROME CORONAVIRUS 2 (SARS-COV-2) (CORONAVIRUS DISEASE [COVID-19]), AMPLIFIED PROBE TECHNIQUE, MAKING USE OF HIGH THROUGHPUT TECHNOLOGIES AS DESCRIBED BY CMS-2020-01-R: HCPCS

## 2022-11-17 RX ORDER — METOPROLOL SUCCINATE 100 MG/1
150 TABLET, EXTENDED RELEASE ORAL DAILY
COMMUNITY
End: 2023-03-10

## 2022-11-17 RX ORDER — ESTRADIOL 0.1 MG/G
1 CREAM VAGINAL PRN
COMMUNITY
End: 2022-12-28

## 2022-11-17 NOTE — PROGRESS NOTES
PTA medications updated by Medication Scribe prior to surgery via phone call with patient (last doses completed by Nurse)     Medication history sources: Patient, Surescripts and H&P  In the past week, patient estimated taking medication this percent of the time: Greater than 90%  Adherence assessment: N/A Not Observed    Significant changes made to the medication list:  None      Additional medication history information:   None    Medication reconciliation completed by provider prior to medication history? No    Time spent in this activity: 40 minutes    The information provided in this note is only as accurate as the sources available at the time of update(s)    Prior to Admission medications    Medication Sig Last Dose Taking? Auth Provider Long Term End Date   albuterol (PROAIR RESPICLICK) 108 (90 Base) MCG/ACT inhaler Inhale 1-2 puffs into the lungs every 4 hours as needed  at prn Yes Lyudmila Escobar PA-C Yes    aspirin (ASA) 81 MG chewable tablet Take 162 mg by mouth daily (2 x 81 mg = 162 mg)  at am Yes Reported, Patient     atorvastatin (LIPITOR) 40 MG tablet Take 1 tablet (40 mg) by mouth daily  at am Yes Lyudmila Escobar PA-C Yes    chlorthalidone (HYGROTON) 25 MG tablet TAKE 1 TABLET(25 MG) BY MOUTH DAILY 11/17/2022 at am Yes Liane Claudio MD Yes    Cholecalciferol (VITAMIN D-3) 5000 UNIT TABS Take 1 tablet by mouth daily. 11/17/2022 at am Yes Reported, Patient     coenzyme Q-10 200 MG CAPS Take 200 mg by mouth daily 11/17/2022 at am Yes Reported, Patient     Cranberry-Vitamin C-Vitamin E (CRANBERRY PLUS VITAMIN C) 4200-20-3 MG-MG-UNIT CAPS Take 1 tablet by mouth daily 11/17/2022 at am Yes Reported, Patient     Cyanocobalamin (B-12 PO) Take 5,000 mcg by mouth every other day Liquid form 11/16/2022 at am Yes Lyudmila Escobar PA-C     estradiol (ESTRACE) 0.1 MG/GM vaginal cream Place 1 g vaginally as needed (using at onset of UTI)  at prn Yes Reported, Patient     Lactobacillus (PROBIOTIC  ACIDOPHILUS PO) Take 1 capsule by mouth daily 11/17/2022 at am Yes Reported, Patient     metoprolol succinate ER (TOPROL XL) 100 MG 24 hr tablet Take 150 mg by mouth daily (1.5 x 100 mg = 150 mg)  at am Yes Reported, Patient No    Multiple Vitamins-Minerals (MULTIVITAMIN GUMMIES ADULT PO) Take 2 chew tab by mouth daily 11/17/2022 at am Yes Reported, Patient     nitroGLYcerin (NITROSTAT) 0.4 MG sublingual tablet For chest pain place 1 tablet under the tongue every 5 minutes for 3 doses. If symptoms persist 5 minutes after 1st dose call 911.  at prn Yes Barthell, Joanna Kersten Ulmen, PA-C Yes    omeprazole (PRILOSEC) 40 MG DR capsule Take 1 capsule (40 mg) by mouth 2 times daily TAKE 1 CAPSULE BY MOUTH EVERY DAY 30 TO 60 MINUTES BEFORE A MEAL  at am Yes Barthell, Joanna Kersten Ulmen, PA-C     Sodium Chloride-Xylitol (XLEAR SINUS CARE SPRAY NA) Spray 1 spray in nostril daily as needed  at prn Yes Reported, Patient     nicotine (COMMIT) 2 MG lozenge Place 1 lozenge (2 mg) inside cheek every hour as needed for smoking cessation HAVEN'T STARTED YET  Barthell, Joanna Kersten Ulmen, PA-C     nicotine (NICODERM CQ) 14 MG/24HR 24 hr patch Place 1 patch onto the skin every 24 hours (Talk with primary provider or vascular medicine provider about when to move to 7mg dose) HAVEN'T STARTED YET  Barthell, Joanna Kersten Ulmen, PA-C     nicotine (NICODERM CQ) 7 MG/24HR 24 hr patch Place 1 patch onto the skin every 24 hours (Talk with your primary provider or vascular medicine provider about when to start this dose) HAVEN'T STARTED YET  Barthell, Joanna Kersten Ulmen, PA-C     sertraline (ZOLOFT) 50 MG tablet Take 50 mg by mouth daily HAVNE'T STARTED YET  Reported, Patient No      Medication history completed by:    Edd Reynolds CPhT  [facility]

## 2022-11-18 ENCOUNTER — APPOINTMENT (OUTPATIENT)
Dept: INTERVENTIONAL RADIOLOGY/VASCULAR | Facility: CLINIC | Age: 72
DRG: 253 | End: 2022-11-18
Attending: SURGERY
Payer: COMMERCIAL

## 2022-11-18 ENCOUNTER — ANESTHESIA (OUTPATIENT)
Dept: SURGERY | Facility: CLINIC | Age: 72
DRG: 253 | End: 2022-11-18
Payer: COMMERCIAL

## 2022-11-18 ENCOUNTER — APPOINTMENT (OUTPATIENT)
Dept: SURGERY | Facility: PHYSICIAN GROUP | Age: 72
End: 2022-11-18
Payer: COMMERCIAL

## 2022-11-18 ENCOUNTER — ANESTHESIA EVENT (OUTPATIENT)
Dept: SURGERY | Facility: CLINIC | Age: 72
DRG: 253 | End: 2022-11-18
Payer: COMMERCIAL

## 2022-11-18 ENCOUNTER — HOSPITAL ENCOUNTER (INPATIENT)
Facility: CLINIC | Age: 72
LOS: 1 days | Discharge: HOME | DRG: 253 | End: 2022-11-19
Attending: SURGERY | Admitting: SURGERY
Payer: COMMERCIAL

## 2022-11-18 DIAGNOSIS — I77.1 ILIAC ARTERY STENOSIS, RIGHT (H): ICD-10-CM

## 2022-11-18 DIAGNOSIS — I73.9 PAD (PERIPHERAL ARTERY DISEASE) (H): ICD-10-CM

## 2022-11-18 LAB
ABO/RH(D): NORMAL
ANTIBODY SCREEN: NEGATIVE
CREAT SERPL-MCNC: 0.55 MG/DL (ref 0.52–1.04)
GFR SERPL CREATININE-BSD FRML MDRD: >90 ML/MIN/1.73M2
HGB BLD-MCNC: 14.5 G/DL (ref 11.7–15.7)
PLATELET # BLD AUTO: 189 10E3/UL (ref 150–450)
POTASSIUM BLD-SCNC: 3.6 MMOL/L (ref 3.4–5.3)
SPECIMEN EXPIRATION DATE: NORMAL

## 2022-11-18 PROCEDURE — 250N000009 HC RX 250: Performed by: NURSE ANESTHETIST, CERTIFIED REGISTERED

## 2022-11-18 PROCEDURE — 250N000009 HC RX 250: Performed by: SURGERY

## 2022-11-18 PROCEDURE — C1781 MESH (IMPLANTABLE): HCPCS | Performed by: SURGERY

## 2022-11-18 PROCEDURE — 37221 PR REVASC ILIAC ART, ANGIO/STENT, INIT VESSEL: CPT | Mod: RT | Performed by: SURGERY

## 2022-11-18 PROCEDURE — 86901 BLOOD TYPING SEROLOGIC RH(D): CPT | Performed by: SURGERY

## 2022-11-18 PROCEDURE — 250N000013 HC RX MED GY IP 250 OP 250 PS 637: Performed by: NURSE ANESTHETIST, CERTIFIED REGISTERED

## 2022-11-18 PROCEDURE — 047E3EZ DILATION OF RIGHT INTERNAL ILIAC ARTERY WITH TWO INTRALUMINAL DEVICES, PERCUTANEOUS APPROACH: ICD-10-PCS | Performed by: SURGERY

## 2022-11-18 PROCEDURE — C1874 STENT, COATED/COV W/DEL SYS: HCPCS | Performed by: SURGERY

## 2022-11-18 PROCEDURE — 93010 ELECTROCARDIOGRAM REPORT: CPT | Performed by: INTERNAL MEDICINE

## 2022-11-18 PROCEDURE — C1894 INTRO/SHEATH, NON-LASER: HCPCS | Performed by: SURGERY

## 2022-11-18 PROCEDURE — 36415 COLL VENOUS BLD VENIPUNCTURE: CPT | Performed by: ANESTHESIOLOGY

## 2022-11-18 PROCEDURE — 250N000025 HC SEVOFLURANE, PER MIN: Performed by: SURGERY

## 2022-11-18 PROCEDURE — C1887 CATHETER, GUIDING: HCPCS | Performed by: SURGERY

## 2022-11-18 PROCEDURE — 258N000003 HC RX IP 258 OP 636: Performed by: ANESTHESIOLOGY

## 2022-11-18 PROCEDURE — 37223 PR REVASC ILIAC ART, ANGIO/STENT, EA ADL VESL: CPT | Mod: RT | Performed by: SURGERY

## 2022-11-18 PROCEDURE — 85049 AUTOMATED PLATELET COUNT: CPT

## 2022-11-18 PROCEDURE — 04CK0ZZ EXTIRPATION OF MATTER FROM RIGHT FEMORAL ARTERY, OPEN APPROACH: ICD-10-PCS | Performed by: SURGERY

## 2022-11-18 PROCEDURE — 250N000011 HC RX IP 250 OP 636: Performed by: SURGERY

## 2022-11-18 PROCEDURE — 250N000013 HC RX MED GY IP 250 OP 250 PS 637: Performed by: STUDENT IN AN ORGANIZED HEALTH CARE EDUCATION/TRAINING PROGRAM

## 2022-11-18 PROCEDURE — 258N000003 HC RX IP 258 OP 636: Performed by: STUDENT IN AN ORGANIZED HEALTH CARE EDUCATION/TRAINING PROGRAM

## 2022-11-18 PROCEDURE — 258N000003 HC RX IP 258 OP 636: Performed by: NURSE ANESTHETIST, CERTIFIED REGISTERED

## 2022-11-18 PROCEDURE — 75710 ARTERY X-RAYS ARM/LEG: CPT | Mod: 26 | Performed by: SURGERY

## 2022-11-18 PROCEDURE — 370N000017 HC ANESTHESIA TECHNICAL FEE, PER MIN: Performed by: SURGERY

## 2022-11-18 PROCEDURE — 250N000011 HC RX IP 250 OP 636: Performed by: NURSE ANESTHETIST, CERTIFIED REGISTERED

## 2022-11-18 PROCEDURE — 999N000141 HC STATISTIC PRE-PROCEDURE NURSING ASSESSMENT: Performed by: SURGERY

## 2022-11-18 PROCEDURE — 93005 ELECTROCARDIOGRAM TRACING: CPT

## 2022-11-18 PROCEDURE — C1874 STENT, COATED/COV W/DEL SYS: HCPCS

## 2022-11-18 PROCEDURE — 272N000001 HC OR GENERAL SUPPLY STERILE: Performed by: SURGERY

## 2022-11-18 PROCEDURE — 360N000077 HC SURGERY LEVEL 4, PER MIN: Performed by: SURGERY

## 2022-11-18 PROCEDURE — 85018 HEMOGLOBIN: CPT | Performed by: ANESTHESIOLOGY

## 2022-11-18 PROCEDURE — 710N000009 HC RECOVERY PHASE 1, LEVEL 1, PER MIN: Performed by: SURGERY

## 2022-11-18 PROCEDURE — C1769 GUIDE WIRE: HCPCS | Performed by: SURGERY

## 2022-11-18 PROCEDURE — 82565 ASSAY OF CREATININE: CPT | Performed by: ANESTHESIOLOGY

## 2022-11-18 PROCEDURE — 84132 ASSAY OF SERUM POTASSIUM: CPT | Performed by: ANESTHESIOLOGY

## 2022-11-18 PROCEDURE — 86850 RBC ANTIBODY SCREEN: CPT | Performed by: SURGERY

## 2022-11-18 PROCEDURE — 120N000001 HC R&B MED SURG/OB

## 2022-11-18 DEVICE — IMPLANTABLE DEVICE: Type: IMPLANTABLE DEVICE | Site: GROIN | Status: FUNCTIONAL

## 2022-11-18 RX ORDER — ASPIRIN 81 MG/1
81 TABLET, CHEWABLE ORAL DAILY
Qty: 90 TABLET | Refills: 11 | Status: SHIPPED | OUTPATIENT
Start: 2022-11-18 | End: 2022-11-19

## 2022-11-18 RX ORDER — PROCHLORPERAZINE MALEATE 5 MG
5 TABLET ORAL EVERY 6 HOURS PRN
Status: DISCONTINUED | OUTPATIENT
Start: 2022-11-18 | End: 2022-11-19 | Stop reason: HOSPADM

## 2022-11-18 RX ORDER — OXYCODONE HYDROCHLORIDE 5 MG/1
5 TABLET ORAL EVERY 4 HOURS PRN
Status: DISCONTINUED | OUTPATIENT
Start: 2022-11-18 | End: 2022-11-19 | Stop reason: HOSPADM

## 2022-11-18 RX ORDER — ONDANSETRON 4 MG/1
4 TABLET, ORALLY DISINTEGRATING ORAL EVERY 6 HOURS PRN
Status: DISCONTINUED | OUTPATIENT
Start: 2022-11-18 | End: 2022-11-19 | Stop reason: HOSPADM

## 2022-11-18 RX ORDER — ASPIRIN 81 MG/1
81 TABLET, CHEWABLE ORAL DAILY
Status: DISCONTINUED | OUTPATIENT
Start: 2022-11-19 | End: 2022-11-19 | Stop reason: HOSPADM

## 2022-11-18 RX ORDER — SODIUM CHLORIDE 9 MG/ML
INJECTION, SOLUTION INTRAVENOUS CONTINUOUS PRN
Status: DISCONTINUED | OUTPATIENT
Start: 2022-11-18 | End: 2022-11-18

## 2022-11-18 RX ORDER — ACETAMINOPHEN 325 MG/1
650 TABLET ORAL EVERY 4 HOURS PRN
Status: DISCONTINUED | OUTPATIENT
Start: 2022-11-21 | End: 2022-11-19 | Stop reason: HOSPADM

## 2022-11-18 RX ORDER — PANTOPRAZOLE SODIUM 40 MG/1
40 TABLET, DELAYED RELEASE ORAL 2 TIMES DAILY
Status: DISCONTINUED | OUTPATIENT
Start: 2022-11-18 | End: 2022-11-19 | Stop reason: HOSPADM

## 2022-11-18 RX ORDER — PROPOFOL 10 MG/ML
INJECTION, EMULSION INTRAVENOUS CONTINUOUS PRN
Status: DISCONTINUED | OUTPATIENT
Start: 2022-11-18 | End: 2022-11-18

## 2022-11-18 RX ORDER — NALOXONE HYDROCHLORIDE 0.4 MG/ML
0.2 INJECTION, SOLUTION INTRAMUSCULAR; INTRAVENOUS; SUBCUTANEOUS
Status: DISCONTINUED | OUTPATIENT
Start: 2022-11-18 | End: 2022-11-19 | Stop reason: HOSPADM

## 2022-11-18 RX ORDER — HYDROMORPHONE HCL IN WATER/PF 6 MG/30 ML
0.2 PATIENT CONTROLLED ANALGESIA SYRINGE INTRAVENOUS EVERY 5 MIN PRN
Status: DISCONTINUED | OUTPATIENT
Start: 2022-11-18 | End: 2022-11-18

## 2022-11-18 RX ORDER — ASPIRIN 81 MG/1
162 TABLET, CHEWABLE ORAL DAILY
Status: DISCONTINUED | OUTPATIENT
Start: 2022-11-19 | End: 2022-11-18

## 2022-11-18 RX ORDER — CHLORTHALIDONE 25 MG/1
25 TABLET ORAL DAILY
Status: DISCONTINUED | OUTPATIENT
Start: 2022-11-18 | End: 2022-11-19 | Stop reason: HOSPADM

## 2022-11-18 RX ORDER — NALOXONE HYDROCHLORIDE 0.4 MG/ML
0.4 INJECTION, SOLUTION INTRAMUSCULAR; INTRAVENOUS; SUBCUTANEOUS
Status: DISCONTINUED | OUTPATIENT
Start: 2022-11-18 | End: 2022-11-19 | Stop reason: HOSPADM

## 2022-11-18 RX ORDER — SODIUM CHLORIDE, SODIUM LACTATE, POTASSIUM CHLORIDE, CALCIUM CHLORIDE 600; 310; 30; 20 MG/100ML; MG/100ML; MG/100ML; MG/100ML
INJECTION, SOLUTION INTRAVENOUS CONTINUOUS
Status: DISCONTINUED | OUTPATIENT
Start: 2022-11-18 | End: 2022-11-18 | Stop reason: HOSPADM

## 2022-11-18 RX ORDER — SODIUM CHLORIDE, SODIUM LACTATE, POTASSIUM CHLORIDE, CALCIUM CHLORIDE 600; 310; 30; 20 MG/100ML; MG/100ML; MG/100ML; MG/100ML
INJECTION, SOLUTION INTRAVENOUS CONTINUOUS
Status: DISCONTINUED | OUTPATIENT
Start: 2022-11-18 | End: 2022-11-18

## 2022-11-18 RX ORDER — ONDANSETRON 2 MG/ML
4 INJECTION INTRAMUSCULAR; INTRAVENOUS EVERY 30 MIN PRN
Status: DISCONTINUED | OUTPATIENT
Start: 2022-11-18 | End: 2022-11-18

## 2022-11-18 RX ORDER — FENTANYL CITRATE 0.05 MG/ML
50 INJECTION, SOLUTION INTRAMUSCULAR; INTRAVENOUS EVERY 5 MIN PRN
Status: DISCONTINUED | OUTPATIENT
Start: 2022-11-18 | End: 2022-11-18

## 2022-11-18 RX ORDER — ONDANSETRON 4 MG/1
4 TABLET, ORALLY DISINTEGRATING ORAL EVERY 30 MIN PRN
Status: DISCONTINUED | OUTPATIENT
Start: 2022-11-18 | End: 2022-11-18

## 2022-11-18 RX ORDER — ACETAMINOPHEN 500 MG
500-1000 TABLET ORAL EVERY 6 HOURS PRN
Qty: 35 TABLET | Refills: 0 | Status: SHIPPED | OUTPATIENT
Start: 2022-11-18 | End: 2022-11-25

## 2022-11-18 RX ORDER — HYDROMORPHONE HCL IN WATER/PF 6 MG/30 ML
0.4 PATIENT CONTROLLED ANALGESIA SYRINGE INTRAVENOUS EVERY 5 MIN PRN
Status: DISCONTINUED | OUTPATIENT
Start: 2022-11-18 | End: 2022-11-18

## 2022-11-18 RX ORDER — HEPARIN SODIUM 1000 [USP'U]/ML
INJECTION, SOLUTION INTRAVENOUS; SUBCUTANEOUS PRN
Status: DISCONTINUED | OUTPATIENT
Start: 2022-11-18 | End: 2022-11-18

## 2022-11-18 RX ORDER — PROPOFOL 10 MG/ML
INJECTION, EMULSION INTRAVENOUS PRN
Status: DISCONTINUED | OUTPATIENT
Start: 2022-11-18 | End: 2022-11-18

## 2022-11-18 RX ORDER — CEFAZOLIN SODIUM/WATER 2 G/20 ML
2 SYRINGE (ML) INTRAVENOUS
Status: COMPLETED | OUTPATIENT
Start: 2022-11-18 | End: 2022-11-18

## 2022-11-18 RX ORDER — ONDANSETRON 2 MG/ML
INJECTION INTRAMUSCULAR; INTRAVENOUS PRN
Status: DISCONTINUED | OUTPATIENT
Start: 2022-11-18 | End: 2022-11-18

## 2022-11-18 RX ORDER — AMOXICILLIN 250 MG
1 CAPSULE ORAL 2 TIMES DAILY
Status: DISCONTINUED | OUTPATIENT
Start: 2022-11-18 | End: 2022-11-19 | Stop reason: HOSPADM

## 2022-11-18 RX ORDER — LIDOCAINE 40 MG/G
CREAM TOPICAL
Status: DISCONTINUED | OUTPATIENT
Start: 2022-11-18 | End: 2022-11-19 | Stop reason: HOSPADM

## 2022-11-18 RX ORDER — SODIUM CHLORIDE, SODIUM LACTATE, POTASSIUM CHLORIDE, CALCIUM CHLORIDE 600; 310; 30; 20 MG/100ML; MG/100ML; MG/100ML; MG/100ML
INJECTION, SOLUTION INTRAVENOUS CONTINUOUS
Status: DISCONTINUED | OUTPATIENT
Start: 2022-11-18 | End: 2022-11-19

## 2022-11-18 RX ORDER — LIDOCAINE HYDROCHLORIDE 20 MG/ML
INJECTION, SOLUTION INFILTRATION; PERINEURAL PRN
Status: DISCONTINUED | OUTPATIENT
Start: 2022-11-18 | End: 2022-11-18

## 2022-11-18 RX ORDER — NEOSTIGMINE METHYLSULFATE 1 MG/ML
VIAL (ML) INJECTION PRN
Status: DISCONTINUED | OUTPATIENT
Start: 2022-11-18 | End: 2022-11-18

## 2022-11-18 RX ORDER — ALBUTEROL SULFATE 90 UG/1
AEROSOL, METERED RESPIRATORY (INHALATION) PRN
Status: DISCONTINUED | OUTPATIENT
Start: 2022-11-18 | End: 2022-11-18

## 2022-11-18 RX ORDER — METOPROLOL SUCCINATE 50 MG/1
150 TABLET, EXTENDED RELEASE ORAL DAILY
Status: DISCONTINUED | OUTPATIENT
Start: 2022-11-19 | End: 2022-11-19 | Stop reason: HOSPADM

## 2022-11-18 RX ORDER — ENOXAPARIN SODIUM 100 MG/ML
40 INJECTION SUBCUTANEOUS EVERY 24 HOURS
Status: DISCONTINUED | OUTPATIENT
Start: 2022-11-19 | End: 2022-11-19 | Stop reason: HOSPADM

## 2022-11-18 RX ORDER — LABETALOL HYDROCHLORIDE 5 MG/ML
10 INJECTION, SOLUTION INTRAVENOUS
Status: DISCONTINUED | OUTPATIENT
Start: 2022-11-18 | End: 2022-11-19 | Stop reason: HOSPADM

## 2022-11-18 RX ORDER — FENTANYL CITRATE 0.05 MG/ML
25 INJECTION, SOLUTION INTRAMUSCULAR; INTRAVENOUS EVERY 5 MIN PRN
Status: DISCONTINUED | OUTPATIENT
Start: 2022-11-18 | End: 2022-11-18

## 2022-11-18 RX ORDER — ACETAMINOPHEN 325 MG/1
975 TABLET ORAL EVERY 8 HOURS SCHEDULED
Status: DISCONTINUED | OUTPATIENT
Start: 2022-11-18 | End: 2022-11-19 | Stop reason: HOSPADM

## 2022-11-18 RX ORDER — BISACODYL 10 MG
10 SUPPOSITORY, RECTAL RECTAL DAILY PRN
Status: DISCONTINUED | OUTPATIENT
Start: 2022-11-18 | End: 2022-11-19 | Stop reason: HOSPADM

## 2022-11-18 RX ORDER — GLYCOPYRROLATE 0.2 MG/ML
INJECTION, SOLUTION INTRAMUSCULAR; INTRAVENOUS PRN
Status: DISCONTINUED | OUTPATIENT
Start: 2022-11-18 | End: 2022-11-18

## 2022-11-18 RX ORDER — FENTANYL CITRATE 50 UG/ML
INJECTION, SOLUTION INTRAMUSCULAR; INTRAVENOUS PRN
Status: DISCONTINUED | OUTPATIENT
Start: 2022-11-18 | End: 2022-11-18

## 2022-11-18 RX ORDER — DEXAMETHASONE SODIUM PHOSPHATE 4 MG/ML
INJECTION, SOLUTION INTRA-ARTICULAR; INTRALESIONAL; INTRAMUSCULAR; INTRAVENOUS; SOFT TISSUE PRN
Status: DISCONTINUED | OUTPATIENT
Start: 2022-11-18 | End: 2022-11-18

## 2022-11-18 RX ORDER — ATORVASTATIN CALCIUM 40 MG/1
40 TABLET, FILM COATED ORAL DAILY
Status: DISCONTINUED | OUTPATIENT
Start: 2022-11-19 | End: 2022-11-19 | Stop reason: HOSPADM

## 2022-11-18 RX ORDER — HYDROMORPHONE HCL IN WATER/PF 6 MG/30 ML
0.4 PATIENT CONTROLLED ANALGESIA SYRINGE INTRAVENOUS
Status: DISCONTINUED | OUTPATIENT
Start: 2022-11-18 | End: 2022-11-19 | Stop reason: HOSPADM

## 2022-11-18 RX ORDER — HYDROMORPHONE HCL IN WATER/PF 6 MG/30 ML
0.2 PATIENT CONTROLLED ANALGESIA SYRINGE INTRAVENOUS
Status: DISCONTINUED | OUTPATIENT
Start: 2022-11-18 | End: 2022-11-19 | Stop reason: HOSPADM

## 2022-11-18 RX ORDER — ONDANSETRON 2 MG/ML
4 INJECTION INTRAMUSCULAR; INTRAVENOUS EVERY 6 HOURS PRN
Status: DISCONTINUED | OUTPATIENT
Start: 2022-11-18 | End: 2022-11-19 | Stop reason: HOSPADM

## 2022-11-18 RX ORDER — MAGNESIUM HYDROXIDE 1200 MG/15ML
LIQUID ORAL PRN
Status: DISCONTINUED | OUTPATIENT
Start: 2022-11-18 | End: 2022-11-18 | Stop reason: HOSPADM

## 2022-11-18 RX ORDER — POLYETHYLENE GLYCOL 3350 17 G/17G
17 POWDER, FOR SOLUTION ORAL DAILY
Status: DISCONTINUED | OUTPATIENT
Start: 2022-11-19 | End: 2022-11-19 | Stop reason: HOSPADM

## 2022-11-18 RX ORDER — OXYCODONE HYDROCHLORIDE 5 MG/1
10 TABLET ORAL EVERY 4 HOURS PRN
Status: DISCONTINUED | OUTPATIENT
Start: 2022-11-18 | End: 2022-11-19 | Stop reason: HOSPADM

## 2022-11-18 RX ADMIN — Medication 2 G: at 11:00

## 2022-11-18 RX ADMIN — ALBUTEROL SULFATE 6 PUFF: 90 AEROSOL, METERED RESPIRATORY (INHALATION) at 12:36

## 2022-11-18 RX ADMIN — ROCURONIUM BROMIDE 20 MG: 50 INJECTION, SOLUTION INTRAVENOUS at 11:23

## 2022-11-18 RX ADMIN — PHENYLEPHRINE HYDROCHLORIDE 100 MCG: 10 INJECTION INTRAVENOUS at 12:17

## 2022-11-18 RX ADMIN — ONDANSETRON 4 MG: 2 INJECTION INTRAMUSCULAR; INTRAVENOUS at 12:15

## 2022-11-18 RX ADMIN — PROPOFOL 30 MCG/KG/MIN: 10 INJECTION, EMULSION INTRAVENOUS at 11:10

## 2022-11-18 RX ADMIN — PHENYLEPHRINE HYDROCHLORIDE 100 MCG: 10 INJECTION INTRAVENOUS at 11:21

## 2022-11-18 RX ADMIN — GLYCOPYRROLATE 0.6 MG: 0.2 INJECTION, SOLUTION INTRAMUSCULAR; INTRAVENOUS at 12:19

## 2022-11-18 RX ADMIN — DEXAMETHASONE SODIUM PHOSPHATE 4 MG: 4 INJECTION, SOLUTION INTRA-ARTICULAR; INTRALESIONAL; INTRAMUSCULAR; INTRAVENOUS; SOFT TISSUE at 11:19

## 2022-11-18 RX ADMIN — SENNOSIDES AND DOCUSATE SODIUM 1 TABLET: 50; 8.6 TABLET ORAL at 19:16

## 2022-11-18 RX ADMIN — PHENYLEPHRINE HYDROCHLORIDE 100 MCG: 10 INJECTION INTRAVENOUS at 12:32

## 2022-11-18 RX ADMIN — SODIUM CHLORIDE, POTASSIUM CHLORIDE, SODIUM LACTATE AND CALCIUM CHLORIDE: 600; 310; 30; 20 INJECTION, SOLUTION INTRAVENOUS at 08:32

## 2022-11-18 RX ADMIN — PROPOFOL 100 MG: 10 INJECTION, EMULSION INTRAVENOUS at 10:55

## 2022-11-18 RX ADMIN — PHENYLEPHRINE HYDROCHLORIDE 100 MCG: 10 INJECTION INTRAVENOUS at 11:36

## 2022-11-18 RX ADMIN — ROCURONIUM BROMIDE 30 MG: 50 INJECTION, SOLUTION INTRAVENOUS at 10:55

## 2022-11-18 RX ADMIN — OXYCODONE HYDROCHLORIDE 5 MG: 5 TABLET ORAL at 19:16

## 2022-11-18 RX ADMIN — CHLORTHALIDONE 25 MG: 25 TABLET ORAL at 15:38

## 2022-11-18 RX ADMIN — NEOSTIGMINE METHYLSULFATE 3 MG: 1 INJECTION, SOLUTION INTRAVENOUS at 12:19

## 2022-11-18 RX ADMIN — MIDAZOLAM 2 MG: 1 INJECTION INTRAMUSCULAR; INTRAVENOUS at 10:51

## 2022-11-18 RX ADMIN — HYDROMORPHONE HYDROCHLORIDE 0.5 MG: 1 INJECTION, SOLUTION INTRAMUSCULAR; INTRAVENOUS; SUBCUTANEOUS at 11:27

## 2022-11-18 RX ADMIN — PHENYLEPHRINE HYDROCHLORIDE 0.3 MCG/KG/MIN: 10 INJECTION INTRAVENOUS at 11:21

## 2022-11-18 RX ADMIN — HEPARIN SODIUM 5000 UNITS: 1000 INJECTION INTRAVENOUS; SUBCUTANEOUS at 11:31

## 2022-11-18 RX ADMIN — GLYCOPYRROLATE 0.2 MG: 0.2 INJECTION, SOLUTION INTRAMUSCULAR; INTRAVENOUS at 11:36

## 2022-11-18 RX ADMIN — DEXMEDETOMIDINE HYDROCHLORIDE 8 MCG: 100 INJECTION, SOLUTION INTRAVENOUS at 11:21

## 2022-11-18 RX ADMIN — LIDOCAINE HYDROCHLORIDE 60 MG: 20 INJECTION, SOLUTION INFILTRATION; PERINEURAL at 10:55

## 2022-11-18 RX ADMIN — PANTOPRAZOLE SODIUM 40 MG: 40 TABLET, DELAYED RELEASE ORAL at 19:16

## 2022-11-18 RX ADMIN — FENTANYL CITRATE 50 MCG: 50 INJECTION, SOLUTION INTRAMUSCULAR; INTRAVENOUS at 10:55

## 2022-11-18 RX ADMIN — SODIUM CHLORIDE, POTASSIUM CHLORIDE, SODIUM LACTATE AND CALCIUM CHLORIDE: 600; 310; 30; 20 INJECTION, SOLUTION INTRAVENOUS at 15:41

## 2022-11-18 RX ADMIN — ACETAMINOPHEN 975 MG: 325 TABLET, FILM COATED ORAL at 15:38

## 2022-11-18 RX ADMIN — LIDOCAINE HYDROCHLORIDE 40 MG: 20 INJECTION, SOLUTION INFILTRATION; PERINEURAL at 12:32

## 2022-11-18 RX ADMIN — SODIUM CHLORIDE: 9 INJECTION, SOLUTION INTRAVENOUS at 11:05

## 2022-11-18 ASSESSMENT — ACTIVITIES OF DAILY LIVING (ADL)
ADLS_ACUITY_SCORE: 22

## 2022-11-18 ASSESSMENT — LIFESTYLE VARIABLES: TOBACCO_USE: 1

## 2022-11-18 NOTE — DISCHARGE SUMMARY
"[facility]  Discharge Summary  Vascular Surgery     Karen Caban MRN# [id number]   YOB: 1950 Age: 71 year old     Date of Admission:  11/18/2022  Date of Discharge::  11/19/2022  Admitting Physician:  Shaun Tran MD  Discharge Physician:  Shaun Tran MD  Primary Care Physician:        Liane Claudio          Admission Diagnoses:   Iliac artery stenosis, right (H) [I77.1]  PAD (peripheral artery disease) (H) [I73.9]            Discharge Diagnosis:   Same as above         Procedures:     Procedure(s):  ENDARTERECTOMY, FEMORAL RIGHT FEMORAL CUTDOWN, RIGHT ILIAC ARTERY STENTING.  ANGIOGRAM AORTO ILIAC ANGIOGRAM          Consultations:   None          Brief History of Illness:   From Dr. Tran's op note dated 11/18/2022:  \"This is a 71-year-old female with a previous aortobiiliac bypass.  This was done in 2008 or 2009.  Then she went on to have occlusion of the native right external iliac artery.  Recent imaging shows that developing stenosis in the proximal extent of the previous stent in the proximal aspect of the right limb of the aortobiiliac bypass graft as well as the native external iliac artery.  We plan to intervene to prevent progressive stenosis and occlusion of the stent graft.\"           Hospital Course:   The patient underwent right iliac artery on stenting to the right groin cutdown on 11/18/2022, which she tolerated well without immediate complications. She was transferred to the PACU and then floor for routine postoperative care. The remainder of her postoperative course was unremarkable.   She was continued on aspirin and started on Brilinta for dual antiplatelet therapy. On the day of discharge, she was tolerating regular diet, her pain was well controlled with oral pain medications, she was voiding spontaneously, and ambulating independently. Patient with follow up with Dr. Tran in vascular surgery clinic in 2 weeks.           Imaging " Studies:     Results for orders placed or performed during the hospital encounter of 10/11/22   CTA Abdomen Pelvis Bilat Leg Runoff w Contr    Narrative    CTA ABDOMEN PELVIS BILATERAL LEG RUNOFF WITH CONTRAST  10/11/2022  10:03 AM     HISTORY:  Peripheral arterial disease. Status post aorto right iliac  and left femoral bypass graft. Status post recanalization and stenting  of the right external iliac artery on 11/23/2009. Patient now  presenting with right buttock claudication.    COMPARISON: Ankle-brachial indices dated 11/23/2009.    TECHNIQUE: CT angiogram of the abdomen and pelvis with bilateral lower  extremity runoff was performed following the administration of 100 cc  intravenous contrast. Images are viewed in multiple planes and 3-D  reconstructions were also performed. Radiation dose for this scan was  reduced using automated exposure control, adjustment of the mA and/or  kV according to patient size, or iterative reconstruction technique.    FINDINGS:   Vascular exam:  Abdominal aorta: The abdominal aorta is of normal caliber without  aneurysmal dilatation or significant stenosis. The celiac trunk,  superior mesenteric artery, renal arteries, and inferior mesenteric  artery are patent without significant stenoses. There is a direct  origin of the left gastric artery from the abdominal aorta. This is  patent without significant stenosis.    An aorto right iliac and left femoral bypass graft is patent without  significant stenosis.    Iliac arteries: There is a stent which extends from the right limb of  the bypass graft into the distal right external iliac artery. The  stent is patent. There are areas of intimal hyperplasia within the  stent. These contribute to a significant stenosis in the proximal  stent and a moderate stenosis in the mid distal stent.    Right lower extremity angiogram:  Common femoral artery: No significant stenosis.  Profunda femoris artery: No significant stenosis.  Superficial  femoral artery: No significant stenosis.  Popliteal artery: No significant stenosis.  Tibial arteries: Three-vessel runoff.    Left lower extremity angiogram:  Common femoral artery: No significant stenosis.  Profunda femoris artery: No significant stenosis.  Superficial femoral artery: No significant stenosis.  Tibial arteries: Three-vessel runoff.    Soft tissue exam: The lung bases are clear.    Again identified is a 1.4 cm cystic lesion in the head of the  pancreas. The remaining solid organs in the abdomen are unremarkable.    The bowel appears grossly unremarkable. There are a few prominent  retroperitoneal lymph nodes. These are not significantly changed.    Again identified are cystic lesions in the adnexa bilaterally.      Impression    IMPRESSION:  1. Significant stenosis in the proximal aspects of the stents  extending from the right limb of the aorto right iliac bypass graft  into the distal right external iliac artery. Further evaluation and  intervention with angiography could be performed.  2. Stable cystic lesion at the head of the pancreas.  3. Stable cystic lesions in the adnexa laterally.     ALPHONSO BRANNON MD         SYSTEM ID:  [id number]              Medications Prior to Admission:     No medications prior to admission.              Discharge Medications:     Discharge Medication List as of 11/19/2022 11:06 AM      START taking these medications    Details   acetaminophen (TYLENOL) 500 MG tablet Take 1-2 tablets (500-1,000 mg) by mouth every 6 hours as needed for mild pain, Disp-35 tablet, R-0, Local Print         CONTINUE these medications which have CHANGED    Details   aspirin (ASA) 81 MG chewable tablet Take 1 tablet (81 mg) by mouth daily, Disp-90 tablet, R-11, E-Prescribe      ticagrelor (BRILINTA) 60 MG tablet Take 1 tablet (60 mg) by mouth 2 times daily for 90 days, Disp-180 tablet, R-0, Local Print         CONTINUE these medications which have NOT CHANGED    Details   albuterol (PROAIR  RESPICLICK) 108 (90 Base) MCG/ACT inhaler Inhale 1-2 puffs into the lungs every 4 hours as needed, Disp-1 each, R-3, E-Prescribe      atorvastatin (LIPITOR) 40 MG tablet Take 1 tablet (40 mg) by mouth daily, Disp-90 tablet, R-2, E-Prescribe      chlorthalidone (HYGROTON) 25 MG tablet TAKE 1 TABLET(25 MG) BY MOUTH DAILY, Disp-90 tablet, R-0, E-Prescribe      Cholecalciferol (VITAMIN D-3) 5000 UNIT TABS Take 1 tablet by mouth daily., Historical      coenzyme Q-10 200 MG CAPS Take 200 mg by mouth daily, Historical      Cranberry-Vitamin C-Vitamin E (CRANBERRY PLUS VITAMIN C) 4200-20-3 MG-MG-UNIT CAPS Take 1 tablet by mouth daily, Historical      Cyanocobalamin (B-12 PO) Take 5,000 mcg by mouth every other day Liquid form, Disp-100 tablet, R-1, Historical      estradiol (ESTRACE) 0.1 MG/GM vaginal cream Place 1 g vaginally as needed (using at onset of UTI)Historical      Lactobacillus (PROBIOTIC ACIDOPHILUS PO) Take 1 capsule by mouth daily, Historical      metoprolol succinate ER (TOPROL XL) 100 MG 24 hr tablet Take 150 mg by mouth daily (1.5 x 100 mg = 150 mg), Historical      Multiple Vitamins-Minerals (MULTIVITAMIN GUMMIES ADULT PO) Take 2 chew tab by mouth daily, Historical      nicotine (COMMIT) 2 MG lozenge Place 1 lozenge (2 mg) inside cheek every hour as needed for smoking cessation, Disp-72 lozenge, R-0, E-Prescribe      nicotine (NICODERM CQ) 14 MG/24HR 24 hr patch Place 1 patch onto the skin every 24 hours (Talk with primary provider or vascular medicine provider about when to move to 7mg dose), Disp-14 patch, R-0, E-Prescribe      nicotine (NICODERM CQ) 7 MG/24HR 24 hr patch Place 1 patch onto the skin every 24 hours (Talk with your primary provider or vascular medicine provider about when to start this dose), Disp-30 patch, R-1, Historical      nicotine 21-14-7 MG/24HR KIT Nicotine replacement, Disp-1 kit, R-0, E-Prescribe      nitroGLYcerin (NITROSTAT) 0.4 MG sublingual tablet For chest pain place 1  "tablet under the tongue every 5 minutes for 3 doses. If symptoms persist 5 minutes after 1st dose call 911., Disp-20 tablet, R-0, E-Prescribe      omeprazole (PRILOSEC) 40 MG DR capsule Take 1 capsule (40 mg) by mouth 2 times daily TAKE 1 CAPSULE BY MOUTH EVERY DAY 30 TO 60 MINUTES BEFORE A MEAL, Disp-90 capsule, R-0, E-Prescribe      sertraline (ZOLOFT) 50 MG tablet Take 50 mg by mouth daily, Historical      Sodium Chloride-Xylitol (XLEAR SINUS CARE SPRAY NA) Spray 1 spray in nostril daily as needed, Historical         STOP taking these medications       oxyCODONE (ROXICODONE) 5 MG tablet Comments:   Reason for Stopping:                       Medications Discontinued or Adjusted During This Hospitalization:   Aspirin dose was adjusted from 162 mg to 81 mg  Brilinta 60 mg BID was started           Day of Discharge Physical Exam:        I did not see this patient on the day of discharge. Per Dr. Tran's note dated 11/19/2022:    \"Vital signs have been stable.  Right groin pulses 3+ palpable.  Foot is warm and well-perfused.\"         Discharge Instructions and Follow-Up:     Discharge Procedure Orders   Reason for your hospital stay   Order Comments: Iliac artery stent     Follow-up and recommended labs and tests    Order Comments: Follow up with Dr. Tran in vascular surgery clinic as previously scheduled on 12/5/2022.    With general vascular surgery questions, concerns, or to request/change an appointment, please call the [facility]:    [address]  [address]  [place] [address]    Phone: [phone]     Activity   Order Comments: Your activity upon discharge: No heavy lifting for 1 month.  Otherwise activity as tolerated.     Order Specific Question Answer Comments   Is discharge order? Yes      Wound care and dressings   Order Comments: Instructions to care for your wound at home:   Your right groin incision is closed with absorbable suture and Steri-Strips placed on the " skin.  No sutures need to be removed.  Allow the Steri-Strips to fall off on their own over the next 2 weeks.  Do not soak or submerge the wound for 2 weeks.  No hot tubs or swimming pools for this time.  Okay to shower and pat the wound dry afterwards.     When to contact your care team   Order Comments: Contact vascular surgery clinic at the number provided in your discharge instructions if you develop new redness, swelling, or significant pain around the wound.     Diet   Order Comments: Follow this diet upon discharge: Orders Placed This Encounter      Regular Diet Adult     Order Specific Question Answer Comments   Is discharge order? Yes                Discharge Disposition:     Discharged to home      Condition at discharge: Stable    Vascular surgery attending staff note: I have seen and examined the patient on postoperative day 1.  She underwent right common femoral artery cutdown and balloon angioplasty and stenting of the right iliac system.  She is suitable for discharge.    DEMETRIS ALBRIGHT M.D.

## 2022-11-18 NOTE — ANESTHESIA PROCEDURE NOTES
Airway       Patient location during procedure: OR       Procedure Start/Stop Times: 11/18/2022 10:57 AM  Staff -        Anesthesiologist:  Conor Mace MD       CRNA: Maddie Campbell APRN CRNA       Performed By: CRNA  Consent for Airway        Urgency: elective  Indications and Patient Condition       Indications for airway management: alber-procedural       Induction type:intravenous       Mask difficulty assessment: 1 - vent by mask    Final Airway Details       Final airway type: endotracheal airway       Successful airway: ETT - single  Endotracheal Airway Details        ETT size (mm): 7.0       Cuffed: yes       Successful intubation technique: direct laryngoscopy       DL Blade Type: MAC 3       Grade View of Cords: 1       Adjucts: stylet       Position: Right       Measured from: gums/teeth       Secured at (cm): 21       Bite block used: None    Post intubation assessment        Placement verified by: capnometry, equal breath sounds and chest rise        Number of attempts at approach: 1       Secured with: silk tape       Ease of procedure: easy       Dentition: Intact and Unchanged    Medication(s) Administered   Medication Administration Time: 11/18/2022 10:57 AM

## 2022-11-18 NOTE — ANESTHESIA POSTPROCEDURE EVALUATION
Patient: Karen Caban    Procedure: Procedure(s):  ENDARTERECTOMY, FEMORAL RIGHT FEMORAL CUTDOWN, RIGHT ILIAC ARTERY STENTING.  ANGIOGRAM AORTO ILIAC ANGIOGRAM       Anesthesia Type:  General    Note:  Disposition: Admission   Postop Pain Control: Uneventful            Sign Out: Well controlled pain   PONV: No   Neuro/Psych: Uneventful            Sign Out: Acceptable/Baseline neuro status   Airway/Respiratory: Uneventful            Sign Out: Acceptable/Baseline resp. status   CV/Hemodynamics: Uneventful            Sign Out: Acceptable CV status; No obvious hypovolemia; No obvious fluid overload   Other NRE: NONE   DID A NON-ROUTINE EVENT OCCUR? No           Last vitals:  Vitals Value Taken Time   /59 11/18/22 1410   Temp 36.8  C (98.2  F) 11/18/22 1310   Pulse 53 11/18/22 1412   Resp 17 11/18/22 1412   SpO2 94 % 11/18/22 1412   Vitals shown include unvalidated device data.    Electronically Signed By: TERI OHARA MD  November 18, 2022  3:12 PM

## 2022-11-18 NOTE — BRIEF OP NOTE
[facility]    Brief Operative Note    Pre-operative diagnosis: Iliac artery stenosis, right (H) [I77.1]  Post-operative diagnosis Same as pre-operative diagnosis    Procedure:  1. Right femoral artery exposure  2. Right aortoiliac arteriogram  3. Right common iliac artery stent    Surgeon: Surgeon(s) and Role:     * Shaun Tran MD - Primary     * Alberto Matos MD - Resident - Assisting  Anesthesia: General   Estimated Blood Loss: 25 mL from 11/18/2022 10:51 AM to 11/18/2022 12:44 PM      Drains: None  Specimens: * No specimens in log *  Findings:     8 mm x 29 mm balloon expandable VBX stent placed in proximal right limb of the previous aortoright iliac bypass graft  8 mm x 59 mm ballon expandable VBX stent placed in proximal right external iliac artery  Strong doppler signals in bilateral DP and PT arteries at end of case.    Complications: None.  Implants:   Implant Name Type Inv. Item Serial No.  Lot No. LRB No. Used Action   GORE VIABAHN VBX BALLOON EXPANDABLE ENDOPROSTHESIS- REF: [id number]   [id number] GORE  Right 1 Implanted   GORE VIABAHN VBX BALLOON EXPANDABLE ENDOPROSTHESIS- REF: [id number]   [id number] GORE  Right 1 Implanted

## 2022-11-18 NOTE — OP NOTE
Preoperative diagnosis:   1.  Recurrent in-stent stenosis of right iliac limb of aorto by iliac bypass graft.  2.  Recurrent in-stent stenosis of right external iliac artery stent graft.    Postoperative diagnosis: Same.    Procedure:  1.  Surgical exposure of right common femoral, deep femoral and superficial femoral arteries.  2.  Aortoiliac arteriogram.  3.  Stent grafting of proximal aspect of right limb of aortobifemoral bypass using 9 mm x 29 mm balloon expandable GORE VBX stent graft.  4.  Then grafting of mid right external iliac artery using 8 mm x 59 mm balloon expandable GORE VBX stent graft.  5.  Primary repair of right common femoral artery access site.    Surgeon: Shaun Tran M.D.   Assistant: Alberto Matos M.D.     Anesthesia: General.  Estimated blood loss: About 25 mL.  Contrast: 55 mL.  Fluoroscopy dose: 48.2 mGy.   Fluoroscopy time: 3.4 minutes.    Indication for procedure: This is a 71-year-old female with a previous aortobiiliac bypass.  This was done in 2008 or 2009.  Then she went on to have occlusion of the native right external iliac artery.  Recent imaging shows that developing stenosis in the proximal extent of the previous stent in the proximal aspect of the right limb of the aortobiiliac bypass graft as well as the native external iliac artery.  We plan to intervene to prevent progressive stenosis and occlusion of the stent graft.    Procedure details: Patient was identified and then taken to the operating room and placed in supine position.  General anesthesia was induced.  Preoperative intravenous antibiotics were administered.  Right groin and right lower extremity were prepped and a sterile surgical field was created.  Preprocedure timeout was conducted.    A 4 cm right groin incision was made in longitudinal fashion.  It was deepened with electrocautery.  The right common femoral artery, deep femoral artery and superficial femoral artery were dissected out and placed in  Vesseloops.  All vessels had good pulsation.  5000 units of heparin were administered.  The right common femoral artery was accessed in retrograde fashion with a micropuncture needle followed by placement of a microwire.  This was then converted to a 0.035 inch wire system and a short 5 Montenegrin sheath was placed.  Arteriogram was performed in retrograde fashion which showed high-grade in-stent stenosis with relining of thrombus in the right external iliac artery stent graft.  We could not get good images of the aortic bifurcation and therefore a 0.035 inch Glidewire was placed in the main body of the aortobiiliac graft and pigtail catheter was placed.  Arteriogram showed high-grade stenosis of the most proximal aspect of the right limb of the aortobiiliac bypass graft with chronic thrombus going into the previously placed stent graft.  We then tightened down on the Vesseloops on the superficial femoral and deep femoral arteries to prevent embolization.  We decided to intervene on this.  5 Montenegrin sheath was removed and a long 8 Montenegrin sheath was placed.  Just at the level of the bifurcation of the aortobiiliac graft we placed a 9 mm x 29 mm balloon expandable GORE VBX stent graft.  Then in the mid external iliac artery where there was in-stent stenosis with chronic thrombus we placed an 8 mm x 59 mm GORE VBX stent graft.  Completion arteriogram showed no residual stenosis with excellent outflow into the common femoral, deep femoral and proximal superficial femoral arteries.    Procedure was concluded.  Sheath was removed and the arteriotomy was closed with a single figure of 8 suture of 6-0 Prolene.  Adequate hemostasis was confirmed.  Femoral sheath was approximated with 2-0 Vicryl.  Koko's layer was closed with 2-0 Vicryl and skin was approximated with 4-0 Monocryl suture.    Counts of instruments, sponges and needle was noted to be correct.    Patient was taken to the recovery room in stable condition.    I  called her  and updated him on our progress.

## 2022-11-18 NOTE — ANESTHESIA CARE TRANSFER NOTE
Patient: Karen Caban    Procedure: Procedure(s):  ENDARTERECTOMY, FEMORAL RIGHT FEMORAL CUTDOWN, RIGHT ILIAC ARTERY STENTING.  ANGIOGRAM AORTO ILIAC ANGIOGRAM       Diagnosis: Iliac artery stenosis, right (H) [I77.1]  Diagnosis Additional Information: No value filed.    Anesthesia Type:   General     Note:    Oropharynx: oropharynx clear of all foreign objects and spontaneously breathing  Level of Consciousness: awake  Oxygen Supplementation: face mask  Level of Supplemental Oxygen (L/min / FiO2): 6  Independent Airway: airway patency satisfactory and stable  Dentition: dentition unchanged  Vital Signs Stable: post-procedure vital signs reviewed and stable  Report to RN Given: handoff report given  Patient transferred to: PACU  Comments: Neuromuscular blockade reversed after TOF 4/4, spontaneous respirations, adequate tidal volumes, followed commands to voice, oropharynx suctioned with soft flexible catheter, extubated atraumatically, extubated with suction, airway patent after extubation.  Oxygen via facemask at 6 liters per minute to PACU. Oxygen tubing connected to wall O2 in PACU, SpO2, NiBP, and EKG monitors and alarms on and functioning, report on patient's clinical status given to PACU RN, RN questions answered.     Handoff Report: Identifed the Patient, Identified the Reponsible Provider, Reviewed the pertinent medical history, Discussed the surgical course, Reviewed Intra-OP anesthesia mangement and issues during anesthesia, Set expectations for post-procedure period and Allowed opportunity for questions and acknowledgement of understanding      Vitals:  Vitals Value Taken Time   BP     Temp     Pulse     Resp     SpO2         Electronically Signed By: KWESI Pino CRNA  November 18, 2022  12:47 PM

## 2022-11-19 VITALS
BODY MASS INDEX: 19.77 KG/M2 | OXYGEN SATURATION: 96 % | RESPIRATION RATE: 14 BRPM | DIASTOLIC BLOOD PRESSURE: 69 MMHG | SYSTOLIC BLOOD PRESSURE: 152 MMHG | HEIGHT: 66 IN | HEART RATE: 60 BPM | WEIGHT: 123 LBS | TEMPERATURE: 98 F

## 2022-11-19 DIAGNOSIS — I77.1 ILIAC ARTERY STENOSIS, RIGHT (H): Primary | ICD-10-CM

## 2022-11-19 LAB
ANION GAP SERPL CALCULATED.3IONS-SCNC: 5 MMOL/L (ref 3–14)
BUN SERPL-MCNC: 9 MG/DL (ref 7–30)
CALCIUM SERPL-MCNC: 9.2 MG/DL (ref 8.5–10.1)
CHLORIDE BLD-SCNC: 100 MMOL/L (ref 94–109)
CO2 SERPL-SCNC: 33 MMOL/L (ref 20–32)
CREAT SERPL-MCNC: 0.55 MG/DL (ref 0.52–1.04)
ERYTHROCYTE [DISTWIDTH] IN BLOOD BY AUTOMATED COUNT: 14 % (ref 10–15)
GFR SERPL CREATININE-BSD FRML MDRD: >90 ML/MIN/1.73M2
GLUCOSE BLD-MCNC: 91 MG/DL (ref 70–99)
GLUCOSE BLDC GLUCOMTR-MCNC: 81 MG/DL (ref 70–99)
HCT VFR BLD AUTO: 40.4 % (ref 35–47)
HGB BLD-MCNC: 13.5 G/DL (ref 11.7–15.7)
MCH RBC QN AUTO: 30.8 PG (ref 26.5–33)
MCHC RBC AUTO-ENTMCNC: 33.4 G/DL (ref 31.5–36.5)
MCV RBC AUTO: 92 FL (ref 78–100)
PLATELET # BLD AUTO: 163 10E3/UL (ref 150–450)
POTASSIUM BLD-SCNC: 3.3 MMOL/L (ref 3.4–5.3)
RBC # BLD AUTO: 4.38 10E6/UL (ref 3.8–5.2)
SODIUM SERPL-SCNC: 138 MMOL/L (ref 133–144)
WBC # BLD AUTO: 26.6 10E3/UL (ref 4–11)

## 2022-11-19 PROCEDURE — 85027 COMPLETE CBC AUTOMATED: CPT | Performed by: STUDENT IN AN ORGANIZED HEALTH CARE EDUCATION/TRAINING PROGRAM

## 2022-11-19 PROCEDURE — 250N000013 HC RX MED GY IP 250 OP 250 PS 637: Performed by: STUDENT IN AN ORGANIZED HEALTH CARE EDUCATION/TRAINING PROGRAM

## 2022-11-19 PROCEDURE — 250N000011 HC RX IP 250 OP 636: Performed by: STUDENT IN AN ORGANIZED HEALTH CARE EDUCATION/TRAINING PROGRAM

## 2022-11-19 PROCEDURE — 250N000013 HC RX MED GY IP 250 OP 250 PS 637: Performed by: SURGERY

## 2022-11-19 PROCEDURE — 80048 BASIC METABOLIC PNL TOTAL CA: CPT | Performed by: STUDENT IN AN ORGANIZED HEALTH CARE EDUCATION/TRAINING PROGRAM

## 2022-11-19 PROCEDURE — 36415 COLL VENOUS BLD VENIPUNCTURE: CPT | Performed by: STUDENT IN AN ORGANIZED HEALTH CARE EDUCATION/TRAINING PROGRAM

## 2022-11-19 RX ORDER — NICOTINE 21 MG/24HR
1 PATCH, TRANSDERMAL 24 HOURS TRANSDERMAL EVERY 24 HOURS
Status: DISCONTINUED | OUTPATIENT
Start: 2022-11-19 | End: 2022-11-19

## 2022-11-19 RX ORDER — NICOTINE 21-14-7MG
KIT TRANSDERMAL
Qty: 1 KIT | Refills: 0 | Status: SHIPPED | OUTPATIENT
Start: 2022-11-19 | End: 2023-04-20

## 2022-11-19 RX ORDER — ASPIRIN 81 MG/1
81 TABLET, CHEWABLE ORAL DAILY
Qty: 90 TABLET | Refills: 11 | Status: SHIPPED | OUTPATIENT
Start: 2022-11-19

## 2022-11-19 RX ORDER — NITROGLYCERIN 0.4 MG/1
0.4 TABLET SUBLINGUAL EVERY 5 MIN PRN
Status: DISCONTINUED | OUTPATIENT
Start: 2022-11-19 | End: 2022-11-19 | Stop reason: HOSPADM

## 2022-11-19 RX ORDER — POTASSIUM CHLORIDE 1500 MG/1
20 TABLET, EXTENDED RELEASE ORAL ONCE
Status: COMPLETED | OUTPATIENT
Start: 2022-11-19 | End: 2022-11-19

## 2022-11-19 RX ORDER — POLYETHYLENE GLYCOL 3350 17 G
2 POWDER IN PACKET (EA) ORAL
Status: DISCONTINUED | OUTPATIENT
Start: 2022-11-19 | End: 2022-11-19 | Stop reason: HOSPADM

## 2022-11-19 RX ORDER — OXYCODONE HYDROCHLORIDE 5 MG/1
5 TABLET ORAL EVERY 6 HOURS PRN
Qty: 15 TABLET | Refills: 0 | Status: SHIPPED | OUTPATIENT
Start: 2022-11-19 | End: 2022-11-19

## 2022-11-19 RX ORDER — MULTIVITAMIN,THERAPEUTIC
TABLET ORAL DAILY
Status: DISCONTINUED | OUTPATIENT
Start: 2022-11-19 | End: 2022-11-19 | Stop reason: HOSPADM

## 2022-11-19 RX ADMIN — ACETAMINOPHEN 975 MG: 325 TABLET, FILM COATED ORAL at 06:44

## 2022-11-19 RX ADMIN — PANTOPRAZOLE SODIUM 40 MG: 40 TABLET, DELAYED RELEASE ORAL at 09:19

## 2022-11-19 RX ADMIN — THERA TABS 1 TABLET: TAB at 09:21

## 2022-11-19 RX ADMIN — ENOXAPARIN SODIUM 40 MG: 40 INJECTION SUBCUTANEOUS at 06:45

## 2022-11-19 RX ADMIN — POTASSIUM CHLORIDE 20 MEQ: 1500 TABLET, EXTENDED RELEASE ORAL at 12:15

## 2022-11-19 RX ADMIN — CHLORTHALIDONE 25 MG: 25 TABLET ORAL at 09:21

## 2022-11-19 RX ADMIN — ATORVASTATIN CALCIUM 40 MG: 40 TABLET, FILM COATED ORAL at 09:21

## 2022-11-19 RX ADMIN — SENNOSIDES AND DOCUSATE SODIUM 1 TABLET: 50; 8.6 TABLET ORAL at 09:21

## 2022-11-19 RX ADMIN — Medication 125 MCG: at 09:21

## 2022-11-19 RX ADMIN — POLYETHYLENE GLYCOL 3350 17 G: 17 POWDER, FOR SOLUTION ORAL at 09:21

## 2022-11-19 RX ADMIN — ASPIRIN 81 MG CHEWABLE TABLET 81 MG: 81 TABLET CHEWABLE at 09:18

## 2022-11-19 RX ADMIN — METOPROLOL SUCCINATE 150 MG: 50 TABLET, EXTENDED RELEASE ORAL at 09:19

## 2022-11-19 ASSESSMENT — ACTIVITIES OF DAILY LIVING (ADL)
ADLS_ACUITY_SCORE: 22

## 2022-11-19 NOTE — PROGRESS NOTES
Mrs. Caban is postoperative day 1 from right common femoral artery exposure and balloon angioplasty and stenting of the right limb of the aorto by iliac graft and the in-stent stenosis of the native external iliac artery.    She has no complaints.  She has been able to void.    Vital signs have been stable.  Right groin pulses 3+ palpable.  Foot is warm and well-perfused.    We will discharge her home today.  She has had problems with clopidogrel before and therefore we will prescribe Brilinta in addition to the baby aspirin.  All questions answered.

## 2022-11-19 NOTE — PROGRESS NOTES
VSS. A/O. SBA, R groin incision CDI. PT doppler, DP palpable. Oxy given once. Tolerating diet. Voiding fine.

## 2022-11-19 NOTE — PLAN OF CARE
POD 1 from a R iliac artery stent. A&O x4. CMS intact. Doppler R PT and R DP. Bowel sounds normoactive, passing flatus, tolerating regular diet. denies nausea. VSS.  R groin incision CDI. Up independently in the room.voiding well.  Mild pain relieved with scheduled tylenol. Pt scoring green on the Aggression Stop Light Tool. Possibly discharged home today.  
Pt A&Ox4; VSS; R groin dressing CDI; CMS intact; DP/PT pulses per doppler. Denies pain. Tolerating PO; voiding without difficulties; up independently. Discharge orders received; instructions reviewed with pt; all questions answered. A copy of discharge AVS and meds given to pt except pt will be picking up Brilinta and tylenol at own pharmacy ([facility]).  Belongings returned; pt discharged from floor via w/c accompanied by NA; family providing transportation to home.            
40.4

## 2022-11-19 NOTE — PROVIDER NOTIFICATION
Paged via [facility] and spoke to Dr. Tran regarding pt's low potassium; order received to give 1 time dose of potassium chloride and okay to discharge pt.   Also, MD made aware that pt will be taking morning dose of Brilinta when she gets home today (medication currently unavailable here in the hospital, per pharmacy.) Pt will be filling prescription at her own pharmacy. Dr. Tran okay with it.

## 2022-11-21 LAB
ATRIAL RATE - MUSE: 57 BPM
DIASTOLIC BLOOD PRESSURE - MUSE: NORMAL MMHG
INTERPRETATION ECG - MUSE: NORMAL
P AXIS - MUSE: 57 DEGREES
PR INTERVAL - MUSE: 182 MS
QRS DURATION - MUSE: 86 MS
QT - MUSE: 442 MS
QTC - MUSE: 430 MS
R AXIS - MUSE: 39 DEGREES
SYSTOLIC BLOOD PRESSURE - MUSE: NORMAL MMHG
T AXIS - MUSE: 72 DEGREES
VENTRICULAR RATE- MUSE: 57 BPM

## 2022-11-22 ENCOUNTER — PATIENT OUTREACH (OUTPATIENT)
Dept: CARE COORDINATION | Facility: CLINIC | Age: 72
End: 2022-11-22

## 2022-11-22 NOTE — PROGRESS NOTES
[facility] Resource Center Contact  [facility]/Voicemail     Clinical Data: Transitional Care Management Outreach     Outreach attempted x 2.  Left message on patient's voicemail, providing [facility]'s 24/7 scheduling and nurse triage phone number [phone] ([phone]) for questions/concerns and/or to schedule an appt with an [facility] provider, if they do not have a PCP.      Plan:  [facility] will do no further outreaches at this time.       Porsha Barcenas  Community Health Worker  [facility], [facility]  [phone]      *Connected Care Resource Team does NOT follow patient ongoing. Referrals are identified based on internal discharge reports and the outreach is to ensure patient has an understanding of their discharge instructions.

## 2022-11-30 ENCOUNTER — TELEPHONE (OUTPATIENT)
Dept: OTHER | Facility: CLINIC | Age: 72
End: 2022-11-30

## 2022-11-30 ENCOUNTER — OFFICE VISIT (OUTPATIENT)
Dept: OTHER | Facility: CLINIC | Age: 72
End: 2022-11-30
Attending: PHYSICIAN ASSISTANT
Payer: COMMERCIAL

## 2022-11-30 VITALS — HEART RATE: 69 BPM | OXYGEN SATURATION: 98 % | SYSTOLIC BLOOD PRESSURE: 125 MMHG | DIASTOLIC BLOOD PRESSURE: 74 MMHG

## 2022-11-30 DIAGNOSIS — E78.5 HYPERLIPIDEMIA LDL GOAL <70: Primary | ICD-10-CM

## 2022-11-30 DIAGNOSIS — I73.9 PAD (PERIPHERAL ARTERY DISEASE) (H): Primary | ICD-10-CM

## 2022-11-30 DIAGNOSIS — L03.818 CELLULITIS OF OTHER SPECIFIED SITE: ICD-10-CM

## 2022-11-30 DIAGNOSIS — E78.5 HYPERLIPIDEMIA LDL GOAL <70: ICD-10-CM

## 2022-11-30 PROCEDURE — G0463 HOSPITAL OUTPT CLINIC VISIT: HCPCS

## 2022-11-30 PROCEDURE — 99214 OFFICE O/P EST MOD 30 MIN: CPT | Performed by: PHYSICIAN ASSISTANT

## 2022-11-30 RX ORDER — CEPHALEXIN 500 MG/1
500 CAPSULE ORAL 3 TIMES DAILY
Qty: 21 CAPSULE | Refills: 0 | Status: SHIPPED | OUTPATIENT
Start: 2022-11-30 | End: 2022-12-07

## 2022-11-30 RX ORDER — ROSUVASTATIN CALCIUM 40 MG/1
40 TABLET, COATED ORAL DAILY
Qty: 90 TABLET | Refills: 3 | Status: SHIPPED | OUTPATIENT
Start: 2022-11-30 | End: 2023-03-06 | Stop reason: SINTOL

## 2022-11-30 NOTE — PATIENT INSTRUCTIONS
Start an antibiotic for mild cellulitis at the incision site. Follow-up with Dr. Tran as scheduled.     2.   Place gauze over incision to prevent your underwear from rubbing directly against it.     3.  Stop Atorvastatin and start Rosuvastatin (Crestor) 40 mg daily for your cholesterol. Then in 3 months we will repeat a cholesterol panel.  We will call you to schedule this.     4. Call us with any questions or concerns ([phone]).

## 2022-11-30 NOTE — TELEPHONE ENCOUNTER
Pt is s/p surgical exposure of right common femoral, deep femoral and superficial femoral arteries, aortoiliac arteriogram, stent grafting of proximal aspect of right limb of aortobifemoral bypass using 9 mm x 29 mm balloon expandable GORE VBX stent graft, grafting of mid right external iliac artery using 8 mm x 59 mm balloon expandable GORE VBX stent graft, primary repair of right common femoral artery access site on 11/18/22 with Dr. Tran.    Returned call to patient.  Reports right groin incision started to appear red and swollen yesterday.  Patient notes a small amount of white and bloody tinged drainage.  Steri-strips have been removed and patient reports she is currently using an antibiotic ointment and a band-aid over the site.  Patient notes incision erythema.    Patient is unable to take a picture and upload it into her MyChart.    Will discuss with JJ ThackerN, RN-[facility]

## 2022-11-30 NOTE — TELEPHONE ENCOUNTER
Patient needs to be scheduled for fasting lipid lab in 3 months and in person or virtual follow up 1 week after the lab is completed.    Appointment note: 3 month follow up- lipid lab prior    Marylou GREWAL RN    [facility]  Office: [phone]  Fax: [phone]

## 2022-11-30 NOTE — TELEPHONE ENCOUNTER
[facility]    Who is the name of the provider?:  Chelsea    What is the location you see this provider at/preferred location?: [place]  Person calling: Self  Phone number:  [phone]  Nurse call back needed:  YES     Reason for call:   Stent placement 11/18.  Since yesterday, groin incision is red, swollen, small area appears pussy and bleeding a little.  Inside of thigh hurts down to knee.  Has been putting bacitracin ointment and bandage on it.      Pharmacy location:  [facility] on [address]  Outside Imaging: Not Applicable   Can we leave a detailed message on this number?  YES

## 2022-11-30 NOTE — PROGRESS NOTES
[facility] CENTER  VASCULAR MEDICINE FOLLOW-UP VISIT      PRIMARY HEALTH CARE PROVIDER:  Liane Claudio    REASON FOR VISIT:  Follow-up hyperlipidemia, PAD, groin incision concerns.       HPI: Karen Caban is a very pleasant 71 year old female smoker with a history of CLL, GERD, polycythemia vera, hypertension, hyperlipidemia, and PAD. She underwent an aorto/right common iliac/left common femoral bypass in 2002 by Dr. Turner. Then in 11/2009 she underwent right iliac artery angioplasty and stenting. This was followed by embolic occlusion of the right distal popliteal and tibial arteries in 12/2011 requiring thromboembolectomy by Dr. Ryan. She had also been followed for a carotid bruit with last ultrasound in 2012 with <50% carotid stenosis bilaterally.      She had not been seen in follow-up in the vascular clinic since 2013. In the past year, she has developed increased right buttock cramping when ambulating. She also gets a numbness in her leg when walking, but this improves when bending over. She denies any rest pain or non-healing wounds. She has no complaints with her left leg.     AZALEA's in 9/2022 were 0.79 at rest on the right, dropping to 0.63 after exercise. CTA was then obtained 10/11/22 and significant for significant stenosis in the proximal aspects of the stents extending from the right limb of the aortoiliac bypass graft into the distal right external iliac artery.     She was referred on to Vascular Surgery again. Dr. Tran took her to the OR oon 11/18/22 for stent grafting of proximal aspect of right limb of aortobifemoral bypass using 9 mm x 29 mm balloon expandable GORE VBX stent graft and grafting of mid right external iliac artery using 8 mm x 59 mm balloon expandable GORE VBX stent graft via right femoral artery cutdown. She was placed on Brilinta (can't tolerate Plavix) and aspirin.     She did well post-procedure. She does have some burning down her leg, likely  reperfusion pain. She is walking more. However, starting one day ago she noted some erythema and pain around her right groin incision as well as some drainage. She denies fever but admits to some chills.      She is presently on Aspirin (unable to tolerate Plavix), Atorvastatin 40 mg daily for hyperlipidemia, and chlorthalidone and metoprolol for hypertension. She is smoking 1/2 ppd of cigarettes but would like to quit.       PAST MEDICAL HISTORY  Past Medical History:   Diagnosis Date     Ankle fracture 2009    L     Anxiety      Chronic back pain      Chronic lymphocytic leukemia (H)      Colon polyp 2012    repeat colonoscopy 5 years.     Fx low femur epiphy-closed (H)      GERD (gastroesophageal reflux disease)      History of UTI 2017    Cysto by Dr Nikole xiong     HTN (hypertension), benign      Hyperlipidemia LDL goal < 100      Normal nuclear stress test 12/2009    EF 67%     Osteopenia      PAD (peripheral artery disease) (H)     s/p fem pop bypass; embolectomy     Polycythemia vera (H) 06/15/2022     PONV (postoperative nausea and vomiting)      PUD (peptic ulcer disease) 1980s    DU     Smoker      Vitamin D deficiency        PAST SURGICAL HISTORY:  Past Surgical History:   Procedure Laterality Date     ANGIOGRAM Right 11/18/2022    Procedure: ANGIOGRAM AORTO ILIAC ANGIOGRAM;  Surgeon: Shaun Tran MD;  Location:  OR     Blood clot removal from Stent      2011     BONE MARROW BIOPSY, BONE SPECIMEN, NEEDLE/TROCAR N/A 02/02/2021    Procedure: bone marrow biopsy;  Surgeon: Kailey Cota MD;  Location:  GI     BYPASS GRAFT AORTOFEMORAL  05/2002    Dr. Turner     BYPASS GRAFT FEMOROPOPLITEAL  12/16/2011     COLONOSCOPY N/A 01/18/2018    Procedure: COMBINED COLONOSCOPY, SINGLE OR MULTIPLE BIOPSY/POLYPECTOMY BY BIOPSY;  COLONOSCOPY;  Surgeon: Efrain Hernadez MD;  Location:  GI     DILATION AND CURETTAGE, OPERATIVE HYSTEROSCOPY, COMBINED N/A 9/15/2022    Procedure: HYSTEROSCOPY,  WITH DILATION AND CURETTAGE OF UTERUS;  Surgeon: Destiney Schaefer MD;  Location:  OR     EMBOLECTOMY LOWER EXTREMITY  12/16/2011    Procedure:EMBOLECTOMY LOWER EXTREMITY; EMBOLECTOMY, RIGHT POPLITEAL WITH PATCH ANGIOPLASTY. EXCISION SKIN LESION RIGHT LEG. ; Surgeon:PARMINDER VELAZCO; Location: OR     ENDARTERECTOMY FEMORAL Right 11/18/2022    Procedure: ENDARTERECTOMY, FEMORAL RIGHT FEMORAL CUTDOWN, RIGHT ILIAC ARTERY STENTING.;  Surgeon: Shaun Tran MD;  Location:  OR     ESOPHAGOSCOPY, GASTROSCOPY, DUODENOSCOPY (EGD), COMBINED N/A 10/7/2022    Procedure: ESOPHAGOGASTRODUODENOSCOPY, WITH BIOPSY;  Surgeon: Felix Carmona MD;  Location:  GI     EXCISE LESION LOWER EXTREMITY  12/16/2011    Procedure:EXCISE LESION LOWER EXTREMITY; Surgeon:PARMINDER VELAZCO; Location: OR     HERNIA REPAIR  11/04/2008    x2 umbilical     IR OR ANGIOGRAM  11/18/2022     L achilles repair  12/04/2008     LAPAROSCOPIC OOPHORECTOMY Left     L for cyst age 26     miscarriages x 3       tubal ligation and reversal           CURRENT MEDICATIONS  albuterol (PROAIR RESPICLICK) 108 (90 Base) MCG/ACT inhaler, Inhale 1-2 puffs into the lungs every 4 hours as needed  aspirin (ASA) 81 MG chewable tablet, Take 1 tablet (81 mg) by mouth daily  atorvastatin (LIPITOR) 40 MG tablet, Take 1 tablet (40 mg) by mouth daily  chlorthalidone (HYGROTON) 25 MG tablet, TAKE 1 TABLET(25 MG) BY MOUTH DAILY  Cholecalciferol (VITAMIN D-3) 5000 UNIT TABS, Take 1 tablet by mouth daily.  coenzyme Q-10 200 MG CAPS, Take 200 mg by mouth daily  Cranberry-Vitamin C-Vitamin E (CRANBERRY PLUS VITAMIN C) 4200-20-3 MG-MG-UNIT CAPS, Take 1 tablet by mouth daily  Cyanocobalamin (B-12 PO), Take 5,000 mcg by mouth every other day Liquid form  estradiol (ESTRACE) 0.1 MG/GM vaginal cream, Place 1 g vaginally as needed (using at onset of UTI)  Lactobacillus (PROBIOTIC ACIDOPHILUS PO), Take 1 capsule by mouth daily  metoprolol succinate ER (TOPROL XL)  100 MG 24 hr tablet, Take 150 mg by mouth daily (1.5 x 100 mg = 150 mg)  Multiple Vitamins-Minerals (MULTIVITAMIN GUMMIES ADULT PO), Take 2 chew tab by mouth daily  nicotine (COMMIT) 2 MG lozenge, Place 1 lozenge (2 mg) inside cheek every hour as needed for smoking cessation  nicotine (NICODERM CQ) 14 MG/24HR 24 hr patch, Place 1 patch onto the skin every 24 hours (Talk with primary provider or vascular medicine provider about when to move to 7mg dose)  nicotine (NICODERM CQ) 7 MG/24HR 24 hr patch, Place 1 patch onto the skin every 24 hours (Talk with your primary provider or vascular medicine provider about when to start this dose)  nicotine 21-14-7 MG/24HR KIT, Nicotine replacement  nitroGLYcerin (NITROSTAT) 0.4 MG sublingual tablet, For chest pain place 1 tablet under the tongue every 5 minutes for 3 doses. If symptoms persist 5 minutes after 1st dose call 911.  omeprazole (PRILOSEC) 40 MG DR capsule, Take 1 capsule (40 mg) by mouth 2 times daily TAKE 1 CAPSULE BY MOUTH EVERY DAY 30 TO 60 MINUTES BEFORE A MEAL  sertraline (ZOLOFT) 50 MG tablet, Take 50 mg by mouth daily  Sodium Chloride-Xylitol (XLEAR SINUS CARE SPRAY NA), Spray 1 spray in nostril daily as needed  ticagrelor (BRILINTA) 60 MG tablet, Take 1 tablet (60 mg) by mouth 2 times daily for 90 days    No current facility-administered medications on file prior to visit.      ALLERGIES     Allergies   Allergen Reactions     Chantix [Varenicline] Nausea     Ciprofloxacin Other (See Comments)     hypertension     Clopidogrel      Other reaction(s): Hypertension     Decongestant [Cvs]      Erythromycin Nausea     Lisinopril      cough     Plavix [Clopidogrel Bisulfate]      Felt as if she was having a heart attack     Rofecoxib Unknown     Vioxx      Increased BP       FAMILY HISTORY  Family History   Problem Relation Age of Onset     Alzheimer Disease Mother          of panc/GI ca age 83     Osteoporosis Mother      Other Cancer Mother      Cancer Father           age 64 lymphoma     Colon Cancer Maternal Grandfather        SOCIAL HISTORY  Social History     Socioeconomic History     Marital status:      Spouse name: Not on file     Number of children: Not on file     Years of education: Not on file     Highest education level: Not on file   Occupational History     Not on file   Tobacco Use     Smoking status: Every Day     Packs/day: 0.50     Years: 50.00     Pack years: 25.00     Types: Cigarettes     Smokeless tobacco: Never     Tobacco comments:     Nicorette gum and patch, trying to quit    Vaping Use     Vaping Use: Some days   Substance and Sexual Activity     Alcohol use: Yes     Comment: Beer once in a while     Drug use: No     Sexual activity: Not Currently     Partners: Male   Other Topics Concern     Parent/sibling w/ CABG, MI or angioplasty before 65F 55M? 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  Had 3 miscarriages.  No living children.   is Dustin.  Walks 2miles per day.       ROS:   Complete ROS negative other than what is stated in HPI.     EXAM:  /74 (BP Location: Right arm, Patient Position: Chair, Cuff Size: Adult Regular)   Pulse 69   SpO2 98%   In general, the patient is a pleasant female in no apparent distress.    HEENT: NC/AT.  PERRLA.  EOMI.  Sclerae white, not injected.    Neck: No adenopathy.  Carotids +2/2 bilaterally without bruits.   Heart: RRR. Normal S1, S2 splits physiologically. No murmur, rub, click, or gallop.   Lungs: CTA.  No ronchi, wheezes, rales.    Abdomen: Soft, nontender, nondistended.   Extremities: No clubbing, cyanosis, or edema. Palpable PT pulse on right, faintly palpable DP pulse on right. Right groin wound as shown below in picture. Unable to express any pus or drainage.            Labs:  LIPID RESULTS:  Lab Results   Component Value Date    CHOL 149 2022    CHOL 142 2020    HDL 51 2022    HDL 50  12/17/2020    LDL 83 09/13/2022    LDL 78 12/17/2020    TRIG 75 09/13/2022    TRIG 70 12/17/2020    CHOLHDLRATIO 2.1 11/07/2014       LIVER ENZYME RESULTS:  Lab Results   Component Value Date    AST 31 10/05/2022    AST 23 06/01/2021    ALT 34 10/05/2022    ALT 31 06/01/2021       CBC RESULTS:  Lab Results   Component Value Date    WBC 26.6 (H) 11/19/2022    WBC 17.7 (H) 06/01/2021    RBC 4.38 11/19/2022    RBC 5.01 06/01/2021    HGB 13.5 11/19/2022    HGB 15.9 (H) 06/01/2021    HCT 40.4 11/19/2022    HCT 47.2 (H) 06/01/2021    MCV 92 11/19/2022    MCV 94 06/01/2021    MCH 30.8 11/19/2022    MCH 31.7 06/01/2021    MCHC 33.4 11/19/2022    MCHC 33.7 06/01/2021    RDW 14.0 11/19/2022    RDW 13.2 06/01/2021     11/19/2022     06/01/2021       BMP RESULTS:  Lab Results   Component Value Date     11/19/2022     06/01/2021    POTASSIUM 3.3 (L) 11/19/2022    POTASSIUM 3.1 (L) 06/01/2021    CHLORIDE 100 11/19/2022    CHLORIDE 101 06/01/2021    CO2 33 (H) 11/19/2022    CO2 35 (H) 06/01/2021    ANIONGAP 5 11/19/2022    ANIONGAP 3 06/01/2021    GLC 91 11/19/2022    GLC 81 11/19/2022     (H) 06/01/2021    BUN 9 11/19/2022    BUN 10 06/01/2021    CR 0.55 11/19/2022    CR 0.71 06/01/2021    GFRESTIMATED >90 11/19/2022    GFRESTIMATED >60 04/21/2022    GFRESTIMATED 87 06/01/2021    GFRESTBLACK >90 06/01/2021    CHANDAN 9.2 11/19/2022    CHANDAN 9.1 06/01/2021        A1C RESULTS:  Lab Results   Component Value Date    A1C 5.2 12/16/2011       THYROID RESULTS:  Lab Results   Component Value Date    TSH 1.38 12/08/2017         Procedures:   CTA ABDOMEN PELVIS BILATERAL LEG RUNOFF WITH CONTRAST  10/11/2022  10:03 AM      HISTORY:  Peripheral arterial disease. Status post aorto right iliac  and left femoral bypass graft. Status post recanalization and stenting  of the right external iliac artery on 11/23/2009. Patient now  presenting with right buttock claudication.     COMPARISON: Ankle-brachial indices dated  11/23/2009.     TECHNIQUE: CT angiogram of the abdomen and pelvis with bilateral lower  extremity runoff was performed following the administration of 100 cc  intravenous contrast. Images are viewed in multiple planes and 3-D  reconstructions were also performed. Radiation dose for this scan was  reduced using automated exposure control, adjustment of the mA and/or  kV according to patient size, or iterative reconstruction technique.     FINDINGS:   Vascular exam:  Abdominal aorta: The abdominal aorta is of normal caliber without  aneurysmal dilatation or significant stenosis. The celiac trunk,  superior mesenteric artery, renal arteries, and inferior mesenteric  artery are patent without significant stenoses. There is a direct  origin of the left gastric artery from the abdominal aorta. This is  patent without significant stenosis.     An aorto right iliac and left femoral bypass graft is patent without  significant stenosis.     Iliac arteries: There is a stent which extends from the right limb of  the bypass graft into the distal right external iliac artery. The  stent is patent. There are areas of intimal hyperplasia within the  stent. These contribute to a significant stenosis in the proximal  stent and a moderate stenosis in the mid distal stent.     Right lower extremity angiogram:  Common femoral artery: No significant stenosis.  Profunda femoris artery: No significant stenosis.  Superficial femoral artery: No significant stenosis.  Popliteal artery: No significant stenosis.  Tibial arteries: Three-vessel runoff.     Left lower extremity angiogram:  Common femoral artery: No significant stenosis.  Profunda femoris artery: No significant stenosis.  Superficial femoral artery: No significant stenosis.  Tibial arteries: Three-vessel runoff.     Soft tissue exam: The lung bases are clear.     Again identified is a 1.4 cm cystic lesion in the head of the  pancreas. The remaining solid organs in the abdomen are  unremarkable.     The bowel appears grossly unremarkable. There are a few prominent  retroperitoneal lymph nodes. These are not significantly changed.     Again identified are cystic lesions in the adnexa bilaterally.                                                                      IMPRESSION:  1. Significant stenosis in the proximal aspects of the stents  extending from the right limb of the aorto right iliac bypass graft  into the distal right external iliac artery. Further evaluation and  intervention with angiography could be performed.  2. Stable cystic lesion at the head of the pancreas.  3. Stable cystic lesions in the adnexa laterally.       BILATERAL CAROTID ULTRASOUND October 11, 2022 9:21 AM      HISTORY: PAD, history of carotid stenosis. Asymptomatic. PAD  (peripheral artery disease) (H). Carotid stenosis, asymptomatic,  bilateral.     COMPARISON: November 21, 2012.     RIGHT CAROTID FINDINGS: There is mild atherosclerotic plaque at the  carotid bifurcation and proximal internal carotid artery with  relatively smooth margins.  Right ICA PSV:  77  cm/sec.  Right ICA EDV:  23 cm/sec.  Right ICA/CCA PSV Ratio:  1.43.    These indicate less than 50% diameter stenosis of the right ICA.    Right Vertebral: Antegrade flow.   Right ECA: Antegrade flow.      LEFT CAROTID FINDINGS: There is mild atherosclerotic plaque in the  carotid bifurcation and proximal internal carotid artery.  Left ICA PSV:  108  cm/sec.  Left ICA EDV:  26 cm/sec.  Left ICA/CCA PSV Ratio:  1.08.    These indicate less than 50% diameter stenosis of the left ICA.    Left Vertebral: Antegrade flow.   Left ECA: Antegrade flow.      Causes of Decreased Accuracy: None.                                                                       IMPRESSION:    1. Less than 50% diameter stenosis of the right internal carotid  artery relative to the distal internal carotid artery diameter.   2. Less than 50% diameter stenosis of the left internal  carotid artery  relative to the distal internal carotid artery' diameter.       Assessment and Plan:     1. Peripheral arterial disease with history of:   -2002 Aorto/right common iliac/left common femoral bypass by Dr. Turner  -11/2009 Right iliac artery angioplasty and stenting by Dr. Trejo  -12/2011  Embolic occlusion right distal popliteal and tibial arteries s/p thromboembolectomy by Dr. Ryan    Now s/p stent grafting of proximal aspect of right limb of aortobifemoral bypass using 9 mm x 29 mm balloon expandable GORE VBX stent graft and grafting of mid right external iliac artery using 8 mm x 59 mm balloon expandable GORE VBX stent graft 11/18/22 by Dr. Tran       -LDL has been elevated/not at goal of less than 70 for at least the last 4 years.   -Continues to smoke.   -No imaging since 2013 and now with increasing right buttock claudication. Denies rest pain or non-healing wounds. AZALEA's decreased in right leg post-exercise. CTA with severe stenosis in the proximal aspects of the stents extending from the right limb of the aorto right iliac bypass graft into the distal right external iliac artery, resulting in above procedure to be performed on 11/18/22.   -Groin wound with some erythema, pain and mild drainage per patient report. Possibly starting to develop mild cellulitis at site.      Recommendations:   -Start Keflex for mild soft tissue/skin infection at incision site.   -Follow-up with Dr. Tran as scheduled on 12/5/22.   -Cover site with gauze to prevent irritation from underwear. Keep site clean and dry.   -She must stop smoking and needs good control of her cholesterol.   -Walk as much as able.         2. Hyperlipidemia     -LDL goal of less than 70 given history of vascular disease-> not at goal for at least the past 4 years. Most recent LDL was 83.   -On Atorvastatin 40 mg daily.      Recommendations:   -Will change Atorvastatin to Crestor 40 mg daily to see if this will get her to goal.  Repeat a lipid panel in 3 months and follow-up with us then.         3. Hypertension     -BP at goal.      Recommendations:   -Continue the same.         4. Ongoing tobacco use     -Unable to tolerate Chantix.   -Feels ready to quit.      Recommendations:   -Discussed the importance of complete smoking cessation.   -She would like to try Wellbutrin and nicotine patches. Prescriptions sent to her pharmacy.         5. Asymptomatic bilateral carotid stenosis     -She had <50% stenosis bilaterally in 2012 and again in 10/2022.      Recommendations:  -Given that she is a vasculopath with ongoing tobacco use, repeat imaging in 1-2 years.         Kaila Navarro PA-C      30 minutes spent on the date of the encounter doing chart review, history and exam, documentation, and further activities as noted above.

## 2022-11-30 NOTE — PROGRESS NOTES
Patient is here to discuss post op.    /74 (BP Location: Right arm, Patient Position: Chair, Cuff Size: Adult Regular)   Pulse 69   SpO2 98%     Questions patient would like addressed today are: N/A.    Refills are needed: No    Has homecare services and agency name:  Ava Albrecht

## 2022-11-30 NOTE — TELEPHONE ENCOUNTER
Discussed with Kaila Navarro PA-C who requested patient to be seen in clinic.    Patient was in agreement to be seen today at 2:50pm with Kaila.  Patient aware of clinic location.    JJ MagdalenoN, RN-[facility]

## 2022-12-01 ENCOUNTER — LAB (OUTPATIENT)
Dept: INFUSION THERAPY | Facility: CLINIC | Age: 72
End: 2022-12-01
Attending: INTERNAL MEDICINE
Payer: COMMERCIAL

## 2022-12-01 DIAGNOSIS — C91.10 CLL (CHRONIC LYMPHOCYTIC LEUKEMIA) (H): ICD-10-CM

## 2022-12-01 LAB
BASOPHILS # BLD MANUAL: 0 10E3/UL (ref 0–0.2)
BASOPHILS NFR BLD MANUAL: 0 %
EOSINOPHIL # BLD MANUAL: 0.7 10E3/UL (ref 0–0.7)
EOSINOPHIL NFR BLD MANUAL: 2 %
ERYTHROCYTE [DISTWIDTH] IN BLOOD BY AUTOMATED COUNT: 14.1 % (ref 10–15)
HCT VFR BLD AUTO: 42.8 % (ref 35–47)
HGB BLD-MCNC: 14.4 G/DL (ref 11.7–15.7)
LYMPHOCYTES # BLD MANUAL: 26.8 10E3/UL (ref 0.8–5.3)
LYMPHOCYTES NFR BLD MANUAL: 78 %
MCH RBC QN AUTO: 31 PG (ref 26.5–33)
MCHC RBC AUTO-ENTMCNC: 33.6 G/DL (ref 31.5–36.5)
MCV RBC AUTO: 92 FL (ref 78–100)
MONOCYTES # BLD MANUAL: 0.3 10E3/UL (ref 0–1.3)
MONOCYTES NFR BLD MANUAL: 1 %
NEUTROPHILS # BLD MANUAL: 6.5 10E3/UL (ref 1.6–8.3)
NEUTROPHILS NFR BLD MANUAL: 19 %
PLAT MORPH BLD: ABNORMAL
PLATELET # BLD AUTO: 256 10E3/UL (ref 150–450)
RBC # BLD AUTO: 4.64 10E6/UL (ref 3.8–5.2)
RBC MORPH BLD: ABNORMAL
WBC # BLD AUTO: 34.4 10E3/UL (ref 4–11)

## 2022-12-01 PROCEDURE — 36415 COLL VENOUS BLD VENIPUNCTURE: CPT

## 2022-12-01 PROCEDURE — 85007 BL SMEAR W/DIFF WBC COUNT: CPT | Performed by: INTERNAL MEDICINE

## 2022-12-01 PROCEDURE — 85027 COMPLETE CBC AUTOMATED: CPT | Performed by: INTERNAL MEDICINE

## 2022-12-01 NOTE — TELEPHONE ENCOUNTER
Spoke with patient and scheduled her on 2/28/23 for fasting labs at [facility] and scheduled her on 3/7/23 for an in person follow up with Kaila Navarro PA-C.

## 2022-12-01 NOTE — TELEPHONE ENCOUNTER
Left voicemail with instructions for patient to call back to schedule their appointment(s)    December 1, 2022 , 3:08 PM

## 2022-12-01 NOTE — PROGRESS NOTES
Medical Assistant Note:  Karen Caban presents today for lab draw.    Patient seen by provider today: No.   present during visit today: Not Applicable.    Concerns: No Concerns.    Procedure:  Lab draw site: RAC, Needle type: BF, Gauge: 23.  Gauze and coban applied  Post Assessment:  Labs drawn without difficulty: Yes.    Discharge Plan:  Departure Mode: Ambulatory.    Face to Face Time: 5.    Iram Cannon CMA

## 2022-12-02 NOTE — RESULT ENCOUNTER NOTE
Dear Ms. Caban,    Blood test reveals elevated WBC of 34.4. Normal hemoglobin and platelet.    Please, call me with any questions.    Sancho Osorio MD

## 2022-12-05 ENCOUNTER — OFFICE VISIT (OUTPATIENT)
Dept: OTHER | Facility: CLINIC | Age: 72
End: 2022-12-05
Attending: SURGERY
Payer: COMMERCIAL

## 2022-12-05 ENCOUNTER — ONCOLOGY VISIT (OUTPATIENT)
Dept: ONCOLOGY | Facility: CLINIC | Age: 72
End: 2022-12-05
Attending: INTERNAL MEDICINE
Payer: COMMERCIAL

## 2022-12-05 ENCOUNTER — TELEPHONE (OUTPATIENT)
Dept: OTHER | Facility: CLINIC | Age: 72
End: 2022-12-05

## 2022-12-05 VITALS
SYSTOLIC BLOOD PRESSURE: 137 MMHG | DIASTOLIC BLOOD PRESSURE: 84 MMHG | BODY MASS INDEX: 20.16 KG/M2 | HEART RATE: 69 BPM | OXYGEN SATURATION: 99 % | RESPIRATION RATE: 16 BRPM | WEIGHT: 123 LBS

## 2022-12-05 VITALS — OXYGEN SATURATION: 99 % | SYSTOLIC BLOOD PRESSURE: 115 MMHG | HEART RATE: 63 BPM | DIASTOLIC BLOOD PRESSURE: 76 MMHG

## 2022-12-05 DIAGNOSIS — Z95.828 S/P AORTO-BIFEMORAL BYPASS SURGERY: Primary | ICD-10-CM

## 2022-12-05 DIAGNOSIS — C91.10 CLL (CHRONIC LYMPHOCYTIC LEUKEMIA) (H): Primary | ICD-10-CM

## 2022-12-05 PROCEDURE — G0463 HOSPITAL OUTPT CLINIC VISIT: HCPCS

## 2022-12-05 PROCEDURE — 99214 OFFICE O/P EST MOD 30 MIN: CPT | Performed by: INTERNAL MEDICINE

## 2022-12-05 PROCEDURE — 99212 OFFICE O/P EST SF 10 MIN: CPT | Performed by: SURGERY

## 2022-12-05 ASSESSMENT — PAIN SCALES - GENERAL: PAINLEVEL: NO PAIN (0)

## 2022-12-05 NOTE — LETTER
12/5/2022         RE: Karen Caban  [address]  [place] [address]        Dear Colleague,    Thank you for referring your patient, Karen Caban, to the [facility]. Please see a copy of my visit note below.    HEMATOLOGIC HISTORY:  Ms. Caban is a 71-year-old female with CLL.  1.  On 12/11/2019, WBC of 9.8.  -On 12/17/2020, WBC of 16.3.  -On 01/06/2021, WBC of 18.6, hemoglobin of 16.5 and platelet of 206.  Neutrophil of 19%, lymphocyte of 74%.  2.  Flow cytometry on peripheral blood on 01/06/2021 revealed CD5 positive kappa monotypic B cells with immunophenotype of CLL/SLL .  3.  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  CT chest, abdomen, and pelvis on 02/03/2021 did not reveal any lymphadenopathy or splenomegaly.     SUBJECTIVE: Ms. Caban is a 72-year-old female with Webb stage 0 chronic lymphocytic leukemia on observation.    On 11/18/2022, CT head vascular surgery in the right lower extremity.  Patient developed infection.  She is on antibiotics.    On 11/19/2022, WBC was 26.6.  Because of infection, it increased to 34.4 on 12/01/2022.  Hemoglobin and platelets are normal.    The patient has lung nodules and follows up with pulmonologist.     She is doing well.  No excessive fatigue.  No headache.  No dizziness.  No bone pain.  No chest pain.  No shortness of breath.  No abdominal pain, nausea or vomiting.  Appetite is good.  No urinary or bowel complaints.  No recent weight loss.  No night sweats.     All other review of systems is negative.     PHYSICAL EXAMINATION:   GENERAL:  Alert and oriented x 3.   VITAL SIGNS:  Reviewed.  ECOG PS of 0.     Rest of the systems not examined.     LABORATORY:  CBC reviewed.     ASSESSMENT:    1.  A  72-year-old female with Webb stage 0 chronic lymphocytic leukemia on observation. CLL is stable.  2.  Lung nodules, stable.  3.  Smoker.     PLAN:    1.  Patient overall is doing well.  CBC was reviewed.  Explained to her that CLL is stable.  She has leukocytosis.  Slight worsening of leukocytosis because of recent infection.  No anemia or thrombocytopenia.    Patient does not need any treatment for CLL.  She is pretty asymptomatic.  We will continue to monitor her.    2.  Patient has emphysema and lung nodules.  She will continue to follow-up with her pulmonologist.    Patient smokes.  Advised her to quit smoking.  She is working on it.    3.  I will see her in 6 months time with labs.  Advised her to call us with any questions or concerns.    TOTAL VISIT TIME:  30 minutes.  Time spent in today's visit, review of chart/investigations today and documentation.      Again, thank you for allowing me to participate in the care of your patient.        Sincerely,        Sancho Osorio MD

## 2022-12-05 NOTE — PROGRESS NOTES
Mrs. Caban returns to clinic today. On 11/18/2022 she underwent right femoral cutdown. We stented the most proximal aspect of the right limb of the aorto bi iliac graft and re lined the right external iliac artery stent graft. She developed cellulitis of the incision which has responded well to Keflex.  She has no complaints. She does note ecchymosis in the left calf area.   There is minimal erythema in the right groin incision. Femoral pulse is 2+ palpable.   I have asked her to cut down the brilinta to 60 mg daily instead of twice daily.   We will see her back in 3 months with duplex sonography.

## 2022-12-05 NOTE — PROGRESS NOTES
Patient is here to discuss post op.    /76 (BP Location: Right arm, Patient Position: Chair, Cuff Size: Adult Regular)   Pulse 63   SpO2 99%     Questions patient would like addressed today are: N/A.    Refills are needed: No    Has homecare services and agency name:  [facility]

## 2022-12-05 NOTE — TELEPHONE ENCOUNTER
Patient needs to be scheduled for aorta/iliac/IVC US and in person follow up in 3 months with Dr. Tran.      Appointment note: History of right iliac bypass with stent grafting. 3 month follow up. Aorta/iliac/IVC US prior.    Marylou GREWAL, RN    [facility]  [facility]  Office: [phone]  Fax: [phone]

## 2022-12-06 NOTE — PROGRESS NOTES
HEMATOLOGIC HISTORY:  Ms. Caban is a 71-year-old female with CLL.  1.  On 12/11/2019, WBC of 9.8.  -On 12/17/2020, WBC of 16.3.  -On 01/06/2021, WBC of 18.6, hemoglobin of 16.5 and platelet of 206.  Neutrophil of 19%, lymphocyte of 74%.  2.  Flow cytometry on peripheral blood on 01/06/2021 revealed CD5 positive kappa monotypic B cells with immunophenotype of CLL/SLL .  3.  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  CT chest, abdomen, and pelvis on 02/03/2021 did not reveal any lymphadenopathy or splenomegaly.     SUBJECTIVE: Ms. Caban is a 72-year-old female with Webb stage 0 chronic lymphocytic leukemia on observation.    On 11/18/2022, CT head vascular surgery in the right lower extremity.  Patient developed infection.  She is on antibiotics.    On 11/19/2022, WBC was 26.6.  Because of infection, it increased to 34.4 on 12/01/2022.  Hemoglobin and platelets are normal.    The patient has lung nodules and follows up with pulmonologist.     She is doing well.  No excessive fatigue.  No headache.  No dizziness.  No bone pain.  No chest pain.  No shortness of breath.  No abdominal pain, nausea or vomiting.  Appetite is good.  No urinary or bowel complaints.  No recent weight loss.  No night sweats.     All other review of systems is negative.     PHYSICAL EXAMINATION:   GENERAL:  Alert and oriented x 3.   VITAL SIGNS:  Reviewed.  ECOG PS of 0.     Rest of the systems not examined.     LABORATORY:  CBC reviewed.     ASSESSMENT:    1.  A 72-year-old female with Webb stage 0 chronic lymphocytic leukemia on observation. CLL is stable.  2.  Lung nodules, stable.  3.  Smoker.     PLAN:    1.  Patient overall is doing well.  CBC was reviewed.  Explained to her that CLL is stable.  She has leukocytosis.   Slight worsening of leukocytosis because of recent infection.  No anemia or thrombocytopenia.    Patient does not need any treatment for CLL.  She is pretty asymptomatic.  We will continue to monitor her.    2.  Patient has emphysema and lung nodules.  She will continue to follow-up with her pulmonologist.    Patient smokes.  Advised her to quit smoking.  She is working on it.    3.  I will see her in 6 months time with labs.  Advised her to call us with any questions or concerns.    TOTAL VISIT TIME:  30 minutes.  Time spent in today's visit, review of chart/investigations today and documentation.

## 2022-12-07 NOTE — TELEPHONE ENCOUNTER
Future Appointments   Date Time Provider Department Center   3/6/2023  8:30 AM [id number]   3/6/2023  9:30 AM Shaun Tran MD [facility]

## 2022-12-09 ENCOUNTER — TRANSFERRED RECORDS (OUTPATIENT)
Dept: HEALTH INFORMATION MANAGEMENT | Facility: CLINIC | Age: 72
End: 2022-12-09

## 2022-12-27 ENCOUNTER — TELEPHONE (OUTPATIENT)
Dept: OTHER | Facility: CLINIC | Age: 72
End: 2022-12-27

## 2022-12-27 ENCOUNTER — OFFICE VISIT (OUTPATIENT)
Dept: OTHER | Facility: CLINIC | Age: 72
End: 2022-12-27
Payer: COMMERCIAL

## 2022-12-27 VITALS — SYSTOLIC BLOOD PRESSURE: 136 MMHG | OXYGEN SATURATION: 97 % | HEART RATE: 72 BPM | DIASTOLIC BLOOD PRESSURE: 76 MMHG

## 2022-12-27 DIAGNOSIS — L03.818 CELLULITIS OF OTHER SPECIFIED SITE: Primary | ICD-10-CM

## 2022-12-27 PROCEDURE — G0463 HOSPITAL OUTPT CLINIC VISIT: HCPCS

## 2022-12-27 PROCEDURE — 99214 OFFICE O/P EST MOD 30 MIN: CPT

## 2022-12-27 RX ORDER — CEPHALEXIN 500 MG/1
500 CAPSULE ORAL 2 TIMES DAILY
Qty: 20 CAPSULE | Refills: 0 | Status: SHIPPED | OUTPATIENT
Start: 2022-12-27 | End: 2023-01-06

## 2022-12-27 NOTE — TELEPHONE ENCOUNTER
Left voicemail with instructions for patient to call back to schedule their appointment(s)    December 27, 2022 , 11:24 AM

## 2022-12-27 NOTE — TELEPHONE ENCOUNTER
"Pt s/p 11/18/22 \"stent grafting of proximal aspect of right limb of aortobifemoral bypass using 9 mm x 29 mm balloon expandable GORE VBX stent graft and grafting of mid right external iliac artery using 8 mm x 59 mm balloon expandable GORE VBX stent graft via right femoral artery cutdown. She was placed on Brilinta (can't tolerate Plavix) and aspirin.\"    Discussed with Allyn Mcclain NP    Routing to  to coordinate in clinic OV today in [place] with Allyn Mcclain NP, no imaging.     Appt note: f/u s/p 11/18/22 aortobifemoral bypass with [place] by Dr. Tran. Pt notes potential infection.     URI Briones, RN  [facility]  Office:  [phone] Fax: [phone]    "

## 2022-12-27 NOTE — TELEPHONE ENCOUNTER
[facility]    Who is the name of the provider?:  CHRISTIANO howard    What is the location you see this provider at/preferred location?: [place]  Person calling: Self  Phone number:  [phone]   Nurse call back needed:  YES     Reason for call:   Reoccurrence of infection of incision. Red, swollen and feeling ill, but no elevated temp    Pharmacy location:  NA  Outside Imaging: NO   Can we leave a detailed message on this number?  YES

## 2022-12-27 NOTE — PROGRESS NOTES
VASCULAR SURGERY PROGRESS NOTE    [facility]    Karen Caban  Medical Record #:  [id number]  YOB: 1950  Age:  72 year old     Date of Service: 12/27/2022    PRIMARY CARE PROVIDER: Liane Claudio    Reason for visit:  Incision infection    IMPRESSION:  Ms. Caban is a 72 year old female who comes into clinic with slight redness of her incision, in addition to pain around her incision and on the medial thigh, describes as pain that she had when she had cellulitis a month ago. The patient also reports general malaise, is afebrile on examination, all symptoms started 4 days ago. Around incision is warm to touch.             RECOMMENDATION/RISKS: Started patient on Keflex 500 mg BID for ten days, follow-up with patient on 1/2 to assess if worsening given recurrence of cellulitis. Continue current medication management. Discussed with patient when to seek further care, such as fevers, lightheadedness.     HPI:  Karen Caban is a 72 year old female with past medical history significant for aortobi iliac graft with femoral cutdown on the right side on 11/18. The patient subsequently developed cellulitis, responded well with antibiotics. Now the patient returns with recurrence of cellulitis after four days of general malaise, pain, and erythema of the incision site in addition to warmth at the incision site.     REVIEW OF SYSTEMS:    A 12 point ROS was reviewed and is negative except for what is listed above in HPI.    PHH:    Past Medical History:   Diagnosis Date     Ankle fracture 2009    L     Anxiety      Chronic back pain      Chronic lymphocytic leukemia (H)      Colon polyp 2012    repeat colonoscopy 5 years.     Fx low femur epiphy-closed (H)      GERD (gastroesophageal reflux disease)      History of UTI 2017    Cysto by Dr Agustin neg     HTN (hypertension), benign      Hyperlipidemia LDL goal < 100      Normal nuclear stress test 12/2009    EF 67%      Osteopenia      PAD (peripheral artery disease) (H)     s/p fem pop bypass; embolectomy     Polycythemia vera (H) 06/15/2022     PONV (postoperative nausea and vomiting)      PUD (peptic ulcer disease) 1980s    DU     Smoker      Vitamin D deficiency           Past Surgical History:   Procedure Laterality Date     ANGIOGRAM Right 11/18/2022    Procedure: ANGIOGRAM AORTO ILIAC ANGIOGRAM;  Surgeon: Shaun Tran MD;  Location:  OR     Blood clot removal from Stent      2011     BONE MARROW BIOPSY, BONE SPECIMEN, NEEDLE/TROCAR N/A 02/02/2021    Procedure: bone marrow biopsy;  Surgeon: Kailey Cota MD;  Location:  GI     BYPASS GRAFT AORTOFEMORAL  05/2002    Dr. Turner     BYPASS GRAFT FEMOROPOPLITEAL  12/16/2011     COLONOSCOPY N/A 01/18/2018    Procedure: COMBINED COLONOSCOPY, SINGLE OR MULTIPLE BIOPSY/POLYPECTOMY BY BIOPSY;  COLONOSCOPY;  Surgeon: Efrain Hernadez MD;  Location:  GI     DILATION AND CURETTAGE, OPERATIVE HYSTEROSCOPY, COMBINED N/A 9/15/2022    Procedure: HYSTEROSCOPY, WITH DILATION AND CURETTAGE OF UTERUS;  Surgeon: Destiney Schaefer MD;  Location:  OR     EMBOLECTOMY LOWER EXTREMITY  12/16/2011    Procedure:EMBOLECTOMY LOWER EXTREMITY; EMBOLECTOMY, RIGHT POPLITEAL WITH PATCH ANGIOPLASTY. EXCISION SKIN LESION RIGHT LEG. ; Surgeon:PARMINDER VELAZCO; Location: OR     ENDARTERECTOMY FEMORAL Right 11/18/2022    Procedure: ENDARTERECTOMY, FEMORAL RIGHT FEMORAL CUTDOWN, RIGHT ILIAC ARTERY STENTING.;  Surgeon: Shaun Tran MD;  Location:  OR     ESOPHAGOSCOPY, GASTROSCOPY, DUODENOSCOPY (EGD), COMBINED N/A 10/7/2022    Procedure: ESOPHAGOGASTRODUODENOSCOPY, WITH BIOPSY;  Surgeon: Felix Carmona MD;  Location:  GI     EXCISE LESION LOWER EXTREMITY  12/16/2011    Procedure:EXCISE LESION LOWER EXTREMITY; Surgeon:PARMINDER VELAZCO; Location: OR     HERNIA REPAIR  11/04/2008    x2 umbilical     IR OR ANGIOGRAM  11/18/2022     L achilles repair   12/04/2008     LAPAROSCOPIC OOPHORECTOMY Left     L for cyst age 26     miscarriages x 3       tubal ligation and reversal         ALLERGIES:  Chantix [varenicline], Ciprofloxacin, Clopidogrel, Decongestant [cvs], Erythromycin, Lisinopril, Plavix [clopidogrel bisulfate], Rofecoxib, and Vioxx    MEDS:    Current Outpatient Medications:      albuterol (PROAIR RESPICLICK) 108 (90 Base) MCG/ACT inhaler, Inhale 1-2 puffs into the lungs every 4 hours as needed, Disp: 1 each, Rfl: 3     aspirin (ASA) 81 MG chewable tablet, Take 1 tablet (81 mg) by mouth daily, Disp: 90 tablet, Rfl: 11     cephALEXin (KEFLEX) 500 MG capsule, Take 1 capsule (500 mg) by mouth 2 times daily for 10 days, Disp: 20 capsule, Rfl: 0     chlorthalidone (HYGROTON) 25 MG tablet, TAKE 1 TABLET(25 MG) BY MOUTH DAILY, Disp: 90 tablet, Rfl: 0     Cholecalciferol (VITAMIN D-3) 5000 UNIT TABS, Take 1 tablet by mouth daily., Disp: , Rfl:      coenzyme Q-10 200 MG CAPS, Take 200 mg by mouth daily, Disp: , Rfl:      Cranberry-Vitamin C-Vitamin E (CRANBERRY PLUS VITAMIN C) 4200-20-3 MG-MG-UNIT CAPS, Take 1 tablet by mouth daily, Disp: , Rfl:      Cyanocobalamin (B-12 PO), Take 5,000 mcg by mouth every other day Liquid form, Disp: 100 tablet, Rfl: 1     Lactobacillus (PROBIOTIC ACIDOPHILUS PO), Take 1 capsule by mouth daily, Disp: , Rfl:      metoprolol succinate ER (TOPROL XL) 100 MG 24 hr tablet, Take 150 mg by mouth daily (1.5 x 100 mg = 150 mg), Disp: , Rfl:      Multiple Vitamins-Minerals (MULTIVITAMIN GUMMIES ADULT PO), Take 2 chew tab by mouth daily, Disp: , Rfl:      nicotine (COMMIT) 2 MG lozenge, Place 1 lozenge (2 mg) inside cheek every hour as needed for smoking cessation, Disp: 72 lozenge, Rfl: 0     nicotine (NICODERM CQ) 14 MG/24HR 24 hr patch, Place 1 patch onto the skin every 24 hours (Talk with primary provider or vascular medicine provider about when to move to 7mg dose), Disp: 14 patch, Rfl: 0     nicotine (NICODERM CQ) 7 MG/24HR 24 hr patch,  Place 1 patch onto the skin every 24 hours (Talk with your primary provider or vascular medicine provider about when to start this dose), Disp: 30 patch, Rfl: 1     nicotine 21-14-7 MG/24HR KIT, Nicotine replacement, Disp: 1 kit, Rfl: 0     nitroGLYcerin (NITROSTAT) 0.4 MG sublingual tablet, For chest pain place 1 tablet under the tongue every 5 minutes for 3 doses. If symptoms persist 5 minutes after 1st dose call 911., Disp: 20 tablet, Rfl: 0     omeprazole (PRILOSEC) 40 MG DR capsule, Take 1 capsule (40 mg) by mouth 2 times daily TAKE 1 CAPSULE BY MOUTH EVERY DAY 30 TO 60 MINUTES BEFORE A MEAL, Disp: 90 capsule, Rfl: 0     rosuvastatin (CRESTOR) 40 MG tablet, Take 1 tablet (40 mg) by mouth daily, Disp: 90 tablet, Rfl: 3     sertraline (ZOLOFT) 50 MG tablet, Take 50 mg by mouth daily, Disp: , Rfl:      Sodium Chloride-Xylitol (XLEAR SINUS CARE SPRAY NA), Spray 1 spray in nostril daily as needed, Disp: , Rfl:      ticagrelor (BRILINTA) 60 MG tablet, Take 1 tablet (60 mg) by mouth 2 times daily for 90 days, Disp: 180 tablet, Rfl: 0     estradiol (ESTRACE) 0.1 MG/GM vaginal cream, Place 1 g vaginally as needed (using at onset of UTI), Disp: , Rfl:     SOCIAL HABITS:    History   Smoking Status     Some Days     Packs/day: 0.50     Years: 50.00     Types: Cigarettes   Smokeless Tobacco     Never     Social History    Substance and Sexual Activity      Alcohol use: Yes        Comment: Beer once in a while      History   Drug Use No       FAMILY HISTORY:    Family History   Problem Relation Age of Onset     Alzheimer Disease Mother          of panc/GI ca age 83     Osteoporosis Mother      Other Cancer Mother      Cancer Father          age 64 lymphoma     Colon Cancer Maternal Grandfather        PE:  /76 (BP Location: Right arm, Patient Position: Chair, Cuff Size: Adult Small)   Pulse 72   SpO2 97%   Wt Readings from Last 1 Encounters:   22 123 lb (55.8 kg)     There is no height or weight on file  to calculate BMI.        DIAGNOSTIC STUDIES:     Images:  No results found.    LABS:      Sodium   Date Value Ref Range Status   11/19/2022 138 133 - 144 mmol/L Final   10/05/2022 136 133 - 144 mmol/L Final   10/04/2022 137 133 - 144 mmol/L Final   06/01/2021 139 133 - 144 mmol/L Final   02/03/2021 137 133 - 144 mmol/L Final   12/17/2020 137 133 - 144 mmol/L Final     Urea Nitrogen   Date Value Ref Range Status   11/19/2022 9 7 - 30 mg/dL Final   10/05/2022 7 7 - 30 mg/dL Final   10/04/2022 10 7 - 30 mg/dL Final   06/01/2021 10 7 - 30 mg/dL Final   02/03/2021 10 7 - 30 mg/dL Final   12/17/2020 11 7 - 30 mg/dL Final     Hemoglobin   Date Value Ref Range Status   12/01/2022 14.4 11.7 - 15.7 g/dL Final   11/19/2022 13.5 11.7 - 15.7 g/dL Final   11/18/2022 14.5 11.7 - 15.7 g/dL Final   06/01/2021 15.9 (H) 11.7 - 15.7 g/dL Final   02/03/2021 16.3 (H) 11.7 - 15.7 g/dL Final   02/02/2021 16.0 (H) 11.7 - 15.7 g/dL Final     Platelet Count   Date Value Ref Range Status   12/01/2022 256 150 - 450 10e3/uL Final   11/19/2022 163 150 - 450 10e3/uL Final   11/18/2022 189 150 - 450 10e3/uL Final   06/01/2021 209 150 - 450 10e9/L Final   02/03/2021 209 150 - 450 10e9/L Final   02/02/2021 190 150 - 450 10e9/L Final     INR   Date Value Ref Range Status   12/10/2012 0.99 0.86 - 1.14 Final   12/16/2011 0.94 0.86 - 1.14 Final   12/06/2009 0.96 0.86 - 1.14 Final       30 minutes spent on the day of encounter doing chart review, history and exam, documentation, and further activities as noted.     Allyn Mcclain, NP  VASCULAR SURGERY

## 2022-12-27 NOTE — PROGRESS NOTES
Patient is here to discuss follow up    /76 (BP Location: Right arm, Patient Position: Chair, Cuff Size: Adult Small)   Pulse 72   SpO2 97%     Questions patient would like addressed today are: N/A.    Refills are needed: No    Has homecare services and agency name:  [facility] LEUNG

## 2022-12-28 ENCOUNTER — VIRTUAL VISIT (OUTPATIENT)
Dept: PHARMACY | Facility: CLINIC | Age: 72
End: 2022-12-28
Payer: COMMERCIAL

## 2022-12-28 DIAGNOSIS — F17.200 SMOKER: ICD-10-CM

## 2022-12-28 DIAGNOSIS — E78.5 HYPERLIPIDEMIA WITH TARGET LDL LESS THAN 100: ICD-10-CM

## 2022-12-28 DIAGNOSIS — F41.9 ANXIETY: ICD-10-CM

## 2022-12-28 DIAGNOSIS — I73.9 PAD (PERIPHERAL ARTERY DISEASE) (H): ICD-10-CM

## 2022-12-28 DIAGNOSIS — K27.9 PUD (PEPTIC ULCER DISEASE): ICD-10-CM

## 2022-12-28 DIAGNOSIS — I10 HTN (HYPERTENSION), BENIGN: Primary | ICD-10-CM

## 2022-12-28 DIAGNOSIS — Z78.9 TAKES DIETARY SUPPLEMENTS: ICD-10-CM

## 2022-12-28 DIAGNOSIS — N39.0 CHRONIC UTI: ICD-10-CM

## 2022-12-28 PROCEDURE — 99607 MTMS BY PHARM ADDL 15 MIN: CPT | Performed by: PHARMACIST

## 2022-12-28 PROCEDURE — 99606 MTMS BY PHARM EST 15 MIN: CPT | Performed by: PHARMACIST

## 2022-12-28 NOTE — PATIENT INSTRUCTIONS
"Recommendations from today's MTM visit:                                                      1. Re-start patch treatment: begin with step 1: 21 mg/day patch for 6 weeks, followed by step 2: 14 mg/day patch for 2 weeks; finish with step 3: 7 mg/day patch for 2 weeks.    2. Start to use the 2mg lozenges instead of a cigarette. Do not chew or swallow; allow to dissolve slowly between cheek and gum (~20 to 30 minutes); minimize swallowing and occasionally move lozenge from one side of the mouth to the other until completely dissolved. Do not eat or drink 15 minutes before using or while lozenge is in mouth.    Follow-up: Via My Chart message in about 2 weeks to check in on status of smoking cessation     It was great speaking with you today.  I value your experience and would be very thankful for your time in providing feedback in our clinic survey. In the next few days, you may receive an email or text message from [employer] with a link to a survey related to your  clinical pharmacist.\"     To schedule another MTM appointment, please call the clinic directly or you may call the MTM scheduling line at [phone] or toll-free at [phone].     My Clinical Pharmacist's contact information:                                                      Please feel free to contact me with any questions or concerns you have.      Lia Hernandez, PharmD  Medication Therapy Management Resident  [phone]  " Pre-Injection Information: pt had MD f/u    Refer to MAR (medication administration record) for type of injection and medication given. Faslodex and vit b12  Needle Size: 25 g. 1\" and 19 g. 1 1/2\"  Patient tolerated well: Follow up appointment scheduled     Tonny is supervising clinician today.

## 2022-12-28 NOTE — PROGRESS NOTES
Medication Therapy Management (MTM) Encounter    ASSESSMENT:                            Medication Adherence/Access: No issues identified    Smoking cessation: Patient has been using 14mg patches without success since she is continuing to use cigarettes. Educated that purpose of nicotine replacement is to replace cigarettes and she should not use both (often this would result in a larger dose of nicotine). Recommend re-starting patch nicotine replacement step therapy. Educated patient on proper use of lozenge and to use a lozenge instead of smoking a cigarette when needed.     Hypertension: Patient's recent clinic BP reading is just slightly above strict goal of <130/80 but is meeting goal of <140/90, home readings are around goal of 130/80. Will continue to monitor.     Hyperlipidemia/PAD: Stable, plan in place for patient to follow up with vascular provider to determine plan for long term antiplatelet therapy. Has a plan with vascular to follow up on incision infection.     Anxiety: Stable, patient is trying to stay off of medication for anxiety, has plans to re-start sertraline if needed and will notify vit Mychart if she does decide to start.      PUD: Plan in place for patient to follow up with  (gastroenterology provider) to discuss plan for PPI use.     Chronic UTIs: Stable, Estrace cream from patient's medication list.     Supplements: Stable, educated that if patient would like to reduce pill burden in the future we could consider discontinuing supplements first. At this time the patient wishes to remain on supplements.     PLAN:                            1. Re-start patch treatment: begin with step 1: 21 mg/day patch for 6 weeks, followed by step 2: 14 mg/day patch for 2 weeks; finish with step 3: 7 mg/day patch for 2 weeks.    2. Start to use the 2mg lozenges instead of a cigarette. Do not chew or swallow; allow to dissolve slowly between cheek and gum (~20 to 30 minutes); minimize swallowing and  occasionally move lozenge from one side of the mouth to the other until completely dissolved. Do not eat or drink 15 minutes before using or while lozenge is in mouth.    Follow-up: Via My Chart message in about 2 weeks to check in on status of smoking cessation     SUBJECTIVE/OBJECTIVE:                          Karen Caban is a 72 year old female called for a follow-up visit.  Today's visit is a follow-up [facility] visit from 7/11/22.    Reason for visit: Routine review.    Allergies/ADRs: Reviewed in chart  Past Medical History: Reviewed in chart  Tobacco: She reports that she has been smoking cigarettes. She has a 25.00 pack-year smoking history. She has never used smokeless tobacco.Nicotine/Tobacco Cessation Plan:   See below     Medication Adherence/Access: no issues reported    Smoking cessation: Current therapy includes nicotine 14mg patch for 24 hours. Currently smoking about 7 cigarettes a day while using patch. She has not used any lozenges. Was prescribed 21mg patches, 14mg patches, 7mg patches and 2mg lozenges when discharged from the hospital. She states she was not educated on how to use the patches or the lozenge.      Hypertension: Current medications include chlorthalidone 25mg daily and metoprolol ER 150mg daily..  Patient does self-monitor blood pressure. Home BP monitoring in range of 130's systolic over 70-80's diastolic.  Patient reports no current medication side effects.  BP Readings from Last 3 Encounters:   12/27/22 136/76   12/05/22 115/76   12/05/22 137/84     Hyperlipidemia/PAD: Current therapy includes rosuvastatin 40mg daily (was changed from atorvastatin by vascular recently) and Brilinta 60mg daily (can't tolerate Plavix- per chart review) and aspirin 81mg daily. Saw vascular yesterday and was given a course of cephalexin for incision wound (has taken for 2 days- seems to find an improvement in pain). Patient reports no significant myalgias or other side effects. Has a follow up  appointment scheduled with vascular in 2 months to decide on plans for medication therapy (dual antiplatelet therapy vs monotherapy).   Recent Labs   Lab Test 09/13/22  0858 12/28/21  0830 11/30/15  0752 11/07/14  0826   CHOL 149 148   < > 144   HDL 51 55   < > 68   LDL 83 81   < > 63   TRIG 75 61   < > 67   CHOLHDLRATIO  --   --   --  2.1    < > = values in this interval not displayed.     Anxiety: Currently prescribed sertraline 50mg but has not started yet. Was also prescribed bupropion but never started (she didn't like the side effects she found online). Mood has been okay without medications- would like to stay off of the medication for now but will plan to update if she starts to take sertraline.     PUD: Current medications include: Prilosec (omeprazole) 40mg twice daily. Was recommended by  (gastroenterology provider) to increase dose to 80mg daily, she plans to go back in a month for follow up to decide if dose can be reduced again to 40mg daily.    Chronic UTIs: Has not had UTIs in over a year and requests removal of Estrace cream from medication list.     Supplements: Current therapy includes Vitamin D 5000 international unit(s) daily, CoQ10 200mg daily, cranberry daily, Vitamin B12 5000mcg every other day, a daily probiotic and multivitamin.  She also uses a sinus spray and rinse daily. Feels that her supplements are effective, has not had an UTI since starting cranberry supplement.   Vitamin D Deficiency Screening Results:  Lab Results   Component Value Date    VITDT 65 09/13/2022    VITDT 38 12/08/2017    VITDT 60 10/25/2013     Lab Results   Component Value Date    B12 676 09/13/2022     Today's Vitals: There were no vitals taken for this visit.  ----------------  Post Discharge Medication Reconciliation Status: discharge medications reconciled, continue medications without change.    I spent 26 minutes with this patient today. I offer these suggestions for consideration by Humaira. A copy of  the visit note was provided to the patient's provider(s).    A summary of these recommendations was sent via [employer].    Vinicio GarciaD  Medication Therapy Management Resident  [phone]    Preceptor cosignature: Karenada Caban was seen independently by Dr. Hernandez. I have reviewed the assessment and plan. Jasmin Jones, Shankar, SENIA, BCACP      Telemedicine Visit Details  Type of service:  Telephone visit  Start Time: 10:32 AM  End Time: 10:58 AM  Originating Location (pt. Location): Home      Distant Location (provider location):  On-site  Provider has received verbal consent for a visit from the patient? Yes     Medication Therapy Recommendations  Smoker    Current Medication: nicotine 21-14-7 MG/24HR KIT   Rationale: Does not understand instructions - Adherence - Adherence   Recommendation: Provide Education   Status: Patient Agreed - Adherence/Education   Note: Provided education on how to use patches and lozenges

## 2023-01-02 ENCOUNTER — TELEPHONE (OUTPATIENT)
Dept: OTHER | Facility: CLINIC | Age: 73
End: 2023-01-02

## 2023-01-02 ENCOUNTER — OFFICE VISIT (OUTPATIENT)
Dept: OTHER | Facility: CLINIC | Age: 73
End: 2023-01-02
Payer: COMMERCIAL

## 2023-01-02 VITALS — HEART RATE: 75 BPM | DIASTOLIC BLOOD PRESSURE: 75 MMHG | SYSTOLIC BLOOD PRESSURE: 123 MMHG

## 2023-01-02 DIAGNOSIS — I73.9 PAD (PERIPHERAL ARTERY DISEASE) (H): ICD-10-CM

## 2023-01-02 PROCEDURE — G0463 HOSPITAL OUTPT CLINIC VISIT: HCPCS

## 2023-01-02 PROCEDURE — 99214 OFFICE O/P EST MOD 30 MIN: CPT

## 2023-01-02 NOTE — PROGRESS NOTES
VASCULAR SURGERY PROGRESS NOTE    LOCATION: Vascular [facility]    Karen Caban  Medical Record #:  [id number]  YOB: 1950  Age:  72 year old     Date of Service: 1/2/2023    PRIMARY CARE PROVIDER: Liane Claudio    Reason for visit:  Incision infection    IMPRESSION:  Ms. Caban is a 72 year old female who comes into clinic for evaluation following cellulitis. Patient has been on abx for 5 days, two more days left. Incision is no longer warm to touch, denies pain. Overall, improved.     RECOMMENDATION/RISKS: Discussed with patient about possible re-occurrence of cellulitis and would trial another antibiotic if that were to happen seeing that she has been on keflex twice. Continue all other medications.         HPI:  Karen Caban is a 72 year old female with past medical history significant for aortobi iliac graft with femoral cutdown on the right side on 11/18. The patient subsequently developed cellulitis, responded well with antibiotics. Now the patient returns with recurrence of cellulitis after four days of general malaise, pain, and erythema of the incision site in addition to warmth at the incision site. Improved with Keflex.    REVIEW OF SYSTEMS:    A 12 point ROS was reviewed and is negative except for what is listed above in HPI.    PHH:    Past Medical History:   Diagnosis Date     Ankle fracture 2009    L     Anxiety      Chronic back pain      Chronic lymphocytic leukemia (H)      Colon polyp 2012    repeat colonoscopy 5 years.     Fx low femur epiphy-closed (H)      GERD (gastroesophageal reflux disease)      History of UTI 2017    Cysto by Dr Nikole xiong     HTN (hypertension), benign      Hyperlipidemia LDL goal < 100      Normal nuclear stress test 12/2009    EF 67%     Osteopenia      PAD (peripheral artery disease) (H)     s/p fem pop bypass; embolectomy     Polycythemia vera (H) 06/15/2022     PONV (postoperative nausea and vomiting)      PUD (peptic ulcer  disease) 1980s    DU     Smoker      Vitamin D deficiency           Past Surgical History:   Procedure Laterality Date     ANGIOGRAM Right 11/18/2022    Procedure: ANGIOGRAM AORTO ILIAC ANGIOGRAM;  Surgeon: Shaun Tran MD;  Location:  OR     Blood clot removal from Stent      2011     BONE MARROW BIOPSY, BONE SPECIMEN, NEEDLE/TROCAR N/A 02/02/2021    Procedure: bone marrow biopsy;  Surgeon: Kailey Cota MD;  Location:  GI     BYPASS GRAFT AORTOFEMORAL  05/2002    Dr. Turner     BYPASS GRAFT FEMOROPOPLITEAL  12/16/2011     COLONOSCOPY N/A 01/18/2018    Procedure: COMBINED COLONOSCOPY, SINGLE OR MULTIPLE BIOPSY/POLYPECTOMY BY BIOPSY;  COLONOSCOPY;  Surgeon: Efrain Hernadez MD;  Location:  GI     DILATION AND CURETTAGE, OPERATIVE HYSTEROSCOPY, COMBINED N/A 9/15/2022    Procedure: HYSTEROSCOPY, WITH DILATION AND CURETTAGE OF UTERUS;  Surgeon: Destiney Schaefer MD;  Location:  OR     EMBOLECTOMY LOWER EXTREMITY  12/16/2011    Procedure:EMBOLECTOMY LOWER EXTREMITY; EMBOLECTOMY, RIGHT POPLITEAL WITH PATCH ANGIOPLASTY. EXCISION SKIN LESION RIGHT LEG. ; Surgeon:PARMINDER VELAZCO; Location: OR     ENDARTERECTOMY FEMORAL Right 11/18/2022    Procedure: ENDARTERECTOMY, FEMORAL RIGHT FEMORAL CUTDOWN, RIGHT ILIAC ARTERY STENTING.;  Surgeon: Shaun Tran MD;  Location:  OR     ESOPHAGOSCOPY, GASTROSCOPY, DUODENOSCOPY (EGD), COMBINED N/A 10/7/2022    Procedure: ESOPHAGOGASTRODUODENOSCOPY, WITH BIOPSY;  Surgeon: Felix Carmona MD;  Location:  GI     EXCISE LESION LOWER EXTREMITY  12/16/2011    Procedure:EXCISE LESION LOWER EXTREMITY; Surgeon:PARMINDER VELAZCO; Location: OR     HERNIA REPAIR  11/04/2008    x2 umbilical     IR OR ANGIOGRAM  11/18/2022     L achilles repair  12/04/2008     LAPAROSCOPIC OOPHORECTOMY Left     L for cyst age 26     miscarriages x 3       tubal ligation and reversal         ALLERGIES:  Chantix [varenicline], Ciprofloxacin, Clopidogrel,  Decongestant [cvs], Erythromycin, Lisinopril, Plavix [clopidogrel bisulfate], Rofecoxib, and Vioxx    MEDS:    Current Outpatient Medications:      aspirin (ASA) 81 MG chewable tablet, Take 1 tablet (81 mg) by mouth daily, Disp: 90 tablet, Rfl: 11     cephALEXin (KEFLEX) 500 MG capsule, Take 1 capsule (500 mg) by mouth 2 times daily for 10 days, Disp: 20 capsule, Rfl: 0     chlorthalidone (HYGROTON) 25 MG tablet, 1/2 tablet once daily (decrease dose as potassium is low), Disp: 30 tablet, Rfl: 0     Cholecalciferol (VITAMIN D-3) 5000 UNIT TABS, Take 1 tablet by mouth daily., Disp: , Rfl:      coenzyme Q-10 200 MG CAPS, Take 200 mg by mouth daily, Disp: , Rfl:      Cranberry-Vitamin C-Vitamin E (CRANBERRY PLUS VITAMIN C) 4200-20-3 MG-MG-UNIT CAPS, Take 1 tablet by mouth daily, Disp: , Rfl:      Cyanocobalamin (B-12 PO), Take 5,000 mcg by mouth every other day Liquid form, Disp: 100 tablet, Rfl: 1     Lactobacillus (PROBIOTIC ACIDOPHILUS PO), Take 1 capsule by mouth daily, Disp: , Rfl:      metoprolol succinate ER (TOPROL XL) 100 MG 24 hr tablet, Take 150 mg by mouth daily (1.5 x 100 mg = 150 mg), Disp: , Rfl:      Multiple Vitamins-Minerals (MULTIVITAMIN GUMMIES ADULT PO), Take 2 chew tab by mouth daily, Disp: , Rfl:      nicotine (COMMIT) 2 MG lozenge, Place 1 lozenge (2 mg) inside cheek every hour as needed for smoking cessation, Disp: 72 lozenge, Rfl: 0     nicotine (NICODERM CQ) 14 MG/24HR 24 hr patch, Place 1 patch onto the skin every 24 hours (Talk with primary provider or vascular medicine provider about when to move to 7mg dose), Disp: 14 patch, Rfl: 0     nicotine (NICODERM CQ) 7 MG/24HR 24 hr patch, Place 1 patch onto the skin every 24 hours (Talk with your primary provider or vascular medicine provider about when to start this dose), Disp: 30 patch, Rfl: 1     nicotine 21-14-7 MG/24HR KIT, Nicotine replacement, Disp: 1 kit, Rfl: 0     nitroGLYcerin (NITROSTAT) 0.4 MG sublingual tablet, For chest pain place  1 tablet under the tongue every 5 minutes for 3 doses. If symptoms persist 5 minutes after 1st dose call 911., Disp: 20 tablet, Rfl: 0     omeprazole (PRILOSEC) 40 MG DR capsule, Take 1 capsule (40 mg) by mouth 2 times daily TAKE 1 CAPSULE BY MOUTH EVERY DAY 30 TO 60 MINUTES BEFORE A MEAL, Disp: 90 capsule, Rfl: 0     rosuvastatin (CRESTOR) 40 MG tablet, Take 1 tablet (40 mg) by mouth daily, Disp: 90 tablet, Rfl: 3     sertraline (ZOLOFT) 50 MG tablet, Take 50 mg by mouth daily, Disp: , Rfl:      Sodium Chloride-Xylitol (XLEAR SINUS CARE SPRAY NA), Spray 1 spray in nostril daily as needed, Disp: , Rfl:      ticagrelor (BRILINTA) 60 MG tablet, Take 1 tablet (60 mg) by mouth once for 1 dose, Disp: 180 tablet, Rfl: 0    SOCIAL HABITS:    History   Smoking Status     Some Days     Packs/day: 0.50     Years: 50.00     Types: Cigarettes   Smokeless Tobacco     Never     Social History    Substance and Sexual Activity      Alcohol use: Yes        Comment: Beer once in a while      History   Drug Use No       FAMILY HISTORY:    Family History   Problem Relation Age of Onset     Alzheimer Disease Mother          of panc/GI ca age 83     Osteoporosis Mother      Other Cancer Mother      Cancer Father          age 64 lymphoma     Colon Cancer Maternal Grandfather        PE:  /75 (BP Location: Left arm, Patient Position: Sitting, Cuff Size: Adult Regular)   Pulse 75   Wt Readings from Last 1 Encounters:   22 55.8 kg (123 lb)     There is no height or weight on file to calculate BMI.    EXAM:  GENERAL: well-developed 72 year old female who appears her stated age  CARDIAC: normal   CHEST/LUNG: normal respiratory effort   MUSCULOSKELETAL: grossly normal and both lower extremities are intact, no lower extremity edema  NEUROLOGIC: focally intact, alert and oriented x 3  PSYCH: appropriate affect  VASCULAR:       DIAGNOSTIC STUDIES:     Images:  No results found.    LABS:      Sodium   Date Value Ref Range Status    11/19/2022 138 133 - 144 mmol/L Final   10/05/2022 136 133 - 144 mmol/L Final   10/04/2022 137 133 - 144 mmol/L Final   06/01/2021 139 133 - 144 mmol/L Final   02/03/2021 137 133 - 144 mmol/L Final   12/17/2020 137 133 - 144 mmol/L Final     Urea Nitrogen   Date Value Ref Range Status   11/19/2022 9 7 - 30 mg/dL Final   10/05/2022 7 7 - 30 mg/dL Final   10/04/2022 10 7 - 30 mg/dL Final   06/01/2021 10 7 - 30 mg/dL Final   02/03/2021 10 7 - 30 mg/dL Final   12/17/2020 11 7 - 30 mg/dL Final     Hemoglobin   Date Value Ref Range Status   12/01/2022 14.4 11.7 - 15.7 g/dL Final   11/19/2022 13.5 11.7 - 15.7 g/dL Final   11/18/2022 14.5 11.7 - 15.7 g/dL Final   06/01/2021 15.9 (H) 11.7 - 15.7 g/dL Final   02/03/2021 16.3 (H) 11.7 - 15.7 g/dL Final   02/02/2021 16.0 (H) 11.7 - 15.7 g/dL Final     Platelet Count   Date Value Ref Range Status   12/01/2022 256 150 - 450 10e3/uL Final   11/19/2022 163 150 - 450 10e3/uL Final   11/18/2022 189 150 - 450 10e3/uL Final   06/01/2021 209 150 - 450 10e9/L Final   02/03/2021 209 150 - 450 10e9/L Final   02/02/2021 190 150 - 450 10e9/L Final     INR   Date Value Ref Range Status   12/10/2012 0.99 0.86 - 1.14 Final   12/16/2011 0.94 0.86 - 1.14 Final   12/06/2009 0.96 0.86 - 1.14 Final       30 minutes spent on the day of encounter doing chart review, history and exam, documentation, and further activities as noted.     Allyn Mcclain NP  VASCULAR SURGERY

## 2023-01-02 NOTE — TELEPHONE ENCOUNTER
[facility]    Who is the name of the provider?:  Chelsea    What is the location you see this provider at/preferred location?: [place]  Person calling: Pharmacist - [facility]  Phone number:  [phone]  Nurse call back needed:  YES     Reason for call:   Clarification of order for Brilinta.     Pharmacy location:  NA  Outside Imaging: Not Applicable   Can we leave a detailed message on this number?  Not Applicable

## 2023-01-02 NOTE — PROGRESS NOTES
[facility]        Patient is here for a  follow up.     Pt is currently taking Aspirin and Statin.    /75 (BP Location: Left arm, Patient Position: Sitting, Cuff Size: Adult Regular)   Pulse 75     The provider has been notified that the patient has no concerns.     Questions patient would like addressed today are: N/A.    Refills are needed: N/A    Has homecare services and agency name:  Ava Fuller MA

## 2023-01-04 ENCOUNTER — LAB (OUTPATIENT)
Dept: LAB | Facility: CLINIC | Age: 73
End: 2023-01-04
Payer: COMMERCIAL

## 2023-01-04 ENCOUNTER — DOCUMENTATION ONLY (OUTPATIENT)
Dept: LAB | Facility: CLINIC | Age: 73
End: 2023-01-04

## 2023-01-04 DIAGNOSIS — R73.9 HYPERGLYCEMIA: ICD-10-CM

## 2023-01-04 DIAGNOSIS — I10 HTN (HYPERTENSION), BENIGN: Primary | ICD-10-CM

## 2023-01-04 DIAGNOSIS — I10 HTN (HYPERTENSION), BENIGN: ICD-10-CM

## 2023-01-04 DIAGNOSIS — E78.5 HYPERLIPIDEMIA LDL GOAL <70: Primary | ICD-10-CM

## 2023-01-04 LAB
ALBUMIN SERPL BCG-MCNC: 4.3 G/DL (ref 3.5–5.2)
ALP SERPL-CCNC: 72 U/L (ref 35–104)
ALT SERPL W P-5'-P-CCNC: 22 U/L (ref 10–35)
ANION GAP SERPL CALCULATED.3IONS-SCNC: 12 MMOL/L (ref 7–15)
AST SERPL W P-5'-P-CCNC: 40 U/L (ref 10–35)
BILIRUB SERPL-MCNC: 0.5 MG/DL
BUN SERPL-MCNC: 12.3 MG/DL (ref 8–23)
CALCIUM SERPL-MCNC: 9.7 MG/DL (ref 8.8–10.2)
CHLORIDE SERPL-SCNC: 101 MMOL/L (ref 98–107)
CHOLEST SERPL-MCNC: 155 MG/DL
CREAT SERPL-MCNC: 0.6 MG/DL (ref 0.51–0.95)
DEPRECATED HCO3 PLAS-SCNC: 28 MMOL/L (ref 22–29)
GFR SERPL CREATININE-BSD FRML MDRD: >90 ML/MIN/1.73M2
GLUCOSE SERPL-MCNC: 97 MG/DL (ref 70–99)
HBA1C MFR BLD: 5.4 % (ref 0–5.6)
HDLC SERPL-MCNC: 56 MG/DL
HOLD SPECIMEN: NORMAL
LDLC SERPL CALC-MCNC: 82 MG/DL
NONHDLC SERPL-MCNC: 99 MG/DL
POTASSIUM SERPL-SCNC: 3.7 MMOL/L (ref 3.4–5.3)
PROT SERPL-MCNC: 6.5 G/DL (ref 6.4–8.3)
SODIUM SERPL-SCNC: 141 MMOL/L (ref 136–145)
TRIGL SERPL-MCNC: 84 MG/DL

## 2023-01-04 PROCEDURE — 80053 COMPREHEN METABOLIC PANEL: CPT | Performed by: INTERNAL MEDICINE

## 2023-01-04 PROCEDURE — 80061 LIPID PANEL: CPT | Performed by: INTERNAL MEDICINE

## 2023-01-04 PROCEDURE — 36415 COLL VENOUS BLD VENIPUNCTURE: CPT

## 2023-01-04 PROCEDURE — 83036 HEMOGLOBIN GLYCOSYLATED A1C: CPT | Performed by: INTERNAL MEDICINE

## 2023-01-04 NOTE — RESULT ENCOUNTER NOTE
Dear Humaira,     I am covering for Dr. Claudio today:    Here are your recent hemoglobin A1c test results which are normal , this is a screening test for diabetes.    Dr. Claudio will see the results when he returns to the clinic.    Regards,  Susan Ordonez PA-C

## 2023-01-05 ENCOUNTER — TELEPHONE (OUTPATIENT)
Dept: OTHER | Facility: CLINIC | Age: 73
End: 2023-01-05

## 2023-01-05 DIAGNOSIS — I73.9 PAD (PERIPHERAL ARTERY DISEASE) (H): ICD-10-CM

## 2023-01-05 DIAGNOSIS — E87.6 LOW BLOOD POTASSIUM: ICD-10-CM

## 2023-01-05 DIAGNOSIS — E78.5 HYPERLIPIDEMIA WITH TARGET LDL LESS THAN 100: Primary | ICD-10-CM

## 2023-01-05 RX ORDER — POTASSIUM CHLORIDE 1500 MG/1
TABLET, EXTENDED RELEASE ORAL
Qty: 30 TABLET | Refills: 0 | Status: SHIPPED | OUTPATIENT
Start: 2023-01-05 | End: 2023-03-10

## 2023-01-05 RX ORDER — EZETIMIBE 10 MG/1
10 TABLET ORAL DAILY
Qty: 90 TABLET | Refills: 1 | Status: SHIPPED | OUTPATIENT
Start: 2023-01-05 | End: 2023-05-30

## 2023-01-05 NOTE — TELEPHONE ENCOUNTER
[facility]    Who is the name of the provider?:  Chelsea    What is the location you see this provider at/preferred location?: [place]  Person calling: Self  Phone number:  [phone]   Nurse call back needed:  Yes    Reason for call:   Having difficulty getting Brilinta 60 mg.  Pharmacist unable to get from distributor, suggest a different dosage or different blood thinner.  Has only 1 tablet left for tomorrow.  Please let her know via phone or MyChart.    Pharmacy location:  [facility], [address], [place].  Outside Imaging: Not Applicable   Can we leave a detailed message on this number?  YES

## 2023-01-06 NOTE — TELEPHONE ENCOUNTER
Per Allyn Mcclain, NP, pt was called and update on new rx. See med list for new Rx ordered.     URI Briones, RN  [facility]  Office:  [phone] Fax: [phone]

## 2023-01-11 ENCOUNTER — OFFICE VISIT (OUTPATIENT)
Dept: FAMILY MEDICINE | Facility: CLINIC | Age: 73
End: 2023-01-11
Payer: COMMERCIAL

## 2023-01-11 VITALS
RESPIRATION RATE: 16 BRPM | OXYGEN SATURATION: 98 % | HEART RATE: 72 BPM | DIASTOLIC BLOOD PRESSURE: 85 MMHG | SYSTOLIC BLOOD PRESSURE: 126 MMHG | BODY MASS INDEX: 20.49 KG/M2 | HEIGHT: 64 IN | WEIGHT: 120 LBS

## 2023-01-11 DIAGNOSIS — Z79.899 ENCOUNTER FOR LONG-TERM (CURRENT) USE OF MEDICATIONS: ICD-10-CM

## 2023-01-11 DIAGNOSIS — F17.200 SMOKER: ICD-10-CM

## 2023-01-11 DIAGNOSIS — I65.23 OBSTRUCTION OF CAROTID ARTERY ON BOTH SIDES: ICD-10-CM

## 2023-01-11 DIAGNOSIS — E78.5 HYPERLIPIDEMIA WITH TARGET LDL LESS THAN 100: ICD-10-CM

## 2023-01-11 DIAGNOSIS — J43.9 PULMONARY EMPHYSEMA, UNSPECIFIED EMPHYSEMA TYPE (H): ICD-10-CM

## 2023-01-11 DIAGNOSIS — Z12.31 VISIT FOR SCREENING MAMMOGRAM: ICD-10-CM

## 2023-01-11 DIAGNOSIS — D45 POLYCYTHEMIA VERA (H): ICD-10-CM

## 2023-01-11 DIAGNOSIS — Z00.00 ROUTINE HISTORY AND PHYSICAL EXAMINATION OF ADULT: Primary | ICD-10-CM

## 2023-01-11 DIAGNOSIS — C91.10 CLL (CHRONIC LYMPHOCYTIC LEUKEMIA) (H): ICD-10-CM

## 2023-01-11 DIAGNOSIS — Z12.11 SCREEN FOR COLON CANCER: ICD-10-CM

## 2023-01-11 DIAGNOSIS — I10 HTN (HYPERTENSION), BENIGN: ICD-10-CM

## 2023-01-11 DIAGNOSIS — Z87.891 PERSONAL HISTORY OF NICOTINE DEPENDENCE: ICD-10-CM

## 2023-01-11 DIAGNOSIS — M85.80 OSTEOPENIA, UNSPECIFIED LOCATION: ICD-10-CM

## 2023-01-11 DIAGNOSIS — E53.8 VITAMIN B12 DEFICIENCY (NON ANEMIC): ICD-10-CM

## 2023-01-11 PROCEDURE — 99214 OFFICE O/P EST MOD 30 MIN: CPT | Mod: 25 | Performed by: INTERNAL MEDICINE

## 2023-01-11 PROCEDURE — G0439 PPPS, SUBSEQ VISIT: HCPCS | Performed by: INTERNAL MEDICINE

## 2023-01-11 RX ORDER — CHLORTHALIDONE 25 MG/1
TABLET ORAL
Qty: 90 TABLET | Refills: 0 | Status: SHIPPED | OUTPATIENT
Start: 2023-01-11 | End: 2023-01-12

## 2023-01-11 ASSESSMENT — ENCOUNTER SYMPTOMS
BREAST MASS: 0
NERVOUS/ANXIOUS: 0
COUGH: 0
EYE PAIN: 0
DIZZINESS: 0
MYALGIAS: 0
FREQUENCY: 0
WEAKNESS: 0
PARESTHESIAS: 0
FEVER: 0
DIARRHEA: 1
DYSURIA: 0
SORE THROAT: 0
ARTHRALGIAS: 0
ABDOMINAL PAIN: 0
HEMATURIA: 0
HEARTBURN: 0
JOINT SWELLING: 0
SHORTNESS OF BREATH: 0
HEMATOCHEZIA: 0
CHILLS: 0
HEADACHES: 0
NAUSEA: 1
PALPITATIONS: 0
CONSTIPATION: 0

## 2023-01-11 ASSESSMENT — PAIN SCALES - GENERAL: PAINLEVEL: NO PAIN (0)

## 2023-01-11 ASSESSMENT — ACTIVITIES OF DAILY LIVING (ADL): CURRENT_FUNCTION: NO ASSISTANCE NEEDED

## 2023-01-11 NOTE — PROGRESS NOTES
"SUBJECTIVE:   Humaira is a 72 year old who presents for Preventive Visit.    Patient has been advised of split billing requirements and indicates understanding: Yes  Are you in the first 12 months of your Medicare coverage?  No    Healthy Habits:     In general, how would you rate your overall health?  Good    Frequency of exercise:  4-5 days/week    Duration of exercise:  30-45 minutes    Do you usually eat at least 4 servings of fruit and vegetables a day, include whole grains    & fiber and avoid regularly eating high fat or \"junk\" foods?  No    Taking medications regularly:  Yes    Medication side effects:  None    Ability to successfully perform activities of daily living:  No assistance needed    Home Safety:  No safety concerns identified    Hearing Impairment:  No hearing concerns    In the past 6 months, have you been bothered by leaking of urine?  No    In general, how would you rate your overall mental or emotional health?  Good      PHQ-2 Total Score: 0    Additional concerns today:  No      Have you ever done Advance Care Planning? (For example, a Health Directive, POLST, or a discussion with a medical provider or your loved ones about your wishes): Yes, advance care planning is on file.       Fall risk  Fallen 2 or more times in the past year?: No  Any fall with injury in the past year?: No    Cognitive Screening   1) Repeat 3 items (Leader, Season, Table)    2) Clock draw: NORMAL  3) 3 item recall: Recalls 3 objects  Results: 3 items recalled: COGNITIVE IMPAIRMENT LESS LIKELY    Mini-CogTM Copyright TRAY Johnson. Licensed by the author for use in [facility]; reprinted with permission (jennifer@.[facility]). All rights reserved.      Do you have sleep apnea, excessive snoring or daytime drowsiness?: no    Reviewed and updated as needed this visit by clinical staff   Tobacco  Allergies  Meds              Reviewed and updated as needed this visit by Provider    Allergies              Social History " "    Tobacco Use     Smoking status: Every Day     Packs/day: 0.75     Years: 50.00     Pack years: 37.50     Types: Cigarettes     Smokeless tobacco: Never     Tobacco comments:     Nicotine patch and a cigarette \"once in awhile.\"   Substance Use Topics     Alcohol use: Yes     Comment: Beer once in a while     If you drink alcohol do you typically have >3 drinks per day or >7 drinks per week? No    Alcohol Use 1/11/2023   Prescreen: >3 drinks/day or >7 drinks/week? No   Prescreen: >3 drinks/day or >7 drinks/week? -   No flowsheet data found.      Current providers sharing in care for this patient include:     Patient Care Team:  Liane Claudio MD as PCP - General (Internal Medicine)  Delonte Strong MD as Assigned Pulmonology Provider  To Shipley MD as Assigned OBGYN Provider  Liane Claudio MD as Assigned PCP  Abril Lopez, PharmD as Pharmacist (Pharmacist)  Shaun Tran MD as Assigned Heart and Vascular Provider  Sancho Osorio MD as Assigned Cancer Care Provider  Miriam Hernandez RPH as Pharmacist (Pharmacist)  Miriam Hernandez RPH as Assigned MTM Pharmacist    The following health maintenance items are reviewed in Epic and correct as of today:  Health Maintenance   Topic Date Due     SPIROMETRY  Never done     URINE DRUG SCREEN  Never done     LUNG CANCER SCREENING  12/03/2022     COLORECTAL CANCER SCREENING  01/18/2023     ANNUAL REVIEW OF HM ORDERS  08/23/2023     NICOTINE/TOBACCO CESSATION COUNSELING Q 1 YR  01/11/2024     MEDICARE ANNUAL WELLNESS VISIT  01/11/2024     FALL RISK ASSESSMENT  01/11/2024     MAMMO SCREENING  01/21/2024     LIPID  01/04/2028     ADVANCE CARE PLANNING  01/12/2028     DTAP/TDAP/TD IMMUNIZATION (3 - Td or Tdap) 11/05/2028     DEXA  01/20/2036     HEPATITIS C SCREENING  Completed     COPD ACTION PLAN  Completed     PHQ-2 (once per calendar year)  Completed     INFLUENZA VACCINE  Completed     Pneumococcal Vaccine: 65+ Years  Completed     ZOSTER " IMMUNIZATION  Completed     COVID-19 Vaccine  Completed     IPV IMMUNIZATION  Aged Out     MENINGITIS IMMUNIZATION  Aged Out     Lab work is in process  Labs reviewed in EPIC  BP Readings from Last 3 Encounters:   01/11/23 126/85   01/02/23 123/75   12/27/22 136/76    Wt Readings from Last 3 Encounters:   01/11/23 54.4 kg (120 lb)   12/05/22 55.8 kg (123 lb)   11/18/22 55.8 kg (123 lb)                  Patient Active Problem List   Diagnosis     Vitamin D deficiency     Osteopenia     HTN (hypertension), benign     Hyperlipidemia with target LDL less than 100     Fx low femur epiphy-closed (H)     Chronic back pain     PAD (peripheral artery disease) (H)     PUD (peptic ulcer disease)     Normal nuclear stress test     Smoker     Advanced directives, counseling/discussion     Colon polyp     GERD (gastroesophageal reflux disease)     Anxiety     Controlled substance agreement signed on 1- with Lyudmila Escobar SHELBIE     Gastroesophageal reflux disease without esophagitis     Abnormal CT lung screening     Polycythemia vera (H)     CLL (chronic lymphocytic leukemia) (H)     Iliac artery stenosis, right (H)     Pulmonary emphysema, unspecified emphysema type (H)     Past Surgical History:   Procedure Laterality Date     ANGIOGRAM Right 11/18/2022    Procedure: ANGIOGRAM AORTO ILIAC ANGIOGRAM;  Surgeon: Shaun Tran MD;  Location:  OR     Blood clot removal from Stent      2011     BONE MARROW BIOPSY, BONE SPECIMEN, NEEDLE/TROCAR N/A 02/02/2021    Procedure: bone marrow biopsy;  Surgeon: Kailey Cota MD;  Location:  GI     BYPASS GRAFT AORTOFEMORAL  05/2002    Dr. Turner     BYPASS GRAFT FEMOROPOPLITEAL  12/16/2011     COLONOSCOPY N/A 01/18/2018    Procedure: COMBINED COLONOSCOPY, SINGLE OR MULTIPLE BIOPSY/POLYPECTOMY BY BIOPSY;  COLONOSCOPY;  Surgeon: Efrain Hernadez MD;  Location:  GI     DILATION AND CURETTAGE, OPERATIVE HYSTEROSCOPY, COMBINED N/A 9/15/2022    Procedure:  "HYSTEROSCOPY, WITH DILATION AND CURETTAGE OF UTERUS;  Surgeon: Destiney Schaefer MD;  Location:  OR     EMBOLECTOMY LOWER EXTREMITY  2011    Procedure:EMBOLECTOMY LOWER EXTREMITY; EMBOLECTOMY, RIGHT POPLITEAL WITH PATCH ANGIOPLASTY. EXCISION SKIN LESION RIGHT LEG. ; Surgeon:PARMINDER VELAZCO; Location:[facility]     ENDARTERECTOMY FEMORAL Right 2022    Procedure: ENDARTERECTOMY, FEMORAL RIGHT FEMORAL CUTDOWN, RIGHT ILIAC ARTERY STENTING.;  Surgeon: Shaun Tran MD;  Location:  OR     ESOPHAGOSCOPY, GASTROSCOPY, DUODENOSCOPY (EGD), COMBINED N/A 10/7/2022    Procedure: ESOPHAGOGASTRODUODENOSCOPY, WITH BIOPSY;  Surgeon: Felix Carmona MD;  Location:  GI     EXCISE LESION LOWER EXTREMITY  2011    Procedure:EXCISE LESION LOWER EXTREMITY; Surgeon:PARMINDER VELAZCO; Location: OR     HERNIA REPAIR  11/04/2008    x2 umbilical     IR OR ANGIOGRAM  2022     L achilles repair  2008     LAPAROSCOPIC OOPHORECTOMY Left     L for cyst age 26     miscarriages x 3       tubal ligation and reversal         Social History     Tobacco Use     Smoking status: Every Day     Packs/day: 0.75     Years: 50.00     Pack years: 37.50     Types: Cigarettes     Smokeless tobacco: Never     Tobacco comments:     Nicotine patch and a cigarette \"once in awhile.\"   Substance Use Topics     Alcohol use: Yes     Comment: Beer once in a while     Family History   Problem Relation Age of Onset     Alzheimer Disease Mother          of panc/GI ca age 83     Osteoporosis Mother      Other Cancer Mother      Cancer Father          age 64 lymphoma     Colon Cancer Maternal Grandfather          Current Outpatient Medications   Medication Sig Dispense Refill     aspirin (ASA) 81 MG chewable tablet Take 1 tablet (81 mg) by mouth daily 90 tablet 11     chlorthalidone (HYGROTON) 25 MG tablet 1 tablet once daily (decrease dose as potassium is low) 90 tablet 0     Cholecalciferol (VITAMIN D-3) 5000 UNIT " TABS Take 1 tablet by mouth daily.       coenzyme Q-10 200 MG CAPS Take 200 mg by mouth daily       Cranberry-Vitamin C-Vitamin E (CRANBERRY PLUS VITAMIN C) 4200-20-3 MG-MG-UNIT CAPS Take 1 tablet by mouth daily       Cyanocobalamin (B-12 PO) Take 5,000 mcg by mouth every other day Liquid form 100 tablet 1     ezetimibe (ZETIA) 10 MG tablet Take 1 tablet (10 mg) by mouth daily 90 tablet 1     Lactobacillus (PROBIOTIC ACIDOPHILUS PO) Take 1 capsule by mouth daily       metoprolol succinate ER (TOPROL XL) 100 MG 24 hr tablet Take 150 mg by mouth daily (1.5 x 100 mg = 150 mg)       Multiple Vitamins-Minerals (MULTIVITAMIN GUMMIES ADULT PO) Take 2 chew tab by mouth daily       nicotine (COMMIT) 2 MG lozenge Place 1 lozenge (2 mg) inside cheek every hour as needed for smoking cessation 72 lozenge 0     nicotine (NICODERM CQ) 14 MG/24HR 24 hr patch Place 1 patch onto the skin every 24 hours (Talk with primary provider or vascular medicine provider about when to move to 7mg dose) 14 patch 0     nicotine (NICODERM CQ) 7 MG/24HR 24 hr patch Place 1 patch onto the skin every 24 hours (Talk with your primary provider or vascular medicine provider about when to start this dose) 30 patch 1     nicotine 21-14-7 MG/24HR KIT Nicotine replacement 1 kit 0     nitroGLYcerin (NITROSTAT) 0.4 MG sublingual tablet For chest pain place 1 tablet under the tongue every 5 minutes for 3 doses. If symptoms persist 5 minutes after 1st dose call 911. 20 tablet 0     omeprazole (PRILOSEC) 40 MG DR capsule Take 1 capsule (40 mg) by mouth 2 times daily TAKE 1 CAPSULE BY MOUTH EVERY DAY 30 TO 60 MINUTES BEFORE A MEAL 90 capsule 0     potassium chloride ER (KLOR-CON M) 20 MEQ CR tablet Take one tablet 2 to 3 times a week. 30 tablet 0     rosuvastatin (CRESTOR) 40 MG tablet Take 1 tablet (40 mg) by mouth daily 90 tablet 3     Sodium Chloride-Xylitol (XLEAR SINUS CARE SPRAY NA) Spray 1 spray in nostril daily as needed       ticagrelor (BRILINTA) 60 MG  tablet Take 1.5 tablets (90 mg) by mouth daily (Patient taking differently: Take 60 mg by mouth daily) 180 tablet 0     sertraline (ZOLOFT) 50 MG tablet Take 50 mg by mouth daily (Patient not taking: Reported on 1/11/2023)       Allergies   Allergen Reactions     Chantix [Varenicline] Nausea     Ciprofloxacin Other (See Comments)     hypertension     Clopidogrel      Other reaction(s): Hypertension     Decongestant [Cvs]      Erythromycin Nausea     Lisinopril      cough     Plavix [Clopidogrel Bisulfate]      Felt as if she was having a heart attack     Rofecoxib Unknown     Vioxx      Increased BP     Recent Labs   Lab Test 01/04/23  1018 11/19/22  0717 11/18/22  0805 10/05/22  1418 09/15/22  0946 09/13/22  0858 05/31/22  0903 04/20/22  1754 12/28/21  0830 11/30/21  0900 06/01/21  0909 02/03/21  1115 12/20/17  0950 12/08/17  0732 11/30/15  0752 12/01/14  0836   A1C 5.4  --   --   --   --   --   --   --   --   --   --   --   --   --   --   --    LDL 82  --   --   --   --  83  --   --  81  --   --   --    < > 87   < >  --    HDL 56  --   --   --   --  51  --   --  55  --   --   --    < > 61   < >  --    TRIG 84  --   --   --   --  75  --   --  61  --   --   --    < > 85   < >  --    ALT 22  --   --  34  --   --  33   < > 30   < > 31  --    < > 33   < >  --    CR 0.60 0.55   < > 0.56   < >  --  0.56   < > 0.63   < > 0.71  --    < > 0.72   < >  --    GFRESTIMATED >90 >90   < > >90   < >  --  >90   < > >90   < > 87 71   < > 81   < >  --    GFRESTBLACK  --   --   --   --   --   --   --   --   --   --  >90 86   < > >90   < >  --    POTASSIUM 3.7 3.3*   < > 3.9   < >  --  3.8   < > 3.4   < > 3.1*  --    < > 4.0   < >  --    TSH  --   --   --   --   --   --   --   --   --   --   --   --   --  1.38  --  1.45    < > = values in this interval not displayed.      Pneumonia Vaccine:For adults 65 years or older who do not have an immunocompromising condition, cerebrospinal fluid leak, or cochlear implant and want to receive  "PPSV23 ONLY: Administer 1 dose of PPSV23. Anyone who received any doses of PPSV23 before age 65 should receive 1 final dose of the vaccine at age 65 or older. Administer this last dose at least 5 years after the prior PPSV23 dose.  Mammogram Screening: Mammogram Screening: Recommended mammography every 1-2 years with patient discussion and risk factor consideration  Last 3 Pap and HPV Results:   PAP / HPV 11/6/2013   PAP (Historical) NIL           Review of Systems  Constitutional, HEENT, cardiovascular, pulmonary, GI, , musculoskeletal, neuro, skin, endocrine and psych systems are negative, except as otherwise noted.    OBJECTIVE:   /85 (BP Location: Left arm, Patient Position: Sitting, Cuff Size: Adult Regular)   Pulse 72   Resp 16   Ht 1.613 m (5' 3.5\")   Wt 54.4 kg (120 lb)   SpO2 98%   BMI 20.92 kg/m   Estimated body mass index is 20.92 kg/m  as calculated from the following:    Height as of this encounter: 1.613 m (5' 3.5\").    Weight as of this encounter: 54.4 kg (120 lb).  Physical Exam  GENERAL APPEARANCE: healthy, alert and no distress  EYES: Eyes grossly normal to inspection, PERRL and conjunctivae and sclerae normal  HENT: ear canals and TM's normal, nose and mouth without ulcers or lesions, oropharynx clear and oral mucous membranes moist  NECK: no adenopathy, no asymmetry, masses, or scars and thyroid normal to palpation  RESP: lungs clear to auscultation - no rales, rhonchi or wheezes  BREAST: normal without masses, tenderness or nipple discharge and no palpable axillary masses or adenopathy  CV: regular rate and rhythm, normal S1 S2, no S3 or S4, no murmur, click or rub, no peripheral edema and peripheral pulses strong  ABDOMEN: soft, nontender, no hepatosplenomegaly, no masses and bowel sounds normal  MS: no musculoskeletal defects are noted and gait is age appropriate without ataxia  SKIN: no suspicious lesions or rashes  NEURO: Normal strength and tone, sensory exam grossly normal, " mentation intact and speech normal  PSYCH: mentation appears normal and affect normal/bright    Diagnostic Test Results:  Labs reviewed in Epic    ASSESSMENT / PLAN:   Karen was seen today for physical.    Diagnoses and all orders for this visit:    Routine history and physical examination of adult    Encounter for long-term (current) use of medications    Screen for colon cancer  -     Colonoscopy Screening  Referral; Future    HTN (hypertension), benign  Comments:  Reasonably controlled.  Orders:  -     Discontinue: chlorthalidone (HYGROTON) 25 MG tablet; 1/2 tablet once daily (decrease dose as potassium is low)  -     Comprehensive metabolic panel (BMP + Alb, Alk Phos, ALT, AST, Total. Bili, TP); Future  -     chlorthalidone (HYGROTON) 25 MG tablet; 1 tablet once daily (decrease dose as potassium is low)    Osteopenia, unspecified location  Comments:  Calcium and vitamin D supplements maintenance, weightbearing exercise.  Orders:  -     Vitamin D Deficiency; Future    Smoker  -     CT Chest Lung Cancer Scrn Low Dose wo; Future    Hyperlipidemia with target LDL less than 100  -     Lipid panel reflex to direct LDL Fasting; Future    Visit for screening mammogram  -     *MA Screening Digital Bilateral; Future    Vitamin B12 deficiency (non anemic)  -     Vitamin B12; Future    Personal history of nicotine dependence  -     CT Chest Lung Cancer Scrn Low Dose wo; Future    Pulmonary emphysema, unspecified emphysema type (H)  Comments:  Not on inhalers, no wheezing or dyspnea    CLL (chronic lymphocytic leukemia) (H)  Comments:  Stable    Polycythemia vera (H)    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    Potassium was corrected continue chlorthalidone 1 tablet daily 25 mg in addition to potassium supplements monitor kidney function test electrolytes.  Blood pressure at goal continue to monitor.  CLL is stable,  PV stable or in  remission.    History of smoking probably underlying emphysema stable not on any inhalers    Vascular claudication has resolved.  Denies any leg pain or edema.    No chest pain or shortness of breath.  Hemodynamic stable.  No other change of medications.    Stress test in October 2022 was negative for inducible myocardial ischemia or infarction with ejection fraction of 74%  Patient has been advised of split billing requirements and indicates understanding: Yes      COUNSELING:  Reviewed preventive health counseling, as reflected in patient instructions       Regular exercise       Healthy diet/nutrition       Vision screening       Hearing screening       Dental care       Bladder control       Fall risk prevention       Aspirin prophylaxis        Alcohol Use        Folic Acid       Osteoporosis prevention/bone health       Advanced Planning         She reports that she has been smoking cigarettes. She has a 37.50 pack-year smoking history. She has never used smokeless tobacco.  Nicotine/Tobacco Cessation Plan:   Medication Therapy Management  (Referral to MTM)      Appropriate preventive services were discussed with this patient, including applicable screening as appropriate for cardiovascular disease, diabetes, osteopenia/osteoporosis, and glaucoma.  As appropriate for age/gender, discussed screening for colorectal cancer, prostate cancer, breast cancer, and cervical cancer. Checklist reviewing preventive services available has been given to the patient.    Reviewed patients plan of care and provided an AVS. The Basic Care Plan (routine screening as documented in Health Maintenance) for Karen meets the Care Plan requirement. This Care Plan has been established and reviewed with the Patient.          Liane Claudio MD  [facility]    Identified Health Risks:

## 2023-01-11 NOTE — LETTER
My COPD Action Plan     Name: Karen Caban    YOB: 1950   Date: 1/12/2023    My doctor: Liane Claudio MD   My clinic: [address], [address]  [facility] [address] [phone]  My Controller Medicine: NONE       My Rescue Medicine: NONE      My Flare Up Medicine:      My COPD Severity: MILD PER SX'S      Use of Oxygen: Oxygen Not Prescribed      Make sure you've had your pneumonia   vaccines.          GREEN ZONE       Doing well today      Usual level of activity and exercise    Usual amount of cough and mucus    No shortness of breath    Usual level of health (thinking clearly, sleeping well, feel like eating) Actions:      Take daily medicines    Use oxygen as prescribed    Follow regular exercise and diet plan    Avoid cigarette smoke and other irritants that harm the lungs           YELLOW ZONE          Having a bad day or flare up      Short of breath more than usual    A lot more sputum (mucus) than usual    Sputum looks yellow, green, tan, brown or bloody    More coughing or wheezing    Fever or chills    Less energy; trouble completing activities    Trouble thinking or focusing    Using quick relief inhaler or nebulizer more often    Poor sleep; symptoms wake me up    Do not feel like eating Actions:      Get plenty of rest    Take daily medicines    Use quick relief inhaler every NA hours    If you use oxygen, call you doctor to see if you should adjust your oxygen    Do breathing exercises or other things to help you relax    Let a loved one, friend or neighbor know you are feeling worse    Call your care team if you have 2 or more symptoms.  Start taking steroids or antibiotics if directed by your care team           RED ZONE       Need medical care now      Severe shortness of breath (feel you can't breathe)    Fever, chills    Not enough breath to do any activity    Trouble coughing up mucus, walking or talking    Blood in  mucus    Frequent coughing   Rescue medicines are not working    Not able to sleep because of breathing    Feel confused or drowsy    Chest pain    Actions:      Call your health care team.  If you cannot reach your care team, call 911 or go to the emergency room.        Annual Reminders:  Meet with Care Team, Flu Shot every Fall  Pharmacy:    [id number] - [place], [place] - [address] [facility] - [address] [place] [address]

## 2023-01-12 PROBLEM — J43.9 PULMONARY EMPHYSEMA, UNSPECIFIED EMPHYSEMA TYPE (H): Status: ACTIVE | Noted: 2023-01-12

## 2023-01-12 RX ORDER — CHLORTHALIDONE 25 MG/1
TABLET ORAL
Qty: 90 TABLET | Refills: 0 | Status: SHIPPED | OUTPATIENT
Start: 2023-01-12 | End: 2023-08-29

## 2023-01-24 ENCOUNTER — HOSPITAL ENCOUNTER (OUTPATIENT)
Dept: MAMMOGRAPHY | Facility: CLINIC | Age: 73
Discharge: HOME | End: 2023-01-24
Attending: INTERNAL MEDICINE
Payer: COMMERCIAL

## 2023-01-24 ENCOUNTER — ANCILLARY PROCEDURE (OUTPATIENT)
Dept: CT IMAGING | Facility: CLINIC | Age: 73
End: 2023-01-24
Attending: INTERNAL MEDICINE
Payer: COMMERCIAL

## 2023-01-24 DIAGNOSIS — Z12.31 VISIT FOR SCREENING MAMMOGRAM: ICD-10-CM

## 2023-01-24 DIAGNOSIS — Z87.891 PERSONAL HISTORY OF NICOTINE DEPENDENCE: ICD-10-CM

## 2023-01-24 DIAGNOSIS — F17.200 SMOKER: ICD-10-CM

## 2023-01-24 PROCEDURE — 77067 SCR MAMMO BI INCL CAD: CPT

## 2023-01-24 PROCEDURE — 71271 CT THORAX LUNG CANCER SCR C-: CPT

## 2023-02-16 ENCOUNTER — TELEPHONE (OUTPATIENT)
Dept: GASTROENTEROLOGY | Facility: CLINIC | Age: 73
End: 2023-02-16
Payer: COMMERCIAL

## 2023-02-16 NOTE — TELEPHONE ENCOUNTER
Screening Questions  BLUE  KIND OF PREP RED  LOCATION [review exclusion criteria] GREEN  SEDATION TYPE        Y Are you active on mychart?       JF LUI Ordering/Referring Provider?        MEDICA MEDICARE What type of coverage do you have?      N Have you had a positive covid test in the last 14 days?     21.3 1. BMI  [BMI 40+ - review exclusion criteria]    Y  2. Are you able to give consent for your medical care? [IF NO,RN REVIEW]          N  3. Are you taking any prescription pain medications on a routine schedule   (ex narcotics: oxycodone, roxicodone, oxycontin,  and percocet)? [RN Review]          3a. EXTENDED PREP What kind of prescription?     N 4. Do you have any chemical dependencies such as alcohol, street drugs, or methadone?        **If yes 3- 5 , please schedule with MAC sedation.**          IF YES TO ANY 6 - 10 - HOSPITAL SETTING ONLY.     N 6.   Do you need assistance transferring?     N 7.   Have you had a heart or lung transplant?    N 8.   Are you currently on dialysis?   N 9.   Do you use daily home oxygen?   N 10. Do you take nitroglycerin?   10a.  If yes, how often?     11. [FEMALES]   Are you currently pregnant?    11a.  If yes, how many weeks? [ Greater than 12 weeks, OR NEEDED]    N 12. Do you have Pulmonary Hypertension? *NEED PAC APPT AT [facility] w/ MAC*     N 13. [review exclusion criteria]  Do you have any implantable devices in your body (pacemaker, defib, LVAD)?    N 14. In the past 6 months, have you had any heart related issues including cardiomyopathy or heart attack?     14a.  If yes, did it require cardiac stenting if so when?     N 15. Have you had a stroke or Transient ischemic attack (TIA - aka  mini stroke ) within 6 months?      N 16. Do you have mod to severe Obstructive Sleep Apnea?  [Hospital only]    N 17. Do you have SEVERE AND UNCONTROLLED asthma? *NEED PAC APPT AT [facility] w/MAC*     Y 18. Are you currently taking any blood thinners?     18a. If yes, inform  "patient to \"follow up w/ ordering provider for bridging instructions.\"    N 19. Do you take the medication Phentermine?    19a. If yes, \"Hold for 7 days before procedure.  Please consult your prescribing provider if you have questions about holding this medication.\"     N  20. Do you have chronic kidney disease?      N  21. Do you have a diagnosis of diabetes?     N  22. On a regular basis do you go 3-5 days between bowel movements?      23. Preferred LOCAL Pharmacy for Pre Prescription    [ LIST ONLY ONE PHARMACY]        [id number] - [place], [place] - [address]          - CLOSING REMINDERS -    Informed patient they will need an adult    Cannot take any type of public or medical transportation alone    Conscious Sedation- Needs  for 6 hours after the procedure       MAC/General-Needs  for 24 hours after procedure    Pre-Procedure Covid test to be completed [[facility] PCR Testing Required]    Confirmed Nurse will call to complete assessment       - SCHEDULING DETAILS -  NO Hospital Setting Required? If yes, what is the exclusion?:    TC  Surgeon    3/29  Date of Procedure  Lower Endoscopy [Colonoscopy]  Type of Procedure Scheduled  [facility]   STANDARD [facility]-If you answer yes to questions #8, #20, #21Which Colonoscopy Prep was Sent?     CS Sedation Type     NO PAC / Pre-op Required                 "

## 2023-02-28 ENCOUNTER — TELEPHONE (OUTPATIENT)
Dept: ONCOLOGY | Facility: CLINIC | Age: 73
End: 2023-02-28

## 2023-02-28 ENCOUNTER — LAB (OUTPATIENT)
Dept: LAB | Facility: CLINIC | Age: 73
End: 2023-02-28
Payer: COMMERCIAL

## 2023-02-28 DIAGNOSIS — M85.80 OSTEOPENIA, UNSPECIFIED LOCATION: ICD-10-CM

## 2023-02-28 DIAGNOSIS — C91.10 CLL (CHRONIC LYMPHOCYTIC LEUKEMIA) (H): ICD-10-CM

## 2023-02-28 DIAGNOSIS — E78.5 HYPERLIPIDEMIA LDL GOAL <70: ICD-10-CM

## 2023-02-28 DIAGNOSIS — I10 HTN (HYPERTENSION), BENIGN: ICD-10-CM

## 2023-02-28 DIAGNOSIS — E53.8 VITAMIN B12 DEFICIENCY (NON ANEMIC): ICD-10-CM

## 2023-02-28 LAB
ALBUMIN SERPL BCG-MCNC: 4.3 G/DL (ref 3.5–5.2)
ALP SERPL-CCNC: 78 U/L (ref 35–104)
ALT SERPL W P-5'-P-CCNC: 51 U/L (ref 10–35)
ANION GAP SERPL CALCULATED.3IONS-SCNC: 12 MMOL/L (ref 7–15)
AST SERPL W P-5'-P-CCNC: 60 U/L (ref 10–35)
BASOPHILS # BLD MANUAL: 0.3 10E3/UL (ref 0–0.2)
BASOPHILS NFR BLD MANUAL: 1 %
BILIRUB SERPL-MCNC: 0.5 MG/DL
BUN SERPL-MCNC: 10.2 MG/DL (ref 8–23)
CALCIUM SERPL-MCNC: 9.6 MG/DL (ref 8.8–10.2)
CHLORIDE SERPL-SCNC: 103 MMOL/L (ref 98–107)
CHOLEST SERPL-MCNC: 102 MG/DL
CREAT SERPL-MCNC: 0.65 MG/DL (ref 0.51–0.95)
DEPRECATED CALCIDIOL+CALCIFEROL SERPL-MC: 66 UG/L (ref 20–75)
DEPRECATED HCO3 PLAS-SCNC: 28 MMOL/L (ref 22–29)
EOSINOPHIL # BLD MANUAL: 0.9 10E3/UL (ref 0–0.7)
EOSINOPHIL NFR BLD MANUAL: 3 %
ERYTHROCYTE [DISTWIDTH] IN BLOOD BY AUTOMATED COUNT: 14.3 % (ref 10–15)
GFR SERPL CREATININE-BSD FRML MDRD: >90 ML/MIN/1.73M2
GLUCOSE SERPL-MCNC: 100 MG/DL (ref 70–99)
HCT VFR BLD AUTO: 46 % (ref 35–47)
HDLC SERPL-MCNC: 47 MG/DL
HGB BLD-MCNC: 14.6 G/DL (ref 11.7–15.7)
LDLC SERPL CALC-MCNC: 44 MG/DL
LYMPHOCYTES # BLD MANUAL: 24.9 10E3/UL (ref 0.8–5.3)
LYMPHOCYTES NFR BLD MANUAL: 79 %
MCH RBC QN AUTO: 30.5 PG (ref 26.5–33)
MCHC RBC AUTO-ENTMCNC: 31.7 G/DL (ref 31.5–36.5)
MCV RBC AUTO: 96 FL (ref 78–100)
MONOCYTES # BLD MANUAL: 0.9 10E3/UL (ref 0–1.3)
MONOCYTES NFR BLD MANUAL: 3 %
NEUTROPHILS # BLD MANUAL: 4.4 10E3/UL (ref 1.6–8.3)
NEUTROPHILS NFR BLD MANUAL: 14 %
NONHDLC SERPL-MCNC: 55 MG/DL
PLAT MORPH BLD: ABNORMAL
PLATELET # BLD AUTO: 184 10E3/UL (ref 150–450)
POTASSIUM SERPL-SCNC: 4 MMOL/L (ref 3.4–5.3)
PROT SERPL-MCNC: 6.4 G/DL (ref 6.4–8.3)
RBC # BLD AUTO: 4.78 10E6/UL (ref 3.8–5.2)
RBC MORPH BLD: ABNORMAL
SMUDGE CELLS BLD QL SMEAR: PRESENT
SODIUM SERPL-SCNC: 143 MMOL/L (ref 136–145)
TRIGL SERPL-MCNC: 53 MG/DL
VIT B12 SERPL-MCNC: 935 PG/ML (ref 232–1245)
WBC # BLD AUTO: 31.5 10E3/UL (ref 4–11)

## 2023-02-28 PROCEDURE — 82607 VITAMIN B-12: CPT

## 2023-02-28 PROCEDURE — 85007 BL SMEAR W/DIFF WBC COUNT: CPT

## 2023-02-28 PROCEDURE — 80061 LIPID PANEL: CPT

## 2023-02-28 PROCEDURE — 80053 COMPREHEN METABOLIC PANEL: CPT

## 2023-02-28 PROCEDURE — 85027 COMPLETE CBC AUTOMATED: CPT

## 2023-02-28 PROCEDURE — 82306 VITAMIN D 25 HYDROXY: CPT

## 2023-02-28 PROCEDURE — 36415 COLL VENOUS BLD VENIPUNCTURE: CPT

## 2023-02-28 NOTE — TELEPHONE ENCOUNTER
Critical Lab Report    Situation:   Lab is reporting the following lab values: WBC 31.6- preliminary.    Background/ Assessment:   Treating Provider:   Dr. Osorio. Dx- CLL    Date of last office visit: 12/5/22      Recent Labs: Previous labs as per below-  CBC RESULTS: Recent Labs   Lab Test 12/01/22  0851   WBC 34.4*   RBC 4.64   HGB 14.4   HCT 42.8   MCV 92   MCH 31.0   MCHC 33.6   RDW 14.1        Per chart review, no upcoming appointment noted.    Recommendations:   Care team notification: Writer will notify care team.

## 2023-02-28 NOTE — RESULT ENCOUNTER NOTE
Dear Ms. Arsh,    CBC is stable. WBC of 31.5. Normal hemoglobin and platelet.     Please, call me with any questions.    Sancho Osorio MD

## 2023-03-02 DIAGNOSIS — R79.89 ELEVATED LFTS: Primary | ICD-10-CM

## 2023-03-06 ENCOUNTER — HOSPITAL ENCOUNTER (OUTPATIENT)
Dept: ULTRASOUND IMAGING | Facility: CLINIC | Age: 73
Discharge: HOME | End: 2023-03-06
Attending: SURGERY
Payer: COMMERCIAL

## 2023-03-06 ENCOUNTER — OFFICE VISIT (OUTPATIENT)
Dept: OTHER | Facility: CLINIC | Age: 73
End: 2023-03-06
Attending: SURGERY
Payer: COMMERCIAL

## 2023-03-06 VITALS — SYSTOLIC BLOOD PRESSURE: 109 MMHG | OXYGEN SATURATION: 98 % | HEART RATE: 77 BPM | DIASTOLIC BLOOD PRESSURE: 67 MMHG

## 2023-03-06 DIAGNOSIS — I73.9 PAD (PERIPHERAL ARTERY DISEASE) (H): ICD-10-CM

## 2023-03-06 DIAGNOSIS — I74.09 AORTOILIAC OCCLUSIVE DISEASE (H): Primary | ICD-10-CM

## 2023-03-06 DIAGNOSIS — I77.1 ILIAC ARTERY STENOSIS, RIGHT (H): Primary | ICD-10-CM

## 2023-03-06 DIAGNOSIS — Z48.812 ENCOUNTER FOR SURGICAL AFTERCARE FOLLOWING SURGERY ON THE CIRCULATORY SYSTEM: ICD-10-CM

## 2023-03-06 DIAGNOSIS — Z95.828 S/P AORTO-BIFEMORAL BYPASS SURGERY: ICD-10-CM

## 2023-03-06 DIAGNOSIS — E78.5 HYPERLIPIDEMIA LDL GOAL <100: ICD-10-CM

## 2023-03-06 PROCEDURE — 93978 VASCULAR STUDY: CPT

## 2023-03-06 PROCEDURE — G0463 HOSPITAL OUTPT CLINIC VISIT: HCPCS

## 2023-03-06 PROCEDURE — 93978 VASCULAR STUDY: CPT | Mod: 26 | Performed by: SURGERY

## 2023-03-06 PROCEDURE — 99213 OFFICE O/P EST LOW 20 MIN: CPT | Performed by: SURGERY

## 2023-03-06 PROCEDURE — G0463 HOSPITAL OUTPT CLINIC VISIT: HCPCS | Mod: 25 | Performed by: SURGERY

## 2023-03-06 RX ORDER — ATORVASTATIN CALCIUM 40 MG/1
40 TABLET, FILM COATED ORAL DAILY
Qty: 90 TABLET | Refills: 3 | Status: SHIPPED | OUTPATIENT
Start: 2023-03-06 | End: 2024-04-17

## 2023-03-06 NOTE — PROGRESS NOTES
Mrs. Caban is a 72-year-old female with history of aortobiiliac bypass grafting.  She had stent grafting of the right external iliac artery and right limb of the aortobiiliac bypass grafting.  Both of these had developed recurrent high-grade stenosis and she underwent right femoral cutdown and stent grafting of the right limb of the aortobiiliac bypass graft and native right external iliac artery.  This was done on 18 November 2022.    She has no complaints in regards to the lower extremities.  However, she has been having muscle aches and weakness and she attributes that to Crestor.  She was not 40 mg of Crestor.  She has stopped taking that for the last 2 days.  She was also prescribed Zetia.    Imaging shows patent aortobiiliac bypass graft and the stent graft within the right side including the right external iliac artery.    I have stopped the Crestor because of its side effects.  Patient has previously taken atorvastatin which she has tolerated well.  I put her back on 40 mg of atorvastatin.  We will reimage her in 6 months time.  She will follow-up with our nurse practitioner, Allyn Mcclain, for a recheck of her lipid profile with this adjusted medication.  She is to take Brilinta for at least 1 year.

## 2023-03-06 NOTE — PROGRESS NOTES
Patient is here to discuss follow up    /67 (BP Location: Left arm, Patient Position: Chair, Cuff Size: Adult Regular)   Pulse 77   SpO2 98%     Questions patient would like addressed today are: wants to quit Crestor     Refills are needed: No    Has homecare services and agency name:  [facility]

## 2023-03-09 ENCOUNTER — TRANSFERRED RECORDS (OUTPATIENT)
Dept: HEALTH INFORMATION MANAGEMENT | Facility: CLINIC | Age: 73
End: 2023-03-09

## 2023-03-10 DIAGNOSIS — I10 HTN (HYPERTENSION), BENIGN: Primary | ICD-10-CM

## 2023-03-10 DIAGNOSIS — E87.6 LOW BLOOD POTASSIUM: ICD-10-CM

## 2023-03-10 RX ORDER — POTASSIUM CHLORIDE 1500 MG/1
TABLET, EXTENDED RELEASE ORAL
Qty: 30 TABLET | Refills: 1 | Status: SHIPPED | OUTPATIENT
Start: 2023-03-10 | End: 2023-08-29

## 2023-03-10 RX ORDER — METOPROLOL SUCCINATE 100 MG/1
150 TABLET, EXTENDED RELEASE ORAL DAILY
Qty: 135 TABLET | Refills: 0 | Status: SHIPPED | OUTPATIENT
Start: 2023-03-10 | End: 2023-06-05

## 2023-03-11 NOTE — TELEPHONE ENCOUNTER
Please check with patient if she is taking metoprolol 150 mg 1 and half tablets daily, this is relatively high dose given that she has peripheral artery disease.  Cardiology's notes indicate only Toprol-XL 25 mg once daily.    Dr Claudio

## 2023-03-13 DIAGNOSIS — I73.9 PAD (PERIPHERAL ARTERY DISEASE) (H): ICD-10-CM

## 2023-03-13 NOTE — TELEPHONE ENCOUNTER
Called and spoke to the patient. Pt states that she has been taking 150 mg (1.5 tabs of 100 mg) for about a year now. Pt states medication previously managed by Lyudmila Escobar. Pt states she probably took 25 mg tablets many years ago.    Routing to provider for review.     Dale Cooper RN

## 2023-03-13 NOTE — TELEPHONE ENCOUNTER
ticagrelor (BRILINTA) 60 MG tablet  Last Written Prescription Date:  1/5/23  Last Fill Quantity: 180,  # refills: 0     Per LOV with Dr. Tran 3/6/23: She is to take Brilinta for at least 1 year.    Rx signature says Take 1.5 mg (90 mg) by mouth daily.    1/11/23 patient reported taking 60 mg daily.    Routing to Allyn Mcclain, for review.

## 2023-03-20 RX ORDER — BISACODYL 5 MG/1
TABLET, DELAYED RELEASE ORAL
Qty: 4 TABLET | Refills: 0 | Status: SHIPPED | OUTPATIENT
Start: 2023-03-20 | End: 2023-04-20

## 2023-03-29 ENCOUNTER — HOSPITAL ENCOUNTER (OUTPATIENT)
Facility: CLINIC | Age: 73
Discharge: HOME | End: 2023-03-29
Attending: SPECIALIST | Admitting: SPECIALIST
Payer: COMMERCIAL

## 2023-03-29 VITALS
DIASTOLIC BLOOD PRESSURE: 67 MMHG | OXYGEN SATURATION: 97 % | RESPIRATION RATE: 22 BRPM | HEIGHT: 64 IN | BODY MASS INDEX: 20.49 KG/M2 | SYSTOLIC BLOOD PRESSURE: 123 MMHG | HEART RATE: 66 BPM | WEIGHT: 120 LBS

## 2023-03-29 DIAGNOSIS — Z12.11 ENCOUNTER FOR SCREENING COLONOSCOPY: Primary | ICD-10-CM

## 2023-03-29 LAB — COLONOSCOPY: NORMAL

## 2023-03-29 PROCEDURE — 88305 TISSUE EXAM BY PATHOLOGIST: CPT | Mod: TC | Performed by: SPECIALIST

## 2023-03-29 PROCEDURE — G0500 MOD SEDAT ENDO SERVICE >5YRS: HCPCS | Performed by: SPECIALIST

## 2023-03-29 PROCEDURE — 250N000011 HC RX IP 250 OP 636: Performed by: SPECIALIST

## 2023-03-29 PROCEDURE — 45385 COLONOSCOPY W/LESION REMOVAL: CPT | Performed by: SPECIALIST

## 2023-03-29 PROCEDURE — 99153 MOD SED SAME PHYS/QHP EA: CPT | Mod: PT | Performed by: SPECIALIST

## 2023-03-29 RX ORDER — LIDOCAINE 40 MG/G
CREAM TOPICAL
Status: DISCONTINUED | OUTPATIENT
Start: 2023-03-29 | End: 2023-03-29 | Stop reason: HOSPADM

## 2023-03-29 RX ORDER — FENTANYL CITRATE 50 UG/ML
INJECTION, SOLUTION INTRAMUSCULAR; INTRAVENOUS PRN
Status: DISCONTINUED | OUTPATIENT
Start: 2023-03-29 | End: 2023-03-29 | Stop reason: HOSPADM

## 2023-03-29 RX ORDER — ONDANSETRON 2 MG/ML
4 INJECTION INTRAMUSCULAR; INTRAVENOUS
Status: DISCONTINUED | OUTPATIENT
Start: 2023-03-29 | End: 2023-03-29 | Stop reason: HOSPADM

## 2023-03-29 ASSESSMENT — ACTIVITIES OF DAILY LIVING (ADL): ADLS_ACUITY_SCORE: 35

## 2023-03-29 NOTE — H&P
Pre-Endoscopy History and Physical     Karen Caban MRN# [id number]   YOB: 1950 Age: 72 year old     Date of Procedure: 3/29/2023  Primary care provider: Liane Claudio  Type of Endoscopy: colonoscopy  Reason for Procedure: screening  Type of Anesthesia Anticipated: Moderate sedation    HPI:    Karen is a 72 year old female who will be undergoing the above procedure.      A history and physical has been performed. The patient's medications and allergies have been reviewed. The risks and benefits of the procedure and the sedation options and risks were discussed with the patient.  All questions were answered and informed consent was obtained.      She denies a personal or family history of anesthesia complications or bleeding disorders.     Allergies   Allergen Reactions     Chantix [Varenicline] Nausea     Ciprofloxacin Other (See Comments)     hypertension     Clopidogrel      Other reaction(s): Hypertension     Decongestant [[facility]]      Erythromycin Nausea     Lisinopril      cough     Plavix [Clopidogrel Bisulfate]      Felt as if she was having a heart attack     Rofecoxib Unknown     Vioxx      Increased BP        Current Facility-Administered Medications   Medication     lidocaine (LMX4) cream     lidocaine 1 % 0.1-1 mL     ondansetron (ZOFRAN) injection 4 mg     sodium chloride (PF) 0.9% PF flush 3 mL     sodium chloride (PF) 0.9% PF flush 3 mL       Patient Active Problem List   Diagnosis     Vitamin D deficiency     Osteopenia     HTN (hypertension), benign     Hyperlipidemia with target LDL less than 100     Fx low femur epiphy-closed (H)     Chronic back pain     PAD (peripheral artery disease) (H)     PUD (peptic ulcer disease)     Normal nuclear stress test     Smoker     Advanced directives, counseling/discussion     Colon polyp     GERD (gastroesophageal reflux disease)     Anxiety     Controlled substance agreement signed on 1- with Lyudmila Wheatley  reflux disease without esophagitis     Abnormal CT lung screening     Polycythemia vera (H)     CLL (chronic lymphocytic leukemia) (H)     Iliac artery stenosis, right (H)     Pulmonary emphysema, unspecified emphysema type (H)        Past Medical History:   Diagnosis Date     Ankle fracture 2009    L     Anxiety      Chronic back pain      Chronic lymphocytic leukemia (H)      Colon polyp 2012    repeat colonoscopy 5 years.     Fx low femur epiphy-closed (H)      GERD (gastroesophageal reflux disease)      History of UTI 2017    Cysto by Dr Agustin neg     HTN (hypertension), benign      Hyperlipidemia LDL goal < 100      Normal nuclear stress test 12/2009    EF 67%     Osteopenia      PAD (peripheral artery disease) (H)     s/p fem pop bypass; embolectomy     Polycythemia vera (H) 06/15/2022     PONV (postoperative nausea and vomiting)      PUD (peptic ulcer disease) 1980s    DU     Pulmonary emphysema, unspecified emphysema type (H) 1/12/2023     Smoker      Vitamin D deficiency         Past Surgical History:   Procedure Laterality Date     ANGIOGRAM Right 11/18/2022    Procedure: ANGIOGRAM AORTO ILIAC ANGIOGRAM;  Surgeon: Shaun Tran MD;  Location:  OR     Blood clot removal from Stent      2011     BONE MARROW BIOPSY, BONE SPECIMEN, NEEDLE/TROCAR N/A 02/02/2021    Procedure: bone marrow biopsy;  Surgeon: Kailey Cota MD;  Location:  GI     BYPASS GRAFT AORTOFEMORAL  05/2002    Dr. Turner     BYPASS GRAFT FEMOROPOPLITEAL  12/16/2011     COLONOSCOPY N/A 01/18/2018    Procedure: COMBINED COLONOSCOPY, SINGLE OR MULTIPLE BIOPSY/POLYPECTOMY BY BIOPSY;  COLONOSCOPY;  Surgeon: Efrain Hernadez MD;  Location:  GI     DILATION AND CURETTAGE, OPERATIVE HYSTEROSCOPY, COMBINED N/A 09/15/2022    Procedure: HYSTEROSCOPY, WITH DILATION AND CURETTAGE OF UTERUS;  Surgeon: Destiney Schaefer MD;  Location:  OR     EMBOLECTOMY LOWER EXTREMITY  12/16/2011    Procedure:EMBOLECTOMY LOWER  "EXTREMITY; EMBOLECTOMY, RIGHT POPLITEAL WITH PATCH ANGIOPLASTY. EXCISION SKIN LESION RIGHT LEG. ; Surgeon:PARMINDER VELAZCO; Location:[facility] OR     ENDARTERECTOMY FEMORAL Right 2022    Procedure: ENDARTERECTOMY, FEMORAL RIGHT FEMORAL CUTDOWN, RIGHT ILIAC ARTERY STENTING.;  Surgeon: Shaun Tran MD;  Location:  OR     ESOPHAGOSCOPY, GASTROSCOPY, DUODENOSCOPY (EGD), COMBINED N/A 10/07/2022    Procedure: ESOPHAGOGASTRODUODENOSCOPY, WITH BIOPSY;  Surgeon: Felix Carmona MD;  Location:  GI     EXCISE LESION LOWER EXTREMITY  2011    Procedure:EXCISE LESION LOWER EXTREMITY; Surgeon:PARMINDER VELAZCO; Location:[facility] OR     HERNIA REPAIR  11/04/2008    x2 umbilical     IR OR ANGIOGRAM  2022     L achilles repair  2008     LAPAROSCOPIC OOPHORECTOMY Left     L for cyst age 26     miscarriages x 3       tubal ligation and reversal         Social History     Tobacco Use     Smoking status: Every Day     Packs/day: 0.25     Years: 50.00     Pack years: 12.50     Types: Cigarettes     Smokeless tobacco: Never     Tobacco comments:     Nicotine patch and a cigarette \"once in awhile.\"   Substance Use Topics     Alcohol use: Yes     Comment: Beer once in a while       Family History   Problem Relation Age of Onset     Alzheimer Disease Mother          of panc/GI ca age 83     Osteoporosis Mother      Other Cancer Mother      Cancer Father          age 64 lymphoma     Colon Cancer Maternal Grandfather        REVIEW OF SYSTEMS:   5 point ROS negative except as noted above in HPI, including Gen., Resp., CV, GI &  system review.    PHYSICAL EXAM:   BP (!) 143/85 (Cuff Size: Adult Small)   Pulse 74   Resp 16   Ht 1.613 m (5' 3.5\")   Wt 54.4 kg (120 lb)   SpO2 97%   BMI 20.92 kg/m   Estimated body mass index is 20.92 kg/m  as calculated from the following:    Height as of this encounter: 1.613 m (5' 3.5\").    Weight as of this encounter: 54.4 kg (120 lb).   GENERAL APPEARANCE: " healthy  MENTAL STATUS: alert  AIRWAY EXAM: Mallampatti Class II (visualization of the soft palate, fauces, and uvula)  RESP: lungs clear to auscultation - no rales, rhonchi or wheezes  CV: regular rates and rhythm, normal S1 S2, no S3 or S4 and no murmur, click or rub    DIAGNOSTICS:    Not indicated    IMPRESSION   ASA Class 2 - Mild systemic disease    PLAN:   colonoscopy    The above has been forwarded to the consulting provider.      Signed Electronically by: Jony Manriquez MD  March 29, 2023

## 2023-03-30 PROCEDURE — 88305 TISSUE EXAM BY PATHOLOGIST: CPT | Mod: 26 | Performed by: PATHOLOGY

## 2023-04-06 ENCOUNTER — LAB (OUTPATIENT)
Dept: LAB | Facility: CLINIC | Age: 73
End: 2023-04-06
Payer: COMMERCIAL

## 2023-04-06 DIAGNOSIS — E78.5 HYPERLIPIDEMIA LDL GOAL <100: ICD-10-CM

## 2023-04-06 DIAGNOSIS — E78.5 HYPERLIPIDEMIA WITH TARGET LDL LESS THAN 100: ICD-10-CM

## 2023-04-06 LAB
CHOLEST SERPL-MCNC: 138 MG/DL
HDLC SERPL-MCNC: 57 MG/DL
LDLC SERPL CALC-MCNC: 65 MG/DL
NONHDLC SERPL-MCNC: 81 MG/DL
TRIGL SERPL-MCNC: 82 MG/DL

## 2023-04-06 PROCEDURE — 36415 COLL VENOUS BLD VENIPUNCTURE: CPT

## 2023-04-06 PROCEDURE — 80061 LIPID PANEL: CPT

## 2023-04-20 ENCOUNTER — VIRTUAL VISIT (OUTPATIENT)
Dept: PHARMACY | Facility: CLINIC | Age: 73
End: 2023-04-20
Payer: COMMERCIAL

## 2023-04-20 DIAGNOSIS — F41.9 ANXIETY: ICD-10-CM

## 2023-04-20 DIAGNOSIS — K27.9 PUD (PEPTIC ULCER DISEASE): ICD-10-CM

## 2023-04-20 DIAGNOSIS — J32.9 SINUSITIS, UNSPECIFIED CHRONICITY, UNSPECIFIED LOCATION: ICD-10-CM

## 2023-04-20 DIAGNOSIS — E78.5 HYPERLIPIDEMIA WITH TARGET LDL LESS THAN 100: ICD-10-CM

## 2023-04-20 DIAGNOSIS — J30.2 SEASONAL ALLERGIC RHINITIS, UNSPECIFIED TRIGGER: ICD-10-CM

## 2023-04-20 DIAGNOSIS — F17.200 SMOKER: ICD-10-CM

## 2023-04-20 DIAGNOSIS — I73.9 PAD (PERIPHERAL ARTERY DISEASE) (H): ICD-10-CM

## 2023-04-20 DIAGNOSIS — Z78.9 TAKES DIETARY SUPPLEMENTS: ICD-10-CM

## 2023-04-20 DIAGNOSIS — E87.6 HYPOKALEMIA: ICD-10-CM

## 2023-04-20 DIAGNOSIS — I10 HTN (HYPERTENSION), BENIGN: Primary | ICD-10-CM

## 2023-04-20 PROCEDURE — 99605 MTMS BY PHARM NP 15 MIN: CPT

## 2023-04-20 PROCEDURE — 99607 MTMS BY PHARM ADDL 15 MIN: CPT

## 2023-04-20 RX ORDER — DOXYCYCLINE HYCLATE 100 MG
100 TABLET ORAL 2 TIMES DAILY
COMMUNITY
Start: 2023-04-17 | End: 2023-04-27

## 2023-04-20 RX ORDER — ALBUTEROL SULFATE 90 UG/1
1-2 AEROSOL, METERED RESPIRATORY (INHALATION) EVERY 6 HOURS PRN
COMMUNITY
End: 2023-09-25

## 2023-04-20 RX ORDER — FLUTICASONE PROPIONATE 50 MCG
1-2 SPRAY, SUSPENSION (ML) NASAL DAILY
COMMUNITY
Start: 2023-01-28

## 2023-04-20 RX ORDER — BENZONATATE 100 MG/1
100 CAPSULE ORAL 3 TIMES DAILY PRN
COMMUNITY
Start: 2023-04-17 | End: 2023-09-10

## 2023-04-20 NOTE — LETTER
"Recommended To-Do List      Prepared on: Apr 20, 2023       You can get the best results from your medications by completing the items on this \"To-Do List.\"      Bring your To-Do List when you go to your doctor. And, share it with your family or caregivers.    My To-Do List:  What we talked about: What I should do:   The importance of taking your medication as intended    Education: Separate calcium and magnesium containing products at least 2 hours apart from when you take doxycyline, I would recommend choosing crackers or toast as options to take with the doxycyline to help with stomach upset          What we talked about: What I should do:   The importance of taking your medication as intended    Stop taking nicotine (COMMIT)          What we talked about: What I should do:                     "

## 2023-04-20 NOTE — PROGRESS NOTES
Medication Therapy Management (MTM) Encounter    ASSESSMENT:                            Medication Adherence/Access: No issues identified    Smoking cesation: Patient is not interested in stopping smoking at this time. Is using currently using nicotine lozenge not as intended , is not interested in using the lozenge instead of smoking a cigarette and instead is using lozenge in addition to cigarettes thus would benefit from discontinuation. Will continue to monitor patient's readiness to quit.     Hypertension: Blood pressure at goal of <130/80.     Hyperlipidemia/PAD: Stable, LDL at goal of <70 as recommended per ADA. Plan in place for patient to have liver enzymes re-checked in the near future. Continues to follow with cardiology.     Hypokalemia: Most recent potassium level WNL.     Anxiety: Stable, educated that bupropion could be used in the future for both mood and smoking cessation if interested. Will continue to monitor.     PUD: Stable, continues to follow with GI. PPI dose has been reduced since last MTM visit.     Supplements: Stable.     Allergies: Stable.      Sinus infection: Educated that patient should separate doxycyline from calcium or magnesium containing products including milk from antibiotic administration by at least 2 hours for best absorption.      PLAN:                            1. Stop using nicotine lozenge     2. Separate calcium and magnesium containing products at least 2 hours apart from when you take doxycyline, I would recommend choosing crackers or toast as options to take with the doxycyline to help with stomach upset    Follow-up: 6 months, sooner if needed.     SUBJECTIVE/OBJECTIVE:                          Karen Caban is a 72 year old female called for a follow-up visit.  Today's visit is a follow-up MTM visit from 12/28/2022     Reason for visit: Comprehensive review.    Allergies/ADRs: Reviewed in chart  Past Medical History: Reviewed in chart  Tobacco: She reports that  she has been smoking cigarettes. She has a 12.50 pack-year smoking history. She has never used smokeless tobacco.Nicotine/Tobacco Cessation Plan:   Information offered: Patient not interested at this time  Alcohol: 1-3 beverages / week  Caffeine: 1 cup of coffee daily in the morning.     Medication Adherence/Access: no issues reported    Smoking cesation: Current therapy includes nicotine 2mg lozenge a couple times a day if out and about somewhere. Not currently using patches that were previously pescribed. Currently smoking about 10 cigarettes a day. Notes that on a scale of 1 to 10 she is at about a 4 on her readiness to quit smoking, she notes if she decided she really wanted to quit this number would increase. Currently is using lozenge in addition to smoking, for example will use a lozenge at a movie when she can't smoke.     Hypertension: Current medications include chlorthalidone 25mg daily and metoprolol ER 150mg daily.  Patient does self-monitor blood pressure. Home BP monitoring in range of 125-130's systolic over 75's diastolic.  Patient reports no current medication side effects.  BP Readings from Last 3 Encounters:   03/29/23 123/67   03/06/23 109/67   01/11/23 126/85     Hyperlipidemia/PAD: Current therapy includes atorvastatin 40mg daily (muscle pains when taking rosuvastatin 40mg daily & elevated LFTs so was switched back to atorvastatin), zeita 10mg daily (recently prescribed), Brilinta 60mg daily (can't tolerate Plavix- per chart review) and aspirin 81mg daily. Per chart review plan for patient to take Brilinta for a year (notes she has about 7-8 months left). Patient reports no significant myalgias or other side effects. Notes some bruising but no major bleeding. Also has nitroglycerin on hand but never has used.   Recent Labs   Lab Test 04/06/23  0748 02/28/23  0726   CHOL 138 102   HDL 57 47*   LDL 65 44   TRIG 82 53     Hypokalemia: Current therapy includes potassium 20meq 2-3 times a week.  Denies side effects.   Potassium   Date Value Ref Range Status   02/28/2023 4.0 3.4 - 5.3 mmol/L Final   11/19/2022 3.3 (L) 3.4 - 5.3 mmol/L Final   06/01/2021 3.1 (L) 3.4 - 5.3 mmol/L Final     Anxiety: Was prescribed sertraline but plans to remain off of the medication for now. Notes that mood has been stable without medications.     PUD: Current therapy includes omeprazole 40mg daily. Recently had an endoscopy completed, was recommended to continue on 40mg once daily (this was reduced from omeprazole 40mg twice daily).     Supplements: Current therapy includes vitamin D 5000 units daily, Coenzym Q10 200mg daily- heard years ago this was good for the heart, Cranberry-vitamin C-vitamin E 1 tablet daily, vitmain b12 5,000mcg every other day, probiotic 1 tablet daily. Denies side effects.   Vitamin D Deficiency Screening Results:  Lab Results   Component Value Date    VITDT 66 02/28/2023    VITDT 65 09/13/2022    VITDT 38 12/08/2017    VITDT 60 10/25/2013     Allergies: fluticasone 1 spray into each nostril daily and Xclear nasal rinse using 1 time daily. Finds current regimen effective.     Sinus infection: Current therapy includes albuterol as needed- using currently about once daily which has been helpful for current symptoms. benzonatate 100mg 1 tablet daily- founds it helpful and doxycyline hyclate 100mg twice daily (started on Monday 4/17). Notes that she has had some diarrhea and upset stomach but nothing severe. Notes that taking milk or a banana with doxycyline has helped to reduce stomach upset.     Today's Vitals: There were no vitals taken for this visit.  ----------------  Post Discharge Medication Reconciliation Status: discharge medications reconciled and changed, per note/orders.    I spent 27 minutes with this patient today. All changes were made via collaborative practice agreement with Liane Claudio MD. A copy of the visit note was provided to the patient's provider(s).    A summary of these  recommendations was sent via [employer].    Lia Hernandez, PharmD  Medication Therapy Management Resident  [phone]    Preceptor cosignature: Karen Caban was seen independently by Dr. Hernandez. I have reviewed the assessment and plan. Jasmin Jones, PharmD, SENIA, BCACP    Telemedicine Visit Details  Type of service:  Telephone visit  Start Time: 10:35 AM  End Time: 11:02 AM     Medication Therapy Recommendations  Sinusitis, unspecified chronicity, unspecified location    Current Medication: doxycycline hyclate (VIBRA-TABS) 100 MG tablet   Rationale: Does not understand instructions - Adherence - Adherence   Recommendation: Provide Education   Status: Patient Agreed - Adherence/Education         Smoker    Current Medication: nicotine (COMMIT) 2 MG lozenge (Discontinued)   Rationale: Does not understand instructions - Adherence - Adherence   Recommendation: Discontinue Medication   Status: Accepted per CPA

## 2023-04-20 NOTE — LETTER
_  Medication List        Prepared on: Apr 20, 2023     Bring your Medication List when you go to the doctor, hospital, or   emergency room. And, share it with your family or caregivers.     Note any changes to how you take your medications.  Cross out medications when you no longer use them.    Medication How I take it Why I use it Prescriber   albuterol (PROAIR HFA/PROVENTIL HFA/VENTOLIN HFA) 108 (90 Base) MCG/ACT inhaler Inhale 1-2 puffs into the lungs every 6 hours as needed for shortness of breath, wheezing or cough  Sinus infection Patient Reported   aspirin (ASA) 81 MG chewable tablet Take 1 tablet (81 mg) by mouth daily PAD  Shaun Tran MD   atorvastatin (LIPITOR) 40 MG tablet Take 1 tablet (40 mg) by mouth daily Cholesterol  Shaun Tran MD   benzonatate (TESSALON) 100 MG capsule Take 100 mg by mouth 3 times daily as needed Cough  Patient Reported   chlorthalidone (HYGROTON) 25 MG tablet 1 tablet once daily (decrease dose as potassium is low) Blood pressure  Liane Claudio MD   Cholecalciferol (VITAMIN D-3) 5000 UNIT TABS Take 1 tablet by mouth daily.  General Health  Patient Reported   coenzyme Q-10 200 MG CAPS Take 200 mg by mouth daily  General Health  Patient Reported   Cranberry-Vitamin C-Vitamin E (CRANBERRY PLUS VITAMIN C) 4200-20-3 MG-MG-UNIT CAPS Take 1 tablet by mouth daily  General Health  Patient Reported   Cyanocobalamin (B-12 PO) Take 5,000 mcg by mouth every other day Liquid form General Health  Liane Claudio MD   doxycycline hyclate (VIBRA-TABS) 100 MG tablet Take 100 mg by mouth 2 times daily For 10 days (started 4/17) Sinus infection  Patient Reported   ezetimibe (ZETIA) 10 MG tablet Take 1 tablet (10 mg) by mouth daily Cholesterol  Liane Claudio MD   fluticasone (FLONASE) 50 MCG/ACT nasal spray Spray 1-2 sprays in nostril daily Allergies  Patient Reported   Lactobacillus (PROBIOTIC ACIDOPHILUS PO) Take 1 capsule by mouth daily General Health  Patient  Reported   metoprolol succinate ER (TOPROL XL) 100 MG 24 hr tablet Take 1.5 tablets (150 mg) by mouth daily (1.5 x 100 mg = 150 mg) Hypertension  Liane Claudio MD   nitroGLYcerin (NITROSTAT) 0.4 MG sublingual tablet For chest pain place 1 tablet under the tongue every 5 minutes for 3 doses. If symptoms persist 5 minutes after 1st dose call 911. Chest pain Joanna Kersten Ulmen Barthell, PA-C   omeprazole (PRILOSEC) 40 MG DR capsule Take 1 capsule (40 mg) by mouth 2 times daily TAKE 1 CAPSULE BY MOUTH EVERY DAY 30 TO 60 MINUTES BEFORE A MEAL GERD Joanna Kersten Ulmen Barthell, PA-C   potassium chloride ER (KLOR-CON M) 20 MEQ CR tablet TAKE 1 TABLET BY MOUTH 2-3 TIMES PER WEEK Low Blood Potassium Liane Claudio MD   Sodium Chloride-Xylitol (XLEAR SINUS CARE SPRAY NA) Spray 1 spray in nostril daily as needed Allergies  Patient Reported   ticagrelor (BRILINTA) 60 MG tablet Take 1 tablet (60 mg) by mouth daily PAD  Allyn Mcclain NP         Add new medications, over-the-counter drugs, herbals, vitamins, or  minerals in the blank rows below.    Medication How I take it Why I use it Prescriber                                      Allergies:      chantix [varenicline]; ciprofloxacin; clopidogrel; decongestant [cvs]; erythromycin; lisinopril; plavix [clopidogrel bisulfate]; rofecoxib; vioxx        Side effects I have had:               Other Information:              My notes and questions:

## 2023-04-20 NOTE — LETTER
April 21, 2023  Karen Caban  [address]  [place] [address]    Dear Ms. Caban, [facility]NDO [facility]     Thank you for talking with me on Apr 20, 2023 about your health and medications. As a follow-up to our conversation, I have included two documents:      Your Recommended To-Do List has steps you should take to get the best results from your medications.  Your Medication List will help you keep track of your medications and how to take them.    If you want to talk about these documents, please call Miriam Hernandez RPH at phone: [phone], Monday-Friday 8-4:30pm.    I look forward to working with you and your doctors to make sure your medications work well for you.    Sincerely,  Miriam Hernandez RPH  [facility] Pharmacist, [facility]

## 2023-04-21 NOTE — PATIENT INSTRUCTIONS
"Recommendations from today's MTM visit:                                                      1. Stop using nicotine lozenge     2. Separate calcium and magnesium containing products at leas 2 hours apart from when you take doxycyline, I would recommend choosing crackers or toast as options to take with the doxycyline to help with stomach upset    Follow-up: 6 months, sooner if needed.     It was great speaking with you today.  I value your experience and would be very thankful for your time in providing feedback in our clinic survey. In the next few days, you may receive an email or text message from [facility] with a link to a survey related to your  clinical pharmacist.\"     To schedule another MTM appointment, please call the clinic directly or you may call the MTM scheduling line at [phone] or toll-free at [phone].     My Clinical Pharmacist's contact information:                                                      Please feel free to contact me with any questions or concerns you have.      Lia Hernandez, PharmD  Medication Therapy Management Resident  [phone]   "

## 2023-04-24 ENCOUNTER — OFFICE VISIT (OUTPATIENT)
Dept: OTHER | Facility: CLINIC | Age: 73
End: 2023-04-24
Attending: PHYSICIAN ASSISTANT
Payer: COMMERCIAL

## 2023-04-24 VITALS
HEIGHT: 65 IN | HEART RATE: 69 BPM | SYSTOLIC BLOOD PRESSURE: 128 MMHG | BODY MASS INDEX: 21.06 KG/M2 | WEIGHT: 126.4 LBS | OXYGEN SATURATION: 98 % | DIASTOLIC BLOOD PRESSURE: 72 MMHG

## 2023-04-24 DIAGNOSIS — E78.5 HYPERLIPIDEMIA LDL GOAL <70: ICD-10-CM

## 2023-04-24 DIAGNOSIS — I73.9 PAD (PERIPHERAL ARTERY DISEASE) (H): ICD-10-CM

## 2023-04-24 PROCEDURE — G0463 HOSPITAL OUTPT CLINIC VISIT: HCPCS

## 2023-04-24 PROCEDURE — 99214 OFFICE O/P EST MOD 30 MIN: CPT | Performed by: PHYSICIAN ASSISTANT

## 2023-04-24 NOTE — PROGRESS NOTES
"Patient is here to discuss follow up    /72 (BP Location: Right arm, Patient Position: Chair, Cuff Size: Adult Regular)   Pulse 69   Ht 5' 4.5\" (1.638 m)   Wt 126 lb 6.4 oz (57.3 kg)   SpO2 98%   BMI 21.36 kg/m      Questions patient would like addressed today are: N/A.    Refills are needed: No    Has homecare services and agency name:  [facility]    "

## 2023-04-24 NOTE — PROGRESS NOTES
[facility]  VASCULAR MEDICINE FOLLOW-UP VISIT      PRIMARY HEALTH CARE PROVIDER:  Liane Claudio    REASON FOR VISIT:  Follow-up hyperlipidemia, PAD      HPI: Karen Caban is a very pleasant 72 year old female smoker with a history of CLL, GERD, polycythemia vera, hypertension, hyperlipidemia, and PAD. She underwent an aorto/right common iliac/left common femoral bypass in 2002 by Dr. Turner. Then in 11/2009 she underwent right iliac artery angioplasty and stenting. This was followed by embolic occlusion of the right distal popliteal and tibial arteries in 12/2011 requiring thromboembolectomy by Dr. Ryan. She had also been followed for a carotid bruit with last ultrasound in 2012 with <50% carotid stenosis bilaterally.      She had not been seen in follow-up in the vascular clinic since 2013. In 2022 she developed increased right buttock cramping when ambulating. She also would get a numbness in her leg when walking, but would improve when bending over. She denies any rest pain or non-healing wounds. She has no complaints with her left leg.      AZALEA's in 9/2022 were 0.79 at rest on the right, dropping to 0.63 after exercise. CTA was then obtained 10/11/22 and significant for significant stenosis in the proximal aspects of the stents extending from the right limb of the aortoiliac bypass graft into the distal right external iliac artery.     She was referred on to Vascular Surgery again. Dr. Tran took her to the OR oon 11/18/22 for stent grafting of proximal aspect of right limb of aortobifemoral bypass using 9 mm x 29 mm balloon expandable GORE VBX stent graft and grafting of mid right external iliac artery using 8 mm x 59 mm balloon expandable GORE VBX stent graft via right femoral artery cutdown. She was placed on Brilinta (can't tolerate Plavix) and aspirin.     She did well post-procedure.  She is walking more and doing well now.     She is presently on Aspirin, Brilinta  (unable to tolerate Plavix), Atorvastatin 40 mg daily and zetia 10 mg daily for hyperlipidemia, and chlorthalidone and metoprolol for hypertension. She is smoking 1/2 ppd of cigarettes but would like to quit.     She had previously been placed on Crestor in 11/2022 for LDL > 70 and progressive PAD. However, 2 months later when lipid checked by PCP she was still not at goal of LDL < 70. Zetia 10 mg daily was added. She developed abdominal pain and nausea. She felt this was secondary to the Crestor. She was changed back to Atorvastatin 40 mg daily and maintained on Zetia with resolution of symptoms. LDL was rechecked and is now at goal. It is noted that her LFT's are mildly elevated. She is planning on getting these rechecked in May. It is thought this was also from the Crestor.       PAST MEDICAL HISTORY  Past Medical History:   Diagnosis Date     Ankle fracture 2009    L     Anxiety      Chronic back pain      Chronic lymphocytic leukemia (H)      Colon polyp 2012    repeat colonoscopy 5 years.     Fx low femur epiphy-closed (H)      GERD (gastroesophageal reflux disease)      History of UTI 2017    Cysto by Dr Agustin neg     HTN (hypertension), benign      Hyperlipidemia LDL goal < 100      Normal nuclear stress test 12/2009    EF 67%     Osteopenia      PAD (peripheral artery disease) (H)     s/p fem pop bypass; embolectomy     Polycythemia vera (H) 06/15/2022     PONV (postoperative nausea and vomiting)      PUD (peptic ulcer disease) 1980s    DU     Pulmonary emphysema, unspecified emphysema type (H) 1/12/2023     Smoker      Vitamin D deficiency        PAST SURGICAL HISTORY:  Past Surgical History:   Procedure Laterality Date     ANGIOGRAM Right 11/18/2022    Procedure: ANGIOGRAM AORTO ILIAC ANGIOGRAM;  Surgeon: Shaun Tran MD;  Location: [facility] OR     Blood clot removal from Stent      2011     BONE MARROW BIOPSY, BONE SPECIMEN, NEEDLE/TROCAR N/A 02/02/2021    Procedure: bone marrow biopsy;  Surgeon:  Kailey Cota MD;  Location:  GI     BYPASS GRAFT AORTOFEMORAL  05/2002    Dr. Turner     BYPASS GRAFT FEMOROPOPLITEAL  12/16/2011     COLONOSCOPY N/A 01/18/2018    Procedure: COMBINED COLONOSCOPY, SINGLE OR MULTIPLE BIOPSY/POLYPECTOMY BY BIOPSY;  COLONOSCOPY;  Surgeon: Efrani Hernadez MD;  Location:  GI     COLONOSCOPY N/A 3/29/2023    Procedure: COLONOSCOPY, FLEXIBLE, WITH LESION REMOVAL USING SNARE;  Surgeon: Jony Manriquez MD;  Location:  GI     DILATION AND CURETTAGE, OPERATIVE HYSTEROSCOPY, COMBINED N/A 09/15/2022    Procedure: HYSTEROSCOPY, WITH DILATION AND CURETTAGE OF UTERUS;  Surgeon: Destiney Schaefer MD;  Location:  OR     EMBOLECTOMY LOWER EXTREMITY  12/16/2011    Procedure:EMBOLECTOMY LOWER EXTREMITY; EMBOLECTOMY, RIGHT POPLITEAL WITH PATCH ANGIOPLASTY. EXCISION SKIN LESION RIGHT LEG. ; Surgeon:PARMINDER VELAZCO; Location: OR     ENDARTERECTOMY FEMORAL Right 11/18/2022    Procedure: ENDARTERECTOMY, FEMORAL RIGHT FEMORAL CUTDOWN, RIGHT ILIAC ARTERY STENTING.;  Surgeon: Shaun Tran MD;  Location:  OR     ESOPHAGOSCOPY, GASTROSCOPY, DUODENOSCOPY (EGD), COMBINED N/A 10/07/2022    Procedure: ESOPHAGOGASTRODUODENOSCOPY, WITH BIOPSY;  Surgeon: Felix Carmona MD;  Location:  GI     EXCISE LESION LOWER EXTREMITY  12/16/2011    Procedure:EXCISE LESION LOWER EXTREMITY; Surgeon:PARMINDER VELAZCO; Location: OR     HERNIA REPAIR  11/04/2008    x2 umbilical     IR OR ANGIOGRAM  11/18/2022     L achilles repair  12/04/2008     LAPAROSCOPIC OOPHORECTOMY Left     L for cyst age 26     miscarriages x 3       tubal ligation and reversal           CURRENT MEDICATIONS  albuterol (PROAIR HFA/PROVENTIL HFA/VENTOLIN HFA) 108 (90 Base) MCG/ACT inhaler, Inhale 1-2 puffs into the lungs every 6 hours as needed for shortness of breath, wheezing or cough  aspirin (ASA) 81 MG chewable tablet, Take 1 tablet (81 mg) by mouth daily  atorvastatin (LIPITOR) 40 MG tablet, Take 1  tablet (40 mg) by mouth daily  benzonatate (TESSALON) 100 MG capsule, Take 100 mg by mouth 3 times daily as needed  chlorthalidone (HYGROTON) 25 MG tablet, 1 tablet once daily (decrease dose as potassium is low)  Cholecalciferol (VITAMIN D-3) 5000 UNIT TABS, Take 1 tablet by mouth daily.  coenzyme Q-10 200 MG CAPS, Take 200 mg by mouth daily  Cranberry-Vitamin C-Vitamin E (CRANBERRY PLUS VITAMIN C) 4200-20-3 MG-MG-UNIT CAPS, Take 1 tablet by mouth daily  Cyanocobalamin (B-12 PO), Take 5,000 mcg by mouth every other day Liquid form  doxycycline hyclate (VIBRA-TABS) 100 MG tablet, Take 100 mg by mouth 2 times daily For 10 days (started 4/17)  ezetimibe (ZETIA) 10 MG tablet, Take 1 tablet (10 mg) by mouth daily  fluticasone (FLONASE) 50 MCG/ACT nasal spray, Spray 1-2 sprays in nostril daily  Lactobacillus (PROBIOTIC ACIDOPHILUS PO), Take 1 capsule by mouth daily  metoprolol succinate ER (TOPROL XL) 100 MG 24 hr tablet, Take 1.5 tablets (150 mg) by mouth daily (1.5 x 100 mg = 150 mg)  nitroGLYcerin (NITROSTAT) 0.4 MG sublingual tablet, For chest pain place 1 tablet under the tongue every 5 minutes for 3 doses. If symptoms persist 5 minutes after 1st dose call 911.  omeprazole (PRILOSEC) 40 MG DR capsule, Take 1 capsule (40 mg) by mouth 2 times daily TAKE 1 CAPSULE BY MOUTH EVERY DAY 30 TO 60 MINUTES BEFORE A MEAL (Patient taking differently: Take 40 mg by mouth daily TAKE 1 CAPSULE BY MOUTH EVERY DAY 30 TO 60 MINUTES BEFORE A MEAL)  potassium chloride ER (KLOR-CON M) 20 MEQ CR tablet, TAKE 1 TABLET BY MOUTH 2-3 TIMES PER WEEK  Sodium Chloride-Xylitol (XLEAR SINUS CARE SPRAY NA), Spray 1 spray in nostril daily as needed  ticagrelor (BRILINTA) 60 MG tablet, Take 1 tablet (60 mg) by mouth daily    No current facility-administered medications on file prior to visit.      ALLERGIES     Allergies   Allergen Reactions     Chantix [Varenicline] Nausea     Ciprofloxacin Other (See Comments)     hypertension     Clopidogrel       "Other reaction(s): Hypertension     Decongestant [Cvs]      Erythromycin Nausea     Lisinopril      cough     Plavix [Clopidogrel Bisulfate]      Felt as if she was having a heart attack     Rofecoxib Unknown     Vioxx      Increased BP       FAMILY HISTORY  Family History   Problem Relation Age of Onset     Alzheimer Disease Mother          of panc/GI ca age 83     Osteoporosis Mother      Other Cancer Mother      Cancer Father          age 64 lymphoma     Colon Cancer Maternal Grandfather        SOCIAL HISTORY  Social History     Socioeconomic History     Marital status:      Spouse name: Not on file     Number of children: Not on file     Years of education: Not on file     Highest education level: Not on file   Occupational History     Not on file   Tobacco Use     Smoking status: Every Day     Packs/day: 0.50     Years: 50.00     Pack years: 25.00     Types: Cigarettes     Smokeless tobacco: Never     Tobacco comments:     Nicorette gum and patch, trying to quit    Vaping Use     Vaping Use: Some days   Substance and Sexual Activity     Alcohol use: Yes     Comment: Beer once in a while     Drug use: No     Sexual activity: Not Currently     Partners: Male   Other Topics Concern     Parent/sibling w/ CABG, MI or angioplasty before 65F 55M? 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  Had 3 miscarriages.  No living children.   is [place].  Walks 2miles per day.       ROS:   Complete ROS negative other than what is stated in HPI.     EXAM:  /72 (BP Location: Right arm, Patient Position: Chair, Cuff Size: Adult Regular)   Pulse 69   Ht 5' 4.5\" (1.638 m)   Wt 126 lb 6.4 oz (57.3 kg)   SpO2 98%   BMI 21.36 kg/m    In general, the patient is a pleasant female in no apparent distress.    HEENT: NC/AT.  PERRLA.  EOMI.  Sclerae white, not injected.    Heart: RRR. Normal S1, S2 splits physiologically. No murmur, rub, click, " or gallop.   Lungs: CTA.  No ronchi, wheezes, rales.    Abdomen: Soft, nontender, nondistended.   Extremities: No clubbing, cyanosis, or edema.      Labs:  LIPID RESULTS:  Lab Results   Component Value Date    CHOL 138 04/06/2023    CHOL 142 12/17/2020    HDL 57 04/06/2023    HDL 50 12/17/2020    LDL 65 04/06/2023    LDL 78 12/17/2020    TRIG 82 04/06/2023    TRIG 70 12/17/2020    CHOLHDLRATIO 2.1 11/07/2014       LIVER ENZYME RESULTS:  Lab Results   Component Value Date    AST 60 (H) 02/28/2023    AST 23 06/01/2021    ALT 51 (H) 02/28/2023    ALT 31 06/01/2021       CBC RESULTS:  Lab Results   Component Value Date    WBC 31.5 (H) 02/28/2023    WBC 17.7 (H) 06/01/2021    RBC 4.78 02/28/2023    RBC 5.01 06/01/2021    HGB 14.6 02/28/2023    HGB 15.9 (H) 06/01/2021    HCT 46.0 02/28/2023    HCT 47.2 (H) 06/01/2021    MCV 96 02/28/2023    MCV 94 06/01/2021    MCH 30.5 02/28/2023    MCH 31.7 06/01/2021    MCHC 31.7 02/28/2023    MCHC 33.7 06/01/2021    RDW 14.3 02/28/2023    RDW 13.2 06/01/2021     02/28/2023     06/01/2021       BMP RESULTS:  Lab Results   Component Value Date     02/28/2023     06/01/2021    POTASSIUM 4.0 02/28/2023    POTASSIUM 3.3 (L) 11/19/2022    POTASSIUM 3.1 (L) 06/01/2021    CHLORIDE 103 02/28/2023    CHLORIDE 100 11/19/2022    CHLORIDE 101 06/01/2021    CO2 28 02/28/2023    CO2 33 (H) 11/19/2022    CO2 35 (H) 06/01/2021    ANIONGAP 12 02/28/2023    ANIONGAP 5 11/19/2022    ANIONGAP 3 06/01/2021     (H) 02/28/2023    GLC 91 11/19/2022    GLC 81 11/19/2022     (H) 06/01/2021    BUN 10.2 02/28/2023    BUN 9 11/19/2022    BUN 10 06/01/2021    CR 0.65 02/28/2023    CR 0.71 06/01/2021    GFRESTIMATED >90 02/28/2023    GFRESTIMATED >60 04/21/2022    GFRESTIMATED 87 06/01/2021    GFRESTBLACK >90 06/01/2021    CHANDAN 9.6 02/28/2023    CHANDAN 9.1 06/01/2021        A1C RESULTS:  Lab Results   Component Value Date    A1C 5.4 01/04/2023    A1C 5.2 12/16/2011       THYROID  RESULTS:  Lab Results   Component Value Date    TSH 1.38 12/08/2017         Procedures:   CTA ABDOMEN PELVIS BILATERAL LEG RUNOFF WITH CONTRAST  10/11/2022  10:03 AM      HISTORY:  Peripheral arterial disease. Status post aorto right iliac  and left femoral bypass graft. Status post recanalization and stenting  of the right external iliac artery on 11/23/2009. Patient now  presenting with right buttock claudication.     COMPARISON: Ankle-brachial indices dated 11/23/2009.     TECHNIQUE: CT angiogram of the abdomen and pelvis with bilateral lower  extremity runoff was performed following the administration of 100 cc  intravenous contrast. Images are viewed in multiple planes and 3-D  reconstructions were also performed. Radiation dose for this scan was  reduced using automated exposure control, adjustment of the mA and/or  kV according to patient size, or iterative reconstruction technique.     FINDINGS:   Vascular exam:  Abdominal aorta: The abdominal aorta is of normal caliber without  aneurysmal dilatation or significant stenosis. The celiac trunk,  superior mesenteric artery, renal arteries, and inferior mesenteric  artery are patent without significant stenoses. There is a direct  origin of the left gastric artery from the abdominal aorta. This is  patent without significant stenosis.     An aorto right iliac and left femoral bypass graft is patent without  significant stenosis.     Iliac arteries: There is a stent which extends from the right limb of  the bypass graft into the distal right external iliac artery. The  stent is patent. There are areas of intimal hyperplasia within the  stent. These contribute to a significant stenosis in the proximal  stent and a moderate stenosis in the mid distal stent.     Right lower extremity angiogram:  Common femoral artery: No significant stenosis.  Profunda femoris artery: No significant stenosis.  Superficial femoral artery: No significant stenosis.  Popliteal artery:  No significant stenosis.  Tibial arteries: Three-vessel runoff.     Left lower extremity angiogram:  Common femoral artery: No significant stenosis.  Profunda femoris artery: No significant stenosis.  Superficial femoral artery: No significant stenosis.  Tibial arteries: Three-vessel runoff.     Soft tissue exam: The lung bases are clear.     Again identified is a 1.4 cm cystic lesion in the head of the  pancreas. The remaining solid organs in the abdomen are unremarkable.     The bowel appears grossly unremarkable. There are a few prominent  retroperitoneal lymph nodes. These are not significantly changed.     Again identified are cystic lesions in the adnexa bilaterally.                                                                      IMPRESSION:  1. Significant stenosis in the proximal aspects of the stents  extending from the right limb of the aorto right iliac bypass graft  into the distal right external iliac artery. Further evaluation and  intervention with angiography could be performed.  2. Stable cystic lesion at the head of the pancreas.  3. Stable cystic lesions in the adnexa laterally.       BILATERAL CAROTID ULTRASOUND October 11, 2022 9:21 AM      HISTORY: PAD, history of carotid stenosis. Asymptomatic. PAD  (peripheral artery disease) (H). Carotid stenosis, asymptomatic,  bilateral.     COMPARISON: November 21, 2012.     RIGHT CAROTID FINDINGS: There is mild atherosclerotic plaque at the  carotid bifurcation and proximal internal carotid artery with  relatively smooth margins.  Right ICA PSV:  77  cm/sec.  Right ICA EDV:  23 cm/sec.  Right ICA/CCA PSV Ratio:  1.43.    These indicate less than 50% diameter stenosis of the right ICA.    Right Vertebral: Antegrade flow.   Right ECA: Antegrade flow.      LEFT CAROTID FINDINGS: There is mild atherosclerotic plaque in the  carotid bifurcation and proximal internal carotid artery.  Left ICA PSV:  108  cm/sec.  Left ICA EDV:  26 cm/sec.  Left ICA/CCA PSV  Ratio:  1.08.    These indicate less than 50% diameter stenosis of the left ICA.    Left Vertebral: Antegrade flow.   Left ECA: Antegrade flow.      Causes of Decreased Accuracy: None.                                                                       IMPRESSION:    1. Less than 50% diameter stenosis of the right internal carotid  artery relative to the distal internal carotid artery diameter.   2. Less than 50% diameter stenosis of the left internal carotid artery  relative to the distal internal carotid artery' diameter.       Assessment and Plan:     1. Peripheral arterial disease with history of:   -2002 Aorto/right common iliac/left common femoral bypass by Dr. Turner  -11/2009 Right iliac artery angioplasty and stenting by Dr. Trejo  -12/2011  Embolic occlusion right distal popliteal and tibial arteries s/p thromboembolectomy by Dr. Ryan    Now s/p stent grafting of proximal aspect of right limb of aortobifemoral bypass using 9 mm x 29 mm balloon expandable GORE VBX stent graft and grafting of mid right external iliac artery using 8 mm x 59 mm balloon expandable GORE VBX stent graft 11/18/22 by Dr. Tran       -LDL had been elevated/not at goal of less than 70 for at least the last 4 years.   -Continues to smoke.   -No imaging since 2013 with increasing right buttock claudication in 2022. Denies rest pain or non-healing wounds. AZALEA's decreased in right leg post-exercise. CTA with severe stenosis in the proximal aspects of the stents extending from the right limb of the aorto right iliac bypass graft into the distal right external iliac artery, resulting in above procedure to be performed on 11/18/22.      Recommendations:   -Follow-up with Dr. Tran as scheduled.  -She must stop smoking and needs good control of her cholesterol.   -Walk as much as able.   -Continue Brilinta (can't tolerate Plavix) and aspirin. Needs Brilinta for at least one year. She needs surgery on the 5th digit of her right hand  due to dislocation and ongoing pain. Discussed with Dr. Tran, she can safely hold Brilinta for one week prior to hand surgery after May 18th (6 months of being on it uninterrupted).         2. Hyperlipidemia     -LDL goal of less than 70 given history of vascular disease-> not at goal for at least the past 4 years when on Atorvastatin 40 mg daily.   -Switched to Crestor 40 mg daily in 11/2022, but LDL was still 82 in 1/2023. Therefore, she was started also on Zetia 10 mg daily per PCP. She developed significant stomach pain and nausea so Crestor was stopped in 3/2023 and she was switched back to Atorvastatin 40 mg daily. She remains on Zetia as well.   -LDL now at goal at 65.      Recommendations:   -Continue Atorvastatin 40 mg daily and Zetia 10 mg daily.         3. Hypertension     -BP at goal.      Recommendations:   -Continue the same.         4. Ongoing tobacco use     -Unable to tolerate Chantix.   -Feels ready to quit.      Recommendations:   -Discussed the importance of complete smoking cessation.   -Did send in Wellbutrin and nicotine patchesprescriptions to her pharmacy in 11/2022. She is electing to try nicorette gum now instead and continuing on trying to cut back and quit.          5. Asymptomatic bilateral carotid stenosis     -She had <50% stenosis bilaterally in 2012 and again in 10/2022.      Recommendations:  -Given that she is a vasculopath with ongoing tobacco use, repeat imaging in 1-2 years.         Kaila Navarro PA-C      30 minutes spent on the date of the encounter doing chart review, history and exam, documentation, and further activities as noted above.

## 2023-05-03 ENCOUNTER — LAB (OUTPATIENT)
Dept: LAB | Facility: CLINIC | Age: 73
End: 2023-05-03
Payer: COMMERCIAL

## 2023-05-03 DIAGNOSIS — R79.89 ELEVATED LFTS: ICD-10-CM

## 2023-05-03 LAB
ALT SERPL W P-5'-P-CCNC: 26 U/L (ref 10–35)
AST SERPL W P-5'-P-CCNC: 39 U/L (ref 10–35)

## 2023-05-03 PROCEDURE — 36415 COLL VENOUS BLD VENIPUNCTURE: CPT

## 2023-05-03 PROCEDURE — 84460 ALANINE AMINO (ALT) (SGPT): CPT

## 2023-05-03 PROCEDURE — 84450 TRANSFERASE (AST) (SGOT): CPT

## 2023-05-22 ENCOUNTER — OFFICE VISIT (OUTPATIENT)
Dept: FAMILY MEDICINE | Facility: CLINIC | Age: 73
End: 2023-05-22
Payer: COMMERCIAL

## 2023-05-22 VITALS
BODY MASS INDEX: 20.96 KG/M2 | HEART RATE: 74 BPM | WEIGHT: 124 LBS | TEMPERATURE: 97.7 F | RESPIRATION RATE: 20 BRPM | OXYGEN SATURATION: 97 % | DIASTOLIC BLOOD PRESSURE: 63 MMHG | SYSTOLIC BLOOD PRESSURE: 109 MMHG

## 2023-05-22 DIAGNOSIS — I10 HTN (HYPERTENSION), BENIGN: ICD-10-CM

## 2023-05-22 DIAGNOSIS — I73.9 PAD (PERIPHERAL ARTERY DISEASE) (H): ICD-10-CM

## 2023-05-22 DIAGNOSIS — C91.10 CLL (CHRONIC LYMPHOCYTIC LEUKEMIA) (H): Chronic | ICD-10-CM

## 2023-05-22 DIAGNOSIS — S63.276A CLOSED DISLOCATION OF INTERPHALANGEAL JOINT OF RIGHT LITTLE FINGER: ICD-10-CM

## 2023-05-22 DIAGNOSIS — E78.5 HYPERLIPIDEMIA WITH TARGET LDL LESS THAN 100: ICD-10-CM

## 2023-05-22 DIAGNOSIS — Z01.818 PRE-OPERATIVE GENERAL PHYSICAL EXAMINATION: Primary | ICD-10-CM

## 2023-05-22 LAB
ALBUMIN SERPL BCG-MCNC: 4.4 G/DL (ref 3.5–5.2)
ALP SERPL-CCNC: 75 U/L (ref 35–104)
ALT SERPL W P-5'-P-CCNC: 26 U/L (ref 10–35)
ANION GAP SERPL CALCULATED.3IONS-SCNC: 11 MMOL/L (ref 7–15)
AST SERPL W P-5'-P-CCNC: 34 U/L (ref 10–35)
BILIRUB SERPL-MCNC: 0.4 MG/DL
BUN SERPL-MCNC: 14.9 MG/DL (ref 8–23)
CALCIUM SERPL-MCNC: 9.4 MG/DL (ref 8.8–10.2)
CHLORIDE SERPL-SCNC: 105 MMOL/L (ref 98–107)
CREAT SERPL-MCNC: 0.69 MG/DL (ref 0.51–0.95)
DEPRECATED HCO3 PLAS-SCNC: 28 MMOL/L (ref 22–29)
GFR SERPL CREATININE-BSD FRML MDRD: >90 ML/MIN/1.73M2
GLUCOSE SERPL-MCNC: 92 MG/DL (ref 70–99)
POTASSIUM SERPL-SCNC: 3.4 MMOL/L (ref 3.4–5.3)
PROT SERPL-MCNC: 6.3 G/DL (ref 6.4–8.3)
SODIUM SERPL-SCNC: 144 MMOL/L (ref 136–145)

## 2023-05-22 PROCEDURE — 99214 OFFICE O/P EST MOD 30 MIN: CPT | Performed by: INTERNAL MEDICINE

## 2023-05-22 PROCEDURE — 93000 ELECTROCARDIOGRAM COMPLETE: CPT | Performed by: INTERNAL MEDICINE

## 2023-05-22 PROCEDURE — 80053 COMPREHEN METABOLIC PANEL: CPT | Performed by: INTERNAL MEDICINE

## 2023-05-22 PROCEDURE — 36415 COLL VENOUS BLD VENIPUNCTURE: CPT | Performed by: INTERNAL MEDICINE

## 2023-05-22 ASSESSMENT — PAIN SCALES - GENERAL: PAINLEVEL: SEVERE PAIN (6)

## 2023-05-22 NOTE — PROGRESS NOTES
[address], [address]  [facility] [address]  Phone: [phone]  Primary Provider: Jf Lui  Pre-op Performing Provider: JF LUI      PREOPERATIVE EVALUATION:  Today's date: 5/22/2023    Karen Caban is a 72 year old female who presents for a preoperative evaluation.       View : No data to display.              Surgical Information:  Surgery/Procedure: reattach ligament or tendon  Surgery Location: [facility] [place] [facility]   Surgeon: Lissett Jacobs md  Surgery Date: 6/5/2023  Time of Surgery: June 5TH 2023  Where patient plans to recover: At home with family  Fax number for surgical facility:[phone]       Assessment & Plan     The proposed surgical procedure is considered INTERMEDIATE risk.    Problem List Items Addressed This Visit        Endocrine    Hyperlipidemia with target LDL less than 100       Circulatory    HTN (hypertension), benign    PAD (peripheral artery disease) (H)       Hematologic    CLL (chronic lymphocytic leukemia) (H) (Chronic)   Other Visit Diagnoses     Pre-operative general physical examination    -  Primary    Relevant Orders    EKG 12-lead complete w/read - Clinics (Completed)    Comprehensive metabolic panel (BMP + Alb, Alk Phos, ALT, AST, Total. Bili, TP)    Closed dislocation of interphalangeal joint of right little finger                     Risks and Recommendations:  The patient has the following additional risks and recommendations for perioperative complications:  Cardiovascular:   -    Antiplatelet or Anticoagulation Medication Instructions:   - clopidrogel (Plavix), prasugrel (Effient), ticagrelor (Brilinta): No contraindication to stopping Plavix, HOLD 5-7 days before surgery.     Additional Medication Instructions:  Patient is to take all scheduled medications on the day of surgery    RECOMMENDATION:  APPROVAL GIVEN to proceed with proposed procedure pending review of diagnostic  evaluation.          Subjective       HPI related to upcoming procedure: RIGHT FIFTH FINGER    NO DYSPNEA ON EXERTION,   NO CP WITH EXERTION,   NO PAIN IN LEGS/CLAUDICATION    Us aorta:  Impression:  1. Patent aorto bi iliac bypass graft with patent stents within the  right limb of the bypass.   2. Patent right external iliac artery stent grafts.        5/15/2023    11:48 AM   Preop Questions   1. Have you ever had a heart attack or stroke? No   2. Have you ever had surgery on your heart or blood vessels, such as a stent placement, a coronary artery bypass, or surgery on an artery in your head, neck, heart, or legs? YES -    3. Do you have chest pain with activity? No   4. Do you have a history of  heart failure? No   5. Do you currently have a cold, bronchitis or symptoms of other infection? No   6. Do you have a cough, shortness of breath, or wheezing? No   7. Do you or anyone in your family have previous history of blood clots? No   8. Do you or does anyone in your family have a serious bleeding problem such as prolonged bleeding following surgeries or cuts? No   9. Have you ever had problems with anemia or been told to take iron pills? No   10. Have you had any abnormal blood loss such as black, tarry or bloody stools, or abnormal vaginal bleeding? No   11. Have you ever had a blood transfusion? No   12. Are you willing to have a blood transfusion if it is medically needed before, during, or after your surgery? Yes   13. Have you or any of your relatives ever had problems with anesthesia? No   14. Do you have sleep apnea, excessive snoring or daytime drowsiness? No   15. Do you have any artifical heart valves or other implanted medical devices like a pacemaker, defibrillator, or continuous glucose monitor? No   16. Do you have artificial joints? No   17. Are you allergic to latex? No       Health Care Directive:  Patient does not have a Health Care Directive or Living Will: Patient states has Advance Directive  and will bring in a copy to clinic.    Preoperative Review of :   reviewed - no record of controlled substances prescribed.      Status of Chronic Conditions:  CLL- stable follows with oncology    PVD - Patient has a longstanding history of moderate-severe PVD. Patient denies recent chest pain or NTG use, denies exercise induced dyspnea or PND. Last Stress test 10/5/2022, EKG 5/22/2023.     HYPERLIPIDEMIA - Patient has a long history of significant Hyperlipidemia requiring medication for treatment with recent good control. Patient reports no problems or side effects with the medication.     HYPERTENSION - Patient has longstanding history of HTN , currently denies any symptoms referable to elevated blood pressure. Specifically denies chest pain, palpitations, dyspnea, orthopnea, PND or peripheral edema. Blood pressure readings have been in normal range. Current medication regimen is as listed below. Patient denies any side effects of medication.       Review of Systems  Constitutional, neuro, ENT, endocrine, pulmonary, cardiac, gastrointestinal, genitourinary, musculoskeletal, integument and psychiatric systems are negative, except as otherwise noted.    Patient Active Problem List    Diagnosis Date Noted     Pulmonary emphysema, unspecified emphysema type (H) 01/12/2023     Priority: Medium     Iliac artery stenosis, right (H) 11/18/2022     Priority: Medium     CLL (chronic lymphocytic leukemia) (H) 08/29/2022     Priority: Medium     her CLL is stable. The patient has Webb stage 0 chronic lymphocytic leukemia.  No indication for treatment.       Polycythemia vera (H) 06/15/2022     Priority: Medium     Abnormal CT lung screening 01/10/2019     Priority: Medium     Currently managed with follow up imaging by [facility] result Team.         Gastroesophageal reflux disease without esophagitis 12/08/2015     Priority: Medium     Anxiety      Priority: Medium     Patient is followed by JOSELITO BALDERAS  GIULIANA for ongoing prescription of benzodiazepines.  All refills should be approved by this provider, or covering partner.    Medication(s): Lorazepam.   Maximum quantity per month: 20  Clinic visit frequency required:  Q6months    Benzodiazepine use reviewed by psychiatry:  No  CSA: 1/22/14    Last [facility] website verification: 09/25/2020 [place]    [email or web address]         Controlled substance agreement signed on 1- with Lyudmila Luz MORFIN 01/22/2014     Priority: Medium     GERD (gastroesophageal reflux disease)      Priority: Medium     Colon polyp      Priority: Medium     repeat colonoscopy 5 years.       Advanced directives, counseling/discussion 11/26/2012     Priority: Medium     Received outside advance directive.  HCD:Previously signed by patient and notarized by [employer].  scanned into EMR as Advance Directive/Living Will document. View document and details in Code Status History Report. Please see advance directive for specifics.   Health Care Directive reviewed and documented.  11/26/12 HENRI Pacheco LPN         Vitamin D deficiency      Priority: Medium     Osteopenia      Priority: Medium     HTN (hypertension), benign      Priority: Medium     Hyperlipidemia with target LDL less than 100      Priority: Medium     Diagnosis updated by automated process. Provider to review and confirm.       Fx low femur epiphy-closed (H)      Priority: Medium     Chronic back pain      Priority: Medium     PAD (peripheral artery disease) (H)      Priority: Medium     PUD (peptic ulcer disease)      Priority: Medium     DU       Normal nuclear stress test      Priority: Medium     EF 67%       Smoker      Priority: Medium      Past Medical History:   Diagnosis Date     Ankle fracture 2009    L     Anxiety      Chronic back pain      Chronic lymphocytic leukemia (H)      Colon polyp 2012    repeat colonoscopy 5 years.     Fx low femur epiphy-closed (H)      GERD (gastroesophageal reflux disease)       History of UTI 2017    Cysto by Dr Agustin neg     HTN (hypertension), benign      Hyperlipidemia LDL goal < 100      Normal nuclear stress test 12/2009    EF 67%     Osteopenia      PAD (peripheral artery disease) (H)     s/p fem pop bypass; embolectomy     Polycythemia vera (H) 06/15/2022     PONV (postoperative nausea and vomiting)      PUD (peptic ulcer disease) 1980s    DU     Pulmonary emphysema, unspecified emphysema type (H) 1/12/2023     Smoker      Vitamin D deficiency      Past Surgical History:   Procedure Laterality Date     ANGIOGRAM Right 11/18/2022    Procedure: ANGIOGRAM AORTO ILIAC ANGIOGRAM;  Surgeon: Shaun Tran MD;  Location:  OR     Blood clot removal from Stent      2011     BONE MARROW BIOPSY, BONE SPECIMEN, NEEDLE/TROCAR N/A 02/02/2021    Procedure: bone marrow biopsy;  Surgeon: Kailey Cota MD;  Location:  GI     BYPASS GRAFT AORTOFEMORAL  05/2002    Dr. Turner     BYPASS GRAFT FEMOROPOPLITEAL  12/16/2011     COLONOSCOPY N/A 01/18/2018    Procedure: COMBINED COLONOSCOPY, SINGLE OR MULTIPLE BIOPSY/POLYPECTOMY BY BIOPSY;  COLONOSCOPY;  Surgeon: Efrain Hernadez MD;  Location:  GI     COLONOSCOPY N/A 3/29/2023    Procedure: COLONOSCOPY, FLEXIBLE, WITH LESION REMOVAL USING SNARE;  Surgeon: Jony Manriquez MD;  Location:  GI     DILATION AND CURETTAGE, OPERATIVE HYSTEROSCOPY, COMBINED N/A 09/15/2022    Procedure: HYSTEROSCOPY, WITH DILATION AND CURETTAGE OF UTERUS;  Surgeon: Destiney Schaefer MD;  Location:  OR     EMBOLECTOMY LOWER EXTREMITY  12/16/2011    Procedure:EMBOLECTOMY LOWER EXTREMITY; EMBOLECTOMY, RIGHT POPLITEAL WITH PATCH ANGIOPLASTY. EXCISION SKIN LESION RIGHT LEG. ; Surgeon:PARMINDER VELAZCO; Location: OR     ENDARTERECTOMY FEMORAL Right 11/18/2022    Procedure: ENDARTERECTOMY, FEMORAL RIGHT FEMORAL CUTDOWN, RIGHT ILIAC ARTERY STENTING.;  Surgeon: Shaun Tran MD;  Location:  OR     ESOPHAGOSCOPY, GASTROSCOPY,  DUODENOSCOPY (EGD), COMBINED N/A 10/07/2022    Procedure: ESOPHAGOGASTRODUODENOSCOPY, WITH BIOPSY;  Surgeon: Felix Carmona MD;  Location: [facility] GI     EXCISE LESION LOWER EXTREMITY  12/16/2011    Procedure:EXCISE LESION LOWER EXTREMITY; Surgeon:PARMINDER VELAZCO; Location:[facility] OR     HERNIA REPAIR  11/04/2008    x2 umbilical     IR OR ANGIOGRAM  11/18/2022     L achilles repair  12/04/2008     LAPAROSCOPIC OOPHORECTOMY Left     L for cyst age 26     miscarriages x 3       tubal ligation and reversal       Current Outpatient Medications   Medication Sig Dispense Refill     albuterol (PROAIR HFA/PROVENTIL HFA/VENTOLIN HFA) 108 (90 Base) MCG/ACT inhaler Inhale 1-2 puffs into the lungs every 6 hours as needed for shortness of breath, wheezing or cough       aspirin (ASA) 81 MG chewable tablet Take 1 tablet (81 mg) by mouth daily 90 tablet 11     atorvastatin (LIPITOR) 40 MG tablet Take 1 tablet (40 mg) by mouth daily 90 tablet 3     benzonatate (TESSALON) 100 MG capsule Take 100 mg by mouth 3 times daily as needed       chlorthalidone (HYGROTON) 25 MG tablet 1 tablet once daily (decrease dose as potassium is low) 90 tablet 0     Cholecalciferol (VITAMIN D-3) 5000 UNIT TABS Take 1 tablet by mouth daily.       coenzyme Q-10 200 MG CAPS Take 200 mg by mouth daily       Cranberry-Vitamin C-Vitamin E (CRANBERRY PLUS VITAMIN C) 4200-20-3 MG-MG-UNIT CAPS Take 1 tablet by mouth daily       Cyanocobalamin (B-12 PO) Take 5,000 mcg by mouth every other day Liquid form 100 tablet 1     ezetimibe (ZETIA) 10 MG tablet Take 1 tablet (10 mg) by mouth daily 90 tablet 1     fluticasone (FLONASE) 50 MCG/ACT nasal spray Spray 1-2 sprays in nostril daily       Lactobacillus (PROBIOTIC ACIDOPHILUS PO) Take 1 capsule by mouth daily       metoprolol succinate ER (TOPROL XL) 100 MG 24 hr tablet Take 1.5 tablets (150 mg) by mouth daily (1.5 x 100 mg = 150 mg) 135 tablet 0     nitroGLYcerin (NITROSTAT) 0.4 MG sublingual tablet For chest pain  "place 1 tablet under the tongue every 5 minutes for 3 doses. If symptoms persist 5 minutes after 1st dose call 911. 20 tablet 0     omeprazole (PRILOSEC) 40 MG DR capsule Take 1 capsule (40 mg) by mouth 2 times daily TAKE 1 CAPSULE BY MOUTH EVERY DAY 30 TO 60 MINUTES BEFORE A MEAL (Patient taking differently: Take 40 mg by mouth daily TAKE 1 CAPSULE BY MOUTH EVERY DAY 30 TO 60 MINUTES BEFORE A MEAL) 90 capsule 0     potassium chloride ER (KLOR-CON M) 20 MEQ CR tablet TAKE 1 TABLET BY MOUTH 2-3 TIMES PER WEEK 30 tablet 1     Sodium Chloride-Xylitol (XLEAR SINUS CARE SPRAY NA) Spray 1 spray in nostril daily as needed       ticagrelor (BRILINTA) 60 MG tablet Take 1 tablet (60 mg) by mouth daily 90 tablet 3       Allergies   Allergen Reactions     Chantix [Varenicline] Nausea     Ciprofloxacin Other (See Comments)     hypertension     Clopidogrel      Other reaction(s): Hypertension     Decongestant [Pseudoephedrine Hcl Er]      Erythromycin Nausea     Lisinopril      cough     Plavix [Clopidogrel Bisulfate]      Felt as if she was having a heart attack     Rofecoxib Unknown     Vioxx      Increased BP        Social History     Tobacco Use     Smoking status: Every Day     Packs/day: 0.25     Years: 50.00     Pack years: 12.50     Types: Cigarettes     Smokeless tobacco: Never     Tobacco comments:     Nicotine patch and a cigarette \"once in awhile.\"   Vaping Use     Vaping status: Some Days   Substance Use Topics     Alcohol use: Yes     Comment: Beer once in a while     Family History   Problem Relation Age of Onset     Alzheimer Disease Mother          of panc/GI ca age 83     Osteoporosis Mother      Other Cancer Mother      Cancer Father          age 64 lymphoma     Colon Cancer Maternal Grandfather      History   Drug Use No         Objective     /63 (BP Location: Right arm, Patient Position: Sitting, Cuff Size: Adult Regular)   Pulse 74   Temp 97.7  F (36.5  C) (Temporal)   Resp 20   Wt 56.2 kg " (124 lb)   SpO2 97%   BMI 20.96 kg/m      Physical Exam    GENERAL APPEARANCE: healthy, alert and no distress     EYES: EOMI, PERRL     HENT: ear canals and TM's normal and nose and mouth without ulcers or lesions     NECK: no adenopathy, no asymmetry, masses, or scars and thyroid normal to palpation     RESP: lungs clear to auscultation - no rales, rhonchi or wheezes     CV: regular rates and rhythm, normal S1 S2, no S3 or S4 and no murmur, click or rub     ABDOMEN:  soft, nontender, no HSM or masses and bowel sounds normal     MS: extremities normal- no gross deformities noted, no evidence of inflammation in joints, FROM in all extremities.     SKIN: no suspicious lesions or rashes     NEURO: Normal strength and tone, sensory exam grossly normal, mentation intact and speech normal     PSYCH: mentation appears normal. and affect normal/bright     LYMPHATICS: No cervical adenopathy    Recent Labs   Lab Test 02/28/23  0726 01/04/23  1018 12/01/22  0851   HGB 14.6  --  14.4     --  256    141  --    POTASSIUM 4.0 3.7  --    CR 0.65 0.60  --    A1C  --  5.4  --         Diagnostics:  No results found for this or any previous visit (from the past 24 hour(s)).   EKG required for peripheral arterial disease and not completed in the last 90 days.     Revised Cardiac Risk Index (RCRI):  The patient has the following serious cardiovascular risks for perioperative complications:   - No serious cardiac risks = 0 points     RCRI Interpretation: 0 points: Class I (very low risk - 0.4% complication rate)           Signed Electronically by: Liane Claudio MD  Copy of this evaluation report is provided to requesting physician.

## 2023-05-28 DIAGNOSIS — E78.5 HYPERLIPIDEMIA WITH TARGET LDL LESS THAN 100: ICD-10-CM

## 2023-05-30 RX ORDER — EZETIMIBE 10 MG/1
TABLET ORAL
Qty: 90 TABLET | Refills: 3 | Status: SHIPPED | OUTPATIENT
Start: 2023-05-30 | End: 2024-05-07

## 2023-06-02 ENCOUNTER — LAB (OUTPATIENT)
Dept: INFUSION THERAPY | Facility: CLINIC | Age: 73
End: 2023-06-02
Attending: INTERNAL MEDICINE
Payer: COMMERCIAL

## 2023-06-02 DIAGNOSIS — C91.10 CLL (CHRONIC LYMPHOCYTIC LEUKEMIA) (H): Primary | ICD-10-CM

## 2023-06-02 LAB
BASOPHILS # BLD MANUAL: 0 10E3/UL (ref 0–0.2)
BASOPHILS NFR BLD MANUAL: 0 %
EOSINOPHIL # BLD MANUAL: 0 10E3/UL (ref 0–0.7)
EOSINOPHIL NFR BLD MANUAL: 0 %
ERYTHROCYTE [DISTWIDTH] IN BLOOD BY AUTOMATED COUNT: 14.2 % (ref 10–15)
HCT VFR BLD AUTO: 45.4 % (ref 35–47)
HGB BLD-MCNC: 14.8 G/DL (ref 11.7–15.7)
LYMPHOCYTES # BLD MANUAL: 34.4 10E3/UL (ref 0.8–5.3)
LYMPHOCYTES NFR BLD MANUAL: 84 %
MCH RBC QN AUTO: 29.4 PG (ref 26.5–33)
MCHC RBC AUTO-ENTMCNC: 32.6 G/DL (ref 31.5–36.5)
MCV RBC AUTO: 90 FL (ref 78–100)
MONOCYTES # BLD MANUAL: 0.8 10E3/UL (ref 0–1.3)
MONOCYTES NFR BLD MANUAL: 2 %
NEUTROPHILS # BLD MANUAL: 5.7 10E3/UL (ref 1.6–8.3)
NEUTROPHILS NFR BLD MANUAL: 14 %
PLAT MORPH BLD: ABNORMAL
PLATELET # BLD AUTO: 233 10E3/UL (ref 150–450)
RBC # BLD AUTO: 5.04 10E6/UL (ref 3.8–5.2)
RBC MORPH BLD: ABNORMAL
SMUDGE CELLS BLD QL SMEAR: PRESENT
WBC # BLD AUTO: 40.9 10E3/UL (ref 4–11)

## 2023-06-02 PROCEDURE — 85027 COMPLETE CBC AUTOMATED: CPT | Performed by: INTERNAL MEDICINE

## 2023-06-02 PROCEDURE — 85007 BL SMEAR W/DIFF WBC COUNT: CPT | Performed by: INTERNAL MEDICINE

## 2023-06-02 PROCEDURE — 36415 COLL VENOUS BLD VENIPUNCTURE: CPT

## 2023-06-02 NOTE — PROGRESS NOTES
Medical Assistant Note:  Karen Caban presents today for blood draw.    Patient seen by provider today: No.   present during visit today: Not Applicable.    Concerns: No Concerns.    Procedure:  Lab draw site: rac, Needle type: bf, Gauge: 23.    Post Assessment:  Labs drawn without difficulty: Yes.    Discharge Plan:  Departure Mode: Ambulatory.    Face to Face Time: 5 min.    Jamaica Bruno, CMA

## 2023-06-03 NOTE — RESULT ENCOUNTER NOTE
Dear Ms. Arsh,    CBC reveals increase in WBC to 40.9. Normal hemoglobin and platelet.    Please, call me with any questions.    Sancho Osorio MD

## 2023-06-05 DIAGNOSIS — I10 HTN (HYPERTENSION), BENIGN: ICD-10-CM

## 2023-06-05 RX ORDER — METOPROLOL SUCCINATE 100 MG/1
TABLET, EXTENDED RELEASE ORAL
Qty: 135 TABLET | Refills: 0 | Status: SHIPPED | OUTPATIENT
Start: 2023-06-05 | End: 2023-07-21

## 2023-06-12 ENCOUNTER — ONCOLOGY VISIT (OUTPATIENT)
Dept: ONCOLOGY | Facility: CLINIC | Age: 73
End: 2023-06-12
Attending: INTERNAL MEDICINE
Payer: COMMERCIAL

## 2023-06-12 VITALS
BODY MASS INDEX: 20.56 KG/M2 | SYSTOLIC BLOOD PRESSURE: 144 MMHG | HEIGHT: 65 IN | OXYGEN SATURATION: 99 % | HEART RATE: 67 BPM | RESPIRATION RATE: 16 BRPM | DIASTOLIC BLOOD PRESSURE: 84 MMHG | WEIGHT: 123.4 LBS

## 2023-06-12 DIAGNOSIS — C91.10 CLL (CHRONIC LYMPHOCYTIC LEUKEMIA) (H): Primary | ICD-10-CM

## 2023-06-12 PROCEDURE — 99214 OFFICE O/P EST MOD 30 MIN: CPT | Performed by: INTERNAL MEDICINE

## 2023-06-12 PROCEDURE — G0463 HOSPITAL OUTPT CLINIC VISIT: HCPCS | Performed by: INTERNAL MEDICINE

## 2023-06-12 ASSESSMENT — PAIN SCALES - GENERAL: PAINLEVEL: NO PAIN (0)

## 2023-06-12 NOTE — PROGRESS NOTES
"Oncology Rooming Note    June 12, 2023 8:49 AM   Karen Caban is a 72 year old female who presents for:    Chief Complaint   Patient presents with     Oncology Clinic Visit     Initial Vitals: There were no vitals taken for this visit. Estimated body mass index is 20.96 kg/m  as calculated from the following:    Height as of 4/24/23: 1.638 m (5' 4.5\").    Weight as of 5/22/23: 56.2 kg (124 lb). There is no height or weight on file to calculate BSA.  Data Unavailable Comment: Data Unavailable   No LMP recorded. Patient is postmenopausal.  Allergies reviewed: Yes  Medications reviewed: Yes    Medications: Medication refills not needed today.  Pharmacy name entered into [employer]:    [id number] - [place] - [address] [facility] [place]  [place] - [address] [address]    Clinical concerns:  doctor was notified.      Petra Souza MA            "

## 2023-06-12 NOTE — PROGRESS NOTES
HEMATOLOGIC HISTORY:  Ms. Caban is a 71-year-old female with CLL.  1.  On 12/11/2019, WBC of 9.8.  -On 12/17/2020, WBC of 16.3.  -On 01/06/2021, WBC of 18.6, hemoglobin of 16.5 and platelet of 206.  Neutrophil of 19%, lymphocyte of 74%.  2.  Flow cytometry on peripheral blood on 01/06/2021 revealed CD5 positive kappa monotypic B cells with immunophenotype of CLL/SLL .  3.  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  CT chest, abdomen, and pelvis on 02/03/2021 did not reveal any lymphadenopathy or splenomegaly.     SUBJECTIVE: Ms. Caban is a 72-year-old female with Webb stage 0 chronic lymphocytic leukemia on observation.     She is a smoker.CT chest lung cancer screening on 01/24/2023 revealed 2 mm right apex nodule.       She is doing well. She is very active.  No headache.  No dizziness. No chest pain.  No shortness of breath.  No abdominal pain, nausea or vomiting.  Appetite is good.  No urinary or bowel complaints.  No recurrent infection. No night sweats. All other review of systems is negative.     PHYSICAL EXAMINATION:   GENERAL:  Alert and oriented x 3.   VITAL SIGNS:  Reviewed.  ECOG PS of 0.     EYES:  No icterus.   NECK:  Supple. No lymphadenopathy. No thyromegaly.   AXILLAE:  No lymphadenopathy.   LUNGS:  Good air entry bilaterally.  No crackles or wheezing.   HEART:  Regular.  No murmur.   GI: Abdomen is soft.  Nontender. No mass.   EXTREMITIES:  No pedal edema.  No calf swelling or tenderness.   SKIN:  No rash.      LABORATORY:  CBC and CMP reviewed.     ASSESSMENT:    1.  A 72-year-old female with Webb stage 0 chronic lymphocytic leukemia on observation. CLL is stable.  2.  Lung nodule.  3.  Smoker.     PLAN:    1.  Patient is doing well.  She is asymptomatic. CBC reviewed with her.   She has leukocytosis/lymphocytosis.  Normal hemoglobin and platelet.  Her leukocytosis/lymphocytosis has been slowly progressing.    Explained to the patient that overall CLL is stable.  Different indication for treatment reviewed.  Patient does not need any treatment at this time.    For CLL, I will see her in 6 months time with labs.    2.  Patient continues to smoke.  Advised her to quit smoking.    3.  She has lung nodule.  She needs follow-up CT chest in January 2024.  She will have it done through her PCP.    4.  She had few questions which were all answered.  I will see her in 6 months time.     TOTAL VISIT TIME:  30 minutes.  Time spent in today's visit, review of chart/investigations today and documentation.

## 2023-06-12 NOTE — LETTER
"    6/12/2023         RE: Karen Caban  [address]  [place] [address]        Dear Colleague,    Thank you for referring your patient, Karen Caban, to the [facility]. Please see a copy of my visit note below.    Oncology Rooming Note    June 12, 2023 8:49 AM   Karen Caban is a 72 year old female who presents for:    Chief Complaint   Patient presents with     Oncology Clinic Visit     Initial Vitals: There were no vitals taken for this visit. Estimated body mass index is 20.96 kg/m  as calculated from the following:    Height as of 4/24/23: 1.638 m (5' 4.5\").    Weight as of 5/22/23: 56.2 kg (124 lb). There is no height or weight on file to calculate BSA.  Data Unavailable Comment: Data Unavailable   No LMP recorded. Patient is postmenopausal.  Allergies reviewed: Yes  Medications reviewed: Yes    Medications: Medication refills not needed today.  Pharmacy name entered into Abzen[employer]:    [id number] [place] - [address] PHARMACY [facility] [place] - [address] [address]    Clinical concerns:  doctor was notified.      Petra Souza MA              HEMATOLOGIC HISTORY:  Ms. Caban is a 71-year-old female with CLL.  1.  On 12/11/2019, WBC of 9.8.  -On 12/17/2020, WBC of 16.3.  -On 01/06/2021, WBC of 18.6, hemoglobin of 16.5 and platelet of 206.  Neutrophil of 19%, lymphocyte of 74%.  2.  Flow cytometry on peripheral blood on 01/06/2021 revealed CD5 positive kappa monotypic B cells with immunophenotype of CLL/SLL .  3.  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  CT chest, abdomen, and pelvis on 02/03/2021 did not reveal any lymphadenopathy or splenomegaly.     SUBJECTIVE: Ms. " Arsh is a 72-year-old female with Webb stage 0 chronic lymphocytic leukemia on observation.     She is a smoker.CT chest lung cancer screening on 01/24/2023 revealed 2 mm right apex nodule.       She is doing well. She is very active.  No headache.  No dizziness. No chest pain.  No shortness of breath.  No abdominal pain, nausea or vomiting.  Appetite is good.  No urinary or bowel complaints.  No recurrent infection. No night sweats. All other review of systems is negative.     PHYSICAL EXAMINATION:   GENERAL:  Alert and oriented x 3.   VITAL SIGNS:  Reviewed.  ECOG PS of 0.     EYES:  No icterus.   NECK:  Supple. No lymphadenopathy. No thyromegaly.   AXILLAE:  No lymphadenopathy.   LUNGS:  Good air entry bilaterally.  No crackles or wheezing.   HEART:  Regular.  No murmur.   GI: Abdomen is soft.  Nontender. No mass.   EXTREMITIES:  No pedal edema.  No calf swelling or tenderness.   SKIN:  No rash.      LABORATORY:  CBC and CMP reviewed.     ASSESSMENT:    1.  A 72-year-old female with Webb stage 0 chronic lymphocytic leukemia on observation. CLL is stable.  2.  Lung nodule.  3.  Smoker.     PLAN:    1.  Patient is doing well.  She is asymptomatic. CBC reviewed with her.  She has leukocytosis/lymphocytosis.  Normal hemoglobin and platelet.  Her leukocytosis/lymphocytosis has been slowly progressing.    Explained to the patient that overall CLL is stable.  Different indication for treatment reviewed.  Patient does not need any treatment at this time.    For CLL, I will see her in 6 months time with labs.    2.  Patient continues to smoke.  Advised her to quit smoking.    3.  She has lung nodule.  She needs follow-up CT chest in January 2024.  She will have it done through her PCP.    4.  She had few questions which were all answered.  I will see her in 6 months time.     TOTAL VISIT TIME:  30 minutes.  Time spent in today's visit, review of chart/investigations today and documentation.      Again, thank you for  allowing me to participate in the care of your patient.        Sincerely,        Sancho Osorio MD

## 2023-07-02 ENCOUNTER — APPOINTMENT (OUTPATIENT)
Dept: GENERAL RADIOLOGY | Facility: CLINIC | Age: 73
End: 2023-07-02
Attending: STUDENT IN AN ORGANIZED HEALTH CARE EDUCATION/TRAINING PROGRAM
Payer: COMMERCIAL

## 2023-07-02 ENCOUNTER — HOSPITAL ENCOUNTER (EMERGENCY)
Facility: CLINIC | Age: 73
Discharge: HOME | End: 2023-07-02
Attending: EMERGENCY MEDICINE | Admitting: EMERGENCY MEDICINE
Payer: COMMERCIAL

## 2023-07-02 VITALS
OXYGEN SATURATION: 95 % | SYSTOLIC BLOOD PRESSURE: 130 MMHG | DIASTOLIC BLOOD PRESSURE: 78 MMHG | RESPIRATION RATE: 12 BRPM | HEART RATE: 69 BPM | TEMPERATURE: 97 F

## 2023-07-02 DIAGNOSIS — R07.9 CHEST PAIN: ICD-10-CM

## 2023-07-02 DIAGNOSIS — R12 HEARTBURN: ICD-10-CM

## 2023-07-02 LAB
ALBUMIN SERPL BCG-MCNC: 4.2 G/DL (ref 3.5–5.2)
ALP SERPL-CCNC: 81 U/L (ref 35–104)
ALT SERPL W P-5'-P-CCNC: 27 U/L (ref 0–50)
ANION GAP SERPL CALCULATED.3IONS-SCNC: 15 MMOL/L (ref 7–15)
AST SERPL W P-5'-P-CCNC: 32 U/L (ref 0–45)
ATRIAL RATE - MUSE: 81 BPM
BASOPHILS # BLD MANUAL: 0 10E3/UL (ref 0–0.2)
BASOPHILS NFR BLD MANUAL: 0 %
BILIRUB DIRECT SERPL-MCNC: <0.2 MG/DL (ref 0–0.3)
BILIRUB SERPL-MCNC: 0.6 MG/DL
BUN SERPL-MCNC: 10.4 MG/DL (ref 8–23)
CALCIUM SERPL-MCNC: 9.2 MG/DL (ref 8.8–10.2)
CHLORIDE SERPL-SCNC: 103 MMOL/L (ref 98–107)
CREAT SERPL-MCNC: 0.53 MG/DL (ref 0.51–0.95)
DEPRECATED HCO3 PLAS-SCNC: 24 MMOL/L (ref 22–29)
DIASTOLIC BLOOD PRESSURE - MUSE: NORMAL MMHG
EOSINOPHIL # BLD MANUAL: 0 10E3/UL (ref 0–0.7)
EOSINOPHIL NFR BLD MANUAL: 0 %
ERYTHROCYTE [DISTWIDTH] IN BLOOD BY AUTOMATED COUNT: 14.6 % (ref 10–15)
GFR SERPL CREATININE-BSD FRML MDRD: >90 ML/MIN/1.73M2
GLUCOSE SERPL-MCNC: 104 MG/DL (ref 70–99)
HCT VFR BLD AUTO: 46.4 % (ref 35–47)
HGB BLD-MCNC: 15.2 G/DL (ref 11.7–15.7)
HOLD SPECIMEN: NORMAL
HOLD SPECIMEN: NORMAL
INTERPRETATION ECG - MUSE: NORMAL
LIPASE SERPL-CCNC: 45 U/L (ref 13–60)
LYMPHOCYTES # BLD MANUAL: 41.4 10E3/UL (ref 0.8–5.3)
LYMPHOCYTES NFR BLD MANUAL: 90 %
MCH RBC QN AUTO: 29 PG (ref 26.5–33)
MCHC RBC AUTO-ENTMCNC: 32.8 G/DL (ref 31.5–36.5)
MCV RBC AUTO: 88 FL (ref 78–100)
MONOCYTES # BLD MANUAL: 0 10E3/UL (ref 0–1.3)
MONOCYTES NFR BLD MANUAL: 0 %
NEUTROPHILS # BLD MANUAL: 4.6 10E3/UL (ref 1.6–8.3)
NEUTROPHILS NFR BLD MANUAL: 10 %
P AXIS - MUSE: 70 DEGREES
PLAT MORPH BLD: ABNORMAL
PLATELET # BLD AUTO: 248 10E3/UL (ref 150–450)
POTASSIUM SERPL-SCNC: 3.3 MMOL/L (ref 3.4–5.3)
PR INTERVAL - MUSE: 190 MS
PROT SERPL-MCNC: 6.4 G/DL (ref 6.4–8.3)
QRS DURATION - MUSE: 80 MS
QT - MUSE: 396 MS
QTC - MUSE: 460 MS
R AXIS - MUSE: 28 DEGREES
RBC # BLD AUTO: 5.25 10E6/UL (ref 3.8–5.2)
RBC MORPH BLD: ABNORMAL
SMUDGE CELLS BLD QL SMEAR: PRESENT
SODIUM SERPL-SCNC: 142 MMOL/L (ref 136–145)
SYSTOLIC BLOOD PRESSURE - MUSE: NORMAL MMHG
T AXIS - MUSE: 92 DEGREES
TROPONIN T SERPL HS-MCNC: 8 NG/L
TROPONIN T SERPL HS-MCNC: 8 NG/L
VENTRICULAR RATE- MUSE: 81 BPM
WBC # BLD AUTO: 46 10E3/UL (ref 4–11)

## 2023-07-02 PROCEDURE — 85007 BL SMEAR W/DIFF WBC COUNT: CPT | Performed by: STUDENT IN AN ORGANIZED HEALTH CARE EDUCATION/TRAINING PROGRAM

## 2023-07-02 PROCEDURE — 71046 X-RAY EXAM CHEST 2 VIEWS: CPT

## 2023-07-02 PROCEDURE — 250N000013 HC RX MED GY IP 250 OP 250 PS 637: Performed by: STUDENT IN AN ORGANIZED HEALTH CARE EDUCATION/TRAINING PROGRAM

## 2023-07-02 PROCEDURE — 96374 THER/PROPH/DIAG INJ IV PUSH: CPT

## 2023-07-02 PROCEDURE — 99285 EMERGENCY DEPT VISIT HI MDM: CPT | Mod: 25

## 2023-07-02 PROCEDURE — 85027 COMPLETE CBC AUTOMATED: CPT | Performed by: STUDENT IN AN ORGANIZED HEALTH CARE EDUCATION/TRAINING PROGRAM

## 2023-07-02 PROCEDURE — 82248 BILIRUBIN DIRECT: CPT | Performed by: EMERGENCY MEDICINE

## 2023-07-02 PROCEDURE — 96375 TX/PRO/DX INJ NEW DRUG ADDON: CPT

## 2023-07-02 PROCEDURE — 83690 ASSAY OF LIPASE: CPT | Performed by: EMERGENCY MEDICINE

## 2023-07-02 PROCEDURE — 250N000011 HC RX IP 250 OP 636: Mod: JZ | Performed by: STUDENT IN AN ORGANIZED HEALTH CARE EDUCATION/TRAINING PROGRAM

## 2023-07-02 PROCEDURE — 84484 ASSAY OF TROPONIN QUANT: CPT | Performed by: STUDENT IN AN ORGANIZED HEALTH CARE EDUCATION/TRAINING PROGRAM

## 2023-07-02 PROCEDURE — 93005 ELECTROCARDIOGRAM TRACING: CPT

## 2023-07-02 PROCEDURE — 36415 COLL VENOUS BLD VENIPUNCTURE: CPT | Performed by: STUDENT IN AN ORGANIZED HEALTH CARE EDUCATION/TRAINING PROGRAM

## 2023-07-02 RX ORDER — MAGNESIUM HYDROXIDE/ALUMINUM HYDROXICE/SIMETHICONE 120; 1200; 1200 MG/30ML; MG/30ML; MG/30ML
15 SUSPENSION ORAL ONCE
Status: COMPLETED | OUTPATIENT
Start: 2023-07-02 | End: 2023-07-02

## 2023-07-02 RX ORDER — ONDANSETRON 2 MG/ML
4 INJECTION INTRAMUSCULAR; INTRAVENOUS ONCE
Status: DISCONTINUED | OUTPATIENT
Start: 2023-07-02 | End: 2023-07-02

## 2023-07-02 RX ORDER — FAMOTIDINE 40 MG/1
40 TABLET, FILM COATED ORAL DAILY
Qty: 14 TABLET | Refills: 0 | Status: SHIPPED | OUTPATIENT
Start: 2023-07-02 | End: 2023-07-16

## 2023-07-02 RX ORDER — ONDANSETRON 2 MG/ML
4 INJECTION INTRAMUSCULAR; INTRAVENOUS ONCE
Status: COMPLETED | OUTPATIENT
Start: 2023-07-02 | End: 2023-07-02

## 2023-07-02 RX ORDER — SUCRALFATE 1 G/1
1 TABLET ORAL 4 TIMES DAILY
Qty: 56 TABLET | Refills: 0 | Status: SHIPPED | OUTPATIENT
Start: 2023-07-02 | End: 2023-07-16

## 2023-07-02 RX ADMIN — ONDANSETRON 4 MG: 2 INJECTION INTRAMUSCULAR; INTRAVENOUS at 13:00

## 2023-07-02 RX ADMIN — FAMOTIDINE 20 MG: 10 INJECTION INTRAVENOUS at 14:20

## 2023-07-02 RX ADMIN — ALUMINUM HYDROXIDE, MAGNESIUM HYDROXIDE, AND SIMETHICONE 15 ML: 200; 200; 20 SUSPENSION ORAL at 14:20

## 2023-07-02 ASSESSMENT — ACTIVITIES OF DAILY LIVING (ADL)
ADLS_ACUITY_SCORE: 37

## 2023-07-02 NOTE — ED PROVIDER NOTES
Emergency Department Attending Supervision Note  7/2/2023  1:46 PM      I evaluated this patient in conjunction with Deanne Field PA-C        History     Chief Complaint:  Chest Pain       HPI   Karen Caban is a 72 year old female with a history of CLL presenting with chest discomfort.  She also has a history of gastroesophageal reflux disease and states that this feels similar however more severe.  She denies any blood in the stool.  Of note she is anticoagulated with Eliquis.      Independent Historian:   None - Patient Only    Review of External Notes:   Previous oncology notes were reviewed      Medications:    albuterol (PROAIR HFA/PROVENTIL HFA/VENTOLIN HFA) 108 (90 Base) MCG/ACT inhaler  aspirin (ASA) 81 MG chewable tablet  atorvastatin (LIPITOR) 40 MG tablet  benzonatate (TESSALON) 100 MG capsule  chlorthalidone (HYGROTON) 25 MG tablet  Cholecalciferol (VITAMIN D-3) 5000 UNIT TABS  coenzyme Q-10 200 MG CAPS  Cranberry-Vitamin C-Vitamin E (CRANBERRY PLUS VITAMIN C) 4200-20-3 MG-MG-UNIT CAPS  Cyanocobalamin (B-12 PO)  ezetimibe (ZETIA) 10 MG tablet  fluticasone (FLONASE) 50 MCG/ACT nasal spray  Lactobacillus (PROBIOTIC ACIDOPHILUS PO)  metoprolol succinate ER (TOPROL XL) 100 MG 24 hr tablet  nitroGLYcerin (NITROSTAT) 0.4 MG sublingual tablet  omeprazole (PRILOSEC) 40 MG DR capsule  potassium chloride ER (KLOR-CON M) 20 MEQ CR tablet  Sodium Chloride-Xylitol (XLEAR SINUS CARE SPRAY NA)  ticagrelor (BRILINTA) 60 MG tablet        Past Medical History:    Past Medical History:   Diagnosis Date     Ankle fracture 2009     Anxiety      Chronic back pain      Chronic lymphocytic leukemia (H)      Colon polyp 2012     Fx low femur epiphy-closed (H)      GERD (gastroesophageal reflux disease)      History of UTI 2017     HTN (hypertension), benign      Hyperlipidemia LDL goal < 100      Normal nuclear stress test 12/2009     Osteopenia      PAD (peripheral artery disease) (H)      Polycythemia vera (H)  06/15/2022     PONV (postoperative nausea and vomiting)      PUD (peptic ulcer disease) 1980s     Pulmonary emphysema, unspecified emphysema type (H) 1/12/2023     Smoker      Vitamin D deficiency        Past Surgical History:    Past Surgical History:   Procedure Laterality Date     ANGIOGRAM Right 11/18/2022    Procedure: ANGIOGRAM AORTO ILIAC ANGIOGRAM;  Surgeon: Shaun Tran MD;  Location:  OR     Blood clot removal from Stent      2011     BONE MARROW BIOPSY, BONE SPECIMEN, NEEDLE/TROCAR N/A 02/02/2021    Procedure: bone marrow biopsy;  Surgeon: Kailey Cota MD;  Location:  GI     BYPASS GRAFT AORTOFEMORAL  05/2002    Dr. Turner     BYPASS GRAFT FEMOROPOPLITEAL  12/16/2011     COLONOSCOPY N/A 01/18/2018    Procedure: COMBINED COLONOSCOPY, SINGLE OR MULTIPLE BIOPSY/POLYPECTOMY BY BIOPSY;  COLONOSCOPY;  Surgeon: Efrain Hernadez MD;  Location:  GI     COLONOSCOPY N/A 3/29/2023    Procedure: COLONOSCOPY, FLEXIBLE, WITH LESION REMOVAL USING SNARE;  Surgeon: Jony Manriquez MD;  Location:  GI     DILATION AND CURETTAGE, OPERATIVE HYSTEROSCOPY, COMBINED N/A 09/15/2022    Procedure: HYSTEROSCOPY, WITH DILATION AND CURETTAGE OF UTERUS;  Surgeon: Destiney Schaefer MD;  Location:  OR     EMBOLECTOMY LOWER EXTREMITY  12/16/2011    Procedure:EMBOLECTOMY LOWER EXTREMITY; EMBOLECTOMY, RIGHT POPLITEAL WITH PATCH ANGIOPLASTY. EXCISION SKIN LESION RIGHT LEG. ; Surgeon:PARMINDER VELAZCO; Location: OR     ENDARTERECTOMY FEMORAL Right 11/18/2022    Procedure: ENDARTERECTOMY, FEMORAL RIGHT FEMORAL CUTDOWN, RIGHT ILIAC ARTERY STENTING.;  Surgeon: Shaun Tran MD;  Location:  OR     ESOPHAGOSCOPY, GASTROSCOPY, DUODENOSCOPY (EGD), COMBINED N/A 10/07/2022    Procedure: ESOPHAGOGASTRODUODENOSCOPY, WITH BIOPSY;  Surgeon: Felix Carmona MD;  Location:  GI     EXCISE LESION LOWER EXTREMITY  12/16/2011    Procedure:EXCISE LESION LOWER EXTREMITY; Surgeon:PARMINDER VELAZCO;  Location:[facility] OR     HERNIA REPAIR  11/04/2008    x2 umbilical     IR OR ANGIOGRAM  11/18/2022     L achilles repair  12/04/2008     LAPAROSCOPIC OOPHORECTOMY Left     L for cyst age 26     miscarriages x 3       tubal ligation and reversal          Physical Exam     Patient Vitals for the past 24 hrs:   BP Temp Temp src Pulse Resp SpO2   07/02/23 1639 130/78 -- -- 69 -- 95 %   07/02/23 1417 -- 97  F (36.1  C) Temporal -- -- --   07/02/23 1302 (!) 137/90 -- -- 73 12 97 %        Physical Exam  General: Alert, interactive  Head:  Scalp is atraumatic  Eyes:  The pupils are equal, round, and reactive to light    EOM's intact    No scleral icterus  ENT:      Nose:  The external nose is normal  Ears:  External ears are normal     Neck:  Normal range of motion.      There is no rigidity.    Trachea is in the midline         CV:  Regular rate and rhythm    No murmur   Resp:  Breath sounds are clear bilaterally    Non-labored, no retractions or accessory muscle use      GI:  Abdomen is soft, no distension, no tenderness.       MS:  Normal strength in all 4 extremities  Skin:  Warm and dry, No rash or lesions noted.    Psych:  Awake. Alert.  Normal affect.      Appropriate interactions.    Emergency Department Course     ECG results from 07/02/23 @ 1231   EKG 12-lead, tracing only     Value    Systolic Blood Pressure     Diastolic Blood Pressure     Ventricular Rate 81    Atrial Rate 81    [place] Interval 190    QRS Duration 80        QTc 460    P Axis 70    R AXIS 28    T Axis 92    Interpretation ECG      Sinus rhythm with Premature supraventricular complexes  Septal infarct, age undetermined  Abnormal ECG  No significant changes compared to prior, dated 11/18/22.  Read by me at 1340.         Imaging:  XR Chest 2 Views   Final Result   IMPRESSION: Heart is normal in size. Lungs are hyperinflated. Lungs are otherwise clear.         Report per radiology    Laboratory:  Labs Ordered and Resulted from Time of ED Arrival to Time  of ED Departure   BASIC METABOLIC PANEL - Abnormal       Result Value    Sodium 142      Potassium 3.3 (*)     Chloride 103      Carbon Dioxide (CO2) 24      Anion Gap 15      Urea Nitrogen 10.4      Creatinine 0.53      Calcium 9.2      Glucose 104 (*)     GFR Estimate >90     CBC WITH PLATELETS AND DIFFERENTIAL - Abnormal    WBC Count 46.0 (*)     RBC Count 5.25 (*)     Hemoglobin 15.2      Hematocrit 46.4      MCV 88      MCH 29.0      MCHC 32.8      RDW 14.6      Platelet Count 248     DIFFERENTIAL - Abnormal    % Neutrophils 10      % Lymphocytes 90      % Monocytes 0      % Eosinophils 0      % Basophils 0      Absolute Neutrophils 4.6      Absolute Lymphocytes 41.4 (*)     Absolute Monocytes 0.0      Absolute Eosinophils 0.0      Absolute Basophils 0.0      RBC Morphology Confirmed RBC Indices      Platelet Assessment        Value: Automated Count Confirmed. Platelet morphology is normal.    Smudge Cells Present (*)    TROPONIN T, HIGH SENSITIVITY - Normal    Troponin T, High Sensitivity 8     HEPATIC FUNCTION PANEL - Normal    Protein Total 6.4      Albumin 4.2      Bilirubin Total 0.6      Alkaline Phosphatase 81      AST 32      ALT 27      Bilirubin Direct <0.20     LIPASE - Normal    Lipase 45     TROPONIN T, HIGH SENSITIVITY - Normal    Troponin T, High Sensitivity 8        Emergency Department Course & Assessments:  Interventions:  Medications   alum & mag hydroxide-simethicone (MAALOX) suspension 15 mL (has no administration in time range)   famotidine (PEPCID) injection 20 mg (has no administration in time range)   ondansetron (ZOFRAN) injection 4 mg (4 mg Intravenous $Given 7/2/23 1300)        Independent Interpretation (X-rays, CTs, rhythm strip):  Chest radiograph reviewed, no obvious infiltrate or pneumothorax    Consultations/Discussion of Management or Tests:  None   ED Course as of 07/02/23 1347   Sun Jul 02, 2023   1300 Performed initial assessment       Social Determinants of Health  affecting care:   None    Disposition:  The patient was discharged to home.     Impression & Plan    Medical Decision Making:  Following presentation history and physical examination were performed, the above work-up was undertaken.  Her symptoms been present since last evening, she has a nonischemic EKG and negative troponin x2 and I doubt acute coronary syndrome.  She is anticoagulated and I doubt pulmonary embolism.  Findings are not consistent with aortic dissection.  Her abdominal examination is benign.  She does have a history of gastroesophageal reflux disease and states that this feels similar.  She had complete resolution of her symptoms following a GI cocktail.  At this point I believe she can safely be discharged home there is no signs of GI bleed or more concerning illness.  She will follow-up with her primary care provider and gastroenterologist and return if new symptoms develop.  We have added the medications below.    Diagnosis:    ICD-10-CM    1. Chest pain  R07.9       2. Heartburn  R12            Discharge Medications:  Discharge Medication List as of 7/2/2023  5:09 PM      START taking these medications    Details   famotidine (PEPCID) 40 MG tablet Take 1 tablet (40 mg) by mouth daily for 14 days, Disp-14 tablet, R-0, E-Prescribe      sucralfate (CARAFATE) 1 GM tablet Take 1 tablet (1 g) by mouth 4 times daily for 14 days, Disp-56 tablet, R-0, E-Prescribe                Kingsley Painter MD  7/2/2023   Kingsley Painter,*      Kingsley Painter MD  07/02/23 1826

## 2023-07-02 NOTE — ED PROVIDER NOTES
History     Chief Complaint:  Chest Pain       HPI   Karen Caban is a 72 year old female with history of CLL, tobacco abuse, GERD, emphysema, HTN, who presents with heart burn, chills, and nausea. Patient states she developed heartburn in the middle of the night that has been constant since. Has history of heartburn but states this is worse than she has ever experienced in the past. Took 2 Prilosec earlier today without relief. Last PO intake was cereal last night. Patient reports drinking a couple beers yesterday, this is her daily typical. No vomiting, however does endorse some slight nausea. Also reports that she had four episodes of diarrhea throughout the night. No bloody or black tarry stool. Does endorse some slight epigastric pain. No known sick contacts. She is anticoagulated on Eliquis.     Independent Historian:   None - Patient Only    Review of External Notes:   None    Medications:    famotidine (PEPCID) 40 MG tablet  sucralfate (CARAFATE) 1 GM tablet  albuterol (PROAIR HFA/PROVENTIL HFA/VENTOLIN HFA) 108 (90 Base) MCG/ACT inhaler  aspirin (ASA) 81 MG chewable tablet  atorvastatin (LIPITOR) 40 MG tablet  benzonatate (TESSALON) 100 MG capsule  chlorthalidone (HYGROTON) 25 MG tablet  Cholecalciferol (VITAMIN D-3) 5000 UNIT TABS  coenzyme Q-10 200 MG CAPS  Cranberry-Vitamin C-Vitamin E (CRANBERRY PLUS VITAMIN C) 4200-20-3 MG-MG-UNIT CAPS  Cyanocobalamin (B-12 PO)  ezetimibe (ZETIA) 10 MG tablet  fluticasone (FLONASE) 50 MCG/ACT nasal spray  Lactobacillus (PROBIOTIC ACIDOPHILUS PO)  metoprolol succinate ER (TOPROL XL) 100 MG 24 hr tablet  nitroGLYcerin (NITROSTAT) 0.4 MG sublingual tablet  omeprazole (PRILOSEC) 40 MG DR capsule  potassium chloride ER (KLOR-CON M) 20 MEQ CR tablet  Sodium Chloride-Xylitol (XLEAR SINUS CARE SPRAY NA)  ticagrelor (BRILINTA) 60 MG tablet        Past Medical History:    Past Medical History:   Diagnosis Date     Ankle fracture 2009     Anxiety      Chronic back pain       Chronic lymphocytic leukemia (H)      Colon polyp 2012     Fx low femur epiphy-closed (H)      GERD (gastroesophageal reflux disease)      History of UTI 2017     HTN (hypertension), benign      Hyperlipidemia LDL goal < 100      Normal nuclear stress test 12/2009     Osteopenia      PAD (peripheral artery disease) (H)      Polycythemia vera (H) 06/15/2022     PONV (postoperative nausea and vomiting)      PUD (peptic ulcer disease) 1980s     Pulmonary emphysema, unspecified emphysema type (H) 1/12/2023     Smoker      Vitamin D deficiency        Past Surgical History:    Past Surgical History:   Procedure Laterality Date     ANGIOGRAM Right 11/18/2022    Procedure: ANGIOGRAM AORTO ILIAC ANGIOGRAM;  Surgeon: Shaun Tran MD;  Location:  OR     Blood clot removal from Stent      2011     BONE MARROW BIOPSY, BONE SPECIMEN, NEEDLE/TROCAR N/A 02/02/2021    Procedure: bone marrow biopsy;  Surgeon: Kailey Cota MD;  Location:  GI     BYPASS GRAFT AORTOFEMORAL  05/2002    Dr. Turner     BYPASS GRAFT FEMOROPOPLITEAL  12/16/2011     COLONOSCOPY N/A 01/18/2018    Procedure: COMBINED COLONOSCOPY, SINGLE OR MULTIPLE BIOPSY/POLYPECTOMY BY BIOPSY;  COLONOSCOPY;  Surgeon: Efrain Hernadez MD;  Location:  GI     COLONOSCOPY N/A 3/29/2023    Procedure: COLONOSCOPY, FLEXIBLE, WITH LESION REMOVAL USING SNARE;  Surgeon: Jony Manriquez MD;  Location:  GI     DILATION AND CURETTAGE, OPERATIVE HYSTEROSCOPY, COMBINED N/A 09/15/2022    Procedure: HYSTEROSCOPY, WITH DILATION AND CURETTAGE OF UTERUS;  Surgeon: Destiney Schaefer MD;  Location:  OR     EMBOLECTOMY LOWER EXTREMITY  12/16/2011    Procedure:EMBOLECTOMY LOWER EXTREMITY; EMBOLECTOMY, RIGHT POPLITEAL WITH PATCH ANGIOPLASTY. EXCISION SKIN LESION RIGHT LEG. ; Surgeon:PARMINDER VELAZCO; Location: OR     ENDARTERECTOMY FEMORAL Right 11/18/2022    Procedure: ENDARTERECTOMY, FEMORAL RIGHT FEMORAL CUTDOWN, RIGHT ILIAC ARTERY STENTING.;   Surgeon: Shaun Tran MD;  Location: [facility]     ESOPHAGOSCOPY, GASTROSCOPY, DUODENOSCOPY (EGD), COMBINED N/A 10/07/2022    Procedure: ESOPHAGOGASTRODUODENOSCOPY, WITH BIOPSY;  Surgeon: Felix Carmona MD;  Location: [facility]     EXCISE LESION LOWER EXTREMITY  12/16/2011    Procedure:EXCISE LESION LOWER EXTREMITY; Surgeon:PARMINDER VELAZCO; Location:[facility] OR     HERNIA REPAIR  11/04/2008    x2 umbilical     IR OR ANGIOGRAM  11/18/2022     L achilles repair  12/04/2008     LAPAROSCOPIC OOPHORECTOMY Left     L for cyst age 26     miscarriages x 3       tubal ligation and reversal          Physical Exam     Patient Vitals for the past 24 hrs:   BP Temp Temp src Pulse Resp SpO2   07/02/23 1639 130/78 -- -- 69 -- 95 %   07/02/23 1417 -- 97  F (36.1  C) Temporal -- -- --   07/02/23 1302 (!) 137/90 -- -- 73 12 97 %        Physical Exam  General: Alert and cooperative with exam. Patient in no apparent distress. Normal mentation.  Head:  Scalp is NC/AT  Eyes:  No scleral icterus, PERRL  ENT:  The external nose and ears are normal.  Neck:  Normal range of motion without rigidity.  CV:  Regular rate and rhythm    No pathologic murmur   Resp:  Rhonchi on ausculation of left side. Normal breath sounds on right  GI:  Abdomen is soft, no distension, no tenderness. No peritoneal signs  MS:  No lower extremity edema   Skin:  Warm and dry, No rash or lesions noted.  Neuro:  Oriented x 3. No gross motor deficits.      Emergency Department Course     ECG results from 07/02/23   EKG 12-lead, tracing only     Value    Systolic Blood Pressure     Diastolic Blood Pressure     Ventricular Rate 81    Atrial Rate 81    IN Interval 190    QRS Duration 80        QTc 460    P Axis 70    R AXIS 28    T Axis 92    Interpretation ECG      Sinus rhythm with Premature supraventricular complexes  Septal infarct (cited on or before 06-DEC-2009)  Abnormal ECG  When compared with ECG of 18-NOV-2022 07:55,  Premature supraventricular  complexes are now Present  T wave inversion now evident in Anterior leads  Confirmed by GENERATED REPORT, COMPUTER ([id number]),  GABINO OG ([id number]) on 7/2/2023 4:16:35 PM           Imaging:  XR Chest 2 Views   Final Result   IMPRESSION: Heart is normal in size. Lungs are hyperinflated. Lungs are otherwise clear.         Report per radiology    Laboratory:  Labs Ordered and Resulted from Time of ED Arrival to Time of ED Departure   BASIC METABOLIC PANEL - Abnormal       Result Value    Sodium 142      Potassium 3.3 (*)     Chloride 103      Carbon Dioxide (CO2) 24      Anion Gap 15      Urea Nitrogen 10.4      Creatinine 0.53      Calcium 9.2      Glucose 104 (*)     GFR Estimate >90     CBC WITH PLATELETS AND DIFFERENTIAL - Abnormal    WBC Count 46.0 (*)     RBC Count 5.25 (*)     Hemoglobin 15.2      Hematocrit 46.4      MCV 88      MCH 29.0      MCHC 32.8      RDW 14.6      Platelet Count 248     DIFFERENTIAL - Abnormal    % Neutrophils 10      % Lymphocytes 90      % Monocytes 0      % Eosinophils 0      % Basophils 0      Absolute Neutrophils 4.6      Absolute Lymphocytes 41.4 (*)     Absolute Monocytes 0.0      Absolute Eosinophils 0.0      Absolute Basophils 0.0      RBC Morphology Confirmed RBC Indices      Platelet Assessment        Value: Automated Count Confirmed. Platelet morphology is normal.    Smudge Cells Present (*)    TROPONIN T, HIGH SENSITIVITY - Normal    Troponin T, High Sensitivity 8     HEPATIC FUNCTION PANEL - Normal    Protein Total 6.4      Albumin 4.2      Bilirubin Total 0.6      Alkaline Phosphatase 81      AST 32      ALT 27      Bilirubin Direct <0.20     LIPASE - Normal    Lipase 45     TROPONIN T, HIGH SENSITIVITY - Normal    Troponin T, High Sensitivity 8          Procedures   None    Emergency Department Course & Assessments:      Interventions:  Medications   ondansetron (ZOFRAN) injection 4 mg (4 mg Intravenous $Given 7/2/23 1300)   alum & mag hydroxide-simethicone (MAALOX)  suspension 15 mL (15 mLs Oral $Given 7/2/23 1420)   famotidine (PEPCID) injection 20 mg (20 mg Intravenous $Given 7/2/23 1420)       Independent Interpretation (X-rays, CTs, rhythm strip):  CXR -no opacities, effusion, widened mediastinum    Consultations/Discussion of Management or Tests:   ED Course as of 07/02/23 1707   Sun Jul 02, 2023   1300 Performed initial assessment       Social Determinants of Health affecting care:   None    Disposition:  The patient was discharged to home.     Impression & Plan      Medical Decision Making:  Karen Caban is a 72 year old female who presents with chest pain described as heartburn.  This heartburn is more severe than she has ever felt before.  Took 2 Pepcid at home without relief.  Chest x-ray today without concerning findings.  Her labs are reassuring.  Patient has history of CLL, white count significant likely elevated due to this.  She is afebrile and does not appear to have opacities to suggest pneumonia, low suspicion for bacterial infection today.  Troponin mildly elevated at 8, delta troponin stable.  EKG without ischemic findings, however does show intermittent premature beats.  She was provided IV Pepcid and a GI cocktail and endorsed complete relief of symptoms after receiving these medications.  Suspect that her symptoms today were just due to worsening heartburn.  Provided education on heartburn causing foods and what to avoid.  Instructed her to continue her Prilosec at home and provided Rx for Pepcid along with Carafate.  Instructed patient to follow-up with her GI doctor for ongoing GERD symptoms.  She may require an endoscopy for further work-up.  Discussed this with patient and she is agreeable with this plan.  Discussed reasons to return to ER.  All questions answered    Diagnosis:    ICD-10-CM    1. Chest pain  R07.9       2. Heartburn  R12            Discharge Medications:  New Prescriptions    FAMOTIDINE (PEPCID) 40 MG TABLET    Take 1 tablet (40  mg) by mouth daily for 14 days    SUCRALFATE (CARAFATE) 1 GM TABLET    Take 1 tablet (1 g) by mouth 4 times daily for 14 days          July 2, 2023  KAMILAH Sepulveda Lauren R, PA-C  07/02/23 4232

## 2023-07-02 NOTE — ED TRIAGE NOTES
Cp that started around midnight last night, feels pressure that radiated up to throat area. Endorses shakiness and clammy feeling. Took 2 Prilosec this morning. Hx of 2 stents placed in right iliec groin. On Eliquis, smokes 1/2 pack daily.

## 2023-07-02 NOTE — DISCHARGE INSTRUCTIONS
I prescribed Pepcid and Carafate to help relieve ongoing heartburn.  Please take the Pepcid once daily and Carafate 4 times daily.  I also recommend he follow-up with your GI doctor regarding ongoing GERD symptoms.  You may require an endoscopy if symptoms continue or worsen.    Please return to ER if symptoms worsen or cannot be managed at home.

## 2023-07-21 ENCOUNTER — OFFICE VISIT (OUTPATIENT)
Dept: FAMILY MEDICINE | Facility: CLINIC | Age: 73
End: 2023-07-21
Payer: COMMERCIAL

## 2023-07-21 VITALS
SYSTOLIC BLOOD PRESSURE: 132 MMHG | BODY MASS INDEX: 21.01 KG/M2 | WEIGHT: 124.3 LBS | TEMPERATURE: 97 F | OXYGEN SATURATION: 99 % | RESPIRATION RATE: 20 BRPM | DIASTOLIC BLOOD PRESSURE: 70 MMHG | HEART RATE: 61 BPM

## 2023-07-21 DIAGNOSIS — E78.5 HYPERLIPIDEMIA, UNSPECIFIED HYPERLIPIDEMIA TYPE: ICD-10-CM

## 2023-07-21 DIAGNOSIS — J43.9 PULMONARY EMPHYSEMA, UNSPECIFIED EMPHYSEMA TYPE (H): Primary | ICD-10-CM

## 2023-07-21 DIAGNOSIS — M85.851 OSTEOPENIA OF BOTH HIPS: ICD-10-CM

## 2023-07-21 DIAGNOSIS — K21.9 GASTROESOPHAGEAL REFLUX DISEASE WITHOUT ESOPHAGITIS: ICD-10-CM

## 2023-07-21 DIAGNOSIS — M85.852 OSTEOPENIA OF BOTH HIPS: ICD-10-CM

## 2023-07-21 DIAGNOSIS — I10 HTN (HYPERTENSION), BENIGN: ICD-10-CM

## 2023-07-21 DIAGNOSIS — I10 PRIMARY HYPERTENSION: ICD-10-CM

## 2023-07-21 DIAGNOSIS — R06.00 DYSPNEA, UNSPECIFIED TYPE: ICD-10-CM

## 2023-07-21 PROCEDURE — 99214 OFFICE O/P EST MOD 30 MIN: CPT | Performed by: INTERNAL MEDICINE

## 2023-07-21 RX ORDER — PANTOPRAZOLE SODIUM 40 MG/1
40 TABLET, DELAYED RELEASE ORAL DAILY
Qty: 90 TABLET | Refills: 1 | Status: SHIPPED | OUTPATIENT
Start: 2023-07-21 | End: 2024-07-31

## 2023-07-21 RX ORDER — AMLODIPINE BESYLATE 5 MG/1
5 TABLET ORAL DAILY
Qty: 90 TABLET | Refills: 0 | Status: SHIPPED | OUTPATIENT
Start: 2023-07-21 | End: 2023-12-13

## 2023-07-21 RX ORDER — METOPROLOL SUCCINATE 100 MG/1
100 TABLET, EXTENDED RELEASE ORAL DAILY
Qty: 90 TABLET | Refills: 0 | Status: SHIPPED | OUTPATIENT
Start: 2023-07-21 | End: 2023-11-14

## 2023-07-21 ASSESSMENT — PAIN SCALES - GENERAL: PAINLEVEL: NO PAIN (0)

## 2023-07-21 NOTE — PROGRESS NOTES
Assessment & Plan   Problem List Items Addressed This Visit          Respiratory    Pulmonary emphysema, unspecified emphysema type (H) - Primary    Relevant Medications    ipratropium-albuterol (COMBIVENT RESPIMAT)  MCG/ACT inhaler    Other Relevant Orders    General PFT Lab (Please always keep checked)    Adult Pulmonary Medicine  Referral       Digestive    Gastroesophageal reflux disease without esophagitis    Relevant Medications    pantoprazole (PROTONIX) 40 MG EC tablet       Circulatory    HTN (hypertension), benign    Relevant Medications    metoprolol succinate ER (TOPROL XL) 100 MG 24 hr tablet       Musculoskeletal and Integumentary    Osteopenia    Relevant Orders    DX Hip/Pelvis/Spine    Vitamin D Deficiency     Other Visit Diagnoses       Dyspnea, unspecified type        Relevant Medications    ipratropium-albuterol (COMBIVENT RESPIMAT)  MCG/ACT inhaler    Other Relevant Orders    Echocardiogram Complete    Primary hypertension        Relevant Medications    amLODIPine (NORVASC) 5 MG tablet    Other Relevant Orders    Echocardiogram Complete    Basic metabolic panel  (Ca, Cl, CO2, Creat, Gluc, K, Na, BUN)    Hyperlipidemia, unspecified hyperlipidemia type        Relevant Orders    Lipid panel reflex to direct LDL Fasting           Increase potassium pill 1 tablet 5 times a week.  Patient does eat supplements of bananas; diarrhea can cause low potassium take Imodium after diuretic bowel movement.  Cut down on beer intake  Keep well-hydrated.  Cut down the dose of metoprolol to 1 tablet a day given the extensive peripheral vascular disease and emphysema.  Amlodipine 5 mg 1 tablet a day goal of blood pressure less than 130/80 if dizzy or lightheaded cut the dose by half  Started on Combivent inhaler up to 4 times a day as needed for the underlying COPD/emphysema do pulmonary function test see pulmonary specialist  Change omeprazole to pantoprazole 1 tablet once daily H. pylori was  negative when she did the EGD.  Get this dyspnea when she bends forward and stands up and some heartburn sensation.  Coronary cath was negative done near last January by cardiology.  We will check a baseline echo as well given some dyspnea and chronic hypertension.  Less than half pack of smoking day and working on cutting down to normal.  Continue with exercise activities as tolerated mainly brisk walking 30 minutes 5 times a week.           MED REC REQUIRED  Post Medication Reconciliation Status: discharge medications reconciled and changed, per note/orders  CONSULTATION/REFERRAL to pulm  Work on weight loss  Regular exercise  See Patient Instructions    Liane Claudio MD  [facility]  Total time spent 60 minutes review of records, ED visit, addressing multiple health concerns and exam.  Yeni Gerardo is a 72 year old, presenting for the following health issues:  ER F/U (Patient here for a ER follow up for chest pain on 7/2/2023.)         No data to display              HPI       ED/UC Followup:  Facility:  [facility] Emergency Dept  Date of visit: 7/2/2023  Reason for visit: Chest pain    Heartburn    Current Status: Patient states feeling better.    Patient follow-up from ER.  Had upper GI like symptoms chest heartburn had some diarrhea.  Diarrhea gets daily.  Had an EGD in October 2022 was normal, had a colonoscopy recently in March 2023 that showed diverticulosis and 4 mm polyp repeat in 5 to 7 years.  Patient had a cardiac cath a year ago was normal.  She sees vascular medicine for chronic vascular disease peripheral artery disease.  She is on omeprazole daily and she was put on Carafate as well she finished the course.  2 weeks course.  Has been on omeprazole for a long time.  Has cut down on smoking using nicotine lozenges.  When she bends over and stands up  is hard to breathe a little bit or walking gets hard to breathe.  Gets heartburn first and then hard to  breathe.  cX-ray shows normal-sized heart and hyperinflated lungs.  No palpitations.  Has some supraventricular extra beats on EKG. no Chest tightness    On atorvastatin and Zetia, LDL at goal.  On metoprolol 150 mg once a day.    Review of Systems   Constitutional, HEENT, cardiovascular, pulmonary, gi and gu systems are negative, except as otherwise noted.      Objective    /70 (BP Location: Right arm, Patient Position: Sitting, Cuff Size: Adult Regular)   Pulse 61   Temp 97  F (36.1  C) (Temporal)   Resp 20   Wt 56.4 kg (124 lb 4.8 oz)   SpO2 99%   BMI 21.01 kg/m    Body mass index is 21.01 kg/m .  Physical Exam   GENERAL: healthy, alert and no distress  EYES: Eyes grossly normal to inspection, PERRL and conjunctivae and sclerae normal  HENT: ear canals and TM's normal, nose and mouth without ulcers or lesions  NECK: no adenopathy, no asymmetry, masses, or scars and thyroid normal to palpation  RESP: lungs clear to auscultation - no rales, rhonchi or wheezes  BREAST: normal without masses, tenderness or nipple discharge and no palpable axillary masses or adenopathy  CV: regular rate and rhythm, normal S1 S2, no S3 or S4, no murmur, click or rub, no peripheral edema and peripheral pulses strong  ABDOMEN: soft, nontender, no hepatosplenomegaly, no masses and bowel sounds normal  MS: no gross musculoskeletal defects noted, no edema  SKIN: no suspicious lesions or rashes  NEURO: Normal strength and tone, mentation intact and speech normal  PSYCH: mentation appears normal, affect normal/bright    Admission on 07/02/2023, Discharged on 07/02/2023   Component Date Value Ref Range Status    Ventricular Rate 07/02/2023 81  BPM Final    Atrial Rate 07/02/2023 81  BPM Final    MD Interval 07/02/2023 190  ms Final    QRS Duration 07/02/2023 80  ms Final    QT 07/02/2023 396  ms Final    QTc 07/02/2023 460  ms Final    P Axis 07/02/2023 70  degrees Final    R AXIS 07/02/2023 28  degrees Final    T Axis 07/02/2023  92  degrees Final    Interpretation ECG 07/02/2023    Final                    Value:Sinus rhythm with Premature supraventricular complexes  Septal infarct (cited on or before 06-DEC-2009)  Abnormal ECG  When compared with ECG of 18-NOV-2022 07:55,  Premature supraventricular complexes are now Present  T wave inversion now evident in Anterior leads  Confirmed by GENERATED REPORT, COMPUTER ([id number]),  GABINO OG ([id number]) on 7/2/2023 4:16:35 PM      Sodium 07/02/2023 142  136 - 145 mmol/L Final    Potassium 07/02/2023 3.3 (L)  3.4 - 5.3 mmol/L Final    Chloride 07/02/2023 103  98 - 107 mmol/L Final    Carbon Dioxide (CO2) 07/02/2023 24  22 - 29 mmol/L Final    Anion Gap 07/02/2023 15  7 - 15 mmol/L Final    Urea Nitrogen 07/02/2023 10.4  8.0 - 23.0 mg/dL Final    Creatinine 07/02/2023 0.53  0.51 - 0.95 mg/dL Final    Calcium 07/02/2023 9.2  8.8 - 10.2 mg/dL Final    Glucose 07/02/2023 104 (H)  70 - 99 mg/dL Final    GFR Estimate 07/02/2023 >90  >60 mL/min/1.73m2 Final    Troponin T, High Sensitivity 07/02/2023 8  <=14 ng/L Final    Either a High Sensitivity Troponin T baseline (0 hours) value = 100 ng/L, or an increase in High Sensitivity Troponin T = 7 ng/L at 2 hours compared to 0 hours (2-0 hours), suggests myocardial injury, and urgent clinical attention is required.    If the 2-0 hours increase is <7 ng/L, a High Sensitivity Troponin T result above gender-specific reference ranges warrants further evaluation.   Recommendations for further evaluation include correlation with clinical decision-making tool (e.g., HEART), a 3rd High Sensitivity Troponin T test 2 hours after the 2nd (a 20% change from baseline would represent concern), admission for observation, close PCC/cardiology follow-up, or urgent outpatient provocative testing.    WBC Count 07/02/2023 46.0 (H)  4.0 - 11.0 10e3/uL Final    RBC Count 07/02/2023 5.25 (H)  3.80 - 5.20 10e6/uL Final    Hemoglobin 07/02/2023 15.2  11.7 - 15.7 g/dL Final     Hematocrit 07/02/2023 46.4  35.0 - 47.0 % Final    MCV 07/02/2023 88  78 - 100 fL Final    MCH 07/02/2023 29.0  26.5 - 33.0 pg Final    MCHC 07/02/2023 32.8  31.5 - 36.5 g/dL Final    RDW 07/02/2023 14.6  10.0 - 15.0 % Final    Platelet Count 07/02/2023 248  150 - 450 10e3/uL Final    Hold Specimen 07/02/2023 JIC   Final    Hold Specimen 07/02/2023 JIC   Final    % Neutrophils 07/02/2023 10  % Final    % Lymphocytes 07/02/2023 90  % Final    Differential done of albumin treated blood due to smudge cells    % Monocytes 07/02/2023 0  % Final    % Eosinophils 07/02/2023 0  % Final    % Basophils 07/02/2023 0  % Final    Absolute Neutrophils 07/02/2023 4.6  1.6 - 8.3 10e3/uL Final    Absolute Lymphocytes 07/02/2023 41.4 (H)  0.8 - 5.3 10e3/uL Final    Absolute Monocytes 07/02/2023 0.0  0.0 - 1.3 10e3/uL Final    Absolute Eosinophils 07/02/2023 0.0  0.0 - 0.7 10e3/uL Final    Absolute Basophils 07/02/2023 0.0  0.0 - 0.2 10e3/uL Final    RBC Morphology 07/02/2023 Confirmed RBC Indices   Final    Platelet Assessment 07/02/2023 Automated Count Confirmed. Platelet morphology is normal.  Automated Count Confirmed. Platelet morphology is normal. Final    Smudge Cells 07/02/2023 Present (A)  None Seen Final    Protein Total 07/02/2023 6.4  6.4 - 8.3 g/dL Final    Albumin 07/02/2023 4.2  3.5 - 5.2 g/dL Final    Bilirubin Total 07/02/2023 0.6  <=1.2 mg/dL Final    Alkaline Phosphatase 07/02/2023 81  35 - 104 U/L Final    AST 07/02/2023 32  0 - 45 U/L Final    Reference intervals for this test were updated on 6/12/2023 to more accurately reflect our healthy population. There may be differences in the flagging of prior results with similar values performed with this method. Interpretation of those prior results can be made in the context of the updated reference intervals.    ALT 07/02/2023 27  0 - 50 U/L Final    Reference intervals for this test were updated on 6/12/2023 to more accurately reflect our healthy population. There  may be differences in the flagging of prior results with similar values performed with this method. Interpretation of those prior results can be made in the context of the updated reference intervals.      Bilirubin Direct 07/02/2023 <0.20  0.00 - 0.30 mg/dL Final    Lipase 07/02/2023 45  13 - 60 U/L Final    Troponin T, High Sensitivity 07/02/2023 8  <=14 ng/L Final    Either a High Sensitivity Troponin T baseline (0 hours) value = 100 ng/L, or an increase in High Sensitivity Troponin T = 7 ng/L at 2 hours compared to 0 hours (2-0 hours), suggests myocardial injury, and urgent clinical attention is required.    If the 2-0 hours increase is <7 ng/L, a High Sensitivity Troponin T result above gender-specific reference ranges warrants further evaluation.   Recommendations for further evaluation include correlation with clinical decision-making tool (e.g., HEART), a 3rd High Sensitivity Troponin T test 2 hours after the 2nd (a 20% change from baseline would represent concern), admission for observation, close PCC/cardiology follow-up, or urgent outpatient provocative testing.

## 2023-07-25 DIAGNOSIS — J43.9 PULMONARY EMPHYSEMA, UNSPECIFIED EMPHYSEMA TYPE (H): Primary | ICD-10-CM

## 2023-07-31 ENCOUNTER — OFFICE VISIT (OUTPATIENT)
Dept: URGENT CARE | Facility: URGENT CARE | Age: 73
End: 2023-07-31
Payer: COMMERCIAL

## 2023-07-31 ENCOUNTER — HOSPITAL ENCOUNTER (OUTPATIENT)
Dept: BONE DENSITY | Facility: CLINIC | Age: 73
Discharge: HOME | End: 2023-07-31
Attending: INTERNAL MEDICINE | Admitting: INTERNAL MEDICINE
Payer: COMMERCIAL

## 2023-07-31 VITALS
OXYGEN SATURATION: 98 % | WEIGHT: 124 LBS | BODY MASS INDEX: 20.96 KG/M2 | TEMPERATURE: 96.9 F | SYSTOLIC BLOOD PRESSURE: 142 MMHG | HEART RATE: 67 BPM | DIASTOLIC BLOOD PRESSURE: 81 MMHG

## 2023-07-31 DIAGNOSIS — M85.852 OSTEOPENIA OF BOTH HIPS: ICD-10-CM

## 2023-07-31 DIAGNOSIS — J20.9 ACUTE BRONCHITIS WITH COEXISTING CONDITION REQUIRING PROPHYLACTIC TREATMENT: ICD-10-CM

## 2023-07-31 DIAGNOSIS — J44.1 COPD WITH EXACERBATION (H): Primary | ICD-10-CM

## 2023-07-31 DIAGNOSIS — M85.851 OSTEOPENIA OF BOTH HIPS: ICD-10-CM

## 2023-07-31 PROCEDURE — 99214 OFFICE O/P EST MOD 30 MIN: CPT | Performed by: NURSE PRACTITIONER

## 2023-07-31 PROCEDURE — 77080 DXA BONE DENSITY AXIAL: CPT

## 2023-07-31 RX ORDER — PREDNISONE 20 MG/1
40 TABLET ORAL DAILY
Qty: 10 TABLET | Refills: 0 | Status: SHIPPED | OUTPATIENT
Start: 2023-07-31 | End: 2023-08-05

## 2023-07-31 NOTE — PROGRESS NOTES
Chief Complaint   Patient presents with    Urgent Care    Sinus Problem     Sinus congestion, sweating, sore throat, lips swollen, wheezing, ears hurt,chills x4days.         ICD-10-CM    1. COPD with exacerbation (H)  J44.1 amoxicillin-clavulanate (AUGMENTIN) 875-125 MG tablet     predniSONE (DELTASONE) 20 MG tablet      2. Acute bronchitis with coexisting condition requiring prophylactic treatment  J20.9 amoxicillin-clavulanate (AUGMENTIN) 875-125 MG tablet      Patient is high risk for complications from upper respiratory and further infections due to her COPD so she will be given antibiotics and steroids to help with the COPD exacerbation.  She was recently given a new Combivent inhaler to use as needed and will begin this.            Results for orders placed or performed during the hospital encounter of 07/31/23 (from the past 24 hour(s))   DX Hip/Pelvis/Spine    Narrative    EXAM: DX HIP/PELVIS/SPINE  LOCATION: [facility]  DATE: 7/31/2023    INDICATION: BMD screening, follow-up.  DEMOGRAPHICS: Age- 72 years. Gender- Female. Menopausal status- Postmenopausal.  COMPARISON: 01/20/2021  TECHNIQUE: Dual-energy x-ray absorptiometry (DXA) performed with routine technique.    FINDINGS:    DXA RESULTS  -Lumbar Spine: L1-L4: Not reported due to degenerative changes causing significantly skewed and inaccurate data.  -RIGHT Hip Total: BMD: 0.835 g/cm2. T-score: -1.4. Z-score: 0.4.  -RIGHT Hip Femoral neck: BMD: 0.914 g/cm2. T-score: -0.9. Z-score: 1.1.  -LEFT Hip Total: BMD: 0.851 g/cm2. T-score: -1.2. Z-score: 0.5.  -LEFT Hip Femoral neck: BMD: 0.955 g/cm2. T-score: -0.6. Z-score: 1.4.    WHO T-SCORE CRITERIA  -Normal: T score at or above -1 SD  -Osteopenia: T score between -1 and -2.5 SD  -Osteoporosis: T score at or below -2.5 SD    The World Health Organization (WHO) criteria is applicable to perimenopausal females, postmenopausal females, and men aged 50 years or older.    INTERVAL  CHANGE  -There has been a 1.1% decrease in lumbar spine BMD compared to previously reported values.  -There has been a 1.5% decrease in bilateral hip BMD.    FRACTURE RISK  -FRAX Results: The 10 year probability of major osteoporotic fracture is 12.4%, and of hip fracture is 4.0%, based on right femoral neck BMD.    RECOMMENDATIONS  Consider treatment if major osteoporotic fracture score is greater than or equal to 20%, and if the hip fracture score is greater than or equal to 3%.      Impression    IMPRESSION: Low bone density (OSTEOPENIA). T score meets the WHO criteria for low bone density (osteopenia) at one or more measured sites. The risk of osteoporotic fracture increases approximately two-fold for each standard deviation decrease in T-score.    Future bone densitometry should include the forearm as the lumbar spine is unable to be interpreted.       Subjective     Karen Caban is an 72 year old female who presents to clinic today for cough, congestion, shortness of breath, chills, fever for 4 days.  She has been using her albuterol inhaler quite frequently.    ROS: 10 point ROS neg other than the symptoms noted above in the HPI.       Objective    BP (!) 142/81   Pulse 67   Temp 96.9  F (36.1  C) (Tympanic)   Wt 56.2 kg (124 lb)   SpO2 98%   BMI 20.96 kg/m    Nurses notes and VS have been reviewed.    Physical Exam       GENERAL APPEARANCE: alert and mild distress     EYES: PERRL, EOMI, sclera non-icteric     HENT: ear canals and TM's normal pharynx in the back of hard palate have many small ulcerations with erythema     NECK: no adenopathy or asymmetry, thyroid normal to palpation     RESP: Expiratory wheezes heard throughout     CV: regular rates and rhythm, no murmurs, rubs, or gallop     MS: extremities normal- no gross deformities noted; normal muscle tone.     SKIN: no suspicious lesions or rashes      KWESI De La Cruz, CNP  [facility] Provider    The use of Dragon/PowerMic  dictation services may have been used to construct the content in this note; any grammatical or spelling errors are non-intentional. Please contact the author of this note directly if you are in need of any clarification.

## 2023-07-31 NOTE — PATIENT INSTRUCTIONS
Discharge Instructions  Upper Respiratory Infection    The upper respiratory tract includes the sinuses, nasal passages, pharynx, and larynx. A URI, or upper respiratory infection, is an infection of any of the parts of the upper airway. Symptoms include runny nose, congestion, sore throat, cough, and fever. URIs are almost always caused by a virus. Antibiotics do not help with virus infections, so are not used for an ordinary URI. A URI is very contagious through coughing and nasal secretions; make sure you wash your hands often and clean surfaces after sneezing, coughing or touching them.  Viruses can live on surfaces for up to 3 days.      Return to the Emergency Department if:  Any of the symptoms you have get much worse.  You seem very sick, like being too weak to get up.  You have any new symptoms, especially serious things like chest pain.   You are short of breath.   You have a severe headache.  You are vomiting so much you can t keep fluids or medicines down.  You have confusion or seem unusually drowsy.  You have a seizure or convulsion.    Follow-up:    You should start to improve in 3 - 5 days.  A cough can linger for up to six weeks, but overall you should be feeling much better.  See your doctor if you have a fever for more than 3 days, or if you are not feeling better within 5 days.      What can I do to help myself?  Fill any prescriptions the doctor gave you and take them right away  If you have a fever, get plenty of rest and drink lots of fluids, especially water. Using a humidifier or saline nose spray will also help loosen secretions.   What clothes or blankets you have on won t change your fever. Do what is comfortable for you.  Bathing or sponging in lukewarm water may help you feel better.  Tylenol  (acetaminophen), Motrin  (ibuprofen), or Advil  (ibuprofen) help bring fever down and may help you feel more comfortable. Be sure to read and follow the package directions, and ask your doctor if  you have questions.  Do not drink alcohol.  Decongestants may help you feel better. You may use decongestant nose sprays Afrin  (oxymetazoline) or Richar-Synephrine  (phenylephrine hydrochloride) for up to 3 days, or may use a decongestant tablet like Sudafed  (pseudoephedrine).  If you were given a prescription for medicine here today, be sure to read all of the information (including the package insert) that comes with your prescription.  This will include important information about the medicine, its side effects, and any warnings that you need to know about.  The pharmacist who fills the prescription can provide more information and answer questions you may have about the medicine.  If you have questions or concerns that the pharmacist cannot address, please call or return to the Emergency Department.   Opioid Medication Information    Pain medications are among the most commonly prescribed medicines, so we are including this information for all our patients. If you did not receive pain medication or get a prescription for pain medicine, you can ignore it.     You may have been given a prescription for an opioid (narcotic) pain medicine and/or have received a pain medicine while here in the Emergency Department. These medicines can make you drowsy or impaired. You must not drive, operate dangerous equipment, or engage in any other dangerous activities while taking these medications. If you drive while taking these medications, you could be arrested for DUI, or driving under the influence. Do not drink any alcohol while you are taking these medications.     Opioid pain medications can cause addiction. If you have a history of chemical dependency of any type, you are at a higher risk of becoming addicted to pain medications.  Only take these prescribed medications to treat your pain when all other options have been tried. Take it for as short a time and as few doses as possible. Store your pain pills in a secure  place, as they are frequently stolen and provide a dangerous opportunity for children or visitors in your house to start abusing these powerful medications. We will not replace any lost or stolen medicine.  As soon as your pain is better, you should flush all your remaining medication.     Many prescription pain medications contain Tylenol  (acetaminophen), including Vicodin , Tylenol #3 , Norco , Lortab , and Percocet .  You should not take any extra pills of Tylenol  if you are using these prescription medications or you can get very sick.  Do not ever take more than 3000 mg of acetaminophen in any 24 hour period.    All opioids tend to cause constipation. Drink plenty of water and eat foods that have a lot of fiber, such as fruits, vegetables, prune juice, apple juice and high fiber cereal.  Take a laxative if you don t move your bowels at least every other day. Miralax , Milk of Magnesia, Colace , or Senna  can be used to keep you regular.      Remember that you can always come back to the Emergency Department if you are not able to see your regular doctor in the amount of time listed above, if you get any new symptoms, or if there is anything that worries you.

## 2023-08-17 NOTE — PROGRESS NOTES
Pulmonary Clinic Note    Date of Service: 8/18/2023     Chief Complaint   Patient presents with    Pulmonary emphysema, unspecified emphysema type       A/P:  72F smoker being seen for COPD. PFTs w/ mild obstruction and evidence of air-trapping consistent with COPD. CT w/ moderate emphysema.     - start LABA-LAMA (Stiolto) daily  - may use Combivent prn  - enrolled in lung Ca screening program, next due 1/2024  - [facility] smoking cessation referral  - OK to substitute medications based on formulary     History:  72F CLL (on observation), HTN, HLD, smoker being seen for emphysema. Not on maintenance inhalers. Prescribed combivent. Breathing is OK. Notices CRUZ w/ steps. Able to walk her dog at least 2 miles and that is OK. No SOB at rest. Occasional wheezing. No chest pain or tightness. No cough. No orthopnea or PND. No LE edema. Using combivent QID, thinks it has helped some, has only been on this for a few weeks.     Smoking: current, 55 years, 1/2PPD, previously 1PPD   Bird exposure: no             Animal exposure: dog        Inhalation exposure: no    Occupation: retired, former                           10 point review of systems negative, aside from that mentioned in HPI.    BP (!) 162/76   Pulse 62   Wt 56.9 kg (125 lb 6.4 oz)   SpO2 97%   BMI 21.19 kg/m    Gen: well-appearing  HEENT: Mallampati III  Card: RRR  Pulm: clear bilaterally   Abd: soft  MSK: no edema, no acute joint abnormality   Skin: no obvious rash  Psych: normal affect  Neuro: alert and oriented     Labs:  Personally reviewed    Imaging/Studies: Personally reviewed  PFTs (8/2023) - mild obstruction, no significant BD response, e/o air-trapping w/o hyperinflation, normal DLCOunc  CXR (7/2023) - hyperinflation   Lung Ca screening CT (1/2023) - new 2mm R apex nodule, few other stable small nodules, moderate emphysema     Past Medical History:   Diagnosis Date    Ankle fracture 2009    L    Anxiety     Chronic back pain     Chronic  lymphocytic leukemia (H)     Colon polyp 2012    repeat colonoscopy 5 years.    Fx low femur epiphy-closed (H)     GERD (gastroesophageal reflux disease)     History of UTI 2017    Cysto by Dr Agustin neg    HTN (hypertension), benign     Hyperlipidemia LDL goal < 100     Normal nuclear stress test 12/2009    EF 67%    Osteopenia     PAD (peripheral artery disease) (H)     s/p fem pop bypass; embolectomy    Polycythemia vera (H) 06/15/2022    PONV (postoperative nausea and vomiting)     PUD (peptic ulcer disease) 1980s    DU    Pulmonary emphysema, unspecified emphysema type (H) 1/12/2023    Smoker     Vitamin D deficiency      Past Surgical History:   Procedure Laterality Date    ANGIOGRAM Right 11/18/2022    Procedure: ANGIOGRAM AORTO ILIAC ANGIOGRAM;  Surgeon: Shaun Tran MD;  Location:  OR    Blood clot removal from Stent      2011    BONE MARROW BIOPSY, BONE SPECIMEN, NEEDLE/TROCAR N/A 02/02/2021    Procedure: bone marrow biopsy;  Surgeon: Kailey Cota MD;  Location:  GI    BYPASS GRAFT AORTOFEMORAL  05/2002    Dr. Turner    BYPASS GRAFT FEMOROPOPLITEAL  12/16/2011    COLONOSCOPY N/A 01/18/2018    Procedure: COMBINED COLONOSCOPY, SINGLE OR MULTIPLE BIOPSY/POLYPECTOMY BY BIOPSY;  COLONOSCOPY;  Surgeon: Efrain Hernadez MD;  Location:  GI    COLONOSCOPY N/A 3/29/2023    Procedure: COLONOSCOPY, FLEXIBLE, WITH LESION REMOVAL USING SNARE;  Surgeon: Jony Manriquez MD;  Location:  GI    DILATION AND CURETTAGE, OPERATIVE HYSTEROSCOPY, COMBINED N/A 09/15/2022    Procedure: HYSTEROSCOPY, WITH DILATION AND CURETTAGE OF UTERUS;  Surgeon: Destiney Schaefer MD;  Location:  OR    EMBOLECTOMY LOWER EXTREMITY  12/16/2011    Procedure:EMBOLECTOMY LOWER EXTREMITY; EMBOLECTOMY, RIGHT POPLITEAL WITH PATCH ANGIOPLASTY. EXCISION SKIN LESION RIGHT LEG. ; Surgeon:PARMINDER VELAZCO; Location: OR    ENDARTERECTOMY FEMORAL Right 11/18/2022    Procedure: ENDARTERECTOMY, FEMORAL RIGHT FEMORAL  "CUTDOWN, RIGHT ILIAC ARTERY STENTING.;  Surgeon: Shaun Tran MD;  Location: [facility] OR    ESOPHAGOSCOPY, GASTROSCOPY, DUODENOSCOPY (EGD), COMBINED N/A 10/07/2022    Procedure: ESOPHAGOGASTRODUODENOSCOPY, WITH BIOPSY;  Surgeon: Felix Carmona MD;  Location: [facility] GI    EXCISE LESION LOWER EXTREMITY  2011    Procedure:EXCISE LESION LOWER EXTREMITY; Surgeon:PARMINDER VELAZCO; Location:[facility] OR    HERNIA REPAIR  11/04/2008    x2 umbilical    IR OR ANGIOGRAM  2022    L achilles repair  2008    LAPAROSCOPIC OOPHORECTOMY Left     L for cyst age 26    miscarriages x 3      tubal ligation and reversal       Family History   Problem Relation Age of Onset    Alzheimer Disease Mother          of panc/GI ca age 83    Osteoporosis Mother     Other Cancer Mother     Cancer Father          age 64 lymphoma    Colon Cancer Maternal Grandfather      Social History     Socioeconomic History    Marital status:      Spouse name: Not on file    Number of children: Not on file    Years of education: Not on file    Highest education level: Not on file   Occupational History    Not on file   Tobacco Use    Smoking status: Every Day     Packs/day: 0.25     Years: 50.00     Pack years: 12.50     Types: Cigarettes    Smokeless tobacco: Never    Tobacco comments:     Nicotine patch and a cigarette \"once in awhile.\"   Vaping Use    Vaping Use: Some days   Substance and Sexual Activity    Alcohol use: Yes     Comment: Beer once in a while    Drug use: No    Sexual activity: Not Currently     Partners: Male   Other Topics Concern    Parent/sibling w/ CABG, MI or angioplasty before 65F 55M? 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  Had 3 miscarriages.  No living children.   is Dustin.  Walks 2miles per day.     Social Determinants of Health     Financial Resource Strain: Not on file   Food Insecurity: Not on file   Transportation Needs: Not " on file   Physical Activity: Not on file   Stress: Not on file   Social Connections: Not on file   Intimate Partner Violence: Not At Risk (6/12/2023)    Humiliation, Afraid, Rape, and Kick questionnaire     Fear of Current or Ex-Partner: No     Emotionally Abused: No     Physically Abused: No     Sexually Abused: No   Housing Stability: Not on file       50 minutes spent reviewing chart, reviewing test results, talking with and examining patient, formulating plan, and documentation on the day of the encounter.    Ez Keys MD  Pulmonary and Critical Care Medicine  [facility]

## 2023-08-18 ENCOUNTER — OFFICE VISIT (OUTPATIENT)
Dept: PULMONOLOGY | Facility: CLINIC | Age: 73
End: 2023-08-18
Attending: INTERNAL MEDICINE
Payer: COMMERCIAL

## 2023-08-18 ENCOUNTER — OFFICE VISIT (OUTPATIENT)
Dept: PULMONOLOGY | Facility: CLINIC | Age: 73
End: 2023-08-18
Payer: COMMERCIAL

## 2023-08-18 VITALS
HEART RATE: 62 BPM | OXYGEN SATURATION: 97 % | BODY MASS INDEX: 21.19 KG/M2 | SYSTOLIC BLOOD PRESSURE: 162 MMHG | WEIGHT: 125.4 LBS | DIASTOLIC BLOOD PRESSURE: 76 MMHG

## 2023-08-18 DIAGNOSIS — J43.9 PULMONARY EMPHYSEMA, UNSPECIFIED EMPHYSEMA TYPE (H): ICD-10-CM

## 2023-08-18 DIAGNOSIS — J43.9 PULMONARY EMPHYSEMA, UNSPECIFIED EMPHYSEMA TYPE (H): Primary | ICD-10-CM

## 2023-08-18 LAB
DLCOUNC-%PRED-PRE: 80 %
DLCOUNC-PRE: 15.39 ML/MIN/MMHG
DLCOUNC-PRED: 19.15 ML/MIN/MMHG
ERV-%PRED-PRE: 18 %
ERV-PRE: 0.13 L
ERV-PRED: 0.73 L
EXPTIME-PRE: 9.11 SEC
FEF2575-%PRED-POST: 57 %
FEF2575-%PRED-PRE: 39 %
FEF2575-POST: 1.01 L/SEC
FEF2575-PRE: 0.69 L/SEC
FEF2575-PRED: 1.76 L/SEC
FEFMAX-%PRED-PRE: 71 %
FEFMAX-PRE: 4.07 L/SEC
FEFMAX-PRED: 5.65 L/SEC
FEV1-%PRED-PRE: 76 %
FEV1-PRE: 1.59 L
FEV1FEV6-PRE: 68 %
FEV1FEV6-PRED: 79 %
FEV1FVC-PRE: 66 %
FEV1FVC-PRED: 79 %
FEV1SVC-PRE: 70 %
FEV1SVC-PRED: 69 %
FIFMAX-PRE: 3.48 L/SEC
FRCPLETH-%PRED-PRE: 125 %
FRCPLETH-PRE: 3.57 L
FRCPLETH-PRED: 2.84 L
FVC-%PRED-PRE: 90 %
FVC-PRE: 2.39 L
FVC-PRED: 2.64 L
IC-%PRED-PRE: 95 %
IC-PRE: 2.13 L
IC-PRED: 2.22 L
RVPLETH-%PRED-PRE: 171 %
RVPLETH-PRE: 3.44 L
RVPLETH-PRED: 2 L
TLCPLETH-%PRED-PRE: 112 %
TLCPLETH-PRE: 5.71 L
TLCPLETH-PRED: 5.06 L
VA-%PRED-PRE: 92 %
VA-PRE: 4.26 L
VC-%PRED-PRE: 75 %
VC-PRE: 2.27 L
VC-PRED: 3.01 L

## 2023-08-18 PROCEDURE — 94729 DIFFUSING CAPACITY: CPT | Performed by: INTERNAL MEDICINE

## 2023-08-18 PROCEDURE — 94060 EVALUATION OF WHEEZING: CPT | Performed by: INTERNAL MEDICINE

## 2023-08-18 PROCEDURE — 99215 OFFICE O/P EST HI 40 MIN: CPT | Mod: 25 | Performed by: STUDENT IN AN ORGANIZED HEALTH CARE EDUCATION/TRAINING PROGRAM

## 2023-08-18 PROCEDURE — 94726 PLETHYSMOGRAPHY LUNG VOLUMES: CPT | Performed by: INTERNAL MEDICINE

## 2023-08-18 RX ORDER — TIOTROPIUM BROMIDE AND OLODATEROL 3.124; 2.736 UG/1; UG/1
2 SPRAY, METERED RESPIRATORY (INHALATION) DAILY
Qty: 4 G | Refills: 5 | Status: SHIPPED | OUTPATIENT
Start: 2023-08-18 | End: 2024-02-12

## 2023-08-18 ASSESSMENT — PAIN SCALES - GENERAL: PAINLEVEL: NO PAIN (0)

## 2023-08-18 NOTE — PROGRESS NOTES
Karen Caban comes into clinic today at the request of Dr. Keys, Ordering Provider for PFT    This service provided today was under the supervising provider of the day Dr. Keys, who was available if needed.    Pj Rajput, RT

## 2023-08-18 NOTE — LETTER
8/18/2023         RE: Karen Caban  [address]  [place] [address]        Dear Colleague,    Thank you for referring your patient, Karen Caban, to the [facility] [place]. Please see a copy of my visit note below.    Pulmonary Clinic Note    Date of Service: 8/18/2023     Chief Complaint   Patient presents with    Pulmonary emphysema, unspecified emphysema type       A/P:  72F smoker being seen for COPD. PFTs w/ mild obstruction and evidence of air-trapping consistent with COPD. CT w/ moderate emphysema.     - start LABA-LAMA (Stiolto) daily  - may use Combivent prn  - enrolled in lung Ca screening program, next due 1/2024  - [facility] smoking cessation referral  - OK to substitute medications based on formulary     History:  72F CLL (on observation), HTN, HLD, smoker being seen for emphysema. Not on maintenance inhalers. Prescribed combivent. Breathing is OK. Notices CRUZ w/ steps. Able to walk her dog at least 2 miles and that is OK. No SOB at rest. Occasional wheezing. No chest pain or tightness. No cough. No orthopnea or PND. No LE edema. Using combivent QID, thinks it has helped some, has only been on this for a few weeks.     Smoking: current, 55 years, 1/2PPD, previously 1PPD   Bird exposure: no             Animal exposure: dog        Inhalation exposure: no    Occupation: retired, former                           10 point review of systems negative, aside from that mentioned in HPI.    BP (!) 162/76   Pulse 62   Wt 56.9 kg (125 lb 6.4 oz)   SpO2 97%   BMI 21.19 kg/m    Gen: well-appearing  HEENT: Mallampati III  Card: RRR  Pulm: clear bilaterally   Abd: soft  MSK: no edema, no acute joint abnormality   Skin: no obvious rash  Psych: normal affect  Neuro: alert and oriented     Labs:  Personally reviewed    Imaging/Studies: Personally reviewed  PFTs (8/2023) - mild obstruction, no significant BD response, e/o air-trapping w/o hyperinflation, normal  DLCOunc  CXR (7/2023) - hyperinflation   Lung Ca screening CT (1/2023) - new 2mm R apex nodule, few other stable small nodules, moderate emphysema     Past Medical History:   Diagnosis Date    Ankle fracture 2009    L    Anxiety     Chronic back pain     Chronic lymphocytic leukemia (H)     Colon polyp 2012    repeat colonoscopy 5 years.    Fx low femur epiphy-closed (H)     GERD (gastroesophageal reflux disease)     History of UTI 2017    Cysto by Dr Agustin neg    HTN (hypertension), benign     Hyperlipidemia LDL goal < 100     Normal nuclear stress test 12/2009    EF 67%    Osteopenia     PAD (peripheral artery disease) (H)     s/p fem pop bypass; embolectomy    Polycythemia vera (H) 06/15/2022    PONV (postoperative nausea and vomiting)     PUD (peptic ulcer disease) 1980s    DU    Pulmonary emphysema, unspecified emphysema type (H) 1/12/2023    Smoker     Vitamin D deficiency      Past Surgical History:   Procedure Laterality Date    ANGIOGRAM Right 11/18/2022    Procedure: ANGIOGRAM AORTO ILIAC ANGIOGRAM;  Surgeon: Shaun Tran MD;  Location:  OR    Blood clot removal from Stent      2011    BONE MARROW BIOPSY, BONE SPECIMEN, NEEDLE/TROCAR N/A 02/02/2021    Procedure: bone marrow biopsy;  Surgeon: Kailey Cota MD;  Location:  GI    BYPASS GRAFT AORTOFEMORAL  05/2002    Dr. Turner    BYPASS GRAFT FEMOROPOPLITEAL  12/16/2011    COLONOSCOPY N/A 01/18/2018    Procedure: COMBINED COLONOSCOPY, SINGLE OR MULTIPLE BIOPSY/POLYPECTOMY BY BIOPSY;  COLONOSCOPY;  Surgeon: Efrain Hernadez MD;  Location:  GI    COLONOSCOPY N/A 3/29/2023    Procedure: COLONOSCOPY, FLEXIBLE, WITH LESION REMOVAL USING SNARE;  Surgeon: Jony Manriquez MD;  Location:  GI    DILATION AND CURETTAGE, OPERATIVE HYSTEROSCOPY, COMBINED N/A 09/15/2022    Procedure: HYSTEROSCOPY, WITH DILATION AND CURETTAGE OF UTERUS;  Surgeon: Destiney Schaefer MD;  Location:  OR    EMBOLECTOMY LOWER EXTREMITY  12/16/2011     "Procedure:EMBOLECTOMY LOWER EXTREMITY; EMBOLECTOMY, RIGHT POPLITEAL WITH PATCH ANGIOPLASTY. EXCISION SKIN LESION RIGHT LEG. ; Surgeon:PARMINDER VELAZCO; Location: OR    ENDARTERECTOMY FEMORAL Right 2022    Procedure: ENDARTERECTOMY, FEMORAL RIGHT FEMORAL CUTDOWN, RIGHT ILIAC ARTERY STENTING.;  Surgeon: Shaun Tran MD;  Location:  OR    ESOPHAGOSCOPY, GASTROSCOPY, DUODENOSCOPY (EGD), COMBINED N/A 10/07/2022    Procedure: ESOPHAGOGASTRODUODENOSCOPY, WITH BIOPSY;  Surgeon: Felix Carmona MD;  Location:  GI    EXCISE LESION LOWER EXTREMITY  2011    Procedure:EXCISE LESION LOWER EXTREMITY; Surgeon:PARMINDER VELAZCO; Location:[facility] OR    HERNIA REPAIR  11/04/2008    x2 umbilical    IR OR ANGIOGRAM  2022    L achilles repair  2008    LAPAROSCOPIC OOPHORECTOMY Left     L for cyst age 26    miscarriages x 3      tubal ligation and reversal       Family History   Problem Relation Age of Onset    Alzheimer Disease Mother          of panc/GI ca age 83    Osteoporosis Mother     Other Cancer Mother     Cancer Father          age 64 lymphoma    Colon Cancer Maternal Grandfather      Social History     Socioeconomic History    Marital status:      Spouse name: Not on file    Number of children: Not on file    Years of education: Not on file    Highest education level: Not on file   Occupational History    Not on file   Tobacco Use    Smoking status: Every Day     Packs/day: 0.25     Years: 50.00     Pack years: 12.50     Types: Cigarettes    Smokeless tobacco: Never    Tobacco comments:     Nicotine patch and a cigarette \"once in awhile.\"   Vaping Use    Vaping Use: Some days   Substance and Sexual Activity    Alcohol use: Yes     Comment: Beer once in a while    Drug use: No    Sexual activity: Not Currently     Partners: Male   Other Topics Concern    Parent/sibling w/ CABG, MI or angioplasty before 65F 55M? 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  Had 3 miscarriages.  No living children.   is Dustin.  Walks 2miles per day.     Social Determinants of Health     Financial Resource Strain: Not on file   Food Insecurity: Not on file   Transportation Needs: Not on file   Physical Activity: Not on file   Stress: Not on file   Social Connections: Not on file   Intimate Partner Violence: Not At Risk (6/12/2023)    Humiliation, Afraid, Rape, and Kick questionnaire     Fear of Current or Ex-Partner: No     Emotionally Abused: No     Physically Abused: No     Sexually Abused: No   Housing Stability: Not on file       50 minutes spent reviewing chart, reviewing test results, talking with and examining patient, formulating plan, and documentation on the day of the encounter.    Ez Keys MD  Pulmonary and Critical Care Medicine  [facility]

## 2023-08-18 NOTE — PATIENT INSTRUCTIONS
Thank you for coming to pulmonary clinic. Your pulmonary function tests show mild obstruction consistent with COPD. Your chest imaging shows emphysema consistent with COPD. I would like you to start Stiolto once daily, you may use Combivent as needed. We will see you back in 6 months.

## 2023-08-22 ENCOUNTER — TELEPHONE (OUTPATIENT)
Dept: PULMONOLOGY | Facility: CLINIC | Age: 73
End: 2023-08-22
Payer: COMMERCIAL

## 2023-08-22 NOTE — TELEPHONE ENCOUNTER
MTM referral from: [facility] visit (referral by provider)    MTM referral outreach attempt #2 on August 22, 2023 at 9:39 AM      Outcome: Patient not reachable after several attempts, will route to MT Pharmacist/Provider as an FYI.  [facility] scheduling number is [phone].  Thank you for the referral.    Use [employer] Part D/MAP  for the carrier/Plan on the flowsheet    Marybel Oliva - [facility]

## 2023-08-22 NOTE — TELEPHONE ENCOUNTER
Sent patient myChart msg.  Abril Lopez, PharmD, [facility]  Medication Therapy Management Provider  Pager: [phone]

## 2023-08-23 ENCOUNTER — HOSPITAL ENCOUNTER (OUTPATIENT)
Dept: CARDIOLOGY | Facility: CLINIC | Age: 73
Discharge: HOME | End: 2023-08-23
Attending: INTERNAL MEDICINE | Admitting: INTERNAL MEDICINE
Payer: COMMERCIAL

## 2023-08-23 DIAGNOSIS — I10 PRIMARY HYPERTENSION: ICD-10-CM

## 2023-08-23 DIAGNOSIS — R06.00 DYSPNEA, UNSPECIFIED TYPE: ICD-10-CM

## 2023-08-23 LAB — LVEF ECHO: NORMAL

## 2023-08-23 PROCEDURE — 93306 TTE W/DOPPLER COMPLETE: CPT | Mod: 26 | Performed by: INTERNAL MEDICINE

## 2023-08-23 PROCEDURE — 93306 TTE W/DOPPLER COMPLETE: CPT

## 2023-08-29 DIAGNOSIS — E87.6 LOW BLOOD POTASSIUM: ICD-10-CM

## 2023-08-29 DIAGNOSIS — I10 HTN (HYPERTENSION), BENIGN: ICD-10-CM

## 2023-08-29 RX ORDER — POTASSIUM CHLORIDE 1500 MG/1
TABLET, EXTENDED RELEASE ORAL
Qty: 30 TABLET | Refills: 1 | Status: SHIPPED | OUTPATIENT
Start: 2023-08-29 | End: 2024-03-25

## 2023-08-29 RX ORDER — CHLORTHALIDONE 25 MG/1
TABLET ORAL
Qty: 90 TABLET | Refills: 0 | Status: SHIPPED | OUTPATIENT
Start: 2023-08-29 | End: 2023-09-10

## 2023-09-10 ENCOUNTER — OFFICE VISIT (OUTPATIENT)
Dept: URGENT CARE | Facility: URGENT CARE | Age: 73
End: 2023-09-10
Payer: COMMERCIAL

## 2023-09-10 VITALS
TEMPERATURE: 98.2 F | DIASTOLIC BLOOD PRESSURE: 82 MMHG | OXYGEN SATURATION: 100 % | BODY MASS INDEX: 21.63 KG/M2 | RESPIRATION RATE: 16 BRPM | WEIGHT: 128 LBS | SYSTOLIC BLOOD PRESSURE: 158 MMHG | HEART RATE: 80 BPM

## 2023-09-10 DIAGNOSIS — I10 HTN (HYPERTENSION), BENIGN: ICD-10-CM

## 2023-09-10 DIAGNOSIS — J01.00 ACUTE NON-RECURRENT MAXILLARY SINUSITIS: Primary | ICD-10-CM

## 2023-09-10 DIAGNOSIS — R07.0 THROAT PAIN: ICD-10-CM

## 2023-09-10 LAB
DEPRECATED S PYO AG THROAT QL EIA: NEGATIVE
GROUP A STREP BY PCR: NOT DETECTED

## 2023-09-10 PROCEDURE — 99214 OFFICE O/P EST MOD 30 MIN: CPT | Performed by: NURSE PRACTITIONER

## 2023-09-10 PROCEDURE — 87651 STREP A DNA AMP PROBE: CPT | Performed by: NURSE PRACTITIONER

## 2023-09-10 NOTE — PROGRESS NOTES
Chief Complaint   Patient presents with    Ear Problem     Patient presents with complain of sore throat, ear pain, teeth pain, chills and headache for the last ten days. Negative Covid test at home          ICD-10-CM    1. Acute non-recurrent maxillary sinusitis  J01.00 amoxicillin-clavulanate (AUGMENTIN) 875-125 MG tablet      2. Throat pain  R07.0 Streptococcus A Rapid Screen w/Reflex to PCR - Clinic Collect     Group A Streptococcus PCR Throat Swab      3. HTN (hypertension), benign  I10       Antibiotics as prescribed.  Recheck in 10 days if any symptoms remain.  Tylenol as needed for pain or discomfort.    Patient's blood pressure is high in clinic today even on recheck.  She is taking her medications as prescribed.  She has not been checking her blood pressure at home but is instructed to start doing so and keep a log of this to discuss with her primary care provider if it remains elevated.  Encouraged her to follow a low-salt diet.      Results for orders placed or performed in visit on 09/10/23 (from the past 24 hour(s))   Streptococcus A Rapid Screen w/Reflex to PCR - Clinic Collect    Specimen: Throat; Swab   Result Value Ref Range    Group A Strep antigen Negative Negative       Subjective     Karen Caban is an 72 year old female who presents to clinic today for right ear pain , swoillen glands, sore throat, teeth pain and sinus pressure for ten days      ROS: 10 point ROS neg other than the symptoms noted above in the HPI.       Objective    BP (!) 158/82   Pulse 80   Temp 98.2  F (36.8  C) (Oral)   Resp 16   Wt 58.1 kg (128 lb)   SpO2 100%   BMI 21.63 kg/m    Nurses notes and VS have been reviewed.    Physical Exam       GENERAL APPEARANCE: alert and mild distress     EYES: PERRL, EOMI, sclera non-icteric     HENT: ear canals and TM's normal, nose and mouth without ulcers or lesions, and maxillary sinus tenderness bilateral, erythema of pharynx     NECK: no adenopathy or asymmetry, thyroid  normal to palpation     RESP: lungs clear to auscultation - no rales, rhonchi or wheezes     CV: regular rates and rhythm, no murmurs, rubs, or gallop     MS: extremities normal- no gross deformities noted; normal muscle tone.     SKIN: no suspicious lesions or rashes      KWESI De La Cruz, CNP  [facility] Provider    The use of Dragon/[employer] dictation services may have been used to construct the content in this note; any grammatical or spelling errors are non-intentional. Please contact the author of this note directly if you are in need of any clarification.

## 2023-09-10 NOTE — PATIENT INSTRUCTIONS
Please purchase a blood pressure monitoring cuff that can go on the wrist or arm.  If you have larger arms please use a wrist monitor.  Check your blood pressure twice a day for the next 2 weeks and keep a log of this.  If the top number is averaging over 140 and the bottom number is averaging greater than 90 please make appointment to see your primary care provider to discuss possible treatment.    Take Tylenol or Ibuprofen as needed for fever or pain.    Drink Plenty of water and rest.   
Attending Attestation (For Attendings USE Only)...

## 2023-09-12 ENCOUNTER — LAB (OUTPATIENT)
Dept: LAB | Facility: CLINIC | Age: 73
End: 2023-09-12
Payer: COMMERCIAL

## 2023-09-12 ENCOUNTER — TELEPHONE (OUTPATIENT)
Dept: ONCOLOGY | Facility: CLINIC | Age: 73
End: 2023-09-12

## 2023-09-12 DIAGNOSIS — M85.852 OSTEOPENIA OF BOTH HIPS: ICD-10-CM

## 2023-09-12 DIAGNOSIS — M85.851 OSTEOPENIA OF BOTH HIPS: ICD-10-CM

## 2023-09-12 DIAGNOSIS — C91.10 CLL (CHRONIC LYMPHOCYTIC LEUKEMIA) (H): ICD-10-CM

## 2023-09-12 DIAGNOSIS — I10 PRIMARY HYPERTENSION: ICD-10-CM

## 2023-09-12 DIAGNOSIS — E78.5 HYPERLIPIDEMIA, UNSPECIFIED HYPERLIPIDEMIA TYPE: ICD-10-CM

## 2023-09-12 LAB
BASOPHILS # BLD MANUAL: 0 10E3/UL (ref 0–0.2)
BASOPHILS NFR BLD MANUAL: 0 %
EOSINOPHIL # BLD MANUAL: 0.4 10E3/UL (ref 0–0.7)
EOSINOPHIL NFR BLD MANUAL: 1 %
ERYTHROCYTE [DISTWIDTH] IN BLOOD BY AUTOMATED COUNT: 16.2 % (ref 10–15)
HCT VFR BLD AUTO: 43.1 % (ref 35–47)
HGB BLD-MCNC: 13.7 G/DL (ref 11.7–15.7)
LYMPHOCYTES # BLD MANUAL: 40 10E3/UL (ref 0.8–5.3)
LYMPHOCYTES NFR BLD MANUAL: 92 %
MCH RBC QN AUTO: 29.4 PG (ref 26.5–33)
MCHC RBC AUTO-ENTMCNC: 31.8 G/DL (ref 31.5–36.5)
MCV RBC AUTO: 93 FL (ref 78–100)
MONOCYTES # BLD MANUAL: 0 10E3/UL (ref 0–1.3)
MONOCYTES NFR BLD MANUAL: 0 %
NEUTROPHILS # BLD MANUAL: 3 10E3/UL (ref 1.6–8.3)
NEUTROPHILS NFR BLD MANUAL: 7 %
PLAT MORPH BLD: ABNORMAL
PLATELET # BLD AUTO: 220 10E3/UL (ref 150–450)
RBC # BLD AUTO: 4.66 10E6/UL (ref 3.8–5.2)
RBC MORPH BLD: ABNORMAL
SMUDGE CELLS BLD QL SMEAR: PRESENT
WBC # BLD AUTO: 43.5 10E3/UL (ref 4–11)

## 2023-09-12 PROCEDURE — 85007 BL SMEAR W/DIFF WBC COUNT: CPT

## 2023-09-12 PROCEDURE — 36415 COLL VENOUS BLD VENIPUNCTURE: CPT

## 2023-09-12 PROCEDURE — 82306 VITAMIN D 25 HYDROXY: CPT

## 2023-09-12 PROCEDURE — 85027 COMPLETE CBC AUTOMATED: CPT

## 2023-09-12 PROCEDURE — 80061 LIPID PANEL: CPT

## 2023-09-12 PROCEDURE — 83615 LACTATE (LD) (LDH) ENZYME: CPT

## 2023-09-12 PROCEDURE — 84550 ASSAY OF BLOOD/URIC ACID: CPT

## 2023-09-12 PROCEDURE — 80048 BASIC METABOLIC PNL TOTAL CA: CPT

## 2023-09-12 NOTE — TELEPHONE ENCOUNTER
DATE/TIME OF CALL RECEIVED FROM LAB:  09/12/23 at 1:38 PM     LAB TEST & LAB VALUE:  WBC 43.4 preliminary    Previous Labs from 7/2/23, WBC 46.0    PROVIDER NOTIFIED?: Yes    PROVIDER NAME: Dr. Osorio    TIME LAB VALUE REPORTED TO PROVIDER:   1:46 PM    MECHANISM OF PROVIDER NOTIFICATION:  routing message    PROVIDER RESPONSE: will await

## 2023-09-13 LAB
ANION GAP SERPL CALCULATED.3IONS-SCNC: 12 MMOL/L (ref 7–15)
BUN SERPL-MCNC: 14.4 MG/DL (ref 8–23)
CALCIUM SERPL-MCNC: 9.7 MG/DL (ref 8.8–10.2)
CHLORIDE SERPL-SCNC: 106 MMOL/L (ref 98–107)
CHOLEST SERPL-MCNC: 119 MG/DL
CREAT SERPL-MCNC: 0.61 MG/DL (ref 0.51–0.95)
DEPRECATED CALCIDIOL+CALCIFEROL SERPL-MC: 48 UG/L (ref 20–75)
DEPRECATED HCO3 PLAS-SCNC: 25 MMOL/L (ref 22–29)
EGFRCR SERPLBLD CKD-EPI 2021: >90 ML/MIN/1.73M2
GLUCOSE SERPL-MCNC: 93 MG/DL (ref 70–99)
HDLC SERPL-MCNC: 50 MG/DL
LDH SERPL L TO P-CCNC: 255 U/L (ref 0–250)
LDLC SERPL CALC-MCNC: 47 MG/DL
NONHDLC SERPL-MCNC: 69 MG/DL
POTASSIUM SERPL-SCNC: 4.7 MMOL/L (ref 3.4–5.3)
SODIUM SERPL-SCNC: 143 MMOL/L (ref 136–145)
TRIGL SERPL-MCNC: 108 MG/DL
URATE SERPL-MCNC: 4.5 MG/DL (ref 2.4–5.7)

## 2023-09-13 NOTE — RESULT ENCOUNTER NOTE
Dear Ms. Caban,    CBC is stable. CLL is stable.    Please, call me with any questions.    Sancho Osorio MD

## 2023-09-13 NOTE — TELEPHONE ENCOUNTER
Per Dr. Osorio, [facility] stable. Will continue to monitor.     Precious Green, RN, BSN, PHN, OCN  [facility]

## 2023-09-25 ENCOUNTER — VIRTUAL VISIT (OUTPATIENT)
Dept: PHARMACY | Facility: CLINIC | Age: 73
End: 2023-09-25
Attending: STUDENT IN AN ORGANIZED HEALTH CARE EDUCATION/TRAINING PROGRAM
Payer: COMMERCIAL

## 2023-09-25 DIAGNOSIS — J43.9 PULMONARY EMPHYSEMA, UNSPECIFIED EMPHYSEMA TYPE (H): ICD-10-CM

## 2023-09-25 DIAGNOSIS — Z23 NEED FOR VACCINATION: ICD-10-CM

## 2023-09-25 DIAGNOSIS — I10 HTN (HYPERTENSION), BENIGN: ICD-10-CM

## 2023-09-25 DIAGNOSIS — F17.200 SMOKER: Primary | ICD-10-CM

## 2023-09-25 PROCEDURE — 99607 MTMS BY PHARM ADDL 15 MIN: CPT | Performed by: PHARMACIST

## 2023-09-25 PROCEDURE — 99606 MTMS BY PHARM EST 15 MIN: CPT | Performed by: PHARMACIST

## 2023-09-25 RX ORDER — POLYETHYLENE GLYCOL 3350 17 G
2 POWDER IN PACKET (EA) ORAL
COMMUNITY

## 2023-09-25 RX ORDER — NICOTINE 21 MG/24HR
1 PATCH, TRANSDERMAL 24 HOURS TRANSDERMAL EVERY 24 HOURS
COMMUNITY

## 2023-09-25 RX ORDER — ACETAMINOPHEN 160 MG/5ML
300 SUSPENSION, ORAL (FINAL DOSE FORM) ORAL DAILY
COMMUNITY
End: 2024-01-26

## 2023-09-25 NOTE — PROGRESS NOTES
Medication Therapy Management (MTM) Encounter    ASSESSMENT:                            Medication Adherence/Access: No issues identified    Smoking Cessation:   She's made good progress over the last month.  Will stay at 14mg nicotine patch since she continues having significant cravings.  Could use nicotine lozenges more frequently, would benefit from avoiding e-cigarette.    COPD:   Improved.    Hypertension:   Patient is meeting blood pressure goal of < 140/90mmHg with home monitoring.     Immunizations:  Due for COVID-19 booster, RSV vaccine and annual influenza vaccine.    PLAN:                            Continue 14mg nicotine patches.  Advised she can use nicotine lozenges every hour as needed.  Recommended avoiding e-cigarette.  Recommended RSV, COVID-19 and influenza vaccines.    Follow-up: Return in about 31 days (around 10/26/2023) for Follow-up Medication Review.    SUBJECTIVE/OBJECTIVE:                          Humaira Caban is a 72 year old female called for a follow-up visit from 4/20/2023.       Reason for visit: Smoking cessation.    Tobacco: She reports that she has been smoking cigarettes. She has a 12.50 pack-year smoking history. She has never used smokeless tobacco.Nicotine/Tobacco Cessation Plan:   See below  Alcohol: 1-3 beverages / week    Medication Adherence/Access: no issues reported    Smoking cessation:    Nicotine 2mg lozenge as needed - using every 3-4 hours  Nicotine 14mg patches daily  Patient reports things going well.  She's been doing nicotine replacement therapy again for about a month, feels she's doing ok at this point.  She's not smoking any cigarettes, occasionally using e-cigarette.  She continues to have significant cravings.  Was smoking <1 PPD prior to starting NRT.  She has noticed mornings are the most challenging time of day for her - she's been walking her dog in the AM which has been helpful.  No side effects reported.    COPD:   Stioloto Respimat 2 puffs daily -  started at pulmonology visit in October  Combivent as needed - rarely  Patient reports no current medication side effects.    Patient reports the following symptoms: none, symptoms have improved since starting Stioloto.     Hypertension   Amlodipine 2.5mg daily  Metoprolol ER 100mg daily  Patient reports no current medication side effects.  Patient self-monitors blood pressure.  Home blood pressure monitoring 138/78mmHg.       BP Readings from Last 3 Encounters:   09/10/23 (!) 158/82   08/18/23 (!) 162/76   07/31/23 (!) 142/81     Pulse Readings from Last 3 Encounters:   09/10/23 80   08/18/23 62   07/31/23 67      Immunizations:  Most Recent Immunizations   Administered Date(s) Administered    COVID-19 Monovalent 18+ (Moderna) 11/02/2021    Covid-19 Vaccine (Novavax) 08/05/2022    Hepatitis A (ADULT 19+) 07/10/2017    Influenza (High Dose) 3 valent vaccine 10/15/2021    Influenza (IIV3) PF 10/08/2016    Influenza Vaccine 65+ (Fluzone HD) 09/30/2022    Pneumo Conj 13-V (2010&after) 12/13/2016    Pneumococcal 23 valent 07/24/2020    TD,PF 7+ (Tenivac) 11/05/2018    TDAP (Adacel,Boostrix) 10/15/2008    Zoster recombinant adjuvanted (SHINGRIX) 05/21/2018    Zoster vaccine, live 06/25/2007      Today's Vitals: There were no vitals taken for this visit.  ----------------    I spent 19 minutes with this patient today. A copy of the visit note was provided to the patient's provider(s).    A summary of these recommendations was sent via [employer].    Abril Lopez, PharmD, BCACP  Medication Therapy Management Provider  Pager: [phone]     Telemedicine Visit Details  Type of service:  Telephone visit  Start Time: 9:35 AM  End Time: 9:54 AM     Medication Therapy Recommendations  Need for vaccination    Rationale: Preventive therapy - Needs additional medication therapy - Indication   Recommendation: Order Vaccine - COVID-19 booster, RSV vaccine and influenza vaccine   Status: Accepted - no CPA Needed         Smoker     Current Medication: nicotine (COMMIT) 2 MG lozenge   Rationale: Frequency inappropriate - Dosage too low - Effectiveness   Recommendation: Increase Frequency   Status: Accepted - no CPA Needed

## 2023-09-25 NOTE — PATIENT INSTRUCTIONS
"Recommendations from today's MTM visit:                                                    MTM (medication therapy management) is a service provided by a clinical pharmacist designed to help you get the most of out of your medicines.   Today we reviewed what your medicines are for, how to know if they are working, that your medicines are safe and how to make your medicine regimen as easy as possible.      Continue 14mg nicotine patches.    Advised she can use nicotine lozenges every hour as needed.  Recommended avoiding e-cigarette.    Recommended RSV, COVID-19 and influenza vaccines.    Follow-up: Return in about 31 days (around 10/26/2023) for Follow-up Medication Review.    It was great speaking with you today.  I value your experience and would be very thankful for your time in providing feedback in our clinic survey. In the next few days, you may receive an email or text message from [employer] with a link to a survey related to your  clinical pharmacist.\"     To schedule another MTM appointment, please call the clinic directly or you may call the MTM scheduling line at [phone] or toll-free at [phone].     My Clinical Pharmacist's contact information:                                                      Please feel free to contact me with any questions or concerns you have.      Abril Lopez, Shankar, BCACP  Medication Therapy Management Provider  Pager: [phone]    "

## 2023-09-26 ENCOUNTER — OFFICE VISIT (OUTPATIENT)
Dept: FAMILY MEDICINE | Facility: CLINIC | Age: 73
End: 2023-09-26
Payer: COMMERCIAL

## 2023-09-26 VITALS
DIASTOLIC BLOOD PRESSURE: 70 MMHG | BODY MASS INDEX: 21.49 KG/M2 | HEIGHT: 65 IN | SYSTOLIC BLOOD PRESSURE: 123 MMHG | OXYGEN SATURATION: 98 % | HEART RATE: 64 BPM | RESPIRATION RATE: 20 BRPM | TEMPERATURE: 96.8 F | WEIGHT: 129 LBS

## 2023-09-26 DIAGNOSIS — J34.89 SINUS PAIN: Primary | ICD-10-CM

## 2023-09-26 LAB — SARS-COV-2 RNA RESP QL NAA+PROBE: NEGATIVE

## 2023-09-26 PROCEDURE — 87635 SARS-COV-2 COVID-19 AMP PRB: CPT | Performed by: PHYSICIAN ASSISTANT

## 2023-09-26 PROCEDURE — 99213 OFFICE O/P EST LOW 20 MIN: CPT | Performed by: PHYSICIAN ASSISTANT

## 2023-09-26 RX ORDER — PREDNISONE 20 MG/1
20 TABLET ORAL 2 TIMES DAILY
Qty: 14 TABLET | Refills: 0 | Status: SHIPPED | OUTPATIENT
Start: 2023-09-26 | End: 2023-12-13

## 2023-09-26 ASSESSMENT — PAIN SCALES - GENERAL: PAINLEVEL: MODERATE PAIN (5)

## 2023-09-26 NOTE — PROGRESS NOTES
Yeni Gerardo is a 72 year old, presenting for the following health issues: R ear, gland, neck, throat pain  Ear Problem    Pt was in urgent care on 9/10/23 for same sxs  She took Augmentin x 10 days without relief  Still has a sore throat kelly on the right  Gland on right feels sore  She's had 3 neg covid tests at home; last was one week ago  She is compliant with using her flonase  She is not on any antihistamines and denies hx of seasonal allergies    Feels sick but no fever  No cough or sputum   Breathing seems stable      Pt working with MTM on smoking cessation    History of Present Illness       Reason for visit:  Ear ache and sore gland    She eats 2-3 servings of fruits and vegetables daily.She consumes 1 sweetened beverage(s) daily.She exercises with enough effort to increase her heart rate 30 to 60 minutes per day.  She exercises with enough effort to increase her heart rate 3 or less days per week.   She is taking medications regularly.       Past Medical History:   Diagnosis Date    Ankle fracture 2009    L    Anxiety     Chronic back pain     Chronic lymphocytic leukemia (H)     Colon polyp 2012    repeat colonoscopy 5 years.    Fx low femur epiphy-closed (H)     GERD (gastroesophageal reflux disease)     History of UTI 2017    Cysto by Dr Agustin neg    HTN (hypertension), benign     Hyperlipidemia LDL goal < 100     Normal nuclear stress test 12/2009    EF 67%    Osteopenia     PAD (peripheral artery disease) (H)     s/p fem pop bypass; embolectomy    Polycythemia vera (H) 06/15/2022    PONV (postoperative nausea and vomiting)     PUD (peptic ulcer disease) 1980s    DU    Pulmonary emphysema, unspecified emphysema type (H) 1/12/2023    Smoker     Vitamin D deficiency      Past Surgical History:   Procedure Laterality Date    ANGIOGRAM Right 11/18/2022    Procedure: ANGIOGRAM AORTO ILIAC ANGIOGRAM;  Surgeon: Shaun Tran MD;  Location: [facility] OR    Blood clot removal from Stent      2011     BONE MARROW BIOPSY, BONE SPECIMEN, NEEDLE/TROCAR N/A 02/02/2021    Procedure: bone marrow biopsy;  Surgeon: Kailey Cota MD;  Location:  GI    BYPASS GRAFT AORTOFEMORAL  05/2002    Dr. Turner    BYPASS GRAFT FEMOROPOPLITEAL  12/16/2011    COLONOSCOPY N/A 01/18/2018    Procedure: COMBINED COLONOSCOPY, SINGLE OR MULTIPLE BIOPSY/POLYPECTOMY BY BIOPSY;  COLONOSCOPY;  Surgeon: Efrain Hernadez MD;  Location:  GI    COLONOSCOPY N/A 3/29/2023    Procedure: COLONOSCOPY, FLEXIBLE, WITH LESION REMOVAL USING SNARE;  Surgeon: Jony Manriquez MD;  Location:  GI    DILATION AND CURETTAGE, OPERATIVE HYSTEROSCOPY, COMBINED N/A 09/15/2022    Procedure: HYSTEROSCOPY, WITH DILATION AND CURETTAGE OF UTERUS;  Surgeon: Destiney Schaefer MD;  Location:  OR    EMBOLECTOMY LOWER EXTREMITY  12/16/2011    Procedure:EMBOLECTOMY LOWER EXTREMITY; EMBOLECTOMY, RIGHT POPLITEAL WITH PATCH ANGIOPLASTY. EXCISION SKIN LESION RIGHT LEG. ; Surgeon:PARMINDER VELAZCO; Location: OR    ENDARTERECTOMY FEMORAL Right 11/18/2022    Procedure: ENDARTERECTOMY, FEMORAL RIGHT FEMORAL CUTDOWN, RIGHT ILIAC ARTERY STENTING.;  Surgeon: Shaun Tran MD;  Location:  OR    ESOPHAGOSCOPY, GASTROSCOPY, DUODENOSCOPY (EGD), COMBINED N/A 10/07/2022    Procedure: ESOPHAGOGASTRODUODENOSCOPY, WITH BIOPSY;  Surgeon: Felix Carmona MD;  Location:  GI    EXCISE LESION LOWER EXTREMITY  12/16/2011    Procedure:EXCISE LESION LOWER EXTREMITY; Surgeon:PARMINDER VELAZCO; Location: OR    HERNIA REPAIR  11/04/2008    x2 umbilical    IR OR ANGIOGRAM  11/18/2022    L achilles repair  12/04/2008    LAPAROSCOPIC OOPHORECTOMY Left     L for cyst age 26    miscarriages x 3      tubal ligation and reversal       Social History     Tobacco Use    Smoking status: Every Day     Packs/day: 0.25     Years: 50.00     Pack years: 12.50     Types: Cigarettes    Smokeless tobacco: Never    Tobacco comments:     Nicotine patch and a cigarette  "\"once in awhile.\"   Substance Use Topics    Alcohol use: Yes     Comment: Beer once in a while     Current Outpatient Medications   Medication Sig Dispense Refill    amLODIPine (NORVASC) 5 MG tablet Take 1 tablet (5 mg) by mouth daily (Patient taking differently: Take 2.5 mg by mouth daily) 90 tablet 0    aspirin (ASA) 81 MG chewable tablet Take 1 tablet (81 mg) by mouth daily 90 tablet 11    atorvastatin (LIPITOR) 40 MG tablet Take 1 tablet (40 mg) by mouth daily 90 tablet 3    Cholecalciferol (VITAMIN D-3) 5000 UNIT TABS Take 1 tablet by mouth daily.      coenzyme Q-10 200 MG CAPS Take 200 mg by mouth daily      Cranberry-Vitamin C-Vitamin E (CRANBERRY PLUS VITAMIN C) 4200-20-3 MG-MG-UNIT CAPS Take 1 tablet by mouth daily      Cyanocobalamin (B-12 PO) Take 5,000 mcg by mouth every other day Liquid form 100 tablet 1    ezetimibe (ZETIA) 10 MG tablet TAKE 1 TABLET(10 MG) BY MOUTH DAILY 90 tablet 3    fluticasone (FLONASE) 50 MCG/ACT nasal spray Spray 1-2 sprays in nostril daily      ipratropium-albuterol (COMBIVENT RESPIMAT)  MCG/ACT inhaler Inhale 1 puff into the lungs every 6 hours as needed for shortness of breath, wheezing or cough 4 g 11    Lactobacillus (PROBIOTIC ACIDOPHILUS PO) Take 1 capsule by mouth daily      metoprolol succinate ER (TOPROL XL) 100 MG 24 hr tablet Take 1 tablet (100 mg) by mouth daily 90 tablet 0    Multiple Vitamins-Minerals (HAIR SKIN & NAILS ADVANCED PO) Take 1 tablet by mouth daily      nicotine (COMMIT) 2 MG lozenge Place 2 mg inside cheek every hour as needed for smoking cessation      nicotine (NICODERM CQ) 14 MG/24HR 24 hr patch Place 1 patch onto the skin every 24 hours      nitroGLYcerin (NITROSTAT) 0.4 MG sublingual tablet For chest pain place 1 tablet under the tongue every 5 minutes for 3 doses. If symptoms persist 5 minutes after 1st dose call 911. 20 tablet 0    pantoprazole (PROTONIX) 40 MG EC tablet Take 1 tablet (40 mg) by mouth daily 90 tablet 1    potassium " "chloride ER (KLOR-CON M) 20 MEQ CR tablet TAKE ONE TABLET BY MOUTH 2-3 TIMES PER WEEK (Patient taking differently: Take 10 mEq by mouth five times a week TAKE ONE TABLET BY MOUTH 2-3 TIMES PER WEEK) 30 tablet 1    predniSONE (DELTASONE) 20 MG tablet Take 1 tablet (20 mg) by mouth 2 times daily 14 tablet 0    Sodium Chloride-Xylitol (XLEAR SINUS CARE SPRAY NA) Spray 1 spray in nostril daily as needed      st johns wort 300 MG TABS tablet Take 300 mg by mouth daily      ticagrelor (BRILINTA) 60 MG tablet Take 1 tablet (60 mg) by mouth daily 90 tablet 3    tiotropium-olodaterol (STIOLTO RESPIMAT) 2.5-2.5 MCG/ACT AERS Inhale 2 puffs into the lungs daily 4 g 5     Allergies   Allergen Reactions    Chantix [Varenicline] Nausea    Ciprofloxacin Other (See Comments)     hypertension    Clopidogrel      Other reaction(s): Hypertension    Decongestant [Pseudoephedrine Hcl Er]     Erythromycin Nausea    Lisinopril      cough    Plavix [Clopidogrel Bisulfate]      Felt as if she was having a heart attack    Rofecoxib Unknown    Vioxx      Increased BP       PHYSICAL EXAM:    /70   Pulse 64   Temp 96.8  F (36  C) (Temporal)   Resp 20   Ht 1.638 m (5' 4.5\")   Wt 58.5 kg (129 lb)   SpO2 98%   BMI 21.80 kg/m      EXAM:  GENERAL APPEARANCE: alert and no distress  EYES: Eyes grossly normal to inspection  HENT: +tenderness over R maxillary and frontal sinuses. ear canals and TM's normal and nose and mouth without ulcers or lesions  NECK: no adenopathy, no asymmetry, masses, or scars and thyroid normal to palpation  RESP: lungs clear to auscultation - no rales, rhonchi or wheezes  CV: regular rates and rhythm, normal S1 S2, no S3 or S4 and no murmur, click or rub  SKIN: no suspicious lesions or rashes    Assessment and Plan:     (J34.89) Sinus pain  (primary encounter diagnosis)  Comment: recd venecia pot and 7 day course of oral steroids. If sxs persist, pt to call back and I order imaging.  Plan: Symptomatic COVID-19 Virus " (Coronavirus) by PCR        Nose, predniSONE (DELTASONE) 20 MG tablet        Bid x 7d with food. Pt doesn't take nsaids as is on brilinta      Lyudmila Luz TRIANA

## 2023-09-26 NOTE — NURSING NOTE
" BP:  BP (!) 146/82   Pulse 62   Temp 96.8  F (36  C) (Temporal)   Resp 20   Ht 1.638 m (5' 4.5\")   Wt 58.5 kg (129 lb)   SpO2 98%   BMI 21.80 kg/m       62  Disposition: provider notified while patient in the clinic   "

## 2023-10-16 ENCOUNTER — OFFICE VISIT (OUTPATIENT)
Dept: OTHER | Facility: CLINIC | Age: 73
End: 2023-10-16
Attending: SURGERY
Payer: COMMERCIAL

## 2023-10-16 ENCOUNTER — HOSPITAL ENCOUNTER (OUTPATIENT)
Dept: ULTRASOUND IMAGING | Facility: CLINIC | Age: 73
Discharge: HOME | End: 2023-10-16
Attending: SURGERY
Payer: COMMERCIAL

## 2023-10-16 VITALS — DIASTOLIC BLOOD PRESSURE: 67 MMHG | OXYGEN SATURATION: 98 % | HEART RATE: 65 BPM | SYSTOLIC BLOOD PRESSURE: 116 MMHG

## 2023-10-16 DIAGNOSIS — Z48.812 ENCOUNTER FOR SURGICAL AFTERCARE FOLLOWING SURGERY ON THE CIRCULATORY SYSTEM: ICD-10-CM

## 2023-10-16 DIAGNOSIS — I77.1 ILIAC ARTERY STENOSIS, RIGHT (H): ICD-10-CM

## 2023-10-16 DIAGNOSIS — E78.5 HYPERLIPIDEMIA LDL GOAL <100: ICD-10-CM

## 2023-10-16 DIAGNOSIS — I73.9 PAD (PERIPHERAL ARTERY DISEASE) (H): ICD-10-CM

## 2023-10-16 DIAGNOSIS — I73.9 PAD (PERIPHERAL ARTERY DISEASE) (H): Primary | ICD-10-CM

## 2023-10-16 PROCEDURE — 93978 VASCULAR STUDY: CPT | Mod: 26 | Performed by: SURGERY

## 2023-10-16 PROCEDURE — 99213 OFFICE O/P EST LOW 20 MIN: CPT | Performed by: SURGERY

## 2023-10-16 PROCEDURE — 93978 VASCULAR STUDY: CPT

## 2023-10-16 PROCEDURE — G0463 HOSPITAL OUTPT CLINIC VISIT: HCPCS | Mod: 25 | Performed by: SURGERY

## 2023-10-16 NOTE — TELEPHONE ENCOUNTER
"Requested Prescriptions   Pending Prescriptions Disp Refills     amoxicillin (AMOXIL) 500 MG capsule [Pharmacy Med Name: AMOXICILLIN 500MG CAPSULES]  Last Written Prescription Date:  04/07/2019  Last Fill Quantity: 4 capsule,  # refills: 0   Last Office Visit: 5/14/2019   Future Office Visit:      4 capsule 0     Sig: TAKE 4 CAPSULES(2000 MG) BY MOUTH 1 TIME FOR 1 DOSE       Oral Acne/Rosacea Medications Protocol Failed - 9/21/2019  8:47 AM        Failed - Confirmation of diagnosis is required     Please confirm diagnosis is acne or rosacea.     If NOT acne or rosacea; refer request to provider for further evaluation.    If diagnosis IS acne or rosacea, OK to refill BASED ON PREVIOUS REFILL CLINICAL NOTE RECOMMENDATION.          Passed - Patient is 12 years of age or older        Passed - Recent (12 mo) or future (30 days) visit within the authorizing provider's specialty     Patient had office visit in the last 12 months or has a visit in the next 30 days with authorizing provider or within the authorizing provider's specialty.  See \"Patient Info\" tab in inbasket, or \"Choose Columns\" in Meds & Orders section of the refill encounter.          Passed - Medication is active on med list        Passed - No active pregnancy on record        Passed - No positive prenancy test is past 12 months          "
108

## 2023-10-16 NOTE — PROGRESS NOTES
Mrs. Caban is a 72-year-old female with history of aortobiiliac bypass grafting.  She had stent grafting of the right external iliac artery and right limb of the aortobiiliac bypass grafting.  Both of these had developed recurrent high-grade stenosis and she underwent right femoral cutdown and stent grafting of the right limb of the aortobiiliac bypass graft and native right external iliac artery.  This was done on 18 November 2022.     She has no complaints in regards to the lower extremities.  Now she is on 40 mg of atorvastatin which she is tolerating well.     Imaging shows patent aortobiiliac bypass graft and the stent graft within the right side including the right external iliac artery.     I have stopped the Crestor because of its side effects.  Patient has previously taken atorvastatin which she has tolerated well.  We will reimage her in 6 months time.  She will follow-up with our nurse practitioner, Allyn Mcclain, for a recheck of her lipid profile with this adjusted medication.      She is to take Brilinta for at least 1 year.    I will see her back in 6 months with repeat imaging.

## 2023-10-16 NOTE — PROGRESS NOTES
Patient is here to discuss FOLLOW UP    /67 (BP Location: Right arm, Patient Position: Chair, Cuff Size: Adult Regular)   Pulse 65   SpO2 98%     Questions patient would like addressed today are: N/A.    Refills are needed: No    Has homecare services and agency name:  [facility]

## 2023-10-19 DIAGNOSIS — Z95.828 S/P AORTO-BIFEMORAL BYPASS SURGERY: ICD-10-CM

## 2023-10-19 DIAGNOSIS — I73.9 PAD (PERIPHERAL ARTERY DISEASE) (H): Primary | ICD-10-CM

## 2023-11-14 ENCOUNTER — NURSE TRIAGE (OUTPATIENT)
Dept: FAMILY MEDICINE | Facility: CLINIC | Age: 73
End: 2023-11-14
Payer: COMMERCIAL

## 2023-11-14 ENCOUNTER — OFFICE VISIT (OUTPATIENT)
Dept: URGENT CARE | Facility: URGENT CARE | Age: 73
End: 2023-11-14
Payer: COMMERCIAL

## 2023-11-14 ENCOUNTER — MYC MEDICAL ADVICE (OUTPATIENT)
Dept: FAMILY MEDICINE | Facility: CLINIC | Age: 73
End: 2023-11-14
Payer: COMMERCIAL

## 2023-11-14 VITALS
DIASTOLIC BLOOD PRESSURE: 93 MMHG | OXYGEN SATURATION: 95 % | SYSTOLIC BLOOD PRESSURE: 193 MMHG | TEMPERATURE: 97.9 F | HEART RATE: 77 BPM

## 2023-11-14 DIAGNOSIS — E87.6 HYPOKALEMIA: ICD-10-CM

## 2023-11-14 DIAGNOSIS — I10 HYPERTENSION, UNSPECIFIED TYPE: Primary | ICD-10-CM

## 2023-11-14 DIAGNOSIS — I10 HTN (HYPERTENSION), BENIGN: ICD-10-CM

## 2023-11-14 DIAGNOSIS — C91.10 CLL (CHRONIC LYMPHOCYTIC LEUKEMIA) (H): Chronic | ICD-10-CM

## 2023-11-14 LAB
ANION GAP SERPL CALCULATED.3IONS-SCNC: 11 MMOL/L (ref 7–15)
BUN SERPL-MCNC: 13.5 MG/DL (ref 8–23)
CALCIUM SERPL-MCNC: 9.5 MG/DL (ref 8.8–10.2)
CHLORIDE SERPL-SCNC: 109 MMOL/L (ref 98–107)
CREAT SERPL-MCNC: 0.59 MG/DL (ref 0.51–0.95)
DEPRECATED HCO3 PLAS-SCNC: 25 MMOL/L (ref 22–29)
EGFRCR SERPLBLD CKD-EPI 2021: >90 ML/MIN/1.73M2
GLUCOSE SERPL-MCNC: 107 MG/DL (ref 70–99)
POTASSIUM SERPL-SCNC: 3.9 MMOL/L (ref 3.4–5.3)
SODIUM SERPL-SCNC: 145 MMOL/L (ref 135–145)

## 2023-11-14 PROCEDURE — 36415 COLL VENOUS BLD VENIPUNCTURE: CPT | Performed by: PHYSICIAN ASSISTANT

## 2023-11-14 PROCEDURE — 80048 BASIC METABOLIC PNL TOTAL CA: CPT | Performed by: PHYSICIAN ASSISTANT

## 2023-11-14 PROCEDURE — 99214 OFFICE O/P EST MOD 30 MIN: CPT | Performed by: PHYSICIAN ASSISTANT

## 2023-11-14 RX ORDER — CHLORTHALIDONE 25 MG/1
25 TABLET ORAL DAILY
Qty: 30 TABLET | Refills: 1 | Status: SHIPPED | OUTPATIENT
Start: 2023-11-14 | End: 2023-12-20

## 2023-11-14 RX ORDER — METOPROLOL SUCCINATE 100 MG/1
150 TABLET, EXTENDED RELEASE ORAL DAILY
Qty: 45 TABLET | Refills: 0 | Status: SHIPPED | OUTPATIENT
Start: 2023-11-14 | End: 2023-12-20

## 2023-11-14 NOTE — TELEPHONE ENCOUNTER
Patient Contact    Attempt # 1    Was call answered?  Yes. Patient triaged for hypertension.    BP now: 189/85  Onset: 10 days ago  How: home BP monitor  History: hypertension  Med: no missed doses but recently restarted amlodipine 2.5mg  Cause: patient reports went off amlodipine for a while and restarted amblodipine (2.5mg) 10 days ago  Other symptoms: swelling in hands, feet and legs slightly (gained six pounds in 10days)  Denies blurred vision, chest pain, difficulty breathing, headache, weakness     Nurse Triage SBAR    Is this a 2nd Level Triage? YES, LICENSED PRACTITIONER REVIEW IS REQUIRED    Protocol Recommended Disposition:   See in Office Today    Recommendation:   Patient was advised to be seen today for evaluation by HCP, but no appts available today at [facility]. Patient states preference for being seen at [place] location in  as she lives nearby the clinic, writer provided [facility] information and encouraged patient to leave as soon as possible. Patient expresses verbal understanding and plans to be seen today in .    Patient states that she will be going now to [facility] to be evaluated.    Does the patient meet one of the following criteria for ADS visit consideration? 16+ years old, with an [facility] PCP     TIP  Providers, please consider if this condition is appropriate for management at one of our Acute and Diagnostic Services sites.     If patient is a good candidate, please use dotphrase <dot>triageresponse and select Refer to ADS to document.    Hortencia Ybarar RN  [facility]        Reason for Disposition   Systolic BP >= 180 OR Diastolic >= 110    Additional Information   Negative: Sounds like a life-threatening emergency to the triager   Negative: Symptom is main concern (e.g., headache, chest pain)   Negative: Low blood pressure is main concern   Negative: Systolic BP >= 160 OR Diastolic >= 100, and any cardiac (e.g., breathing difficulty, chest pain) or  Palliative Medicine  [facility]: [phone] ([phone])  [facility]: [address] [place] [address] Way: [phone]    Patient Name: Alejandro Whatley. YOB: 1926    Date of Initial Consult: 5/2/2022   Follow up 5/3/2022   Reason for Consult: care decisions   Requesting Provider: Dr Elgin Rodriguez   Primary Care Physician: ARIANA Thibodeaux      SUMMARY:   Alejandro Leo is a 81y.o. year old with a past history of prostate cancer, diabetes, CVA, dementia  who was admitted on 4/30/2022 from [facility]  with a diagnosis of rectal bleeding, Hgb 6.9 in ER. He was transfused 1 unit of PRBC's  Current medical issues leading to Palliative Medicine involvement include: 81year old male who is LTC at [facility], presents one episode of rectal bleeding. Has been seen by GI with no planned procedures. Palliative medicine is consulted for support and care decisions. 5/3/22  More alert, talkative oriented this am, wife at bedside. POST signed by wife. PALLIATIVE DIAGNOSES:   1. Goals of care / care decisions   2. Rectal bleeding   3. Dementia   4. Debility        PLAN:   1. 5/3/2022 Mr Augustine Sellers seen along with Ms Micheal Patrick His wife at bedside. More alert and engaging this am, oriented x 2. Denies pain or shortness of breath. Goals of care reviewed with wife she was very clear DNR/DNI, limited interventions for now, no feeding tubes. POST signed by wife to this effect. Original to wife, copy to chart. ( please see below for previous notes per palliative team)     2. Goals of care Patient seen along with Ms Genny Mckeon, NADER. He is alert, knows he is hospital, speech at times difficult to understand. Do not believe he can make a complex medical decision due to underlying confusion. We called his wife Rika Jaquez who is his legal next of kin. Rika Jaquez affirms DNR/DNI. She is not able to come into the hospital today, however will come in tomorrow after her doctor's appointment.  He looks neurologic symptoms (e.g., new-onset blurred or double vision)   Negative: Pregnant 20 or more weeks (or postpartum < 6 weeks) with new hand or face swelling   Negative: Pregnant 20 or more weeks (or postpartum < 6 weeks) and Systolic BP >= 160 OR Diastolic >= 110   Negative: Patient sounds very sick or weak to the triager   Negative: Systolic BP >= 200 OR Diastolic >= 120 and having NO cardiac or neurologic symptoms   Negative: Pregnant 20 or more weeks (or postpartum < 6 weeks) with Systolic BP >= 140 OR Diastolic >= 90   Negative: Systolic BP >= 180 OR Diastolic >= 110, and missed most recent dose of blood pressure medication    Protocols used: Blood Pressure - High-A-OH     very debilitated to me, could answer a few questions for us but not sure he can participate in a conversation. Will discuss with wife tomorrow. Goals of care DNR/DNI limited interventions at this time. 3. Rectal bleeding currently per report no further bleeding. GI on board no planned procedures at this time. 4. Dementia very but at times speech is garbled. His dementia appears advanced   5. Debility lives in [address] [place] [address] requires assistance with feeding PPS is low at 40 indicating a mostly bed to chair bound state and overall poor prognosis   6. Initial consult note routed to primary continuity provider  7. Communicated plan of care with: Palliative IDT, wife  Patient/Health Care Proxy Stated Goals: Prolong life      TREATMENT PREFERENCES:   Code Status: DNR/ DNI    Advance Care Planning:  [] The [facility] Interdisciplinary Team has updated the ACP Navigator with [address] and Patient Capacity    Primary Decision Maker ([address]): wife William Barrett is legal next of kin     Medical Interventions: Limited additional interventions   Artificially Administered Nutrition: No feeding tube     Other:  As far as possible, the palliative care team has discussed with patient / health care proxy about goals of care / treatment preferences for patient.      HISTORY:     History obtained from:  Chart and wife     CHIEF COMPLAINT: rectal bleeding     HPI/SUBJECTIVE:    The patient is:   [] Verbal and participatory  [x] Non-participatory due to:  Dementia and confusion   Please see summary   5/3/2022   More alert this am talkative oriented x 2     Clinical Pain Assessment (nonverbal scale for nonverbal patients): Clinical Pain Assessment  Severity: 0     Activity (Movement): Lying quietly, normal position    Duration: for how long has pt been experiencing pain (e.g., 2 days, 1 month, years)  Frequency: how often pain is an issue (e.g., several times per day, once every few days, constant)     FUNCTIONAL ASSESSMENT: Palliative Performance Scale (PPS):  PPS: 50    ECOG  ECOG Status : Limited self-care     PSYCHOSOCIAL/SPIRITUAL SCREENING:      Any spiritual / Taoist concerns: unable to assess   [] Yes /  [] No    Caregiver Burnout:  [] Yes /  [] No /  [x] No Caregiver Present      Anticipatory grief assessment: unable to assess for patient   [] Normal  / [] Maladaptive        REVIEW OF SYSTEMS:     Positive and pertinent negative findings in ROS are noted above in HPI. The following systems were [] reviewed / [x] unable to be reviewed as noted in HPI  Other findings are noted below. Systems: constitutional, ears/nose/mouth/throat, respiratory, gastrointestinal, genitourinary, musculoskeletal, integumentary, neurologic, psychiatric, endocrine. Positive findings noted below. Modified ESAS Completed by: provider   Fatigue: 4 Drowsiness: 0   Depression: 0 Pain: 0     Nausea: 0   Anorexia: 3 Dyspnea: 0           Stool Occurrence(s): 1        PHYSICAL EXAM:     Wt Readings from Last 3 Encounters:   05/01/22 63 kg (139 lb)   04/26/22 78.5 kg (173 lb)   03/02/22 78.5 kg (173 lb)     Blood pressure (!) 95/48, pulse 75, temperature 98 °F (36.7 °C), resp. rate 15, weight 63 kg (139 lb), SpO2 100 %. Pain:  Pain Scale 1: Numeric (0 - 10)  Pain Intensity 1: 0                 Last bowel movement: x 1 5/2/2022     Constitutional: sitting up in bed alert talkative answers simple questions correctly, could name his wife who was at bedside.  In NAD   Eyes: pupils equal  ENMT: edentulous   Cardiovascular: regular rhythm  Respiratory: breathing not labored   Skin: warm, dry  Neurologic: alert oriented x 2, talkative, engaging          HISTORY:     Active Problems:    GI bleed (5/1/2022)      Past Medical History:   Diagnosis Date    Diabetes ([facility] [address]     no meds    Hypercholesteremia     Personal history of malignant neoplasm of prostate     Prostate cancer ([facility] [facility]     Unspecified cerebral artery occlusion with cerebral infarction 2012 \"TIA\"      Past Surgical History:   Procedure Laterality Date    COLONOSCOPY N/A 6/22/2017    COLONOSCOPY performed by Elizabet Dela Cruz MD at [address] HX COLONOSCOPY      HX CRYOABLATION OF THE PROSTATE        Family History   Family history unknown: Yes     History reviewed, no pertinent family history.   Social History     Tobacco Use    Smoking status: Former Smoker    Smokeless tobacco: Never Used   Substance Use Topics    Alcohol use: No     No Known Allergies   Current Facility-Administered Medications   Medication Dose Route Frequency    pantoprazole (PROTONIX) tablet 40 mg  40 mg Oral ACB&D    sodium chloride (NS) flush 5-40 mL  5-40 mL IntraVENous Q8H    sodium chloride (NS) flush 5-40 mL  5-40 mL IntraVENous PRN    acetaminophen (TYLENOL) tablet 650 mg  650 mg Oral Q6H PRN    Or    acetaminophen (TYLENOL) suppository 650 mg  650 mg Rectal Q6H PRN    polyethylene glycol (MIRALAX) packet 17 g  17 g Oral DAILY PRN    ondansetron (ZOFRAN ODT) tablet 4 mg  4 mg Oral Q8H PRN    Or    ondansetron (ZOFRAN) injection 4 mg  4 mg IntraVENous Q6H PRN    atorvastatin (LIPITOR) tablet 40 mg  40 mg Oral DAILY    insulin lispro (HUMALOG) injection   SubCUTAneous Q6H    glucose chewable tablet 16 g  16 g Oral PRN    glucagon (GLUCAGEN) injection 1 mg  1 mg IntraMUSCular PRN    dextrose 10% infusion 0-250 mL  0-250 mL IntraVENous PRN    cycloSPORINE (RESTASIS) 0.05 % ophthalmic emulsion 1 Drop  1 Drop Both Eyes BID    latanoprost (XALATAN) 0.005 % ophthalmic solution 1 Drop  1 Drop Both Eyes DAILY        LAB AND IMAGING FINDINGS:     Lab Results   Component Value Date/Time    WBC 3.7 (L) 05/02/2022 04:55 AM    HGB 7.6 (L) 05/03/2022 04:58 AM    PLATELET 751 98/64/1714 04:55 AM     Lab Results   Component Value Date/Time    Sodium 141 05/02/2022 04:55 AM    Potassium 4.2 05/02/2022 04:55 AM    Chloride 109 05/02/2022 04:55 AM    CO2 30 05/02/2022 04:55 AM    BUN 18 05/02/2022 04:55 AM    Creatinine 1.38 (H) 05/02/2022 04:55 AM    Calcium 9.5 05/02/2022 04:55 AM    Magnesium 2.2 05/01/2022 07:38 AM    Phosphorus 3.4 09/09/2021 07:18 AM      Lab Results   Component Value Date/Time    Alk. phosphatase 55 05/01/2022 07:38 AM    Protein, total 7.1 05/01/2022 07:38 AM    Albumin 2.5 (L) 05/01/2022 07:38 AM    Globulin 4.6 (H) 05/01/2022 07:38 AM     Lab Results   Component Value Date/Time    INR 1.2 05/01/2022 07:38 AM    Prothrombin time 15.2 05/01/2022 07:38 AM    aPTT 29.8 05/01/2022 07:38 AM      Lab Results   Component Value Date/Time    Iron 80 09/09/2021 07:18 AM    TIBC 263 09/09/2021 07:18 AM    Iron % saturation 30 09/09/2021 07:18 AM    Ferritin 200 12/02/2020 07:03 AM      No results found for: PH, PCO2, PO2  No components found for: GLPOC   No results found for: CPK, CKMB           Total time: 25 minutes   Counseling / coordination time, spent as noted above:   > 50% counseling / coordination: yes with wife     Prolonged service was provided for  []30 min   []75 min in face to face time in the presence of the patient, spent as noted above.   Time Start:   Time End:

## 2023-11-15 NOTE — PROGRESS NOTES
Assessment & Plan     Hypertension, unspecified type    Patient has been having uncontrolled BP since switching medication  Due to BP being so high we are going to switch her back to her medications and have her follow up with her PCP to discuss HTN treatment options    - chlorthalidone (HYGROTON) 25 MG tablet  Dispense: 30 tablet; Refill: 1  - metoprolol succinate ER (TOPROL XL) 100 MG 24 hr tablet  Dispense: 45 tablet; Refill: 0    Hypokalemia    Results for orders placed or performed in visit on 11/14/23   Basic metabolic panel  (Ca, Cl, CO2, Creat, Gluc, K, Na, BUN)     Status: Abnormal   Result Value Ref Range    Sodium 145 135 - 145 mmol/L    Potassium 3.9 3.4 - 5.3 mmol/L    Chloride 109 (H) 98 - 107 mmol/L    Carbon Dioxide (CO2) 25 22 - 29 mmol/L    Anion Gap 11 7 - 15 mmol/L    Urea Nitrogen 13.5 8.0 - 23.0 mg/dL    Creatinine 0.59 0.51 - 0.95 mg/dL    GFR Estimate >90 >60 mL/min/1.73m2    Calcium 9.5 8.8 - 10.2 mg/dL    Glucose 107 (H) 70 - 99 mg/dL     Patient can still take K+ supplements 2-3 times per week  Recheck levels with PCP    CLL (chronic lymphocytic leukemia) (H)    Patient has hx and is being followed    HTN (hypertension), benign    Patient advised to follow up with PCP for recheck blood pressure   Information given to patient on diet modifications, including lowering salt in diet, decrease calories, weight loss and exercise.  Monitor blood pressure at home and if BP maintains over 140/90 then advise having a recheck with PCP in 2 weeks.     - chlorthalidone (HYGROTON) 25 MG tablet  Dispense: 30 tablet; Refill: 1  - metoprolol succinate ER (TOPROL XL) 100 MG 24 hr tablet  Dispense: 45 tablet; Refill: 0      Review of external notes as documented elsewhere in note    Follow up with PCP in 2 weeks for recheck BP    No follow-ups on file.    Dominick Herbert, [facility], PA-C  [facility] ASHLYN Gerardo is a 72 year old, presenting for the following health  issues:  Hypertension (Pt reports high /85 at home an hour ago)      HPI Review of Systems   Constitutional, HEENT, cardiovascular, pulmonary, GI, , musculoskeletal, neuro, skin, endocrine and psych systems are negative, except as otherwise noted.      Objective    BP (!) 193/93   Pulse 77   Temp 97.9  F (36.6  C) (Tympanic)   SpO2 95%   There is no height or weight on file to calculate BMI.  Physical Exam   GENERAL: healthy, alert and no distress  EYES: Eyes grossly normal to inspection, PERRL and conjunctivae and sclerae normal  HENT: ear canals and TM's normal, nose and mouth without ulcers or lesions  NECK: no adenopathy, no asymmetry, masses, or scars and thyroid normal to palpation  RESP: lungs clear to auscultation - no rales, rhonchi or wheezes  CV: regular rate and rhythm, normal S1 S2, no S3 or S4, no murmur, click or rub, no peripheral edema and peripheral pulses strong  ABDOMEN: soft, nontender, no hepatosplenomegaly, no masses and bowel sounds normal  MS: no gross musculoskeletal defects noted, no edema  SKIN: no suspicious lesions or rashes  NEURO: Normal strength and tone, mentation intact and speech normal  PSYCH: mentation appears normal, affect normal/bright

## 2023-11-20 NOTE — DISCHARGE INSTRUCTIONS
Same Day Surgery Discharge Instructions for  Sedation and General Anesthesia     It's not unusual to feel dizzy, light-headed or faint for up to 24 hours after surgery or while taking pain medication.  If you have these symptoms: sit for a few minutes before standing and have someone assist you when you get up to walk or use the bathroom.    You should rest and relax for the next 24 hours. We recommend you make arrangements to have an adult stay with you for at least 24 hours after your discharge.  Avoid hazardous and strenuous activity.    DO NOT DRIVE any vehicle or operate mechanical equipment for 24 hours following the end of your surgery.  Even though you may feel normal, your reactions may be affected by the medication you have received.    Do not drink alcoholic beverages for 24 hours following surgery.     Slowly progress to your regular diet as you feel able. It's not unusual to feel nauseated and/or vomit after receiving anesthesia.  If you develop these symptoms, drink clear liquids (apple juice, ginger ale, broth, 7-up, etc. ) until you feel better.  If your nausea and vomiting persists for 24 hours, please notify your surgeon.      All narcotic pain medications, along with inactivity and anesthesia, can cause constipation. Drinking plenty of liquids and increasing fiber intake will help.    For any questions of a medical nature, call your surgeon.    Do not make important decisions for 24 hours.    If you had general anesthesia, you may have a sore throat for a couple of days related to the breathing tube used during surgery.  You may use Cepacol lozenges to help with this discomfort.  If it worsens or if you develop a fever, contact your surgeon.     If you feel your pain is not well managed with the pain medications prescribed by your surgeon, please contact your surgeon's office to let them know so they can address your concerns.        Discharge Instructions: Using an   Incentive Spirometer    An  Son called today with update. Hip infection seems to be improving with Keflex. Will continue to monitor for now. incentive spirometer is a device that helps you do deep breathing exercises. These exercises will help you breathe better and improve the function of your lungs. The incentive spirometer provides a way for you to take an active part in your recovery.  Follow these steps to use your incentive spirometer:  Inhale normally. Relax and breathe out.  Place your lips tightly around the mouthpiece.  Make sure the device is upright and not tilted.  Breathe in slowly and deeply. Fill your lungs with as much air as you can.   Hold your breath long enough to keep the disk raised for at least 3 seconds while keeping the bead on the right side between the two arrows.   Perform this exercise 10 times every hour while you re awake or as often as your doctor instructs.  If you were also taught coughing exercises, perform them regularly as instructed.  Please call Dr Schaefer or on call physician with any concerns #[phone] for after hours concerns

## 2023-12-02 NOTE — TELEPHONE ENCOUNTER
Type of surgery: ENDARTERECTOMY, FEMORAL RIGHT FEMORAL CUTDOWN; ANGIOGRAM AORTO ILIAC ANGIOGRAM   Location of surgery: [facility]  Date and time of surgery: 11/18/22 at 9:30  Surgeon: Dr. Tran  Pre-Op to be scheduled with: Tomashek  COVID Test Appt Date: 11/15/22   Post-Op Appt Date: 12/5/22   Packet sent out: Yes, given  Pre-cert/Authorization completed:  Not Applicable  Date: 10/26/22      Lorna Weaver, Surgery Scheduling   [facility]        1

## 2023-12-13 ENCOUNTER — LAB (OUTPATIENT)
Dept: INFUSION THERAPY | Facility: CLINIC | Age: 73
End: 2023-12-13
Attending: INTERNAL MEDICINE
Payer: COMMERCIAL

## 2023-12-13 ENCOUNTER — TELEPHONE (OUTPATIENT)
Dept: ONCOLOGY | Facility: CLINIC | Age: 73
End: 2023-12-13

## 2023-12-13 ENCOUNTER — ONCOLOGY VISIT (OUTPATIENT)
Dept: ONCOLOGY | Facility: CLINIC | Age: 73
End: 2023-12-13
Attending: INTERNAL MEDICINE
Payer: COMMERCIAL

## 2023-12-13 VITALS
WEIGHT: 137.6 LBS | OXYGEN SATURATION: 97 % | BODY MASS INDEX: 23.25 KG/M2 | HEART RATE: 66 BPM | RESPIRATION RATE: 16 BRPM | DIASTOLIC BLOOD PRESSURE: 67 MMHG | SYSTOLIC BLOOD PRESSURE: 126 MMHG

## 2023-12-13 DIAGNOSIS — C91.10 CLL (CHRONIC LYMPHOCYTIC LEUKEMIA) (H): Primary | ICD-10-CM

## 2023-12-13 LAB
BASOPHILS # BLD AUTO: ABNORMAL 10*3/UL
BASOPHILS # BLD MANUAL: 0 10E3/UL (ref 0–0.2)
BASOPHILS NFR BLD AUTO: ABNORMAL %
BASOPHILS NFR BLD MANUAL: 0 %
EOSINOPHIL # BLD AUTO: ABNORMAL 10*3/UL
EOSINOPHIL # BLD MANUAL: 0 10E3/UL (ref 0–0.7)
EOSINOPHIL NFR BLD AUTO: ABNORMAL %
EOSINOPHIL NFR BLD MANUAL: 0 %
ERYTHROCYTE [DISTWIDTH] IN BLOOD BY AUTOMATED COUNT: 14.9 % (ref 10–15)
HCT VFR BLD AUTO: 44.1 % (ref 35–47)
HGB BLD-MCNC: 13.7 G/DL (ref 11.7–15.7)
HOLD SPECIMEN: NORMAL
HOLD SPECIMEN: NORMAL
IMM GRANULOCYTES # BLD: ABNORMAL 10*3/UL
IMM GRANULOCYTES NFR BLD: ABNORMAL %
LYMPHOCYTES # BLD AUTO: ABNORMAL 10*3/UL
LYMPHOCYTES # BLD MANUAL: 43.1 10E3/UL (ref 0.8–5.3)
LYMPHOCYTES NFR BLD AUTO: ABNORMAL %
LYMPHOCYTES NFR BLD MANUAL: 75 %
MCH RBC QN AUTO: 28.8 PG (ref 26.5–33)
MCHC RBC AUTO-ENTMCNC: 31.1 G/DL (ref 31.5–36.5)
MCV RBC AUTO: 93 FL (ref 78–100)
MONOCYTES # BLD AUTO: ABNORMAL 10*3/UL
MONOCYTES # BLD MANUAL: 2.3 10E3/UL (ref 0–1.3)
MONOCYTES NFR BLD AUTO: ABNORMAL %
MONOCYTES NFR BLD MANUAL: 4 %
NEUTROPHILS # BLD AUTO: ABNORMAL 10*3/UL
NEUTROPHILS # BLD MANUAL: 12.1 10E3/UL (ref 1.6–8.3)
NEUTROPHILS NFR BLD AUTO: ABNORMAL %
NEUTROPHILS NFR BLD MANUAL: 21 %
NRBC # BLD AUTO: 0 10E3/UL
NRBC BLD AUTO-RTO: 0 /100
PLAT MORPH BLD: ABNORMAL
PLATELET # BLD AUTO: 167 10E3/UL (ref 150–450)
RBC # BLD AUTO: 4.76 10E6/UL (ref 3.8–5.2)
RBC MORPH BLD: ABNORMAL
SMUDGE CELLS BLD QL SMEAR: PRESENT
WBC # BLD AUTO: 57.4 10E3/UL (ref 4–11)

## 2023-12-13 PROCEDURE — 99214 OFFICE O/P EST MOD 30 MIN: CPT | Performed by: INTERNAL MEDICINE

## 2023-12-13 PROCEDURE — G0463 HOSPITAL OUTPT CLINIC VISIT: HCPCS | Performed by: INTERNAL MEDICINE

## 2023-12-13 PROCEDURE — 85007 BL SMEAR W/DIFF WBC COUNT: CPT | Performed by: INTERNAL MEDICINE

## 2023-12-13 PROCEDURE — 36415 COLL VENOUS BLD VENIPUNCTURE: CPT

## 2023-12-13 PROCEDURE — 85027 COMPLETE CBC AUTOMATED: CPT | Performed by: INTERNAL MEDICINE

## 2023-12-13 ASSESSMENT — PAIN SCALES - GENERAL: PAINLEVEL: NO PAIN (0)

## 2023-12-13 NOTE — LETTER
12/13/2023         RE: Karen Caban  [address]  [place] [address]        Dear Colleague,    Thank you for referring your patient, Karen Caban, to the [facility]. Please see a copy of my visit note below.    HEMATOLOGIC HISTORY:  Ms. Caban is a 71-year-old female with CLL.  1.  On 12/11/2019, WBC of 9.8.  -On 12/17/2020, WBC of 16.3.  -On 01/06/2021, WBC of 18.6, hemoglobin of 16.5 and platelet of 206.  Neutrophil of 19%, lymphocyte of 74%.  2.  Flow cytometry on peripheral blood on 01/06/2021 revealed CD5 positive kappa monotypic B cells with immunophenotype of CLL/SLL .  3.  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  CT chest, abdomen, and pelvis on 02/03/2021 did not reveal any lymphadenopathy or splenomegaly.     SUBJECTIVE: Ms. Caban is a 73-year-old female with Webb stage 0 chronic lymphocytic leukemia on observation.     She is a smoker. CT chest lung cancer screening on 01/24/2023 revealed 2 mm right apex nodule. She has follow-up appointment with pulmonologist with CT.     She is doing well. She is very active.  No headache.  No dizziness. No chest pain.  No shortness of breath.  No abdominal pain, nausea or vomiting. Appetite is good.  No urinary or bowel complaints.  No recurrent infection. No night sweats. All other review of systems is negative.     PHYSICAL EXAMINATION:   GENERAL:  Alert and oriented x 3.   VITAL SIGNS:  Reviewed.  ECOG PS of 0.     EYES:  No icterus.   NECK:  Supple. No lymphadenopathy. No thyromegaly.   AXILLAE:  No lymphadenopathy.   LUNGS:  Good air entry bilaterally.  No crackles or wheezing.   HEART:  Regular.  No murmur.   GI: Abdomen is soft.  Nontender. No mass.   EXTREMITIES:  No pedal edema.  No calf  swelling or tenderness.   SKIN:  No rash.      LABORATORY:  CBC reviewed.     ASSESSMENT:    1.  A 73-year-old female with Webb stage 0 chronic lymphocytic leukemia on observation. CLL is slowly progressing.  2.  Lung nodule.  3.  Chronic smoking.     PLAN:    1.  Patient is asymptomatic. Leukocytosis/lymphocytosis has increased. Normal hemoglobin and platelet. CLL is slowly progressing.    Different indications for treatment reviewed. No indication for treatment at this time.     For CLL, she will have CBC every 3 months.     2.  Advised her to quit smoking.     3.  For lung nodule, she will have follow-up CT chest in January 2024 and follow-up with PCP and pulmonologist.     4.  She had a few questions which were all answered.  I will see her in 6 months time.     TOTAL VISIT TIME:  30 minutes.  Time spent in today's visit, review of chart/investigations today and documentation.      Again, thank you for allowing me to participate in the care of your patient.        Sincerely,        Sancho Osorio MD

## 2023-12-13 NOTE — PROGRESS NOTES
Medical Assistant Note:  Karen Caban presents today for blood draw.    Patient seen by provider today: Yes: kalani.   present during visit today: Not Applicable.    Concerns: No Concerns.    Procedure:  Lab draw site: rac, Needle type: bf, Gauge: 23.    Post Assessment:  Labs drawn without difficulty: Yes.    Discharge Plan:  Departure Mode: Ambulatory.    Face to Face Time: 5 min.    Jamaica Bruno, CMA

## 2023-12-13 NOTE — PROGRESS NOTES
HEMATOLOGIC HISTORY:  Ms. Caban is a 71-year-old female with CLL.  1.  On 12/11/2019, WBC of 9.8.  -On 12/17/2020, WBC of 16.3.  -On 01/06/2021, WBC of 18.6, hemoglobin of 16.5 and platelet of 206.  Neutrophil of 19%, lymphocyte of 74%.  2.  Flow cytometry on peripheral blood on 01/06/2021 revealed CD5 positive kappa monotypic B cells with immunophenotype of CLL/SLL .  3.  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  CT chest, abdomen, and pelvis on 02/03/2021 did not reveal any lymphadenopathy or splenomegaly.     SUBJECTIVE: Ms. Caban is a 73-year-old female with Webb stage 0 chronic lymphocytic leukemia on observation.     She is a smoker. CT chest lung cancer screening on 01/24/2023 revealed 2 mm right apex nodule. She has follow-up appointment with pulmonologist with CT.     She is doing well. She is very active.  No headache.  No dizziness. No chest pain.  No shortness of breath.  No abdominal pain, nausea or vomiting. Appetite is good.  No urinary or bowel complaints.  No recurrent infection. No night sweats. All other review of systems is negative.     PHYSICAL EXAMINATION:   GENERAL:  Alert and oriented x 3.   VITAL SIGNS:  Reviewed.  ECOG PS of 0.     EYES:  No icterus.   NECK:  Supple. No lymphadenopathy. No thyromegaly.   AXILLAE:  No lymphadenopathy.   LUNGS:  Good air entry bilaterally.  No crackles or wheezing.   HEART:  Regular.  No murmur.   GI: Abdomen is soft.  Nontender. No mass.   EXTREMITIES:  No pedal edema.  No calf swelling or tenderness.   SKIN:  No rash.      LABORATORY:  CBC reviewed.     ASSESSMENT:    1.  A 73-year-old female with Ewbb stage 0 chronic lymphocytic leukemia on observation. CLL is slowly progressing.  2.  Lung nodule.  3.  Chronic smoking.     PLAN:    1.   Patient is asymptomatic. Leukocytosis/lymphocytosis has increased. Normal hemoglobin and platelet. CLL is slowly progressing.    Different indications for treatment reviewed. No indication for treatment at this time.     For CLL, she will have CBC every 3 months.     2.  Advised her to quit smoking.     3.  For lung nodule, she will have follow-up CT chest in January 2024 and follow-up with PCP and pulmonologist.     4.  She had a few questions which were all answered.  I will see her in 6 months time.     TOTAL VISIT TIME:  30 minutes.  Time spent in today's visit, review of chart/investigations today and documentation.

## 2023-12-13 NOTE — TELEPHONE ENCOUNTER
DATE/TIME OF CALL RECEIVED FROM LAB:  12/13/23 at 11:02 AM     LAB TEST & LAB VALUE:  WBC 57.4    Previous Labs from 10/20/23, OUTSIDE LAB    WBC 45.5    PROVIDER NOTIFIED?: Yes    Patient has visit with provider today.    PROVIDER NAME: Dr. Osorio    TIME LAB VALUE REPORTED TO PROVIDER:   11:03 AM    MECHANISM OF PROVIDER NOTIFICATION:  message sent.     Precious Green RN, BSN, PHN, OCN  [facility]

## 2023-12-14 NOTE — RESULT ENCOUNTER NOTE
Dear Ms. Caban,    Blood test reveals WBC to be higher at 57.4. Hemoglobin and platelet is normal. Please, have CBC rechecked in 3 months.    Please, call me with any questions.    Sancho Osorio MD

## 2023-12-18 DIAGNOSIS — I10 HTN (HYPERTENSION), BENIGN: ICD-10-CM

## 2023-12-18 DIAGNOSIS — I10 HYPERTENSION, UNSPECIFIED TYPE: ICD-10-CM

## 2023-12-18 RX ORDER — METOPROLOL SUCCINATE 100 MG/1
150 TABLET, EXTENDED RELEASE ORAL DAILY
Qty: 45 TABLET | Refills: 0 | OUTPATIENT
Start: 2023-12-18

## 2023-12-20 ENCOUNTER — VIRTUAL VISIT (OUTPATIENT)
Dept: PHARMACY | Facility: CLINIC | Age: 73
End: 2023-12-20
Payer: COMMERCIAL

## 2023-12-20 DIAGNOSIS — Z78.9 TAKES DIETARY SUPPLEMENTS: ICD-10-CM

## 2023-12-20 DIAGNOSIS — F17.200 SMOKER: ICD-10-CM

## 2023-12-20 DIAGNOSIS — I73.9 PAD (PERIPHERAL ARTERY DISEASE) (H): ICD-10-CM

## 2023-12-20 DIAGNOSIS — N30.00 ACUTE CYSTITIS WITHOUT HEMATURIA: Primary | ICD-10-CM

## 2023-12-20 DIAGNOSIS — I10 HTN (HYPERTENSION), BENIGN: ICD-10-CM

## 2023-12-20 DIAGNOSIS — E78.5 HYPERLIPIDEMIA WITH TARGET LDL LESS THAN 100: ICD-10-CM

## 2023-12-20 DIAGNOSIS — J43.9 PULMONARY EMPHYSEMA, UNSPECIFIED EMPHYSEMA TYPE (H): ICD-10-CM

## 2023-12-20 PROCEDURE — 99607 MTMS BY PHARM ADDL 15 MIN: CPT | Performed by: PHARMACIST

## 2023-12-20 PROCEDURE — 99606 MTMS BY PHARM EST 15 MIN: CPT | Performed by: PHARMACIST

## 2023-12-20 RX ORDER — METOPROLOL SUCCINATE 100 MG/1
150 TABLET, EXTENDED RELEASE ORAL DAILY
Qty: 45 TABLET | Refills: 1 | Status: SHIPPED | OUTPATIENT
Start: 2023-12-20 | End: 2024-02-16

## 2023-12-20 RX ORDER — CHLORTHALIDONE 25 MG/1
25 TABLET ORAL DAILY
Qty: 30 TABLET | Refills: 1 | Status: SHIPPED | OUTPATIENT
Start: 2023-12-20 | End: 2024-02-13

## 2023-12-20 RX ORDER — CEPHALEXIN 500 MG/1
500 CAPSULE ORAL 2 TIMES DAILY
COMMUNITY
Start: 2023-12-15 | End: 2023-12-22

## 2023-12-20 RX ORDER — METOPROLOL SUCCINATE 100 MG/1
150 TABLET, EXTENDED RELEASE ORAL DAILY
Qty: 135 TABLET | OUTPATIENT
Start: 2023-12-20

## 2023-12-20 NOTE — PATIENT INSTRUCTIONS
"Recommendations from today's MTM visit:                                                    Today we reviewed what your medicines are for, how to know if they are working, that your medicines are safe and how to make your medicine regimen as easy as possible.        Stop taking Leroy Wort   MTM will request refills for Chlorthalidone and Metoprolol   Get BMP and Lipid labs in the next two weeks    Follow-up: 4 Months     It was great speaking with you today.  I value your experience and would be very thankful for your time in providing feedback in our clinic survey. In the next few days, you may receive an email or text message from [employer] with a link to a survey related to your  clinical pharmacist.\"     To schedule another MTM appointment, please call the clinic directly or you may call the MTM scheduling line at [phone] or toll-free at [phone].     My Clinical Pharmacist's contact information:                                                      Please feel free to contact me with any questions or concerns you have.      Indira Gaines PharmD  Medication Therapy Management (MTM) Resident  Pager: [phone]    Abril Lopez PharmD, [facility]  Medication Therapy Management Provider  [phone]     "

## 2023-12-20 NOTE — TELEPHONE ENCOUNTER
This refill request is a duplicate request, previously received or sent.   Sent denial notification to pharmacy.  LAUREN Marks  [facility]

## 2023-12-20 NOTE — PROGRESS NOTES
Medication Therapy Management (MTM) Encounter    ASSESSMENT:                            Medication Adherence/Access: No issues identified    UTI:  Appropriately treated with antibiotics    Hypertension:   Patient is meeting blood pressure goal of < 130/80mmHg. Patient would benefit from continuing Chlorthalidone and Metoprolol. Future considerations may be made for adding and ARB (had cough with lisinopril) and reducing Metoprolol.  Due for updated BMP since starting chlorthalidone.    Hyperlipidemia:   Would benefit from rechecking a lipid panel because the patient was switched from Crestor back to Atorvastatin in October.     PAD:  Plan in place with Vascular Surgery to repeat imaging of the right external iliac artery stent grafting of and right limb of the aortobiiliac bypass grafting. Potentially could be on Brilinta until this is done around April 2024.     COPD/Emphysema:   Stable will continue to monitor.    Tobacco Cessation:   Will continue to follow closely    Supplements:   Concern for drug-drug interaction between Lucky Wort causing decreased efficacy of Brilinta. Patient would benefit from stopping Lucky Wort until PAD can be reassessed and Brilinta therapy completed.      PLAN:                            Stop taking Lucky Wort   MTM will request refills for Chlorthalidone and Metoprolol   Get BMP and Lipid labs in the next two weeks    Follow-up: 4 Months     SUBJECTIVE/OBJECTIVE:                          Humaira Caban is a 73 year old female called for a follow-up visit from 09/25/23.       Reason for visit: Routine Follow-Up.    Allergies/ADRs: Reviewed in chart  Past Medical History: Reviewed in chart  Tobacco: She reports that she has been smoking cigarettes. She has a 12.5 pack-year smoking history. She has never used smokeless tobacco.Nicotine/Tobacco Cessation Plan:   Pharmacotherapies : Nicotine patch and Nicotine lozenge  Alcohol: Unable to determine current use    Medication  Adherence/Access: no issues reported    UTI:   Current urinary tract infection started on Cephalexin 500 mg twice daily 12/15/23 will complete therapy 12/22/23   No complaints or issue reported     Hypertension   Chlorthalidone 25 mg daily - needs new prescription   Metoprolol  mg daily - needs new prescription     Patient reports no current medication side effects.  Patient self-monitors blood pressure.  Home blood pressure monitoring 126/68 mmHg today. Typically sees readings <130/80 mmHg     BP Readings from Last 3 Encounters:   12/13/23 126/67   11/14/23 (!) 193/93   10/16/23 116/67     Pulse Readings from Last 3 Encounters:   12/13/23 66   11/14/23 77   10/16/23 65     Hyperlipidemia   Atorvastatin 40 mg daily   Zetia 10 mg daily    No issues reported   Recent Labs   Lab Test 09/12/23  1255 04/06/23  0748   CHOL 119 138   HDL 50 57   LDL 47 65   TRIG 108 82     PAD:  Brilinta 60 mg daily   Aspirin 81 mg daily     No sign or symptoms of bleeding.     COPD/Emphysema:   Stiolto Respimat 2.5-2.5 mcg 2 puffs daily   Combivent Respimat  mcg 1 puff every 6 hours as needed     Reports that she has been breathing a lot better since stopping the amlodipine. No issues reported with her inhalers.     Tobacco Cessation:   Nicotine 2 mg lozenge   Nicotine 14 mg patch   Overall says that these have been helpful, it has just been a long process to quit.    Supplements:   Vitamin D3 5000 international unit(s) daily  CoQ10 200 mg daily   Cranberry w/Vit C&E 4200-20-3 mg/mg/Unit daily   Vitamin B12 5000mcg every other day   Probiotic daily   Hair Skin & Nails daily   Potassium CL ER 10 mEq 2-3 times weekly   Saline Nasal spray   Bronx Wort 300 mg daily - started about three months ago     No reported issues at this time.     Today's Vitals: There were no vitals taken for this visit.  ----------------    I spent 18 minutes with this patient today. All changes were made via collaborative practice agreement with   Shanon. A copy of the visit note was provided to the patient's provider(s).    A summary of these recommendations was sent via [employer].    Vinicio BarksdaleD  Medication Therapy Management (MTM) Resident  Pager: [phone]    Abril Lopez PharmD, [facility]  Medication Therapy Management Provider  [phone]    Telemedicine Visit Details  Type of service:  Telephone visit  Start Time:  10:38 AM  End Time:  10:56 AM     Medication Therapy Recommendations  HTN (hypertension), benign    Current Medication: chlorthalidone (HYGROTON) 25 MG tablet (Discontinued)   Rationale: Medication requires monitoring - Needs additional monitoring - Safety   Recommendation: Order Lab   Status: Accepted per CPA         PAD (peripheral artery disease) (H24)    Current Medication: ticagrelor (BRILINTA) 60 MG tablet   Rationale: Medication interaction - Adverse medication event - Safety   Recommendation: Discontinue Medication - st johns wort 300 MG Tabs tablet   Status: Accepted - no CPA Needed

## 2024-01-17 ENCOUNTER — TELEPHONE (OUTPATIENT)
Dept: ONCOLOGY | Facility: CLINIC | Age: 74
End: 2024-01-17

## 2024-01-17 ENCOUNTER — LAB (OUTPATIENT)
Dept: LAB | Facility: CLINIC | Age: 74
End: 2024-01-17
Payer: COMMERCIAL

## 2024-01-17 DIAGNOSIS — I10 HTN (HYPERTENSION), BENIGN: ICD-10-CM

## 2024-01-17 DIAGNOSIS — E78.5 HYPERLIPIDEMIA WITH TARGET LDL LESS THAN 100: ICD-10-CM

## 2024-01-17 DIAGNOSIS — C91.10 CLL (CHRONIC LYMPHOCYTIC LEUKEMIA) (H): ICD-10-CM

## 2024-01-17 LAB
ANION GAP SERPL CALCULATED.3IONS-SCNC: 10 MMOL/L (ref 7–15)
BASOPHILS # BLD AUTO: ABNORMAL 10*3/UL
BASOPHILS # BLD MANUAL: 0 10E3/UL (ref 0–0.2)
BASOPHILS NFR BLD AUTO: ABNORMAL %
BASOPHILS NFR BLD MANUAL: 0 %
BUN SERPL-MCNC: 13.5 MG/DL (ref 8–23)
CALCIUM SERPL-MCNC: 9.5 MG/DL (ref 8.8–10.2)
CHLORIDE SERPL-SCNC: 103 MMOL/L (ref 98–107)
CHOLEST SERPL-MCNC: 124 MG/DL
CREAT SERPL-MCNC: 0.62 MG/DL (ref 0.51–0.95)
DEPRECATED HCO3 PLAS-SCNC: 28 MMOL/L (ref 22–29)
EGFRCR SERPLBLD CKD-EPI 2021: >90 ML/MIN/1.73M2
EOSINOPHIL # BLD AUTO: ABNORMAL 10*3/UL
EOSINOPHIL # BLD MANUAL: 0 10E3/UL (ref 0–0.7)
EOSINOPHIL NFR BLD AUTO: ABNORMAL %
EOSINOPHIL NFR BLD MANUAL: 0 %
ERYTHROCYTE [DISTWIDTH] IN BLOOD BY AUTOMATED COUNT: 14.7 % (ref 10–15)
FASTING STATUS PATIENT QL REPORTED: YES
GLUCOSE SERPL-MCNC: 99 MG/DL (ref 70–99)
HCT VFR BLD AUTO: 45.4 % (ref 35–47)
HDLC SERPL-MCNC: 46 MG/DL
HGB BLD-MCNC: 14.2 G/DL (ref 11.7–15.7)
IMM GRANULOCYTES # BLD: ABNORMAL 10*3/UL
IMM GRANULOCYTES NFR BLD: ABNORMAL %
LDLC SERPL CALC-MCNC: 62 MG/DL
LYMPHOCYTES # BLD AUTO: ABNORMAL 10*3/UL
LYMPHOCYTES # BLD MANUAL: 48.8 10E3/UL (ref 0.8–5.3)
LYMPHOCYTES NFR BLD AUTO: ABNORMAL %
LYMPHOCYTES NFR BLD MANUAL: 84 %
MCH RBC QN AUTO: 28.4 PG (ref 26.5–33)
MCHC RBC AUTO-ENTMCNC: 31.3 G/DL (ref 31.5–36.5)
MCV RBC AUTO: 91 FL (ref 78–100)
MONOCYTES # BLD AUTO: ABNORMAL 10*3/UL
MONOCYTES # BLD MANUAL: 1.7 10E3/UL (ref 0–1.3)
MONOCYTES NFR BLD AUTO: ABNORMAL %
MONOCYTES NFR BLD MANUAL: 3 %
NEUTROPHILS # BLD AUTO: ABNORMAL 10*3/UL
NEUTROPHILS # BLD MANUAL: 7.6 10E3/UL (ref 1.6–8.3)
NEUTROPHILS NFR BLD AUTO: ABNORMAL %
NEUTROPHILS NFR BLD MANUAL: 13 %
NONHDLC SERPL-MCNC: 78 MG/DL
NRBC # BLD AUTO: 0 10E3/UL
NRBC BLD AUTO-RTO: 0 /100
PLAT MORPH BLD: ABNORMAL
PLATELET # BLD AUTO: 185 10E3/UL (ref 150–450)
POTASSIUM SERPL-SCNC: 4.4 MMOL/L (ref 3.4–5.3)
RBC # BLD AUTO: 5 10E6/UL (ref 3.8–5.2)
RBC MORPH BLD: ABNORMAL
SMUDGE CELLS BLD QL SMEAR: PRESENT
SODIUM SERPL-SCNC: 141 MMOL/L (ref 135–145)
TRIGL SERPL-MCNC: 82 MG/DL
WBC # BLD AUTO: 58.1 10E3/UL (ref 4–11)

## 2024-01-17 PROCEDURE — 80061 LIPID PANEL: CPT

## 2024-01-17 PROCEDURE — 85007 BL SMEAR W/DIFF WBC COUNT: CPT

## 2024-01-17 PROCEDURE — 85027 COMPLETE CBC AUTOMATED: CPT

## 2024-01-17 PROCEDURE — 36415 COLL VENOUS BLD VENIPUNCTURE: CPT

## 2024-01-17 PROCEDURE — 80048 BASIC METABOLIC PNL TOTAL CA: CPT

## 2024-01-17 NOTE — TELEPHONE ENCOUNTER
DATE/TIME OF CALL RECEIVED FROM LAB:  01/17/24 at 9:10 AM     LAB TEST & LAB VALUE: WBC 58.47 preliminary        Previous Labs from 12/13/2023; WBC 57.4    PROVIDER NOTIFIED?: Yes    PROVIDER NAME: Dr Osorio    TIME LAB VALUE REPORTED TO PROVIDER:   9:11 AM     MECHANISM OF PROVIDER NOTIFICATION:  high priority in basket message    PROVIDER RESPONSE: awaiting reply    Lizbeth Silva, RN on 1/17/2024 at 9:11 AM

## 2024-01-17 NOTE — RESULT ENCOUNTER NOTE
Dear MsRikki Caban,    Blood test reveals CLL to be stable.  WBC is elevated 58.1.  Normal hemoglobin and platelet.    Please, call me with any questions.    Sancho Osorio MD

## 2024-01-18 NOTE — TELEPHONE ENCOUNTER
Per chart review, Dr Osorio sent the following message to pt yesterday afternoon:    Dear Ms. Caban,     Blood test reveals CLL to be stable.  WBC is elevated 58.1.  Normal hemoglobin and platelet.     Please, call me with any questions.     Sancho Osorio MD      No further action needed at this time.  Will close encounter.    Lizbeth Silva RN on 1/18/2024 at 10:10 AM

## 2024-01-19 ASSESSMENT — ENCOUNTER SYMPTOMS
HEARTBURN: 0
JOINT SWELLING: 0
ABDOMINAL PAIN: 0
NERVOUS/ANXIOUS: 0
HEMATURIA: 0
CHILLS: 0
WEAKNESS: 0
FREQUENCY: 1
NAUSEA: 0
HEMATOCHEZIA: 0
CONSTIPATION: 0
ARTHRALGIAS: 0
PALPITATIONS: 0
BREAST MASS: 0
DIARRHEA: 0
PARESTHESIAS: 0
HEADACHES: 0
COUGH: 0
DIZZINESS: 0
SHORTNESS OF BREATH: 0
SORE THROAT: 0
DYSURIA: 0
MYALGIAS: 0
FEVER: 0
EYE PAIN: 0

## 2024-01-19 ASSESSMENT — ACTIVITIES OF DAILY LIVING (ADL): CURRENT_FUNCTION: NO ASSISTANCE NEEDED

## 2024-01-26 ENCOUNTER — OFFICE VISIT (OUTPATIENT)
Dept: FAMILY MEDICINE | Facility: CLINIC | Age: 74
End: 2024-01-26
Payer: COMMERCIAL

## 2024-01-26 VITALS
HEART RATE: 66 BPM | SYSTOLIC BLOOD PRESSURE: 129 MMHG | RESPIRATION RATE: 18 BRPM | WEIGHT: 131.1 LBS | BODY MASS INDEX: 21.84 KG/M2 | DIASTOLIC BLOOD PRESSURE: 82 MMHG | OXYGEN SATURATION: 97 % | TEMPERATURE: 97.7 F | HEIGHT: 65 IN

## 2024-01-26 DIAGNOSIS — I73.9 PAD (PERIPHERAL ARTERY DISEASE) (H): ICD-10-CM

## 2024-01-26 DIAGNOSIS — J43.9 PULMONARY EMPHYSEMA, UNSPECIFIED EMPHYSEMA TYPE (H): ICD-10-CM

## 2024-01-26 DIAGNOSIS — Z00.00 ROUTINE HISTORY AND PHYSICAL EXAMINATION OF ADULT: ICD-10-CM

## 2024-01-26 DIAGNOSIS — M81.0 OSTEOPOROSIS, UNSPECIFIED OSTEOPOROSIS TYPE, UNSPECIFIED PATHOLOGICAL FRACTURE PRESENCE: ICD-10-CM

## 2024-01-26 DIAGNOSIS — C91.10 CLL (CHRONIC LYMPHOCYTIC LEUKEMIA) (H): Chronic | ICD-10-CM

## 2024-01-26 DIAGNOSIS — K21.9 GASTROESOPHAGEAL REFLUX DISEASE WITHOUT ESOPHAGITIS: ICD-10-CM

## 2024-01-26 DIAGNOSIS — R91.8 PULMONARY NODULES: Primary | ICD-10-CM

## 2024-01-26 DIAGNOSIS — R91.8 ABNORMAL CT LUNG SCREENING: ICD-10-CM

## 2024-01-26 DIAGNOSIS — N39.0 RECURRENT UTI: ICD-10-CM

## 2024-01-26 DIAGNOSIS — I10 HTN (HYPERTENSION), BENIGN: ICD-10-CM

## 2024-01-26 DIAGNOSIS — F17.200 SMOKER: ICD-10-CM

## 2024-01-26 PROCEDURE — 99214 OFFICE O/P EST MOD 30 MIN: CPT | Mod: 25 | Performed by: INTERNAL MEDICINE

## 2024-01-26 PROCEDURE — G0439 PPPS, SUBSEQ VISIT: HCPCS | Performed by: INTERNAL MEDICINE

## 2024-01-26 ASSESSMENT — ENCOUNTER SYMPTOMS
DIZZINESS: 0
EYE PAIN: 0
ABDOMINAL PAIN: 0
FEVER: 0
NERVOUS/ANXIOUS: 0
CONSTIPATION: 0
SHORTNESS OF BREATH: 0
HEADACHES: 0
NAUSEA: 0
WEAKNESS: 0
CHILLS: 0
MYALGIAS: 0
SORE THROAT: 0
DYSURIA: 0
PALPITATIONS: 0
JOINT SWELLING: 0
COUGH: 0
HEMATURIA: 0
ARTHRALGIAS: 0
FREQUENCY: 1
DIARRHEA: 0

## 2024-01-26 ASSESSMENT — PAIN SCALES - GENERAL: PAINLEVEL: NO PAIN (0)

## 2024-01-26 ASSESSMENT — ACTIVITIES OF DAILY LIVING (ADL): CURRENT_FUNCTION: NO ASSISTANCE NEEDED

## 2024-01-26 NOTE — PROGRESS NOTES
"Preventive Care Visit  [facility]  Liane Claudio MD, Internal Medicine  Jan 26, 2024      SUBJECTIVE:   Humaira is a 73 year old, presenting for the following:  Physical (Patient is here for annual wellness visit.back pain) and UTI (2-3 month, burning, yeast infection, pressure lower belly)        1/26/2024    11:17 AM   Additional Questions   Roomed by [email or web address]     Are you in the first 12 months of your Medicare coverage?  No    Healthy Habits:     In general, how would you rate your overall health?  Good    Frequency of exercise:  4-5 days/week    Duration of exercise:  30-45 minutes    Do you usually eat at least 4 servings of fruit and vegetables a day, include whole grains    & fiber and avoid regularly eating high fat or \"junk\" foods?  Yes    Taking medications regularly:  Yes    Ability to successfully perform activities of daily living:  No assistance needed    Home Safety:  No safety concerns identified    Hearing Impairment:  No hearing concerns    In the past 6 months, have you been bothered by leaking of urine?  No    In general, how would you rate your overall mental or emotional health?  Good    Additional concerns today:  Yes      Today's PHQ-2 Score:       1/26/2024    10:53 AM   PHQ-2 ( 1999 [employer])   Q1: Little interest or pleasure in doing things 0   Q2: Feeling down, depressed or hopeless 0   PHQ-2 Score 0   Q1: Little interest or pleasure in doing things Not at all   Q2: Feeling down, depressed or hopeless Not at all   PHQ-2 Score 0       3 UTI' 10/5.10/20, 12/15 and last was candida,     LOWER BACK PAIN, AND PRESSURE IN GROIN, WITH DYSURIA    /100    Amlodipine , caused high bp , was lot of breath on it    Blood pressure 1 17-1 20 systolic.  She denies any claudication with walking.  She has some clear phlegm at times.  She is due for follow-up on a 20 return pulmonary nodule she sees a lung specialist.  She is using Combivent as needed and she is on the Stiolto daily " "inhaler.  Denies any dizziness.  Vision is stable.  No hearing problem.  She does have osteoporosis equivalent her bone densitometry showed risk fracture of 4%.  Will discuss with MTM.  Her EGD did not show Lagunas's.  She is on PPI chronic therapy.  She had 3 urine infections October to December.  She had a right ovarian complex cyst that was monitored by gynecology back in 2022.  She had D&C that showed benign uterus endometrial thickening by pathology..  Having some pressure sensation in her pelvic area with recurrent UTI, no hematuria she does get dysuria with the onset of the UTIs.  No other systemic concerns.  Have you ever done Advance Care Planning? (For example, a Health Directive, POLST, or a discussion with a medical provider or your loved ones about your wishes): No, advance care planning information given to patient to review.  Patient plans to discuss their wishes with loved ones or provider.         Fall risk  Fallen 2 or more times in the past year?: No  Any fall with injury in the past year?: No    Cognitive Screening   1) Repeat 3 items (Leader, Season, Table)    2) Clock draw: NORMAL  3) 3 item recall: Recalls 3 objects  Results: 3 items recalled: COGNITIVE IMPAIRMENT LESS LIKELY    Mini-CogTM Copyright S Alex. Licensed by the author for use in [facility]; reprinted with permission ([email or web address]). All rights reserved.      Do you have sleep apnea, excessive snoring or daytime drowsiness? : no    Reviewed and updated as needed this visit by clinical staff   Tobacco  Allergies  Meds  Problems             Reviewed and updated as needed this visit by Provider    Allergies  Meds  Problems             Social History     Tobacco Use    Smoking status: Every Day     Packs/day: 0.25     Years: 50.00     Additional pack years: 0.00     Total pack years: 12.50     Types: Cigarettes    Smokeless tobacco: Never    Tobacco comments:     Nicotine patch and a cigarette \"once in awhile.\" "   Substance Use Topics    Alcohol use: Yes     Comment: Beer once in a while             1/19/2024    10:08 AM   Alcohol Use   Prescreen: >3 drinks/day or >7 drinks/week? No          No data to display              Do you have a current opioid prescription? No  Do you use any other controlled substances or medications that are not prescribed by a provider? None              Current providers sharing in care for this patient include:   Patient Care Team:  Liane Claudio MD as PCP - General (Internal Medicine)  Liane Claudio MD as Assigned PCP  Abril Lopez, PharmD as Pharmacist (Pharmacist)  Sancho Osorio MD as Assigned Cancer Care Provider  Ez Keys MD as Assigned Pulmonology Provider  Abril Lopez PharmD as Assigned MTM Pharmacist  Shaun Tran MD as Assigned Heart and Vascular Provider    The following health maintenance items are reviewed in Epic and correct as of today:  Health Maintenance   Topic Date Due    URINE DRUG SCREEN  Never done    LUNG CANCER SCREENING  01/24/2024    MEDICARE ANNUAL WELLNESS VISIT  01/11/2024    MAMMO SCREENING  01/24/2025    NICOTINE/TOBACCO CESSATION COUNSELING Q 1 YR  01/26/2025    ANNUAL REVIEW OF HM ORDERS  01/26/2025    FALL RISK ASSESSMENT  01/26/2025    DTAP/TDAP/TD IMMUNIZATION (3 - Td or Tdap) 11/05/2028    LIPID  01/17/2029    ADVANCE CARE PLANNING  01/26/2029    COLORECTAL CANCER SCREENING  03/29/2030    DEXA  07/31/2038    SPIROMETRY  Completed    HEPATITIS C SCREENING  Completed    COPD ACTION PLAN  Completed    PHQ-2 (once per calendar year)  Completed    INFLUENZA VACCINE  Completed    Pneumococcal Vaccine: 65+ Years  Completed    ZOSTER IMMUNIZATION  Completed    RSV VACCINE (Pregnancy & 60+)  Completed    COVID-19 Vaccine  Completed    IPV IMMUNIZATION  Aged Out    HPV IMMUNIZATION  Aged Out    MENINGITIS IMMUNIZATION  Aged Out    RSV MONOCLONAL ANTIBODY  Aged Out     Lab work is in process  Labs reviewed in EPIC  BP  Readings from Last 3 Encounters:   01/26/24 129/82   12/13/23 126/67   11/14/23 (!) 193/93    Wt Readings from Last 3 Encounters:   01/26/24 59.5 kg (131 lb 1.6 oz)   12/13/23 62.4 kg (137 lb 9.6 oz)   09/26/23 58.5 kg (129 lb)                  Patient Active Problem List   Diagnosis    Vitamin D deficiency    Osteopenia    HTN (hypertension), benign    Hyperlipidemia with target LDL less than 100    Fx low femur epiphy-closed (H)    Chronic back pain    PAD (peripheral artery disease) (H24)    PUD (peptic ulcer disease)    Normal nuclear stress test    Smoker    Advanced directives, counseling/discussion    Colon polyp    GERD (gastroesophageal reflux disease)    Anxiety    Controlled substance agreement signed on 1- with Lyudmila Escobar SHELBIE    Gastroesophageal reflux disease without esophagitis    Abnormal CT lung screening    Polycythemia vera (H)    CLL (chronic lymphocytic leukemia) (H)    Iliac artery stenosis, right (H24)    Pulmonary emphysema, unspecified emphysema type (H)     Past Surgical History:   Procedure Laterality Date    ABDOMEN SURGERY      ANGIOGRAM Right 11/18/2022    Procedure: ANGIOGRAM AORTO ILIAC ANGIOGRAM;  Surgeon: Shaun Tran MD;  Location:  OR    BIOPSY      Blood clot removal from Stent      2011    BONE MARROW BIOPSY, BONE SPECIMEN, NEEDLE/TROCAR N/A 02/02/2021    Procedure: bone marrow biopsy;  Surgeon: Kailey Cota MD;  Location:  GI    BYPASS GRAFT AORTOFEMORAL  05/2002    Dr. Turner    BYPASS GRAFT FEMOROPOPLITEAL  12/16/2011    COLONOSCOPY N/A 01/18/2018    Procedure: COMBINED COLONOSCOPY, SINGLE OR MULTIPLE BIOPSY/POLYPECTOMY BY BIOPSY;  COLONOSCOPY;  Surgeon: Efrain Hernadez MD;  Location:  GI    COLONOSCOPY N/A 03/29/2023    Procedure: COLONOSCOPY, FLEXIBLE, WITH LESION REMOVAL USING SNARE;  Surgeon: Jony Manriquez MD;  Location:  GI    DILATION AND CURETTAGE, OPERATIVE HYSTEROSCOPY, COMBINED N/A 09/15/2022    Procedure: HYSTEROSCOPY,  "WITH DILATION AND CURETTAGE OF UTERUS;  Surgeon: Destiney Schaefer MD;  Location:  OR    EMBOLECTOMY LOWER EXTREMITY  2011    Procedure:EMBOLECTOMY LOWER EXTREMITY; EMBOLECTOMY, RIGHT POPLITEAL WITH PATCH ANGIOPLASTY. EXCISION SKIN LESION RIGHT LEG. ; Surgeon:PARMINDER VELAZCO; Location: OR    ENDARTERECTOMY FEMORAL Right 2022    Procedure: ENDARTERECTOMY, FEMORAL RIGHT FEMORAL CUTDOWN, RIGHT ILIAC ARTERY STENTING.;  Surgeon: Shaun Tran MD;  Location:  OR    ESOPHAGOSCOPY, GASTROSCOPY, DUODENOSCOPY (EGD), COMBINED N/A 10/07/2022    Procedure: ESOPHAGOGASTRODUODENOSCOPY, WITH BIOPSY;  Surgeon: Felix Carmona MD;  Location:  GI    EXCISE LESION LOWER EXTREMITY  2011    Procedure:EXCISE LESION LOWER EXTREMITY; Surgeon:PARMINDER VELAZCO; Location: OR    HERNIA REPAIR  11/04/2008    x2 umbilical    IR OR ANGIOGRAM  2022    L achilles repair  2008    LAPAROSCOPIC OOPHORECTOMY Left     L for cyst age 26    miscarriages x 3      tubal ligation and reversal         Social History     Tobacco Use    Smoking status: Every Day     Packs/day: 0.25     Years: 50.00     Additional pack years: 0.00     Total pack years: 12.50     Types: Cigarettes    Smokeless tobacco: Never    Tobacco comments:     Nicotine patch and a cigarette \"once in awhile.\"   Substance Use Topics    Alcohol use: Yes     Comment: Beer once in a while     Family History   Problem Relation Age of Onset    Alzheimer Disease Mother          of panc/GI ca age 83    Osteoporosis Mother     Other Cancer Mother     Cancer Father          age 64 lymphoma    Hyperlipidemia Father     Depression Father         None    Colon Cancer Maternal Grandfather     Hypertension Sister          Current Outpatient Medications   Medication Sig Dispense Refill    aspirin (ASA) 81 MG chewable tablet Take 1 tablet (81 mg) by mouth daily 90 tablet 11    atorvastatin (LIPITOR) 40 MG tablet Take 1 tablet (40 mg) by " mouth daily 90 tablet 3    chlorthalidone (HYGROTON) 25 MG tablet Take 1 tablet (25 mg) by mouth daily 30 tablet 1    Cholecalciferol (VITAMIN D-3) 5000 UNIT TABS Take 1 tablet by mouth daily.      coenzyme Q-10 200 MG CAPS Take 200 mg by mouth daily      Cranberry-Vitamin C-Vitamin E (CRANBERRY PLUS VITAMIN C) 4200-20-3 MG-MG-UNIT CAPS Take 1 tablet by mouth daily      Cyanocobalamin (B-12 PO) Take 5,000 mcg by mouth every other day Liquid form 100 tablet 1    ezetimibe (ZETIA) 10 MG tablet TAKE 1 TABLET(10 MG) BY MOUTH DAILY 90 tablet 3    fluticasone (FLONASE) 50 MCG/ACT nasal spray Spray 1-2 sprays in nostril daily      ipratropium-albuterol (COMBIVENT RESPIMAT)  MCG/ACT inhaler Inhale 1 puff into the lungs every 6 hours as needed for shortness of breath, wheezing or cough 4 g 11    Lactobacillus (PROBIOTIC ACIDOPHILUS PO) Take 1 capsule by mouth daily      metoprolol succinate ER (TOPROL XL) 100 MG 24 hr tablet Take 1.5 tablets (150 mg) by mouth daily 45 tablet 1    Multiple Vitamins-Minerals (HAIR SKIN & NAILS ADVANCED PO) Take 1 tablet by mouth daily      nicotine (COMMIT) 2 MG lozenge Place 2 mg inside cheek every hour as needed for smoking cessation      nicotine (NICODERM CQ) 14 MG/24HR 24 hr patch Place 1 patch onto the skin every 24 hours      nitroGLYcerin (NITROSTAT) 0.4 MG sublingual tablet For chest pain place 1 tablet under the tongue every 5 minutes for 3 doses. If symptoms persist 5 minutes after 1st dose call 911. 20 tablet 0    pantoprazole (PROTONIX) 40 MG EC tablet Take 1 tablet (40 mg) by mouth daily 90 tablet 1    potassium chloride ER (KLOR-CON M) 20 MEQ CR tablet TAKE ONE TABLET BY MOUTH 2-3 TIMES PER WEEK (Patient taking differently: Take 10 mEq by mouth five times a week TAKE ONE TABLET BY MOUTH 2-3 TIMES PER WEEK) 30 tablet 1    Sodium Chloride-Xylitol (XLEAR SINUS CARE SPRAY NA) Spray 1 spray in nostril daily as needed      ticagrelor (BRILINTA) 60 MG tablet Take 1 tablet (60 mg)  by mouth daily 90 tablet 3    tiotropium-olodaterol (STIOLTO RESPIMAT) 2.5-2.5 MCG/ACT AERS Inhale 2 puffs into the lungs daily 4 g 5     Allergies   Allergen Reactions    Chantix [Varenicline] Nausea    Ciprofloxacin Other (See Comments)     hypertension    Clopidogrel      Other reaction(s): Hypertension    Decongestant [Pseudoephedrine Hcl Er]     Erythromycin Nausea    Lisinopril      cough    Plavix [Clopidogrel Bisulfate]      Felt as if she was having a heart attack    Rofecoxib Unknown    Vioxx      Increased BP     Recent Labs   Lab Test 01/17/24  0859 11/14/23  1545 09/12/23  1255 07/02/23  1444 05/22/23  1358 05/03/23  0859 04/06/23  0748 02/28/23  0726 01/04/23  1018 11/30/21  0900 06/01/21  0909 02/03/21  1115 12/20/17  0950 12/08/17  0732   A1C  --   --   --   --   --   --   --   --  5.4  --   --   --   --   --    LDL 62  --  47  --   --   --  65   < > 82   < >  --   --    < > 87   HDL 46*  --  50  --   --   --  57   < > 56   < >  --   --    < > 61   TRIG 82  --  108  --   --   --  82   < > 84   < >  --   --    < > 85   ALT  --   --   --  27 26 26  --    < > 22   < > 31  --    < > 33   CR 0.62 0.59 0.61 0.53 0.69  --   --    < > 0.60   < > 0.71  --    < > 0.72   GFRESTIMATED >90 >90 >90 >90 >90  --   --    < > >90   < > 87 71   < > 81   GFRESTBLACK  --   --   --   --   --   --   --   --   --   --  >90 86   < > >90   POTASSIUM 4.4 3.9 4.7 3.3* 3.4  --   --    < > 3.7   < > 3.1*  --    < > 4.0   TSH  --   --   --   --   --   --   --   --   --   --   --   --   --  1.38    < > = values in this interval not displayed.      Mammogram Screening: Mammogram Screening: Recommended mammography every 1-2 years with patient discussion and risk factor consideration  Last 3 Pap and HPV Results:       11/6/2013    12:00 AM   PAP / HPV   PAP (Historical) NIL          Review of Systems   Constitutional:  Negative for chills and fever.   HENT:  Negative for congestion, ear pain, hearing loss and sore throat.    Eyes:   "Negative for pain and visual disturbance.   Respiratory:  Negative for cough and shortness of breath.    Cardiovascular:  Negative for chest pain and palpitations.   Gastrointestinal:  Negative for abdominal pain, constipation, diarrhea and nausea.   Genitourinary:  Positive for frequency, urgency and vaginal discharge. Negative for dysuria, genital sores, hematuria, pelvic pain and vaginal bleeding.   Musculoskeletal:  Negative for arthralgias, joint swelling and myalgias.   Skin:  Negative for rash.   Neurological:  Negative for dizziness, weakness and headaches.   Psychiatric/Behavioral:  The patient is not nervous/anxious.         Review of Systems  Constitutional, HEENT, cardiovascular, pulmonary, GI, , musculoskeletal, neuro, skin, endocrine and psych systems are negative, except as otherwise noted.    OBJECTIVE:   /82 (BP Location: Left arm)   Pulse 66   Temp 97.7  F (36.5  C) (Oral)   Resp 18   Ht 1.638 m (5' 4.5\")   Wt 59.5 kg (131 lb 1.6 oz)   SpO2 97%   BMI 22.16 kg/m     Estimated body mass index is 22.16 kg/m  as calculated from the following:    Height as of this encounter: 1.638 m (5' 4.5\").    Weight as of this encounter: 59.5 kg (131 lb 1.6 oz).  Physical Exam  GENERAL: alert and no distress  EYES: Eyes grossly normal to inspection, PERRL and conjunctivae and sclerae normal  HENT: ear canals and TM's normal, nose and mouth without ulcers or lesions  NECK: no adenopathy, no asymmetry, masses, or scars  RESP: lungs clear to auscultation - no rales, rhonchi or wheezes  CV: regular rate and rhythm, normal S1 S2, no S3 or S4, no murmur, click or rub, no peripheral edema  ABDOMEN: soft, nontender, no hepatosplenomegaly, no masses and bowel sounds normal  MS: no gross musculoskeletal defects noted, no edema  SKIN: no suspicious lesions or rashes  NEURO: Normal strength and tone, mentation intact and speech normal  PSYCH: mentation appears normal, affect normal/bright  LYMPH: no cervical, " supraclavicular, axillary, or inguinal adenopathy    Diagnostic Test Results:  Labs reviewed in Epic    ASSESSMENT / PLAN:     Problem List Items Addressed This Visit       HTN (hypertension), benign    PAD (peripheral artery disease) (H24)    Smoker    Relevant Orders    CT Chest Lung wo Contrast Diagnostic Screening    Gastroesophageal reflux disease without esophagitis    Abnormal CT lung screening    CLL (chronic lymphocytic leukemia) (H) (Chronic)    Pulmonary emphysema, unspecified emphysema type (H)     Other Visit Diagnoses       Pulmonary nodules    -  Primary    Relevant Orders    CT Chest Lung wo Contrast Diagnostic Screening    Recurrent UTI        Relevant Orders    US Pelvic Complete with Transvaginal    Adult Urology  Referral    Routine history and physical examination of adult        Osteoporosis, unspecified osteoporosis type, unspecified pathological fracture presence                Patient has been advised of split billing requirements and indicates understanding: Yes  Discussed multiple health conditions today.  She has osteoporosis equivalent she will benefit from oral biphosphonate's will check with MTM.  Continue calcium vitamin D supplements calcium in the form of Caltrate 1 to 2 tablets daily.  Continue with weightbearing exercise.  Peripheral vascular disease continue to follow with vascular medicine.  Blood pressure well-controlled hypertension controlled.  Recurrent UTI referral to urogynecology consideration for topical vaginal estrogen cream but given her GYN history finding of ovarian cyst complex cyst will hold on further evaluation, repeat pelvic ultrasound.  Reviewed echocardiogram normal,  Up-to-date on colonoscopy repeat in 5 years.  Due for mammogram.  Advise all medications,  Continue with healthy diet low-salt low-cholesterol diet.  All questions answered  Up-to-date on all her vaccines.  Counseling  Reviewed preventive health counseling, as reflected in patient  instructions       Regular exercise       Healthy diet/nutrition       Vision screening       Hearing screening       Dental care       Bladder control       Fall risk prevention       Aspirin prophylaxis        Osteoporosis prevention/bone health       Colon cancer screening       Advanced Planning         She reports that she has been smoking cigarettes. She has a 12.5 pack-year smoking history. She has never used smokeless tobacco.  Nicotine/Tobacco Cessation Plan  Patient uses nicotine patches and nicotine lozenges.      Appropriate preventive services were discussed with this patient, including applicable screening as appropriate for fall prevention, nutrition, physical activity, Tobacco-use cessation, weight loss and cognition.  Checklist reviewing preventive services available has been given to the patient.    Reviewed patients plan of care and provided an AVS. The Basic Care Plan (routine screening as documented in Health Maintenance) for Karen meets the Care Plan requirement. This Care Plan has been established and reviewed with the Patient.          Signed Electronically by: Liane Claudio MD    Identified Health Risks  I have reviewed Opioid Use Disorder and Substance Use Disorder risk factors and made any needed referrals.

## 2024-02-01 ENCOUNTER — HOSPITAL ENCOUNTER (OUTPATIENT)
Dept: MAMMOGRAPHY | Facility: CLINIC | Age: 74
Discharge: HOME | End: 2024-02-01
Attending: INTERNAL MEDICINE | Admitting: INTERNAL MEDICINE
Payer: COMMERCIAL

## 2024-02-01 ENCOUNTER — OFFICE VISIT (OUTPATIENT)
Dept: URGENT CARE | Facility: URGENT CARE | Age: 74
End: 2024-02-01
Payer: COMMERCIAL

## 2024-02-01 VITALS
TEMPERATURE: 97.8 F | HEART RATE: 71 BPM | OXYGEN SATURATION: 97 % | SYSTOLIC BLOOD PRESSURE: 168 MMHG | DIASTOLIC BLOOD PRESSURE: 86 MMHG

## 2024-02-01 DIAGNOSIS — B96.89 BV (BACTERIAL VAGINOSIS): Primary | ICD-10-CM

## 2024-02-01 DIAGNOSIS — Z12.31 VISIT FOR SCREENING MAMMOGRAM: ICD-10-CM

## 2024-02-01 DIAGNOSIS — N76.0 BV (BACTERIAL VAGINOSIS): Primary | ICD-10-CM

## 2024-02-01 DIAGNOSIS — Z87.440 HISTORY OF RECURRENT UTI (URINARY TRACT INFECTION): ICD-10-CM

## 2024-02-01 DIAGNOSIS — N89.8 VAGINAL DISCHARGE: ICD-10-CM

## 2024-02-01 DIAGNOSIS — R30.0 DYSURIA: ICD-10-CM

## 2024-02-01 LAB
ALBUMIN UR-MCNC: NEGATIVE MG/DL
APPEARANCE UR: CLEAR
BILIRUB UR QL STRIP: NEGATIVE
CLUE CELLS: PRESENT
COLOR UR AUTO: YELLOW
GLUCOSE UR STRIP-MCNC: NEGATIVE MG/DL
HGB UR QL STRIP: NEGATIVE
KETONES UR STRIP-MCNC: NEGATIVE MG/DL
LEUKOCYTE ESTERASE UR QL STRIP: NEGATIVE
NITRATE UR QL: NEGATIVE
PH UR STRIP: 6.5 [PH] (ref 5–7)
SP GR UR STRIP: 1.01 (ref 1–1.03)
TRICHOMONAS, WET PREP: ABNORMAL
UROBILINOGEN UR STRIP-ACNC: 0.2 E.U./DL
WBC'S/HIGH POWER FIELD, WET PREP: ABNORMAL
YEAST, WET PREP: ABNORMAL

## 2024-02-01 PROCEDURE — 99213 OFFICE O/P EST LOW 20 MIN: CPT

## 2024-02-01 PROCEDURE — 77063 BREAST TOMOSYNTHESIS BI: CPT

## 2024-02-01 PROCEDURE — 81003 URINALYSIS AUTO W/O SCOPE: CPT

## 2024-02-01 PROCEDURE — 87210 SMEAR WET MOUNT SALINE/INK: CPT

## 2024-02-01 RX ORDER — METRONIDAZOLE 7.5 MG/G
1 GEL VAGINAL DAILY
Qty: 25 G | Refills: 0 | Status: SHIPPED | OUTPATIENT
Start: 2024-02-01 | End: 2024-02-06

## 2024-02-01 NOTE — PROGRESS NOTES
Assessment & Plan        Diagnosis Comments   1. BV (bacterial vaginosis)  metroNIDAZOLE (METROGEL) 0.75 % vaginal gel       2. History of recurrent UTI (urinary tract infection)        3. Vaginal discharge  Wet prep - Clinic Collect       4. Dysuria  UA Macroscopic with reflex to Microscopic and Culture - Clinic Collect       Home care reviewed. Patient verbalized understanding; will monitor symptoms closely. Reviewed s/e to medications.   Follow up with primary care in 1 week if symptoms not improving.     Handout given from epic and reviewed.        [facility] ASHLYN Gerardo is a 73 year old female who presents to clinic today for the following health issues:  Chief Complaint   Patient presents with    Urinary Problem     Pt reports urinary difficulty, pain, pressure, ab extension.     HPI      UTI    Onset of symptoms was 3day(s).  Course of illness is waxing and waning  Severity mild  Current and associated symptoms dysuria, frequency, and suprapubic pain and pressure  Treatment and measures tried Increase fluid intake  Predisposing factors include history of frequent UTI's  Patient denies rigors, flank pain, temperature > 101 degrees F., vomiting, taking Coumadin, and GFR less than 30 within the last year          GYN Complaint    Onset of symptoms was 3 day(s) ago.  Course of illness is waxing and waning.    Severity moderate  Current and Associated symptoms: pelvic pain -  burning  Treatment measures tried include:  None  Sexually active: no  Predisposing factors: None  Hx of previous symptom: recent vaginal yeast treated with diflucan    Review of Systems  Constitutional, HEENT, cardiovascular, pulmonary, gi and gu systems are negative, except as otherwise noted.      Objective    BP (!) 168/86   Pulse 71   Temp 97.8  F (36.6  C) (Tympanic)   SpO2 97%   Physical Exam   GENERAL: alert and no distress  NECK: no adenopathy, no asymmetry, masses, or  scars  RESP: lungs clear to auscultation - no rales, rhonchi or wheezes  CV: regular rate and rhythm, normal S1 S2, no S3 or S4, no murmur, click or rub, no peripheral edema  ABDOMEN: soft, nontender, no hepatosplenomegaly, no masses and bowel sounds normal  MS: no gross musculoskeletal defects noted, no edema    Results for orders placed or performed in visit on 02/01/24   UA Macroscopic with reflex to Microscopic and Culture - Clinic Collect     Status: Normal    Specimen: Urine, Midstream   Result Value Ref Range    Color Urine Yellow Colorless, Straw, Light Yellow, Yellow    Appearance Urine Clear Clear    Glucose Urine Negative Negative mg/dL    Bilirubin Urine Negative Negative    Ketones Urine Negative Negative mg/dL    Specific Gravity Urine 1.015 1.003 - 1.035    Blood Urine Negative Negative    pH Urine 6.5 5.0 - 7.0    Protein Albumin Urine Negative Negative mg/dL    Urobilinogen Urine 0.2 0.2, 1.0 E.U./dL    Nitrite Urine Negative Negative    Leukocyte Esterase Urine Negative Negative    Narrative    Microscopic not indicated   Wet prep - Clinic Collect     Status: Abnormal    Specimen: Vagina; Swab   Result Value Ref Range    Trichomonas Absent Absent    Yeast Absent Absent    Clue Cells Present (A) Absent    WBCs/high power field 2+ (A) None   Results for orders placed or performed during the hospital encounter of 02/01/24   MA Screen Bilateral w/Jay     Status: None    Narrative    SCREENING MAMMOGRAM, BILATERAL, DIGITAL w/CAD AND TOMOSYNTHESIS -  2/1/2024 8:47 AM.    BREAST SYMPTOMS: No current breast complaints.     COMPARISON:  Prior mammograms in 2022 and 2023 .    BREAST DENSITY: Scattered fibroglandular densities.    COMMENTS: No findings of suspicion for malignancy.   There is an oval  mass in the left lateral breast, previously demonstrated to be a cyst.        Impression    IMPRESSION: BI-RADS CATEGORY: 2 - Benign.    RECOMMENDED FOLLOW-UP: Annual Mammography.  Recommend routine annual  screening mammography beginning at age 40 or  as discussed with your provider.    Exam results letter mailed to patient.    TESSA CYR MD         SYSTEM ID:  [id number]

## 2024-02-06 ENCOUNTER — HOSPITAL ENCOUNTER (OUTPATIENT)
Dept: ULTRASOUND IMAGING | Facility: CLINIC | Age: 74
Discharge: HOME | End: 2024-02-06
Attending: INTERNAL MEDICINE
Payer: COMMERCIAL

## 2024-02-06 ENCOUNTER — HOSPITAL ENCOUNTER (OUTPATIENT)
Dept: CT IMAGING | Facility: CLINIC | Age: 74
Discharge: HOME | End: 2024-02-06
Attending: INTERNAL MEDICINE
Payer: COMMERCIAL

## 2024-02-06 DIAGNOSIS — F17.200 SMOKER: ICD-10-CM

## 2024-02-06 DIAGNOSIS — R91.8 PULMONARY NODULES: ICD-10-CM

## 2024-02-06 DIAGNOSIS — N39.0 RECURRENT UTI: ICD-10-CM

## 2024-02-06 PROCEDURE — 71271 CT THORAX LUNG CANCER SCR C-: CPT

## 2024-02-06 PROCEDURE — 76830 TRANSVAGINAL US NON-OB: CPT

## 2024-02-06 PROCEDURE — 76856 US EXAM PELVIC COMPLETE: CPT

## 2024-02-08 ENCOUNTER — MYC MEDICAL ADVICE (OUTPATIENT)
Dept: FAMILY MEDICINE | Facility: CLINIC | Age: 74
End: 2024-02-08
Payer: COMMERCIAL

## 2024-02-08 DIAGNOSIS — N83.202 CYSTS OF BOTH OVARIES: Primary | ICD-10-CM

## 2024-02-08 DIAGNOSIS — N83.201 CYSTS OF BOTH OVARIES: Primary | ICD-10-CM

## 2024-02-08 DIAGNOSIS — R93.89 ENDOMETRIAL THICKENING ON ULTRASOUND: ICD-10-CM

## 2024-02-08 NOTE — TELEPHONE ENCOUNTER
Patient requesting GYN referral    Referral pending if appropriate      Reference to 1/26/24 OV note:

## 2024-02-12 DIAGNOSIS — J43.9 PULMONARY EMPHYSEMA, UNSPECIFIED EMPHYSEMA TYPE (H): ICD-10-CM

## 2024-02-12 RX ORDER — TIOTROPIUM BROMIDE AND OLODATEROL 3.124; 2.736 UG/1; UG/1
2 SPRAY, METERED RESPIRATORY (INHALATION) DAILY
Qty: 4 G | Refills: 0 | Status: SHIPPED | OUTPATIENT
Start: 2024-02-12 | End: 2024-02-21

## 2024-02-12 NOTE — TELEPHONE ENCOUNTER
Requested Prescriptions   Pending Prescriptions Disp Refills    tiotropium-olodaterol (STIOLTO RESPIMAT) 2.5-2.5 MCG/ACT AERS 4 g 5     Sig: Inhale 2 puffs into the lungs daily       Inhaled Steroids Protocol Passed - 2/12/2024 12:35 PM        Passed - Patient is age 12 or older        Passed - Recent (12 mo) or future (30 days) visit within the authorizing provider's specialty     The patient must have completed an in-person or virtual visit within the past 12 months or has a future visit scheduled within the next 90 days with the authorizing provider s specialty.  Urgent care and e-visits do not quality as an office visit for this protocol.          Passed - Medication is active on med list       Long-Acting Beta Agonist Inhalers Protocol  Passed - 2/12/2024 12:35 PM        Passed - Patient is age 12 or older        Passed - Recent (12 mo) or future (30 days) visit within the authorizing provider's specialty     The patient must have completed an in-person or virtual visit within the past 12 months or has a future visit scheduled within the next 90 days with the authorizing provider s specialty.  Urgent care and e-visits do not quality as an office visit for this protocol.          Passed - Medication is active on med list           Last Written Prescription Date:  8/19/23  Last Fill Quantity: 4g,  # refills: 5   Last office visit: 8/18/2023 ; last virtual visit: Visit date not found with prescribing provider:  Dr Keys   Future Office Visit:  2/21/24    Refill sent  Javan Doss RN

## 2024-02-12 NOTE — TELEPHONE ENCOUNTER
Requested Prescriptions   Pending Prescriptions Disp Refills    tiotropium-olodaterol (STIOLTO RESPIMAT) 2.5-2.5 MCG/ACT AERS 4 g 5     Sig: Inhale 2 puffs into the lungs daily       There is no refill protocol information for this order           Last Written Prescription Date:  8/18/2023  Last Fill Quantity: 4 g,  # refills: 5   Last office visit: 8/18/2023 ; last virtual visit: Visit date not found with prescribing provider:  Ez Keys MD    Future Office Visit: None

## 2024-02-13 DIAGNOSIS — I10 HTN (HYPERTENSION), BENIGN: ICD-10-CM

## 2024-02-13 RX ORDER — CHLORTHALIDONE 25 MG/1
25 TABLET ORAL DAILY
Qty: 90 TABLET | Refills: 1 | Status: SHIPPED | OUTPATIENT
Start: 2024-02-13 | End: 2024-07-31

## 2024-02-16 DIAGNOSIS — I10 HTN (HYPERTENSION), BENIGN: ICD-10-CM

## 2024-02-16 RX ORDER — METOPROLOL SUCCINATE 100 MG/1
150 TABLET, EXTENDED RELEASE ORAL DAILY
Qty: 45 TABLET | Refills: 1 | Status: SHIPPED | OUTPATIENT
Start: 2024-02-16 | End: 2024-05-13

## 2024-02-21 ENCOUNTER — OFFICE VISIT (OUTPATIENT)
Dept: PULMONOLOGY | Facility: CLINIC | Age: 74
End: 2024-02-21
Attending: STUDENT IN AN ORGANIZED HEALTH CARE EDUCATION/TRAINING PROGRAM
Payer: COMMERCIAL

## 2024-02-21 VITALS
DIASTOLIC BLOOD PRESSURE: 73 MMHG | SYSTOLIC BLOOD PRESSURE: 111 MMHG | WEIGHT: 129.7 LBS | OXYGEN SATURATION: 96 % | HEART RATE: 89 BPM | HEIGHT: 65 IN | BODY MASS INDEX: 21.61 KG/M2

## 2024-02-21 DIAGNOSIS — J43.9 PULMONARY EMPHYSEMA, UNSPECIFIED EMPHYSEMA TYPE (H): ICD-10-CM

## 2024-02-21 PROCEDURE — 99214 OFFICE O/P EST MOD 30 MIN: CPT | Performed by: PHYSICIAN ASSISTANT

## 2024-02-21 RX ORDER — TIOTROPIUM BROMIDE AND OLODATEROL 3.124; 2.736 UG/1; UG/1
2 SPRAY, METERED RESPIRATORY (INHALATION) DAILY
Qty: 12 G | Refills: 3 | Status: SHIPPED | OUTPATIENT
Start: 2024-02-21

## 2024-02-21 ASSESSMENT — PAIN SCALES - GENERAL: PAINLEVEL: NO PAIN (0)

## 2024-02-21 NOTE — PROGRESS NOTES
"Pulmonary Clinic Note    Date of Service: 02/21/24     Chief Complaint   Patient presents with    RECHECK     Pulmonary emphysema, unspecified emphysema type (H)       A/P:  73F being seen for COPD. PFTs w/ mild obstruction and evidence of air-trapping consistent with COPD. CT w/ moderate emphysema. Last seen in clinic with Dr. Keys 8/2023, at which time she was started on a LAMA-LABA. Lung cancer screen CT chest 2/2024 (ordered by her PCP) with stable tiny pulmonary nodules measuring up to 3mm.     - Continue LABA-LAMA (Stiolto) daily  - may use Combivent prn  - recommend continued annual lung cancer screening with PCP, next due 2/2025  - recommended smoking cessation  - OK to substitute medications based on formulary     History:  73F CLL (on observation), HTN, HLD, smoker being seen for follow up of COPD. Last seen in clinic with Dr. Keys 8/2023, at which time she was started on a LAMA-LABA. Benefits from Stiolto daily. Using combivent once daily or less.  No longer has dyspnea with stairs. Able to walk her dog at least 2 miles and that is OK. No SOB at rest. Rarely wheezing. No chest pain or tightness. No cough. No orthopnea or PND. No LE edema. Has mild seasonal allergies, which she doesn't feel are bad enough to warrant taking medication for.     Also improved after she got off of amlodipine.     Smoking: current, 55 years, 1/2PPD, previously 1PPD. Using an electronic cigarette as needed. Chantix caused awful nightmares. She has gum and patches. Zyban didn't work for her. Doesn't want to quit currently.   Bird exposure: no             Animal exposure: dog        Inhalation exposure: no    Occupation: retired, former                           10 point review of systems negative, aside from that mentioned in HPI.    /73 (BP Location: Left arm, Patient Position: Sitting, Cuff Size: Adult Regular)   Pulse 89   Ht 1.638 m (5' 4.5\")   Wt 58.8 kg (129 lb 11.2 oz)   SpO2 96%   BMI 21.92 kg/m  "   Gen: well-appearing  Card: RRR  Pulm: clear bilaterally   MSK: no edema, no acute joint abnormality   Skin: no obvious rash  Psych: normal affect  Neuro: alert and oriented     Labs:  Personally reviewed    Imaging/Studies: Personally reviewed  PFTs (8/2023) - mild obstruction, no significant BD response, e/o air-trapping w/o hyperinflation, normal DLCOunc  CXR (7/2023) - hyperinflation   Lung Ca screening CT (2/2024) - few stable pulmonary nodules measuring up to 3mm, moderate emphysema     Past Medical History:   Diagnosis Date    Ankle fracture 2009    L    Anxiety     Chronic back pain     Chronic lymphocytic leukemia (H)     Colon polyp 2012    repeat colonoscopy 5 years.    Fx low femur epiphy-closed (H)     GERD (gastroesophageal reflux disease)     History of UTI 2017    Cysto by Dr Agustin neg    HTN (hypertension), benign     Hyperlipidemia LDL goal < 100     Normal nuclear stress test 12/2009    EF 67%    Osteopenia     PAD (peripheral artery disease) (H24)     s/p fem pop bypass; embolectomy    Polycythemia vera (H) 06/15/2022    PONV (postoperative nausea and vomiting)     PUD (peptic ulcer disease) 1980s    DU    Pulmonary emphysema, unspecified emphysema type (H) 1/12/2023    Smoker     Vitamin D deficiency      Past Surgical History:   Procedure Laterality Date    ABDOMEN SURGERY      ANGIOGRAM Right 11/18/2022    Procedure: ANGIOGRAM AORTO ILIAC ANGIOGRAM;  Surgeon: Shaun Tran MD;  Location:  OR    BIOPSY      Blood clot removal from Stent      2011    BONE MARROW BIOPSY, BONE SPECIMEN, NEEDLE/TROCAR N/A 02/02/2021    Procedure: bone marrow biopsy;  Surgeon: Kailey Cota MD;  Location:  GI    BYPASS GRAFT AORTOFEMORAL  05/2002    Dr. Turner    BYPASS GRAFT FEMOROPOPLITEAL  12/16/2011    COLONOSCOPY N/A 01/18/2018    Procedure: COMBINED COLONOSCOPY, SINGLE OR MULTIPLE BIOPSY/POLYPECTOMY BY BIOPSY;  COLONOSCOPY;  Surgeon: Efrain Hernadez MD;  Location:  GI     COLONOSCOPY N/A 2023    Procedure: COLONOSCOPY, FLEXIBLE, WITH LESION REMOVAL USING SNARE;  Surgeon: Jony Manirquez MD;  Location:  GI    DILATION AND CURETTAGE, OPERATIVE HYSTEROSCOPY, COMBINED N/A 09/15/2022    Procedure: HYSTEROSCOPY, WITH DILATION AND CURETTAGE OF UTERUS;  Surgeon: Destiney Schaefer MD;  Location:  OR    EMBOLECTOMY LOWER EXTREMITY  2011    Procedure:EMBOLECTOMY LOWER EXTREMITY; EMBOLECTOMY, RIGHT POPLITEAL WITH PATCH ANGIOPLASTY. EXCISION SKIN LESION RIGHT LEG. ; Surgeon:PARMINDER VELAZCO; Location: OR    ENDARTERECTOMY FEMORAL Right 2022    Procedure: ENDARTERECTOMY, FEMORAL RIGHT FEMORAL CUTDOWN, RIGHT ILIAC ARTERY STENTING.;  Surgeon: Shaun Tran MD;  Location:  OR    ESOPHAGOSCOPY, GASTROSCOPY, DUODENOSCOPY (EGD), COMBINED N/A 10/07/2022    Procedure: ESOPHAGOGASTRODUODENOSCOPY, WITH BIOPSY;  Surgeon: Felix Carmona MD;  Location:  GI    EXCISE LESION LOWER EXTREMITY  2011    Procedure:EXCISE LESION LOWER EXTREMITY; Surgeon:PARMINDER VELAZCO; Location: OR    HERNIA REPAIR  11/04/2008    x2 umbilical    IR OR ANGIOGRAM  2022    L achilles repair  2008    LAPAROSCOPIC OOPHORECTOMY Left     L for cyst age 26    miscarriages x 3      tubal ligation and reversal       Family History   Problem Relation Age of Onset    Alzheimer Disease Mother          of panc/GI ca age 83    Osteoporosis Mother     Other Cancer Mother     Cancer Father          age 64 lymphoma    Hyperlipidemia Father     Depression Father         None    Colon Cancer Maternal Grandfather     Hypertension Sister      Social History     Socioeconomic History    Marital status:      Spouse name: Not on file    Number of children: Not on file    Years of education: Not on file    Highest education level: Not on file   Occupational History    Not on file   Tobacco Use    Smoking status: Every Day     Packs/day: 0.25     Years: 50.00     Additional  "pack years: 0.00     Total pack years: 12.50     Types: Cigarettes    Smokeless tobacco: Never    Tobacco comments:     Nicotine patch and a cigarette \"once in awhile.\"   Vaping Use    Vaping Use: Some days   Substance and Sexual Activity    Alcohol use: Yes     Comment: Beer once in a while    Drug use: No    Sexual activity: Not Currently     Partners: Male     Birth control/protection: None   Other Topics Concern    Parent/sibling w/ CABG, MI or angioplasty before 65F 55M? No   Social History Narrative    , working FT for a [employer] company.  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  Had 3 miscarriages.  No living children.   is Dustin.  Walks 2miles per day.     Social Determinants of Health     Financial Resource Strain: Low Risk  (1/19/2024)    Financial Resource Strain     Within the past 12 months, have you or your family members you live with been unable to get utilities (heat, electricity) when it was really needed?: No   Food Insecurity: Low Risk  (1/19/2024)    Food Insecurity     Within the past 12 months, did you worry that your food would run out before you got money to buy more?: No     Within the past 12 months, did the food you bought just not last and you didn t have money to get more?: No   Transportation Needs: Low Risk  (1/19/2024)    Transportation Needs     Within the past 12 months, has lack of transportation kept you from medical appointments, getting your medicines, non-medical meetings or appointments, work, or from getting things that you need?: No   Physical Activity: Not on file   Stress: Not on file   Social Connections: Not on file   Interpersonal Safety: Low Risk  (9/26/2023)    Interpersonal Safety     Do you feel physically and emotionally safe where you currently live?: Yes     Within the past 12 months, have you been hit, slapped, kicked or otherwise physically hurt by someone?: No     Within the past 12 months, have you been humiliated or emotionally abused in other ways by your " partner or ex-partner?: No   Housing Stability: Low Risk  (1/19/2024)    Housing Stability     Do you have housing? : Yes     Are you worried about losing your housing?: No       35 minutes spent reviewing chart, reviewing test results, talking with and examining patient, formulating plan, and documentation on the day of the encounter.    Jolie Garsia PA-C  General Pulmonology

## 2024-02-21 NOTE — LETTER
2/21/2024         RE: Karen Caban  [address]  [place] [address]        Dear Colleague,    Thank you for referring your patient, Karen Caban, to the [facility] [place]. Please see a copy of my visit note below.    Pulmonary Clinic Note    Date of Service: 02/21/24     Chief Complaint   Patient presents with     RECHECK     Pulmonary emphysema, unspecified emphysema type (H)       A/P:  73F being seen for COPD. PFTs w/ mild obstruction and evidence of air-trapping consistent with COPD. CT w/ moderate emphysema. Last seen in clinic with Dr. Keys 8/2023, at which time she was started on a LAMA-LABA. Lung cancer screen CT chest 2/2024 (ordered by her PCP) with stable tiny pulmonary nodules measuring up to 3mm.     - Continue LABA-LAMA (Stiolto) daily  - may use Combivent prn  - recommend continued annual lung cancer screening with PCP, next due 2/2025  - recommended smoking cessation  - OK to substitute medications based on formulary     History:  73F CLL (on observation), HTN, HLD, smoker being seen for follow up of COPD. Last seen in clinic with Dr. Keys 8/2023, at which time she was started on a LAMA-LABA. Benefits from Stiolto daily. Using combivent once daily or less.  No longer has dyspnea with stairs. Able to walk her dog at least 2 miles and that is OK. No SOB at rest. Rarely wheezing. No chest pain or tightness. No cough. No orthopnea or PND. No LE edema. Has mild seasonal allergies, which she doesn't feel are bad enough to warrant taking medication for.     Also improved after she got off of amlodipine.     Smoking: current, 55 years, 1/2PPD, previously 1PPD. Using an electronic cigarette as needed. Chantix caused awful nightmares. She has gum and patches. Zyban didn't work for her. Doesn't want to quit currently.   Bird exposure: no             Animal exposure: dog        Inhalation exposure: no    Occupation: retired, former                        "    10 point review of systems negative, aside from that mentioned in HPI.    /73 (BP Location: Left arm, Patient Position: Sitting, Cuff Size: Adult Regular)   Pulse 89   Ht 1.638 m (5' 4.5\")   Wt 58.8 kg (129 lb 11.2 oz)   SpO2 96%   BMI 21.92 kg/m    Gen: well-appearing  Card: RRR  Pulm: clear bilaterally   MSK: no edema, no acute joint abnormality   Skin: no obvious rash  Psych: normal affect  Neuro: alert and oriented     Labs:  Personally reviewed    Imaging/Studies: Personally reviewed  PFTs (8/2023) - mild obstruction, no significant BD response, e/o air-trapping w/o hyperinflation, normal DLCOunc  CXR (7/2023) - hyperinflation   Lung Ca screening CT (2/2024) - few stable pulmonary nodules measuring up to 3mm, moderate emphysema     Past Medical History:   Diagnosis Date     Ankle fracture 2009    L     Anxiety      Chronic back pain      Chronic lymphocytic leukemia (H)      Colon polyp 2012    repeat colonoscopy 5 years.     Fx low femur epiphy-closed (H)      GERD (gastroesophageal reflux disease)      History of UTI 2017    Cysto by Dr Agustin neg     HTN (hypertension), benign      Hyperlipidemia LDL goal < 100      Normal nuclear stress test 12/2009    EF 67%     Osteopenia      PAD (peripheral artery disease) (H24)     s/p fem pop bypass; embolectomy     Polycythemia vera (H) 06/15/2022     PONV (postoperative nausea and vomiting)      PUD (peptic ulcer disease) 1980s    DU     Pulmonary emphysema, unspecified emphysema type (H) 1/12/2023     Smoker      Vitamin D deficiency      Past Surgical History:   Procedure Laterality Date     ABDOMEN SURGERY       ANGIOGRAM Right 11/18/2022    Procedure: ANGIOGRAM AORTO ILIAC ANGIOGRAM;  Surgeon: Shaun Tran MD;  Location:  OR     BIOPSY       Blood clot removal from Stent      2011     BONE MARROW BIOPSY, BONE SPECIMEN, NEEDLE/TROCAR N/A 02/02/2021    Procedure: bone marrow biopsy;  Surgeon: Kailey Cota MD;  Location:  GI "     BYPASS GRAFT AORTOFEMORAL  2002    Dr. Turner     BYPASS GRAFT FEMOROPOPLITEAL  2011     COLONOSCOPY N/A 2018    Procedure: COMBINED COLONOSCOPY, SINGLE OR MULTIPLE BIOPSY/POLYPECTOMY BY BIOPSY;  COLONOSCOPY;  Surgeon: Efrain Hernadez MD;  Location:  GI     COLONOSCOPY N/A 2023    Procedure: COLONOSCOPY, FLEXIBLE, WITH LESION REMOVAL USING SNARE;  Surgeon: Jony Manriquez MD;  Location:  GI     DILATION AND CURETTAGE, OPERATIVE HYSTEROSCOPY, COMBINED N/A 09/15/2022    Procedure: HYSTEROSCOPY, WITH DILATION AND CURETTAGE OF UTERUS;  Surgeon: Destiney Schaefer MD;  Location:  OR     EMBOLECTOMY LOWER EXTREMITY  2011    Procedure:EMBOLECTOMY LOWER EXTREMITY; EMBOLECTOMY, RIGHT POPLITEAL WITH PATCH ANGIOPLASTY. EXCISION SKIN LESION RIGHT LEG. ; Surgeon:PARMINDER VELAZCO; Location: OR     ENDARTERECTOMY FEMORAL Right 2022    Procedure: ENDARTERECTOMY, FEMORAL RIGHT FEMORAL CUTDOWN, RIGHT ILIAC ARTERY STENTING.;  Surgeon: Shaun Tran MD;  Location:  OR     ESOPHAGOSCOPY, GASTROSCOPY, DUODENOSCOPY (EGD), COMBINED N/A 10/07/2022    Procedure: ESOPHAGOGASTRODUODENOSCOPY, WITH BIOPSY;  Surgeon: Felix Carmona MD;  Location:  GI     EXCISE LESION LOWER EXTREMITY  2011    Procedure:EXCISE LESION LOWER EXTREMITY; Surgeon:PARMINDER VELAZCO; Location: OR     HERNIA REPAIR  11/04/2008    x2 umbilical     IR OR ANGIOGRAM  2022     L achilles repair  2008     LAPAROSCOPIC OOPHORECTOMY Left     L for cyst age 26     miscarriages x 3       tubal ligation and reversal       Family History   Problem Relation Age of Onset     Alzheimer Disease Mother          of panc/GI ca age 83     Osteoporosis Mother      Other Cancer Mother      Cancer Father          age 64 lymphoma     Hyperlipidemia Father      Depression Father         None     Colon Cancer Maternal Grandfather      Hypertension Sister      Social History     Socioeconomic  "History     Marital status:      Spouse name: Not on file     Number of children: Not on file     Years of education: Not on file     Highest education level: Not on file   Occupational History     Not on file   Tobacco Use     Smoking status: Every Day     Packs/day: 0.25     Years: 50.00     Additional pack years: 0.00     Total pack years: 12.50     Types: Cigarettes     Smokeless tobacco: Never     Tobacco comments:     Nicotine patch and a cigarette \"once in awhile.\"   Vaping Use     Vaping Use: Some days   Substance and Sexual Activity     Alcohol use: Yes     Comment: Beer once in a while     Drug use: No     Sexual activity: Not Currently     Partners: Male     Birth control/protection: None   Other Topics Concern     Parent/sibling w/ CABG, MI or angioplasty before 65F 55M? No   Social History Narrative    , working FT for a [employer] company.  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  Had 3 miscarriages.  No living children.   is Dustin.  Walks 2miles per day.     Social Determinants of Health     Financial Resource Strain: Low Risk  (1/19/2024)    Financial Resource Strain      Within the past 12 months, have you or your family members you live with been unable to get utilities (heat, electricity) when it was really needed?: No   Food Insecurity: Low Risk  (1/19/2024)    Food Insecurity      Within the past 12 months, did you worry that your food would run out before you got money to buy more?: No      Within the past 12 months, did the food you bought just not last and you didn t have money to get more?: No   Transportation Needs: Low Risk  (1/19/2024)    Transportation Needs      Within the past 12 months, has lack of transportation kept you from medical appointments, getting your medicines, non-medical meetings or appointments, work, or from getting things that you need?: No   Physical Activity: Not on file   Stress: Not on file   Social Connections: Not on file   Interpersonal Safety: Low Risk  " (9/26/2023)    Interpersonal Safety      Do you feel physically and emotionally safe where you currently live?: Yes      Within the past 12 months, have you been hit, slapped, kicked or otherwise physically hurt by someone?: No      Within the past 12 months, have you been humiliated or emotionally abused in other ways by your partner or ex-partner?: No   Housing Stability: Low Risk  (1/19/2024)    Housing Stability      Do you have housing? : Yes      Are you worried about losing your housing?: No       35 minutes spent reviewing chart, reviewing test results, talking with and examining patient, formulating plan, and documentation on the day of the encounter.    Jolie Garsia PA-C  General Pulmonology

## 2024-02-21 NOTE — NURSING NOTE
"Chief Complaint   Patient presents with    RECHECK     Pulmonary emphysema, unspecified emphysema type (H)       Vitals:    02/21/24 1418   BP: 111/73   BP Location: Left arm   Patient Position: Sitting   Cuff Size: Adult Regular   Pulse: 89   SpO2: 96%   Weight: 58.8 kg (129 lb 11.2 oz)   Height: 1.638 m (5' 4.5\")       Body mass index is 21.92 kg/m .      CHENG Luna    "

## 2024-02-26 DIAGNOSIS — I73.9 PAD (PERIPHERAL ARTERY DISEASE) (H): ICD-10-CM

## 2024-02-26 NOTE — TELEPHONE ENCOUNTER
ticagrelor (BRILINTA) 60 MG tablet     Last Written Prescription Date:  3/13/23  Last Fill Quantity: 90,  # refills: 3    Hx: right femoral cutdown and stent grafting of the right limb of the aortobiiliac bypass graft and native right external iliac artery.  This was done on 18 November 2022.       Last visit with provider:  10/16/2023 with Dr. Tran  Follow up scheduled:    4/15 with Dr. Tran    I will see her back in 6 months with repeat imaging.   She is to take Brilinta for at least 1 year.

## 2024-03-06 ENCOUNTER — OFFICE VISIT (OUTPATIENT)
Dept: UROLOGY | Facility: CLINIC | Age: 74
End: 2024-03-06
Attending: INTERNAL MEDICINE
Payer: COMMERCIAL

## 2024-03-06 VITALS — DIASTOLIC BLOOD PRESSURE: 98 MMHG | SYSTOLIC BLOOD PRESSURE: 161 MMHG | OXYGEN SATURATION: 98 % | HEART RATE: 66 BPM

## 2024-03-06 DIAGNOSIS — Z87.440 HISTORY OF UTI: Primary | ICD-10-CM

## 2024-03-06 DIAGNOSIS — N95.2 VAGINAL ATROPHY: ICD-10-CM

## 2024-03-06 DIAGNOSIS — R93.89 ENDOMETRIAL THICKENING ON ULTRASOUND: ICD-10-CM

## 2024-03-06 DIAGNOSIS — R39.15 URINARY URGENCY: ICD-10-CM

## 2024-03-06 DIAGNOSIS — R35.1 NOCTURIA: ICD-10-CM

## 2024-03-06 DIAGNOSIS — R35.0 URINARY FREQUENCY: ICD-10-CM

## 2024-03-06 LAB
ALBUMIN UR-MCNC: NEGATIVE MG/DL
APPEARANCE UR: CLEAR
BILIRUB UR QL STRIP: NEGATIVE
COLOR UR AUTO: YELLOW
GLUCOSE UR STRIP-MCNC: NEGATIVE MG/DL
HGB UR QL STRIP: NEGATIVE
KETONES UR STRIP-MCNC: NEGATIVE MG/DL
LEUKOCYTE ESTERASE UR QL STRIP: NEGATIVE
NITRATE UR QL: NEGATIVE
PH UR STRIP: 6.5 [PH] (ref 5–7)
RESIDUAL VOLUME (RV) (EXTERNAL): 31
SP GR UR STRIP: 1.01 (ref 1–1.03)
UROBILINOGEN UR STRIP-ACNC: 0.2 E.U./DL

## 2024-03-06 PROCEDURE — 99204 OFFICE O/P NEW MOD 45 MIN: CPT | Mod: 25 | Performed by: PHYSICIAN ASSISTANT

## 2024-03-06 PROCEDURE — 81003 URINALYSIS AUTO W/O SCOPE: CPT | Mod: QW | Performed by: PHYSICIAN ASSISTANT

## 2024-03-06 PROCEDURE — 51798 US URINE CAPACITY MEASURE: CPT | Performed by: PHYSICIAN ASSISTANT

## 2024-03-06 NOTE — LETTER
3/6/2024       RE: Karen Caban  [address]  [place] [address]     Dear Colleague,    Thank you for referring your patient, Karen Caban, to the [facility] [place] [facility]. Please see a copy of my visit note below.    Urology Clinic    Name: Karen Caban    MRN: [id number]   YOB: 1950  Accompanied at today's visit by:self              Assessment and Plan:   73 year old female with hx of UTIs, urinary urgency/frequency, nocturia, vaginal atrophy, endometrial thickening.     - PVR WNL  - UA grossly negative  - avoid bladder irritants.  - offered PFPT and OAB medication for urgency/frequency and patient declined.   - Recommend OTC supplements for UTI prevention.   - Encourage good vulvar hygiene.   - Advised to contact clinic if develops symptoms of UTI. If UTIs persist consider CTU and cysto.   - Keep appointment with OB/GYN. Clear estrogen cream with them.   - consider methenamine if UTIs persist.   - encourage smoking cessation.   - followup in 3 months or sooner if needed.     Orders Placed This Encounter   Procedures    MEASURE POST-VOID RESIDUAL URINE/BLADDER CAPACITY, US NON-IMAGING (82368)    UA without Microscopic [[id number]]     After discussing the assessment and plan with patient, patient verbalized understanding and agreed to the above plan. All questions answered.     45 minutes were spent today on the date of the encounter in reviewing the EMR, direct patient care, coordination of care and documentation in addition to exam, reviewing Ucs/urgent care notes from [facility] (total time 5 minutes).     Julia Thompson PA-C  March 6, 2024    Patient Care Team:  Liane Claudio MD as PCP - General (Internal Medicine)  Liane Claudio MD as Assigned PCP  Abril Lopez, PharmD as Pharmacist (Pharmacist)  Sancho Osorio MD as Assigned Cancer Care Provider  Ez Keys MD as  Assigned Pulmonology Provider  Abril Lopez, PharmD as Assigned [place] Pharmacist  Shaun Tran MD as Assigned Heart and Vascular Provider  Julia Thompson PA-C as Physician Assistant  Lalo Perez MD as MD (OB/Gyn)  JF LUI          Chief Complaint:   Hx of UTIs          History of Present Illness:   March 6, 2024    HISTORY: Karen Caban is a 73 year old female as a new consultation for concerns of UTIs. Typical symptoms include burning with urination, pelvic pressure, feeling of incomplete emptying. Per EMR, has had 1 true UTI over the past 12 months. Denies hx of gross hematuria or kidney stones. Smokes half pack per day. When not having UTI symptoms voiding every 3-4 hours during the day and 3x/night. Does not snore, does not take naps, feels well rested. Sleeps in lazy boy due to chronic back issues. Denies urinary incontinence. Drinks 1 cup of coffee per day and occasional tea or soda. Not sexually active. Feels pelvic pressure but no tissue or bulge coming out of vagina. Bowel regular. CT from 4/2022 unremarkable from urologic standpoint. Planning to see OB/GYN in 2 weeks from recent pelvic us showing endometrial thickening and cysts. Patient also working with PCP for cyst on pancreas. PMH includes CLL, PAD, pulmonary nodules, GERD, osteopenia, HTN, HLD, PUD, smoker, chronic back pain. PSH includes ovary removed, vascular surgery with stent in left leg and fem pop bypass, achilles tendon repair, hernia repair. Patient voices no other concerns at this time.            Past Medical History:     Past Medical History:   Diagnosis Date    Ankle fracture 2009    L    Anxiety     Chronic back pain     Chronic lymphocytic leukemia (H)     Colon polyp 2012    repeat colonoscopy 5 years.    Fx low femur epiphy-closed (H)     GERD (gastroesophageal reflux disease)     History of UTI 2017    Cysto by Dr Nikole xiong    HTN (hypertension), benign     Hyperlipidemia LDL goal < 100      Mumps     Normal nuclear stress test 12/2009    EF 67%    Osteopenia     PAD (peripheral artery disease) (H24)     s/p fem pop bypass; embolectomy    Polycythemia vera (H) 06/15/2022    PONV (postoperative nausea and vomiting)     PUD (peptic ulcer disease) 1980s    DU    Pulmonary emphysema, unspecified emphysema type (H) 01/12/2023    Smoker     Spider veins     Vitamin D deficiency             Past Surgical History:     Past Surgical History:   Procedure Laterality Date    ABDOMEN SURGERY      ANGIOGRAM Right 11/18/2022    Procedure: ANGIOGRAM AORTO ILIAC ANGIOGRAM;  Surgeon: Shaun Tran MD;  Location:  OR    BIOPSY      Blood clot removal from Stent      2011    BONE MARROW BIOPSY, BONE SPECIMEN, NEEDLE/TROCAR N/A 02/02/2021    Procedure: bone marrow biopsy;  Surgeon: Kailey Cota MD;  Location:  GI    BYPASS GRAFT AORTOFEMORAL  05/2002    Dr. Turner    BYPASS GRAFT FEMOROPOPLITEAL  12/16/2011    COLONOSCOPY N/A 01/18/2018    Procedure: COMBINED COLONOSCOPY, SINGLE OR MULTIPLE BIOPSY/POLYPECTOMY BY BIOPSY;  COLONOSCOPY;  Surgeon: Efrain Hernadez MD;  Location:  GI    COLONOSCOPY N/A 03/29/2023    Procedure: COLONOSCOPY, FLEXIBLE, WITH LESION REMOVAL USING SNARE;  Surgeon: Jony Manriquez MD;  Location:  GI    DILATION AND CURETTAGE, OPERATIVE HYSTEROSCOPY, COMBINED N/A 09/15/2022    Procedure: HYSTEROSCOPY, WITH DILATION AND CURETTAGE OF UTERUS;  Surgeon: Destiney Schaefer MD;  Location:  OR    EMBOLECTOMY LOWER EXTREMITY  12/16/2011    Procedure:EMBOLECTOMY LOWER EXTREMITY; EMBOLECTOMY, RIGHT POPLITEAL WITH PATCH ANGIOPLASTY. EXCISION SKIN LESION RIGHT LEG. ; Surgeon:PARMINDER VELAZCO; Location: OR    ENDARTERECTOMY FEMORAL Right 11/18/2022    Procedure: ENDARTERECTOMY, FEMORAL RIGHT FEMORAL CUTDOWN, RIGHT ILIAC ARTERY STENTING.;  Surgeon: Shaun Tran MD;  Location:  OR    ESOPHAGOSCOPY, GASTROSCOPY, DUODENOSCOPY (EGD), COMBINED N/A 10/07/2022     "Procedure: ESOPHAGOGASTRODUODENOSCOPY, WITH BIOPSY;  Surgeon: Felix Carmona MD;  Location: [facility] GI    EXCISE LESION LOWER EXTREMITY  2011    Procedure:EXCISE LESION LOWER EXTREMITY; Surgeon:PARMINDER VELAZCO; Location:[facility] OR    HERNIA REPAIR  11/04/2008    x2 umbilical    IR OR ANGIOGRAM  2022    L achilles repair  2008    LAPAROSCOPIC OOPHORECTOMY Left     L for cyst age 26    miscarriages x 3      tubal ligation and reversal              Social History:     Social History     Tobacco Use    Smoking status: Every Day     Packs/day: 0.25     Years: 50.00     Additional pack years: 0.00     Total pack years: 12.50     Types: Cigarettes    Smokeless tobacco: Never    Tobacco comments:     Nicotine patch and a cigarette \"once in awhile.\"   Substance Use Topics    Alcohol use: Yes     Comment: Beer once in a while            Family History:     Family History   Problem Relation Age of Onset    Alzheimer Disease Mother          of panc/GI ca age 83    Osteoporosis Mother     Other Cancer Mother     Cancer Father          age 64 lymphoma    Hyperlipidemia Father     Depression Father         None    Colon Cancer Maternal Grandfather     Hypertension Sister               Allergies:     Allergies   Allergen Reactions    Chantix [Varenicline] Nausea    Ciprofloxacin Other (See Comments)     hypertension    Clopidogrel      Other reaction(s): Hypertension    Decongestant [Pseudoephedrine Hcl Er]     Erythromycin Nausea    Lisinopril      cough    Plavix [Clopidogrel Bisulfate]      Felt as if she was having a heart attack    Rofecoxib Unknown    Vioxx      Increased BP            Medications:     Current Outpatient Medications   Medication Sig    aspirin (ASA) 81 MG chewable tablet Take 1 tablet (81 mg) by mouth daily    atorvastatin (LIPITOR) 40 MG tablet Take 1 tablet (40 mg) by mouth daily    chlorthalidone (HYGROTON) 25 MG tablet Take 1 tablet (25 mg) by mouth daily    Cholecalciferol " (VITAMIN D-3) 5000 UNIT TABS Take 1 tablet by mouth daily.    coenzyme Q-10 200 MG CAPS Take 200 mg by mouth daily    Cranberry-Vitamin C-Vitamin E (CRANBERRY PLUS VITAMIN C) 4200-20-3 MG-MG-UNIT CAPS Take 1 tablet by mouth daily    Cyanocobalamin (B-12 PO) Take 5,000 mcg by mouth every other day Liquid form    ezetimibe (ZETIA) 10 MG tablet TAKE 1 TABLET(10 MG) BY MOUTH DAILY    fluticasone (FLONASE) 50 MCG/ACT nasal spray Spray 1-2 sprays in nostril daily    ipratropium-albuterol (COMBIVENT RESPIMAT)  MCG/ACT inhaler Inhale 1 puff into the lungs every 6 hours as needed for shortness of breath, wheezing or cough    Lactobacillus (PROBIOTIC ACIDOPHILUS PO) Take 1 capsule by mouth daily    metoprolol succinate ER (TOPROL XL) 100 MG 24 hr tablet TAKE 1 AND 1/2 TABLETS(150 MG) BY MOUTH DAILY    Multiple Vitamins-Minerals (HAIR SKIN & NAILS ADVANCED PO) Take 1 tablet by mouth daily    nicotine (COMMIT) 2 MG lozenge Place 2 mg inside cheek every hour as needed for smoking cessation    nicotine (NICODERM CQ) 14 MG/24HR 24 hr patch Place 1 patch onto the skin every 24 hours    nitroGLYcerin (NITROSTAT) 0.4 MG sublingual tablet For chest pain place 1 tablet under the tongue every 5 minutes for 3 doses. If symptoms persist 5 minutes after 1st dose call 911. (Patient not taking: Reported on 2/21/2024)    pantoprazole (PROTONIX) 40 MG EC tablet Take 1 tablet (40 mg) by mouth daily    potassium chloride ER (KLOR-CON M) 20 MEQ CR tablet TAKE ONE TABLET BY MOUTH 2-3 TIMES PER WEEK (Patient taking differently: Take 10 mEq by mouth five times a week TAKE ONE TABLET BY MOUTH 2-3 TIMES PER WEEK)    Sodium Chloride-Xylitol (XLEAR SINUS CARE SPRAY NA) Spray 1 spray in nostril daily as needed    ticagrelor (BRILINTA) 60 MG tablet Take 1 tablet (60 mg) by mouth daily    tiotropium-olodaterol (STIOLTO RESPIMAT) 2.5-2.5 MCG/ACT AERS Inhale 2 puffs into the lungs daily     No current facility-administered medications for this visit.              Review of Systems:    ROS: 14 point ROS neg other than the symptoms noted above in the HPI.          Physical Exam:   Blood pressure (!) 161/98, pulse 66, SpO2 98%, not currently breastfeeding.  Data Unavailable, There is no height or weight on file to calculate BMI., 0 lbs 0 oz  Gen appearance: Age-appropriate appearing female in NAD.   HEENT:  EOMI, conjunctiva clear/white. Normal ROM of neck for age.   Psych:  alert , In no acute distress.  Neuro:  A&Ox3  Skin:  Clear of obvious rashes, ecchymoses.  Resp:  Normal respiratory effort; not in acute respiratory distress.   Vasc:  Regular rate.  lymph:  No obvious LE edema bilaterally.     exam:  Vulva is normal in appearance. Urethra normal.. negative for DAVID with reduction of speculum when instructed to cough. Vaginal mucosa atrophic. No pain to palpation on internal or external exam. No prolapse appreciated. Strength 2/5. No obvious masses, lesions, ulcers, bleeding noted on internal or external exam.          Data:    PVR  31mL    Labs:    UC   1/5/24 mixed wood ([facility])  12/15/23 mixed wood ([facility])  10/5/23 >100,000 E. Coli (pansensitive), 50,000-100,000 proteus mirabilis (resistant to macrobid)    UA RESULTS:  Recent Labs   Lab Test 03/06/24  0846 02/01/24  1350 04/20/22  1754   COLOR Yellow   < > Yellow   APPEARANCE Clear   < > Clear   URINEGLC Negative   < > Negative   URINEBILI Negative   < > Negative   URINEKETONE Negative   < > Negative   SG 1.010   < > 1.010   UBLD Negative   < > Trace*   URINEPH 6.5   < > 6.5   PROTEIN Negative   < > Negative   UROBILINOGEN 0.2   < > 0.2   NITRITE Negative   < > Negative   LEUKEST Negative   < > Negative   RBCU  --   --  0-2   WBCU  --   --  0-5    < > = values in this interval not displayed.       Creatinine   Date Value Ref Range Status   01/17/2024 0.62 0.51 - 0.95 mg/dL Final   06/01/2021 0.71 0.52 - 1.04 mg/dL Final       UA RESULTS:  Recent Labs   Lab Test 03/06/24  0846  02/01/24  1350 04/20/22  1754   COLOR Yellow   < > Yellow   APPEARANCE Clear   < > Clear   URINEGLC Negative   < > Negative   URINEBILI Negative   < > Negative   URINEKETONE Negative   < > Negative   SG 1.010   < > 1.010   UBLD Negative   < > Trace*   URINEPH 6.5   < > 6.5   PROTEIN Negative   < > Negative   UROBILINOGEN 0.2   < > 0.2   NITRITE Negative   < > Negative   LEUKEST Negative   < > Negative   RBCU  --   --  0-2   WBCU  --   --  0-5    < > = values in this interval not displayed.       Office Visit on 02/01/2024   Component Date Value Ref Range Status    Color Urine 02/01/2024 Yellow  Colorless, Straw, Light Yellow, Yellow Final    Appearance Urine 02/01/2024 Clear  Clear Final    Glucose Urine 02/01/2024 Negative  Negative mg/dL Final    Bilirubin Urine 02/01/2024 Negative  Negative Final    Ketones Urine 02/01/2024 Negative  Negative mg/dL Final    Specific Gravity Urine 02/01/2024 1.015  1.003 - 1.035 Final    Blood Urine 02/01/2024 Negative  Negative Final    pH Urine 02/01/2024 6.5  5.0 - 7.0 Final    Protein Albumin Urine 02/01/2024 Negative  Negative mg/dL Final    Urobilinogen Urine 02/01/2024 0.2  0.2, 1.0 E.U./dL Final    Nitrite Urine 02/01/2024 Negative  Negative Final    Leukocyte Esterase Urine 02/01/2024 Negative  Negative Final    Trichomonas 02/01/2024 Absent  Absent Final    Yeast 02/01/2024 Absent  Absent Final    Clue Cells 02/01/2024 Present (A)  Absent Final    WBCs/high power field 02/01/2024 2+ (A)  None Final       Imaging:  CT 4/2022  FINDINGS:   LOWER CHEST: Normal.     HEPATOBILIARY: Normal.     PANCREAS: Cystic lesion in the pancreatic neck, measuring 1.2 x 0.6 cm, unchanged from prior. No main pancreatic duct dilatation.     SPLEEN: Normal.     ADRENAL GLANDS: Normal.     KIDNEYS/BLADDER: Normal.     BOWEL: No obstruction or inflammatory change.     LYMPH NODES: Normal.     VASCULATURE: Moderate aortic calcifications. Stent in the right common iliac and external iliac  arteries.     PELVIC ORGANS: Right adnexal cystic lesion measuring 3.7 x 2.0 cm. Uterus and left adnexa appears unremarkable.     MUSCULOSKELETAL: Multilevel degenerative changes of the spine. Dextroconvex curvature of the lumbar spine.                                                                      IMPRESSION:   1.  No acute findings or inflammatory changes in the abdomen or pelvis.     2.  Unchanged 1.2 cm cystic lesion in the pancreatic neck. Consider follow-up ACR recommendations: Repeat imaging with MRI/MRCP every 2 years x5.     3.  Incidental right adnexal cystic lesion measuring 3.7 cm. Consider ultrasound to exclude solid components.          SATNAM REDDY PA-C

## 2024-03-06 NOTE — PATIENT INSTRUCTIONS
- Follow-up in 3 months. Contact clinic if you develop symptoms of a UTI in the future. If you continue to have UTIs, may consider further workup with CT scan and cystoscopy (camera that looks inside your bladder.     - follow-up with the OB/GYN. Clear estrogen cream for UTI prevention with them. If cleared by them, contact my clinic.     __________________________    Below is a list of things that can irritate the bladder and should be eliminated:  Caffeinated soft drinks.  Coffee.  Tea.  Chocolate.  Tomato-based foods.  Acidic juices and fruits. (includes cranberry juice)  Alcohol.  Nicotine  Carbonated drinks.  Aspartame/Nutrasweet.    ________________________________     UTI Prevention:    - Recommend cranberry supplement 1gm twice-daily or Vitamin C 1 gm twice daily.   - AZO as needed; if symptoms do not improve after 24-48 hours after taking AZO as needed advise contacting clinic.   - Probiotic daily; recommend for Florajen 3 or any women's probiotic will do.  - Make sure you stay hydrated with about 60-80oz of water per day.   - Void after sexual intercourse.   - Recommend good vulvar hygiene such as wearing loose cotton underwear and avoiding scented hygenic products/wipes/soaps or detergents. Wipe front to back after voiding/defecation. Avoid sitting in soiled clothing and keeping vulva dry.   - If you develop symptoms of UTI, please contact clinic. However, if you develop fevers  greater than 100.4 degrees fahrenheit, flank pain or blood in your urine, recommend going to urgent care or ER.   - Make sure to drink plenty of water each day and to really push fluids when have symptoms of a UTI.  - Estrogen cream 2x per week at night ONLY IF CLEARED BY OB/GYN; apply blueberry-sized amount on finger and rub along vaginal tissue.     _______________________________________________

## 2024-03-06 NOTE — PROGRESS NOTES
Contact patient and inform result is normal.   Urology Clinic    Name: Karen Caban    MRN: [id number]   YOB: 1950  Accompanied at today's visit by:self              Assessment and Plan:   73 year old female with hx of UTIs, urinary urgency/frequency, nocturia, vaginal atrophy, endometrial thickening.     - PVR WNL  - UA grossly negative  - avoid bladder irritants.  - offered PFPT and OAB medication for urgency/frequency and patient declined.   - Recommend OTC supplements for UTI prevention.   - Encourage good vulvar hygiene.   - Advised to contact clinic if develops symptoms of UTI. If UTIs persist consider CTU and cysto.   - Keep appointment with OB/GYN. Clear estrogen cream with them.   - consider methenamine if UTIs persist.   - encourage smoking cessation.   - followup in 3 months or sooner if needed.     Orders Placed This Encounter   Procedures    MEASURE POST-VOID RESIDUAL URINE/BLADDER CAPACITY, US NON-IMAGING (46245)    UA without Microscopic [[id number]]     After discussing the assessment and plan with patient, patient verbalized understanding and agreed to the above plan. All questions answered.     45 minutes were spent today on the date of the encounter in reviewing the EMR, direct patient care, coordination of care and documentation in addition to exam, reviewing [facility]/urgent care notes from [facility] (total time 5 minutes).     Julia Thompson PA-C  March 6, 2024    Patient Care Team:  Jf Lui MD as PCP - General (Internal Medicine)  Jf Lui MD as Assigned PCP  Abril Lopez, PharmD as Pharmacist (Pharmacist)  Sancho Osorio MD as Assigned Cancer Care Provider  Ez Keys MD as Assigned Pulmonology Provider  Abril Lopez, PharmD as Assigned MTM Pharmacist  Shaun Tran MD as Assigned Heart and Vascular Provider  Julia Thompson PA-C as Physician Assistant  Lalo Perez MD as MD (OB/Gyn)  JF LUI          Chief Complaint:   Hx of UTIs           History of Present Illness:   March 6, 2024    HISTORY: Karen Caban is a 73 year old female as a new consultation for concerns of UTIs. Typical symptoms include burning with urination, pelvic pressure, feeling of incomplete emptying. Per EMR, has had 1 true UTI over the past 12 months. Denies hx of gross hematuria or kidney stones. Smokes half pack per day. When not having UTI symptoms voiding every 3-4 hours during the day and 3x/night. Does not snore, does not take naps, feels well rested. Sleeps in lazy boy due to chronic back issues. Denies urinary incontinence. Drinks 1 cup of coffee per day and occasional tea or soda. Not sexually active. Feels pelvic pressure but no tissue or bulge coming out of vagina. Bowel regular. CT from 4/2022 unremarkable from urologic standpoint. Planning to see OB/GYN in 2 weeks from recent pelvic us showing endometrial thickening and cysts. Patient also working with PCP for cyst on pancreas. PMH includes CLL, PAD, pulmonary nodules, GERD, osteopenia, HTN, HLD, PUD, smoker, chronic back pain. PSH includes ovary removed, vascular surgery with stent in left leg and fem pop bypass, achilles tendon repair, hernia repair. Patient voices no other concerns at this time.            Past Medical History:     Past Medical History:   Diagnosis Date    Ankle fracture 2009    L    Anxiety     Chronic back pain     Chronic lymphocytic leukemia (H)     Colon polyp 2012    repeat colonoscopy 5 years.    Fx low femur epiphy-closed (H)     GERD (gastroesophageal reflux disease)     History of UTI 2017    Cysto by Dr Nikole xiong    HTN (hypertension), benign     Hyperlipidemia LDL goal < 100     Mumps     Normal nuclear stress test 12/2009    EF 67%    Osteopenia     PAD (peripheral artery disease) (H24)     s/p fem pop bypass; embolectomy    Polycythemia vera (H) 06/15/2022    PONV (postoperative nausea and vomiting)     PUD (peptic ulcer disease) 1980s    DU    Pulmonary emphysema, unspecified  emphysema type (H) 01/12/2023    Smoker     Spider veins     Vitamin D deficiency             Past Surgical History:     Past Surgical History:   Procedure Laterality Date    ABDOMEN SURGERY      ANGIOGRAM Right 11/18/2022    Procedure: ANGIOGRAM AORTO ILIAC ANGIOGRAM;  Surgeon: Shaun Tran MD;  Location:  OR    BIOPSY      Blood clot removal from Stent      2011    BONE MARROW BIOPSY, BONE SPECIMEN, NEEDLE/TROCAR N/A 02/02/2021    Procedure: bone marrow biopsy;  Surgeon: Kailey Cota MD;  Location:  GI    BYPASS GRAFT AORTOFEMORAL  05/2002    Dr. Turner    BYPASS GRAFT FEMOROPOPLITEAL  12/16/2011    COLONOSCOPY N/A 01/18/2018    Procedure: COMBINED COLONOSCOPY, SINGLE OR MULTIPLE BIOPSY/POLYPECTOMY BY BIOPSY;  COLONOSCOPY;  Surgeon: Efrain Hernadez MD;  Location:  GI    COLONOSCOPY N/A 03/29/2023    Procedure: COLONOSCOPY, FLEXIBLE, WITH LESION REMOVAL USING SNARE;  Surgeon: Jony Manriquez MD;  Location:  GI    DILATION AND CURETTAGE, OPERATIVE HYSTEROSCOPY, COMBINED N/A 09/15/2022    Procedure: HYSTEROSCOPY, WITH DILATION AND CURETTAGE OF UTERUS;  Surgeon: Destiney Schaefer MD;  Location:  OR    EMBOLECTOMY LOWER EXTREMITY  12/16/2011    Procedure:EMBOLECTOMY LOWER EXTREMITY; EMBOLECTOMY, RIGHT POPLITEAL WITH PATCH ANGIOPLASTY. EXCISION SKIN LESION RIGHT LEG. ; Surgeon:PARMINDER VELAZCO; Location: OR    ENDARTERECTOMY FEMORAL Right 11/18/2022    Procedure: ENDARTERECTOMY, FEMORAL RIGHT FEMORAL CUTDOWN, RIGHT ILIAC ARTERY STENTING.;  Surgeon: Shaun Tran MD;  Location:  OR    ESOPHAGOSCOPY, GASTROSCOPY, DUODENOSCOPY (EGD), COMBINED N/A 10/07/2022    Procedure: ESOPHAGOGASTRODUODENOSCOPY, WITH BIOPSY;  Surgeon: Felix Carmona MD;  Location:  GI    EXCISE LESION LOWER EXTREMITY  12/16/2011    Procedure:EXCISE LESION LOWER EXTREMITY; Surgeon:PARMINDER VELAZCO; Location: OR    HERNIA REPAIR  11/04/2008    x2 umbilical    IR OR ANGIOGRAM   "2022    L achilles repair  2008    LAPAROSCOPIC OOPHORECTOMY Left     L for cyst age 26    miscarriages x 3      tubal ligation and reversal              Social History:     Social History     Tobacco Use    Smoking status: Every Day     Packs/day: 0.25     Years: 50.00     Additional pack years: 0.00     Total pack years: 12.50     Types: Cigarettes    Smokeless tobacco: Never    Tobacco comments:     Nicotine patch and a cigarette \"once in awhile.\"   Substance Use Topics    Alcohol use: Yes     Comment: Beer once in a while            Family History:     Family History   Problem Relation Age of Onset    Alzheimer Disease Mother          of panc/GI ca age 83    Osteoporosis Mother     Other Cancer Mother     Cancer Father          age 64 lymphoma    Hyperlipidemia Father     Depression Father         None    Colon Cancer Maternal Grandfather     Hypertension Sister               Allergies:     Allergies   Allergen Reactions    Chantix [Varenicline] Nausea    Ciprofloxacin Other (See Comments)     hypertension    Clopidogrel      Other reaction(s): Hypertension    Decongestant [Pseudoephedrine Hcl Er]     Erythromycin Nausea    Lisinopril      cough    Plavix [Clopidogrel Bisulfate]      Felt as if she was having a heart attack    Rofecoxib Unknown    Vioxx      Increased BP            Medications:     Current Outpatient Medications   Medication Sig    aspirin (ASA) 81 MG chewable tablet Take 1 tablet (81 mg) by mouth daily    atorvastatin (LIPITOR) 40 MG tablet Take 1 tablet (40 mg) by mouth daily    chlorthalidone (HYGROTON) 25 MG tablet Take 1 tablet (25 mg) by mouth daily    Cholecalciferol (VITAMIN D-3) 5000 UNIT TABS Take 1 tablet by mouth daily.    coenzyme Q-10 200 MG CAPS Take 200 mg by mouth daily    Cranberry-Vitamin C-Vitamin E (CRANBERRY PLUS VITAMIN C) 4200-20-3 MG-MG-UNIT CAPS Take 1 tablet by mouth daily    Cyanocobalamin (B-12 PO) Take 5,000 mcg by mouth every other day Liquid " form    ezetimibe (ZETIA) 10 MG tablet TAKE 1 TABLET(10 MG) BY MOUTH DAILY    fluticasone (FLONASE) 50 MCG/ACT nasal spray Spray 1-2 sprays in nostril daily    ipratropium-albuterol (COMBIVENT RESPIMAT)  MCG/ACT inhaler Inhale 1 puff into the lungs every 6 hours as needed for shortness of breath, wheezing or cough    Lactobacillus (PROBIOTIC ACIDOPHILUS PO) Take 1 capsule by mouth daily    metoprolol succinate ER (TOPROL XL) 100 MG 24 hr tablet TAKE 1 AND 1/2 TABLETS(150 MG) BY MOUTH DAILY    Multiple Vitamins-Minerals (HAIR SKIN & NAILS ADVANCED PO) Take 1 tablet by mouth daily    nicotine (COMMIT) 2 MG lozenge Place 2 mg inside cheek every hour as needed for smoking cessation    nicotine (NICODERM CQ) 14 MG/24HR 24 hr patch Place 1 patch onto the skin every 24 hours    nitroGLYcerin (NITROSTAT) 0.4 MG sublingual tablet For chest pain place 1 tablet under the tongue every 5 minutes for 3 doses. If symptoms persist 5 minutes after 1st dose call 911. (Patient not taking: Reported on 2/21/2024)    pantoprazole (PROTONIX) 40 MG EC tablet Take 1 tablet (40 mg) by mouth daily    potassium chloride ER (KLOR-CON M) 20 MEQ CR tablet TAKE ONE TABLET BY MOUTH 2-3 TIMES PER WEEK (Patient taking differently: Take 10 mEq by mouth five times a week TAKE ONE TABLET BY MOUTH 2-3 TIMES PER WEEK)    Sodium Chloride-Xylitol (XLEAR SINUS CARE SPRAY NA) Spray 1 spray in nostril daily as needed    ticagrelor (BRILINTA) 60 MG tablet Take 1 tablet (60 mg) by mouth daily    tiotropium-olodaterol (STIOLTO RESPIMAT) 2.5-2.5 MCG/ACT AERS Inhale 2 puffs into the lungs daily     No current facility-administered medications for this visit.             Review of Systems:    ROS: 14 point ROS neg other than the symptoms noted above in the HPI.          Physical Exam:   Blood pressure (!) 161/98, pulse 66, SpO2 98%, not currently breastfeeding.  Data Unavailable, There is no height or weight on file to calculate BMI., 0 lbs 0 oz  Gen  appearance: Age-appropriate appearing female in NAD.   HEENT:  EOMI, conjunctiva clear/white. Normal ROM of neck for age.   Psych:  alert , In no acute distress.  Neuro:  A&Ox3  Skin:  Clear of obvious rashes, ecchymoses.  Resp:  Normal respiratory effort; not in acute respiratory distress.   Vasc:  Regular rate.  lymph:  No obvious LE edema bilaterally.     exam:  Vulva is normal in appearance. Urethra normal.. negative for DAVID with reduction of speculum when instructed to cough. Vaginal mucosa atrophic. No pain to palpation on internal or external exam. No prolapse appreciated. Strength 2/5. No obvious masses, lesions, ulcers, bleeding noted on internal or external exam.          Data:    PVR  31mL    Labs:    UC   1/5/24 mixed wood ([facility])  12/15/23 mixed wood ([facility])  10/5/23 >100,000 E. Coli (pansensitive), 50,000-100,000 proteus mirabilis (resistant to macrobid)    UA RESULTS:  Recent Labs   Lab Test 03/06/24  0846 02/01/24  1350 04/20/22  1754   COLOR Yellow   < > Yellow   APPEARANCE Clear   < > Clear   URINEGLC Negative   < > Negative   URINEBILI Negative   < > Negative   URINEKETONE Negative   < > Negative   SG 1.010   < > 1.010   UBLD Negative   < > Trace*   URINEPH 6.5   < > 6.5   PROTEIN Negative   < > Negative   UROBILINOGEN 0.2   < > 0.2   NITRITE Negative   < > Negative   LEUKEST Negative   < > Negative   RBCU  --   --  0-2   WBCU  --   --  0-5    < > = values in this interval not displayed.       Creatinine   Date Value Ref Range Status   01/17/2024 0.62 0.51 - 0.95 mg/dL Final   06/01/2021 0.71 0.52 - 1.04 mg/dL Final       UA RESULTS:  Recent Labs   Lab Test 03/06/24  0846 02/01/24  1350 04/20/22  1754   COLOR Yellow   < > Yellow   APPEARANCE Clear   < > Clear   URINEGLC Negative   < > Negative   URINEBILI Negative   < > Negative   URINEKETONE Negative   < > Negative   SG 1.010   < > 1.010   UBLD Negative   < > Trace*   URINEPH 6.5   < > 6.5   PROTEIN Negative   < > Negative    UROBILINOGEN 0.2   < > 0.2   NITRITE Negative   < > Negative   LEUKEST Negative   < > Negative   RBCU  --   --  0-2   WBCU  --   --  0-5    < > = values in this interval not displayed.       Office Visit on 02/01/2024   Component Date Value Ref Range Status    Color Urine 02/01/2024 Yellow  Colorless, Straw, Light Yellow, Yellow Final    Appearance Urine 02/01/2024 Clear  Clear Final    Glucose Urine 02/01/2024 Negative  Negative mg/dL Final    Bilirubin Urine 02/01/2024 Negative  Negative Final    Ketones Urine 02/01/2024 Negative  Negative mg/dL Final    Specific Gravity Urine 02/01/2024 1.015  1.003 - 1.035 Final    Blood Urine 02/01/2024 Negative  Negative Final    pH Urine 02/01/2024 6.5  5.0 - 7.0 Final    Protein Albumin Urine 02/01/2024 Negative  Negative mg/dL Final    Urobilinogen Urine 02/01/2024 0.2  0.2, 1.0 E.U./dL Final    Nitrite Urine 02/01/2024 Negative  Negative Final    Leukocyte Esterase Urine 02/01/2024 Negative  Negative Final    Trichomonas 02/01/2024 Absent  Absent Final    Yeast 02/01/2024 Absent  Absent Final    Clue Cells 02/01/2024 Present (A)  Absent Final    WBCs/high power field 02/01/2024 2+ (A)  None Final       Imaging:  CT 4/2022  FINDINGS:   LOWER CHEST: Normal.     HEPATOBILIARY: Normal.     PANCREAS: Cystic lesion in the pancreatic neck, measuring 1.2 x 0.6 cm, unchanged from prior. No main pancreatic duct dilatation.     SPLEEN: Normal.     ADRENAL GLANDS: Normal.     KIDNEYS/BLADDER: Normal.     BOWEL: No obstruction or inflammatory change.     LYMPH NODES: Normal.     VASCULATURE: Moderate aortic calcifications. Stent in the right common iliac and external iliac arteries.     PELVIC ORGANS: Right adnexal cystic lesion measuring 3.7 x 2.0 cm. Uterus and left adnexa appears unremarkable.     MUSCULOSKELETAL: Multilevel degenerative changes of the spine. Dextroconvex curvature of the lumbar spine.                                                                       IMPRESSION:   1.  No acute findings or inflammatory changes in the abdomen or pelvis.     2.  Unchanged 1.2 cm cystic lesion in the pancreatic neck. Consider follow-up ACR recommendations: Repeat imaging with MRI/MRCP every 2 years x5.     3.  Incidental right adnexal cystic lesion measuring 3.7 cm. Consider ultrasound to exclude solid components.

## 2024-03-06 NOTE — NURSING NOTE
Pt  dont think she has a uti now  Sx: dysuria and can't go with pressure.    PVR by scanner= 31mL    ARLEN Cai CMA

## 2024-03-13 ENCOUNTER — LAB (OUTPATIENT)
Dept: LAB | Facility: CLINIC | Age: 74
End: 2024-03-13
Payer: COMMERCIAL

## 2024-03-13 ENCOUNTER — PATIENT OUTREACH (OUTPATIENT)
Dept: ONCOLOGY | Facility: CLINIC | Age: 74
End: 2024-03-13

## 2024-03-13 DIAGNOSIS — C91.10 CLL (CHRONIC LYMPHOCYTIC LEUKEMIA) (H): ICD-10-CM

## 2024-03-13 LAB
BASOPHILS # BLD AUTO: ABNORMAL 10*3/UL
BASOPHILS # BLD MANUAL: 0 10E3/UL (ref 0–0.2)
BASOPHILS NFR BLD AUTO: ABNORMAL %
BASOPHILS NFR BLD MANUAL: 0 %
EOSINOPHIL # BLD AUTO: ABNORMAL 10*3/UL
EOSINOPHIL # BLD MANUAL: 0.5 10E3/UL (ref 0–0.7)
EOSINOPHIL NFR BLD AUTO: ABNORMAL %
EOSINOPHIL NFR BLD MANUAL: 1 %
ERYTHROCYTE [DISTWIDTH] IN BLOOD BY AUTOMATED COUNT: 16.2 % (ref 10–15)
HCT VFR BLD AUTO: 43.9 % (ref 35–47)
HGB BLD-MCNC: 14 G/DL (ref 11.7–15.7)
IMM GRANULOCYTES # BLD: ABNORMAL 10*3/UL
IMM GRANULOCYTES NFR BLD: ABNORMAL %
LYMPHOCYTES # BLD AUTO: ABNORMAL 10*3/UL
LYMPHOCYTES # BLD MANUAL: 44.8 10E3/UL (ref 0.8–5.3)
LYMPHOCYTES NFR BLD AUTO: ABNORMAL %
LYMPHOCYTES NFR BLD MANUAL: 90 %
MCH RBC QN AUTO: 29.1 PG (ref 26.5–33)
MCHC RBC AUTO-ENTMCNC: 31.9 G/DL (ref 31.5–36.5)
MCV RBC AUTO: 91 FL (ref 78–100)
MONOCYTES # BLD AUTO: ABNORMAL 10*3/UL
MONOCYTES # BLD MANUAL: 0 10E3/UL (ref 0–1.3)
MONOCYTES NFR BLD AUTO: ABNORMAL %
MONOCYTES NFR BLD MANUAL: 0 %
NEUTROPHILS # BLD AUTO: ABNORMAL 10*3/UL
NEUTROPHILS # BLD MANUAL: 4.5 10E3/UL (ref 1.6–8.3)
NEUTROPHILS NFR BLD AUTO: ABNORMAL %
NEUTROPHILS NFR BLD MANUAL: 9 %
NRBC # BLD AUTO: 0 10E3/UL
NRBC BLD AUTO-RTO: 0 /100
PLAT MORPH BLD: ABNORMAL
PLATELET # BLD AUTO: 188 10E3/UL (ref 150–450)
RBC # BLD AUTO: 4.81 10E6/UL (ref 3.8–5.2)
RBC MORPH BLD: ABNORMAL
SMUDGE CELLS BLD QL SMEAR: PRESENT
WBC # BLD AUTO: 49.8 10E3/UL (ref 4–11)

## 2024-03-13 PROCEDURE — 36415 COLL VENOUS BLD VENIPUNCTURE: CPT

## 2024-03-13 PROCEDURE — 85027 COMPLETE CBC AUTOMATED: CPT

## 2024-03-13 PROCEDURE — 85007 BL SMEAR W/DIFF WBC COUNT: CPT

## 2024-03-13 NOTE — PROGRESS NOTES
DATE/TIME OF CALL RECEIVED FROM LAB:  03/13/24 at 8:29 AM   LAB TEST:  WBC  LAB VALUE:  49.9  PROVIDER NOTIFIED?: Yes  PROVIDER NAME: JUWAN Osorio  DATE/TIME LAB VALUE REPORTED TO PROVIDER: March 13, 2024, 8:30 AM  MECHANISM OF PROVIDER NOTIFICATION:  IB  PROVIDER RESPONSE: awaiting response.

## 2024-03-22 ENCOUNTER — OFFICE VISIT (OUTPATIENT)
Dept: OBGYN | Facility: CLINIC | Age: 74
End: 2024-03-22
Attending: INTERNAL MEDICINE
Payer: COMMERCIAL

## 2024-03-22 VITALS — BODY MASS INDEX: 21.97 KG/M2 | WEIGHT: 130 LBS | DIASTOLIC BLOOD PRESSURE: 70 MMHG | SYSTOLIC BLOOD PRESSURE: 130 MMHG

## 2024-03-22 DIAGNOSIS — N83.202 CYSTS OF BOTH OVARIES: ICD-10-CM

## 2024-03-22 DIAGNOSIS — R93.89 ENDOMETRIAL THICKENING ON ULTRASOUND: ICD-10-CM

## 2024-03-22 DIAGNOSIS — N83.201 CYSTS OF BOTH OVARIES: ICD-10-CM

## 2024-03-22 DIAGNOSIS — I10 HTN (HYPERTENSION), BENIGN: ICD-10-CM

## 2024-03-22 PROCEDURE — 99214 OFFICE O/P EST MOD 30 MIN: CPT | Performed by: OBSTETRICS & GYNECOLOGY

## 2024-03-22 RX ORDER — METOPROLOL SUCCINATE 100 MG/1
150 TABLET, EXTENDED RELEASE ORAL DAILY
Qty: 45 TABLET | Refills: 1 | OUTPATIENT
Start: 2024-03-22

## 2024-03-22 NOTE — PROGRESS NOTES
Chief Complaint   Patient presents with    Ultrasound results       Subjective:  73-year-old with a past medical history notable for peripheral artery disease, status post femoropopliteal bypass, chronic back pain, hypertension, smoking history with emphysema and chronic lymphocytic leukemia presents in follow-up of her recent pelvic ultrasound.    Her relevant gynecologic history dates back to May 2022 when she underwent an endometrial biopsy due to a thickened endometrial lining.  Also noted were some small adnexal cysts.  The biopsy was benign and she was referred to GYN oncology for further discussion of the lining as well as the adnexal cysts.    She ultimately underwent hysteroscopy D&C with Dr. Destiney Schaefer in September 2022 with a finding of a benign polyp.  The adnexal cysts were not felt worrisome enough to justify surgical intervention and she was advised to follow-up in 6 months.    A pelvic ultrasound was performed on February 6 and is reviewed today.    It shows a small right ovary with a paraovarian cyst slightly decreased compared with October 2022 and a simple cyst in the left adnexa which is also similar to minimally changed.  I reviewed the images and both are benign-appearing unilocular cysts.    Patient does not endorse any postmenopausal bleeding.    She asked about my feelings about the use of vaginal estrogen and I indicated that given her past medical history I think that the risks of its use are significant and she was comfortable with this management option being declined.      REVIEW OF SYSTEMS:  General: negative    Health Maintenance   Topic Date Due    URINE DRUG SCREEN  Never done    Medicare Annual MTM Pharmacist Visit (once per calendar year)  01/01/2024    LIPID  01/17/2025    NICOTINE/TOBACCO CESSATION COUNSELING Q 1 YR  01/26/2025    MEDICARE ANNUAL WELLNESS VISIT  01/26/2025    FALL RISK ASSESSMENT  01/26/2025    LUNG CANCER SCREENING  02/06/2025    MAMMO SCREENING  02/01/2026     GLUCOSE  01/17/2027    DTAP/TDAP/TD IMMUNIZATION (3 - Td or Tdap) 11/05/2028    ADVANCE CARE PLANNING  01/26/2029    COLORECTAL CANCER SCREENING  03/29/2030    DEXA  07/31/2038    SPIROMETRY  Completed    HEPATITIS C SCREENING  Completed    COPD ACTION PLAN  Completed    PHQ-2 (once per calendar year)  Completed    INFLUENZA VACCINE  Completed    Pneumococcal Vaccine: 65+ Years  Completed    ZOSTER IMMUNIZATION  Completed    RSV VACCINE (Pregnancy & 60+)  Completed    COVID-19 Vaccine  Completed    IPV IMMUNIZATION  Aged Out    HPV IMMUNIZATION  Aged Out    MENINGITIS IMMUNIZATION  Aged Out    RSV MONOCLONAL ANTIBODY  Aged Out       Allergies   Allergen Reactions    Chantix [Varenicline] Nausea    Ciprofloxacin Other (See Comments)     hypertension    Clopidogrel      Other reaction(s): Hypertension    Decongestant [Pseudoephedrine Hcl Er]     Erythromycin Nausea    Lisinopril      cough    Plavix [Clopidogrel Bisulfate]      Felt as if she was having a heart attack    Rofecoxib Unknown    Vioxx      Increased BP       Objective:  Vitals: /70   Wt 59 kg (130 lb)   BMI 21.97 kg/m    BMI= Body mass index is 21.97 kg/m .    Physical examination was deferred  Assessment/Plan:  1. Cysts of both ovaries  Stable benign-appearing adnexal cysts which GYN oncology did not feel were worthy of surgical evaluation in September 2022  - Ob/Gyn  Referral    2. Endometrial thickening on ultrasound  Endometrial thickening on ultrasound has improved compared to when the patient was evaluated in May 2022 with benign findings.  Patient does not endorse any postmenopausal bleeding.  - Ob/Gyn  Referral    Recommend follow-up with GYN oncology in 6 months or as needed    Martínez Perez MD

## 2024-03-22 NOTE — NURSING NOTE
"Chief Complaint   Patient presents with    Ultrasound results     initial /70   Wt 59 kg (130 lb)   BMI 21.97 kg/m   Estimated body mass index is 21.97 kg/m  as calculated from the following:    Height as of 2/21/24: 1.638 m (5' 4.5\").    Weight as of this encounter: 59 kg (130 lb).  BP completed using cuff size regular.  Carlota Quintana CMA    "

## 2024-03-25 DIAGNOSIS — E87.6 LOW BLOOD POTASSIUM: ICD-10-CM

## 2024-03-25 DIAGNOSIS — Z86.39 HISTORY OF LOW POTASSIUM: Primary | ICD-10-CM

## 2024-03-25 RX ORDER — POTASSIUM CHLORIDE 1500 MG/1
TABLET, EXTENDED RELEASE ORAL
Qty: 30 TABLET | Refills: 0 | Status: SHIPPED | OUTPATIENT
Start: 2024-03-25 | End: 2024-05-28

## 2024-03-26 DIAGNOSIS — I10 HTN (HYPERTENSION), BENIGN: Primary | ICD-10-CM

## 2024-04-03 ENCOUNTER — LAB (OUTPATIENT)
Dept: LAB | Facility: CLINIC | Age: 74
End: 2024-04-03
Payer: COMMERCIAL

## 2024-04-03 DIAGNOSIS — Z86.39 HISTORY OF LOW POTASSIUM: ICD-10-CM

## 2024-04-03 DIAGNOSIS — I10 HTN (HYPERTENSION), BENIGN: ICD-10-CM

## 2024-04-03 DIAGNOSIS — E87.6 LOW BLOOD POTASSIUM: ICD-10-CM

## 2024-04-03 LAB
ANION GAP SERPL CALCULATED.3IONS-SCNC: 11 MMOL/L (ref 7–15)
BUN SERPL-MCNC: 11.6 MG/DL (ref 8–23)
CALCIUM SERPL-MCNC: 9.7 MG/DL (ref 8.8–10.2)
CHLORIDE SERPL-SCNC: 103 MMOL/L (ref 98–107)
CREAT SERPL-MCNC: 0.66 MG/DL (ref 0.51–0.95)
DEPRECATED HCO3 PLAS-SCNC: 28 MMOL/L (ref 22–29)
EGFRCR SERPLBLD CKD-EPI 2021: >90 ML/MIN/1.73M2
GLUCOSE SERPL-MCNC: 94 MG/DL (ref 70–99)
POTASSIUM SERPL-SCNC: 4.4 MMOL/L (ref 3.4–5.3)
SODIUM SERPL-SCNC: 142 MMOL/L (ref 135–145)

## 2024-04-03 PROCEDURE — 99000 SPECIMEN HANDLING OFFICE-LAB: CPT

## 2024-04-03 PROCEDURE — 84244 ASSAY OF RENIN: CPT | Mod: 90

## 2024-04-03 PROCEDURE — 36415 COLL VENOUS BLD VENIPUNCTURE: CPT

## 2024-04-03 PROCEDURE — 80048 BASIC METABOLIC PNL TOTAL CA: CPT

## 2024-04-03 PROCEDURE — 82088 ASSAY OF ALDOSTERONE: CPT

## 2024-04-05 LAB — ALDOST SERPL-MCNC: 9 NG/DL (ref 0–31)

## 2024-04-06 LAB — RENIN PLAS-CCNC: 0.5 NG/ML/HR

## 2024-04-07 LAB — ALDOST/RENIN PLAS-RTO: 18 {RATIO} (ref 0–25)

## 2024-04-15 ENCOUNTER — OFFICE VISIT (OUTPATIENT)
Dept: OTHER | Facility: CLINIC | Age: 74
End: 2024-04-15
Attending: SURGERY
Payer: COMMERCIAL

## 2024-04-15 ENCOUNTER — LAB (OUTPATIENT)
Dept: LAB | Facility: CLINIC | Age: 74
End: 2024-04-15
Attending: SURGERY
Payer: COMMERCIAL

## 2024-04-15 ENCOUNTER — HOSPITAL ENCOUNTER (OUTPATIENT)
Dept: ULTRASOUND IMAGING | Facility: CLINIC | Age: 74
Discharge: HOME | End: 2024-04-15
Attending: SURGERY
Payer: COMMERCIAL

## 2024-04-15 VITALS
DIASTOLIC BLOOD PRESSURE: 79 MMHG | HEART RATE: 67 BPM | OXYGEN SATURATION: 97 % | SYSTOLIC BLOOD PRESSURE: 122 MMHG | BODY MASS INDEX: 22.04 KG/M2 | WEIGHT: 130.4 LBS

## 2024-04-15 DIAGNOSIS — I73.9 PAD (PERIPHERAL ARTERY DISEASE) (H): ICD-10-CM

## 2024-04-15 DIAGNOSIS — R09.89 OTHER SPECIFIED SYMPTOMS AND SIGNS INVOLVING THE CIRCULATORY AND RESPIRATORY SYSTEMS: ICD-10-CM

## 2024-04-15 DIAGNOSIS — I73.9 PERIPHERAL ARTERIAL DISEASE (H): Primary | ICD-10-CM

## 2024-04-15 DIAGNOSIS — Z95.828 S/P AORTO-BIFEMORAL BYPASS SURGERY: ICD-10-CM

## 2024-04-15 LAB
CHOLEST SERPL-MCNC: 114 MG/DL
FASTING STATUS PATIENT QL REPORTED: YES
HDLC SERPL-MCNC: 44 MG/DL
HOLD SPECIMEN: NORMAL
LDLC SERPL CALC-MCNC: 56 MG/DL
NONHDLC SERPL-MCNC: 70 MG/DL
TRIGL SERPL-MCNC: 72 MG/DL

## 2024-04-15 PROCEDURE — G0463 HOSPITAL OUTPT CLINIC VISIT: HCPCS | Mod: 25 | Performed by: SURGERY

## 2024-04-15 PROCEDURE — 93978 VASCULAR STUDY: CPT

## 2024-04-15 PROCEDURE — 80061 LIPID PANEL: CPT

## 2024-04-15 PROCEDURE — 99213 OFFICE O/P EST LOW 20 MIN: CPT | Performed by: SURGERY

## 2024-04-15 PROCEDURE — 93978 VASCULAR STUDY: CPT | Mod: 26 | Performed by: SURGERY

## 2024-04-15 PROCEDURE — 36415 COLL VENOUS BLD VENIPUNCTURE: CPT

## 2024-04-15 NOTE — PROGRESS NOTES
[facility]        Patient is here for a  follow up.    Pt is currently taking Aspirin and Statin.    /79 (BP Location: Right arm, Patient Position: Chair, Cuff Size: Adult Regular)   Pulse 67   Wt 130 lb 6.4 oz (59.1 kg)   SpO2 97%   BMI 22.04 kg/m      The provider has been notified that the patient has no concerns.     Questions patient would like addressed today are: N/A.    Refills are needed: N/A    Has homecare services and agency name:  Ava Fuller MA

## 2024-04-15 NOTE — PROGRESS NOTES
Mrs. Caabn is a 73-year-old female with history of aortobiiliac bypass grafting.  She had stent grafting of the right external iliac artery and right limb of the aortobiiliac bypass grafting.  Both of these had developed recurrent high-grade stenosis and she underwent right femoral cutdown and stent grafting of the right limb of the aortobiiliac bypass graft and native right external iliac artery.  This was done on 18 November 2022.     She has no complaints in regards to the lower extremities.  Now she is on 40 mg of atorvastatin which she is tolerating well.     Imaging shows patent aortobiiliac bypass graft and the stent graft within the right side including the right external iliac artery.    We will reimage her in 6 months time.      This was discussed with her in detail.  And she has verbalized her understanding.

## 2024-04-17 ENCOUNTER — TELEPHONE (OUTPATIENT)
Dept: OTHER | Facility: CLINIC | Age: 74
End: 2024-04-17
Payer: COMMERCIAL

## 2024-04-17 DIAGNOSIS — I77.1 ILIAC ARTERY STENOSIS, RIGHT (H): ICD-10-CM

## 2024-04-17 NOTE — TELEPHONE ENCOUNTER
See refill encounter sent to Dr. Tran.    Marylou GREWAL, RN    [facility]  [facility]  Office: [phone]  Fax: [phone]

## 2024-04-17 NOTE — TELEPHONE ENCOUNTER
Patient requesting refill of Lipitor 40 mg tablet.  Med and pharm loaded.    Marylou GREWAL, RN    [facility]  Office: [phone]  Fax: [phone]

## 2024-04-17 NOTE — TELEPHONE ENCOUNTER
[facility]    Who is the name of the provider?:  DEMETRIS ALBRIGHT   What is the location you see this provider at/preferred location?: [place]  Person calling / Facility: Karen Caban  Phone number:  [phone] (home)  Nurse call back needed:  NO     Reason for call:  Patent seen 4/15, discussed medication refill. Patient running low on:    atorvastatin (LIPITOR) 40 MG tablet [[id number]]     Please refill, pharmacy confirmed.    [id number] [place], [place] - [address]  Outside Imaging: n/a   Can we leave a detailed message on this number?  YES     4/17/2024, 10:44 AM

## 2024-04-18 RX ORDER — ATORVASTATIN CALCIUM 40 MG/1
40 TABLET, FILM COATED ORAL DAILY
Qty: 90 TABLET | Refills: 3 | Status: SHIPPED | OUTPATIENT
Start: 2024-04-18

## 2024-05-06 DIAGNOSIS — I10 HTN (HYPERTENSION), BENIGN: ICD-10-CM

## 2024-05-06 DIAGNOSIS — E78.5 HYPERLIPIDEMIA WITH TARGET LDL LESS THAN 100: ICD-10-CM

## 2024-05-07 DIAGNOSIS — I73.9 PAD (PERIPHERAL ARTERY DISEASE) (H): ICD-10-CM

## 2024-05-07 RX ORDER — METOPROLOL SUCCINATE 100 MG/1
150 TABLET, EXTENDED RELEASE ORAL DAILY
Qty: 135 TABLET | OUTPATIENT
Start: 2024-05-07

## 2024-05-07 RX ORDER — EZETIMIBE 10 MG/1
TABLET ORAL
Qty: 90 TABLET | Refills: 0 | Status: SHIPPED | OUTPATIENT
Start: 2024-05-07 | End: 2024-07-31

## 2024-05-07 NOTE — TELEPHONE ENCOUNTER
ticagrelor (BRILINTA) 60 MG tablet   Last Written Prescription Date:  2/26/24  Last Fill Quantity: 90,  # refills: 0     Last office visit: 4/15/2024;   Follow up Recommended: We will reimage her in 6 months time.     Hx: right femoral cutdown and stent grafting of the right limb of the aortobiiliac bypass graft and native right external iliac artery.  This was done on 18 November 2022.   I will see her back in 6 months with repeat imaging.   She is to take Brilinta for at least 1 year.

## 2024-05-12 DIAGNOSIS — I10 HTN (HYPERTENSION), BENIGN: ICD-10-CM

## 2024-05-13 DIAGNOSIS — I10 HTN (HYPERTENSION), BENIGN: ICD-10-CM

## 2024-05-13 RX ORDER — METOPROLOL SUCCINATE 100 MG/1
150 TABLET, EXTENDED RELEASE ORAL DAILY
Qty: 135 TABLET | Refills: 1 | Status: SHIPPED | OUTPATIENT
Start: 2024-05-13 | End: 2024-05-13

## 2024-05-13 NOTE — TELEPHONE ENCOUNTER
General Call - RESEND E-SCRIPT    Pharmacy says they did not receive the e-script.     Call in a verbal or fax prescription to [phone].    - Pended Rx again.  - Pended pharmacy.    Informed pt it would likely be filled tomorrow (5/14/24).    Marian Sifuentes on 5/13/2024 at 5:04 PM

## 2024-05-14 DIAGNOSIS — I10 HTN (HYPERTENSION), BENIGN: ICD-10-CM

## 2024-05-14 RX ORDER — METOPROLOL SUCCINATE 100 MG/1
150 TABLET, EXTENDED RELEASE ORAL DAILY
Qty: 135 TABLET | Refills: 1 | OUTPATIENT
Start: 2024-05-14

## 2024-05-14 RX ORDER — METOPROLOL SUCCINATE 100 MG/1
150 TABLET, EXTENDED RELEASE ORAL DAILY
Qty: 135 TABLET | Refills: 1 | Status: SHIPPED | OUTPATIENT
Start: 2024-05-14 | End: 2024-07-31

## 2024-05-14 NOTE — TELEPHONE ENCOUNTER
Prescription approved per [facility] Refill Protocol.  Brenda Hensley RN  [facility]

## 2024-05-15 ENCOUNTER — LAB (OUTPATIENT)
Dept: LAB | Facility: CLINIC | Age: 74
End: 2024-05-15
Payer: COMMERCIAL

## 2024-05-15 ENCOUNTER — TELEPHONE (OUTPATIENT)
Dept: UROLOGY | Facility: CLINIC | Age: 74
End: 2024-05-15

## 2024-05-15 DIAGNOSIS — R39.15 URINARY URGENCY: Primary | ICD-10-CM

## 2024-05-15 DIAGNOSIS — R39.15 URINARY URGENCY: ICD-10-CM

## 2024-05-15 LAB
ALBUMIN UR-MCNC: >=300 MG/DL
APPEARANCE UR: CLEAR
BILIRUB UR QL STRIP: NEGATIVE
COLOR UR AUTO: YELLOW
GLUCOSE UR STRIP-MCNC: NEGATIVE MG/DL
HGB UR QL STRIP: ABNORMAL
KETONES UR STRIP-MCNC: NEGATIVE MG/DL
LEUKOCYTE ESTERASE UR QL STRIP: ABNORMAL
NITRATE UR QL: POSITIVE
PH UR STRIP: 7 [PH] (ref 5–7)
SP GR UR STRIP: 1.02 (ref 1–1.03)
UROBILINOGEN UR STRIP-ACNC: 0.2 E.U./DL

## 2024-05-15 PROCEDURE — 87186 SC STD MICRODIL/AGAR DIL: CPT

## 2024-05-15 PROCEDURE — 87088 URINE BACTERIA CULTURE: CPT

## 2024-05-15 PROCEDURE — 81003 URINALYSIS AUTO W/O SCOPE: CPT | Mod: QW

## 2024-05-15 PROCEDURE — 87086 URINE CULTURE/COLONY COUNT: CPT

## 2024-05-15 NOTE — TELEPHONE ENCOUNTER
Returned phone call and spoke with patient. She would like to come in at 11:00am to our clinic lab to leave a UAUC. Orders placed in computer.     Sweta Cotton LPN

## 2024-05-15 NOTE — TELEPHONE ENCOUNTER
[facility] Call Center    Phone Message    May a detailed message be left on voicemail: yes     Reason for Call: Other: Pt is experiencing UTI symptoms. She was told to inform team if she believed she had a UTI. Please call pt to discuss     Action Taken: Other: Uro    Travel Screening: Not Applicable

## 2024-05-17 DIAGNOSIS — Z87.440 HISTORY OF UTI: Primary | ICD-10-CM

## 2024-05-17 LAB
BACTERIA UR CULT: ABNORMAL
BACTERIA UR CULT: ABNORMAL

## 2024-05-17 RX ORDER — CEPHALEXIN 500 MG/1
500 CAPSULE ORAL 2 TIMES DAILY
Qty: 14 CAPSULE | Refills: 0 | Status: SHIPPED | OUTPATIENT
Start: 2024-05-17 | End: 2024-05-28

## 2024-05-28 ENCOUNTER — VIRTUAL VISIT (OUTPATIENT)
Dept: PHARMACY | Facility: CLINIC | Age: 74
End: 2024-05-28
Payer: COMMERCIAL

## 2024-05-28 DIAGNOSIS — E78.5 HYPERLIPIDEMIA WITH TARGET LDL LESS THAN 100: ICD-10-CM

## 2024-05-28 DIAGNOSIS — K27.9 PUD (PEPTIC ULCER DISEASE): ICD-10-CM

## 2024-05-28 DIAGNOSIS — F17.200 SMOKER: ICD-10-CM

## 2024-05-28 DIAGNOSIS — J06.9 UPPER RESPIRATORY TRACT INFECTION, UNSPECIFIED TYPE: Primary | ICD-10-CM

## 2024-05-28 DIAGNOSIS — J30.2 SEASONAL ALLERGIC RHINITIS, UNSPECIFIED TRIGGER: ICD-10-CM

## 2024-05-28 DIAGNOSIS — I73.9 PAD (PERIPHERAL ARTERY DISEASE) (H): ICD-10-CM

## 2024-05-28 DIAGNOSIS — J43.9 PULMONARY EMPHYSEMA, UNSPECIFIED EMPHYSEMA TYPE (H): ICD-10-CM

## 2024-05-28 DIAGNOSIS — I10 HTN (HYPERTENSION), BENIGN: ICD-10-CM

## 2024-05-28 DIAGNOSIS — Z78.9 TAKES DIETARY SUPPLEMENTS: ICD-10-CM

## 2024-05-28 PROCEDURE — 99605 MTMS BY PHARM NP 15 MIN: CPT | Mod: 93 | Performed by: PHARMACIST

## 2024-05-28 NOTE — LETTER
May 29, 2024  Karen Caban  [address]  [place] [address]    Dear Ms. Caban, [facility]     Thank you for talking with me on May 28, 2024 about your health and medications. As a follow-up to our conversation, I have included two documents:      Your Recommended To-Do List has steps you should take to get the best results from your medications.  Your Medication List will help you keep track of your medications and how to take them.    If you want to talk about these documents, please call Abril Lopez PharmD at phone: [phone], Monday-Friday 8-4:30pm.    I look forward to working with you and your doctors to make sure your medications work well for you.    Sincerely,  Abril Lopez PharmD  [facility] [place] Pharmacist, [facility]

## 2024-05-28 NOTE — LETTER
"Recommended To-Do List      Prepared on: 05/29/2024       You can get the best results from your medications by completing the items on this \"To-Do List.\"      Bring your To-Do List when you go to your doctor. And, share it with your family or caregivers.    My To-Do List:  What we talked about: What I should do:   What my medicines are for, how to know if my medicines are working, made sure my medicines are safe for me and reviewed how to take my medicines.    Take my medicines every day                 "

## 2024-05-28 NOTE — PROGRESS NOTES
"Medication Therapy Management (MTM) Encounter    ASSESSMENT:                            Medication Adherence/Access: No issues identified    URI:   Will continue to monitor.    Hypertension:   Stable. Patient is meeting blood pressure goal of < 130/80mmHg.     Hyperlipidemia:   Stable.  LDL near goal <55mg/dL given diagnosis of PAD.    PAD:  Stable.    COPD:   Stable      Allergic rhinitis:   Stable.    Tobacco Cessation:  Patient continues to use tobacco. Patient is not ready to quit using tobacco, continued to encourage cessation.    GERD:   Stable.     Supplements:  Stable.       PLAN:                            Continue current medication regimen.     Follow-up: Return in about 6 months (around 11/28/2024) for Follow-up Medication Review.    SUBJECTIVE/OBJECTIVE:                          Humaira Caban is a 73 year old female called for a follow-up visit.       Reason for visit: Routine follow-up.    Tobacco: She reports that she has been smoking cigarettes. She has a 12.5 pack-year smoking history. She has never used smokeless tobacco.Nicotine/Tobacco Cessation Plan  See below  Alcohol: Less than 1 beverages / week    Medication Adherence/Access: no issues reported    URI:  Patient currently has a \"head cold\"  She reports feeling ok, denies need for further evaluation  Wishes to keep our conversation brief today as a result    Hypertension   Chlorthalidone 25 mg daily  Metoprolol  mg daily  Sublingual nitroglycerin  - no recent use    Patient reports no current medication side effects.  Patient self-monitors blood pressure - exact readings not discussed today     Hyperlipidemia   Atorvastatin 40 mg daily   Zetia 10 mg daily   No side effects reported.        PAD:  Aspirin 81 mg daily  Brilinta 60 mg daily     No sign or symptoms of bleeding reported.     COPD:   Stiolto Respimat 2.5-2.5 mcg 2 puffs daily   Combivent Respimat  mcg 1 puff every 6 hours as needed      She reports breathing has been stable. " " Denies side effects.    Allergic Rhinitis:   Flonase (fluticasone) nasal spray - 1-2 spray(s) each nostril once daily  Saline nasal spray as needed   Patient reports no current medication side effects.     Patient feels that current therapy is effective.       Tobacco Cessation:   Nicotine 2 mg lozenge - using as needed   Nicotine 14 mg patch - using as needed   She reports that process for tobacco cessation has been \"on and off.\"  She finds these effective when needed, doesn't feel she needs additional support at this time.  She denies side effects.    GERD    Pantoprazole 40 mg once daily   Patient reports no current symptoms.          Supplements   Vitamin C 2 chew tablets daily - recommended by urology  Vitamin D3 5000 international unit(s) daily  CoQ10 200 mg daily   Cranberry w/Vit C&E 2 tablets daily - increased by urology  Vitamin B12 5000mcg every other day   Probiotic daily   Hair Skin & Nails daily   No reported issues at this time.       Today's Vitals: There were no vitals taken for this visit.  ----------------    I spent 5 minutes with this patient today. A copy of the visit note was provided to the patient's provider(s).    A summary of these recommendations was sent via [employer].    Abril Lopez, PharmD, [facility]  Medication Therapy Management Provider  [phone]     Telemedicine Visit Details  Type of service:  Telephone visit  Start Time:  11:37 AM  End Time: 11:42 AM     Medication Therapy Recommendations  No medication therapy recommendations to display   "

## 2024-05-28 NOTE — PATIENT INSTRUCTIONS
"Recommendations from today's MTM visit:                                                    MTM (medication therapy management) is a service provided by a clinical pharmacist designed to help you get the most of out of your medicines.   Today we reviewed what your medicines are for, how to know if they are working, that your medicines are safe and how to make your medicine regimen as easy as possible.      Continue current medication regimen.     Follow-up: Return in about 6 months (around 11/28/2024) for Follow-up Medication Review.    It was great speaking with you today.  I value your experience and would be very thankful for your time in providing feedback in our clinic survey. In the next few days, you may receive an email or text message from [employer] with a link to a survey related to your  clinical pharmacist.\"     To schedule another MTM appointment, please call the clinic directly or you may call the MTM scheduling line at [phone] or toll-free at [phone].     My Clinical Pharmacist's contact information:                                                      Please feel free to contact me with any questions or concerns you have.      Abril Lopez, Shankar, [facility]  Medication Therapy Management Provider  [phone]    "

## 2024-05-28 NOTE — LETTER
_  Medication List        Prepared on: 05/29/2024     Bring your Medication List when you go to the doctor, hospital, or   emergency room. And, share it with your family or caregivers.     Note any changes to how you take your medications.  Cross out medications when you no longer use them.    Medication How I take it Why I use it Prescriber   Ascorbic Acid (VITAMIN C GUMMIE PO) Take 2 chew tab by mouth daily General Health  Self   aspirin (ASA) 81 MG chewable tablet Take 1 tablet (81 mg) by mouth daily PAD (Peripheral Artery Disease) (H) Shaun Tran MD   atorvastatin (LIPITOR) 40 MG tablet Take 1 tablet (40 mg) by mouth daily Iliac artery stenosis, right (H) Shaun Tran MD   chlorthalidone (HYGROTON) 25 MG tablet Take 1 tablet (25 mg) by mouth daily HTN (Hypertension), Benign Liane Claudio MD   Cholecalciferol (VITAMIN D-3) 5000 UNIT TABS Take 1 tablet by mouth daily. General Health  Self   coenzyme Q-10 200 MG CAPS Take 200 mg by mouth daily General Health  Self   Cranberry-Vitamin C-Vitamin E (CRANBERRY PLUS VITAMIN C) 4200-20-3 MG-MG-UNIT CAPS Take 2 tablets by mouth daily  General Health  Self   Cyanocobalamin (B-12 PO) Take 5,000 mcg by mouth every other day Liquid form Vegetarian Diet Lyudmila Escobar PA-C   ezetimibe (ZETIA) 10 MG tablet TAKE 1 TABLET(10 MG) BY MOUTH DAILY Hyperlipidemia with Target LDL Less than 100 Liane Claudio MD   fluticasone (FLONASE) 50 MCG/ACT nasal spray Spray 1-2 sprays in nostril daily Allergy Self   ipratropium-albuterol (COMBIVENT RESPIMAT)  MCG/ACT inhaler Inhale 1 puff into the lungs every 6 hours as needed for shortness of breath, wheezing or cough Pulmonary emphysema, unspecified emphysema type (H) Ez Keys MD   Lactobacillus (PROBIOTIC ACIDOPHILUS PO) Take 1 capsule by mouth daily General Health  Self    metoprolol succinate ER (TOPROL XL) 100 MG 24 hr tablet Take 1.5 tablets (150 mg) by mouth daily HTN (Hypertension),  Benign Liane Claudio MD   Multiple Vitamins-Minerals (HAIR SKIN & NAILS ADVANCED PO) Take 1 tablet by mouth daily General Health  Self   nicotine (COMMIT) 2 MG lozenge Place 2 mg inside cheek every hour as needed for smoking cessation Tobacco Cessation Patient Reported   nicotine (NICODERM CQ) 14 MG/24HR 24 hr patch Place 1 patch onto the skin every 24 hours Tobacco Cessation Patient Reported   nitroGLYcerin (NITROSTAT) 0.4 MG sublingual tablet For chest pain place 1 tablet under the tongue every 5 minutes for 3 doses. If symptoms persist 5 minutes after 1st dose call 911. Chest pain, unspecified type Joanna Kersten Ulmen Barthell, PA-C   pantoprazole (PROTONIX) 40 MG EC tablet Take 1 tablet (40 mg) by mouth daily Gastroesophageal Reflux Disease without Esophagitis Liane Claudio MD   Sodium Chloride-Xylitol (XLEAR SINUS CARE SPRAY NA) Spray 1 spray in nostril daily as needed Allergy Self   ticagrelor (BRILINTA) 60 MG tablet Take 1 tablet (60 mg) by mouth daily PAD (Peripheral Artery Disease) (H) Shaun Tran MD   tiotropium-olodaterol (STIOLTO RESPIMAT) 2.5-2.5 MCG/ACT AERS Inhale 2 puffs into the lungs daily Pulmonary emphysema, unspecified emphysema type (H) Jolie Garsia PA-C         Add new medications, over-the-counter drugs, herbals, vitamins, or  minerals in the blank rows below.    Medication How I take it Why I use it Prescriber                                      Allergies:      - Chantix [varenicline] - Nausea  - Ciprofloxacin - Other (See Comments)  - Clopidogrel  - Decongestant [pseudoephedrine Hcl Er]  - Erythromycin - Nausea  - Lisinopril  - Plavix [clopidogrel Bisulfate]  - Rofecoxib - Unknown  - Vioxx        Side effects I have had:      Not on File        Other Information:              My notes and questions:

## 2024-06-05 ENCOUNTER — LAB (OUTPATIENT)
Dept: LAB | Facility: CLINIC | Age: 74
End: 2024-06-05
Payer: COMMERCIAL

## 2024-06-05 ENCOUNTER — PATIENT OUTREACH (OUTPATIENT)
Dept: ONCOLOGY | Facility: CLINIC | Age: 74
End: 2024-06-05

## 2024-06-05 DIAGNOSIS — C91.10 CLL (CHRONIC LYMPHOCYTIC LEUKEMIA) (H): ICD-10-CM

## 2024-06-05 LAB
BASOPHILS # BLD AUTO: ABNORMAL 10*3/UL
BASOPHILS # BLD MANUAL: 0 10E3/UL (ref 0–0.2)
BASOPHILS NFR BLD AUTO: ABNORMAL %
BASOPHILS NFR BLD MANUAL: 0 %
EOSINOPHIL # BLD AUTO: ABNORMAL 10*3/UL
EOSINOPHIL # BLD MANUAL: 0 10E3/UL (ref 0–0.7)
EOSINOPHIL NFR BLD AUTO: ABNORMAL %
EOSINOPHIL NFR BLD MANUAL: 0 %
ERYTHROCYTE [DISTWIDTH] IN BLOOD BY AUTOMATED COUNT: 15.9 % (ref 10–15)
HCT VFR BLD AUTO: 48.8 % (ref 35–47)
HGB BLD-MCNC: 15.4 G/DL (ref 11.7–15.7)
IMM GRANULOCYTES # BLD: ABNORMAL 10*3/UL
IMM GRANULOCYTES NFR BLD: ABNORMAL %
LYMPHOCYTES # BLD AUTO: ABNORMAL 10*3/UL
LYMPHOCYTES # BLD MANUAL: 62.3 10E3/UL (ref 0.8–5.3)
LYMPHOCYTES NFR BLD AUTO: ABNORMAL %
LYMPHOCYTES NFR BLD MANUAL: 96 %
MCH RBC QN AUTO: 30.9 PG (ref 26.5–33)
MCHC RBC AUTO-ENTMCNC: 31.6 G/DL (ref 31.5–36.5)
MCV RBC AUTO: 98 FL (ref 78–100)
MONOCYTES # BLD AUTO: ABNORMAL 10*3/UL
MONOCYTES # BLD MANUAL: 0 10E3/UL (ref 0–1.3)
MONOCYTES NFR BLD AUTO: ABNORMAL %
MONOCYTES NFR BLD MANUAL: 0 %
NEUTROPHILS # BLD AUTO: ABNORMAL 10*3/UL
NEUTROPHILS # BLD MANUAL: 2.6 10E3/UL (ref 1.6–8.3)
NEUTROPHILS NFR BLD AUTO: ABNORMAL %
NEUTROPHILS NFR BLD MANUAL: 4 %
NRBC # BLD AUTO: 0 10E3/UL
NRBC BLD AUTO-RTO: 0 /100
PLAT MORPH BLD: ABNORMAL
PLATELET # BLD AUTO: 244 10E3/UL (ref 150–450)
RBC # BLD AUTO: 4.98 10E6/UL (ref 3.8–5.2)
RBC MORPH BLD: ABNORMAL
SMUDGE CELLS BLD QL SMEAR: PRESENT
WBC # BLD AUTO: 64.9 10E3/UL (ref 4–11)

## 2024-06-05 PROCEDURE — 36415 COLL VENOUS BLD VENIPUNCTURE: CPT

## 2024-06-05 PROCEDURE — 85007 BL SMEAR W/DIFF WBC COUNT: CPT

## 2024-06-05 PROCEDURE — 85027 COMPLETE CBC AUTOMATED: CPT

## 2024-06-05 NOTE — TELEPHONE ENCOUNTER
DATE/TIME OF CALL RECEIVED FROM LAB:  06/05/24 at 9:42 AM   LAB TEST:  WBC  LAB VALUE:  66.1  PROVIDER NOTIFIED?: Yes  PROVIDER NAME: Dr. Osorio  DATE/TIME LAB VALUE REPORTED TO PROVIDER: 0845  MECHANISM OF PROVIDER NOTIFICATION:  message routed. Elevated WBC to be expected with CLL. Will have Dr. Osorio advise . Appointment 6/19.  PROVIDER RESPONSE: Will wait for a response.  Mildred Duke RN

## 2024-06-06 ENCOUNTER — OFFICE VISIT (OUTPATIENT)
Dept: UROLOGY | Facility: CLINIC | Age: 74
End: 2024-06-06
Payer: COMMERCIAL

## 2024-06-06 VITALS — SYSTOLIC BLOOD PRESSURE: 175 MMHG | DIASTOLIC BLOOD PRESSURE: 92 MMHG | OXYGEN SATURATION: 99 % | HEART RATE: 56 BPM

## 2024-06-06 DIAGNOSIS — R35.1 NOCTURIA: ICD-10-CM

## 2024-06-06 DIAGNOSIS — Z87.440 HISTORY OF UTI: Primary | ICD-10-CM

## 2024-06-06 DIAGNOSIS — R39.15 URINARY URGENCY: ICD-10-CM

## 2024-06-06 DIAGNOSIS — N95.2 VAGINAL ATROPHY: ICD-10-CM

## 2024-06-06 DIAGNOSIS — R35.0 URINARY FREQUENCY: ICD-10-CM

## 2024-06-06 PROCEDURE — 99214 OFFICE O/P EST MOD 30 MIN: CPT | Performed by: PHYSICIAN ASSISTANT

## 2024-06-06 ASSESSMENT — PAIN SCALES - GENERAL: PAINLEVEL: NO PAIN (0)

## 2024-06-06 NOTE — LETTER
6/6/2024       RE: Karen Caban  [address]  [place] [address]     Dear Colleague,    Thank you for referring your patient, Karen Caban, to the [facility]OLOG[facility] [place] [facility]. Please see a copy of my visit note below.    Urology Clinic      Name: Karen Caban    MRN: [id number]   YOB: 1950  Accompanied at today's visit by:self                 Chief Complaint:   Hx of UTIS          History of Present Illness:   June 6, 2024    HISTORY:   We have been following 73 year old Karen Caban for hx of UTIs, urinary urgency/frequency, nocturia, vaginal atrophy, endometrial thickening. Hx complicated by CLL and continues to smoke. Last seen on 3/6/24 for consultation. Offered PFPT and OAB medication, however patient declined at that time. Encouraged to avoid bladder irritants and discussed UTI prevention. Here today for follow-up. Reports starting OTC supplements and working well for her. Has had 1 UTI since last encounter and is pleased by this. Denies gross hematuria. Not bothered by her urgency/frequency. Of note, patient has hx of endometrial thickening and planned to see OB/GYN. Advised to clear estrogen cream with OB/GYN for UTI prevention. Saw OB/GYN on 4/22/24 and did not recommend estrogen cream. Patient voices no other concerns at this time.            Allergies:     Allergies   Allergen Reactions    Chantix [Varenicline] Nausea    Ciprofloxacin Other (See Comments)     hypertension    Clopidogrel      Other reaction(s): Hypertension    Decongestant [Pseudoephedrine Hcl Er]     Erythromycin Nausea    Lisinopril      cough    Plavix [Clopidogrel Bisulfate]      Felt as if she was having a heart attack    Rofecoxib Unknown    Vioxx      Increased BP            Medications:     Current Outpatient Medications   Medication Sig Dispense Refill    Ascorbic Acid (VITAMIN C GUMMIE PO) Take 2 chew tab by mouth  daily      aspirin (ASA) 81 MG chewable tablet Take 1 tablet (81 mg) by mouth daily 90 tablet 11    atorvastatin (LIPITOR) 40 MG tablet Take 1 tablet (40 mg) by mouth daily 90 tablet 3    chlorthalidone (HYGROTON) 25 MG tablet Take 1 tablet (25 mg) by mouth daily 90 tablet 1    Cholecalciferol (VITAMIN D-3) 5000 UNIT TABS Take 1 tablet by mouth daily.      coenzyme Q-10 200 MG CAPS Take 200 mg by mouth daily      Cranberry-Vitamin C-Vitamin E (CRANBERRY PLUS VITAMIN C) 4200-20-3 MG-MG-UNIT CAPS Take 2 tablets by mouth daily      Cyanocobalamin (B-12 PO) Take 5,000 mcg by mouth every other day Liquid form 100 tablet 1    ezetimibe (ZETIA) 10 MG tablet TAKE 1 TABLET(10 MG) BY MOUTH DAILY 90 tablet 0    fluticasone (FLONASE) 50 MCG/ACT nasal spray Spray 1-2 sprays in nostril daily      ipratropium-albuterol (COMBIVENT RESPIMAT)  MCG/ACT inhaler Inhale 1 puff into the lungs every 6 hours as needed for shortness of breath, wheezing or cough 4 g 11    Lactobacillus (PROBIOTIC ACIDOPHILUS PO) Take 1 capsule by mouth daily      metoprolol succinate ER (TOPROL XL) 100 MG 24 hr tablet Take 1.5 tablets (150 mg) by mouth daily 135 tablet 1    Multiple Vitamins-Minerals (HAIR SKIN & NAILS ADVANCED PO) Take 1 tablet by mouth daily      nicotine (COMMIT) 2 MG lozenge Place 2 mg inside cheek every hour as needed for smoking cessation      nicotine (NICODERM CQ) 14 MG/24HR 24 hr patch Place 1 patch onto the skin every 24 hours      nitroGLYcerin (NITROSTAT) 0.4 MG sublingual tablet For chest pain place 1 tablet under the tongue every 5 minutes for 3 doses. If symptoms persist 5 minutes after 1st dose call 911. 20 tablet 0    pantoprazole (PROTONIX) 40 MG EC tablet Take 1 tablet (40 mg) by mouth daily 90 tablet 1    Sodium Chloride-Xylitol (XLEAR SINUS CARE SPRAY NA) Spray 1 spray in nostril daily as needed      ticagrelor (BRILINTA) 60 MG tablet Take 1 tablet (60 mg) by mouth daily 90 tablet 2    tiotropium-olodaterol (STIOLTO  RESPIMAT) 2.5-2.5 MCG/ACT AERS Inhale 2 puffs into the lungs daily 12 g 3     No current facility-administered medications for this visit.               Past  Surgical History:     Past Surgical History:   Procedure Laterality Date    ABDOMEN SURGERY      ANGIOGRAM Right 11/18/2022    Procedure: ANGIOGRAM AORTO ILIAC ANGIOGRAM;  Surgeon: Shaun Tran MD;  Location:  OR    BIOPSY      Blood clot removal from Stent      2011    BONE MARROW BIOPSY, BONE SPECIMEN, NEEDLE/TROCAR N/A 02/02/2021    Procedure: bone marrow biopsy;  Surgeon: Kailey Cota MD;  Location:  GI    BYPASS GRAFT AORTOFEMORAL  05/2002    Dr. Turner    BYPASS GRAFT FEMOROPOPLITEAL  12/16/2011    COLONOSCOPY N/A 01/18/2018    Procedure: COMBINED COLONOSCOPY, SINGLE OR MULTIPLE BIOPSY/POLYPECTOMY BY BIOPSY;  COLONOSCOPY;  Surgeon: Efrain Hernadez MD;  Location:  GI    COLONOSCOPY N/A 03/29/2023    Procedure: COLONOSCOPY, FLEXIBLE, WITH LESION REMOVAL USING SNARE;  Surgeon: Jony Manriquez MD;  Location:  GI    DILATION AND CURETTAGE, OPERATIVE HYSTEROSCOPY, COMBINED N/A 09/15/2022    Procedure: HYSTEROSCOPY, WITH DILATION AND CURETTAGE OF UTERUS;  Surgeon: Destiney Schaefer MD;  Location:  OR    EMBOLECTOMY LOWER EXTREMITY  12/16/2011    Procedure:EMBOLECTOMY LOWER EXTREMITY; EMBOLECTOMY, RIGHT POPLITEAL WITH PATCH ANGIOPLASTY. EXCISION SKIN LESION RIGHT LEG. ; Surgeon:PARMINDER VELAZCO; Location: OR    ENDARTERECTOMY FEMORAL Right 11/18/2022    Procedure: ENDARTERECTOMY, FEMORAL RIGHT FEMORAL CUTDOWN, RIGHT ILIAC ARTERY STENTING.;  Surgeon: Shaun Tran MD;  Location:  OR    ESOPHAGOSCOPY, GASTROSCOPY, DUODENOSCOPY (EGD), COMBINED N/A 10/07/2022    Procedure: ESOPHAGOGASTRODUODENOSCOPY, WITH BIOPSY;  Surgeon: Felix Carmona MD;  Location:  GI    EXCISE LESION LOWER EXTREMITY  12/16/2011    Procedure:EXCISE LESION LOWER EXTREMITY; Surgeon:PARMINDER VELAZCO; Location: OR    HERNIA  REPAIR  11/04/2008    x2 umbilical    IR OR ANGIOGRAM  11/18/2022    L achilles repair  12/04/2008    LAPAROSCOPIC OOPHORECTOMY Left     L for cyst age 26    miscarriages x 3      tubal ligation and reversal               Physical Exam:     Vitals:    06/06/24 1005   BP: (!) 175/92   BP Location: Right arm   Patient Position: Sitting   Cuff Size: Adult Regular   Pulse: 56   SpO2: 99%     PSYCH: NAD  EYES: EOMI  NEURO: AAO x3    LABS:     UC  5/15/24 10-50k E. Coli (pansensitive) and 10-50k Klebsiella Oxytoca (resistant to ampicillin and cefazolin)  1/5/24 mixed wood ([employer])  12/15/23 mixed wood ([employer])  10/5/23 >100,000 E. Coli (pansensitive), 50,000-100,000 proteus mirabilis (resistant to macrobid)    Creatinine   Date Value Ref Range Status   04/03/2024 0.66 0.51 - 0.95 mg/dL Final   06/01/2021 0.71 0.52 - 1.04 mg/dL Final            Assessment and Plan:   73 year old is a pleasant female who has hx of UTIs, urinary urgency/frequency, nocturia, vaginal atrophy, smoker, hx of CLL    Plan:  - patient pleased that OTC supplements of vitamin C, cranberry and probiotic working well for her.  - will avoid estrogen cream per recommendations of OB/GYN.  - discussed workup for hx of UTIs, however has only had 2 UTIs over past 12 months. Patient would like to hold off on further work-up at this time. Consider methenamine if UTIs persist as well as further workup with CTU and cysto if continues to get more UTIs.  - continue to monitor urgency/frequency.   - Plan to follow-up in 3 months.   - After discussing the assessment and plan with patient, patient verbalizes understanding and agrees to the above plan. All questions answered.       29 minutes spent on the date of the encounter doing chart review, review of outside records, review of test results, interpretation of tests, patient visit and documentation.      Julia Thompson PA-C  Urology  June 6, 2024      Patient Care Team:  Liane Claudio MD  as PCP - General (Internal Medicine)  Liane Claudio MD as Assigned PCP  Abril Lopez, PharmD as Pharmacist (Pharmacist)  Sancho Osorio MD as Assigned Cancer Care Provider  Ez Keys MD as Assigned Pulmonology Provider  Abril Lopez, PharmD as Assigned [facility] Pharmacist  Shaun Tran MD as Assigned Heart and Vascular Provider  Julia Thomposn PA-C as Physician Assistant  Lalo Perez MD as MD (OB/Gyn)  Julia Thomspon PA-C as Assigned Surgical Provider  Lalo Perez MD as Assigned OBGYN Provider

## 2024-06-06 NOTE — PROGRESS NOTES
Urology Clinic      Name: Karen Caban    MRN: [id number]   YOB: 1950  Accompanied at today's visit by:self                 Chief Complaint:   Hx of UTIS          History of Present Illness:   June 6, 2024    HISTORY:   We have been following 73 year old aKren Caban for hx of UTIs, urinary urgency/frequency, nocturia, vaginal atrophy, endometrial thickening. Hx complicated by CLL and continues to smoke. Last seen on 3/6/24 for consultation. Offered PFPT and OAB medication, however patient declined at that time. Encouraged to avoid bladder irritants and discussed UTI prevention. Here today for follow-up. Reports starting OTC supplements and working well for her. Has had 1 UTI since last encounter and is pleased by this. Denies gross hematuria. Not bothered by her urgency/frequency. Of note, patient has hx of endometrial thickening and planned to see OB/GYN. Advised to clear estrogen cream with OB/GYN for UTI prevention. Saw OB/GYN on 4/22/24 and did not recommend estrogen cream. Patient voices no other concerns at this time.            Allergies:     Allergies   Allergen Reactions    Chantix [Varenicline] Nausea    Ciprofloxacin Other (See Comments)     hypertension    Clopidogrel      Other reaction(s): Hypertension    Decongestant [Pseudoephedrine Hcl Er]     Erythromycin Nausea    Lisinopril      cough    Plavix [Clopidogrel Bisulfate]      Felt as if she was having a heart attack    Rofecoxib Unknown    Vioxx      Increased BP            Medications:     Current Outpatient Medications   Medication Sig Dispense Refill    Ascorbic Acid (VITAMIN C GUMMIE PO) Take 2 chew tab by mouth daily      aspirin (ASA) 81 MG chewable tablet Take 1 tablet (81 mg) by mouth daily 90 tablet 11    atorvastatin (LIPITOR) 40 MG tablet Take 1 tablet (40 mg) by mouth daily 90 tablet 3    chlorthalidone (HYGROTON) 25 MG tablet Take 1 tablet (25 mg) by mouth daily 90 tablet 1    Cholecalciferol (VITAMIN D-3) 5000  UNIT TABS Take 1 tablet by mouth daily.      coenzyme Q-10 200 MG CAPS Take 200 mg by mouth daily      Cranberry-Vitamin C-Vitamin E (CRANBERRY PLUS VITAMIN C) 4200-20-3 MG-MG-UNIT CAPS Take 2 tablets by mouth daily      Cyanocobalamin (B-12 PO) Take 5,000 mcg by mouth every other day Liquid form 100 tablet 1    ezetimibe (ZETIA) 10 MG tablet TAKE 1 TABLET(10 MG) BY MOUTH DAILY 90 tablet 0    fluticasone (FLONASE) 50 MCG/ACT nasal spray Spray 1-2 sprays in nostril daily      ipratropium-albuterol (COMBIVENT RESPIMAT)  MCG/ACT inhaler Inhale 1 puff into the lungs every 6 hours as needed for shortness of breath, wheezing or cough 4 g 11    Lactobacillus (PROBIOTIC ACIDOPHILUS PO) Take 1 capsule by mouth daily      metoprolol succinate ER (TOPROL XL) 100 MG 24 hr tablet Take 1.5 tablets (150 mg) by mouth daily 135 tablet 1    Multiple Vitamins-Minerals (HAIR SKIN & NAILS ADVANCED PO) Take 1 tablet by mouth daily      nicotine (COMMIT) 2 MG lozenge Place 2 mg inside cheek every hour as needed for smoking cessation      nicotine (NICODERM CQ) 14 MG/24HR 24 hr patch Place 1 patch onto the skin every 24 hours      nitroGLYcerin (NITROSTAT) 0.4 MG sublingual tablet For chest pain place 1 tablet under the tongue every 5 minutes for 3 doses. If symptoms persist 5 minutes after 1st dose call 911. 20 tablet 0    pantoprazole (PROTONIX) 40 MG EC tablet Take 1 tablet (40 mg) by mouth daily 90 tablet 1    Sodium Chloride-Xylitol (XLEAR SINUS CARE SPRAY NA) Spray 1 spray in nostril daily as needed      ticagrelor (BRILINTA) 60 MG tablet Take 1 tablet (60 mg) by mouth daily 90 tablet 2    tiotropium-olodaterol (STIOLTO RESPIMAT) 2.5-2.5 MCG/ACT AERS Inhale 2 puffs into the lungs daily 12 g 3     No current facility-administered medications for this visit.               Past  Surgical History:     Past Surgical History:   Procedure Laterality Date    ABDOMEN SURGERY      ANGIOGRAM Right 11/18/2022    Procedure: ANGIOGRAM  AORTO ILIAC ANGIOGRAM;  Surgeon: Shaun Tran MD;  Location:  OR    BIOPSY      Blood clot removal from Stent      2011    BONE MARROW BIOPSY, BONE SPECIMEN, NEEDLE/TROCAR N/A 02/02/2021    Procedure: bone marrow biopsy;  Surgeon: Kailey Cota MD;  Location:  GI    BYPASS GRAFT AORTOFEMORAL  05/2002    Dr. Turner    BYPASS GRAFT FEMOROPOPLITEAL  12/16/2011    COLONOSCOPY N/A 01/18/2018    Procedure: COMBINED COLONOSCOPY, SINGLE OR MULTIPLE BIOPSY/POLYPECTOMY BY BIOPSY;  COLONOSCOPY;  Surgeon: Efrain Hernadez MD;  Location:  GI    COLONOSCOPY N/A 03/29/2023    Procedure: COLONOSCOPY, FLEXIBLE, WITH LESION REMOVAL USING SNARE;  Surgeon: Jony Manriquez MD;  Location:  GI    DILATION AND CURETTAGE, OPERATIVE HYSTEROSCOPY, COMBINED N/A 09/15/2022    Procedure: HYSTEROSCOPY, WITH DILATION AND CURETTAGE OF UTERUS;  Surgeon: Destiney Schaefer MD;  Location:  OR    EMBOLECTOMY LOWER EXTREMITY  12/16/2011    Procedure:EMBOLECTOMY LOWER EXTREMITY; EMBOLECTOMY, RIGHT POPLITEAL WITH PATCH ANGIOPLASTY. EXCISION SKIN LESION RIGHT LEG. ; Surgeon:PARMINDER VELAZCO; Location: OR    ENDARTERECTOMY FEMORAL Right 11/18/2022    Procedure: ENDARTERECTOMY, FEMORAL RIGHT FEMORAL CUTDOWN, RIGHT ILIAC ARTERY STENTING.;  Surgeon: Shaun Tran MD;  Location:  OR    ESOPHAGOSCOPY, GASTROSCOPY, DUODENOSCOPY (EGD), COMBINED N/A 10/07/2022    Procedure: ESOPHAGOGASTRODUODENOSCOPY, WITH BIOPSY;  Surgeon: Felix Carmona MD;  Location:  GI    EXCISE LESION LOWER EXTREMITY  12/16/2011    Procedure:EXCISE LESION LOWER EXTREMITY; Surgeon:PARMINDER VELAZCO; Location: OR    HERNIA REPAIR  11/04/2008    x2 umbilical    IR OR ANGIOGRAM  11/18/2022    L achilles repair  12/04/2008    LAPAROSCOPIC OOPHORECTOMY Left     L for cyst age 26    miscarriages x 3      tubal ligation and reversal               Physical Exam:     Vitals:    06/06/24 1005   BP: (!) 175/92   BP Location: Right arm    Patient Position: Sitting   Cuff Size: Adult Regular   Pulse: 56   SpO2: 99%     PSYCH: NAD  EYES: EOMI  NEURO: AAO x3    LABS:     UC  5/15/24 10-50k E. Coli (pansensitive) and 10-50k Klebsiella Oxytoca (resistant to ampicillin and cefazolin)  1/5/24 mixed wood ([employer])  12/15/23 mixed wood ([email or web address])  10/5/23 >100,000 E. Coli (pansensitive), 50,000-100,000 proteus mirabilis (resistant to macrobid)    Creatinine   Date Value Ref Range Status   04/03/2024 0.66 0.51 - 0.95 mg/dL Final   06/01/2021 0.71 0.52 - 1.04 mg/dL Final            Assessment and Plan:   73 year old is a pleasant female who has hx of UTIs, urinary urgency/frequency, nocturia, vaginal atrophy, smoker, hx of CLL    Plan:  - patient pleased that OTC supplements of vitamin C, cranberry and probiotic working well for her.  - will avoid estrogen cream per recommendations of OB/GYN.  - discussed workup for hx of UTIs, however has only had 2 UTIs over past 12 months. Patient would like to hold off on further work-up at this time. Consider methenamine if UTIs persist as well as further workup with CTU and cysto if continues to get more UTIs.  - continue to monitor urgency/frequency.   - Plan to follow-up in 3 months.   - BP elevated though asymptomatic. Patient reports to staff from stress/rushing to get to clinic. Advised to continue to monitor BP at home and if BP does not improve or becomes symptomatic to go to the ER. Patient left prior to rechecking BP.   - After discussing the assessment and plan with patient, patient verbalizes understanding and agrees to the above plan. All questions answered.       29 minutes spent on the date of the encounter doing chart review, review of outside records, review of test results, interpretation of tests, patient visit and documentation.      Julia Thompson PA-C  Urology  June 6, 2024      Patient Care Team:  Liane Claudio MD as PCP - General (Internal Medicine)  Liane Claudio MD  as Assigned PCP  Abril Lopez, PharmD as Pharmacist (Pharmacist)  Sancho Osorio MD as Assigned Cancer Care Provider  Ez Keys MD as Assigned Pulmonology Provider  Abril Lopez, PharmD as Assigned MTM Pharmacist  Shaun Tran MD as Assigned Heart and Vascular Provider  Julia Thompson PA-C as Physician Assistant  Lalo Perez MD as MD (OB/Gyn)  Julia Thompson PA-C as Assigned Surgical Provider  Lalo Perez MD as Assigned OBGYN Provider

## 2024-06-06 NOTE — NURSING NOTE
Chief Complaint   Patient presents with    Other     Follow up     Patient has no concerns today.    Cyrus Cai, CMA

## 2024-06-06 NOTE — TELEPHONE ENCOUNTER
Dr. Osorio responded nothing further needs to be done at this time with WBC . He will address plan at upcoming visit.     Mildred Duke RN

## 2024-06-07 NOTE — RESULT ENCOUNTER NOTE
Dear Ms. Caban,    WBC has increased to 64.9. Hemoglobin and platelet is normal. We will continue to monitor it.    Please, call me with any questions.    Sancho Osorio MD

## 2024-06-19 ENCOUNTER — ONCOLOGY VISIT (OUTPATIENT)
Dept: ONCOLOGY | Facility: CLINIC | Age: 74
End: 2024-06-19
Attending: INTERNAL MEDICINE
Payer: COMMERCIAL

## 2024-06-19 VITALS
TEMPERATURE: 98 F | WEIGHT: 130 LBS | OXYGEN SATURATION: 100 % | DIASTOLIC BLOOD PRESSURE: 63 MMHG | SYSTOLIC BLOOD PRESSURE: 120 MMHG | HEART RATE: 40 BPM | BODY MASS INDEX: 21.97 KG/M2 | RESPIRATION RATE: 16 BRPM

## 2024-06-19 DIAGNOSIS — R00.1 BRADYCARDIA: ICD-10-CM

## 2024-06-19 DIAGNOSIS — C91.10 CLL (CHRONIC LYMPHOCYTIC LEUKEMIA) (H): Primary | ICD-10-CM

## 2024-06-19 PROCEDURE — G0463 HOSPITAL OUTPT CLINIC VISIT: HCPCS | Performed by: INTERNAL MEDICINE

## 2024-06-19 PROCEDURE — 93010 ELECTROCARDIOGRAM REPORT: CPT | Performed by: INTERNAL MEDICINE

## 2024-06-19 PROCEDURE — 99214 OFFICE O/P EST MOD 30 MIN: CPT | Performed by: INTERNAL MEDICINE

## 2024-06-19 ASSESSMENT — PAIN SCALES - GENERAL: PAINLEVEL: NO PAIN (0)

## 2024-06-19 NOTE — PROGRESS NOTES
"Oncology Rooming Note    June 19, 2024 2:06 PM   Karen Caban is a 73 year old female who presents for:    Chief Complaint   Patient presents with    Oncology Clinic Visit     Initial Vitals: /63   Pulse (!) 40   Temp 98  F (36.7  C) (Oral)   Resp 16   Wt 59 kg (130 lb)   SpO2 100%   BMI 21.97 kg/m   Estimated body mass index is 21.97 kg/m  as calculated from the following:    Height as of 2/21/24: 1.638 m (5' 4.5\").    Weight as of this encounter: 59 kg (130 lb). Body surface area is 1.64 meters squared.  No Pain (0) Comment: Data Unavailable   No LMP recorded. Patient is postmenopausal.  Allergies reviewed: Yes  Medications reviewed: Yes    Medications: Medication refills not needed today.  Pharmacy name entered into [employer]: [id number] - [place], [place] - [address]    Frailty Screening:   Is the patient here for a new oncology consult visit in cancer care? 2. No      Clinical concerns: no       Shari J. Schoenberger, CMA            "

## 2024-06-19 NOTE — LETTER
"6/19/2024      Karen Caban  [address]  [place] [address]      Dear Colleague,    Thank you for referring your patient, Karen Caban, to the [facility]. Please see a copy of my visit note below.    Oncology Rooming Note    June 19, 2024 2:06 PM   Karen Caban is a 73 year old female who presents for:    Chief Complaint   Patient presents with     Oncology Clinic Visit     Initial Vitals: /63   Pulse (!) 40   Temp 98  F (36.7  C) (Oral)   Resp 16   Wt 59 kg (130 lb)   SpO2 100%   BMI 21.97 kg/m   Estimated body mass index is 21.97 kg/m  as calculated from the following:    Height as of 2/21/24: 1.638 m (5' 4.5\").    Weight as of this encounter: 59 kg (130 lb). Body surface area is 1.64 meters squared.  No Pain (0) Comment: Data Unavailable   No LMP recorded. Patient is postmenopausal.  Allergies reviewed: Yes  Medications reviewed: Yes    Medications: Medication refills not needed today.  Pharmacy name entered into [employer]: [facility] #[id number] - [place], [place] - [address]    Frailty Screening:   Is the patient here for a new oncology consult visit in cancer care? 2. No      Clinical concerns: no       Shari J. Schoenberger, CMA              HEMATOLOGIC HISTORY:  Ms. Caban is a 71-year-old female with CLL.  1.  On 12/11/2019, WBC of 9.8.  -On 12/17/2020, WBC of 16.3.  -On 01/06/2021, WBC of 18.6, hemoglobin of 16.5 and platelet of 206.  Neutrophil of 19%, lymphocyte of 74%.  2.  Flow cytometry on peripheral blood on 01/06/2021 revealed CD5 positive kappa monotypic B cells with immunophenotype of CLL/SLL .  3.  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  CT chest, abdomen, and pelvis on 02/03/2021 did " not reveal any lymphadenopathy or splenomegaly.     SUBJECTIVE: Ms. Caban is a 73-year-old female with Webb stage 0 chronic lymphocytic leukemia on observation.    She has lung nodule and emphysema.  She follows up with pulmonologist.     She is asymptomatic. She is very active physically.  No headache.  No dizziness. No chest pain.  No shortness of breath.  No abdominal pain, nausea or vomiting. Appetite is good.  No urinary or bowel complaints.  No recurrent infection. No night sweats. All other review of systems is negative.     PHYSICAL EXAMINATION:   GENERAL:  Alert and oriented x 3.   VITAL SIGNS:  Reviewed.  ECOG PS of 0.     EYES:  No icterus.   NECK:  Supple. No lymphadenopathy. No thyromegaly.   AXILLAE:  No lymphadenopathy.   LUNGS:  Good air entry bilaterally.  No crackles or wheezing.   HEART:  Regular.  No murmur.   GI: Abdomen is soft.  Nontender. No mass.   EXTREMITIES:  No pedal edema.  No calf swelling or tenderness.   SKIN:  No rash.      LABORATORY:  CBC reviewed.     ASSESSMENT:    1.  A 73-year-old female with Webb stage 0 chronic lymphocytic leukemia on observation. CLL is slowly progressing.  2.  Bradycardia.     PLAN:    CBC every 3 months.  Follow-up in 6 months with labs.  EKG today.    DISCUSSION:  1.  Patient is doing well.  She is asymptomatic.  CBC reviewed with her.  Her WBC/lymphocyte has increased.  Hemoglobin and platelet are normal.    Explained to the patient that CLL is slowly progressing as evidenced by increasing leukocytosis/lymphocytosis.    Different indication for treatment reviewed.  At this time no indication for any treatment.  Will continue to monitor her.    She will have CBC checked every 3 months.    2.  Her pulse was low.  Because of that EKG was done.  It reveals sinus rhythm with frequent PVC.  Patient asymptomatic.  Nothing needs to be done.    3.  She had few questions which were all answered.  I will see her in 6 months time.     TOTAL VISIT TIME:  30 minutes.   Time spent in today's visit, review of chart/investigations today and documentation.         Again, thank you for allowing me to participate in the care of your patient.        Sincerely,        Sancho Osorio MD

## 2024-06-19 NOTE — PROGRESS NOTES
HEMATOLOGIC HISTORY:  Ms. Caban is a 71-year-old female with CLL.  1.  On 12/11/2019, WBC of 9.8.  -On 12/17/2020, WBC of 16.3.  -On 01/06/2021, WBC of 18.6, hemoglobin of 16.5 and platelet of 206.  Neutrophil of 19%, lymphocyte of 74%.  2.  Flow cytometry on peripheral blood on 01/06/2021 revealed CD5 positive kappa monotypic B cells with immunophenotype of CLL/SLL .  3.  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  CT chest, abdomen, and pelvis on 02/03/2021 did not reveal any lymphadenopathy or splenomegaly.     SUBJECTIVE: Ms. Caban is a 73-year-old female with Webb stage 0 chronic lymphocytic leukemia on observation.    She has lung nodule and emphysema.  She follows up with pulmonologist.     She is asymptomatic. She is very active physically.  No headache.  No dizziness. No chest pain.  No shortness of breath.  No abdominal pain, nausea or vomiting. Appetite is good.  No urinary or bowel complaints.  No recurrent infection. No night sweats. All other review of systems is negative.     PHYSICAL EXAMINATION:   GENERAL:  Alert and oriented x 3.   VITAL SIGNS:  Reviewed.  ECOG PS of 0.     EYES:  No icterus.   NECK:  Supple. No lymphadenopathy. No thyromegaly.   AXILLAE:  No lymphadenopathy.   LUNGS:  Good air entry bilaterally.  No crackles or wheezing.   HEART:  Regular.  No murmur.   GI: Abdomen is soft.  Nontender. No mass.   EXTREMITIES:  No pedal edema.  No calf swelling or tenderness.   SKIN:  No rash.      LABORATORY:  CBC reviewed.     ASSESSMENT:    1.  A 73-year-old female with Webb stage 0 chronic lymphocytic leukemia on observation. CLL is slowly progressing.  2.  Bradycardia.     PLAN:    CBC every 3 months.  Follow-up in 6 months with labs.  EKG today.    DISCUSSION:  1.  Patient is  doing well.  She is asymptomatic.  CBC reviewed with her.  Her WBC/lymphocyte has increased.  Hemoglobin and platelet are normal.    Explained to the patient that CLL is slowly progressing as evidenced by increasing leukocytosis/lymphocytosis.    Different indication for treatment reviewed.  At this time no indication for any treatment.  Will continue to monitor her.    She will have CBC checked every 3 months.    2.  Her pulse was low.  Because of that EKG was done.  It reveals sinus rhythm with frequent PVC.  Patient asymptomatic.  Nothing needs to be done.    3.  She had few questions which were all answered.  I will see her in 6 months time.     TOTAL VISIT TIME:  30 minutes.  Time spent in today's visit, review of chart/investigations today and documentation.

## 2024-06-21 ENCOUNTER — OFFICE VISIT (OUTPATIENT)
Dept: URGENT CARE | Facility: URGENT CARE | Age: 74
End: 2024-06-21
Payer: COMMERCIAL

## 2024-06-21 ENCOUNTER — ANCILLARY PROCEDURE (OUTPATIENT)
Dept: GENERAL RADIOLOGY | Facility: CLINIC | Age: 74
End: 2024-06-21
Attending: PHYSICIAN ASSISTANT
Payer: COMMERCIAL

## 2024-06-21 VITALS
DIASTOLIC BLOOD PRESSURE: 75 MMHG | RESPIRATION RATE: 16 BRPM | OXYGEN SATURATION: 98 % | WEIGHT: 130 LBS | SYSTOLIC BLOOD PRESSURE: 146 MMHG | TEMPERATURE: 97.6 F | BODY MASS INDEX: 21.97 KG/M2 | HEART RATE: 61 BPM

## 2024-06-21 DIAGNOSIS — T14.8XXA HEMATOMA OF SKIN: ICD-10-CM

## 2024-06-21 DIAGNOSIS — L03.116 CELLULITIS OF LEFT LOWER EXTREMITY: ICD-10-CM

## 2024-06-21 DIAGNOSIS — S89.92XA LEG INJURY, LEFT, INITIAL ENCOUNTER: ICD-10-CM

## 2024-06-21 DIAGNOSIS — S89.92XA LEG INJURY, LEFT, INITIAL ENCOUNTER: Primary | ICD-10-CM

## 2024-06-21 PROCEDURE — 99214 OFFICE O/P EST MOD 30 MIN: CPT | Performed by: PHYSICIAN ASSISTANT

## 2024-06-21 PROCEDURE — 73590 X-RAY EXAM OF LOWER LEG: CPT | Mod: TC | Performed by: RADIOLOGY

## 2024-06-21 RX ORDER — CEPHALEXIN 500 MG/1
500 CAPSULE ORAL 3 TIMES DAILY
Qty: 30 CAPSULE | Refills: 0 | Status: SHIPPED | OUTPATIENT
Start: 2024-06-21 | End: 2024-07-01

## 2024-06-21 NOTE — PROGRESS NOTES
Assessment & Plan     Leg injury, left, initial encounter    Xray tibia/fibula  Negative for acute findings, pos for old healed fracture  read by Dominick DUNN at time of visit.    RICE treatment: Rest, Ice, compression, elevation   OTC motrin    - XR Tibia and Fibula Left 2 Views    Hematoma of skin     hematoma is a bad bruise. It happens when an injury causes blood to collect and pool under the skin. The pooling blood gives the skin a spongy, rubbery, lumpy feel.  A hematoma usually is not a cause for concern. It is not the same thing as a blood clot in a vein, and it does not cause blood clots.    Alternate ice and warm moist compresses  May take 2-3 months to resolve      Cellulitis of left lower extremity    Cellulitis is an infection of the deep layers of skin. A break in the skin, such as a cut or scratch, can let bacteria under the skin. If the bacteria get to deep layers of the skin, it can be serious. If not treated, cellulitis can get into the bloodstream and lymph nodes. The infection can then spread throughout the body. This causes serious illness.   Cellulitis causes the affected skin to become red, swollen, warm, and sore. The reddened areas have a visible border. An open sore may leak fluid (pus). You may have a fever, chills, and pain.   Cellulitis is treated with antibiotics taken for 7 to 10 days. An open sore may be cleaned and covered with cool wet gauze. Symptoms should get better 1 to 2 days after treatment is started. Make sure to take all the antibiotics for the full number of days until they are gone. Keep taking the medicine even if your symptoms go away.     - cephALEXin (KEFLEX) 500 MG capsule  Dispense: 30 capsule; Refill: 0     No follow-ups on file.    Dominick Herbert, SHAUN, PAKENDALL  [facility] ASHLYN Gerardo is a 73 year old female who presents to clinic today for the following health issues:  Chief Complaint   Patient presents with    Urgent  Care    Leg Swelling     Left leg swelling, bruising and hot to the touch onset 2 weeks ago after a dog ran and hit patient on the leg.        HPI  Review of Systems  Constitutional, HEENT, cardiovascular, pulmonary, gi and gu systems are negative, except as otherwise noted.      Objective    BP (!) 146/75 (BP Location: Left arm, Patient Position: Sitting, Cuff Size: Adult Regular)   Pulse 61   Temp 97.6  F (36.4  C) (Tympanic)   Resp 16   Wt 59 kg (130 lb)   SpO2 98%   BMI 21.97 kg/m    Physical Exam   GENERAL: alert and no distress  MS: pos for left anterior leg bruising with swelling  SKIN: pos for warmth and mild erythema across hematoma, tenderness  NEURO: Normal strength and tone, mentation intact and speech normal  PSYCH: mentation appears normal, affect normal/bright        Xray tibia/fibula  Negative for acute findings, pos for old healed fracture  read by Dominick DUNN at time of visit.

## 2024-07-05 ENCOUNTER — ORDERS ONLY (AUTO-RELEASED) (OUTPATIENT)
Dept: FAMILY MEDICINE | Facility: CLINIC | Age: 74
End: 2024-07-05
Payer: COMMERCIAL

## 2024-07-05 ENCOUNTER — VIRTUAL VISIT (OUTPATIENT)
Dept: FAMILY MEDICINE | Facility: CLINIC | Age: 74
End: 2024-07-05
Payer: COMMERCIAL

## 2024-07-05 DIAGNOSIS — I49.8 BRADYARRHYTHMIA: Primary | ICD-10-CM

## 2024-07-05 DIAGNOSIS — I49.8 BRADYARRHYTHMIA: ICD-10-CM

## 2024-07-05 PROCEDURE — 99441 PR PHYSICIAN TELEPHONE EVALUATION 5-10 MIN: CPT | Mod: 93 | Performed by: INTERNAL MEDICINE

## 2024-07-05 NOTE — PROGRESS NOTES
Humaira is a 73 year old who is being evaluated via a billable telephone visit.    What phone number would you like to be contacted at? [phone]  How would you like to obtain your AVS? Guillermo  Originating Location (pt. Location): Home    Distant Location (provider location):  On-site    Assessment & Plan   Problem List Items Addressed This Visit    None  Visit Diagnoses       Bradyarrhythmia    -  Primary    Relevant Orders    ZIO PATCH MAIL OUT           Advised to cut down on metoprolol to 1 tablet once daily continue monitor heart rate blood pressure.  She is not dizzy or lightheaded.  Keep well-hydrated.  Keep monitoring blood pressure.  And will check his 3-day Holter monitor.  All questions answered.       See Patient Instructions      Subjective   Humaira is a 73 year old, presenting for the following health issues:  Hypertension and Results (ekg)        7/5/2024     2:26 PM   Additional Questions   Roomed by Antonia   Accompanied by Not applicable, by themselves     History of Present Illness       Reason for visit:  Blood pressure    She eats 2-3 servings of fruits and vegetables daily.She consumes 2 sweetened beverage(s) daily.She exercises with enough effort to increase her heart rate 30 to 60 minutes per day.  She exercises with enough effort to increase her heart rate 6 days per week.   She is taking medications regularly.     Patient presented for evaluation for bradycardia noticed at the oncologist office.  She has CLL.  Reports her heart rate has been ranging from the 40s to 60s.  She metoprolol  mg 1,5 tablet daily.  Denies any dizziness or fatigue striae      Review of Systems  Constitutional, HEENT, cardiovascular, pulmonary, gi and gu systems are negative, except as otherwise noted.      Objective           Vitals:  No vitals were obtained today due to virtual visit.    Physical Exam   General: Alert and no distress //Respiratory: No audible wheeze, cough, or shortness of breath //  Psychiatric:  Appropriate affect, tone, and pace of words      Lab on 06/05/2024   Component Date Value Ref Range Status    WBC Count 06/05/2024 64.9 (HH)  4.0 - 11.0 10e3/uL Final    RBC Count 06/05/2024 4.98  3.80 - 5.20 10e6/uL Final    Hemoglobin 06/05/2024 15.4  11.7 - 15.7 g/dL Final    Hematocrit 06/05/2024 48.8 (H)  35.0 - 47.0 % Final    MCV 06/05/2024 98  78 - 100 fL Final    MCH 06/05/2024 30.9  26.5 - 33.0 pg Final    MCHC 06/05/2024 31.6  31.5 - 36.5 g/dL Final    RDW 06/05/2024 15.9 (H)  10.0 - 15.0 % Final    Platelet Count 06/05/2024 244  150 - 450 10e3/uL Final    NRBCs per 100 WBC 06/05/2024 0  <1 /100 Final    Absolute NRBCs 06/05/2024 0.0  10e3/uL Final    % Neutrophils 06/05/2024 4  % Final    % Lymphocytes 06/05/2024 96  % Final    Differential done of albumin treated blood due to smudge cells    % Monocytes 06/05/2024 0  % Final    % Eosinophils 06/05/2024 0  % Final    % Basophils 06/05/2024 0  % Final    Absolute Neutrophils 06/05/2024 2.6  1.6 - 8.3 10e3/uL Final    Absolute Lymphocytes 06/05/2024 62.3 (H)  0.8 - 5.3 10e3/uL Final    Absolute Monocytes 06/05/2024 0.0  0.0 - 1.3 10e3/uL Final    Absolute Eosinophils 06/05/2024 0.0  0.0 - 0.7 10e3/uL Final    Absolute Basophils 06/05/2024 0.0  0.0 - 0.2 10e3/uL Final    RBC Morphology 06/05/2024 Confirmed RBC Indices   Final    Platelet Assessment 06/05/2024 Automated Count Confirmed. Platelet morphology is normal.  Automated Count Confirmed. Platelet morphology is normal. Final    Smudge Cells 06/05/2024 Present (A)  None Seen Final         Phone call duration: 10 minutes  Signed Electronically by: Liane Claudio MD

## 2024-07-18 ENCOUNTER — TELEPHONE (OUTPATIENT)
Dept: FAMILY MEDICINE | Facility: CLINIC | Age: 74
End: 2024-07-18
Payer: COMMERCIAL

## 2024-07-18 DIAGNOSIS — R00.2 PALPITATIONS: Primary | ICD-10-CM

## 2024-07-18 DIAGNOSIS — I49.3 PVC'S (PREMATURE VENTRICULAR CONTRACTIONS): ICD-10-CM

## 2024-07-18 PROCEDURE — 93244 EXT ECG>48HR<7D REV&INTERPJ: CPT | Performed by: INTERNAL MEDICINE

## 2024-07-18 NOTE — TELEPHONE ENCOUNTER
See message from PCP below...        [employer] message sent to the patient with the referral information.     Will postpone encounter and ensure patient has read the [employer] message.     Lakeisha Casillas RN

## 2024-07-18 NOTE — TELEPHONE ENCOUNTER
Upon chart review, patient has read the result note above. RN called and spoke to the patient.     Metoprolol: Patient states she is only taking one tablet (100 mg) per day. Patient states she was taken off the additional half tablet because her HR was on the lower side (40s-50s). Since she stopped the half tablet, her heart has increased to 60s-70s.   Patient is willing to see Cardiology. Referral pended for review. Please send patient a [employer] message with the referral information.     Thank you,  Lakeisha Casillas RN

## 2024-07-18 NOTE — RESULT ENCOUNTER NOTE
Evan Gerardo-I reviewed your 3-day Holter monitor/ cardiac monitor that showed frequent PVCs with increased load of 20%.  Are you currently taking metoprolol 100 mg, 1 and half tablet daily that should have helped.  There were no symptoms reported.  Due to the high load of PVCs called premature ventricular contractions, I do recommend that you see cardiology.  I can place a referral if you approve or agree with such.  Any further questions please let me know ,  Dr Claudio

## 2024-07-31 ENCOUNTER — OFFICE VISIT (OUTPATIENT)
Dept: CARDIOLOGY | Facility: CLINIC | Age: 74
End: 2024-07-31
Attending: INTERNAL MEDICINE
Payer: COMMERCIAL

## 2024-07-31 VITALS
HEIGHT: 62 IN | OXYGEN SATURATION: 96 % | HEART RATE: 75 BPM | RESPIRATION RATE: 15 BRPM | DIASTOLIC BLOOD PRESSURE: 77 MMHG | SYSTOLIC BLOOD PRESSURE: 128 MMHG | BODY MASS INDEX: 24.29 KG/M2 | WEIGHT: 132 LBS

## 2024-07-31 DIAGNOSIS — K21.9 GASTROESOPHAGEAL REFLUX DISEASE WITHOUT ESOPHAGITIS: ICD-10-CM

## 2024-07-31 DIAGNOSIS — R00.2 PALPITATIONS: Primary | ICD-10-CM

## 2024-07-31 DIAGNOSIS — E78.5 HYPERLIPIDEMIA WITH TARGET LDL LESS THAN 100: ICD-10-CM

## 2024-07-31 DIAGNOSIS — C91.10 CLL (CHRONIC LYMPHOCYTIC LEUKEMIA) (H): Chronic | ICD-10-CM

## 2024-07-31 DIAGNOSIS — I10 HTN (HYPERTENSION), BENIGN: ICD-10-CM

## 2024-07-31 DIAGNOSIS — I49.3 PVC'S (PREMATURE VENTRICULAR CONTRACTIONS): ICD-10-CM

## 2024-07-31 PROBLEM — J43.9 PULMONARY EMPHYSEMA, UNSPECIFIED EMPHYSEMA TYPE (H): Status: RESOLVED | Noted: 2023-01-12 | Resolved: 2024-07-31

## 2024-07-31 PROCEDURE — G2211 COMPLEX E/M VISIT ADD ON: HCPCS | Performed by: INTERNAL MEDICINE

## 2024-07-31 PROCEDURE — 99215 OFFICE O/P EST HI 40 MIN: CPT | Performed by: INTERNAL MEDICINE

## 2024-07-31 RX ORDER — EZETIMIBE 10 MG/1
TABLET ORAL
Qty: 90 TABLET | Refills: 1 | Status: SHIPPED | OUTPATIENT
Start: 2024-07-31

## 2024-07-31 RX ORDER — METOPROLOL SUCCINATE 100 MG/1
100 TABLET, EXTENDED RELEASE ORAL DAILY
COMMUNITY
Start: 2024-07-31 | End: 2024-09-23

## 2024-07-31 RX ORDER — CHLORTHALIDONE 25 MG/1
25 TABLET ORAL DAILY
Qty: 90 TABLET | Refills: 1 | Status: SHIPPED | OUTPATIENT
Start: 2024-07-31

## 2024-07-31 RX ORDER — PANTOPRAZOLE SODIUM 40 MG/1
40 TABLET, DELAYED RELEASE ORAL DAILY
Qty: 90 TABLET | Refills: 1 | Status: SHIPPED | OUTPATIENT
Start: 2024-07-31

## 2024-07-31 NOTE — PROGRESS NOTES
SERVICE DATE: July 31, 2024      DIAGNOSES/ASSESSMENT:  Frequent PVCs with 20% ectopy burden on previous dosage of metoprolol  mg daily.  Her metoprolol XL has since been cut back to 100 mg daily.  PVCs can occur if excessively beta blocked.  She is asymptomatic.  Will follow-up with repeat heart monitor on the lower dose of metoprolol and an echocardiogram to rule out any dysfunction.  COPD, peripheral arterial disease status post percutaneous intervention and tobacco dependence.  Counseled about tobacco cessation.  Multiple providers doctor about this.  She has smoked since she was a child and grew up in a house with both parents smoking.  She wants to quit, and is trying hard, has cut back.    PLAN:  Agree with low-dose of metoprolol XL 50 mg daily.  In 2 months, repeat heart monitor, echocardiogram and follow-up with YANA.    If Ihigh burden frequent PVCs persist, refer to electrophysiology MD.  Tobacco cessation counseling.      Natalie Durbin MD  Cardiology          REASON FOR VISIT:  Occasions and frequent premature ventricular complexes.    HISTORY OF PRESENT ILLNESS:  Karen Caban is 73 year old female,, referred by her PCP for evaluation of frequent PVCs.  She has treated hypertension, hyperlipidemia, COPD, PAD status post peripheral intervention over a year ago and on DAPT with aspirin and ticagrelor, tobacco dependence, chronic lymphocytic leukemia under oncology surveillance, BMI 24.    Historically, Humaira has been on high-dose metoprolol  mg daily for hypertension management.  No background history of arrhythmia.  Recent provider visit, noted to be bradycardic in the 40s.  EKG in June showed sinus rhythm and ventricular bigeminy.  . Zio patch showed sinus rhythm with an average heart rate of 70 bpm, frequent isolated PVCs of 20%, singly 6 beat run of SVT that is not clinically significant. Dr Claudio appropriately advised her to cut back on metoprolol  mg daily and  "referred her to cardiology.  Patient states that on the lower dose of metoprolol, she has been monitoring her pulse and it has increased to the 70s.  She has no symptoms of palpitations or new dyspnea.  No chest pain.    Last echocardiogram in 8/2023 showed normal LVEF of 60%, normal right ventricular systolic function, mildly dilated left atrium, no significant valve disease. Nuclear stress testing in 2022 was negative for ischemia or infarct.  Labs - normal renal panel with a creatinine of 0.6, potassium 4.4, hemoglobin 15.4, normal platelets.  No recent TSH.  LDL is 56.    On exam /77, pulse 75, weight is 132 pounds, regular heart sounds with infrequent skipped beats, no murmur, normal JVP.      New patient.  45 minutes spent by me on the date of the encounter doing chart review, history and exam, documentation and further activities per the note    The longitudinal plan of care for the condition(s) below were addressed during this visit. Due to the added complexity in care, I will continue to support Humaira in the subsequent management of this condition(s) and with the ongoing continuity of care of this condition(s).  Frequent PVCs.    This note was completed in part using dictation via the Dragon voice recognition software. Some word and grammatical errors may occur and must be interpreted in the appropriate clinical context. If there are any questions pertaining to this issue, please contact me for further clarification.     Orders Placed This Encounter   Procedures    TSH with free T4 reflex    Follow-Up with Cardiology YANA    Echocardiogram Complete         Vitals: /77   Pulse 75   Resp 15   Ht 1.575 m (5' 2\")   Wt 59.9 kg (132 lb)   SpO2 96%   BMI 24.14 kg/m        CURRENT MEDICATIONS:  Current Outpatient Medications   Medication Sig Dispense Refill    Ascorbic Acid (VITAMIN C GUMMIE PO) Take 2 chew tab by mouth daily      aspirin (ASA) 81 MG chewable tablet Take 1 tablet (81 mg) by mouth " daily 90 tablet 11    atorvastatin (LIPITOR) 40 MG tablet Take 1 tablet (40 mg) by mouth daily 90 tablet 3    chlorthalidone (HYGROTON) 25 MG tablet TAKE 1 TABLET(25 MG) BY MOUTH DAILY 90 tablet 1    Cholecalciferol (VITAMIN D-3) 5000 UNIT TABS Take 1 tablet by mouth daily.      coenzyme Q-10 200 MG CAPS Take 200 mg by mouth daily      Cranberry-Vitamin C-Vitamin E (CRANBERRY PLUS VITAMIN C) 4200-20-3 MG-MG-UNIT CAPS Take 2 tablets by mouth daily      Cyanocobalamin (B-12 PO) Take 5,000 mcg by mouth every other day Liquid form 100 tablet 1    ezetimibe (ZETIA) 10 MG tablet TAKE 1 TABLET(10 MG) BY MOUTH DAILY 90 tablet 1    fluticasone (FLONASE) 50 MCG/ACT nasal spray Spray 1-2 sprays in nostril daily      ipratropium-albuterol (COMBIVENT RESPIMAT)  MCG/ACT inhaler Inhale 1 puff into the lungs every 6 hours as needed for shortness of breath, wheezing or cough 4 g 11    Lactobacillus (PROBIOTIC ACIDOPHILUS PO) Take 1 capsule by mouth daily      metoprolol succinate ER (TOPROL XL) 100 MG 24 hr tablet Take 1 tablet (100 mg) by mouth daily      Multiple Vitamins-Minerals (HAIR SKIN & NAILS ADVANCED PO) Take 1 tablet by mouth daily      nicotine (COMMIT) 2 MG lozenge Place 2 mg inside cheek every hour as needed for smoking cessation      nicotine (NICODERM CQ) 14 MG/24HR 24 hr patch Place 1 patch onto the skin every 24 hours      nitroGLYcerin (NITROSTAT) 0.4 MG sublingual tablet For chest pain place 1 tablet under the tongue every 5 minutes for 3 doses. If symptoms persist 5 minutes after 1st dose call 911. 20 tablet 0    pantoprazole (PROTONIX) 40 MG EC tablet TAKE 1 TABLET(40 MG) BY MOUTH DAILY 90 tablet 1    Sodium Chloride-Xylitol (XLEAR SINUS CARE SPRAY NA) Spray 1 spray in nostril daily as needed      ticagrelor (BRILINTA) 60 MG tablet Take 1 tablet (60 mg) by mouth daily 90 tablet 2    tiotropium-olodaterol (STIOLTO RESPIMAT) 2.5-2.5 MCG/ACT AERS Inhale 2 puffs into the lungs daily 12 g 3          ALLERGIES:  Allergies   Allergen Reactions    Chantix [Varenicline] Nausea    Ciprofloxacin Other (See Comments)     hypertension    Clopidogrel      Other reaction(s): Hypertension    Decongestant [Pseudoephedrine Hcl Er]     Erythromycin Nausea    Lisinopril      cough    Plavix [Clopidogrel Bisulfate]      Felt as if she was having a heart attack    Rofecoxib Unknown    Vioxx      Increased BP

## 2024-08-03 ENCOUNTER — OFFICE VISIT (OUTPATIENT)
Dept: URGENT CARE | Facility: URGENT CARE | Age: 74
End: 2024-08-03
Payer: COMMERCIAL

## 2024-08-03 VITALS
RESPIRATION RATE: 15 BRPM | BODY MASS INDEX: 24.14 KG/M2 | OXYGEN SATURATION: 97 % | WEIGHT: 132 LBS | TEMPERATURE: 97 F | DIASTOLIC BLOOD PRESSURE: 71 MMHG | HEART RATE: 75 BPM | SYSTOLIC BLOOD PRESSURE: 149 MMHG

## 2024-08-03 DIAGNOSIS — N30.01 ACUTE CYSTITIS WITH HEMATURIA: ICD-10-CM

## 2024-08-03 DIAGNOSIS — R30.0 DYSURIA: Primary | ICD-10-CM

## 2024-08-03 LAB
BACTERIA #/AREA URNS HPF: ABNORMAL /HPF
RBC #/AREA URNS AUTO: ABNORMAL /HPF
SQUAMOUS #/AREA URNS AUTO: ABNORMAL /LPF
WBC #/AREA URNS AUTO: ABNORMAL /HPF

## 2024-08-03 PROCEDURE — 99213 OFFICE O/P EST LOW 20 MIN: CPT | Performed by: FAMILY MEDICINE

## 2024-08-03 PROCEDURE — 87088 URINE BACTERIA CULTURE: CPT | Performed by: FAMILY MEDICINE

## 2024-08-03 PROCEDURE — 81015 MICROSCOPIC EXAM OF URINE: CPT

## 2024-08-03 PROCEDURE — 87086 URINE CULTURE/COLONY COUNT: CPT | Performed by: FAMILY MEDICINE

## 2024-08-03 PROCEDURE — 87186 SC STD MICRODIL/AGAR DIL: CPT | Performed by: FAMILY MEDICINE

## 2024-08-03 RX ORDER — CEPHALEXIN 500 MG/1
500 CAPSULE ORAL 2 TIMES DAILY
Qty: 10 CAPSULE | Refills: 0 | Status: SHIPPED | OUTPATIENT
Start: 2024-08-03 | End: 2024-08-08

## 2024-08-03 NOTE — PROGRESS NOTES
Chief Complaint   Patient presents with    Urgent Care     UTI        Humaira was seen today for urgent care.    Diagnoses and all orders for this visit:    Dysuria  -     Cancel: UA Macroscopic with reflex to Microscopic and Culture - Clinic Collect  -     UA Microscopic with Reflex to Culture  -     Urine Culture    Acute cystitis with hematuria  -     cephALEXin (KEFLEX) 500 MG capsule; Take 1 capsule (500 mg) by mouth 2 times daily for 5 days        ASSESSMENT: UTI uncomplicated without evidence of pyelonephritis    PLAN: Treatment per orders - also push fluids, may use Pyridium OTC prn. Call or return to clinic prn if these symptoms worsen or fail to improve as anticipated.    SUBJECTIVE: Karen Caban is a 73 year old female who complains of urinary frequency, urgency and dysuria x 1 days, without flank pain, fever, chills, or abnormal vaginal discharge or bleeding.     OBJECTIVE: Appears well, in no apparent distress.  Vital signs are normal. The abdomen is soft without tenderness, guarding, mass, rebound or organomegaly. No CVA tenderness or inguinal adenopathy noted. Urine dipstick shows   Results for orders placed or performed in visit on 08/03/24   UA Microscopic with Reflex to Culture     Status: Abnormal   Result Value Ref Range    Bacteria Urine Many (A) None Seen /HPF    RBC Urine 0-2 0-2 /HPF /HPF    WBC Urine  (A) 0-5 /HPF /HPF    Squamous Epithelials Urine Few (A) None Seen /LPF       Tiffanie Perea MD

## 2024-08-05 LAB — BACTERIA UR CULT: ABNORMAL

## 2024-08-07 ENCOUNTER — ONCOLOGY VISIT (OUTPATIENT)
Dept: ONCOLOGY | Facility: CLINIC | Age: 74
End: 2024-08-07
Attending: INTERNAL MEDICINE
Payer: COMMERCIAL

## 2024-08-07 VITALS
RESPIRATION RATE: 16 BRPM | WEIGHT: 130 LBS | HEART RATE: 75 BPM | TEMPERATURE: 97.6 F | SYSTOLIC BLOOD PRESSURE: 132 MMHG | DIASTOLIC BLOOD PRESSURE: 74 MMHG | BODY MASS INDEX: 23.78 KG/M2 | OXYGEN SATURATION: 96 %

## 2024-08-07 DIAGNOSIS — N95.2 ATROPHIC VAGINITIS: Primary | ICD-10-CM

## 2024-08-07 PROCEDURE — 99213 OFFICE O/P EST LOW 20 MIN: CPT | Performed by: OBSTETRICS & GYNECOLOGY

## 2024-08-07 PROCEDURE — G0463 HOSPITAL OUTPT CLINIC VISIT: HCPCS | Performed by: OBSTETRICS & GYNECOLOGY

## 2024-08-07 RX ORDER — ESTRADIOL 0.1 MG/G
2 CREAM VAGINAL
Qty: 42.5 G | Refills: 1 | Status: SHIPPED | OUTPATIENT
Start: 2024-08-08

## 2024-08-07 ASSESSMENT — PAIN SCALES - GENERAL: PAINLEVEL: NO PAIN (0)

## 2024-08-07 NOTE — LETTER
"2024      Karen Caban  [address]  [place] [address]      Dear Colleague,    Thank you for referring your patient, Karen Caban, to the [facility]. Please see a copy of my visit note below.    Oncology Rooming Note    2024 1:41 PM   Karen Caban is a 73 year old female who presents for:    Chief Complaint   Patient presents with     Oncology Clinic Visit     Initial Vitals: /74   Pulse 75   Temp 97.6  F (36.4  C) (Oral)   Resp 16   Wt 59 kg (130 lb)   SpO2 96%   BMI 23.78 kg/m   Estimated body mass index is 23.78 kg/m  as calculated from the following:    Height as of 24: 1.575 m (5' 2\").    Weight as of this encounter: 59 kg (130 lb). Body surface area is 1.61 meters squared.  No Pain (0) Comment: Data Unavailable   No LMP recorded. Patient is postmenopausal.  Allergies reviewed: Yes  Medications reviewed: Yes    Medications: Medication refills not needed today.  Pharmacy name entered into [facility]: [id number] - [place], [place] - [address]    Frailty Screening:   Is the patient here for a new oncology consult visit in cancer care? 2. No      Clinical concerns: no       Shari J. Schoenberger, CMA              GYNECOLOGIC  ONCOLOGY Followup    Referring provider:    To Shipley MD  [facility] [place] [address]   RE: Karen Caban  : 1950  STEPHANIE: 2024     CC: Ovarian cysts, endometrial polyp     HPI: Ms Karen Caban is a 73 year old female who presents for followup.     Her past medical history is notable for PMHx of peripheral artery disease s/p fem pop bypass, chronic back pain, HTN, smoking history with emphysema.   Diagnosis of chronic lymphocytic leukemia not currently on treatment but on observation and follow up by Dr. Osorio.  Prior left ovary removal for cyst at age of 26.    She presented to her primary care clinic with report of bladder pressure " in early 2022. Since then the bladder pressure resolved. She was referred to OB/GYn who performed a CT and TVUS. Endometrial biopsy in may of 2022 showed scant benign tissue.  Denies any pelvic pain, no vaginal bleeding or discharge, no urinary concerns.Repeat biopsy with my partner 2022 showed a benign endometrial polpy. 2022 I did a D&C, hysteroscopy with final pathology showing a benign polyp. She had a recent pelvic US in early  which redemonstrared ovarian cysts (stable on my review) and showed a 7mm endometrial stripe.     She is having recurrent UTIs. SHe notes vaginal dryness.       OBGYN history and Health Maintenance:    Last Pap Smear:   negative  Last Mammogram: 2024 Benign BI-RADS 2  Last Colonoscopy:  , diverticulosis, benign polyp      Past Medical History:   Diagnosis Date     Ankle fracture     L     Anxiety      Chronic back pain      Chronic lymphocytic leukemia (H)      Colon polyp 2012    repeat colonoscopy 5 years.     Fx low femur epiphy-closed (H)      GERD (gastroesophageal reflux disease)      History of UTI     Cysto by Dr Agustin neg     HTN (hypertension), benign      Hyperlipidemia LDL goal < 100      Mumps      Normal nuclear stress test 2009    EF 67%     Osteopenia      PAD (peripheral artery disease) (H24)     s/p fem pop bypass; embolectomy     Polycythemia vera (H) 06/15/2022     PONV (postoperative nausea and vomiting)      PUD (peptic ulcer disease)     DU     Pulmonary emphysema, unspecified emphysema type (H) 2023     Smoker      Spider veins      Vitamin D deficiency        Past Surgical History:   Procedure Laterality Date     ABDOMEN SURGERY       ANGIOGRAM Right 2022    Procedure: ANGIOGRAM AORTO ILIAC ANGIOGRAM;  Surgeon: Shaun Tran MD;  Location: [facility] OR     BIOPSY       Blood clot removal from Stent           BONE MARROW BIOPSY, BONE SPECIMEN, NEEDLE/TROCAR N/A 2021    Procedure: bone  marrow biopsy;  Surgeon: Kailey Cota MD;  Location:  GI     BYPASS GRAFT AORTOFEMORAL  05/2002    Dr. Turner     BYPASS GRAFT FEMOROPOPLITEAL  12/16/2011     COLONOSCOPY N/A 01/18/2018    Procedure: COMBINED COLONOSCOPY, SINGLE OR MULTIPLE BIOPSY/POLYPECTOMY BY BIOPSY;  COLONOSCOPY;  Surgeon: Efrain Hernadez MD;  Location:  GI     COLONOSCOPY N/A 03/29/2023    Procedure: COLONOSCOPY, FLEXIBLE, WITH LESION REMOVAL USING SNARE;  Surgeon: Jony Manriquez MD;  Location:  GI     DILATION AND CURETTAGE, OPERATIVE HYSTEROSCOPY, COMBINED N/A 09/15/2022    Procedure: HYSTEROSCOPY, WITH DILATION AND CURETTAGE OF UTERUS;  Surgeon: Destiney Schaefer MD;  Location:  OR     EMBOLECTOMY LOWER EXTREMITY  12/16/2011    Procedure:EMBOLECTOMY LOWER EXTREMITY; EMBOLECTOMY, RIGHT POPLITEAL WITH PATCH ANGIOPLASTY. EXCISION SKIN LESION RIGHT LEG. ; Surgeon:PARMINDER VELAZCO; Location: OR     ENDARTERECTOMY FEMORAL Right 11/18/2022    Procedure: ENDARTERECTOMY, FEMORAL RIGHT FEMORAL CUTDOWN, RIGHT ILIAC ARTERY STENTING.;  Surgeon: Shaun Tran MD;  Location:  OR     ESOPHAGOSCOPY, GASTROSCOPY, DUODENOSCOPY (EGD), COMBINED N/A 10/07/2022    Procedure: ESOPHAGOGASTRODUODENOSCOPY, WITH BIOPSY;  Surgeon: Felix Carmona MD;  Location:  GI     EXCISE LESION LOWER EXTREMITY  12/16/2011    Procedure:EXCISE LESION LOWER EXTREMITY; Surgeon:PARMINDER VELAZCO; Location: OR     HERNIA REPAIR  11/04/2008    x2 umbilical     IR OR ANGIOGRAM  11/18/2022     L achilles repair  12/04/2008     LAPAROSCOPIC OOPHORECTOMY Left     L for cyst age 26     miscarriages x 3       tubal ligation and reversal            Current Outpatient Medications   Medication Sig Dispense Refill     Ascorbic Acid (VITAMIN C GUMMIE PO) Take 2 chew tab by mouth daily       aspirin (ASA) 81 MG chewable tablet Take 1 tablet (81 mg) by mouth daily 90 tablet 11     atorvastatin (LIPITOR) 40 MG tablet Take 1 tablet (40 mg) by  mouth daily 90 tablet 3     cephALEXin (KEFLEX) 500 MG capsule Take 1 capsule (500 mg) by mouth 2 times daily for 5 days 10 capsule 0     chlorthalidone (HYGROTON) 25 MG tablet TAKE 1 TABLET(25 MG) BY MOUTH DAILY 90 tablet 1     Cholecalciferol (VITAMIN D-3) 5000 UNIT TABS Take 1 tablet by mouth daily.       coenzyme Q-10 200 MG CAPS Take 200 mg by mouth daily       Cranberry-Vitamin C-Vitamin E (CRANBERRY PLUS VITAMIN C) 4200-20-3 MG-MG-UNIT CAPS Take 2 tablets by mouth daily       Cyanocobalamin (B-12 PO) Take 5,000 mcg by mouth every other day Liquid form 100 tablet 1     ezetimibe (ZETIA) 10 MG tablet TAKE 1 TABLET(10 MG) BY MOUTH DAILY 90 tablet 1     fluticasone (FLONASE) 50 MCG/ACT nasal spray Spray 1-2 sprays in nostril daily       ipratropium-albuterol (COMBIVENT RESPIMAT)  MCG/ACT inhaler Inhale 1 puff into the lungs every 6 hours as needed for shortness of breath, wheezing or cough 4 g 11     Lactobacillus (PROBIOTIC ACIDOPHILUS PO) Take 1 capsule by mouth daily       metoprolol succinate ER (TOPROL XL) 100 MG 24 hr tablet Take 1 tablet (100 mg) by mouth daily       Multiple Vitamins-Minerals (HAIR SKIN & NAILS ADVANCED PO) Take 1 tablet by mouth daily       nicotine (COMMIT) 2 MG lozenge Place 2 mg inside cheek every hour as needed for smoking cessation       nicotine (NICODERM CQ) 14 MG/24HR 24 hr patch Place 1 patch onto the skin every 24 hours       nitroGLYcerin (NITROSTAT) 0.4 MG sublingual tablet For chest pain place 1 tablet under the tongue every 5 minutes for 3 doses. If symptoms persist 5 minutes after 1st dose call 911. 20 tablet 0     pantoprazole (PROTONIX) 40 MG EC tablet TAKE 1 TABLET(40 MG) BY MOUTH DAILY 90 tablet 1     Sodium Chloride-Xylitol (XLEAR SINUS CARE SPRAY NA) Spray 1 spray in nostril daily as needed       ticagrelor (BRILINTA) 60 MG tablet Take 1 tablet (60 mg) by mouth daily 90 tablet 2     tiotropium-olodaterol (STIOLTO RESPIMAT) 2.5-2.5 MCG/ACT AERS Inhale 2 puffs  "into the lungs daily 12 g 3         Allergies   Allergen Reactions     Chantix [Varenicline] Nausea     Ciprofloxacin Other (See Comments)     hypertension     Clopidogrel      Other reaction(s): Hypertension     Decongestant [Pseudoephedrine Hcl Er]      Erythromycin Nausea     Lisinopril      cough     Plavix [Clopidogrel Bisulfate]      Felt as if she was having a heart attack     Rofecoxib Unknown     Vioxx      Increased BP       Social History:  Social History     Tobacco Use     Smoking status: Every Day     Current packs/day: 0.25     Average packs/day: 0.3 packs/day for 50.0 years (12.5 ttl pk-yrs)     Types: Cigarettes     Smokeless tobacco: Never     Tobacco comments:     Nicotine patch and a cigarette \"once in awhile.\"   Substance Use Topics     Alcohol use: Yes     Comment: Beer once in a while       Family History:   The patient's family history is notable for   Family History   Problem Relation Age of Onset     Alzheimer Disease Mother          of panc/GI ca age 83     Osteoporosis Mother      Other Cancer Mother      Cancer Father          age 64 lymphoma     Hyperlipidemia Father      Depression Father         None     Colon Cancer Maternal Grandfather      Hypertension Sister          Physical Exam:     /74   Pulse 75   Temp 97.6  F (36.4  C) (Oral)   Resp 16   Wt 59 kg (130 lb)   SpO2 96%   BMI 23.78 kg/m    Body mass index is 23.78 kg/m .    General: Alert and oriented, no acute distress  Psych: Mood stable       Pelvic US 24  (Personally reviewed today,  2024)      IMPRESSION:      1. Endometrial thickness measures 7 mm. No discrete lesion.     2. Since 2022 similar to minimally increased in size right  paraovarian simple cyst, and left adnexal simple cyst.     3. No acute findings within the pelvis.     ANEL MARSH MD     Assessment: Karen Caban is a 73 year old woman with ovarian cysts and thickened endometrial stripe. Multiple vascular " comorbidities.       Plan:     1.) Thickened stripe. 7mm. No bleeding. Evaluated thoroughly in 2022. I have very low concerns for any malignancy.     - Recommend follow-up only if she has pelvic discomfort or bleeding      2. ) complex pelvic mass: Very stable ovarian cysts. Very low risk of malignancy    - TVUS only if she has pelvic symptoms    Followup GYN ONC PRN only        Total time today was 15 minutes which includes reviewing imaging, counseling, documentation.        Destiney Schaefer MD  Gynecologic Oncology  Pager [phone]      Again, thank you for allowing me to participate in the care of your patient.        Sincerely,        Destiney Schaefer MD

## 2024-08-07 NOTE — PROGRESS NOTES
GYNECOLOGIC  ONCOLOGY Followup    Referring provider:    To Shipley MD  [address] [place] [address]   RE: Karen Caban  : 1950  STEPHANIE: 2024     CC: Ovarian cysts, endometrial polyp     HPI: Ms Karen Caban is a 73 year old female who presents for followup.     Her past medical history is notable for PMHx of peripheral artery disease s/p fem pop bypass, chronic back pain, HTN, smoking history with emphysema.   Diagnosis of chronic lymphocytic leukemia not currently on treatment but on observation and follow up by Dr. Osorio.  Prior left ovary removal for cyst at age of 26.    She presented to her primary care clinic with report of bladder pressure in early 2022. Since then the bladder pressure resolved. She was referred to OB/GYn who performed a CT and TVUS. Endometrial biopsy in may of 2022 showed scant benign tissue.  Denies any pelvic pain, no vaginal bleeding or discharge, no urinary concerns.Repeat biopsy with my partner 2022 showed a benign endometrial polpy. 2022 I did a D&C, hysteroscopy with final pathology showing a benign polyp. She had a recent pelvic US in early  which redemonstrared ovarian cysts (stable on my review) and showed a 7mm endometrial stripe.     She is having recurrent UTIs. SHe notes vaginal dryness.       OBGYN history and Health Maintenance:    Last Pap Smear:   negative  Last Mammogram: 2024 Benign BI-RADS 2  Last Colonoscopy:  , diverticulosis, benign polyp      Past Medical History:   Diagnosis Date    Ankle fracture     L    Anxiety     Chronic back pain     Chronic lymphocytic leukemia (H)     Colon polyp 2012    repeat colonoscopy 5 years.    Fx low femur epiphy-closed (H)     GERD (gastroesophageal reflux disease)     History of UTI 2017    Cysto by Dr Agustin neg    HTN (hypertension), benign     Hyperlipidemia LDL goal < 100     Mumps     Normal nuclear stress test 2009    EF 67%    Osteopenia      PAD (peripheral artery disease) (H24)     s/p fem pop bypass; embolectomy    Polycythemia vera (H) 06/15/2022    PONV (postoperative nausea and vomiting)     PUD (peptic ulcer disease) 1980s    DU    Pulmonary emphysema, unspecified emphysema type (H) 01/12/2023    Smoker     Spider veins     Vitamin D deficiency        Past Surgical History:   Procedure Laterality Date    ABDOMEN SURGERY      ANGIOGRAM Right 11/18/2022    Procedure: ANGIOGRAM AORTO ILIAC ANGIOGRAM;  Surgeon: Shaun Tran MD;  Location:  OR    BIOPSY      Blood clot removal from Stent      2011    BONE MARROW BIOPSY, BONE SPECIMEN, NEEDLE/TROCAR N/A 02/02/2021    Procedure: bone marrow biopsy;  Surgeon: Kailey Cota MD;  Location:  GI    BYPASS GRAFT AORTOFEMORAL  05/2002    Dr. Turner    BYPASS GRAFT FEMOROPOPLITEAL  12/16/2011    COLONOSCOPY N/A 01/18/2018    Procedure: COMBINED COLONOSCOPY, SINGLE OR MULTIPLE BIOPSY/POLYPECTOMY BY BIOPSY;  COLONOSCOPY;  Surgeon: Efrain Hernadez MD;  Location:  GI    COLONOSCOPY N/A 03/29/2023    Procedure: COLONOSCOPY, FLEXIBLE, WITH LESION REMOVAL USING SNARE;  Surgeon: Jony Manriquez MD;  Location:  GI    DILATION AND CURETTAGE, OPERATIVE HYSTEROSCOPY, COMBINED N/A 09/15/2022    Procedure: HYSTEROSCOPY, WITH DILATION AND CURETTAGE OF UTERUS;  Surgeon: Destiney Schaefer MD;  Location:  OR    EMBOLECTOMY LOWER EXTREMITY  12/16/2011    Procedure:EMBOLECTOMY LOWER EXTREMITY; EMBOLECTOMY, RIGHT POPLITEAL WITH PATCH ANGIOPLASTY. EXCISION SKIN LESION RIGHT LEG. ; Surgeon:PARMINDER VELAZCO; Location: OR    ENDARTERECTOMY FEMORAL Right 11/18/2022    Procedure: ENDARTERECTOMY, FEMORAL RIGHT FEMORAL CUTDOWN, RIGHT ILIAC ARTERY STENTING.;  Surgeon: Shaun Tran MD;  Location:  OR    ESOPHAGOSCOPY, GASTROSCOPY, DUODENOSCOPY (EGD), COMBINED N/A 10/07/2022    Procedure: ESOPHAGOGASTRODUODENOSCOPY, WITH BIOPSY;  Surgeon: Felix Carmona MD;  Location:  GI     EXCISE LESION LOWER EXTREMITY  12/16/2011    Procedure:EXCISE LESION LOWER EXTREMITY; Surgeon:PARMINDER VELAZCO; Location:[facility] OR    HERNIA REPAIR  11/04/2008    x2 umbilical    IR OR ANGIOGRAM  11/18/2022    L achilles repair  12/04/2008    LAPAROSCOPIC OOPHORECTOMY Left     L for cyst age 26    miscarriages x 3      tubal ligation and reversal            Current Outpatient Medications   Medication Sig Dispense Refill    Ascorbic Acid (VITAMIN C GUMMIE PO) Take 2 chew tab by mouth daily      aspirin (ASA) 81 MG chewable tablet Take 1 tablet (81 mg) by mouth daily 90 tablet 11    atorvastatin (LIPITOR) 40 MG tablet Take 1 tablet (40 mg) by mouth daily 90 tablet 3    cephALEXin (KEFLEX) 500 MG capsule Take 1 capsule (500 mg) by mouth 2 times daily for 5 days 10 capsule 0    chlorthalidone (HYGROTON) 25 MG tablet TAKE 1 TABLET(25 MG) BY MOUTH DAILY 90 tablet 1    Cholecalciferol (VITAMIN D-3) 5000 UNIT TABS Take 1 tablet by mouth daily.      coenzyme Q-10 200 MG CAPS Take 200 mg by mouth daily      Cranberry-Vitamin C-Vitamin E (CRANBERRY PLUS VITAMIN C) 4200-20-3 MG-MG-UNIT CAPS Take 2 tablets by mouth daily      Cyanocobalamin (B-12 PO) Take 5,000 mcg by mouth every other day Liquid form 100 tablet 1    ezetimibe (ZETIA) 10 MG tablet TAKE 1 TABLET(10 MG) BY MOUTH DAILY 90 tablet 1    fluticasone (FLONASE) 50 MCG/ACT nasal spray Spray 1-2 sprays in nostril daily      ipratropium-albuterol (COMBIVENT RESPIMAT)  MCG/ACT inhaler Inhale 1 puff into the lungs every 6 hours as needed for shortness of breath, wheezing or cough 4 g 11    Lactobacillus (PROBIOTIC ACIDOPHILUS PO) Take 1 capsule by mouth daily      metoprolol succinate ER (TOPROL XL) 100 MG 24 hr tablet Take 1 tablet (100 mg) by mouth daily      Multiple Vitamins-Minerals (HAIR SKIN & NAILS ADVANCED PO) Take 1 tablet by mouth daily      nicotine (COMMIT) 2 MG lozenge Place 2 mg inside cheek every hour as needed for smoking cessation      nicotine  "(NICODERM CQ) 14 MG/24HR 24 hr patch Place 1 patch onto the skin every 24 hours      nitroGLYcerin (NITROSTAT) 0.4 MG sublingual tablet For chest pain place 1 tablet under the tongue every 5 minutes for 3 doses. If symptoms persist 5 minutes after 1st dose call 911. 20 tablet 0    pantoprazole (PROTONIX) 40 MG EC tablet TAKE 1 TABLET(40 MG) BY MOUTH DAILY 90 tablet 1    Sodium Chloride-Xylitol (XLEAR SINUS CARE SPRAY NA) Spray 1 spray in nostril daily as needed      ticagrelor (BRILINTA) 60 MG tablet Take 1 tablet (60 mg) by mouth daily 90 tablet 2    tiotropium-olodaterol (STIOLTO RESPIMAT) 2.5-2.5 MCG/ACT AERS Inhale 2 puffs into the lungs daily 12 g 3         Allergies   Allergen Reactions    Chantix [Varenicline] Nausea    Ciprofloxacin Other (See Comments)     hypertension    Clopidogrel      Other reaction(s): Hypertension    Decongestant [Pseudoephedrine Hcl Er]     Erythromycin Nausea    Lisinopril      cough    Plavix [Clopidogrel Bisulfate]      Felt as if she was having a heart attack    Rofecoxib Unknown    Vioxx      Increased BP       Social History:  Social History     Tobacco Use    Smoking status: Every Day     Current packs/day: 0.25     Average packs/day: 0.3 packs/day for 50.0 years (12.5 ttl pk-yrs)     Types: Cigarettes    Smokeless tobacco: Never    Tobacco comments:     Nicotine patch and a cigarette \"once in awhile.\"   Substance Use Topics    Alcohol use: Yes     Comment: Beer once in a while       Family History:   The patient's family history is notable for   Family History   Problem Relation Age of Onset    Alzheimer Disease Mother          of panc/GI ca age 83    Osteoporosis Mother     Other Cancer Mother     Cancer Father          age 64 lymphoma    Hyperlipidemia Father     Depression Father         None    Colon Cancer Maternal Grandfather     Hypertension Sister          Physical Exam:     /74   Pulse 75   Temp 97.6  F (36.4  C) (Oral)   Resp 16   Wt 59 kg (130 lb) "   SpO2 96%   BMI 23.78 kg/m    Body mass index is 23.78 kg/m .    General: Alert and oriented, no acute distress  Psych: Mood stable       Pelvic US 2/6/24  (Personally reviewed today,  8/7/2024)      IMPRESSION:      1. Endometrial thickness measures 7 mm. No discrete lesion.     2. Since 7/18/2022 similar to minimally increased in size right  paraovarian simple cyst, and left adnexal simple cyst.     3. No acute findings within the pelvis.     ANEL MARSH MD     Assessment: Karen Caban is a 73 year old woman with ovarian cysts and thickened endometrial stripe. Multiple vascular comorbidities.       Plan:     1.) Thickened stripe. 7mm. No bleeding. Evaluated thoroughly in 2022. I have very low concerns for any malignancy.     - Recommend follow-up only if she has pelvic discomfort or bleeding      2. ) complex pelvic mass: Very stable ovarian cysts. Very low risk of malignancy    - TVUS only if she has pelvic symptoms    Followup GYN ONC PRN only        Total time today was 15 minutes which includes reviewing imaging, counseling, documentation.        Destiney Schaefer MD  Gynecologic Oncology  Pager [phone]

## 2024-08-07 NOTE — PROGRESS NOTES
"Oncology Rooming Note    August 7, 2024 1:41 PM   Karen Caban is a 73 year old female who presents for:    Chief Complaint   Patient presents with    Oncology Clinic Visit     Initial Vitals: /74   Pulse 75   Temp 97.6  F (36.4  C) (Oral)   Resp 16   Wt 59 kg (130 lb)   SpO2 96%   BMI 23.78 kg/m   Estimated body mass index is 23.78 kg/m  as calculated from the following:    Height as of 7/31/24: 1.575 m (5' 2\").    Weight as of this encounter: 59 kg (130 lb). Body surface area is 1.61 meters squared.  No Pain (0) Comment: Data Unavailable   No LMP recorded. Patient is postmenopausal.  Allergies reviewed: Yes  Medications reviewed: Yes    Medications: Medication refills not needed today.  Pharm[facility] entered into [employer]: [id number] [place], [place] - [address]    Frailty Screening:   Is the patient here for a new oncology consult visit in cancer care? 2. No      Clinical concerns: no       Shari J. Schoenberger, CMA            "

## 2024-08-14 DIAGNOSIS — R00.2 PALPITATIONS: Primary | ICD-10-CM

## 2024-08-14 RX ORDER — METOPROLOL SUCCINATE 50 MG/1
50 TABLET, EXTENDED RELEASE ORAL DAILY
Qty: 90 TABLET | Refills: 1 | Status: SHIPPED | OUTPATIENT
Start: 2024-08-14

## 2024-08-25 ENCOUNTER — OFFICE VISIT (OUTPATIENT)
Dept: URGENT CARE | Facility: URGENT CARE | Age: 74
End: 2024-08-25
Payer: COMMERCIAL

## 2024-08-25 VITALS
DIASTOLIC BLOOD PRESSURE: 67 MMHG | BODY MASS INDEX: 23.41 KG/M2 | HEART RATE: 62 BPM | SYSTOLIC BLOOD PRESSURE: 129 MMHG | WEIGHT: 128 LBS | RESPIRATION RATE: 16 BRPM | TEMPERATURE: 98 F | OXYGEN SATURATION: 98 %

## 2024-08-25 DIAGNOSIS — N39.0 URINARY TRACT INFECTION WITHOUT HEMATURIA, SITE UNSPECIFIED: Primary | ICD-10-CM

## 2024-08-25 DIAGNOSIS — R30.0 DYSURIA: ICD-10-CM

## 2024-08-25 LAB
ALBUMIN UR-MCNC: ABNORMAL MG/DL
APPEARANCE UR: CLEAR
BACTERIA #/AREA URNS HPF: ABNORMAL /HPF
BILIRUB UR QL STRIP: NEGATIVE
COLOR UR AUTO: YELLOW
GLUCOSE UR STRIP-MCNC: NEGATIVE MG/DL
HGB UR QL STRIP: ABNORMAL
KETONES UR STRIP-MCNC: NEGATIVE MG/DL
LEUKOCYTE ESTERASE UR QL STRIP: ABNORMAL
NITRATE UR QL: POSITIVE
PH UR STRIP: 7 [PH] (ref 5–7)
RBC #/AREA URNS AUTO: ABNORMAL /HPF
SP GR UR STRIP: 1.01 (ref 1–1.03)
UROBILINOGEN UR STRIP-ACNC: 0.2 E.U./DL
WBC #/AREA URNS AUTO: ABNORMAL /HPF

## 2024-08-25 PROCEDURE — 81001 URINALYSIS AUTO W/SCOPE: CPT

## 2024-08-25 PROCEDURE — 87086 URINE CULTURE/COLONY COUNT: CPT | Performed by: FAMILY MEDICINE

## 2024-08-25 PROCEDURE — 99213 OFFICE O/P EST LOW 20 MIN: CPT | Performed by: FAMILY MEDICINE

## 2024-08-25 PROCEDURE — 87186 SC STD MICRODIL/AGAR DIL: CPT | Performed by: FAMILY MEDICINE

## 2024-08-25 RX ORDER — CEFDINIR 300 MG/1
300 CAPSULE ORAL 2 TIMES DAILY
Qty: 10 CAPSULE | Refills: 0 | Status: SHIPPED | OUTPATIENT
Start: 2024-08-25 | End: 2024-08-30

## 2024-08-25 ASSESSMENT — PAIN SCALES - GENERAL: PAINLEVEL: NO PAIN (0)

## 2024-08-25 NOTE — PROGRESS NOTES
"SUBJECTIVE: Karen Caban is a 73 year old female who  presents today for a possible UTI.   Symptoms of dysuria and frequency have been going on for 5day(s).    Hematuria no.  still present    Past Medical History:   Diagnosis Date    Ankle fracture 2009    L    Anxiety     Chronic back pain     Chronic lymphocytic leukemia (H)     Colon polyp 2012    repeat colonoscopy 5 years.    Fx low femur epiphy-closed (H)     GERD (gastroesophageal reflux disease)     History of UTI 2017    Cysto by Dr Nikole xiong    HTN (hypertension), benign     Hyperlipidemia LDL goal < 100     Mumps     Normal nuclear stress test 12/2009    EF 67%    Osteopenia     PAD (peripheral artery disease) (H24)     s/p fem pop bypass; embolectomy    Polycythemia vera (H) 06/15/2022    PONV (postoperative nausea and vomiting)     PUD (peptic ulcer disease) 1980s    DU    Pulmonary emphysema, unspecified emphysema type (H) 01/12/2023    Smoker     Spider veins     Vitamin D deficiency      Allergies   Allergen Reactions    Chantix [Varenicline] Nausea    Ciprofloxacin Other (See Comments)     hypertension    Clopidogrel      Other reaction(s): Hypertension    Decongestant [Pseudoephedrine Hcl Er]     Erythromycin Nausea    Lisinopril      cough    Plavix [Clopidogrel Bisulfate]      Felt as if she was having a heart attack    Rofecoxib Unknown    Vioxx      Increased BP     Social History     Tobacco Use    Smoking status: Every Day     Current packs/day: 0.25     Average packs/day: 0.3 packs/day for 50.0 years (12.5 ttl pk-yrs)     Types: Cigarettes    Smokeless tobacco: Never    Tobacco comments:     Nicotine patch and a cigarette \"once in awhile.\"   Substance Use Topics    Alcohol use: Yes     Comment: Beer once in a while       ROS: CONSTITUTIONAL:NEGATIVE for fever, chills, change in weight    OBJECTIVE:  /67 (BP Location: Left arm, Patient Position: Sitting, Cuff Size: Adult Regular)   Pulse 62   Temp 98  F (36.7  C) (Oral)   Resp " 16   Wt 58.1 kg (128 lb)   SpO2 98%   BMI 23.41 kg/m      No Flank/abd pain      ICD-10-CM    1. Urinary tract infection without hematuria, site unspecified  N39.0 cefdinir (OMNICEF) 300 MG capsule      2. Dysuria  R30.0 UA with Microscopic reflex to Culture - Clinic Collect     Urine Microscopic Exam     Urine Culture          Drink plenty of fluids.  Prevention and treatment of UTI's discussed.Signs and symptoms of pyelonephritis mentioned.  Follow up with primary care physician if not improving

## 2024-08-26 LAB — BACTERIA UR CULT: ABNORMAL

## 2024-08-28 ENCOUNTER — LAB (OUTPATIENT)
Dept: LAB | Facility: CLINIC | Age: 74
End: 2024-08-28
Payer: COMMERCIAL

## 2024-08-28 ENCOUNTER — PATIENT OUTREACH (OUTPATIENT)
Dept: ONCOLOGY | Facility: CLINIC | Age: 74
End: 2024-08-28

## 2024-08-28 DIAGNOSIS — I49.3 PVC'S (PREMATURE VENTRICULAR CONTRACTIONS): ICD-10-CM

## 2024-08-28 DIAGNOSIS — C91.10 CLL (CHRONIC LYMPHOCYTIC LEUKEMIA) (H): ICD-10-CM

## 2024-08-28 DIAGNOSIS — R00.2 PALPITATIONS: ICD-10-CM

## 2024-08-28 LAB
BASOPHILS # BLD MANUAL: 0 10E3/UL (ref 0–0.2)
BASOPHILS NFR BLD MANUAL: 0 %
EOSINOPHIL # BLD MANUAL: 0 10E3/UL (ref 0–0.7)
EOSINOPHIL NFR BLD MANUAL: 0 %
ERYTHROCYTE [DISTWIDTH] IN BLOOD BY AUTOMATED COUNT: 14.5 % (ref 10–15)
HCT VFR BLD AUTO: 49.9 % (ref 35–47)
HGB BLD-MCNC: 16.1 G/DL (ref 11.7–15.7)
LYMPHOCYTES # BLD MANUAL: 71.3 10E3/UL (ref 0.8–5.3)
LYMPHOCYTES NFR BLD MANUAL: 89 %
MCH RBC QN AUTO: 31.1 PG (ref 26.5–33)
MCHC RBC AUTO-ENTMCNC: 32.3 G/DL (ref 31.5–36.5)
MCV RBC AUTO: 96 FL (ref 78–100)
MONOCYTES # BLD MANUAL: 0 10E3/UL (ref 0–1.3)
MONOCYTES NFR BLD MANUAL: 0 %
NEUTROPHILS # BLD MANUAL: 8.8 10E3/UL (ref 1.6–8.3)
NEUTROPHILS NFR BLD MANUAL: 11 %
NRBC # BLD AUTO: 0 10E3/UL
NRBC BLD AUTO-RTO: 0 /100
PLAT MORPH BLD: ABNORMAL
PLATELET # BLD AUTO: 185 10E3/UL (ref 150–450)
RBC # BLD AUTO: 5.18 10E6/UL (ref 3.8–5.2)
RBC MORPH BLD: ABNORMAL
SMUDGE CELLS BLD QL SMEAR: PRESENT
TSH SERPL DL<=0.005 MIU/L-ACNC: 1.31 UIU/ML (ref 0.3–4.2)
WBC # BLD AUTO: 80.1 10E3/UL (ref 4–11)

## 2024-08-28 PROCEDURE — 85007 BL SMEAR W/DIFF WBC COUNT: CPT

## 2024-08-28 PROCEDURE — 36415 COLL VENOUS BLD VENIPUNCTURE: CPT

## 2024-08-28 PROCEDURE — 85027 COMPLETE CBC AUTOMATED: CPT

## 2024-08-28 PROCEDURE — 84443 ASSAY THYROID STIM HORMONE: CPT

## 2024-08-28 NOTE — PROGRESS NOTES
DATE/TIME OF CALL RECEIVED FROM LAB:  08/28/24 at 1:37 PM   LAB TEST:  WBC    LAB VALUE:  80.1  PROVIDER NOTIFIED?: Yes  PROVIDER NAME: Dr. Osorio  DATE/TIME LAB VALUE REPORTED TO PROVIDER: Routing message to Dr Conti previous result was 64.9  MECHANISM OF PROVIDER NOTIFICATION:  route message  PROVIDER RESPONSE: awaiting response.    Mildred Duke RN

## 2024-08-29 NOTE — RESULT ENCOUNTER NOTE
Dear Ms. Arsh,    WBC has increased to 80.1. It was 64.9 last time. Please, have another blood test in 4-6 weeks.    Please, call me with any questions.    Sancho Osorio MD

## 2024-08-31 ENCOUNTER — ORDERS ONLY (AUTO-RELEASED) (OUTPATIENT)
Dept: CARDIOLOGY | Facility: CLINIC | Age: 74
End: 2024-08-31
Payer: COMMERCIAL

## 2024-08-31 DIAGNOSIS — I49.3 PVC'S (PREMATURE VENTRICULAR CONTRACTIONS): ICD-10-CM

## 2024-09-12 ENCOUNTER — OFFICE VISIT (OUTPATIENT)
Dept: UROLOGY | Facility: CLINIC | Age: 74
End: 2024-09-12
Payer: COMMERCIAL

## 2024-09-12 VITALS
HEART RATE: 59 BPM | DIASTOLIC BLOOD PRESSURE: 79 MMHG | OXYGEN SATURATION: 98 % | BODY MASS INDEX: 24.11 KG/M2 | HEIGHT: 62 IN | WEIGHT: 131 LBS | SYSTOLIC BLOOD PRESSURE: 130 MMHG

## 2024-09-12 DIAGNOSIS — N39.0 RECURRENT UTI: Primary | ICD-10-CM

## 2024-09-12 DIAGNOSIS — N95.2 VAGINAL ATROPHY: ICD-10-CM

## 2024-09-12 DIAGNOSIS — R39.15 URINARY URGENCY: ICD-10-CM

## 2024-09-12 DIAGNOSIS — R35.1 NOCTURIA: ICD-10-CM

## 2024-09-12 DIAGNOSIS — R35.0 URINARY FREQUENCY: ICD-10-CM

## 2024-09-12 PROCEDURE — 99214 OFFICE O/P EST MOD 30 MIN: CPT | Performed by: PHYSICIAN ASSISTANT

## 2024-09-12 ASSESSMENT — PAIN SCALES - GENERAL: PAINLEVEL: NO PAIN (0)

## 2024-09-12 NOTE — NURSING NOTE
Chief Complaint   Patient presents with    Atrophic Vaginitis     Dysuria       Patient states since her last visit she was treated for 1 UTI.  Patient states she does not have any UTI symptoms today.         Elizabeth Perkins MA on 9/12/2024 at 2:09 PM

## 2024-09-12 NOTE — LETTER
9/12/2024       RE: Karen Caban  [address]  [place] [address]     Dear Colleague,    Thank you for referring your patient, Karen Caban, to the [facility] [place] at [facility]. Please see a copy of my visit note below.    Urology Clinic      Name: Karen Caban    MRN: [id number]   YOB: 1950  Accompanied at today's visit by:self                 Chief Complaint:   Hx of UTI          History of Present Illness:   September 12, 2024    HISTORY:   We have been following 73 year old Karen Caban for UTIs, urinary urgency/frequency, nocturia, vaginal atrophy, endometrial thickening. Hx complicated by CLL and continues to smoke. Was last seen on 6/6/24 and was pleased that UTIs had improved with OTC supplements of vitamin C, cranberry and probiotic. Of note, avoiding estrogen cream per gynecologist for endometrial thickening. EMB in 2022 was benign. Of note, see that gyn/onc Dr. Schaefer ordered estrace cream on 8/8/24. She had a recent pelvic US in 2/2024 which redemonstrared ovarian cysts (stable on my review) and showed a 7mm endometrial stripe. Gyn/onc had low concern for malignancy and advised TVUS only if has pelvic sx. Here today for follow-up. States she just started the estrogen cream after her most recent UTI. Using estrogen cream 3x/week. Has had 3 UTIs since 1/2024. Patient voices no other concerns at this time.           Allergies:     Allergies   Allergen Reactions     Chantix [Varenicline] Nausea     Ciprofloxacin Other (See Comments)     hypertension     Clopidogrel      Other reaction(s): Hypertension     Decongestant [Pseudoephedrine Hcl Er]      Erythromycin Nausea     Lisinopril      cough     Plavix [Clopidogrel Bisulfate]      Felt as if she was having a heart attack     Rofecoxib Unknown     Vioxx      Increased BP            Medications:     Current Outpatient Medications   Medication  Sig Dispense Refill     Ascorbic Acid (VITAMIN C GUMMIE PO) Take 2 chew tab by mouth daily       aspirin (ASA) 81 MG chewable tablet Take 1 tablet (81 mg) by mouth daily 90 tablet 11     atorvastatin (LIPITOR) 40 MG tablet Take 1 tablet (40 mg) by mouth daily 90 tablet 3     chlorthalidone (HYGROTON) 25 MG tablet TAKE 1 TABLET(25 MG) BY MOUTH DAILY 90 tablet 1     Cholecalciferol (VITAMIN D-3) 5000 UNIT TABS Take 1 tablet by mouth daily.       coenzyme Q-10 200 MG CAPS Take 200 mg by mouth daily       Cranberry-Vitamin C-Vitamin E (CRANBERRY PLUS VITAMIN C) 4200-20-3 MG-MG-UNIT CAPS Take 2 tablets by mouth daily       Cyanocobalamin (B-12 PO) Take 5,000 mcg by mouth every other day Liquid form 100 tablet 1     estradiol (ESTRACE) 0.1 MG/GM vaginal cream Place 2 g vaginally twice a week Place a pea sized amount on your fingertip and apply to vulva 3x/week 42.5 g 1     ezetimibe (ZETIA) 10 MG tablet TAKE 1 TABLET(10 MG) BY MOUTH DAILY 90 tablet 1     fluticasone (FLONASE) 50 MCG/ACT nasal spray Spray 1-2 sprays in nostril daily       ipratropium-albuterol (COMBIVENT RESPIMAT)  MCG/ACT inhaler Inhale 1 puff into the lungs every 6 hours as needed for shortness of breath, wheezing or cough 4 g 11     Lactobacillus (PROBIOTIC ACIDOPHILUS PO) Take 1 capsule by mouth daily       metoprolol succinate ER (TOPROL XL) 100 MG 24 hr tablet Take 1 tablet (100 mg) by mouth daily       metoprolol succinate ER (TOPROL XL) 50 MG 24 hr tablet Take 1 tablet (50 mg) by mouth daily 90 tablet 1     Multiple Vitamins-Minerals (HAIR SKIN & NAILS ADVANCED PO) Take 1 tablet by mouth daily       nicotine (COMMIT) 2 MG lozenge Place 2 mg inside cheek every hour as needed for smoking cessation       nicotine (NICODERM CQ) 14 MG/24HR 24 hr patch Place 1 patch onto the skin every 24 hours       nitroGLYcerin (NITROSTAT) 0.4 MG sublingual tablet For chest pain place 1 tablet under the tongue every 5 minutes for 3 doses. If symptoms persist 5  minutes after 1st dose call 911. 20 tablet 0     pantoprazole (PROTONIX) 40 MG EC tablet TAKE 1 TABLET(40 MG) BY MOUTH DAILY 90 tablet 1     Sodium Chloride-Xylitol (XLEAR SINUS CARE SPRAY NA) Spray 1 spray in nostril daily as needed       ticagrelor (BRILINTA) 60 MG tablet Take 1 tablet (60 mg) by mouth daily 90 tablet 2     tiotropium-olodaterol (STIOLTO RESPIMAT) 2.5-2.5 MCG/ACT AERS Inhale 2 puffs into the lungs daily 12 g 3     No current facility-administered medications for this visit.               Past  Surgical History:     Past Surgical History:   Procedure Laterality Date     ABDOMEN SURGERY       ANGIOGRAM Right 11/18/2022    Procedure: ANGIOGRAM AORTO ILIAC ANGIOGRAM;  Surgeon: Shaun Tran MD;  Location:  OR     BIOPSY       Blood clot removal from Stent      2011     BONE MARROW BIOPSY, BONE SPECIMEN, NEEDLE/TROCAR N/A 02/02/2021    Procedure: bone marrow biopsy;  Surgeon: Kailey Cota MD;  Location:  GI     BYPASS GRAFT AORTOFEMORAL  05/2002    Dr. Turner     BYPASS GRAFT FEMOROPOPLITEAL  12/16/2011     COLONOSCOPY N/A 01/18/2018    Procedure: COMBINED COLONOSCOPY, SINGLE OR MULTIPLE BIOPSY/POLYPECTOMY BY BIOPSY;  COLONOSCOPY;  Surgeon: Efrain Hernadez MD;  Location:  GI     COLONOSCOPY N/A 03/29/2023    Procedure: COLONOSCOPY, FLEXIBLE, WITH LESION REMOVAL USING SNARE;  Surgeon: Jony Manriquez MD;  Location:  GI     DILATION AND CURETTAGE, OPERATIVE HYSTEROSCOPY, COMBINED N/A 09/15/2022    Procedure: HYSTEROSCOPY, WITH DILATION AND CURETTAGE OF UTERUS;  Surgeon: Destiney Schaefer MD;  Location:  OR     EMBOLECTOMY LOWER EXTREMITY  12/16/2011    Procedure:EMBOLECTOMY LOWER EXTREMITY; EMBOLECTOMY, RIGHT POPLITEAL WITH PATCH ANGIOPLASTY. EXCISION SKIN LESION RIGHT LEG. ; Surgeon:PARMINDER VELAZCO; Location: OR     ENDARTERECTOMY FEMORAL Right 11/18/2022    Procedure: ENDARTERECTOMY, FEMORAL RIGHT FEMORAL CUTDOWN, RIGHT ILIAC ARTERY STENTING.;   "Surgeon: Shaun Tran MD;  Location:  OR     ESOPHAGOSCOPY, GASTROSCOPY, DUODENOSCOPY (EGD), COMBINED N/A 10/07/2022    Procedure: ESOPHAGOGASTRODUODENOSCOPY, WITH BIOPSY;  Surgeon: Felix Carmona MD;  Location:  GI     EXCISE LESION LOWER EXTREMITY  12/16/2011    Procedure:EXCISE LESION LOWER EXTREMITY; Surgeon:PARMINDER VELAZCO; Location:[facility] OR     HERNIA REPAIR  11/04/2008    x2 umbilical     IR OR ANGIOGRAM  11/18/2022     L achilles repair  12/04/2008     LAPAROSCOPIC OOPHORECTOMY Left     L for cyst age 26     miscarriages x 3       tubal ligation and reversal               Physical Exam:     Vitals:    09/12/24 1421   BP: 130/79   BP Location: Left arm   Patient Position: Sitting   Cuff Size: Adult Regular   Pulse: 59   SpO2: 98%   Weight: 59.4 kg (131 lb)   Height: 1.575 m (5' 2\")     PSYCH: NAD  EYES: EOMI  NEURO: AAO x3    LABS:   UC   8/25/24 >100k E. Coli (pansensitive)  8/3/24 >100k Klebsiella oxytoca (resistant to ampicillin)  5/15/24 10-50k Klebsiella oxytoca (resistant to ampicillin and cefazolin) and 10-50k E. Coli (pansensitive)    Creatinine   Date Value Ref Range Status   04/03/2024 0.66 0.51 - 0.95 mg/dL Final   06/01/2021 0.71 0.52 - 1.04 mg/dL Final            Assessment and Plan:   73 year old is a pleasant female who has UTIs, urinary urgency/frequency, nocturia, vaginal atrophy, endometrial thickening, CLL, current smoker.     Plan:  - Given patient has had further UTIs discussed further workup with CTU and cysto. Patient would like further workup. Plan to get UA/UC 10 days pror to cysto with Dr. Griffith  - encourage smoking cessation.  - see how patient does with vaginal estrogen cream from gyn/onc team. If UTIs persist despite estrogen cream, consider methenamine vs nightly low dose keflex.   - continue to monitor urgency/frequency.   - contact clinic if develops s/s of UTI in the future.   - After discussing the assessment and plan with patient, patient verbalizes " understanding and agrees to the above plan. All questions answered.       Other orders as below:  Orders Placed This Encounter   Procedures     CT Urogram wo & w Contrast       20 minutes spent on the date of the encounter doing chart review, review of labs, review of gyn/onc notes, review of test results, interpretation of tests, patient visit and documentation.      Satnam Thompson PA-C  Urology  September 12, 2024      Patient Care Team:  Liane Claudio MD as PCP - General (Internal Medicine)  Liane Claudio MD as Assigned PCP  Abril Lopez, PharmD as Pharmacist (Pharmacist)  Sancho Osorio MD as Assigned Cancer Care Provider  Abril Lopez, PharmD as Assigned MTM Pharmacist  Shaun Tran MD as Assigned Heart and Vascular Provider  Satnam Thompson PA-C as Physician Assistant  Lalo Perez MD as MD (OB/Gyn)  Satnam Thompson PA-C as Assigned Surgical Provider  Lalo Perez MD as Assigned OBGYN Provider  Natalie Durbin MD as MD (Cardiovascular Disease)           Again, thank you for allowing me to participate in the care of your patient.      Sincerely,    SATNAM THOMPSON PA-C

## 2024-09-12 NOTE — PATIENT INSTRUCTIONS
- CT scan: Please call [phone] to schedule this at the [facility] ([place]) or [facility] ([place]).      CYSTOSCOPY    What is a Cystoscopy?  This is a procedure done to check for problems inside the bladder.  Problems may include polyps (growths), tumors, inflammation (swelling and redness) and other concerns.    The doctor inserts a thin tube (called a cystoscope) into the bladder.  The tube is about the size of a pencil.  We will give you numbing medicine to reduce the pain or discomfort you may feel.    The tube allows the doctor to:  The doctor will be able to see inside the bladder by filling the bladder with water.  The water makes it easier to see any problems that may be present.    If needed, the doctor may use the tube to:  The doctor is able to take tissue samples (biopsies).  Samples are sent to the lab for testing.  The doctor can also burn off any small growths or tumors that are found.  This is call fulguration.    What happens after the exam?  You may go back to your normal diet and activity as you feel ready, unless your doctor tells you not to.    For the next two days, you may notice:  Some blood in your urine.  Some burning when you urinate (use the toilet).  An urge to urinate more often.  Bladder spasms.    These are normal after the procedure. They should go away on their own after a day or two.      You can help to relieve the above listed symptoms by:  Drinking 6 to 8 large glasses of water each day (includes drinks at meals).  This will help clear the urine.  Take warm baths to relieve pain and bladder spasms.  Do not add anything to the bath water.  Your doctor may prescribe pain medicine.  You may also take Tylenol (acetaminophen) for pain.    When should I call my doctor?  A fever over 100.0 F (38 C) for more than a day.  (Before you call the doctor, check your temperature under your tongue.)  Chills.  Failure to urinate: No urine  comes out when you try to use the toilet.  (Try soaking in a bathtub full of warm water.  If still no urine, call your doctor.)  A lot of blood in the urine or blood clots larger than a nickel.  Pain in the back or abdomen (belly / stomach area).  Pain or spasms that are not relieved by warm tub baths and pain medicine.  Severe pain, burning or other problems while passing urine.  Pain that gets worse after two days.

## 2024-09-12 NOTE — PROGRESS NOTES
Urology Clinic      Name: Karen Caban    MRN: [id number]   YOB: 1950  Accompanied at today's visit by:self                 Chief Complaint:   Hx of UTI          History of Present Illness:   September 12, 2024    HISTORY:   We have been following 73 year old Karen Caban for UTIs, urinary urgency/frequency, nocturia, vaginal atrophy, endometrial thickening. Hx complicated by CLL and continues to smoke. Was last seen on 6/6/24 and was pleased that UTIs had improved with OTC supplements of vitamin C, cranberry and probiotic. Of note, avoiding estrogen cream per gynecologist for endometrial thickening. EMB in 2022 was benign. Of note, see that gyn/onc Dr. Schaefer ordered estrace cream on 8/8/24. She had a recent pelvic US in 2/2024 which redemonstrared ovarian cysts (stable on my review) and showed a 7mm endometrial stripe. Gyn/onc had low concern for malignancy and advised TVUS only if has pelvic sx. Here today for follow-up. States she just started the estrogen cream after her most recent UTI. Using estrogen cream 3x/week. Has had 3 UTIs since 1/2024. Patient voices no other concerns at this time.           Allergies:     Allergies   Allergen Reactions    Chantix [Varenicline] Nausea    Ciprofloxacin Other (See Comments)     hypertension    Clopidogrel      Other reaction(s): Hypertension    Decongestant [Pseudoephedrine Hcl Er]     Erythromycin Nausea    Lisinopril      cough    Plavix [Clopidogrel Bisulfate]      Felt as if she was having a heart attack    Rofecoxib Unknown    Vioxx      Increased BP            Medications:     Current Outpatient Medications   Medication Sig Dispense Refill    Ascorbic Acid (VITAMIN C GUMMIE PO) Take 2 chew tab by mouth daily      aspirin (ASA) 81 MG chewable tablet Take 1 tablet (81 mg) by mouth daily 90 tablet 11    atorvastatin (LIPITOR) 40 MG tablet Take 1 tablet (40 mg) by mouth daily 90 tablet 3    chlorthalidone (HYGROTON) 25 MG tablet TAKE 1  TABLET(25 MG) BY MOUTH DAILY 90 tablet 1    Cholecalciferol (VITAMIN D-3) 5000 UNIT TABS Take 1 tablet by mouth daily.      coenzyme Q-10 200 MG CAPS Take 200 mg by mouth daily      Cranberry-Vitamin C-Vitamin E (CRANBERRY PLUS VITAMIN C) 4200-20-3 MG-MG-UNIT CAPS Take 2 tablets by mouth daily      Cyanocobalamin (B-12 PO) Take 5,000 mcg by mouth every other day Liquid form 100 tablet 1    estradiol (ESTRACE) 0.1 MG/GM vaginal cream Place 2 g vaginally twice a week Place a pea sized amount on your fingertip and apply to vulva 3x/week 42.5 g 1    ezetimibe (ZETIA) 10 MG tablet TAKE 1 TABLET(10 MG) BY MOUTH DAILY 90 tablet 1    fluticasone (FLONASE) 50 MCG/ACT nasal spray Spray 1-2 sprays in nostril daily      ipratropium-albuterol (COMBIVENT RESPIMAT)  MCG/ACT inhaler Inhale 1 puff into the lungs every 6 hours as needed for shortness of breath, wheezing or cough 4 g 11    Lactobacillus (PROBIOTIC ACIDOPHILUS PO) Take 1 capsule by mouth daily      metoprolol succinate ER (TOPROL XL) 100 MG 24 hr tablet Take 1 tablet (100 mg) by mouth daily      metoprolol succinate ER (TOPROL XL) 50 MG 24 hr tablet Take 1 tablet (50 mg) by mouth daily 90 tablet 1    Multiple Vitamins-Minerals (HAIR SKIN & NAILS ADVANCED PO) Take 1 tablet by mouth daily      nicotine (COMMIT) 2 MG lozenge Place 2 mg inside cheek every hour as needed for smoking cessation      nicotine (NICODERM CQ) 14 MG/24HR 24 hr patch Place 1 patch onto the skin every 24 hours      nitroGLYcerin (NITROSTAT) 0.4 MG sublingual tablet For chest pain place 1 tablet under the tongue every 5 minutes for 3 doses. If symptoms persist 5 minutes after 1st dose call 911. 20 tablet 0    pantoprazole (PROTONIX) 40 MG EC tablet TAKE 1 TABLET(40 MG) BY MOUTH DAILY 90 tablet 1    Sodium Chloride-Xylitol (XLEAR SINUS CARE SPRAY NA) Spray 1 spray in nostril daily as needed      ticagrelor (BRILINTA) 60 MG tablet Take 1 tablet (60 mg) by mouth daily 90 tablet 2     tiotropium-olodaterol (STIOLTO RESPIMAT) 2.5-2.5 MCG/ACT AERS Inhale 2 puffs into the lungs daily 12 g 3     No current facility-administered medications for this visit.               Past  Surgical History:     Past Surgical History:   Procedure Laterality Date    ABDOMEN SURGERY      ANGIOGRAM Right 11/18/2022    Procedure: ANGIOGRAM AORTO ILIAC ANGIOGRAM;  Surgeon: Shaun Tran MD;  Location:  OR    BIOPSY      Blood clot removal from Stent      2011    BONE MARROW BIOPSY, BONE SPECIMEN, NEEDLE/TROCAR N/A 02/02/2021    Procedure: bone marrow biopsy;  Surgeon: Kailey Cota MD;  Location:  GI    BYPASS GRAFT AORTOFEMORAL  05/2002    Dr. Turner    BYPASS GRAFT FEMOROPOPLITEAL  12/16/2011    COLONOSCOPY N/A 01/18/2018    Procedure: COMBINED COLONOSCOPY, SINGLE OR MULTIPLE BIOPSY/POLYPECTOMY BY BIOPSY;  COLONOSCOPY;  Surgeon: Efrain Hernadez MD;  Location:  GI    COLONOSCOPY N/A 03/29/2023    Procedure: COLONOSCOPY, FLEXIBLE, WITH LESION REMOVAL USING SNARE;  Surgeon: Jony Manriquez MD;  Location:  GI    DILATION AND CURETTAGE, OPERATIVE HYSTEROSCOPY, COMBINED N/A 09/15/2022    Procedure: HYSTEROSCOPY, WITH DILATION AND CURETTAGE OF UTERUS;  Surgeon: Destiney Schaefer MD;  Location:  OR    EMBOLECTOMY LOWER EXTREMITY  12/16/2011    Procedure:EMBOLECTOMY LOWER EXTREMITY; EMBOLECTOMY, RIGHT POPLITEAL WITH PATCH ANGIOPLASTY. EXCISION SKIN LESION RIGHT LEG. ; Surgeon:PARMINDER VELAZCO; Location: OR    ENDARTERECTOMY FEMORAL Right 11/18/2022    Procedure: ENDARTERECTOMY, FEMORAL RIGHT FEMORAL CUTDOWN, RIGHT ILIAC ARTERY STENTING.;  Surgeon: Shaun Tran MD;  Location:  OR    ESOPHAGOSCOPY, GASTROSCOPY, DUODENOSCOPY (EGD), COMBINED N/A 10/07/2022    Procedure: ESOPHAGOGASTRODUODENOSCOPY, WITH BIOPSY;  Surgeon: Felix Carmona MD;  Location:  GI    EXCISE LESION LOWER EXTREMITY  12/16/2011    Procedure:EXCISE LESION LOWER EXTREMITY; Surgeon:PARMINDER VELAZCO  "JONAH; Location:[facility] OR    HERNIA REPAIR  11/04/2008    x2 umbilical    IR OR ANGIOGRAM  11/18/2022    L achilles repair  12/04/2008    LAPAROSCOPIC OOPHORECTOMY Left     L for cyst age 26    miscarriages x 3      tubal ligation and reversal               Physical Exam:     Vitals:    09/12/24 1421   BP: 130/79   BP Location: Left arm   Patient Position: Sitting   Cuff Size: Adult Regular   Pulse: 59   SpO2: 98%   Weight: 59.4 kg (131 lb)   Height: 1.575 m (5' 2\")     PSYCH: NAD  EYES: EOMI  NEURO: AAO x3    LABS:   UC   8/25/24 >100k E. Coli (pansensitive)  8/3/24 >100k Klebsiella oxytoca (resistant to ampicillin)  5/15/24 10-50k Klebsiella oxytoca (resistant to ampicillin and cefazolin) and 10-50k E. Coli (pansensitive)    Creatinine   Date Value Ref Range Status   04/03/2024 0.66 0.51 - 0.95 mg/dL Final   06/01/2021 0.71 0.52 - 1.04 mg/dL Final            Assessment and Plan:   73 year old is a pleasant female who has UTIs, urinary urgency/frequency, nocturia, vaginal atrophy, endometrial thickening, CLL, current smoker.     Plan:  - Given patient has had further UTIs discussed further workup with CTU and cysto. Patient would like further workup. Plan to get UA/UC 10 days pror to cysto with Dr. Griffith  - encourage smoking cessation.  - see how patient does with vaginal estrogen cream from gyn/onc team. If UTIs persist despite estrogen cream, consider methenamine vs nightly low dose keflex.   - continue to monitor urgency/frequency.   - contact clinic if develops s/s of UTI in the future.   - After discussing the assessment and plan with patient, patient verbalizes understanding and agrees to the above plan. All questions answered.       Other orders as below:  Orders Placed This Encounter   Procedures    CT Urogram wo & w Contrast       20 minutes spent on the date of the encounter doing chart review, review of labs, review of gyn/onc notes, review of test results, interpretation of tests, patient visit and " documentation.      Julia Thompson PA-C  Urology  September 12, 2024      Patient Care Team:  Liane Claudio MD as PCP - General (Internal Medicine)  Liane Claudio MD as Assigned PCP  Abril Lopez, PharmD as Pharmacist (Pharmacist)  Sancho Osorio MD as Assigned Cancer Care Provider  Abril Lopez, PharmD as Assigned [facility] Pharmacist  Shaun Tran MD as Assigned Heart and Vascular Provider  Julia Thompson PA-C as Physician Assistant  Lalo Perez MD as MD (OB/Gyn)  Julia Thompson PA-C as Assigned Surgical Provider  Lalo Perez MD as Assigned OBGYN Provider  Natalie Durbin MD as MD (Cardiovascular Disease)

## 2024-09-15 PROCEDURE — 93227 XTRNL ECG REC<48 HR R&I: CPT | Performed by: INTERNAL MEDICINE

## 2024-09-17 ENCOUNTER — OFFICE VISIT (OUTPATIENT)
Dept: URGENT CARE | Facility: URGENT CARE | Age: 74
End: 2024-09-17
Payer: COMMERCIAL

## 2024-09-17 VITALS
SYSTOLIC BLOOD PRESSURE: 147 MMHG | RESPIRATION RATE: 16 BRPM | DIASTOLIC BLOOD PRESSURE: 79 MMHG | HEART RATE: 65 BPM | TEMPERATURE: 97.9 F | OXYGEN SATURATION: 97 %

## 2024-09-17 DIAGNOSIS — T50.Z95A ADVERSE EFFECT OF VACCINE, INITIAL ENCOUNTER: Primary | ICD-10-CM

## 2024-09-17 PROCEDURE — 99212 OFFICE O/P EST SF 10 MIN: CPT | Performed by: EMERGENCY MEDICINE

## 2024-09-17 NOTE — PROGRESS NOTES
Assessment & Plan     Diagnosis:    ICD-10-CM    1. Adverse effect of vaccine, initial encounter  T50.Z95A     myalgias and swelling          Medical Decision Making:  Karen Caban is a 73 year old female presenting for evaluation of left upper arm soreness and a possible lump at the back of her left shoulder blade since having COVID and flu vaccine 9/6/2024. Soreness has improved slightly. There are no signs of a hematoma/seroma; pain near the top of the left scapula appears to be muscular in origin. No signs of infection. Patient is neurovascularly intact in the bilateral upper extremities. No chest pain or other concerning symptoms.  Reassurance provided. Patient verbalizes understanding and agreement with the plan including reasons to go to the ER. All questions answered.     [facility]AMILAH [facility] URGENT CARE    Subjective     Karen Caban is a 73 year old female who presents to clinic today for the following health issues:  Chief Complaint   Patient presents with    Urgent Care     High dose flu vaccine and COVID vaccine injection on 9/6 at [facility] pharmacy - pt is complaining of left arm pain and lump behind left shoulder, difficulty lifting left arm w/o pain, SOB nausea x 9/10        HPI  Patient is that she received her flu and COVID-vaccine injection on 9 6 in the left upper arm, had some pain and swelling afterwards, but this is now been 10 days.  She notes the swelling did come down, she has some pain with lifting her left arm but is able to bring it above her head.  She thinks there is a lump at the back of her shoulder blade.  No fevers, chest pain, neck pain or stiffness, numbness or weakness in the arms.  She has felt a little bit of nausea, slight shortness of breath but just feels like she has the flu.    Review of Systems    See HPI    Objective      Vitals: BP (!) 147/79   Pulse 65   Temp 97.9  F (36.6  C) (Tympanic)   Resp 16   SpO2 97%       Patient Vitals for the  past 24 hrs:   BP Temp Temp src Pulse Resp SpO2   09/17/24 1327 (!) 147/79 97.9  F (36.6  C) Tympanic 65 16 97 %       Vital signs reviewed by: Ronald Capone PA-C    Physical Exam   Constitutional: Patient is alert and cooperative. No acute distress  Cardiovascular: Regular rate and rhythm  Pulmonary/Chest: Lungs are clear to auscultation throughout. Effort normal. No respiratory distress. No wheezes, rales or rhonchi.  Neurological: Alert and oriented x3. CN 3-7 and 9-12 intact. Strength and sensation are intact and symmetric in the bilateral upper extremities.   MSK/Skin: Left upper shoulder slightly tender to palpation, especially above the medial aspect of the scapula.  Muscles feel tight and spasming here.  No masses, induration, fluctuance or areas of pointing.  No erythema or warmth of the joint.  Normal range of motion at the shoulder.    Psychiatric:The patient has a normal mood and affect.     Ronald Capone PA-C, September 17, 2024

## 2024-09-19 ENCOUNTER — HOSPITAL ENCOUNTER (OUTPATIENT)
Dept: CARDIOLOGY | Facility: CLINIC | Age: 74
Discharge: HOME | End: 2024-09-19
Attending: INTERNAL MEDICINE | Admitting: INTERNAL MEDICINE
Payer: COMMERCIAL

## 2024-09-19 DIAGNOSIS — I49.3 PVC'S (PREMATURE VENTRICULAR CONTRACTIONS): ICD-10-CM

## 2024-09-19 LAB — LVEF ECHO: NORMAL

## 2024-09-19 PROCEDURE — 93306 TTE W/DOPPLER COMPLETE: CPT | Mod: 26 | Performed by: INTERNAL MEDICINE

## 2024-09-19 PROCEDURE — 93306 TTE W/DOPPLER COMPLETE: CPT

## 2024-09-23 ENCOUNTER — OFFICE VISIT (OUTPATIENT)
Dept: CARDIOLOGY | Facility: CLINIC | Age: 74
End: 2024-09-23
Attending: INTERNAL MEDICINE
Payer: COMMERCIAL

## 2024-09-23 VITALS
HEIGHT: 64 IN | WEIGHT: 129 LBS | DIASTOLIC BLOOD PRESSURE: 62 MMHG | OXYGEN SATURATION: 94 % | HEART RATE: 70 BPM | SYSTOLIC BLOOD PRESSURE: 118 MMHG | BODY MASS INDEX: 22.02 KG/M2

## 2024-09-23 DIAGNOSIS — I49.3 PVC'S (PREMATURE VENTRICULAR CONTRACTIONS): ICD-10-CM

## 2024-09-23 PROCEDURE — 99214 OFFICE O/P EST MOD 30 MIN: CPT | Performed by: NURSE PRACTITIONER

## 2024-09-23 NOTE — PATIENT INSTRUCTIONS
Today's Recommendations    Continue all medications without changes.  Please follow up with cardiology in 1 year or sooner if needed.    Please send Snapsortt message or call for further questions or concerns.     It was a pleasure to see you today.     KWESI Dailey, CNP    EP [phone]  Device [phone]  General scheduling and after hours number [phone]  EP scheduling [phone]

## 2024-09-23 NOTE — LETTER
9/23/2024    Liane Claudio MD  [address]  [place] [address]    RE: Karen Caban       Dear Colleague,     I had the pleasure of seeing Karen Caban in the [facility].    Electrophysiology Clinic Progress Note  Karen Caban MRN# [id number]   YOB: 1950 Age: 73 year old     Primary cardiologist: Dr. Durbin    Reason for visit: PVCs    History of presenting illness:    Karen Caban is a pleasant 73 year old patient with past medical history significant for:    Frequent PVCs: Previous burden 20% on metoprolol  mg daily and dose reduction to 100 mg daily and then 50 mg daily. PVC burden ~6% on Zio from 9/2024  PAD: s/p Aortobiliac bypass graft with stenting into right EIA and right limb of aortobilliac graft. High-grade stenosis s/p right femoral cutdown and stent grafting of the right limb of the aortobiiliac bypass graft and native right external iliac artery 11/2022. Follows with Dr. Tran in vascular surgery on DAPT  Tobacco dependence  COPD  Hypertension  Hyperlipidemia  CLL: follows with Dr. Osorio in oncology    Humaira was referred to cardiology by her PCP due to frequent PVCs and bradycardia on EKG noted SR with ventricular bigeminy. Zio Patch showed SR with PVC burden of 20%. Her PCP decreased metoprolol XL to 100 mg daily. She was evaluated by Dr. Durbin in 7/2024 who decreased metoprolol further to 50 mg daily. A follow up Zio Patch showed burden was down to ~6%. An echocardiogram showed preserved LVEF.    Today she returns for a follow up to review her monitor. She is asymptomatic for a PVC standpoint and was reassured by the Zio Patch and echocardiogram findings. Her BP is well controlled with the metoprolol dose reduction.     Diagnotic studies:  Zio Patch (23 hours) 9/2024: PVC burden ~6%  Echocardiogram 9/2024: LVEF 55-60% with mild MR.   Zio Patch (3 days) 7/2024: PVC burden~20%   Echocardiogram 8/2023: LVEF 55-60%. Mild MR  Nuclear  stress testing 2022: negative for ischemia or infarct.           Assessment and Plan:     ASSESSMENT:    Asymptomatic frequent PVCs  Previous burden 20% on metoprolol  mg daily and dose reduction to 100 mg daily and then 50 mg daily.   PVC burden ~6% on Zio from 9/2024  Echocardiogram 9/2024: LVEF 55-60% with mild MR.     Hypertension  Well-controlled currently on chlorthalidone 25 mg daily and metoprolol XL 50 mg daily    PAD  S/p Aortobiliac bypass graft with stenting into right EIA and right limb of aortobilliac graft.   High-grade stenosis s/p right femoral cutdown and stent grafting of the right limb of the aortobiiliac bypass graft and native right external iliac artery 11/2022.   Follows with Dr. Tran in vascular surgery on DAPT      PLAN:     Continue present medical therapy  Return to clinic and follow up with general cardiology in 1 year or sooner if needed       Orders this Visit:  Orders Placed This Encounter   Procedures     Follow-Up with Cardiology YANA     No orders of the defined types were placed in this encounter.    Medications Discontinued During This Encounter   Medication Reason     metoprolol succinate ER (TOPROL XL) 100 MG 24 hr tablet        Today's clinic visit entailed:  Review of the result(s) of each unique test - EKG, echo, Zio Patch, Labs  Prescription drug management  I spent a total of 32 minutes on the day of the visit.   Time spent by me doing chart review, history and exam, documentation and further activities per the note  Provider  Link to [facility] Help Grid     The level of medical decision making during this visit was of moderate complexity.           Review of Systems:     Review of Systems:  Skin:        Eyes:       ENT:       Respiratory:  Negative    Cardiovascular:  Negative    Gastroenterology:      Genitourinary:       Musculoskeletal:       Neurologic:       Psychiatric:       Heme/Lymph/Imm:       Endocrine:                 Physical Exam:     Vitals: /62    "Pulse 70   Ht 1.626 m (5' 4\")   Wt 58.5 kg (129 lb)   SpO2 94%   BMI 22.14 kg/m    Constitutional: Well nourished and in no apparent distress.  Eyes: Pupils equal, round.   HEENT: Normocephalic, atraumatic.   Neck: Supple.   Respiratory: Breathing non-labored. Lungs clear to auscultation bilaterally.  Cardiovascular:  Regular rate and rhythm, normal S1 and S2. No murmur   Skin: Warm, dry.   Extremities: No edema.  Neurologic: No gross motor deficits. Alert, awake, and oriented to person, place and time.  Psychiatric: Affect appropriate.        CURRENT MEDICATIONS:  Current Outpatient Medications   Medication Sig Dispense Refill     Ascorbic Acid (VITAMIN C GUMMIE PO) Take 2 chew tab by mouth daily       aspirin (ASA) 81 MG chewable tablet Take 1 tablet (81 mg) by mouth daily 90 tablet 11     atorvastatin (LIPITOR) 40 MG tablet Take 1 tablet (40 mg) by mouth daily 90 tablet 3     chlorthalidone (HYGROTON) 25 MG tablet TAKE 1 TABLET(25 MG) BY MOUTH DAILY 90 tablet 1     Cholecalciferol (VITAMIN D-3) 5000 UNIT TABS Take 1 tablet by mouth daily.       coenzyme Q-10 200 MG CAPS Take 200 mg by mouth daily       Cranberry-Vitamin C-Vitamin E (CRANBERRY PLUS VITAMIN C) 4200-20-3 MG-MG-UNIT CAPS Take 2 tablets by mouth daily       Cyanocobalamin (B-12 PO) Take 5,000 mcg by mouth every other day Liquid form 100 tablet 1     estradiol (ESTRACE) 0.1 MG/GM vaginal cream Place 2 g vaginally twice a week Place a pea sized amount on your fingertip and apply to vulva 3x/week 42.5 g 1     ezetimibe (ZETIA) 10 MG tablet TAKE 1 TABLET(10 MG) BY MOUTH DAILY 90 tablet 1     fluticasone (FLONASE) 50 MCG/ACT nasal spray Spray 1-2 sprays in nostril daily       ipratropium-albuterol (COMBIVENT RESPIMAT)  MCG/ACT inhaler Inhale 1 puff into the lungs every 6 hours as needed for shortness of breath, wheezing or cough 4 g 11     Lactobacillus (PROBIOTIC ACIDOPHILUS PO) Take 1 capsule by mouth daily       metoprolol succinate ER (TOPROL " XL) 50 MG 24 hr tablet Take 1 tablet (50 mg) by mouth daily 90 tablet 1     Multiple Vitamins-Minerals (HAIR SKIN & NAILS ADVANCED PO) Take 1 tablet by mouth daily       nicotine (COMMIT) 2 MG lozenge Place 2 mg inside cheek every hour as needed for smoking cessation       nicotine (NICODERM CQ) 14 MG/24HR 24 hr patch Place 1 patch onto the skin every 24 hours       pantoprazole (PROTONIX) 40 MG EC tablet TAKE 1 TABLET(40 MG) BY MOUTH DAILY 90 tablet 1     Sodium Chloride-Xylitol (XLEAR SINUS CARE SPRAY NA) Spray 1 spray in nostril daily as needed       ticagrelor (BRILINTA) 60 MG tablet Take 1 tablet (60 mg) by mouth daily 90 tablet 2     tiotropium-olodaterol (STIOLTO RESPIMAT) 2.5-2.5 MCG/ACT AERS Inhale 2 puffs into the lungs daily 12 g 3     nitroGLYcerin (NITROSTAT) 0.4 MG sublingual tablet For chest pain place 1 tablet under the tongue every 5 minutes for 3 doses. If symptoms persist 5 minutes after 1st dose call 911. (Patient not taking: Reported on 9/23/2024) 20 tablet 0       ALLERGIES  Allergies   Allergen Reactions     Chantix [Varenicline] Nausea     Ciprofloxacin Other (See Comments)     hypertension     Clopidogrel      Other reaction(s): Hypertension     Decongestant [Pseudoephedrine Hcl Er]      Erythromycin Nausea     Lisinopril      cough     Plavix [Clopidogrel Bisulfate]      Felt as if she was having a heart attack     Rofecoxib Unknown     Vioxx      Increased BP         PAST MEDICAL HISTORY:  Past Medical History:   Diagnosis Date     Ankle fracture 2009    L     Anxiety      Chronic back pain      Chronic lymphocytic leukemia (H)      Colon polyp 2012    repeat colonoscopy 5 years.     Fx low femur epiphy-closed (H)      GERD (gastroesophageal reflux disease)      History of UTI 2017    Cysto by Dr Agustin neg     HTN (hypertension), benign      Hyperlipidemia LDL goal < 100      Mumps      Normal nuclear stress test 12/2009    EF 67%     Osteopenia      PAD (peripheral artery disease) (H24)      s/p fem pop bypass; embolectomy     Polycythemia vera (H) 06/15/2022     PONV (postoperative nausea and vomiting)      PUD (peptic ulcer disease) 1980s    DU     Pulmonary emphysema, unspecified emphysema type (H) 01/12/2023     Smoker      Spider veins      Vitamin D deficiency        PAST SURGICAL HISTORY:  Past Surgical History:   Procedure Laterality Date     ABDOMEN SURGERY       ANGIOGRAM Right 11/18/2022    Procedure: ANGIOGRAM AORTO ILIAC ANGIOGRAM;  Surgeon: Shaun Tran MD;  Location:  OR     BIOPSY       Blood clot removal from Stent      2011     BONE MARROW BIOPSY, BONE SPECIMEN, NEEDLE/TROCAR N/A 02/02/2021    Procedure: bone marrow biopsy;  Surgeon: Kailey Cota MD;  Location:  GI     BYPASS GRAFT AORTOFEMORAL  05/2002    Dr. Turner     BYPASS GRAFT FEMOROPOPLITEAL  12/16/2011     COLONOSCOPY N/A 01/18/2018    Procedure: COMBINED COLONOSCOPY, SINGLE OR MULTIPLE BIOPSY/POLYPECTOMY BY BIOPSY;  COLONOSCOPY;  Surgeon: Efrain Hernadez MD;  Location:  GI     COLONOSCOPY N/A 03/29/2023    Procedure: COLONOSCOPY, FLEXIBLE, WITH LESION REMOVAL USING SNARE;  Surgeon: Jony Manriquez MD;  Location:  GI     DILATION AND CURETTAGE, OPERATIVE HYSTEROSCOPY, COMBINED N/A 09/15/2022    Procedure: HYSTEROSCOPY, WITH DILATION AND CURETTAGE OF UTERUS;  Surgeon: Destiney Schaefer MD;  Location:  OR     EMBOLECTOMY LOWER EXTREMITY  12/16/2011    Procedure:EMBOLECTOMY LOWER EXTREMITY; EMBOLECTOMY, RIGHT POPLITEAL WITH PATCH ANGIOPLASTY. EXCISION SKIN LESION RIGHT LEG. ; Surgeon:PARMINDER VELAZCO; Location: OR     ENDARTERECTOMY FEMORAL Right 11/18/2022    Procedure: ENDARTERECTOMY, FEMORAL RIGHT FEMORAL CUTDOWN, RIGHT ILIAC ARTERY STENTING.;  Surgeon: Shaun Tran MD;  Location:  OR     ESOPHAGOSCOPY, GASTROSCOPY, DUODENOSCOPY (EGD), COMBINED N/A 10/07/2022    Procedure: ESOPHAGOGASTRODUODENOSCOPY, WITH BIOPSY;  Surgeon: Felix Carmona MD;  Location:  GI      "EXCISE LESION LOWER EXTREMITY  2011    Procedure:EXCISE LESION LOWER EXTREMITY; Surgeon:PARMINDER VELAZCO; Location:[facility] OR     HERNIA REPAIR  11/04/2008    x2 umbilical     IR OR ANGIOGRAM  2022     L achilles repair  2008     LAPAROSCOPIC OOPHORECTOMY Left     L for cyst age 26     miscarriages x 3       tubal ligation and reversal         FAMILY HISTORY:  Family History   Problem Relation Age of Onset     Alzheimer Disease Mother          of panc/GI ca age 83     Osteoporosis Mother      Other Cancer Mother      Cancer Father          age 64 lymphoma     Hyperlipidemia Father      Depression Father         None     Colon Cancer Maternal Grandfather      Hypertension Sister        SOCIAL HISTORY:  Social History     Socioeconomic History     Marital status:      Spouse name: None     Number of children: None     Years of education: None     Highest education level: None   Tobacco Use     Smoking status: Every Day     Current packs/day: 0.25     Average packs/day: 0.3 packs/day for 50.0 years (12.5 ttl pk-yrs)     Types: Cigarettes     Passive exposure: Current     Smokeless tobacco: Never     Tobacco comments:     Nicotine patch and a cigarette \"once in awhile.\"   Vaping Use     Vaping status: Some Days   Substance and Sexual Activity     Alcohol use: Yes     Comment: Beer once in a while     Drug use: No     Sexual activity: Not Currently     Partners: Male     Birth control/protection: None   Other Topics Concern     Parent/sibling w/ CABG, MI or angioplasty before 65F 55M? No   Social History Narrative    , working FT for a [employer] company.  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  Had 3 miscarriages.  No living children.   is [place].  Walks 2miles per day.     Social Determinants of Health     Financial Resource Strain: Low Risk  (2024)    Financial Resource Strain      Within the past 12 months, have you or your family members you live with been unable to get utilities " (heat, electricity) when it was really needed?: No   Food Insecurity: Low Risk  (1/19/2024)    Food Insecurity      Within the past 12 months, did you worry that your food would run out before you got money to buy more?: No      Within the past 12 months, did the food you bought just not last and you didn t have money to get more?: No   Transportation Needs: Low Risk  (1/19/2024)    Transportation Needs      Within the past 12 months, has lack of transportation kept you from medical appointments, getting your medicines, non-medical meetings or appointments, work, or from getting things that you need?: No   Interpersonal Safety: Low Risk  (9/26/2023)    Interpersonal Safety      Do you feel physically and emotionally safe where you currently live?: Yes      Within the past 12 months, have you been hit, slapped, kicked or otherwise physically hurt by someone?: No      Within the past 12 months, have you been humiliated or emotionally abused in other ways by your partner or ex-partner?: No   Housing Stability: Low Risk  (1/19/2024)    Housing Stability      Do you have housing? : Yes      Are you worried about losing your housing?: No               Thank you for allowing me to participate in the care of your patient.      Sincerely,     KWESI [facility]  cc:   Natalie Durbin MD  [address] [place] [id number]

## 2024-09-23 NOTE — PROGRESS NOTES
Electrophysiology Clinic Progress Note  Karen Caban MRN# [id number]   YOB: 1950 Age: 73 year old     Primary cardiologist: Dr. Durbin    Reason for visit: PVCs    History of presenting illness:    Karen Caban is a pleasant 73 year old patient with past medical history significant for:    Frequent PVCs: Previous burden 20% on metoprolol  mg daily and dose reduction to 100 mg daily and then 50 mg daily. PVC burden ~6% on Zio from 9/2024  PAD: s/p Aortobiliac bypass graft with stenting into right EIA and right limb of aortobilliac graft. High-grade stenosis s/p right femoral cutdown and stent grafting of the right limb of the aortobiiliac bypass graft and native right external iliac artery 11/2022. Follows with Dr. Tran in vascular surgery on DAPT  Tobacco dependence  COPD  Hypertension  Hyperlipidemia  CLL: follows with Dr. Osorio in oncology    Humaira was referred to cardiology by her PCP due to frequent PVCs and bradycardia on EKG noted SR with ventricular bigeminy. Zio Patch showed SR with PVC burden of 20%. Her PCP decreased metoprolol XL to 100 mg daily. She was evaluated by Dr. Durbin in 7/2024 who decreased metoprolol further to 50 mg daily. A follow up Zio Patch showed burden was down to ~6%. An echocardiogram showed preserved LVEF.    Today she returns for a follow up to review her monitor. She is asymptomatic for a PVC standpoint and was reassured by the Zio Patch and echocardiogram findings. Her BP is well controlled with the metoprolol dose reduction.     Diagnotic studies:  Zio Patch (23 hours) 9/2024: PVC burden ~6%  Echocardiogram 9/2024: LVEF 55-60% with mild MR.   Zio Patch (3 days) 7/2024: PVC burden~20%   Echocardiogram 8/2023: LVEF 55-60%. Mild MR  Nuclear stress testing 2022: negative for ischemia or infarct.           Assessment and Plan:     ASSESSMENT:    Asymptomatic frequent PVCs  Previous burden 20% on metoprolol  mg daily and dose reduction to 100 mg  "daily and then 50 mg daily.   PVC burden ~6% on Zio from 9/2024  Echocardiogram 9/2024: LVEF 55-60% with mild MR.     Hypertension  Well-controlled currently on chlorthalidone 25 mg daily and metoprolol XL 50 mg daily    PAD  S/p Aortobiliac bypass graft with stenting into right EIA and right limb of aortobilliac graft.   High-grade stenosis s/p right femoral cutdown and stent grafting of the right limb of the aortobiiliac bypass graft and native right external iliac artery 11/2022.   Follows with Dr. Tran in vascular surgery on DAPT      PLAN:     Continue present medical therapy  Return to clinic and follow up with general cardiology in 1 year or sooner if needed       Orders this Visit:  Orders Placed This Encounter   Procedures    Follow-Up with Cardiology YANA     No orders of the defined types were placed in this encounter.    Medications Discontinued During This Encounter   Medication Reason    metoprolol succinate ER (TOPROL XL) 100 MG 24 hr tablet        Today's clinic visit entailed:  Review of the result(s) of each unique test - EKG, echo, Zio Patch, Labs  Prescription drug management  I spent a total of 32 minutes on the day of the visit.   Time spent by me doing chart review, history and exam, documentation and further activities per the note  Provider  Link to MDM Help Grid     The level of medical decision making during this visit was of moderate complexity.           Review of Systems:     Review of Systems:  Skin:        Eyes:       ENT:       Respiratory:  Negative    Cardiovascular:  Negative    Gastroenterology:      Genitourinary:       Musculoskeletal:       Neurologic:       Psychiatric:       Heme/Lymph/Imm:       Endocrine:                 Physical Exam:     Vitals: /62   Pulse 70   Ht 1.626 m (5' 4\")   Wt 58.5 kg (129 lb)   SpO2 94%   BMI 22.14 kg/m    Constitutional: Well nourished and in no apparent distress.  Eyes: Pupils equal, round.   HEENT: Normocephalic, atraumatic. "   Neck: Supple.   Respiratory: Breathing non-labored. Lungs clear to auscultation bilaterally.  Cardiovascular:  Regular rate and rhythm, normal S1 and S2. No murmur   Skin: Warm, dry.   Extremities: No edema.  Neurologic: No gross motor deficits. Alert, awake, and oriented to person, place and time.  Psychiatric: Affect appropriate.        CURRENT MEDICATIONS:  Current Outpatient Medications   Medication Sig Dispense Refill    Ascorbic Acid (VITAMIN C GUMMIE PO) Take 2 chew tab by mouth daily      aspirin (ASA) 81 MG chewable tablet Take 1 tablet (81 mg) by mouth daily 90 tablet 11    atorvastatin (LIPITOR) 40 MG tablet Take 1 tablet (40 mg) by mouth daily 90 tablet 3    chlorthalidone (HYGROTON) 25 MG tablet TAKE 1 TABLET(25 MG) BY MOUTH DAILY 90 tablet 1    Cholecalciferol (VITAMIN D-3) 5000 UNIT TABS Take 1 tablet by mouth daily.      coenzyme Q-10 200 MG CAPS Take 200 mg by mouth daily      Cranberry-Vitamin C-Vitamin E (CRANBERRY PLUS VITAMIN C) 4200-20-3 MG-MG-UNIT CAPS Take 2 tablets by mouth daily      Cyanocobalamin (B-12 PO) Take 5,000 mcg by mouth every other day Liquid form 100 tablet 1    estradiol (ESTRACE) 0.1 MG/GM vaginal cream Place 2 g vaginally twice a week Place a pea sized amount on your fingertip and apply to vulva 3x/week 42.5 g 1    ezetimibe (ZETIA) 10 MG tablet TAKE 1 TABLET(10 MG) BY MOUTH DAILY 90 tablet 1    fluticasone (FLONASE) 50 MCG/ACT nasal spray Spray 1-2 sprays in nostril daily      ipratropium-albuterol (COMBIVENT RESPIMAT)  MCG/ACT inhaler Inhale 1 puff into the lungs every 6 hours as needed for shortness of breath, wheezing or cough 4 g 11    Lactobacillus (PROBIOTIC ACIDOPHILUS PO) Take 1 capsule by mouth daily      metoprolol succinate ER (TOPROL XL) 50 MG 24 hr tablet Take 1 tablet (50 mg) by mouth daily 90 tablet 1    Multiple Vitamins-Minerals (HAIR SKIN & NAILS ADVANCED PO) Take 1 tablet by mouth daily      nicotine (COMMIT) 2 MG lozenge Place 2 mg inside cheek  every hour as needed for smoking cessation      nicotine (NICODERM CQ) 14 MG/24HR 24 hr patch Place 1 patch onto the skin every 24 hours      pantoprazole (PROTONIX) 40 MG EC tablet TAKE 1 TABLET(40 MG) BY MOUTH DAILY 90 tablet 1    Sodium Chloride-Xylitol (XLEAR SINUS CARE SPRAY NA) Spray 1 spray in nostril daily as needed      ticagrelor (BRILINTA) 60 MG tablet Take 1 tablet (60 mg) by mouth daily 90 tablet 2    tiotropium-olodaterol (STIOLTO RESPIMAT) 2.5-2.5 MCG/ACT AERS Inhale 2 puffs into the lungs daily 12 g 3    nitroGLYcerin (NITROSTAT) 0.4 MG sublingual tablet For chest pain place 1 tablet under the tongue every 5 minutes for 3 doses. If symptoms persist 5 minutes after 1st dose call 911. (Patient not taking: Reported on 9/23/2024) 20 tablet 0       ALLERGIES  Allergies   Allergen Reactions    Chantix [Varenicline] Nausea    Ciprofloxacin Other (See Comments)     hypertension    Clopidogrel      Other reaction(s): Hypertension    Decongestant [Pseudoephedrine Hcl Er]     Erythromycin Nausea    Lisinopril      cough    Plavix [Clopidogrel Bisulfate]      Felt as if she was having a heart attack    Rofecoxib Unknown    Vioxx      Increased BP         PAST MEDICAL HISTORY:  Past Medical History:   Diagnosis Date    Ankle fracture 2009    L    Anxiety     Chronic back pain     Chronic lymphocytic leukemia (H)     Colon polyp 2012    repeat colonoscopy 5 years.    Fx low femur epiphy-closed (H)     GERD (gastroesophageal reflux disease)     History of UTI 2017    Cysto by Dr Agustin neg    HTN (hypertension), benign     Hyperlipidemia LDL goal < 100     Mumps     Normal nuclear stress test 12/2009    EF 67%    Osteopenia     PAD (peripheral artery disease) (H24)     s/p fem pop bypass; embolectomy    Polycythemia vera (H) 06/15/2022    PONV (postoperative nausea and vomiting)     PUD (peptic ulcer disease) 1980s    DU    Pulmonary emphysema, unspecified emphysema type (H) 01/12/2023    Smoker     Spider veins      Vitamin D deficiency        PAST SURGICAL HISTORY:  Past Surgical History:   Procedure Laterality Date    ABDOMEN SURGERY      ANGIOGRAM Right 11/18/2022    Procedure: ANGIOGRAM AORTO ILIAC ANGIOGRAM;  Surgeon: Shaun Tran MD;  Location:  OR    BIOPSY      Blood clot removal from Stent      2011    BONE MARROW BIOPSY, BONE SPECIMEN, NEEDLE/TROCAR N/A 02/02/2021    Procedure: bone marrow biopsy;  Surgeon: Kailey Cota MD;  Location:  GI    BYPASS GRAFT AORTOFEMORAL  05/2002    Dr. Turner    BYPASS GRAFT FEMOROPOPLITEAL  12/16/2011    COLONOSCOPY N/A 01/18/2018    Procedure: COMBINED COLONOSCOPY, SINGLE OR MULTIPLE BIOPSY/POLYPECTOMY BY BIOPSY;  COLONOSCOPY;  Surgeon: Efrain Hernadez MD;  Location:  GI    COLONOSCOPY N/A 03/29/2023    Procedure: COLONOSCOPY, FLEXIBLE, WITH LESION REMOVAL USING SNARE;  Surgeon: Jony Manriquez MD;  Location:  GI    DILATION AND CURETTAGE, OPERATIVE HYSTEROSCOPY, COMBINED N/A 09/15/2022    Procedure: HYSTEROSCOPY, WITH DILATION AND CURETTAGE OF UTERUS;  Surgeon: Destiney Schaefer MD;  Location:  OR    EMBOLECTOMY LOWER EXTREMITY  12/16/2011    Procedure:EMBOLECTOMY LOWER EXTREMITY; EMBOLECTOMY, RIGHT POPLITEAL WITH PATCH ANGIOPLASTY. EXCISION SKIN LESION RIGHT LEG. ; Surgeon:PARMINDER VELAZCO; Location: OR    ENDARTERECTOMY FEMORAL Right 11/18/2022    Procedure: ENDARTERECTOMY, FEMORAL RIGHT FEMORAL CUTDOWN, RIGHT ILIAC ARTERY STENTING.;  Surgeon: Shaun Tran MD;  Location:  OR    ESOPHAGOSCOPY, GASTROSCOPY, DUODENOSCOPY (EGD), COMBINED N/A 10/07/2022    Procedure: ESOPHAGOGASTRODUODENOSCOPY, WITH BIOPSY;  Surgeon: Felix Carmona MD;  Location:  GI    EXCISE LESION LOWER EXTREMITY  12/16/2011    Procedure:EXCISE LESION LOWER EXTREMITY; Surgeon:PARMINDER VELAZCO; Location: OR    HERNIA REPAIR  11/04/2008    x2 umbilical    IR OR ANGIOGRAM  11/18/2022    L achilles repair  12/04/2008    LAPAROSCOPIC OOPHORECTOMY  "Left     L for cyst age 26    miscarriages x 3      tubal ligation and reversal         FAMILY HISTORY:  Family History   Problem Relation Age of Onset    Alzheimer Disease Mother          of panc/GI ca age 83    Osteoporosis Mother     Other Cancer Mother     Cancer Father          age 64 lymphoma    Hyperlipidemia Father     Depression Father         None    Colon Cancer Maternal Grandfather     Hypertension Sister        SOCIAL HISTORY:  Social History     Socioeconomic History    Marital status:      Spouse name: None    Number of children: None    Years of education: None    Highest education level: None   Tobacco Use    Smoking status: Every Day     Current packs/day: 0.25     Average packs/day: 0.3 packs/day for 50.0 years (12.5 ttl pk-yrs)     Types: Cigarettes     Passive exposure: Current    Smokeless tobacco: Never    Tobacco comments:     Nicotine patch and a cigarette \"once in awhile.\"   Vaping Use    Vaping status: Some Days   Substance and Sexual Activity    Alcohol use: Yes     Comment: Beer once in a while    Drug use: No    Sexual activity: Not Currently     Partners: Male     Birth control/protection: None   Other Topics Concern    Parent/sibling w/ CABG, MI or angioplasty before 65F 55M? 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  Had 3 miscarriages.  No living children.   is [place].  Walks 2miles per day.     Social Determinants of Health     Financial Resource Strain: Low Risk  (2024)    Financial Resource Strain     Within the past 12 months, have you or your family members you live with been unable to get utilities (heat, electricity) when it was really needed?: No   Food Insecurity: Low Risk  (2024)    Food Insecurity     Within the past 12 months, did you worry that your food would run out before you got money to buy more?: No     Within the past 12 months, did the food you bought just not last and you " didn t have money to get more?: No   Transportation Needs: Low Risk  (1/19/2024)    Transportation Needs     Within the past 12 months, has lack of transportation kept you from medical appointments, getting your medicines, non-medical meetings or appointments, work, or from getting things that you need?: No   Interpersonal Safety: Low Risk  (9/26/2023)    Interpersonal Safety     Do you feel physically and emotionally safe where you currently live?: Yes     Within the past 12 months, have you been hit, slapped, kicked or otherwise physically hurt by someone?: No     Within the past 12 months, have you been humiliated or emotionally abused in other ways by your partner or ex-partner?: No   Housing Stability: Low Risk  (1/19/2024)    Housing Stability     Do you have housing? : Yes     Are you worried about losing your housing?: No

## 2024-09-24 ENCOUNTER — ANCILLARY PROCEDURE (OUTPATIENT)
Dept: CT IMAGING | Facility: CLINIC | Age: 74
End: 2024-09-24
Attending: PHYSICIAN ASSISTANT
Payer: COMMERCIAL

## 2024-09-24 DIAGNOSIS — N39.0 RECURRENT UTI: ICD-10-CM

## 2024-09-24 LAB
CREAT BLD-MCNC: 0.8 MG/DL (ref 0.5–1)
EGFRCR SERPLBLD CKD-EPI 2021: >60 ML/MIN/1.73M2

## 2024-09-24 PROCEDURE — 250N000011 HC RX IP 250 OP 636: Performed by: PHYSICIAN ASSISTANT

## 2024-09-24 PROCEDURE — 82565 ASSAY OF CREATININE: CPT

## 2024-09-24 PROCEDURE — 250N000009 HC RX 250: Performed by: PHYSICIAN ASSISTANT

## 2024-09-24 PROCEDURE — 74178 CT ABD&PLV WO CNTR FLWD CNTR: CPT

## 2024-09-24 RX ORDER — IOPAMIDOL 755 MG/ML
99 INJECTION, SOLUTION INTRAVASCULAR ONCE
Status: COMPLETED | OUTPATIENT
Start: 2024-09-24 | End: 2024-09-24

## 2024-09-24 RX ADMIN — SODIUM CHLORIDE 117 ML: 9 INJECTION, SOLUTION INTRAVENOUS at 14:14

## 2024-09-24 RX ADMIN — IOPAMIDOL 99 ML: 755 INJECTION, SOLUTION INTRAVENOUS at 14:13

## 2024-09-26 ENCOUNTER — DOCUMENTATION ONLY (OUTPATIENT)
Dept: LAB | Facility: CLINIC | Age: 74
End: 2024-09-26

## 2024-09-26 NOTE — PROGRESS NOTES
Patient has an upcomming lab visit without orders. Please place orders as needed.    Patient requesting: Labs    Appointment date: 10/3/2024

## 2024-09-30 ENCOUNTER — TELEPHONE (OUTPATIENT)
Dept: UROLOGY | Facility: CLINIC | Age: 74
End: 2024-09-30
Payer: COMMERCIAL

## 2024-09-30 NOTE — TELEPHONE ENCOUNTER
Both are within [facility], so they can see the results, she will let them know.  Camila Cottrell LPN

## 2024-09-30 NOTE — TELEPHONE ENCOUNTER
[facility] Call [facility]    Phone Message    May a detailed message be left on voicemail: no     Reason for Call: Other: Patient called and was wondering if her CT results would be sent to her primary and oncologist. Please call patient back to verify.     Action Taken: Other: [facility]    Travel Screening: Not Applicable

## 2024-10-03 ENCOUNTER — LAB (OUTPATIENT)
Dept: LAB | Facility: CLINIC | Age: 74
End: 2024-10-03
Payer: COMMERCIAL

## 2024-10-03 LAB — HOLD SPECIMEN: NORMAL

## 2024-10-08 ENCOUNTER — LAB (OUTPATIENT)
Dept: INFUSION THERAPY | Facility: CLINIC | Age: 74
End: 2024-10-08
Attending: INTERNAL MEDICINE
Payer: COMMERCIAL

## 2024-10-08 ENCOUNTER — TELEPHONE (OUTPATIENT)
Dept: ONCOLOGY | Facility: CLINIC | Age: 74
End: 2024-10-08
Payer: COMMERCIAL

## 2024-10-08 DIAGNOSIS — C91.10 CLL (CHRONIC LYMPHOCYTIC LEUKEMIA) (H): Primary | ICD-10-CM

## 2024-10-08 DIAGNOSIS — C91.10 CLL (CHRONIC LYMPHOCYTIC LEUKEMIA) (H): ICD-10-CM

## 2024-10-08 LAB
BASOPHILS # BLD MANUAL: 0 10E3/UL (ref 0–0.2)
BASOPHILS NFR BLD MANUAL: 0 %
EOSINOPHIL # BLD MANUAL: 0.7 10E3/UL (ref 0–0.7)
EOSINOPHIL NFR BLD MANUAL: 1 %
ERYTHROCYTE [DISTWIDTH] IN BLOOD BY AUTOMATED COUNT: 14.3 % (ref 10–15)
HCT VFR BLD AUTO: 46.6 % (ref 35–47)
HGB BLD-MCNC: 15.3 G/DL (ref 11.7–15.7)
LYMPHOCYTES # BLD MANUAL: 66.6 10E3/UL (ref 0.8–5.3)
LYMPHOCYTES NFR BLD MANUAL: 91 %
MCH RBC QN AUTO: 31.7 PG (ref 26.5–33)
MCHC RBC AUTO-ENTMCNC: 32.8 G/DL (ref 31.5–36.5)
MCV RBC AUTO: 97 FL (ref 78–100)
MONOCYTES # BLD MANUAL: 0.7 10E3/UL (ref 0–1.3)
MONOCYTES NFR BLD MANUAL: 1 %
NEUTROPHILS # BLD MANUAL: 5.1 10E3/UL (ref 1.6–8.3)
NEUTROPHILS NFR BLD MANUAL: 7 %
PLAT MORPH BLD: ABNORMAL
PLATELET # BLD AUTO: 169 10E3/UL (ref 150–450)
RBC # BLD AUTO: 4.83 10E6/UL (ref 3.8–5.2)
RBC MORPH BLD: ABNORMAL
WBC # BLD AUTO: 73.2 10E3/UL (ref 4–11)

## 2024-10-08 PROCEDURE — 85027 COMPLETE CBC AUTOMATED: CPT | Performed by: INTERNAL MEDICINE

## 2024-10-08 PROCEDURE — 85007 BL SMEAR W/DIFF WBC COUNT: CPT | Performed by: INTERNAL MEDICINE

## 2024-10-08 PROCEDURE — 36415 COLL VENOUS BLD VENIPUNCTURE: CPT

## 2024-10-08 NOTE — TELEPHONE ENCOUNTER
Pt is calling.  States that she had CBC done on 8/28/2024 and Dr Osorio advised follow up CBC in 4-6 weeks.  Pt reports that she went to [facility] on 10/3/2024 and had her blood drawn.  However, there are no results for CBC on 10/3/2024 for pt. Pt reports that the [facility] told her that they didn't have any orders.    Pt would like to schedule another appt for blood draw. She will call back to schedule appt soon.   Future order for CBC entered.  Will route to Dr Osorio to see if any additional labs need to be done.  OK to leave message if unable to reach patient.   Lizbeth Silva RN on 10/8/2024 at 9:19 AM

## 2024-10-08 NOTE — TELEPHONE ENCOUNTER
Per Claribel AGUILARCC, only CBC is needed.  Orders have already been entered.  No further action needed.  Lizbeth Silva RN on 10/8/2024 at 10:51 AM

## 2024-10-10 NOTE — RESULT ENCOUNTER NOTE
Dear Ms. Caban,    Blood test is stable. CLL is stable. We will continue to monitor it.    Please, call me with any questions.    Sancho Osorio MD

## 2024-10-17 ENCOUNTER — HOSPITAL ENCOUNTER (OUTPATIENT)
Dept: ULTRASOUND IMAGING | Facility: CLINIC | Age: 74
Discharge: HOME | End: 2024-10-17
Attending: SURGERY | Admitting: SURGERY
Payer: COMMERCIAL

## 2024-10-17 DIAGNOSIS — I73.9 PERIPHERAL ARTERIAL DISEASE (H): ICD-10-CM

## 2024-10-17 DIAGNOSIS — R09.89 OTHER SPECIFIED SYMPTOMS AND SIGNS INVOLVING THE CIRCULATORY AND RESPIRATORY SYSTEMS: ICD-10-CM

## 2024-10-17 PROCEDURE — 93978 VASCULAR STUDY: CPT

## 2024-10-17 PROCEDURE — 93978 VASCULAR STUDY: CPT | Mod: 26 | Performed by: SURGERY

## 2024-10-24 ENCOUNTER — ONCOLOGY VISIT (OUTPATIENT)
Dept: ONCOLOGY | Facility: CLINIC | Age: 74
End: 2024-10-24
Attending: INTERNAL MEDICINE
Payer: COMMERCIAL

## 2024-10-24 VITALS
RESPIRATION RATE: 14 BRPM | SYSTOLIC BLOOD PRESSURE: 144 MMHG | OXYGEN SATURATION: 96 % | DIASTOLIC BLOOD PRESSURE: 80 MMHG | TEMPERATURE: 98.2 F | HEART RATE: 69 BPM

## 2024-10-24 DIAGNOSIS — C91.10 CLL (CHRONIC LYMPHOCYTIC LEUKEMIA) (H): Primary | ICD-10-CM

## 2024-10-24 PROCEDURE — G0463 HOSPITAL OUTPT CLINIC VISIT: HCPCS | Performed by: INTERNAL MEDICINE

## 2024-10-24 PROCEDURE — G2211 COMPLEX E/M VISIT ADD ON: HCPCS | Performed by: INTERNAL MEDICINE

## 2024-10-24 PROCEDURE — 99214 OFFICE O/P EST MOD 30 MIN: CPT | Performed by: INTERNAL MEDICINE

## 2024-10-24 ASSESSMENT — PAIN SCALES - GENERAL: PAINLEVEL_OUTOF10: NO PAIN (0)

## 2024-10-24 NOTE — PROGRESS NOTES
"Oncology Rooming Note    October 24, 2024 3:00 PM   Karen Caban is a 73 year old female who presents for:    Chief Complaint   Patient presents with    Oncology Clinic Visit     Initial Vitals: There were no vitals taken for this visit. Estimated body mass index is 22.14 kg/m  as calculated from the following:    Height as of 9/23/24: 1.626 m (5' 4\").    Weight as of 9/23/24: 58.5 kg (129 lb). There is no height or weight on file to calculate BSA.  Data Unavailable Comment: Data Unavailable   No LMP recorded. Patient is postmenopausal.  Allergies reviewed: Yes  Medications reviewed: Yes    Medications: Medication refills not needed today.  Pharmacy name entered into [employer]: [facility] #[id number] [place], [place] - [address]    Frailty Screening:   Is the patient here for a new oncology consult visit in cancer care? 2. No      Clinical concerns: Go over CT  Dr. Osorio  was notified.      Shari J. Schoenberger, CMA            "

## 2024-10-24 NOTE — LETTER
"10/24/2024      Karen Caban  [address]  [place] [address]      Dear Colleague,    Thank you for referring your patient, Karen Caban, to the [facility]. Please see a copy of my visit note below.    Oncology Rooming Note    October 24, 2024 3:00 PM   Karen Caban is a 73 year old female who presents for:    Chief Complaint   Patient presents with     Oncology Clinic Visit     Initial Vitals: There were no vitals taken for this visit. Estimated body mass index is 22.14 kg/m  as calculated from the following:    Height as of 9/23/24: 1.626 m (5' 4\").    Weight as of 9/23/24: 58.5 kg (129 lb). There is no height or weight on file to calculate BSA.  Data Unavailable Comment: Data Unavailable   No LMP recorded. Patient is postmenopausal.  Allergies reviewed: Yes  Medications reviewed: Yes    Medications: Medication refills not needed today.  Pharmacy name entered into [employer]: [facility] #[id number]  [place], [place] - [address]    Frailty Screening:   Is the patient here for a new oncology consult visit in cancer care? 2. No      Clinical concerns: Go over CT  Dr. Osorio  was notified.      Shari J. Schoenberger, [facility]              HEMATOLOGIC HISTORY:  Ms. Caban is a 71-year-old female with CLL.  1.  On 12/11/2019, WBC of 9.8.  -On 12/17/2020, WBC of 16.3.  -On 01/06/2021, WBC of 18.6, hemoglobin of 16.5 and platelet of 206.  Neutrophil of 19%, lymphocyte of 74%.  2.  Flow cytometry on peripheral blood on 01/06/2021 revealed CD5 positive kappa monotypic B cells with immunophenotype of CLL/SLL .  3.  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  CT chest, abdomen, and pelvis on 02/03/2021 did not reveal any lymphadenopathy " or splenomegaly.     SUBJECTIVE:   Ms. Caban is a 73-year-old female with Webb stage 0 chronic lymphocytic leukemia on observation.     She has lung nodule and emphysema.  She follows up with pulmonologist.    She has been getting recurrent UTI. CT urogram on 09/24/24:  1.  No urolithiasis, suspicious renal parenchymal or urothelial lesion.  2.  Mildly trabeculated bladder with few small diverticula.  3.  Increasing, now mildly enlarged abdominopelvic lymphadenopathy for which the differential includes a lymphoproliferative disorder. PET may be useful for further evaluation.  4.  Mild splenomegaly.  5.  Stable 1.3 cm pancreatic neck cystic lesion, likely a sidebranch IPMN. Guidelines below.  6.  Slightly enlarging bilateral adnexal cystic lesions, previously evaluated with sonography.     She is doing well. She is very active.  No headache.  No dizziness. No chest pain.  No shortness of breath.  No abdominal pain, nausea or vomiting. Appetite is good.  No bowel complaints.  No recurrent infection. No night sweats. Gets some hot-flashes during daytime. It is stress related. All other review of systems is negative.     PHYSICAL EXAMINATION:   GENERAL:  Alert and oriented x 3.   VITAL SIGNS:  Reviewed.  ECOG PS of 0.     Rest of the system not examined.    LABORATORY:  CBC reviewed.     ASSESSMENT:    1.  A 73-year-old female with Webb stage 0 chronic lymphocytic leukemia on observation. CLL is stable.  2.  Recurrent UTI.  3.  Mild lymphadenopathy from CLL.  4.  Pancreatic IPMN.     PLAN:    CBC every 3 months.  Follow-up in 6 months with labs.     DISCUSSION:  1.  Discussed with the patient regarding CLL.  Explained to her that CLL is stable.  CBC reveals leukocytosis/lymphocytosis.  No anemia or thrombocytopenia.    CT urogram reviewed with her.  There is mildly enlarged abdomino-pelvic lymphadenopathy and splenomegaly.  Explained to her that this is secondary to CLL. (We are still calling it Webb stage 0 as these are  not clinically palpable.)    We discussed regarding treatment.  Different indication for treatment for CLL reviewed.  At this time, there is no indication for treatment.  Will continue to monitor her.    She had a few questions regarding treatment which were answered.  Explained to her that there are many different treatment options.  She can be on single agent BTK inhibitor like acalabrutinib.  Or we can give her fixed duration treatment with venetoclax and obinutuzumab.  Further discussion when it is time to start treatment.    2.  She has pancreatic IPMN.  Will consider follow-up scan in 1 year.    3.  She had a few questions which were all answered.  I will see her in 6 months time.  She will have CBC checked in between.     Total visit time of 30 minutes.  Time spent in today's visit, review of chart/investigations today and documentation.      Again, thank you for allowing me to participate in the care of your patient.        Sincerely,        Sancho Osorio MD

## 2024-10-24 NOTE — PROGRESS NOTES
HEMATOLOGIC HISTORY:  Ms. Caban is a 71-year-old female with CLL.  1.  On 12/11/2019, WBC of 9.8.  -On 12/17/2020, WBC of 16.3.  -On 01/06/2021, WBC of 18.6, hemoglobin of 16.5 and platelet of 206.  Neutrophil of 19%, lymphocyte of 74%.  2.  Flow cytometry on peripheral blood on 01/06/2021 revealed CD5 positive kappa monotypic B cells with immunophenotype of CLL/SLL .  3.  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  CT chest, abdomen, and pelvis on 02/03/2021 did not reveal any lymphadenopathy or splenomegaly.     SUBJECTIVE:   Ms. Caban is a 73-year-old female with Webb stage 0 chronic lymphocytic leukemia on observation.     She has lung nodule and emphysema.  She follows up with pulmonologist.    She has been getting recurrent UTI. CT urogram on 09/24/24:  1.  No urolithiasis, suspicious renal parenchymal or urothelial lesion.  2.  Mildly trabeculated bladder with few small diverticula.  3.  Increasing, now mildly enlarged abdominopelvic lymphadenopathy for which the differential includes a lymphoproliferative disorder. PET may be useful for further evaluation.  4.  Mild splenomegaly.  5.  Stable 1.3 cm pancreatic neck cystic lesion, likely a sidebranch IPMN. Guidelines below.  6.  Slightly enlarging bilateral adnexal cystic lesions, previously evaluated with sonography.     She is doing well. She is very active.  No headache.  No dizziness. No chest pain.  No shortness of breath.  No abdominal pain, nausea or vomiting. Appetite is good.  No bowel complaints.  No recurrent infection. No night sweats. Gets some hot-flashes during daytime. It is stress related. All other review of systems is negative.     PHYSICAL EXAMINATION:   GENERAL:  Alert and oriented x 3.   VITAL SIGNS:  Reviewed.  ECOG PS  of 0.     Rest of the system not examined.    LABORATORY:  CBC reviewed.     ASSESSMENT:    1.  A 73-year-old female with Webb stage 0 chronic lymphocytic leukemia on observation. CLL is stable.  2.  Recurrent UTI.  3.  Mild lymphadenopathy from CLL.  4.  Pancreatic IPMN.     PLAN:    CBC every 3 months.  Follow-up in 6 months with labs.     DISCUSSION:  1.  Discussed with the patient regarding CLL.  Explained to her that CLL is stable.  CBC reveals leukocytosis/lymphocytosis.  No anemia or thrombocytopenia.    CT urogram reviewed with her.  There is mildly enlarged abdomino-pelvic lymphadenopathy and splenomegaly.  Explained to her that this is secondary to CLL. (We are still calling it Webb stage 0 as these are not clinically palpable.)    We discussed regarding treatment.  Different indication for treatment for CLL reviewed.  At this time, there is no indication for treatment.  Will continue to monitor her.    She had a few questions regarding treatment which were answered.  Explained to her that there are many different treatment options.  She can be on single agent BTK inhibitor like acalabrutinib.  Or we can give her fixed duration treatment with venetoclax and obinutuzumab.  Further discussion when it is time to start treatment.    2.  She has pancreatic IPMN.  Will consider follow-up scan in 1 year.    3.  She had a few questions which were all answered.  I will see her in 6 months time.  She will have CBC checked in between.     Total visit time of 30 minutes.  Time spent in today's visit, review of chart/investigations today and documentation.

## 2024-10-30 ENCOUNTER — OFFICE VISIT (OUTPATIENT)
Dept: URGENT CARE | Facility: URGENT CARE | Age: 74
End: 2024-10-30
Payer: COMMERCIAL

## 2024-10-30 VITALS
RESPIRATION RATE: 14 BRPM | WEIGHT: 124 LBS | SYSTOLIC BLOOD PRESSURE: 133 MMHG | TEMPERATURE: 98 F | OXYGEN SATURATION: 98 % | HEART RATE: 61 BPM | DIASTOLIC BLOOD PRESSURE: 76 MMHG | BODY MASS INDEX: 21.28 KG/M2

## 2024-10-30 DIAGNOSIS — K27.9 PUD (PEPTIC ULCER DISEASE): ICD-10-CM

## 2024-10-30 DIAGNOSIS — K21.00 GASTROESOPHAGEAL REFLUX DISEASE WITH ESOPHAGITIS WITHOUT HEMORRHAGE: Primary | ICD-10-CM

## 2024-10-30 DIAGNOSIS — I49.3 PVC'S (PREMATURE VENTRICULAR CONTRACTIONS): ICD-10-CM

## 2024-10-30 PROCEDURE — 93000 ELECTROCARDIOGRAM COMPLETE: CPT | Performed by: STUDENT IN AN ORGANIZED HEALTH CARE EDUCATION/TRAINING PROGRAM

## 2024-10-30 PROCEDURE — 99214 OFFICE O/P EST MOD 30 MIN: CPT | Performed by: STUDENT IN AN ORGANIZED HEALTH CARE EDUCATION/TRAINING PROGRAM

## 2024-10-30 RX ORDER — SUCRALFATE 1 G/1
1 TABLET ORAL 4 TIMES DAILY
Qty: 120 TABLET | Refills: 0 | Status: SHIPPED | OUTPATIENT
Start: 2024-10-30

## 2024-10-30 NOTE — PROGRESS NOTES
chief complaint: Heartburn, PVCs, BP    HPI:  Karen Caban is a 73 year old female who presents today complaining of chronic heart burn, about a week ago she noticed significantly increased in heart burn with metallic taste in mouth. No hemetesis of melena. Was on metoprolol 150 mg, now on 50 mg daily, feels like since her dosage reduced shye n ticed worsening palpitations and increased BP     History obtained from the patient.    Problem List:  2023-01: Pulmonary emphysema, unspecified emphysema type (H)  2022-11: Iliac artery stenosis, right (H)  2022-08: CLL (chronic lymphocytic leukemia) (H)  2022-06: Polycythemia vera (H)  2019-01: Abnormal CT lung screening  2015-12: Gastroesophageal reflux disease without esophagitis  2014-01: Controlled substance agreement signed on 1- with Lyudmila MORFIN  2012-11: Advanced directives, counseling/discussion  Vitamin D deficiency  Osteopenia  HTN (hypertension), benign  Hyperlipidemia with target LDL less than 100  Fx low femur epiphy-closed (H)  Chronic back pain  PAD (peripheral artery disease) (H)  PUD (peptic ulcer disease)  Normal nuclear stress test  Smoker  Colon polyp  GERD (gastroesophageal reflux disease)  Anxiety      Past Medical History:   Diagnosis Date    Ankle fracture 2009    L    Anxiety     Chronic back pain     Chronic lymphocytic leukemia (H)     Colon polyp 2012    repeat colonoscopy 5 years.    Fx low femur epiphy-closed (H)     GERD (gastroesophageal reflux disease)     History of UTI 2017    Cysto by Dr Agustin neg    HTN (hypertension), benign     Hyperlipidemia LDL goal < 100     Mumps     Normal nuclear stress test 12/2009    EF 67%    Osteopenia     PAD (peripheral artery disease) (H)     s/p fem pop bypass; embolectomy    Polycythemia vera (H) 06/15/2022    PONV (postoperative nausea and vomiting)     PUD (peptic ulcer disease) 1980s    DU    Pulmonary emphysema, unspecified emphysema type (H) 01/12/2023    Smoker     Spider veins      "Vitamin D deficiency        Social History     Tobacco Use    Smoking status: Every Day     Current packs/day: 0.25     Average packs/day: 0.3 packs/day for 50.0 years (12.5 ttl pk-yrs)     Types: Cigarettes     Passive exposure: Current    Smokeless tobacco: Never    Tobacco comments:     Nicotine patch and a cigarette \"once in awhile.\"   Substance Use Topics    Alcohol use: Yes     Comment: Beer once in a while       Review of systems  ROS negative except for pertinent positives listed in HPI      Vitals:    10/30/24 1020   BP: 133/76   BP Location: Left arm   Patient Position: Sitting   Cuff Size: Adult Small   Pulse: 61   Resp: 14   Temp: 98  F (36.7  C)   TempSrc: Oral   SpO2: 98%   Weight: 56.2 kg (124 lb)       Physical Exam  Constitutional: healthy, alert, and no distress  Head: Normocephalic.   Cardiovascular:  RRR. No murmurs, clicks gallops or rub  Respiratory:unlabored respiratory effort  Musculoskeletal: extremities normal- no gross deformities noted, gait normal  Psychiatric: mentation appears normal and affect normal/bright    Assessment & Plan     Humaira was seen today for urgent care.    Diagnoses and all orders for this visit:    PUD (peptic ulcer disease)  Gastroesophageal reflux disease with esophagitis without hemorrhage  Patient is currently on daily Protonix and Tums, will add sucralfate for PUD.  Suspect patient's symptoms likely secondary to worsening GERD and PUD, discussed need for referral to GI given that patient is on Max treatment for GERD.  Patient agreed to be referred to GI.    -     sucralfate (CARAFATE) 1 GM tablet; Take 1 tablet (1 g) by mouth 4 times daily.  -     Adult GI  Referral - Consult Only; Future  -     Cont protonix daily and tums as needed    PVC's (premature ventricular contractions)  Now symptomatic with frequent palpitations, EKG is showing sinus bradycardia, we will not increase metoprolol at this point.  Patient seems to also be anxious at baseline, " discussed that this could also be adding to palpitations.  Discussed that we would defer anxiety plan to primary care provider.  Plan follow up with PCP, cardiology or any provider in clinic in 1-2 weeks. Also follow up with cardiology earlier  -     PRIMARY CARE FOLLOW-UP SCHEDULING; Future  -     EKG 12-lead complete w/read - Clinics  -     Increase      At the end of the encounter, I discussed results, diagnosis, medications. Discussed red flags for immediate return to clinic/ER, as well as indications for follow up if no improvement. Patient understood and agreed to plan. Patient was stable for discharge.

## 2024-11-03 DIAGNOSIS — I10 HTN (HYPERTENSION), BENIGN: ICD-10-CM

## 2024-11-04 ENCOUNTER — OFFICE VISIT (OUTPATIENT)
Dept: OTHER | Facility: CLINIC | Age: 74
End: 2024-11-04
Attending: SURGERY
Payer: COMMERCIAL

## 2024-11-04 VITALS — DIASTOLIC BLOOD PRESSURE: 80 MMHG | SYSTOLIC BLOOD PRESSURE: 126 MMHG | HEART RATE: 68 BPM

## 2024-11-04 DIAGNOSIS — R09.89 OTHER SPECIFIED SYMPTOMS AND SIGNS INVOLVING THE CIRCULATORY AND RESPIRATORY SYSTEMS: ICD-10-CM

## 2024-11-04 DIAGNOSIS — I70.309 ATHEROSCLEROSIS OF AORTO-ILIAC BYPASS GRAFT (H): Primary | ICD-10-CM

## 2024-11-04 PROCEDURE — 99213 OFFICE O/P EST LOW 20 MIN: CPT | Performed by: SURGERY

## 2024-11-04 PROCEDURE — G0463 HOSPITAL OUTPT CLINIC VISIT: HCPCS | Performed by: SURGERY

## 2024-11-04 RX ORDER — CHLORTHALIDONE 25 MG/1
25 TABLET ORAL DAILY
Qty: 90 TABLET | Refills: 1 | OUTPATIENT
Start: 2024-11-04

## 2024-11-04 NOTE — PROGRESS NOTES
It was a pleasure again to see Karen Caban in the vascular surgery clinic today.  She is a very pleasant 73-year-old female with history of aorto by iliac bypass grafting.  Previously she had had stenting of the right external iliac artery and right limb of the aortobiiliac bypass graft.  She had developed recurrent stenosis in both areas.  In November 2022 we did stent grafting of the proximal aspect of the right limb of the aorto by iliac bypass.  Then we also stent graft into the mid right external iliac artery.  Both of these were done with [place] VBX stent graft.    Since that time we have kept a close eye on this.  Recently she had a CT urogram as part of her workup for recurrent urinary tract infections.  This showed more prominent adenopathy.  Of note she has chronic lymphocytic leukemia and she has been under the care of Dr. Osorio for that.  She recently saw him and continued surveillance was recommended.    Recent sonography shows that the stent graft on the right side (in the right limb of the aortobiiliac bypass and native right external iliac artery) are patent.  She is currently on baby aspirin and ticagrelor.    We will see her back in 6 months with repeat duplex sonography of her stent graft.

## 2024-11-04 NOTE — PROGRESS NOTES
[facility]        Patient is here for a follow up.    Pt is currently taking Aspirin.    /80 (BP Location: Left arm, Patient Position: Sitting, Cuff Size: Adult Regular)   Pulse 68     The provider has been notified that the patient has no concerns.     Questions patient would like addressed today are: N/A.    Refills are needed: N/A    Has homecare services and agency name:  Ava Dos Santos MA

## 2024-11-05 ENCOUNTER — TELEPHONE (OUTPATIENT)
Dept: GASTROENTEROLOGY | Facility: CLINIC | Age: 74
End: 2024-11-05

## 2024-11-05 NOTE — PATIENT INSTRUCTIONS
Thank you for allowing us to provide your medical care.  Please see below for the follow up instructions pertaining to your medical appointment with Dr. Tran.    Follow up plan: visit with Dr. Tran and US of aorta/iliacs in 6 months.    Our office will send you a letter by mail, closer to your follow up time frame, with a reminded to call to schedule appointment(s).  Please call our office with any concerns or questions [phone].

## 2024-11-05 NOTE — TELEPHONE ENCOUNTER
Health Call Center    Phone Message    May a detailed message be left on voicemail: yes     Reason for Call: Other: Patient called to schedule form appt from Referral, DX: Gastroesophageal reflux disease with esophagitis without hemorrhage; PUD (peptic ulcer disease.  She wants to be seen in [place]; however, no appt times came up for Dr. Orona or Dr. Hussein.  Per triage, it is MD only.  Please follow up with patient.     Action Taken: Message routed to:  [facility] ([facility]    Travel Screening: Not Applicable

## 2024-11-11 ENCOUNTER — OFFICE VISIT (OUTPATIENT)
Dept: FAMILY MEDICINE | Facility: CLINIC | Age: 74
End: 2024-11-11
Payer: COMMERCIAL

## 2024-11-11 VITALS
OXYGEN SATURATION: 99 % | BODY MASS INDEX: 21.51 KG/M2 | DIASTOLIC BLOOD PRESSURE: 77 MMHG | SYSTOLIC BLOOD PRESSURE: 125 MMHG | HEART RATE: 60 BPM | WEIGHT: 126 LBS | TEMPERATURE: 98 F | RESPIRATION RATE: 18 BRPM | HEIGHT: 64 IN

## 2024-11-11 DIAGNOSIS — C91.10 CLL (CHRONIC LYMPHOCYTIC LEUKEMIA) (H): Chronic | ICD-10-CM

## 2024-11-11 DIAGNOSIS — I73.9 PAD (PERIPHERAL ARTERY DISEASE) (H): ICD-10-CM

## 2024-11-11 DIAGNOSIS — I49.3 PVC'S (PREMATURE VENTRICULAR CONTRACTIONS): ICD-10-CM

## 2024-11-11 DIAGNOSIS — I10 PRIMARY HYPERTENSION: ICD-10-CM

## 2024-11-11 DIAGNOSIS — K21.9 GASTROESOPHAGEAL REFLUX DISEASE, UNSPECIFIED WHETHER ESOPHAGITIS PRESENT: ICD-10-CM

## 2024-11-11 DIAGNOSIS — F17.200 NICOTINE DEPENDENCE, UNCOMPLICATED, UNSPECIFIED NICOTINE PRODUCT TYPE: ICD-10-CM

## 2024-11-11 DIAGNOSIS — I74.09 AORTOILIAC OCCLUSIVE DISEASE (H): Primary | ICD-10-CM

## 2024-11-11 DIAGNOSIS — Z87.11 HISTORY OF PEPTIC ULCER DISEASE: ICD-10-CM

## 2024-11-11 PROBLEM — D45 POLYCYTHEMIA VERA (H): Status: RESOLVED | Noted: 2022-06-15 | Resolved: 2024-11-11

## 2024-11-11 LAB
ALBUMIN SERPL BCG-MCNC: 4.5 G/DL (ref 3.5–5.2)
ALP SERPL-CCNC: 84 U/L (ref 40–150)
ALT SERPL W P-5'-P-CCNC: 22 U/L (ref 0–50)
ANION GAP SERPL CALCULATED.3IONS-SCNC: 11 MMOL/L (ref 7–15)
AST SERPL W P-5'-P-CCNC: 51 U/L (ref 0–45)
BILIRUB SERPL-MCNC: 0.7 MG/DL
BUN SERPL-MCNC: 10 MG/DL (ref 8–23)
CALCIUM SERPL-MCNC: 9.1 MG/DL (ref 8.8–10.4)
CHLORIDE SERPL-SCNC: 100 MMOL/L (ref 98–107)
CREAT SERPL-MCNC: 0.46 MG/DL (ref 0.51–0.95)
EGFRCR SERPLBLD CKD-EPI 2021: >90 ML/MIN/1.73M2
GLUCOSE SERPL-MCNC: 95 MG/DL (ref 70–99)
HCO3 SERPL-SCNC: 28 MMOL/L (ref 22–29)
MAGNESIUM SERPL-MCNC: 1.8 MG/DL (ref 1.7–2.3)
POTASSIUM SERPL-SCNC: 4 MMOL/L (ref 3.4–5.3)
PROT SERPL-MCNC: 6.7 G/DL (ref 6.4–8.3)
SODIUM SERPL-SCNC: 139 MMOL/L (ref 135–145)

## 2024-11-11 PROCEDURE — 99214 OFFICE O/P EST MOD 30 MIN: CPT | Performed by: INTERNAL MEDICINE

## 2024-11-11 PROCEDURE — 80053 COMPREHEN METABOLIC PANEL: CPT | Performed by: INTERNAL MEDICINE

## 2024-11-11 PROCEDURE — 36415 COLL VENOUS BLD VENIPUNCTURE: CPT | Performed by: INTERNAL MEDICINE

## 2024-11-11 PROCEDURE — 83735 ASSAY OF MAGNESIUM: CPT | Performed by: INTERNAL MEDICINE

## 2024-11-11 PROCEDURE — 93000 ELECTROCARDIOGRAM COMPLETE: CPT | Performed by: INTERNAL MEDICINE

## 2024-11-11 RX ORDER — LOSARTAN POTASSIUM 25 MG/1
25 TABLET ORAL DAILY
Qty: 90 TABLET | Refills: 0 | Status: SHIPPED | OUTPATIENT
Start: 2024-11-11

## 2024-11-11 RX ORDER — METOPROLOL SUCCINATE 100 MG/1
100 TABLET, EXTENDED RELEASE ORAL DAILY
Qty: 90 TABLET | Refills: 1 | Status: SHIPPED | OUTPATIENT
Start: 2024-11-11

## 2024-11-11 RX ORDER — ASPIRIN 81 MG/1
81 TABLET, CHEWABLE ORAL DAILY
COMMUNITY
Start: 2024-11-11

## 2024-11-11 ASSESSMENT — PAIN SCALES - GENERAL: PAINLEVEL_OUTOF10: NO PAIN (0)

## 2024-11-11 NOTE — PROGRESS NOTES
Assessment & Plan   Problem List Items Addressed This Visit       PAD (peripheral artery disease) (H)    Relevant Medications    aspirin (ASA) 81 MG chewable tablet    GERD (gastroesophageal reflux disease)    CLL (chronic lymphocytic leukemia) (H) (Chronic)    Aortoiliac occlusive disease (H) - Primary     Other Visit Diagnoses       Nicotine dependence, uncomplicated, unspecified nicotine product type        Relevant Orders    [place] Quit Partner Referral    PVC's (premature ventricular contractions)        Relevant Medications    metoprolol succinate ER (TOPROL XL) 100 MG 24 hr tablet    Other Relevant Orders    Comprehensive metabolic panel (BMP + Alb, Alk Phos, ALT, AST, Total. Bili, TP) (Completed)    EKG 12-lead complete w/read - Clinics (Completed)    Magnesium (Completed)    Primary hypertension        Relevant Medications    losartan (COZAAR) 25 MG tablet    History of peptic ulcer disease               Keep the same dose of metoprolol patient well tolerating ,heart rate remains above 55.  Holter monitor shows 6% PVCs needing metoprolol.  She follows with oncology for CLL stage 0.  CBC every 3 months.  Needs repeat chemistry panel.  Her TSH was normal.  Repeat EKG.    Keep well-hydrated.  She will schedule follow-up with GI to do an EGD; she is on  ticagrelor for at least 6 months.  She had significant heartburn ,no epigastric pain she will be probably getting an EGD as well.  She remains on Carafate and pantoprazole ; she understands to take them  by 2 hours at least.  Has cut down on smoking down to less than half pack a day.  Using Nicotine patches.    Follows with MTM.  Follows with vascular medicine for aortoiliac occlusive disease.  Continue follow-up with oncology.  Continue follow-up with cardiology.  Continue monitor blood pressure and call us with readings.    Continue to wean on smoking.  Continue to limit caffeine and increase fluid intake.     MED REC REQUIRED  Post Medication  Reconciliation Status: discharge medications reconciled, continue medications without change  Work on weight loss  Regular exercise  See Patient Instructions      Subjective   Humaira is a 73 year old, presenting for the following health issues:  ER F/U (URGENT CARE), Recheck Medication (Bp med), and Imm/Inj (Already have covid in september)        11/11/2024     9:18 AM   Additional Questions   Roomed by Antonia   Accompanied by Not applicable, by themselves     History of Present Illness       Hypertension: She presents for follow up of hypertension.  She does check blood pressure  regularly outside of the clinic. Outpatient blood pressures have not been over 140/90. She does not follow a low salt diet.     She eats 2-3 servings of fruits and vegetables daily.She consumes 2 sweetened beverage(s) daily.She exercises with enough effort to increase her heart rate 10 to 19 minutes per day.  She exercises with enough effort to increase her heart rate 3 or less days per week.   She is taking medications regularly.       ED/UC Followup:    Facility:  [facility]  Date of visit: 10/30/2024  Reason for visit: heart burn with metallic taste in mouth. (GERD)  Current Status:     Humaira presents for follow-up from urgent care.  She had palpitations ,she has in Holter monitor showed PVC 6% burden.  She has upped her dose of metoprolol to 100 mg dose and needs refill, she has been recording her blood pressure ranges anywhere between 1 15-1 53 systolic over 72-90 diastolic heart rate range anywhere between 53-70.  Patient denies being dizzy from the increased dose of medication or lightheadedness.  Has no diarrhea.  She needs update on her labs.  Echocardiogram showed ejection fraction 55 to 60% mild +1 regurg, right ventricle is normal    Review of Systems  Constitutional, HEENT, cardiovascular, pulmonary, gi and gu systems are negative, except as otherwise noted.      Objective    /77 (BP Location:  "Right arm)   Pulse 60   Temp 98  F (36.7  C) (Temporal)   Resp 18   Ht 1.626 m (5' 4\")   Wt 57.2 kg (126 lb)   SpO2 99%   BMI 21.63 kg/m    Body mass index is 21.63 kg/m .  Physical Exam   GENERAL: alert and no distress  EYES: Eyes grossly normal to inspection, PERRL and conjunctivae and sclerae normal  NECK: no adenopathy, no asymmetry, masses, or scars  RESP: lungs clear to auscultation - no rales, rhonchi or wheezes  CV: regular rate and rhythm, normal S1 S2, no S3 or S4, no murmur, click or rub, no peripheral edema  ABDOMEN: soft, nontender, no hepatosplenomegaly, no masses and bowel sounds normal  MS: no gross musculoskeletal defects noted, no edema  SKIN: no suspicious lesions or rashes  NEURO: Normal strength and tone, mentation intact and speech normal  PSYCH: mentation appears normal, affect normal/bright    Lab on 10/08/2024   Component Date Value Ref Range Status    WBC Count 10/08/2024 73.2 (HH)  4.0 - 11.0 10e3/uL Final    Preliminary ANC is 5.56    RBC Count 10/08/2024 4.83  3.80 - 5.20 10e6/uL Final    Hemoglobin 10/08/2024 15.3  11.7 - 15.7 g/dL Final    Hematocrit 10/08/2024 46.6  35.0 - 47.0 % Final    MCV 10/08/2024 97  78 - 100 fL Final    MCH 10/08/2024 31.7  26.5 - 33.0 pg Final    MCHC 10/08/2024 32.8  31.5 - 36.5 g/dL Final    RDW 10/08/2024 14.3  10.0 - 15.0 % Final    Platelet Count 10/08/2024 169  150 - 450 10e3/uL Final    % Neutrophils 10/08/2024 7  % Final    % Lymphocytes 10/08/2024 91  % Final    % Monocytes 10/08/2024 1  % Final    % Eosinophils 10/08/2024 1  % Final    % Basophils 10/08/2024 0  % Final    Absolute Neutrophils 10/08/2024 5.1  1.6 - 8.3 10e3/uL Final    Absolute Lymphocytes 10/08/2024 66.6 (H)  0.8 - 5.3 10e3/uL Final    Absolute Monocytes 10/08/2024 0.7  0.0 - 1.3 10e3/uL Final    Absolute Eosinophils 10/08/2024 0.7  0.0 - 0.7 10e3/uL Final    Absolute Basophils 10/08/2024 0.0  0.0 - 0.2 10e3/uL Final    RBC Morphology 10/08/2024 Confirmed RBC Indices   Final "    Platelet Assessment 10/08/2024 Automated Count Confirmed. Platelet morphology is normal.  Automated Count Confirmed. Platelet morphology is normal. Final           Signed Electronically by: Liane Claudio MD

## 2024-11-11 NOTE — TELEPHONE ENCOUNTER
RACHNA send message to  GI nurse to review referral.     Comments    Ronnie, LAUREN Martínez 10/30/2024 11:48 AM CDT  Accept.  Please schedule esophageal next available (patulous lower esophageal sphincter)   MD ONLY- saw [facility] 2023          Jeanie Estes CMA

## 2024-11-21 NOTE — TELEPHONE ENCOUNTER
[facility] called patient to offer help to get her scheduled for a sooner appointment with either Dr. Orona or Dr. Hussein at the [facility]. Patient did not answer the phone. Left VM informing patient of reason for call and requesting patient to call back. [facility] provided with call back number.      Jeanie Estes CMA

## 2024-11-25 NOTE — TELEPHONE ENCOUNTER
[facility] called patient to offer her help to get her scheduled for a sooner appointment with either Dr. Orona or Dr. Hussein at the [facility]. Patient did not answer the phone. Left VM informing patient of reason for call and requesting patient to call back. [facility] provided with call back number.        Jeanie Estes CMA

## 2024-12-09 ENCOUNTER — ALLIED HEALTH/NURSE VISIT (OUTPATIENT)
Dept: UROLOGY | Facility: CLINIC | Age: 74
End: 2024-12-09
Payer: COMMERCIAL

## 2024-12-09 DIAGNOSIS — N39.0 RECURRENT UTI: Primary | ICD-10-CM

## 2024-12-09 LAB
ALBUMIN UR-MCNC: NEGATIVE MG/DL
APPEARANCE UR: CLEAR
BILIRUB UR QL STRIP: NEGATIVE
COLOR UR AUTO: YELLOW
GLUCOSE UR STRIP-MCNC: NEGATIVE MG/DL
HGB UR QL STRIP: NEGATIVE
KETONES UR STRIP-MCNC: NEGATIVE MG/DL
LEUKOCYTE ESTERASE UR QL STRIP: NEGATIVE
NITRATE UR QL: NEGATIVE
PH UR STRIP: 7 [PH] (ref 5–7)
SP GR UR STRIP: 1.01 (ref 1–1.03)
UROBILINOGEN UR STRIP-ACNC: 0.2 E.U./DL

## 2024-12-09 PROCEDURE — 99211 OFF/OP EST MAY X REQ PHY/QHP: CPT

## 2024-12-09 PROCEDURE — 81003 URINALYSIS AUTO W/O SCOPE: CPT | Mod: QW

## 2024-12-09 PROCEDURE — 87086 URINE CULTURE/COLONY COUNT: CPT

## 2024-12-09 NOTE — NURSING NOTE
Karne Caban comes into clinic today at the request of Dr. Griffith Ordering Provider for UA/UC cath specimen.      This service provided today was under the supervising provider of the day Dr. Ordonez, who was available if needed.    [facility] sent to lab    ARLEN Cai CMA

## 2024-12-11 LAB — BACTERIA UR CULT: NO GROWTH

## 2024-12-30 ENCOUNTER — TELEPHONE (OUTPATIENT)
Dept: OTHER | Facility: CLINIC | Age: 74
End: 2024-12-30
Payer: COMMERCIAL

## 2024-12-30 NOTE — TELEPHONE ENCOUNTER
"RN reviewed chart. RN noted that pt was seen in  on 12/22/24 for leg pain. Per visit note from Dr. Gareth Arciniega: \"ASSESSMENT AND PLAN:    ICD-10-CM   1. Thrombophlebitis of superficial veins of right lower extremity I80.01       This appears to be quite superficial and associated with varicosity and no further imaging pursued today however patient precaution if this is enlarging or feeling worsening symptoms to return.     There are no Patient Instructions on file for this visit. \"      RN spoke with pt. Pt reports UC Provider thought it was a superficial vein. Was told in 1 week if no improvement should get an US. Pt reports her right leg hurts more. Swelling is the same as when she was seen in UC on 12/22. Has a knot in calf. States area is a little warm. Has tried hot/cold packs and compression stockings x 8 days. Has been elevating as well. Pt describes the pain as a constant ache in the lower calf. Rates 4-5/10. Has taken Tylenol with some relief. Denies chest pain, SOB or dizziness. RN agreed that pt should follow up in clinic for symptoms. Dr. Tran is out of office returning in clinic on 1/3/25. Pt informed of this. RN assisted with scheduling appt with Dr. Tran for 1/3. Pt was advised to go to ER if increase in pain, if she develops any chest pain, SOB or dizziness. Pt verbalized understanding.    Routing to Dr. Tran to advise if you would like any imaging completed prior to in clinic visit with you on 1/3/25?    Kalina Linares RN  [facility] [place]      "

## 2024-12-30 NOTE — TELEPHONE ENCOUNTER
"[facility]    Who is the name of the provider?:  DEMETRIS ALBRIGHT   What is the location you see this provider at/preferred location?: [place]  Person calling / Facility: Karen JODI Arsh  Phone number:  [phone] (home)   Nurse call back needed:  Yes or route to scheduling     Reason for call:  Patient was recently seen in [facility] ED for leg pain. Right lower calf has a \"knot\" in the vein that has not improved in the 8 days since ED visit.    Pharmacy location: n/a  Outside Imaging: n/a   Can we leave a detailed message on this number?  YES     12/30/2024, 8:37 AM    "

## 2025-01-02 ENCOUNTER — TELEPHONE (OUTPATIENT)
Dept: VASCULAR SURGERY | Facility: CLINIC | Age: 75
End: 2025-01-02

## 2025-01-02 ENCOUNTER — ANCILLARY PROCEDURE (OUTPATIENT)
Dept: ULTRASOUND IMAGING | Facility: CLINIC | Age: 75
End: 2025-01-02
Attending: SURGERY
Payer: COMMERCIAL

## 2025-01-02 DIAGNOSIS — I83.811 VARICOSE VEINS OF RIGHT LOWER EXTREMITY WITH PAIN: ICD-10-CM

## 2025-01-02 DIAGNOSIS — I83.811 VARICOSE VEINS OF RIGHT LOWER EXTREMITY WITH PAIN: Primary | ICD-10-CM

## 2025-01-02 PROCEDURE — 93971 EXTREMITY STUDY: CPT | Mod: RT | Performed by: SURGERY

## 2025-01-02 NOTE — TELEPHONE ENCOUNTER
Per staff message received above, pt needs to be seen at the Vein Clinic.    Routing to scheduling to contact patient to reschedule patient from [facility] to Vein Clinic with Dr. Tran.    Carolann Nolan, JJN, RN, CV-BC, [facility]

## 2025-01-02 NOTE — TELEPHONE ENCOUNTER
Spoke to patient she was provided Vein Clinic phone number and transferred to schedule with Dr. Tran.

## 2025-01-02 NOTE — TELEPHONE ENCOUNTER
Called pt back and got her scheduled for a right leg venous competency study for this afternoon and consult visit at veins with Dr. Tran. Pt c/o pain and swelling with her right leg varicose veins, went to  recently, may be having an episode of superficial thrombophlebitis.    Pt in agreement with appts and had no further questions.    Kailey Mauricio RN  [facility]  Vein Clinic

## 2025-01-02 NOTE — TELEPHONE ENCOUNTER
Patient has kristine VV with pain and swelling. No sores/ulcers. She also has spider veins. No previous vein tx. Per the patient she was seen at urgent care about 2 weeks ago and was told she needed an ultrasound. Patient was scheduled at [facility] but received a call today saying she needed to be seen in the vein clinic. She specifically wants to see K[facility]. Does she need an ultrasound or just a consult?    Leny KING [facility]

## 2025-01-07 ENCOUNTER — PATIENT OUTREACH (OUTPATIENT)
Dept: CARE COORDINATION | Facility: CLINIC | Age: 75
End: 2025-01-07
Payer: COMMERCIAL

## 2025-01-07 NOTE — TELEPHONE ENCOUNTER
Pt is now scheduled for an appointment with Dr. Jasmina Orona on 3/12/2025 at 1:30 PM for an in-person clinic visit. Closing encounter.

## 2025-01-15 ENCOUNTER — OFFICE VISIT (OUTPATIENT)
Dept: VASCULAR SURGERY | Facility: CLINIC | Age: 75
End: 2025-01-15
Payer: COMMERCIAL

## 2025-01-15 ENCOUNTER — ALLIED HEALTH/NURSE VISIT (OUTPATIENT)
Dept: VASCULAR SURGERY | Facility: CLINIC | Age: 75
End: 2025-01-15
Payer: COMMERCIAL

## 2025-01-15 DIAGNOSIS — I83.811 VARICOSE VEINS OF RIGHT LOWER EXTREMITY WITH PAIN: Primary | ICD-10-CM

## 2025-01-15 PROCEDURE — A6530 COMPRESSION STOCKING BK18-30: HCPCS

## 2025-01-15 PROCEDURE — 99207 PR NO CHARGE NURSE ONLY: CPT

## 2025-01-15 PROCEDURE — 99213 OFFICE O/P EST LOW 20 MIN: CPT | Performed by: SURGERY

## 2025-01-15 NOTE — PATIENT INSTRUCTIONS
Knee- High, medical grade, 20-30 mmHg strength, compression stockings are recommended (may be required if having a vein procedure or treatment)    Options for purchasing compression stockings:    Call your insurance company and ask if compression stockings are covered.  If they are covered, they usually fall under your Durable Medical Equipment (DME) coverage.  The DME codes if they ask are: Knee high , Thigh high , Panty .   Be sure to ask if you need a specific medical diagnosis for coverage, if your deductible needs to be met first, how many pairs are covered and how often you can get them.  If insurance covers them and you would like to order from [employer], or another medical supply company: contact the vein clinic and we will fax a prescription to your medical supply company of choice. They will bill your insurance and ship them to you. Please let us know your color choice (black or beige), and toe choice (open or closed toe) when contacting us.    2.  We sell regular sizes of [employer] in our clinic (except specialty order or petite sizing). Ask us to check if we have your size. We cannot bill your insurance for these purchases.  Knee high are $65/pair  Thigh high are $90/pair.   If you would like to purchase from the clinic, let us know including your color choice (black or beige), and toe choice (open or closed toe). We will set them aside for you to  and pay for at your next clinic appointment or the day of your procedure.    3.  Online website ordering options:   [email or web address]  [email or web address]  [employer].com has many options available.

## 2025-01-15 NOTE — PROGRESS NOTES
Karen Caban is a 74-year-old female who I know from arterial clinic.  She had a previous aortobiiliac bypass graft.  She had previously undergone the stenting of the native right external iliac artery and right limb of the aortobiiliac bypass graft.  She had developed recurrent stenosis and in November 2002 we did stent grafting of the proximal aspect of the right limb of the aortobiiliac bypass.  We also stent graft into the mid external iliac artery.  These were done with [place] VBX stent graft.  On recent imaging there were both known to be patent.    Recently patient developed an episode of thrombophlebitis in a cluster of varicose veins in the right calf.  This is starting to get better on its own.  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  I explained the pathophysiology of disease to her in detail.  I have advised her to wear compression stockings.  The rationale for not doing great saphenous vein ablation is as follows: If we ablated the great saphenous vein and in future if the aortobiiliac bypass graft goes down then I would like to have the great saphenous vein as an alternative outflow of the lower extremity venous circulation.  I explained all of this to her in detail and therefore I would strongly caution against ablating the great saphenous vein.  Patient understands this and is more than happy to continue to wear compression stockings.

## 2025-01-15 NOTE — LETTER
1/15/2025      Karen Caban  [address]  [place] [address]      Dear Colleague,    Thank you for referring your patient, Karen Caban, to the [facility] [place]. Please see a copy of my visit note below.    Karen Caban is a 74-year-old female who I know from arterial clinic.  She had a previous aortobiiliac bypass graft.  She had previously undergone the stenting of the native right external iliac artery and right limb of the aortobiiliac bypass graft.  She had developed recurrent stenosis and in November 2002 we did stent grafting of the proximal aspect of the right limb of the aortobiiliac bypass.  We also stent graft into the mid external iliac artery.  These were done with [place] VBX stent graft.  On recent imaging there were both known to be patent.    Recently patient developed an episode of thrombophlebitis in a cluster of varicose veins in the right calf.  This is starting to get better on its own.  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  I explained the pathophysiology of disease to her in detail.  I have advised her to wear compression stockings.  The rationale for not doing great saphenous vein ablation is as follows: If we ablated the great saphenous vein and in future if the aortobiiliac bypass graft goes down then I would like to have the great saphenous vein as an alternative outflow of the lower extremity venous circulation.  I explained all of this to her in detail and therefore I would strongly caution against ablating the great saphenous vein.  Patient understands this and is more than happy to continue to wear compression stockings.      Again, thank you for allowing me to participate in the care of your patient.        Sincerely,        Shaun Tran MD    Electronically signed

## 2025-01-16 ENCOUNTER — VIRTUAL VISIT (OUTPATIENT)
Dept: PHARMACY | Facility: CLINIC | Age: 75
End: 2025-01-16
Payer: COMMERCIAL

## 2025-01-16 DIAGNOSIS — I73.9 PAD (PERIPHERAL ARTERY DISEASE): ICD-10-CM

## 2025-01-16 DIAGNOSIS — J43.9 PULMONARY EMPHYSEMA, UNSPECIFIED EMPHYSEMA TYPE (H): ICD-10-CM

## 2025-01-16 DIAGNOSIS — E78.5 HYPERLIPIDEMIA WITH TARGET LDL LESS THAN 100: ICD-10-CM

## 2025-01-16 DIAGNOSIS — N95.2 ATROPHIC VAGINITIS: ICD-10-CM

## 2025-01-16 DIAGNOSIS — J30.2 SEASONAL ALLERGIC RHINITIS, UNSPECIFIED TRIGGER: ICD-10-CM

## 2025-01-16 DIAGNOSIS — K27.9 PUD (PEPTIC ULCER DISEASE): ICD-10-CM

## 2025-01-16 DIAGNOSIS — Z78.9 TAKES DIETARY SUPPLEMENTS: ICD-10-CM

## 2025-01-16 DIAGNOSIS — F17.200 SMOKER: ICD-10-CM

## 2025-01-16 DIAGNOSIS — I10 HTN (HYPERTENSION), BENIGN: Primary | ICD-10-CM

## 2025-01-16 NOTE — PROGRESS NOTES
"Medication Therapy Management (MTM) Encounter    ASSESSMENT:                            Medication Adherence/Access: No issues identified.    Hypertension   Stable. Patient is meeting blood pressure goal of < 130/80mmHg per her report.     Hyperlipidemia   Stable. LDL is at goal <70mg/dL given diagnosis of PAD.      GERD  Stable.     PAD  Stable.    COPD   Stable     Allergic rhinitis   Stable.     Tobacco Use Disorder  Patient continues to use tobacco.      Atrophic Vaginitis  Stable.    Supplements  Stable.     PLAN:                            Continue current medication regimen.     Follow-up: Return in about 1 year (around 1/16/2026) for Follow-up Medication Review.    SUBJECTIVE/OBJECTIVE:                          Humaira Caban is a 74 year old female seen for a follow-up visit.       Reason for visit: Routine follow-up.    Tobacco: She reports that she has been smoking cigarettes. She has a 12.5 pack-year smoking history. She has been exposed to tobacco smoke. She has never used smokeless tobacco.Nicotine/Tobacco Cessation Plan  See below  Alcohol: Less than 1 beverages / week    Medication Adherence/Access: no issues reported.    Hypertension   Chlorthalidone 25 mg daily  Metoprolol ER 100mg daily  Sublingual nitroglycerin - no recent use  Patient reports no current medication side effects.  Losartan had been added but caused dizziness so she stopped.  Patient self-monitors blood pressure and reports \"normal\"     Hyperlipidemia   Atorvastatin 40 mg daily   Ezetimibe 10mg daily  No side effects reported.     GERD    Pantoprazole 40 mg once daily   Patient reports no current symptoms.  No side effects reported.      PAD  Aspirin 81 mg daily  Brilinta 60 mg daily   No sign or symptoms of bleeding reported.     COPD  Combivent Respimat  mcg 1 puff every 6 hours as needed   Stiolto Respimat 2.5-2.5 mcg 2 puffs daily   She reports breathing has been stable.    Denies side effects.     Allergic Rhinitis   Flonase " "(fluticasone) nasal spray - 1-2 spray(s) each nostril once daily  Saline nasal spray as needed   Patient reports no current medication side effects.     Patient feels that current therapy is effective.       Tobacco Use Disorder  Nicotine 2 mg lozenge - using as needed   Nicotine 14 mg patch - using as needed   She reports things are going \"so so.\"  She finds these effective when needed, doesn't feel she needs additional support at this time.  She denies side effects.    Atrophic Vaginitis  Estradiol vaginal cream applied to vulva 3x/week  She reports this has been very helpful in preventing UTIs.    Supplements   Vitamin C 2 chew tablets daily - recommended by urology  Vitamin D3 5000 international unit(s) daily  CoQ10 200 mg daily   Cranberry w/Vit C&E 2 tablets daily - recommended by urology  Vitamin B12 1000mcg daily  Probiotic daily   Hair Skin & Nails daily   No reported issues at this time.      Today's Vitals: There were no vitals taken for this visit.  ----------------    I spent 7 minutes with this patient today.     A summary of these recommendations was sent via [employer].    Abril Lopez, PharmD, BCACP  Medication Therapy Management Provider  [phone]     Telemedicine Visit Details  The patient's medications can be safely assessed via a telemedicine encounter.  Type of service:  Telephone visit  Originating Location (pt. Location): Home    Distant Location (provider location):  Off-site  Start Time: 10:30 AM  End Time: 10:37 AM     Medication Therapy Recommendations  No medication therapy recommendations to display   "

## 2025-01-16 NOTE — LETTER
January 16, 2025  Karen Caban  [address]  [place] [address]    Dear Ms. Caban, [facility]     Thank you for talking with me on Jan 16, 2025 about your health and medications. As a follow-up to our conversation, I have included two documents:      Your Recommended To-Do List has steps you should take to get the best results from your medications.  Your Medication List will help you keep track of your medications and how to take them.    If you want to talk about these documents, please call Abril Lopez PharmD at phone: [phone], Monday-Friday 8-4:30pm.    I look forward to working with you and your doctors to make sure your medications work well for you.    Sincerely,  Abril Lopez PharmD  [facility] Pharmacist, [facility]

## 2025-01-16 NOTE — PROGRESS NOTES
Patient purchased 1 pair(s) of Black, Knee High, Open Toe, size 2 compression hose from the clinic today.     Informed patient all compression hose purchases are final.    Kailey Mauricio RN  [facility]  [facility]

## 2025-01-16 NOTE — LETTER
_  Medication List        Prepared on: Jan 16, 2025     Bring your Medication List when you go to the doctor, hospital, or   emergency room. And, share it with your family or caregivers.     Note any changes to how you take your medications.  Cross out medications when you no longer use them.    Medication How I take it Why I use it Prescriber   Ascorbic Acid (VITAMIN C GUMMIE PO) Take 2 chew tab by mouth daily General Health  Self   aspirin (ASA) 81 MG chewable tablet Take 1 tablet (81 mg) by mouth daily. PAD (Peripheral Artery Disease) Liane Claudio MD   atorvastatin (LIPITOR) 40 MG tablet Take 1 tablet (40 mg) by mouth daily Iliac artery stenosis, right Shaun Tran MD   chlorthalidone (HYGROTON) 25 MG tablet TAKE 1 TABLET(25 MG) BY MOUTH DAILY HTN (Hypertension), Benign Liane Claudio MD   Cholecalciferol (VITAMIN D-3) 5000 UNIT TABS Take 1 tablet by mouth daily. General Health  Self   coenzyme Q-10 200 MG CAPS Take 200 mg by mouth daily General Health  Self   Cranberry-Vitamin C-Vitamin E (CRANBERRY PLUS VITAMIN C) 4200-20-3 MG-MG-UNIT CAPS Take 2 tablets by mouth daily General Health  Self   Cyanocobalamin (B-12 PO) Take 1,000 mcg by mouth daily. Vegetarian Diet Lyudmila Escobar PA-C   estradiol (ESTRACE) 0.1 MG/GM vaginal cream Place 2 g vaginally twice a week Place a pea sized amount on your fingertip and apply to vulva 3x/week Atrophic Vaginitis Destiney Schaefer MD   ezetimibe (ZETIA) 10 MG tablet TAKE 1 TABLET(10 MG) BY MOUTH DAILY Hyperlipidemia with Target LDL Less than 100 Liane Claudio MD   fluticasone (FLONASE) 50 MCG/ACT nasal spray Spray 1-2 sprays in nostril daily Allergy Self   ipratropium-albuterol (COMBIVENT RESPIMAT)  MCG/ACT inhaler Inhale 1 puff into the lungs every 6 hours as needed for shortness of breath, wheezing or cough Pulmonary emphysema, unspecified emphysema type (H) Ez Keys MD   Lactobacillus (PROBIOTIC ACIDOPHILUS PO) Take 1 capsule  by mouth daily General Health  Self   metoprolol succinate ER (TOPROL XL) 100 MG 24 hr tablet Take 1 tablet (100 mg) by mouth daily. PVC's (Premature Ventricular Contractions) Liane Claudio MD   Multiple Vitamins-Minerals (HAIR SKIN & NAILS ADVANCED PO) Take 1 tablet by mouth daily General Health  Self   nicotine (COMMIT) 2 MG lozenge Place 2 mg inside cheek every hour as needed for smoking cessation Tobacco Use Disorder Patient Reported   nicotine (NICODERM CQ) 14 MG/24HR 24 hr patch Place 1 patch onto the skin daily as needed. Tobacco Use Disorder Patient Reported   nitroGLYcerin (NITROSTAT) 0.4 MG sublingual tablet For chest pain place 1 tablet under the tongue every 5 minutes for 3 doses. If symptoms persist 5 minutes after 1st dose call 911. Chest pain, unspecified type Joanna Kersten Ulmen Barthell, PA-C   pantoprazole (PROTONIX) 40 MG EC tablet TAKE 1 TABLET(40 MG) BY MOUTH DAILY Gastroesophageal Reflux Disease without Esophagitis Liane Claudio MD   Sodium Chloride-Xylitol (XLEAR SINUS CARE SPRAY NA) Spray 1 spray in nostril daily as needed Allergy Patient Reported   ticagrelor (BRILINTA) 60 MG tablet Take 1 tablet (60 mg) by mouth daily PAD (Peripheral Artery Disease) Shaun Tran MD   tiotropium-olodaterol (STIOLTO RESPIMAT) 2.5-2.5 MCG/ACT AERS Inhale 2 puffs into the lungs daily Pulmonary emphysema, unspecified emphysema type (H) Jolie Garsia PA-C         Add new medications, over-the-counter drugs, herbals, vitamins, or  minerals in the blank rows below.    Medication How I take it Why I use it Prescriber                                      Allergies:      - Chantix [varenicline] - Nausea  - Ciprofloxacin - Other (See Comments)  - Clopidogrel  - Decongestant [pseudoephedrine Hcl Er]  - Erythromycin - Nausea  - Lisinopril  - Plavix [clopidogrel Bisulfate]  - Rofecoxib - Unknown  - Vioxx        Side effects I have had:      Not on File        Other Information:               My notes and questions:

## 2025-01-16 NOTE — LETTER
"Recommended To-Do List      Prepared on: Jan 16, 2025       You can get the best results from your medications by completing the items on this \"To-Do List.\"      Bring your To-Do List when you go to your doctor. And, share it with your family or caregivers.    My To-Do List:  What we talked about: What I should do:   What my medicines are for, how to know if my medicines are working, made sure my medicines are safe for me and reviewed how to take my medicines.    Take my medicines every day               "

## 2025-01-16 NOTE — PATIENT INSTRUCTIONS
"Recommendations from today's MTM visit:                                                    MTM (medication therapy management) is a service provided by a clinical pharmacist designed to help you get the most of out of your medicines.   Today we reviewed what your medicines are for, how to know if they are working, that your medicines are safe and how to make your medicine regimen as easy as possible.      Continue current medication regimen.     Follow-up: Return in about 1 year (around 1/16/2026) for Follow-up Medication Review.    It was great speaking with you today.  I value your experience and would be very thankful for your time in providing feedback in our clinic survey. In the next few days, you may receive an email or text message from [facility] [employer] with a link to a survey related to your  clinical pharmacist.\"     To schedule another MTM appointment, please call the clinic directly or you may call the MTM scheduling line at [phone] or toll-free at [phone].     My Clinical Pharmacist's contact information:                                                      Please feel free to contact me with any questions or concerns you have.      Anjelica Azul PharmD  Medication Therapy Management Pharmacy Resident  [facility]  [phone]    Abril Lopez PharmD, [facility]  Medication Therapy Management Provider  [facility]  [phone]     "

## 2025-01-20 ENCOUNTER — TELEPHONE (OUTPATIENT)
Dept: ONCOLOGY | Facility: CLINIC | Age: 75
End: 2025-01-20

## 2025-01-20 ENCOUNTER — LAB (OUTPATIENT)
Dept: LAB | Facility: CLINIC | Age: 75
End: 2025-01-20
Payer: COMMERCIAL

## 2025-01-20 DIAGNOSIS — C91.10 CLL (CHRONIC LYMPHOCYTIC LEUKEMIA) (H): ICD-10-CM

## 2025-01-20 LAB
ACANTHOCYTES BLD QL SMEAR: SLIGHT
BASOPHILS # BLD AUTO: 0.3 10E3/UL (ref 0–0.2)
BASOPHILS NFR BLD AUTO: 0 %
EOSINOPHIL # BLD AUTO: 0.3 10E3/UL (ref 0–0.7)
EOSINOPHIL NFR BLD AUTO: 0 %
ERYTHROCYTE [DISTWIDTH] IN BLOOD BY AUTOMATED COUNT: 14.3 % (ref 10–15)
HCT VFR BLD AUTO: 47.3 % (ref 35–47)
HGB BLD-MCNC: 16 G/DL (ref 11.7–15.7)
IGG SERPL-MCNC: 493 MG/DL (ref 610–1616)
IMM GRANULOCYTES # BLD: 0.2 10E3/UL
IMM GRANULOCYTES NFR BLD: 0 %
LYMPHOCYTES # BLD AUTO: 84.5 10E3/UL (ref 0.8–5.3)
LYMPHOCYTES NFR BLD AUTO: 91 %
MCH RBC QN AUTO: 33.5 PG (ref 26.5–33)
MCHC RBC AUTO-ENTMCNC: 33.8 G/DL (ref 31.5–36.5)
MCV RBC AUTO: 99 FL (ref 78–100)
MONOCYTES # BLD AUTO: 2 10E3/UL (ref 0–1.3)
MONOCYTES NFR BLD AUTO: 2 %
NEUTROPHILS # BLD AUTO: 5.2 10E3/UL (ref 1.6–8.3)
NEUTROPHILS NFR BLD AUTO: 6 %
NRBC # BLD AUTO: 0 10E3/UL
NRBC BLD AUTO-RTO: 0 /100
PLAT MORPH BLD: ABNORMAL
PLATELET # BLD AUTO: 189 10E3/UL (ref 150–450)
RBC # BLD AUTO: 4.77 10E6/UL (ref 3.8–5.2)
RBC MORPH BLD: ABNORMAL
WBC # BLD AUTO: 92.5 10E3/UL (ref 4–11)

## 2025-01-20 PROCEDURE — 36415 COLL VENOUS BLD VENIPUNCTURE: CPT

## 2025-01-20 PROCEDURE — 85025 COMPLETE CBC W/AUTO DIFF WBC: CPT

## 2025-01-20 PROCEDURE — 82784 ASSAY IGA/IGD/IGG/IGM EACH: CPT

## 2025-01-20 NOTE — CONFIDENTIAL NOTE
Called pt to notify her of lab results. Phone VM was not private, did not leave VM with results but asked pt to call triage to discuss.

## 2025-01-20 NOTE — CONFIDENTIAL NOTE
Humaira returned phone call. Writer discussed results with pt. Pt verbalized understanding and will follow up as scheduled.

## 2025-01-20 NOTE — TELEPHONE ENCOUNTER
DATE/TIME OF CALL RECEIVED FROM LAB:  01/20/25 at 8:46 AM     LAB TEST & LAB VALUE: WBC 90.91 preliminary; lymphocyte percentage 91.9        Previous Labs from 10/8/2024: WBC 73.2    PROVIDER NOTIFIED?: Yes    PROVIDER NAME: Dr Osorio     TIME LAB VALUE REPORTED TO PROVIDER:   8:49 AM     MECHANISM OF PROVIDER NOTIFICATION:  high priority in basket message     PROVIDER RESPONSE: awaiting reply    Lizbeth Silva RN on 1/20/2025 at 8:50 AM

## 2025-01-21 DIAGNOSIS — E78.5 HYPERLIPIDEMIA WITH TARGET LDL LESS THAN 100: ICD-10-CM

## 2025-01-21 DIAGNOSIS — K21.9 GASTROESOPHAGEAL REFLUX DISEASE WITHOUT ESOPHAGITIS: ICD-10-CM

## 2025-01-21 RX ORDER — EZETIMIBE 10 MG/1
TABLET ORAL
Qty: 90 TABLET | Refills: 0 | Status: SHIPPED | OUTPATIENT
Start: 2025-01-21

## 2025-01-21 RX ORDER — PANTOPRAZOLE SODIUM 40 MG/1
40 TABLET, DELAYED RELEASE ORAL DAILY
Qty: 90 TABLET | Refills: 0 | Status: SHIPPED | OUTPATIENT
Start: 2025-01-21

## 2025-01-22 NOTE — RESULT ENCOUNTER NOTE
Dear Ms. Caban,    Blood test reveals increase in WBC to 92.5. Hemoglobin and platelet are normal. CLL is stable.    IgG is low at 493.    We will monitor these labs.    Please, call me with any questions.    Sancho Osorio MD

## 2025-01-24 SDOH — HEALTH STABILITY: PHYSICAL HEALTH: ON AVERAGE, HOW MANY MINUTES DO YOU ENGAGE IN EXERCISE AT THIS LEVEL?: 30 MIN

## 2025-01-24 SDOH — HEALTH STABILITY: PHYSICAL HEALTH: ON AVERAGE, HOW MANY DAYS PER WEEK DO YOU ENGAGE IN MODERATE TO STRENUOUS EXERCISE (LIKE A BRISK WALK)?: 7 DAYS

## 2025-01-24 ASSESSMENT — SOCIAL DETERMINANTS OF HEALTH (SDOH): HOW OFTEN DO YOU GET TOGETHER WITH FRIENDS OR RELATIVES?: MORE THAN THREE TIMES A WEEK

## 2025-01-28 DIAGNOSIS — I10 HTN (HYPERTENSION), BENIGN: ICD-10-CM

## 2025-01-28 RX ORDER — CHLORTHALIDONE 25 MG/1
25 TABLET ORAL DAILY
Qty: 90 TABLET | Refills: 1 | Status: SHIPPED | OUTPATIENT
Start: 2025-01-28

## 2025-01-29 ENCOUNTER — OFFICE VISIT (OUTPATIENT)
Dept: FAMILY MEDICINE | Facility: CLINIC | Age: 75
End: 2025-01-29
Payer: COMMERCIAL

## 2025-01-29 VITALS
OXYGEN SATURATION: 100 % | HEART RATE: 69 BPM | RESPIRATION RATE: 16 BRPM | WEIGHT: 125.4 LBS | HEIGHT: 64 IN | BODY MASS INDEX: 21.41 KG/M2 | TEMPERATURE: 98 F | DIASTOLIC BLOOD PRESSURE: 67 MMHG | SYSTOLIC BLOOD PRESSURE: 117 MMHG

## 2025-01-29 DIAGNOSIS — Z23 NEED FOR TDAP VACCINATION: ICD-10-CM

## 2025-01-29 DIAGNOSIS — Z00.00 MEDICARE ANNUAL WELLNESS VISIT, SUBSEQUENT: ICD-10-CM

## 2025-01-29 DIAGNOSIS — S81.801A OPEN WOUND OF RIGHT LOWER EXTREMITY, INITIAL ENCOUNTER: ICD-10-CM

## 2025-01-29 DIAGNOSIS — Z87.891 HISTORY OF TOBACCO USE, PRESENTING HAZARDS TO HEALTH: Primary | ICD-10-CM

## 2025-01-29 DIAGNOSIS — I74.09 AORTOILIAC OCCLUSIVE DISEASE (H): ICD-10-CM

## 2025-01-29 DIAGNOSIS — E78.5 HYPERLIPIDEMIA WITH TARGET LDL LESS THAN 100: ICD-10-CM

## 2025-01-29 DIAGNOSIS — I10 PRIMARY HYPERTENSION: ICD-10-CM

## 2025-01-29 DIAGNOSIS — C91.11 CHRONIC LYMPHOCYTIC LEUKEMIA OF B-CELL TYPE IN REMISSION (H): ICD-10-CM

## 2025-01-29 DIAGNOSIS — Z13.1 SCREENING FOR DIABETES MELLITUS: ICD-10-CM

## 2025-01-29 DIAGNOSIS — E78.5 HYPERLIPIDEMIA LDL GOAL <70: ICD-10-CM

## 2025-01-29 DIAGNOSIS — E55.9 VITAMIN D DEFICIENCY: ICD-10-CM

## 2025-01-29 DIAGNOSIS — Z23 NEED FOR VACCINATION: ICD-10-CM

## 2025-01-29 LAB
EST. AVERAGE GLUCOSE BLD GHB EST-MCNC: 108 MG/DL
HBA1C MFR BLD: 5.4 % (ref 0–5.6)

## 2025-01-29 PROCEDURE — G0439 PPPS, SUBSEQ VISIT: HCPCS | Performed by: INTERNAL MEDICINE

## 2025-01-29 PROCEDURE — 90471 IMMUNIZATION ADMIN: CPT | Performed by: INTERNAL MEDICINE

## 2025-01-29 PROCEDURE — 90715 TDAP VACCINE 7 YRS/> IM: CPT | Performed by: INTERNAL MEDICINE

## 2025-01-29 PROCEDURE — 82570 ASSAY OF URINE CREATININE: CPT | Performed by: INTERNAL MEDICINE

## 2025-01-29 PROCEDURE — 99214 OFFICE O/P EST MOD 30 MIN: CPT | Mod: 25 | Performed by: INTERNAL MEDICINE

## 2025-01-29 PROCEDURE — 80053 COMPREHEN METABOLIC PANEL: CPT | Performed by: INTERNAL MEDICINE

## 2025-01-29 PROCEDURE — 80061 LIPID PANEL: CPT | Performed by: INTERNAL MEDICINE

## 2025-01-29 PROCEDURE — 83735 ASSAY OF MAGNESIUM: CPT | Performed by: INTERNAL MEDICINE

## 2025-01-29 PROCEDURE — 83036 HEMOGLOBIN GLYCOSYLATED A1C: CPT | Performed by: INTERNAL MEDICINE

## 2025-01-29 PROCEDURE — 36415 COLL VENOUS BLD VENIPUNCTURE: CPT | Performed by: INTERNAL MEDICINE

## 2025-01-29 PROCEDURE — 82043 UR ALBUMIN QUANTITATIVE: CPT | Performed by: INTERNAL MEDICINE

## 2025-01-29 ASSESSMENT — PAIN SCALES - GENERAL: PAINLEVEL_OUTOF10: NO PAIN (0)

## 2025-01-29 NOTE — PROGRESS NOTES
Preventive Care Visit  [facility]  Liane Claudio MD, Internal Medicine  Jan 29, 2025      Assessment & Plan   Problem List Items Addressed This Visit       Vitamin D deficiency    Hyperlipidemia with target LDL less than 100    Aortoiliac occlusive disease (H)     Other Visit Diagnoses       History of tobacco use, presenting hazards to health    -  Primary    Relevant Orders    CT Chest Lung Cancer Screen Low Dose Without    Medicare annual wellness visit, subsequent        Hyperlipidemia LDL goal <70        Relevant Orders    Lipid panel reflex to direct LDL Fasting    Primary hypertension        Relevant Orders    Comprehensive metabolic panel (BMP + Alb, Alk Phos, ALT, AST, Total. Bili, TP)    Albumin Random Urine Quantitative with Creat Ratio    Screening for diabetes mellitus        Relevant Orders    Hemoglobin A1c (Completed)    Chronic lymphocytic leukemia of B-cell type in remission (H)        Need for vaccination        Need for Tdap vaccination        Open wound of right lower extremity, initial encounter               Counseled on smoking cessation agrees to see MTM to start on Zyban or Wellbutrin.  She does have history of hypertension well-controlled.    She has not been successful with nicotine patches.    On statins well-tolerated.    Continue follow-up with vascular surgery Dr. Tran.    Continue follow-up with oncology CLL closely monitoring white count which is slightly increasing.    Continue with exercise activities as tolerated.    Check some basic labs.  Preventative screening labs.    Up-to-date on colonoscopy.    Up-to-date on bone densitometry.    Due for mammogram.      Continue to apply Vaseline or bacitracin ointment and apply pressure with a wrap. Change of dressing applied today. No evidence of cellulitis. Production. No bleeding from the wound.   Patient has been advised of split billing requirements and indicates understanding: Yes        Counseling  Appropriate preventive services were addressed with this patient via screening, questionnaire, or discussion as appropriate for fall prevention, nutrition, physical activity, Tobacco-use cessation, social engagement, weight loss and cognition.  Checklist reviewing preventive services available has been given to the patient.  Reviewed patient's diet, addressing concerns and/or questions.     Work on weight loss  Regular exercise  See Patient Instructions      Yeni Gerardo is a 74 year old, presenting for the following:  Physical and Cough (dry)        1/29/2025     9:46 AM   Additional Questions   Roomed by lucia ESPINOSA  Brendan presented for follow-up.  Doing relatively well.  Up-to-date on her vaccine but the pertussis is lacking.  Agrees to get the DTaP repeated today.  She continues smoke half pack a day.  She is due for follow-up with pulmonary specialist, she is on Stiolto inhaler twice a day and Combivent as needed.  Blood pressure usually is controlled at home 120s to 130s systolic elevated in the clinic.  She injured her right distal chin lateral aspect she had on a box at home had some bleeding secondary, she applied pressure, bleeding stopped.  Colonoscopy in March 2023.  Repeat in 5 to 7 years.  Wearing compression socks, had superficial thrombophlebitis of right lower extremity last year.  Follows with vascular surgery.  Walks her dog twice or 3 times a day  Walks 30 to 40 minutes at a time.  Last echo shows normal LV function..  No palpitations and chest.  Health Care Directive  Patient does not have a Health Care Directive: Advance Directive received and scanned. Click on Code in the patient header to view.      1/24/2025   General Health   How would you rate your overall physical health? Good   Feel stress (tense, anxious, or unable to sleep) Patient declined         1/24/2025   Nutrition   Diet: Vegetarian/vegan         1/24/2025   Exercise   Days per week of  moderate/strenous exercise 7 days   Average minutes spent exercising at this level 30 min         1/24/2025   Social Factors   Frequency of gathering with friends or relatives More than three times a week   Worry food won't last until get money to buy more No    No   Food not last or not have enough money for food? No    No   Do you have housing? (Housing is defined as stable permanent housing and does not include staying ouside in a car, in a tent, in an abandoned building, in an overnight shelter, or couch-surfing.) Yes    Yes   Are you worried about losing your housing? No    No   Lack of transportation? No    No   Unable to get utilities (heat,electricity)? No    No       Multiple values from one day are sorted in reverse-chronological order         1/24/2025   Fall Risk   Fallen 2 or more times in the past year? No    No   Trouble with walking or balance? No    No       Multiple values from one day are sorted in reverse-chronological order          1/24/2025   Activities of Daily Living- Home Safety   Needs help with the following daily activites None of the above   Safety concerns in the home None of the above         1/24/2025   Dental   Dentist two times every year? Yes         1/24/2025   Hearing Screening   Hearing concerns? None of the above         1/24/2025   Driving Risk Screening   Patient/family members have concerns about driving No         1/24/2025   General Alertness/Fatigue Screening   Have you been more tired than usual lately? No         1/24/2025   Urinary Incontinence Screening   Bothered by leaking urine in past 6 months No         1/24/2025   TB Screening   Were you born outside of the [place]? No         Today's PHQ-2 Score:       1/28/2025     3:20 PM   PHQ-2 ( 1999 [employer])   Q1: Little interest or pleasure in doing things 0   Q2: Feeling down, depressed or hopeless 0   PHQ-2 Score 0    Q1: Little interest or pleasure in doing things Not at all   Q2: Feeling down, depressed or hopeless Not at  all   PHQ-2 Score 0       Patient-reported           1/24/2025   Substance Use   Alcohol more than 3/day or more than 7/wk No   Do you have a current opioid prescription? No   How severe/bad is pain from 1 to 10? 0/10 (No Pain)   Do you use any other substances recreationally? No     Social History     Tobacco Use    Smoking status: Every Day     Current packs/day: 0.25     Average packs/day: 0.3 packs/day for 50.0 years (12.5 ttl pk-yrs)     Types: Cigarettes     Passive exposure: Current    Smokeless tobacco: Never    Tobacco comments:     Nicotine patches on and off   Vaping Use    Vaping status: Some Days   Substance Use Topics    Alcohol use: Yes     Comment: Beer once in a while    Drug use: No           2/1/2024   LAST FHS-7 RESULTS   1st degree relative breast or ovarian cancer No   Any relative bilateral breast cancer No   Any male have breast cancer No   Any ONE woman have BOTH breast AND ovarian cancer No   Any woman with breast cancer before 50yrs No   2 or more relatives with breast AND/OR ovarian cancer No   2 or more relatives with breast AND/OR bowel cancer No        Mammogram Screening - Mammogram every 1-2 years updated in Health Maintenance based on mutual decision making    ASCVD Risk   The ASCVD Risk score (Agustin DK, et al., 2019) failed to calculate for the following reasons:    The valid total cholesterol range is 130 to 320 mg/dL            Reviewed and updated as needed this visit by Provider                    Past Medical History:   Diagnosis Date    Ankle fracture 2009    L    Anxiety     Chronic back pain     Chronic lymphocytic leukemia (H)     Colon polyp 2012    repeat colonoscopy 5 years.    Fx low femur epiphy-closed (H)     GERD (gastroesophageal reflux disease)     History of UTI 2017    Cysto by Dr Agustin neg    HTN (hypertension), benign     Hyperlipidemia LDL goal < 100     Mumps     Normal nuclear stress test 12/2009    EF 67%    Osteopenia     PAD (peripheral artery  disease)     s/p fem pop bypass; embolectomy    Polycythemia vera (H) 06/15/2022    PONV (postoperative nausea and vomiting)     PUD (peptic ulcer disease) 1980s    DU    Pulmonary emphysema, unspecified emphysema type (H) 01/12/2023    Smoker     Spider veins     Vitamin D deficiency      Past Surgical History:   Procedure Laterality Date    ABDOMEN SURGERY      ANGIOGRAM Right 11/18/2022    Procedure: ANGIOGRAM AORTO ILIAC ANGIOGRAM;  Surgeon: Shaun Tran MD;  Location:  OR    BIOPSY      Blood clot removal from Stent      2011    BONE MARROW BIOPSY, BONE SPECIMEN, NEEDLE/TROCAR N/A 02/02/2021    Procedure: bone marrow biopsy;  Surgeon: Kailey Cota MD;  Location:  GI    BYPASS GRAFT AORTOFEMORAL  05/2002    Dr. Turner    BYPASS GRAFT FEMOROPOPLITEAL  12/16/2011    COLONOSCOPY N/A 01/18/2018    Procedure: COMBINED COLONOSCOPY, SINGLE OR MULTIPLE BIOPSY/POLYPECTOMY BY BIOPSY;  COLONOSCOPY;  Surgeon: Efrain Hernadez MD;  Location:  GI    COLONOSCOPY N/A 03/29/2023    Procedure: COLONOSCOPY, FLEXIBLE, WITH LESION REMOVAL USING SNARE;  Surgeon: Jony Manriquez MD;  Location:  GI    DILATION AND CURETTAGE, OPERATIVE HYSTEROSCOPY, COMBINED N/A 09/15/2022    Procedure: HYSTEROSCOPY, WITH DILATION AND CURETTAGE OF UTERUS;  Surgeon: Destiney Schaefer MD;  Location:  OR    EMBOLECTOMY LOWER EXTREMITY  12/16/2011    Procedure:EMBOLECTOMY LOWER EXTREMITY; EMBOLECTOMY, RIGHT POPLITEAL WITH PATCH ANGIOPLASTY. EXCISION SKIN LESION RIGHT LEG. ; Surgeon:PARMINDER VELAZCO; Location: OR    ENDARTERECTOMY FEMORAL Right 11/18/2022    Procedure: ENDARTERECTOMY, FEMORAL RIGHT FEMORAL CUTDOWN, RIGHT ILIAC ARTERY STENTING.;  Surgeon: Shaun Tran MD;  Location:  OR    ESOPHAGOSCOPY, GASTROSCOPY, DUODENOSCOPY (EGD), COMBINED N/A 10/07/2022    Procedure: ESOPHAGOGASTRODUODENOSCOPY, WITH BIOPSY;  Surgeon: Felix Carmona MD;  Location:  GI    EXCISE LESION LOWER EXTREMITY   2011    Procedure:EXCISE LESION LOWER EXTREMITY; Surgeon:PARMINDER VELAZCO; Location: OR    HERNIA REPAIR  11/04/2008    x2 umbilical    IR OR ANGIOGRAM  2022    L achilles repair  2008    LAPAROSCOPIC OOPHORECTOMY Left     L for cyst age 26    miscarriages x 3      tubal ligation and reversal       OB History    Para Term  AB Living   4 1 1 0 3 1   SAB IAB Ectopic Multiple Live Births   0 0 0 0 1      # Outcome Date GA Lbr Robert/2nd Weight Sex Type Anes PTL Lv   4 Term      Vag-Spont   LIVE BIRTH   3 AB            2 AB            1 AB              Lab work is in process  Labs reviewed in EPIC  BP Readings from Last 3 Encounters:   25 117/67   24 127/59   24 125/77    Wt Readings from Last 3 Encounters:   25 56.9 kg (125 lb 6.4 oz)   24 57.2 kg (126 lb)   24 57.2 kg (126 lb)                  Patient Active Problem List   Diagnosis    Vitamin D deficiency    Osteopenia    Hyperlipidemia with target LDL less than 100    Fx low femur epiphy-closed (H)    Chronic back pain    PAD (peripheral artery disease)    PUD (peptic ulcer disease)    Normal nuclear stress test    Smoker    Colon polyp    GERD (gastroesophageal reflux disease)    Anxiety    Controlled substance agreement signed on 2014 with Lyudmila MORFIN    Gastroesophageal reflux disease without esophagitis    Abnormal CT lung screening    CLL (chronic lymphocytic leukemia) (H)    Iliac artery stenosis, right    Aortoiliac occlusive disease (H)     Past Surgical History:   Procedure Laterality Date    ABDOMEN SURGERY      ANGIOGRAM Right 2022    Procedure: ANGIOGRAM AORTO ILIAC ANGIOGRAM;  Surgeon: Shaun Tran MD;  Location:  OR    BIOPSY      Blood clot removal from Stent          BONE MARROW BIOPSY, BONE SPECIMEN, NEEDLE/TROCAR N/A 2021    Procedure: bone marrow biopsy;  Surgeon: Kailey Cota MD;  Location:  GI    BYPASS GRAFT AORTOFEMORAL   05/2002    Dr. Turner    BYPASS GRAFT FEMOROPOPLITEAL  12/16/2011    COLONOSCOPY N/A 01/18/2018    Procedure: COMBINED COLONOSCOPY, SINGLE OR MULTIPLE BIOPSY/POLYPECTOMY BY BIOPSY;  COLONOSCOPY;  Surgeon: Efrain Hernadez MD;  Location:  GI    COLONOSCOPY N/A 03/29/2023    Procedure: COLONOSCOPY, FLEXIBLE, WITH LESION REMOVAL USING SNARE;  Surgeon: Jony Manriquez MD;  Location:  GI    DILATION AND CURETTAGE, OPERATIVE HYSTEROSCOPY, COMBINED N/A 09/15/2022    Procedure: HYSTEROSCOPY, WITH DILATION AND CURETTAGE OF UTERUS;  Surgeon: Destiney Schaefer MD;  Location:  OR    EMBOLECTOMY LOWER EXTREMITY  12/16/2011    Procedure:EMBOLECTOMY LOWER EXTREMITY; EMBOLECTOMY, RIGHT POPLITEAL WITH PATCH ANGIOPLASTY. EXCISION SKIN LESION RIGHT LEG. ; Surgeon:PARMINDER VELAZCO; Location: OR    ENDARTERECTOMY FEMORAL Right 11/18/2022    Procedure: ENDARTERECTOMY, FEMORAL RIGHT FEMORAL CUTDOWN, RIGHT ILIAC ARTERY STENTING.;  Surgeon: Shaun Tran MD;  Location:  OR    ESOPHAGOSCOPY, GASTROSCOPY, DUODENOSCOPY (EGD), COMBINED N/A 10/07/2022    Procedure: ESOPHAGOGASTRODUODENOSCOPY, WITH BIOPSY;  Surgeon: Felix Carmona MD;  Location:  GI    EXCISE LESION LOWER EXTREMITY  12/16/2011    Procedure:EXCISE LESION LOWER EXTREMITY; Surgeon:PARMINDER VELAZCO; Location: OR    HERNIA REPAIR  11/04/2008    x2 umbilical    IR OR ANGIOGRAM  11/18/2022    L achilles repair  12/04/2008    LAPAROSCOPIC OOPHORECTOMY Left     L for cyst age 26    miscarriages x 3      tubal ligation and reversal         Social History     Tobacco Use    Smoking status: Every Day     Current packs/day: 0.25     Average packs/day: 0.3 packs/day for 50.0 years (12.5 ttl pk-yrs)     Types: Cigarettes     Passive exposure: Current    Smokeless tobacco: Never    Tobacco comments:     Nicotine patches on and off   Substance Use Topics    Alcohol use: Yes     Comment: Beer once in a while     Family History   Problem Relation Age of  Onset    Alzheimer Disease Mother          of panc/GI ca age 83    Osteoporosis Mother     Other Cancer Mother     Cancer Father          age 64 lymphoma    Hyperlipidemia Father     Depression Father         None    Colon Cancer Maternal Grandfather     Hypertension Sister          Current Outpatient Medications   Medication Sig Dispense Refill    Ascorbic Acid (VITAMIN C GUMMIE PO) Take 2 chew tab by mouth daily      aspirin (ASA) 81 MG chewable tablet Take 1 tablet (81 mg) by mouth daily.      atorvastatin (LIPITOR) 40 MG tablet Take 1 tablet (40 mg) by mouth daily 90 tablet 3    chlorthalidone (HYGROTON) 25 MG tablet TAKE 1 TABLET(25 MG) BY MOUTH DAILY 90 tablet 1    Cholecalciferol (VITAMIN D-3) 5000 UNIT TABS Take 1 tablet by mouth daily.      coenzyme Q-10 200 MG CAPS Take 200 mg by mouth daily      Cranberry-Vitamin C-Vitamin E (CRANBERRY PLUS VITAMIN C) 4200-20-3 MG-MG-UNIT CAPS Take 2 tablets by mouth daily      Cyanocobalamin (B-12 PO) Take 1,000 mcg by mouth daily. 100 tablet 1    estradiol (ESTRACE) 0.1 MG/GM vaginal cream Place 2 g vaginally twice a week Place a pea sized amount on your fingertip and apply to vulva 3x/week 42.5 g 1    ezetimibe (ZETIA) 10 MG tablet TAKE 1 TABLET(10 MG) BY MOUTH DAILY 90 tablet 0    fluticasone (FLONASE) 50 MCG/ACT nasal spray Spray 1-2 sprays in nostril daily      ipratropium-albuterol (COMBIVENT RESPIMAT)  MCG/ACT inhaler Inhale 1 puff into the lungs every 6 hours as needed for shortness of breath, wheezing or cough 4 g 11    Lactobacillus (PROBIOTIC ACIDOPHILUS PO) Take 1 capsule by mouth daily      metoprolol succinate ER (TOPROL XL) 100 MG 24 hr tablet Take 1 tablet (100 mg) by mouth daily. 90 tablet 1    Multiple Vitamins-Minerals (HAIR SKIN & NAILS ADVANCED PO) Take 1 tablet by mouth daily      nicotine (COMMIT) 2 MG lozenge Place 2 mg inside cheek every hour as needed for smoking cessation      nicotine (NICODERM CQ) 14 MG/24HR 24 hr patch Place 1  patch onto the skin daily as needed.      nitroGLYcerin (NITROSTAT) 0.4 MG sublingual tablet For chest pain place 1 tablet under the tongue every 5 minutes for 3 doses. If symptoms persist 5 minutes after 1st dose call 911. 20 tablet 0    pantoprazole (PROTONIX) 40 MG EC tablet TAKE 1 TABLET(40 MG) BY MOUTH DAILY 90 tablet 0    Sodium Chloride-Xylitol (XLEAR SINUS CARE SPRAY NA) Spray 1 spray in nostril daily as needed      ticagrelor (BRILINTA) 60 MG tablet Take 1 tablet (60 mg) by mouth daily 90 tablet 2    tiotropium-olodaterol (STIOLTO RESPIMAT) 2.5-2.5 MCG/ACT AERS Inhale 2 puffs into the lungs daily 12 g 3     Allergies   Allergen Reactions    Chantix [Varenicline] Nausea    Ciprofloxacin Other (See Comments)     hypertension    Clopidogrel      Other reaction(s): Hypertension    Decongestant [Pseudoephedrine Hcl Er]     Erythromycin Nausea    Lisinopril      cough    Plavix [Clopidogrel Bisulfate]      Felt as if she was having a heart attack    Rofecoxib Unknown    Vioxx      Increased BP     Recent Labs   Lab Test 01/29/25  1059 11/11/24  1017 09/24/24  1412 08/28/24  1059 04/15/24  0751 04/03/24  0938 01/17/24  0859 11/14/23  1545 09/12/23  1255 07/02/23  1444 05/22/23  1358 02/28/23  0726 01/04/23  1018 11/30/21  0900 06/01/21  0909 02/03/21  1115 12/20/17  0950 12/08/17  0732   A1C 5.4  --   --   --   --   --   --   --   --   --   --   --  5.4  --   --   --   --   --    LDL  --   --   --   --  56  --  62  --  47  --   --    < > 82   < >  --   --    < > 87   HDL  --   --   --   --  44*  --  46*  --  50  --   --    < > 56   < >  --   --    < > 61   TRIG  --   --   --   --  72  --  82  --  108  --   --    < > 84   < >  --   --    < > 85   ALT  --  22  --   --   --   --   --   --   --  27 26   < > 22   < > 31  --    < > 33   CR  --  0.46* 0.8  --   --  0.66 0.62   < > 0.61 0.53 0.69   < > 0.60   < > 0.71  --    < > 0.72   GFRESTIMATED  --  >90 >60  --   --  >90 >90   < > >90 >90 >90   < > >90   < > 87 71   <  > 81   GFRESTBLACK  --   --   --   --   --   --   --   --   --   --   --   --   --   --  >90 86   < > >90   POTASSIUM  --  4.0  --   --   --  4.4 4.4   < > 4.7 3.3* 3.4   < > 3.7   < > 3.1*  --    < > 4.0   TSH  --   --   --  1.31  --   --   --   --   --   --   --   --   --   --   --   --   --  1.38    < > = values in this interval not displayed.      Current providers sharing in care for this patient include:  Patient Care Team:  Liane Claudio MD as PCP - General (Internal Medicine)  Liane Claudio MD as Assigned PCP  Abril Lopez PharmD as Pharmacist (Pharmacist)  Sancho Osorio MD as Assigned Cancer Care Provider  Abril Lopez PharmD as Assigned MTM Pharmacist  Shaun Tran MD as Assigned Heart and Vascular Provider  Julia Thompson PA-C as Physician Assistant  Lalo Perez MD as MD (OB/Gyn)  Julia Thompson PA-C as Assigned Surgical Provider  Lalo Perez MD as Assigned OBGYN Provider  Natalie Durbin MD as MD (Cardiovascular Disease)  Ez Keys MD as Assigned Pulmonology Provider  Darrel Kumari DO as Physician (Gastroenterology)  Mary Kay Ellison MD as Physician (Student in organized health care education/training program)  Jasmina Orona DO as Physician (Gastroenterology)    The following health maintenance items are reviewed in Epic and correct as of today:  Health Maintenance   Topic Date Due    URINE DRUG SCREEN  Never done    COVID-19 Vaccine (7 - 2024-25 season) 11/01/2024    LUNG CANCER SCREENING  02/06/2025    LIPID  04/15/2025    BMP  11/11/2025    NICOTINE/TOBACCO CESSATION COUNSELING Q 1 YR  01/16/2026    MEDICARE ANNUAL WELLNESS VISIT  01/29/2026    ANNUAL REVIEW OF HM ORDERS  01/29/2026    FALL RISK ASSESSMENT  01/29/2026    MAMMO SCREENING  02/01/2026    GLUCOSE  11/11/2027    ADVANCE CARE PLANNING  01/29/2030    COLORECTAL CANCER SCREENING  03/29/2030    DTAP/TDAP/TD IMMUNIZATION (4 - Td or Tdap)  "01/29/2035    DEXA  07/31/2038    SPIROMETRY  Completed    HEPATITIS C SCREENING  Completed    COPD ACTION PLAN  Completed    PHQ-2 (once per calendar year)  Completed    INFLUENZA VACCINE  Completed    Pneumococcal Vaccine: 50+ Years  Completed    ZOSTER IMMUNIZATION  Completed    RSV VACCINE  Completed    Medicare Annual [place] Pharmacist Visit (once per calendar year)  Completed    HPV IMMUNIZATION  Aged Out    MENINGITIS IMMUNIZATION  Aged Out    RSV MONOCLONAL ANTIBODY  Aged Out         Review of Systems  Constitutional, neuro, ENT, endocrine, pulmonary, cardiac, gastrointestinal, genitourinary, musculoskeletal, integument and psychiatric systems are negative, except as otherwise noted.     Objective    Exam  /67   Pulse 69   Temp 98  F (36.7  C) (Temporal)   Resp 16   Ht 1.626 m (5' 4\")   Wt 56.9 kg (125 lb 6.4 oz)   SpO2 100%   BMI 21.52 kg/m     Estimated body mass index is 21.52 kg/m  as calculated from the following:    Height as of this encounter: 1.626 m (5' 4\").    Weight as of this encounter: 56.9 kg (125 lb 6.4 oz).    Physical Exam  GENERAL: alert and no distress, very pleasant  EYES: Eyes grossly normal to inspection, PERRL and conjunctivae and sclerae normal  HENT: ear canals and TM's normal, nose and mouth without ulcers or lesions  NECK: no adenopathy, no asymmetry, masses, or scars  RESP: lungs clear to auscultation - no rales, rhonchi or wheezes  CV: regular rate and rhythm, normal S1 S2, no S3 or S4, no murmur, click or rub, no peripheral edema  ABDOMEN: soft, nontender, no hepatosplenomegaly, no masses and bowel sounds normal  MS: no gross musculoskeletal defects noted, no edema  SKIN: no suspicious lesions or rashes, right distal anterior lateral chin area there is a one by one skin flap no oozing around.  Multiple ecchymosis 5 x 5 area.  No hematoma collection.  No purulent collection.  NEURO: Normal strength and tone, mentation intact and speech normal  PSYCH: mentation appears " normal, affect normal/bright         1/29/2025   Mini Cog   Clock Draw Score 2 Normal   3 Item Recall 3 objects recalled   Mini Cog Total Score 5              Signed Electronically by: Liane Claudio MD

## 2025-01-30 ENCOUNTER — APPOINTMENT (OUTPATIENT)
Dept: GENERAL RADIOLOGY | Facility: CLINIC | Age: 75
End: 2025-01-30
Attending: EMERGENCY MEDICINE
Payer: COMMERCIAL

## 2025-01-30 ENCOUNTER — HOSPITAL ENCOUNTER (EMERGENCY)
Facility: CLINIC | Age: 75
Discharge: HOME | End: 2025-01-30
Attending: EMERGENCY MEDICINE
Payer: COMMERCIAL

## 2025-01-30 VITALS
DIASTOLIC BLOOD PRESSURE: 77 MMHG | TEMPERATURE: 97.3 F | HEIGHT: 65 IN | BODY MASS INDEX: 20.66 KG/M2 | HEART RATE: 54 BPM | RESPIRATION RATE: 18 BRPM | WEIGHT: 124 LBS | SYSTOLIC BLOOD PRESSURE: 134 MMHG | OXYGEN SATURATION: 94 %

## 2025-01-30 DIAGNOSIS — K21.00 GASTROESOPHAGEAL REFLUX DISEASE WITH ESOPHAGITIS WITHOUT HEMORRHAGE: ICD-10-CM

## 2025-01-30 DIAGNOSIS — I73.9 PAD (PERIPHERAL ARTERY DISEASE): ICD-10-CM

## 2025-01-30 DIAGNOSIS — E87.6 HYPOKALEMIA: Primary | ICD-10-CM

## 2025-01-30 LAB
ALBUMIN SERPL BCG-MCNC: 4.2 G/DL (ref 3.5–5.2)
ALBUMIN SERPL BCG-MCNC: 4.3 G/DL (ref 3.5–5.2)
ALP SERPL-CCNC: 104 U/L (ref 40–150)
ALP SERPL-CCNC: 110 U/L (ref 40–150)
ALT SERPL W P-5'-P-CCNC: 24 U/L (ref 0–50)
ALT SERPL W P-5'-P-CCNC: 25 U/L (ref 0–50)
ANION GAP SERPL CALCULATED.3IONS-SCNC: 11 MMOL/L (ref 7–15)
ANION GAP SERPL CALCULATED.3IONS-SCNC: 12 MMOL/L (ref 7–15)
AST SERPL W P-5'-P-CCNC: 30 U/L (ref 0–45)
AST SERPL W P-5'-P-CCNC: 38 U/L (ref 0–45)
ATRIAL RATE - MUSE: 65 BPM
BASOPHILS # BLD MANUAL: 0 10E3/UL (ref 0–0.2)
BASOPHILS NFR BLD MANUAL: 0 %
BILIRUB SERPL-MCNC: 0.7 MG/DL
BILIRUB SERPL-MCNC: 0.8 MG/DL
BUN SERPL-MCNC: 11 MG/DL (ref 8–23)
BUN SERPL-MCNC: 11.3 MG/DL (ref 8–23)
BURR CELLS BLD QL SMEAR: SLIGHT
CALCIUM SERPL-MCNC: 9.3 MG/DL (ref 8.8–10.4)
CALCIUM SERPL-MCNC: 9.8 MG/DL (ref 8.8–10.4)
CHLORIDE SERPL-SCNC: 94 MMOL/L (ref 98–107)
CHLORIDE SERPL-SCNC: 95 MMOL/L (ref 98–107)
CHOLEST SERPL-MCNC: 104 MG/DL
CREAT SERPL-MCNC: 0.51 MG/DL (ref 0.51–0.95)
CREAT SERPL-MCNC: 0.55 MG/DL (ref 0.51–0.95)
CREAT UR-MCNC: 35.6 MG/DL
DIASTOLIC BLOOD PRESSURE - MUSE: NORMAL MMHG
EGFRCR SERPLBLD CKD-EPI 2021: >90 ML/MIN/1.73M2
EGFRCR SERPLBLD CKD-EPI 2021: >90 ML/MIN/1.73M2
EOSINOPHIL # BLD MANUAL: 0 10E3/UL (ref 0–0.7)
EOSINOPHIL NFR BLD MANUAL: 0 %
ERYTHROCYTE [DISTWIDTH] IN BLOOD BY AUTOMATED COUNT: 14 % (ref 10–15)
FASTING STATUS PATIENT QL REPORTED: YES
FASTING STATUS PATIENT QL REPORTED: YES
FRAGMENTS BLD QL SMEAR: ABNORMAL
GLUCOSE SERPL-MCNC: 94 MG/DL (ref 70–99)
GLUCOSE SERPL-MCNC: 95 MG/DL (ref 70–99)
HCO3 SERPL-SCNC: 30 MMOL/L (ref 22–29)
HCO3 SERPL-SCNC: 31 MMOL/L (ref 22–29)
HCT VFR BLD AUTO: 46.5 % (ref 35–47)
HDLC SERPL-MCNC: 31 MG/DL
HGB BLD-MCNC: 15.6 G/DL (ref 11.7–15.7)
HOLD SPECIMEN: NORMAL
INTERPRETATION ECG - MUSE: NORMAL
LDLC SERPL CALC-MCNC: 58 MG/DL
LYMPHOCYTES # BLD MANUAL: 81.2 10E3/UL (ref 0.8–5.3)
LYMPHOCYTES NFR BLD MANUAL: 96 %
MAGNESIUM SERPL-MCNC: 1.7 MG/DL (ref 1.7–2.3)
MCH RBC QN AUTO: 32.5 PG (ref 26.5–33)
MCHC RBC AUTO-ENTMCNC: 33.5 G/DL (ref 31.5–36.5)
MCV RBC AUTO: 97 FL (ref 78–100)
MICROALBUMIN UR-MCNC: <12 MG/L
MICROALBUMIN/CREAT UR: NORMAL MG/G{CREAT}
MONOCYTES # BLD MANUAL: 0.8 10E3/UL (ref 0–1.3)
MONOCYTES NFR BLD MANUAL: 1 %
NEUTROPHILS # BLD MANUAL: 2.5 10E3/UL (ref 1.6–8.3)
NEUTROPHILS NFR BLD MANUAL: 3 %
NONHDLC SERPL-MCNC: 73 MG/DL
P AXIS - MUSE: 67 DEGREES
PLAT MORPH BLD: ABNORMAL
PLATELET # BLD AUTO: 192 10E3/UL (ref 150–450)
POTASSIUM SERPL-SCNC: 3 MMOL/L (ref 3.4–5.3)
POTASSIUM SERPL-SCNC: 3.2 MMOL/L (ref 3.4–5.3)
PR INTERVAL - MUSE: 178 MS
PROT SERPL-MCNC: 6.4 G/DL (ref 6.4–8.3)
PROT SERPL-MCNC: 6.8 G/DL (ref 6.4–8.3)
QRS DURATION - MUSE: 86 MS
QT - MUSE: 442 MS
QTC - MUSE: 459 MS
R AXIS - MUSE: 61 DEGREES
RBC # BLD AUTO: 4.8 10E6/UL (ref 3.8–5.2)
RBC MORPH BLD: ABNORMAL
SMUDGE CELLS BLD QL SMEAR: PRESENT
SODIUM SERPL-SCNC: 136 MMOL/L (ref 135–145)
SODIUM SERPL-SCNC: 137 MMOL/L (ref 135–145)
SYSTOLIC BLOOD PRESSURE - MUSE: NORMAL MMHG
T AXIS - MUSE: 88 DEGREES
TRIGL SERPL-MCNC: 76 MG/DL
TROPONIN T SERPL HS-MCNC: 13 NG/L
TROPONIN T SERPL HS-MCNC: 13 NG/L
VENTRICULAR RATE- MUSE: 65 BPM
WBC # BLD AUTO: 84.6 10E3/UL (ref 4–11)

## 2025-01-30 PROCEDURE — 250N000009 HC RX 250: Performed by: EMERGENCY MEDICINE

## 2025-01-30 PROCEDURE — 99285 EMERGENCY DEPT VISIT HI MDM: CPT | Mod: 25

## 2025-01-30 PROCEDURE — 36415 COLL VENOUS BLD VENIPUNCTURE: CPT | Performed by: EMERGENCY MEDICINE

## 2025-01-30 PROCEDURE — 85049 AUTOMATED PLATELET COUNT: CPT | Performed by: EMERGENCY MEDICINE

## 2025-01-30 PROCEDURE — 71046 X-RAY EXAM CHEST 2 VIEWS: CPT

## 2025-01-30 PROCEDURE — 250N000013 HC RX MED GY IP 250 OP 250 PS 637: Performed by: EMERGENCY MEDICINE

## 2025-01-30 PROCEDURE — 85018 HEMOGLOBIN: CPT | Performed by: EMERGENCY MEDICINE

## 2025-01-30 PROCEDURE — 93005 ELECTROCARDIOGRAM TRACING: CPT

## 2025-01-30 PROCEDURE — 82040 ASSAY OF SERUM ALBUMIN: CPT | Performed by: EMERGENCY MEDICINE

## 2025-01-30 PROCEDURE — 84484 ASSAY OF TROPONIN QUANT: CPT | Performed by: EMERGENCY MEDICINE

## 2025-01-30 PROCEDURE — 85007 BL SMEAR W/DIFF WBC COUNT: CPT | Performed by: EMERGENCY MEDICINE

## 2025-01-30 RX ORDER — LIDOCAINE HYDROCHLORIDE 20 MG/ML
10 SOLUTION OROPHARYNGEAL ONCE
Status: COMPLETED | OUTPATIENT
Start: 2025-01-30 | End: 2025-01-30

## 2025-01-30 RX ORDER — POTASSIUM CHLORIDE 1.5 G/1.58G
40 POWDER, FOR SOLUTION ORAL ONCE
Status: COMPLETED | OUTPATIENT
Start: 2025-01-30 | End: 2025-01-30

## 2025-01-30 RX ORDER — POTASSIUM CHLORIDE 1500 MG/1
20 TABLET, EXTENDED RELEASE ORAL DAILY
Qty: 90 TABLET | Refills: 0 | Status: SHIPPED | OUTPATIENT
Start: 2025-01-30

## 2025-01-30 RX ORDER — MAGNESIUM HYDROXIDE/ALUMINUM HYDROXICE/SIMETHICONE 120; 1200; 1200 MG/30ML; MG/30ML; MG/30ML
15 SUSPENSION ORAL ONCE
Status: COMPLETED | OUTPATIENT
Start: 2025-01-30 | End: 2025-01-30

## 2025-01-30 RX ADMIN — LIDOCAINE HYDROCHLORIDE 10 ML: 20 SOLUTION ORAL at 16:40

## 2025-01-30 RX ADMIN — POTASSIUM CHLORIDE 40 MEQ: 1.5 POWDER, FOR SOLUTION ORAL at 18:55

## 2025-01-30 RX ADMIN — ALUMINUM HYDROXIDE, MAGNESIUM HYDROXIDE, AND SIMETHICONE 15 ML: 200; 200; 20 SUSPENSION ORAL at 16:38

## 2025-01-30 ASSESSMENT — ACTIVITIES OF DAILY LIVING (ADL)
ADLS_ACUITY_SCORE: 46

## 2025-01-30 ASSESSMENT — COLUMBIA-SUICIDE SEVERITY RATING SCALE - C-SSRS
6. HAVE YOU EVER DONE ANYTHING, STARTED TO DO ANYTHING, OR PREPARED TO DO ANYTHING TO END YOUR LIFE?: NO
2. HAVE YOU ACTUALLY HAD ANY THOUGHTS OF KILLING YOURSELF IN THE PAST MONTH?: NO
1. IN THE PAST MONTH, HAVE YOU WISHED YOU WERE DEAD OR WISHED YOU COULD GO TO SLEEP AND NOT WAKE UP?: NO

## 2025-01-30 NOTE — ED TRIAGE NOTES
4 hours ago, patient developed chest pain that she describes as heart burn.   Denies shortness of breath.  Concerned that this could be because she had a tetanus shot yesterday.

## 2025-01-30 NOTE — ED PROVIDER NOTES
Emergency Department Note      History of Present Illness     Chief Complaint   Chest Pain      HPI   Karen Caban is a 74 year old female who presents with a sensation that she describes as heartburn over the last 4 to 5 hours.  She is not aware of any provoking factors but did get a tetanus shot yesterday and is wondering whether this could be contributing.  At times she feels some pain in her left arm but again this is where she had her tetanus shot.  She describes some tingling that goes from the bicep area to the mid forearm but not to the hand.  She says she has had a similar type reaction with the RSV vaccine in the past in terms of the tingling in the arm.  No symptoms affecting the face and the leg.  No difficulty walking or speaking.  She is on aspirin and Brilinta.    Review of External Notes   Cardiology notes reviewed from September 23, 2024.  She had a normal echocardiogram in September and had a negative stress test in 2022.    Past Medical History     Medical History and Problem List   Past Medical History:   Diagnosis Date    Ankle fracture 2009    Anxiety     Chronic back pain     Chronic lymphocytic leukemia (H)     Colon polyp 2012    Fx low femur epiphy-closed (H)     GERD (gastroesophageal reflux disease)     History of UTI 2017    HTN (hypertension), benign     Hyperlipidemia LDL goal < 100     Mumps     Normal nuclear stress test 12/2009    Osteopenia     PAD (peripheral artery disease)     Polycythemia vera (H) 06/15/2022    PONV (postoperative nausea and vomiting)     PUD (peptic ulcer disease) 1980s    Pulmonary emphysema, unspecified emphysema type (H) 01/12/2023    Smoker     Spider veins     Vitamin D deficiency        Medications   Ascorbic Acid (VITAMIN C GUMMIE PO)  aspirin (ASA) 81 MG chewable tablet  atorvastatin (LIPITOR) 40 MG tablet  chlorthalidone (HYGROTON) 25 MG tablet  Cholecalciferol (VITAMIN D-3) 5000 UNIT TABS  coenzyme Q-10 200 MG CAPS  Cranberry-Vitamin C-Vitamin  E (CRANBERRY PLUS VITAMIN C) 4200-20-3 MG-MG-UNIT CAPS  Cyanocobalamin (B-12 PO)  estradiol (ESTRACE) 0.1 MG/GM vaginal cream  ezetimibe (ZETIA) 10 MG tablet  fluticasone (FLONASE) 50 MCG/ACT nasal spray  ipratropium-albuterol (COMBIVENT RESPIMAT)  MCG/ACT inhaler  Lactobacillus (PROBIOTIC ACIDOPHILUS PO)  metoprolol succinate ER (TOPROL XL) 100 MG 24 hr tablet  Multiple Vitamins-Minerals (HAIR SKIN & NAILS ADVANCED PO)  nicotine (COMMIT) 2 MG lozenge  nicotine (NICODERM CQ) 14 MG/24HR 24 hr patch  nitroGLYcerin (NITROSTAT) 0.4 MG sublingual tablet  pantoprazole (PROTONIX) 40 MG EC tablet  potassium chloride fransico ER (KLOR-CON M20) 20 MEQ CR tablet  Sodium Chloride-Xylitol (XLEAR SINUS CARE SPRAY NA)  ticagrelor (BRILINTA) 60 MG tablet  tiotropium-olodaterol (STIOLTO RESPIMAT) 2.5-2.5 MCG/ACT AERS        Surgical History   Past Surgical History:   Procedure Laterality Date    ABDOMEN SURGERY      ANGIOGRAM Right 11/18/2022    Procedure: ANGIOGRAM AORTO ILIAC ANGIOGRAM;  Surgeon: Shaun Tran MD;  Location:  OR    BIOPSY      Blood clot removal from Stent      2011    BONE MARROW BIOPSY, BONE SPECIMEN, NEEDLE/TROCAR N/A 02/02/2021    Procedure: bone marrow biopsy;  Surgeon: Kailey Cota MD;  Location:  GI    BYPASS GRAFT AORTOFEMORAL  05/2002    Dr. Turner    BYPASS GRAFT FEMOROPOPLITEAL  12/16/2011    COLONOSCOPY N/A 01/18/2018    Procedure: COMBINED COLONOSCOPY, SINGLE OR MULTIPLE BIOPSY/POLYPECTOMY BY BIOPSY;  COLONOSCOPY;  Surgeon: Efrain Hernadez MD;  Location:  GI    COLONOSCOPY N/A 03/29/2023    Procedure: COLONOSCOPY, FLEXIBLE, WITH LESION REMOVAL USING SNARE;  Surgeon: Jony Manriquez MD;  Location:  GI    DILATION AND CURETTAGE, OPERATIVE HYSTEROSCOPY, COMBINED N/A 09/15/2022    Procedure: HYSTEROSCOPY, WITH DILATION AND CURETTAGE OF UTERUS;  Surgeon: Destiney Schaefer MD;  Location:  OR    EMBOLECTOMY LOWER EXTREMITY  12/16/2011    Procedure:EMBOLECTOMY LOWER  "EXTREMITY; EMBOLECTOMY, RIGHT POPLITEAL WITH PATCH ANGIOPLASTY. EXCISION SKIN LESION RIGHT LEG. ; Surgeon:PARMINDER VELAZCO; Location: OR    ENDARTERECTOMY FEMORAL Right 11/18/2022    Procedure: ENDARTERECTOMY, FEMORAL RIGHT FEMORAL CUTDOWN, RIGHT ILIAC ARTERY STENTING.;  Surgeon: Shuan Tran MD;  Location:  OR    ESOPHAGOSCOPY, GASTROSCOPY, DUODENOSCOPY (EGD), COMBINED N/A 10/07/2022    Procedure: ESOPHAGOGASTRODUODENOSCOPY, WITH BIOPSY;  Surgeon: Felix Carmona MD;  Location:  GI    EXCISE LESION LOWER EXTREMITY  12/16/2011    Procedure:EXCISE LESION LOWER EXTREMITY; Surgeon:PARMINDER VELAZCO; Location: OR    HERNIA REPAIR  11/04/2008    x2 umbilical    IR OR ANGIOGRAM  11/18/2022    L achilles repair  12/04/2008    LAPAROSCOPIC OOPHORECTOMY Left     L for cyst age 26    miscarriages x 3      tubal ligation and reversal         Physical Exam     Patient Vitals for the past 24 hrs:   BP Temp Temp src Pulse Resp SpO2 Height Weight   01/30/25 2100 134/77 -- -- 54 18 94 % -- --   01/30/25 2000 (!) 148/87 -- -- 52 26 97 % -- --   01/30/25 1900 -- -- -- 52 -- 96 % -- --   01/30/25 1840 (!) 164/81 -- -- 58 30 97 % -- --   01/30/25 1341 (!) 159/72 97.3  F (36.3  C) Temporal 62 16 97 % 1.651 m (5' 5\") 56.2 kg (124 lb)     Physical Exam  Constitutional:       General: She is not in acute distress.     Appearance: Normal appearance. She is not diaphoretic.   HENT:      Head: Atraumatic.      Mouth/Throat:      Mouth: Mucous membranes are moist.   Eyes:      General: No scleral icterus.     Conjunctiva/sclera: Conjunctivae normal.   Cardiovascular:      Rate and Rhythm: Normal rate and regular rhythm.      Heart sounds: Normal heart sounds.   Pulmonary:      Effort: No respiratory distress.      Breath sounds: Normal breath sounds.   Abdominal:      General: Abdomen is flat. There is no distension.      Tenderness: There is no abdominal tenderness.   Musculoskeletal:      Cervical back: Neck " supple.   Skin:     General: Skin is warm.      Capillary Refill: Capillary refill takes less than 2 seconds.      Findings: No rash.      Comments: No erythema or swelling of the left upper arm at the injection site.   Neurological:      General: No focal deficit present.      Mental Status: She is alert and oriented to person, place, and time.      Comments:  strength 5 out of 5 nuchal bilaterally.  5 out of 5 strength for flexion and extension at the elbow as well as abduction and abduction at the shoulder.  Sensation light touch intact over all distributions of the forearms and hands.  No facial asymmetry.  Speech is fluent.  Strength 5 out of 5 in the lower extremities.   Psychiatric:         Mood and Affect: Mood normal.         Behavior: Behavior normal.           Diagnostics     Lab Results   Labs Ordered and Resulted from Time of ED Arrival to Time of ED Departure   COMPREHENSIVE METABOLIC PANEL - Abnormal       Result Value    Sodium 136      Potassium 3.0 (*)     Carbon Dioxide (CO2) 31 (*)     Anion Gap 11      Urea Nitrogen 11.0      Creatinine 0.51      GFR Estimate >90      Calcium 9.8      Chloride 94 (*)     Glucose 95      Alkaline Phosphatase 110      AST 30      ALT 25      Protein Total 6.8      Albumin 4.3      Bilirubin Total 0.7     CBC WITH PLATELETS AND DIFFERENTIAL - Abnormal    WBC Count 84.6 (*)     RBC Count 4.80      Hemoglobin 15.6      Hematocrit 46.5      MCV 97      MCH 32.5      MCHC 33.5      RDW 14.0      Platelet Count 192     MANUAL DIFFERENTIAL - Abnormal    % Neutrophils 3      % Lymphocytes 96      % Monocytes 1      % Eosinophils 0      % Basophils 0      Absolute Neutrophils 2.5      Absolute Lymphocytes 81.2 (*)     Absolute Monocytes 0.8      Absolute Eosinophils 0.0      Absolute Basophils 0.0      RBC Morphology Confirmed RBC Indices      Platelet Assessment        Value: Automated Count Confirmed. Platelet morphology is normal.    [place] Cells Slight (*)     RBC  Fragments Rare (*)     Smudge Cells Present (*)    TROPONIN T, HIGH SENSITIVITY - Normal    Troponin T, High Sensitivity 13     TROPONIN T, HIGH SENSITIVITY - Normal    Troponin T, High Sensitivity 13         Imaging   XR Chest 2 Views   Final Result   IMPRESSION: Normal heart size and pulmonary vascularity. Lungs clear. No pneumothorax or pleural fluid. Degenerative changes lower thoracic and upper lumbar spine.          EKG   ECG results from 01/30/25   EKG 12-lead, tracing only     Value    Systolic Blood Pressure     Diastolic Blood Pressure     Ventricular Rate 65    Atrial Rate 65    NV Interval 178    QRS Duration 86        QTc 459    P Axis 67    R AXIS 61    T Axis 88    Interpretation ECG      Sinus rhythm with frequent Premature ventricular complexes  Septal infarct (cited on or before 06-Dec-2009)  Abnormal ECG  When compared with ECG of 02-Jul-2023 12:31,  Premature ventricular complexes are now Present  Premature supraventricular complexes are no longer Present  Confirmed by GENERATED REPORT, COMPUTER ([id number]),  Lorna Arreaga ([id number]) on 1/30/2025 3:20:12 PM        Independent Interpretation   Chest x-ray dependently interpreted.  No pneumothorax.    ED Course      Medications Administered   Medications   alum & mag hydroxide-simethicone (MAALOX) suspension 15 mL (15 mLs Oral $Given 1/30/25 1638)   lidocaine (viscous) (XYLOCAINE) 2 % solution 10 mL (10 mLs Mouth/Throat $Given 1/30/25 1640)   potassium chloride (KLOR-CON) Packet 40 mEq (40 mEq Oral $Given 1/30/25 1855)       Medical Decision Making / Diagnosis     MDM   Karen Caban is a 74 year old female who presents to the ED for evaluation of burning chest discomfort.  She feels that it is likely acid reflux provoked by a vaccine yesterday.  However, given the chest pain she had an EKG that is nonischemic and 2 troponin measurements are negative.  Her pain resolved with a GI cocktail.  She was having some paresthesias in the left arm  which may have been a reaction to the vaccine as well.  This is not resolved prior to discharge.    Lab workup overall unremarkable otherwise.  She has a history of CLL and has a chronic leukocytosis.    Disposition   The patient was discharged.     Diagnosis     ICD-10-CM    1. Gastroesophageal reflux disease with esophagitis without hemorrhage  K21.00             Dillon Askew MD  01/30/25 2359

## 2025-01-30 NOTE — TELEPHONE ENCOUNTER
Unable to fill per [facility] protocol.  Medication and pharmacy loaded.    Marylou GREWAL, RN    [facility]  Office: [phone]  Fax: [phone]

## 2025-02-03 ENCOUNTER — HOSPITAL ENCOUNTER (OUTPATIENT)
Dept: MAMMOGRAPHY | Facility: CLINIC | Age: 75
Discharge: HOME | End: 2025-02-03
Attending: INTERNAL MEDICINE | Admitting: INTERNAL MEDICINE
Payer: COMMERCIAL

## 2025-02-03 DIAGNOSIS — Z12.31 VISIT FOR SCREENING MAMMOGRAM: ICD-10-CM

## 2025-02-03 PROCEDURE — 77063 BREAST TOMOSYNTHESIS BI: CPT

## 2025-02-07 ENCOUNTER — TELEPHONE (OUTPATIENT)
Dept: FAMILY MEDICINE | Facility: CLINIC | Age: 75
End: 2025-02-07
Payer: COMMERCIAL

## 2025-02-07 ENCOUNTER — HOSPITAL ENCOUNTER (OUTPATIENT)
Dept: CT IMAGING | Facility: CLINIC | Age: 75
Discharge: HOME | End: 2025-02-07
Attending: INTERNAL MEDICINE | Admitting: INTERNAL MEDICINE
Payer: COMMERCIAL

## 2025-02-07 DIAGNOSIS — Z87.891 HISTORY OF TOBACCO USE, PRESENTING HAZARDS TO HEALTH: ICD-10-CM

## 2025-02-07 PROCEDURE — 71271 CT THORAX LUNG CANCER SCR C-: CPT

## 2025-02-07 NOTE — TELEPHONE ENCOUNTER
Liane Claudio MD sent to [facility]  Hi Humaira, It was very nice to meet you during clinic visit.  I had the opportunity to review your CT scan result for lung cancer screening, there is finding of emphysema, also there is finding of new solid right upper lobe nodule measuring 7 x 5 mm and another new solid right upper lobe lesion of 6 x 4 mm and stable right upper lobe nodule of 5 mm.  I do recommend that you follow-up with the nodule clinic with pulmonary/clinic.  Will need further diagnostic testing imaging as a follow-up on these new nodules..  I will place a referral to the nodule clinic.    I tried to call you to discuss CT lung findings but there was a voice answering machine.    It has been a pleasure to participate in your care.  Please call our clinic if you have any questions or concerns.    Sincerely,    Liane Claudio MD on 2/7/2025 at 8:51 AM

## 2025-02-07 NOTE — TELEPHONE ENCOUNTER
Patient Contact     Attempt # 1     Was call answered?  no.  Left message on voicemail with information to call triage back.     Upon call back, please review results below with Patient.    Ila BAY,  Triage RN  [facility]

## 2025-02-07 NOTE — RESULT ENCOUNTER NOTE
Evan Gerardo, It was very nice to meet you during clinic visit.  I had the opportunity to review your CT scan result for lung cancer screening, there is finding of emphysema, also there is finding of new solid right upper lobe nodule measuring 7 x 5 mm and another new solid right upper lobe lesion of 6 x 4 mm and stable right upper lobe nodule of 5 mm.  I do recommend that you follow-up with the nodule clinic with pulmonary/clinic.  Will need further diagnostic testing imaging as a follow-up on these new nodules..  I will place a referral to the nodule clinic.    I tried to call you to discuss CT lung findings but there was a voice answering machine.    It has been a pleasure to participate in your care.  Please call our clinic if you have any questions or concerns.     Sincerely,    Liane Claudio MD on 2/7/2025 at 8:51 AM

## 2025-02-25 ENCOUNTER — OFFICE VISIT (OUTPATIENT)
Dept: PULMONOLOGY | Facility: CLINIC | Age: 75
End: 2025-02-25
Attending: PHYSICIAN ASSISTANT
Payer: COMMERCIAL

## 2025-02-25 VITALS
BODY MASS INDEX: 20.72 KG/M2 | OXYGEN SATURATION: 95 % | SYSTOLIC BLOOD PRESSURE: 137 MMHG | HEART RATE: 59 BPM | WEIGHT: 124.5 LBS | DIASTOLIC BLOOD PRESSURE: 68 MMHG

## 2025-02-25 DIAGNOSIS — J43.9 PULMONARY EMPHYSEMA, UNSPECIFIED EMPHYSEMA TYPE (H): ICD-10-CM

## 2025-02-25 DIAGNOSIS — R91.8 PULMONARY NODULES: ICD-10-CM

## 2025-02-25 PROCEDURE — 3075F SYST BP GE 130 - 139MM HG: CPT | Performed by: PHYSICIAN ASSISTANT

## 2025-02-25 PROCEDURE — 3078F DIAST BP <80 MM HG: CPT | Performed by: PHYSICIAN ASSISTANT

## 2025-02-25 PROCEDURE — 99214 OFFICE O/P EST MOD 30 MIN: CPT | Performed by: PHYSICIAN ASSISTANT

## 2025-02-25 PROCEDURE — 1126F AMNT PAIN NOTED NONE PRSNT: CPT | Performed by: PHYSICIAN ASSISTANT

## 2025-02-25 RX ORDER — TIOTROPIUM BROMIDE AND OLODATEROL 3.124; 2.736 UG/1; UG/1
2 SPRAY, METERED RESPIRATORY (INHALATION) DAILY
Qty: 12 G | Refills: 3 | Status: SHIPPED | OUTPATIENT
Start: 2025-02-25

## 2025-02-25 ASSESSMENT — PAIN SCALES - GENERAL: PAINLEVEL_OUTOF10: NO PAIN (0)

## 2025-02-25 NOTE — NURSING NOTE
Chief Complaint   Patient presents with    RECHECK     Pulmonary nodules       Vitals:    02/25/25 1227 02/25/25 1231   BP: (!) 144/66 137/68   BP Location: Left arm Left arm   Patient Position: Sitting Sitting   Cuff Size: Adult Small Adult Small   Pulse: 53 59   SpO2: 95%    Weight: 56.5 kg (124 lb 8 oz)        Body mass index is 20.72 kg/m .    CHENG Thomas

## 2025-02-25 NOTE — PROGRESS NOTES
Pulmonary Clinic Note    Date of Service: 02/25/25     Chief Complaint   Patient presents with    RECHECK     Pulmonary nodules       A/P:  74F being seen for COPD and lung nodules. PFTs w/ mild obstruction and evidence of air-trapping consistent with COPD. CT w/ moderate emphysema. Benefiting from LAMA-LABA. No history of AECOPD. She is enrolled in lung cancer screening with her PCP. I personally reviewed her CT chest from 2/7/2025 and note multiple new lung nodules measuring up to 7mm when compared to LDCT 2/2024. Previous tiny nodules are stable. Notably, she recently had influenza A. I suspect these are infectious/inflammatory. They are too small to biopsy or to be detected on PET. Recommend surveillance CT chest 3 months per Lung-RADS criteria.   - Continue LABA-LAMA (Stiolto) daily  - may use Combivent prn  - CT chest 3 months  - recommended smoking cessation  - OK to substitute medications based on formulary     F/up 3 months. Due to my limited in-person clinic availability she is going to schedule as a telephone visit. She doesn't have access to video.     History:  74F CLL (on observation), HTN, HLD, smoker being seen for follow up of COPD and lung nodules. Last seen in clinic with me 2/2024, previously followed with Dr. Keys. Had the flu a few weeks ago, was coughing a lot. Had blood streaked sputum. Still tired but feeling a lot better now. Benefits from Stiolto daily, had thrush but resolved and didn't recur now tat she is rinsing her mouth out more thoroughly after using. Using combivent once daily or less.  No longer has dyspnea with stairs. Able to walk her dog at least 2 miles /day without issue. No SOB at rest. Rarely wheezing. No chest pain or tightness. No cough. No orthopnea or PND. No LE edema. Has mild seasonal allergies, which she doesn't feel are bad enough to warrant taking medication for. She is working on quitting smoking, using the 14mg nicotine patch daily which is helping.     PCP  ordered lung cancer screen, done 2/7/2025, noting multiple new lung nodules compared to 2/2024.     Smoking: current, 55 years, 5 cigarettes per day, previously 1PPD. Using an electronic cigarette as needed. Chantix caused awful nightmares. Using 14mg patches. Zyban didn't work for her. Doesn't want to quit currently.   Bird exposure: no             Animal exposure: dog        Inhalation exposure: no    Occupation: retired, former                           10 point review of systems negative, aside from that mentioned in HPI.    /68 (BP Location: Left arm, Patient Position: Sitting, Cuff Size: Adult Small)   Pulse 59   Wt 56.5 kg (124 lb 8 oz)   SpO2 95%   BMI 20.72 kg/m    Gen: well-appearing  Pulm: normal respiratory effort on room air  MSK: no edema, no acute joint abnormality   Skin: no obvious rash  Psych: normal affect  Neuro: alert and oriented     Labs:  Personally reviewed    Imaging/Studies: Personally reviewed  PFTs (8/2023) - mild obstruction, no significant BD response, e/o air-trapping w/o hyperinflation, normal DLCOunc  CXR (7/2023) - hyperinflation   Lung Ca screening CT (2/2025) - multiple new nodules measuring up to 7mm. Previous nodules are stable. Moderate emphysema       Past Medical History:   Diagnosis Date    Ankle fracture 2009    L    Anxiety     Chronic back pain     Chronic lymphocytic leukemia (H)     Colon polyp 2012    repeat colonoscopy 5 years.    Fx low femur epiphy-closed (H)     GERD (gastroesophageal reflux disease)     History of UTI 2017    Cysto by Dr Nikole xiong    HTN (hypertension), benign     Hyperlipidemia LDL goal < 100     Mumps     Normal nuclear stress test 12/2009    EF 67%    Osteopenia     PAD (peripheral artery disease)     s/p fem pop bypass; embolectomy    Polycythemia vera (H) 06/15/2022    PONV (postoperative nausea and vomiting)     PUD (peptic ulcer disease) 1980s    DU    Pulmonary emphysema, unspecified emphysema type (H) 01/12/2023     Smoker     Spider veins     Vitamin D deficiency      Past Surgical History:   Procedure Laterality Date    ABDOMEN SURGERY      ANGIOGRAM Right 11/18/2022    Procedure: ANGIOGRAM AORTO ILIAC ANGIOGRAM;  Surgeon: Shaun Tran MD;  Location:  OR    BIOPSY      Blood clot removal from Stent      2011    BONE MARROW BIOPSY, BONE SPECIMEN, NEEDLE/TROCAR N/A 02/02/2021    Procedure: bone marrow biopsy;  Surgeon: Kailey Cota MD;  Location:  GI    BYPASS GRAFT AORTOFEMORAL  05/2002    Dr. Turner    BYPASS GRAFT FEMOROPOPLITEAL  12/16/2011    COLONOSCOPY N/A 01/18/2018    Procedure: COMBINED COLONOSCOPY, SINGLE OR MULTIPLE BIOPSY/POLYPECTOMY BY BIOPSY;  COLONOSCOPY;  Surgeon: Efrain Hernadez MD;  Location:  GI    COLONOSCOPY N/A 03/29/2023    Procedure: COLONOSCOPY, FLEXIBLE, WITH LESION REMOVAL USING SNARE;  Surgeon: Jony Manriquez MD;  Location:  GI    DILATION AND CURETTAGE, OPERATIVE HYSTEROSCOPY, COMBINED N/A 09/15/2022    Procedure: HYSTEROSCOPY, WITH DILATION AND CURETTAGE OF UTERUS;  Surgeon: Destiney Schaefer MD;  Location:  OR    EMBOLECTOMY LOWER EXTREMITY  12/16/2011    Procedure:EMBOLECTOMY LOWER EXTREMITY; EMBOLECTOMY, RIGHT POPLITEAL WITH PATCH ANGIOPLASTY. EXCISION SKIN LESION RIGHT LEG. ; Surgeon:PARMINDER VELAZCO; Location: OR    ENDARTERECTOMY FEMORAL Right 11/18/2022    Procedure: ENDARTERECTOMY, FEMORAL RIGHT FEMORAL CUTDOWN, RIGHT ILIAC ARTERY STENTING.;  Surgeon: Shaun Tran MD;  Location:  OR    ESOPHAGOSCOPY, GASTROSCOPY, DUODENOSCOPY (EGD), COMBINED N/A 10/07/2022    Procedure: ESOPHAGOGASTRODUODENOSCOPY, WITH BIOPSY;  Surgeon: Felix Carmona MD;  Location:  GI    EXCISE LESION LOWER EXTREMITY  12/16/2011    Procedure:EXCISE LESION LOWER EXTREMITY; Surgeon:PARMINDER VELAZCO; Location: OR    HERNIA REPAIR  11/04/2008    x2 umbilical    IR OR ANGIOGRAM  11/18/2022    L achilles repair  12/04/2008    LAPAROSCOPIC OOPHORECTOMY  Left     L for cyst age 26    miscarriages x 3      tubal ligation and reversal       Family History   Problem Relation Age of Onset    Alzheimer Disease Mother          of panc/GI ca age 83    Osteoporosis Mother     Other Cancer Mother     Cancer Father          age 64 lymphoma    Hyperlipidemia Father     Depression Father         None    Colon Cancer Maternal Grandfather     Hypertension Sister      Social History     Socioeconomic History    Marital status:      Spouse name: Not on file    Number of children: Not on file    Years of education: Not on file    Highest education level: Not on file   Occupational History    Not on file   Tobacco Use    Smoking status: Some Days     Current packs/day: 0.25     Average packs/day: 0.3 packs/day for 50.0 years (12.5 ttl pk-yrs)     Types: Cigarettes     Passive exposure: Current    Smokeless tobacco: Never    Tobacco comments:     Nicotine patches on and off   Vaping Use    Vaping status: Some Days   Substance and Sexual Activity    Alcohol use: Yes     Comment: Beer once in a while    Drug use: No    Sexual activity: Not Currently     Partners: Male     Birth control/protection: None   Other Topics Concern    Parent/sibling w/ CABG, MI or angioplasty before 65F 55M? 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  Had 3 miscarriages.  No living children.   is [place].  Walks 2miles per day.     Social Drivers of Health     Financial Resource Strain: Low Risk  (2025)    [facility]    Financial Resource Strain     Difficulty of Paying Living Expenses: 3     Difficulty of Paying Living Expenses: Not on file   Food Insecurity: Low Risk  (2025)    Food Insecurity     Within the past 12 months, did you worry that your food would run out before you got money to buy more?: No     Within the past 12 months, did the food you bought just not last and  you didn t have money to get more?: No   Transportation Needs: Low Risk  (1/24/2025)    Transportation Needs     Within the past 12 months, has lack of transportation kept you from medical appointments, getting your medicines, non-medical meetings or appointments, work, or from getting things that you need?: No   Physical Activity: Sufficiently Active (1/24/2025)    Exercise Vital Sign     Days of Exercise per Week: 7 days     Minutes of Exercise per Session: 30 min   Stress: Patient Declined (1/24/2025)    [employer] [place] [employer] - Occupational Stress Questionnaire     Feeling of Stress : Patient declined   Social Connections: Unknown (1/24/2025)    Social Connection and Isolation Panel [NHANES]     Frequency of Communication with Friends and Family: Not on file     Frequency of Social Gatherings with Friends and Family: More than three times a week     Attends Alevism Services: Not on file     Active Member of Clubs or Organizations: Not on file     Attends Club or Organization Meetings: Not on file     Marital Status: Not on file   Interpersonal Safety: Low Risk  (1/29/2025)    Interpersonal Safety     Do you feel physically and emotionally safe where you currently live?: Yes     Within the past 12 months, have you been hit, slapped, kicked or otherwise physically hurt by someone?: No     Within the past 12 months, have you been humiliated or emotionally abused in other ways by your partner or ex-partner?: No   Housing Stability: Low Risk  (1/24/2025)    Housing Stability     Do you have housing? : Yes     Are you worried about losing your housing?: No       35 minutes spent reviewing chart, reviewing test results, talking with and examining patient, formulating plan, and documentation on the day of the encounter.    Jolie Garsia PA-C  General Pulmonology

## 2025-02-25 NOTE — LETTER
2/25/2025      Karen Caban  [address]  [place] [facility] [address]      Dear Colleague,    Thank you for referring your patient, Karen Caban, to the [facility] [place]. Please see a copy of my visit note below.    Pulmonary Clinic Note    Date of Service: 02/25/25     Chief Complaint   Patient presents with     RECHECK     Pulmonary nodules       A/P:  74F being seen for COPD and lung nodules. PFTs w/ mild obstruction and evidence of air-trapping consistent with COPD. CT w/ moderate emphysema. Benefiting from LAMA-LABA. No history of AECOPD. She is enrolled in lung cancer screening with her PCP. I personally reviewed her CT chest from 2/7/2025 and note multiple new lung nodules measuring up to 7mm when compared to LDCT 2/2024. Previous tiny nodules are stable. Notably, she recently had influenza A. I suspect these are infectious/inflammatory. They are too small to biopsy or to be detected on PET. Recommend surveillance CT chest 3 months per Lung-RADS criteria.   - Continue LABA-LAMA (Stiolto) daily  - may use Combivent prn  - CT chest 3 months  - recommended smoking cessation  - OK to substitute medications based on formulary     F/up 3 months. Due to my limited in-person clinic availability she is going to schedule as a telephone visit. She doesn't have access to video.     History:  74F CLL (on observation), HTN, HLD, smoker being seen for follow up of COPD and lung nodules. Last seen in clinic with me 2/2024, previously followed with Dr. Keys. Had the flu a few weeks ago, was coughing a lot. Had blood streaked sputum. Still tired but feeling a lot better now. Benefits from Stiolto daily, had thrush but resolved and didn't recur now tat she is rinsing her mouth out more thoroughly after using. Using combivent once daily or less.  No longer has dyspnea with stairs. Able to walk her dog at least 2 miles /day without issue. No SOB at rest. Rarely wheezing. No chest pain or  tightness. No cough. No orthopnea or PND. No LE edema. Has mild seasonal allergies, which she doesn't feel are bad enough to warrant taking medication for. She is working on quitting smoking, using the 14mg nicotine patch daily which is helping.     PCP ordered lung cancer screen, done 2/7/2025, noting multiple new lung nodules compared to 2/2024.     Smoking: current, 55 years, 5 cigarettes per day, previously 1PPD. Using an electronic cigarette as needed. Chantix caused awful nightmares. Using 14mg patches. Zyban didn't work for her. Doesn't want to quit currently.   Bird exposure: no             Animal exposure: dog        Inhalation exposure: no    Occupation: retired, former                           10 point review of systems negative, aside from that mentioned in HPI.    /68 (BP Location: Left arm, Patient Position: Sitting, Cuff Size: Adult Small)   Pulse 59   Wt 56.5 kg (124 lb 8 oz)   SpO2 95%   BMI 20.72 kg/m    Gen: well-appearing  Card: RRR  Pulm: clear bilaterally   MSK: no edema, no acute joint abnormality   Skin: no obvious rash  Psych: normal affect  Neuro: alert and oriented     Labs:  Personally reviewed    Imaging/Studies: Personally reviewed  PFTs (8/2023) - mild obstruction, no significant BD response, e/o air-trapping w/o hyperinflation, normal DLCOunc  CXR (7/2023) - hyperinflation   Lung Ca screening CT (2/2025) - multiple new nodules measuring up to 7mm. Previous nodules are stable. Moderate emphysema       Past Medical History:   Diagnosis Date     Ankle fracture 2009    L     Anxiety      Chronic back pain      Chronic lymphocytic leukemia (H)      Colon polyp 2012    repeat colonoscopy 5 years.     Fx low femur epiphy-closed (H)      GERD (gastroesophageal reflux disease)      History of UTI 2017    Cysto by Dr Nikole xiong     HTN (hypertension), benign      Hyperlipidemia LDL goal < 100      Mumps      Normal nuclear stress test 12/2009    EF 67%     Osteopenia       PAD (peripheral artery disease)     s/p fem pop bypass; embolectomy     Polycythemia vera (H) 06/15/2022     PONV (postoperative nausea and vomiting)      PUD (peptic ulcer disease) 1980s    DU     Pulmonary emphysema, unspecified emphysema type (H) 01/12/2023     Smoker      Spider veins      Vitamin D deficiency      Past Surgical History:   Procedure Laterality Date     ABDOMEN SURGERY       ANGIOGRAM Right 11/18/2022    Procedure: ANGIOGRAM AORTO ILIAC ANGIOGRAM;  Surgeon: Shaun Tran MD;  Location:  OR     BIOPSY       Blood clot removal from Stent      2011     BONE MARROW BIOPSY, BONE SPECIMEN, NEEDLE/TROCAR N/A 02/02/2021    Procedure: bone marrow biopsy;  Surgeon: Kailey Cota MD;  Location:  GI     BYPASS GRAFT AORTOFEMORAL  05/2002    Dr. Turner     BYPASS GRAFT FEMOROPOPLITEAL  12/16/2011     COLONOSCOPY N/A 01/18/2018    Procedure: COMBINED COLONOSCOPY, SINGLE OR MULTIPLE BIOPSY/POLYPECTOMY BY BIOPSY;  COLONOSCOPY;  Surgeon: Efrain Hernadez MD;  Location:  GI     COLONOSCOPY N/A 03/29/2023    Procedure: COLONOSCOPY, FLEXIBLE, WITH LESION REMOVAL USING SNARE;  Surgeon: Jony Manriquez MD;  Location:  GI     DILATION AND CURETTAGE, OPERATIVE HYSTEROSCOPY, COMBINED N/A 09/15/2022    Procedure: HYSTEROSCOPY, WITH DILATION AND CURETTAGE OF UTERUS;  Surgeon: Destiney Schaefer MD;  Location:  OR     EMBOLECTOMY LOWER EXTREMITY  12/16/2011    Procedure:EMBOLECTOMY LOWER EXTREMITY; EMBOLECTOMY, RIGHT POPLITEAL WITH PATCH ANGIOPLASTY. EXCISION SKIN LESION RIGHT LEG. ; Surgeon:PARMINDER VELAZCO; Location: OR     ENDARTERECTOMY FEMORAL Right 11/18/2022    Procedure: ENDARTERECTOMY, FEMORAL RIGHT FEMORAL CUTDOWN, RIGHT ILIAC ARTERY STENTING.;  Surgeon: Shaun Tran MD;  Location:  OR     ESOPHAGOSCOPY, GASTROSCOPY, DUODENOSCOPY (EGD), COMBINED N/A 10/07/2022    Procedure: ESOPHAGOGASTRODUODENOSCOPY, WITH BIOPSY;  Surgeon: Felix Carmona MD;  Location:   GI     EXCISE LESION LOWER EXTREMITY  2011    Procedure:EXCISE LESION LOWER EXTREMITY; Surgeon:PARMINDER VELAZCO; Location:[facility] OR     HERNIA REPAIR  11/04/2008    x2 umbilical     IR OR ANGIOGRAM  2022     L achilles repair  2008     LAPAROSCOPIC OOPHORECTOMY Left     L for cyst age 26     miscarriages x 3       tubal ligation and reversal       Family History   Problem Relation Age of Onset     Alzheimer Disease Mother          of panc/GI ca age 83     Osteoporosis Mother      Other Cancer Mother      Cancer Father          age 64 lymphoma     Hyperlipidemia Father      Depression Father         None     Colon Cancer Maternal Grandfather      Hypertension Sister      Social History     Socioeconomic History     Marital status:      Spouse name: Not on file     Number of children: Not on file     Years of education: Not on file     Highest education level: Not on file   Occupational History     Not on file   Tobacco Use     Smoking status: Some Days     Current packs/day: 0.25     Average packs/day: 0.3 packs/day for 50.0 years (12.5 ttl pk-yrs)     Types: Cigarettes     Passive exposure: Current     Smokeless tobacco: Never     Tobacco comments:     Nicotine patches on and off   Vaping Use     Vaping status: Some Days   Substance and Sexual Activity     Alcohol use: Yes     Comment: Beer once in a while     Drug use: No     Sexual activity: Not Currently     Partners: Male     Birth control/protection: None   Other Topics Concern     Parent/sibling w/ CABG, MI or angioplasty before 65F 55M? No   Social History Narrative    , working FT for a [employer] company.  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  Had 3 miscarriages.  No living children.   is Dustin.  Walks 2miles per day.     Social Drivers of Health     Financial Resource Strain: Low Risk  (2025)    [facility]    Financial Resource Strain      Difficulty of Paying Living  Expenses: 3      Difficulty of Paying Living Expenses: Not on file   Food Insecurity: Low Risk  (1/24/2025)    Food Insecurity      Within the past 12 months, did you worry that your food would run out before you got money to buy more?: No      Within the past 12 months, did the food you bought just not last and you didn t have money to get more?: No   Transportation Needs: Low Risk  (1/24/2025)    Transportation Needs      Within the past 12 months, has lack of transportation kept you from medical appointments, getting your medicines, non-medical meetings or appointments, work, or from getting things that you need?: No   Physical Activity: Sufficiently Active (1/24/2025)    Exercise Vital Sign      Days of Exercise per Week: 7 days      Minutes of Exercise per Session: 30 min   Stress: Patient Declined (1/24/2025)    [place] [employer] - Occupational Stress Questionnaire      Feeling of Stress : Patient declined   Social Connections: Unknown (1/24/2025)    Social Connection and Isolation Panel [NHANES]      Frequency of Communication with Friends and Family: Not on file      Frequency of Social Gatherings with Friends and Family: More than three times a week      Attends Synagogue Services: Not on file      Active Member of Clubs or Organizations: Not on file      Attends Club or Organization Meetings: Not on file      Marital Status: Not on file   Interpersonal Safety: Low Risk  (1/29/2025)    Interpersonal Safety      Do you feel physically and emotionally safe where you currently live?: Yes      Within the past 12 months, have you been hit, slapped, kicked or otherwise physically hurt by someone?: No      Within the past 12 months, have you been humiliated or emotionally abused in other ways by your partner or ex-partner?: No   Housing Stability: Low Risk  (1/24/2025)    Housing Stability      Do you have housing? : Yes      Are you worried about losing your housing?: No       35 minutes  spent reviewing chart, reviewing test results, talking with and examining patient, formulating plan, and documentation on the day of the encounter.    Jolie Garsia PA-C  General Pulmonology       Again, thank you for allowing me to participate in the care of your patient.        Sincerely,        Jolie Garsia PA-C    Electronically signed

## 2025-02-26 ENCOUNTER — LAB (OUTPATIENT)
Dept: LAB | Facility: CLINIC | Age: 75
End: 2025-02-26
Payer: COMMERCIAL

## 2025-02-26 DIAGNOSIS — E87.6 HYPOKALEMIA: ICD-10-CM

## 2025-02-26 LAB
ANION GAP SERPL CALCULATED.3IONS-SCNC: 10 MMOL/L (ref 7–15)
BUN SERPL-MCNC: 11.4 MG/DL (ref 8–23)
CALCIUM SERPL-MCNC: 9.9 MG/DL (ref 8.8–10.4)
CHLORIDE SERPL-SCNC: 103 MMOL/L (ref 98–107)
CREAT SERPL-MCNC: 0.54 MG/DL (ref 0.51–0.95)
EGFRCR SERPLBLD CKD-EPI 2021: >90 ML/MIN/1.73M2
GLUCOSE SERPL-MCNC: 93 MG/DL (ref 70–99)
HCO3 SERPL-SCNC: 27 MMOL/L (ref 22–29)
POTASSIUM SERPL-SCNC: 4.6 MMOL/L (ref 3.4–5.3)
SODIUM SERPL-SCNC: 140 MMOL/L (ref 135–145)

## 2025-02-26 PROCEDURE — 80048 BASIC METABOLIC PNL TOTAL CA: CPT

## 2025-02-26 PROCEDURE — 36415 COLL VENOUS BLD VENIPUNCTURE: CPT

## 2025-02-26 NOTE — RESULT ENCOUNTER NOTE
Evan Jones,    I have had the opportunity to review your recent results and an interpretation is as follows:  Your metabolic panel shows excellent renal function and electrolytes.  Much improved    Sincerely,  Jean Carlisle MD

## 2025-02-27 ENCOUNTER — ANCILLARY PROCEDURE (OUTPATIENT)
Dept: GENERAL RADIOLOGY | Facility: CLINIC | Age: 75
End: 2025-02-27
Attending: FAMILY MEDICINE
Payer: COMMERCIAL

## 2025-02-27 ENCOUNTER — OFFICE VISIT (OUTPATIENT)
Dept: URGENT CARE | Facility: URGENT CARE | Age: 75
End: 2025-02-27
Payer: COMMERCIAL

## 2025-02-27 VITALS
HEART RATE: 64 BPM | BODY MASS INDEX: 20.55 KG/M2 | DIASTOLIC BLOOD PRESSURE: 54 MMHG | WEIGHT: 123.5 LBS | TEMPERATURE: 97.8 F | SYSTOLIC BLOOD PRESSURE: 116 MMHG | RESPIRATION RATE: 18 BRPM | OXYGEN SATURATION: 95 %

## 2025-02-27 DIAGNOSIS — R05.1 ACUTE COUGH: ICD-10-CM

## 2025-02-27 DIAGNOSIS — R91.8 LEFT LOWER LOBE PULMONARY INFILTRATE: ICD-10-CM

## 2025-02-27 DIAGNOSIS — R09.81 CONGESTION OF PARANASAL SINUS: ICD-10-CM

## 2025-02-27 DIAGNOSIS — R07.9 LEFT-SIDED CHEST PAIN: ICD-10-CM

## 2025-02-27 DIAGNOSIS — R05.8 PRODUCTIVE COUGH: Primary | ICD-10-CM

## 2025-02-27 DIAGNOSIS — F17.200 SMOKING ADDICTION: ICD-10-CM

## 2025-02-27 LAB
FLUAV AG SPEC QL IA: NEGATIVE
FLUBV AG SPEC QL IA: NEGATIVE

## 2025-02-27 RX ORDER — DOXYCYCLINE HYCLATE 100 MG
100 TABLET ORAL 2 TIMES DAILY
Qty: 14 TABLET | Refills: 0 | Status: SHIPPED | OUTPATIENT
Start: 2025-02-27 | End: 2025-03-06

## 2025-02-27 NOTE — PROGRESS NOTES
Chief Complaint   Patient presents with    Urgent Care     Pt presents cough with green phlegm, chest hurst, tasting blood in mouth, feeling feverish without fever, onset since yesterday.      Humaira was seen today for urgent care.    Diagnoses and all orders for this visit:    Productive cough  -     XR Chest 2 Views  -     doxycycline hyclate (VIBRA-TABS) 100 MG tablet; Take 1 tablet (100 mg) by mouth 2 times daily for 7 days.    Acute cough  -     Influenza A/B antigen    Congestion of paranasal sinus  -     Influenza A/B antigen    Smoking addiction    Left-sided chest pain    Left lower lobe pulmonary infiltrate      D/d  Asthma, Bronchitis-viral, Influenza, Pneumonia, Viral pharyngitis, Viral syndrome, and Viral upper respiratory illness    PLAN:  Symptomatic therapy suggested: push fluids, rest, gargle warm salt water, use vaporizer or mist needed , use acetaminophen, ibuprofen as needed, and Return office visit if symptoms persist or worsen. Call or return to clinic prn if these symptoms worsen or fail to improve as anticipated.  X-ray did show some patchiness in the left lower lung area reviewed with patient with the findings would consider treating with an antibiotic.    SUBJECTIVE:   Karen Caban is a 74 year old female  with h/o smoking currently not smoking for last 2 days who complains of productive cough, chest congestion, and chills for 2 days also describes some left sided chest pain . She denies a history of wheezing, shortness of breath, fatigue, and dizzyness and denies a history of asthma. Patient admits to smoke cigarettes.     OBJECTIVE:  She appears well, vital signs are as noted by the nurse. Ears normal.  Throat and pharynx normal.  Neck supple. No adenopathy in the neck. Nose is congested. Sinuses non tender. The chest is clear, without wheezes or rales.    Tiffanie Perea MD

## 2025-02-27 NOTE — PATIENT INSTRUCTIONS
You are seen for upper respiratory infection  Complete the course of the antibiotic Continue tylenol/ibuprofen for the fever and pain ,Continue pushing more fluids Symptomatic therapy suggested: push fluids, rest, gargle warm salt water, use vaporizer or mist needed , and use acetaminophen, ibuprofen as needed.   if there is worsening cough, sob , chest heaviness or fever above 102, confusion , fainting episode do follow up      Tiffanie Perea MD

## 2025-03-24 ENCOUNTER — TRANSFERRED RECORDS (OUTPATIENT)
Dept: HEALTH INFORMATION MANAGEMENT | Facility: CLINIC | Age: 75
End: 2025-03-24

## 2025-04-14 ENCOUNTER — LAB (OUTPATIENT)
Dept: INFUSION THERAPY | Facility: CLINIC | Age: 75
End: 2025-04-14
Attending: INTERNAL MEDICINE
Payer: COMMERCIAL

## 2025-04-14 ENCOUNTER — ONCOLOGY VISIT (OUTPATIENT)
Dept: ONCOLOGY | Facility: CLINIC | Age: 75
End: 2025-04-14
Attending: INTERNAL MEDICINE
Payer: COMMERCIAL

## 2025-04-14 VITALS
OXYGEN SATURATION: 100 % | SYSTOLIC BLOOD PRESSURE: 108 MMHG | DIASTOLIC BLOOD PRESSURE: 57 MMHG | WEIGHT: 124.2 LBS | RESPIRATION RATE: 16 BRPM | BODY MASS INDEX: 20.67 KG/M2 | HEART RATE: 55 BPM

## 2025-04-14 DIAGNOSIS — D49.0 IPMN (INTRADUCTAL PAPILLARY MUCINOUS NEOPLASM): ICD-10-CM

## 2025-04-14 DIAGNOSIS — C91.10 CLL (CHRONIC LYMPHOCYTIC LEUKEMIA) (H): Primary | ICD-10-CM

## 2025-04-14 DIAGNOSIS — C91.10 CLL (CHRONIC LYMPHOCYTIC LEUKEMIA) (H): ICD-10-CM

## 2025-04-14 LAB
ALBUMIN SERPL BCG-MCNC: 4.4 G/DL (ref 3.5–5.2)
ALP SERPL-CCNC: 74 U/L (ref 40–150)
ALT SERPL W P-5'-P-CCNC: 28 U/L (ref 0–50)
ANION GAP SERPL CALCULATED.3IONS-SCNC: 8 MMOL/L (ref 7–15)
AST SERPL W P-5'-P-CCNC: 41 U/L (ref 0–45)
BASOPHILS # BLD MANUAL: 0 10E3/UL (ref 0–0.2)
BASOPHILS NFR BLD MANUAL: 0 %
BILIRUB SERPL-MCNC: 0.5 MG/DL
BUN SERPL-MCNC: 14.7 MG/DL (ref 8–23)
CALCIUM SERPL-MCNC: 9.1 MG/DL (ref 8.8–10.4)
CHLORIDE SERPL-SCNC: 101 MMOL/L (ref 98–107)
CREAT SERPL-MCNC: 0.65 MG/DL (ref 0.51–0.95)
EGFRCR SERPLBLD CKD-EPI 2021: >90 ML/MIN/1.73M2
EOSINOPHIL # BLD MANUAL: 0 10E3/UL (ref 0–0.7)
EOSINOPHIL NFR BLD MANUAL: 0 %
ERYTHROCYTE [DISTWIDTH] IN BLOOD BY AUTOMATED COUNT: 14.1 % (ref 10–15)
GLUCOSE SERPL-MCNC: 94 MG/DL (ref 70–99)
HCO3 SERPL-SCNC: 27 MMOL/L (ref 22–29)
HCT VFR BLD AUTO: 44.1 % (ref 35–47)
HGB BLD-MCNC: 14.8 G/DL (ref 11.7–15.7)
LYMPHOCYTES # BLD MANUAL: 65.3 10E3/UL (ref 0.8–5.3)
LYMPHOCYTES NFR BLD MANUAL: 96 %
MCH RBC QN AUTO: 32.5 PG (ref 26.5–33)
MCHC RBC AUTO-ENTMCNC: 33.6 G/DL (ref 31.5–36.5)
MCV RBC AUTO: 97 FL (ref 78–100)
MONOCYTES # BLD MANUAL: 0 10E3/UL (ref 0–1.3)
MONOCYTES NFR BLD MANUAL: 0 %
NEUTROPHILS # BLD MANUAL: 2.7 10E3/UL (ref 1.6–8.3)
NEUTROPHILS NFR BLD MANUAL: 4 %
PLAT MORPH BLD: ABNORMAL
PLATELET # BLD AUTO: 158 10E3/UL (ref 150–450)
POTASSIUM SERPL-SCNC: 5.2 MMOL/L (ref 3.4–5.3)
PROT SERPL-MCNC: 6.3 G/DL (ref 6.4–8.3)
RBC # BLD AUTO: 4.55 10E6/UL (ref 3.8–5.2)
RBC MORPH BLD: ABNORMAL
SMUDGE CELLS BLD QL SMEAR: PRESENT
SODIUM SERPL-SCNC: 136 MMOL/L (ref 135–145)
URATE SERPL-MCNC: 4.8 MG/DL (ref 2.4–5.7)
WBC # BLD AUTO: 68 10E3/UL (ref 4–11)

## 2025-04-14 PROCEDURE — G0463 HOSPITAL OUTPT CLINIC VISIT: HCPCS | Performed by: INTERNAL MEDICINE

## 2025-04-14 PROCEDURE — G2211 COMPLEX E/M VISIT ADD ON: HCPCS | Performed by: INTERNAL MEDICINE

## 2025-04-14 PROCEDURE — 84155 ASSAY OF PROTEIN SERUM: CPT | Performed by: INTERNAL MEDICINE

## 2025-04-14 PROCEDURE — 36415 COLL VENOUS BLD VENIPUNCTURE: CPT

## 2025-04-14 PROCEDURE — 85007 BL SMEAR W/DIFF WBC COUNT: CPT | Performed by: INTERNAL MEDICINE

## 2025-04-14 PROCEDURE — 85041 AUTOMATED RBC COUNT: CPT | Performed by: INTERNAL MEDICINE

## 2025-04-14 PROCEDURE — 84450 TRANSFERASE (AST) (SGOT): CPT | Performed by: INTERNAL MEDICINE

## 2025-04-14 PROCEDURE — 84550 ASSAY OF BLOOD/URIC ACID: CPT | Performed by: INTERNAL MEDICINE

## 2025-04-14 PROCEDURE — 99214 OFFICE O/P EST MOD 30 MIN: CPT | Performed by: INTERNAL MEDICINE

## 2025-04-14 ASSESSMENT — PAIN SCALES - GENERAL: PAINLEVEL_OUTOF10: NO PAIN (0)

## 2025-04-14 NOTE — PROGRESS NOTES
Medical Assistant Note:  Karen Caban presents today for blood draw.    Patient seen by provider today: Yes: kalani.   present during visit today: Not Applicable.    Concerns: No Concerns.    Procedure:  Lab draw site: rac, Needle type: bf, Gauge: 23.    Post Assessment:  Labs drawn without difficulty: Yes.    Discharge Plan:  Departure Mode: Ambulatory.    Face to Face Time: 5 min  Zeus cmp, cbc, uric.    Jamaica Bruno, CMA

## 2025-04-14 NOTE — PROGRESS NOTES
"Oncology Rooming Note    April 14, 2025 8:53 AM   Karen Caban is a 74 year old female who presents for:    Chief Complaint   Patient presents with    Oncology Clinic Visit     Initial Vitals: /57   Pulse 55   Resp 16   Wt 56.3 kg (124 lb 3.2 oz)   SpO2 100%   BMI 20.67 kg/m   Estimated body mass index is 20.67 kg/m  as calculated from the following:    Height as of 1/30/25: 1.651 m (5' 5\").    Weight as of this encounter: 56.3 kg (124 lb 3.2 oz). Body surface area is 1.61 meters squared.  No Pain (0) Comment: Data Unavailable   No LMP recorded. Patient is postmenopausal.  Allergies reviewed: Yes  Medications reviewed: Yes    Medications: Medication refills not needed today.  Pharmacy [facility] entered into [email or web address]: [id number] [place], [place] - [address]    Frailty Screening:   Is the patient here for a new oncology consult visit in cancer care? 2. No    PHQ9:  Did this patient require a PHQ9?: No        Mila Draper CMA            "

## 2025-04-14 NOTE — PATIENT INSTRUCTIONS
-CBC every 3 months.  -CT abdomen and pelvis in 6 months.  -Follow-up in 6 months with CT and labs.

## 2025-04-14 NOTE — LETTER
"4/14/2025      Karen Caban  [address]  [place] [address]      Dear Colleague,    Thank you for referring your patient, Karen Caban, to the [facility]. Please see a copy of my visit note below.    Oncology Rooming Note    April 14, 2025 8:53 AM   Karen Caban is a 74 year old female who presents for:    Chief Complaint   Patient presents with     Oncology Clinic Visit     Initial Vitals: /57   Pulse 55   Resp 16   Wt 56.3 kg (124 lb 3.2 oz)   SpO2 100%   BMI 20.67 kg/m   Estimated body mass index is 20.67 kg/m  as calculated from the following:    Height as of 1/30/25: 1.651 m (5' 5\").    Weight as of this encounter: 56.3 kg (124 lb 3.2 oz). Body surface area is 1.61 meters squared.  No Pain (0) Comment: Data Unavailable   No LMP recorded. Patient is postmenopausal.  Allergies reviewed: Yes  Medications reviewed: Yes    Medications: Medication refills not needed today.  Pharmacy name entered into [employer]: [id number] - [place], [place] - [address]    Frailty Screening:   Is the patient here for a new oncology consult visit in cancer care? 2. No    PHQ9:  Did this patient require a PHQ9?: No        Mila Draper, [facility]              HEMATOLOGIC HISTORY:  Ms. Caban is a 71-year-old female with CLL.  1.  On 12/11/2019, WBC of 9.8.  -On 12/17/2020, WBC of 16.3.  -On 01/06/2021, WBC of 18.6, hemoglobin of 16.5 and platelet of 206.  Neutrophil of 19%, lymphocyte of 74%.  2.  Flow cytometry on peripheral blood on 01/06/2021 revealed CD5 positive kappa monotypic B cells with immunophenotype of CLL/SLL .  3.  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  CT chest, abdomen, and pelvis on 02/03/2021 did " not reveal any lymphadenopathy or splenomegaly.     SUBJECTIVE:   Ms. Caban is a 74-year-old female with Webb stage 0 chronic lymphocytic leukemia on observation.     She has lung nodule and emphysema.  She follows up with pulmonologist. In 01/2025, she had influenza A.     CT chest on 02/07/2025:  1. ACR Assessment Category: Lung-RADS Category 4A. Suspicious. Additional diagnostic testing and/or tissue sampling is recommended. 3 month LDCT; PET/CT may be used when there is a >/= 8 mm solid component. Several new nodules are seen as above.  2. Significant Incidental Finding(s): Category S: No.     She is doing well. She is active.  No headache.  No dizziness. No chest pain.  No shortness of breath.  No abdominal pain, nausea or vomiting. Appetite is good.  No urinary or bowel complaints. No night sweats. No weight loss. All other review of systems is negative.     PHYSICAL EXAMINATION:   GENERAL:  Alert and oriented x 3.   VITAL SIGNS:  Reviewed.  ECOG PS of 0.     EYES:  No icterus.   NECK:  Supple. No lymphadenopathy. No thyromegaly.   AXILLAE:  No lymphadenopathy.   LUNGS:  Good air entry bilaterally.  No crackles or wheezing.   HEART:  Regular.  No murmur.   GI: Abdomen is soft.  Nontender. No mass.   EXTREMITIES:  No pedal edema.  No calf swelling or tenderness.   SKIN:  No rash.      LABORATORY:  CBC, CMP and uric acid reviewed.     ASSESSMENT:    1.  An 74-year-old female with Webb stage 0 chronic lymphocytic leukemia on observation. CLL is stable.  2.  Pancreatic IPMN.  3.  Lung nodules.     PLAN:    -CBC every 3 months.  -CT abdomen and pelvis in 6 months.  -Follow-up in 6 months with CT and labs.     DISCUSSION:  1.  Patient is overall doing well.  CBC reviewed with her.  She has leukocytosis with WBC of 68.  Normal hemoglobin and platelet.    Explained to the patient that CLL is stable.  At this time no indication for treatment.  Will continue to monitor her.    She will get CBC every 3 months.    2.   Patient has lung nodules.  She follows up with pulmonologist.  CT chest in February revealed new lung nodules.  She had influenza A in January.  Could be because of that.  She is going to get follow-up CT chest in May.    3.  Patient has pancreatic IPMN.  For follow-up will get CT abdomen and pelvis in 6 months.    4.  She had few questions which were all answered.  I will see her in 6 months time.  Advised her to call us with any questions or concerns.      1.  Discussed with the patient regarding CLL.  Explained to her that CLL is stable.  CBC reveals leukocytosis/lymphocytosis.  No anemia or thrombocytopenia.     CT urogram reviewed with her.  There is mildly enlarged abdomino-pelvic lymphadenopathy and splenomegaly.  Explained to her that this is secondary to CLL. (We are still calling it Ewbb stage 0 as these are not clinically palpable.)     We discussed regarding treatment.  Different indication for treatment for CLL reviewed.  At this time, there is no indication for treatment.  Will continue to monitor her.     She had a few questions regarding treatment which were answered.  Explained to her that there are many different treatment options.  She can be on single agent BTK inhibitor like acalabrutinib.  Or we can give her fixed duration treatment with venetoclax and obinutuzumab.  Further discussion when it is time to start treatment.     2.  She has pancreatic IPMN.  Will consider follow-up scan in 1 year.     3.  She had a few questions which were all answered.  I will see her in 6 months time.  She will have CBC checked in between.     Total visit time of 30 minutes.  Time spent in today's visit, review of chart/investigations today and documentation.      Again, thank you for allowing me to participate in the care of your patient.        Sincerely,        Sancho Osorio MD    Electronically signed

## 2025-04-14 NOTE — PROGRESS NOTES
HEMATOLOGIC HISTORY:  Ms. Caban is a 71-year-old female with CLL.  1.  On 12/11/2019, WBC of 9.8.  -On 12/17/2020, WBC of 16.3.  -On 01/06/2021, WBC of 18.6, hemoglobin of 16.5 and platelet of 206.  Neutrophil of 19%, lymphocyte of 74%.  2.  Flow cytometry on peripheral blood on 01/06/2021 revealed CD5 positive kappa monotypic B cells with immunophenotype of CLL/SLL .  3.  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  CT chest, abdomen, and pelvis on 02/03/2021 did not reveal any lymphadenopathy or splenomegaly.     SUBJECTIVE:   Ms. Caban is a 74-year-old female with Webb stage 0 chronic lymphocytic leukemia on observation.     She has lung nodule and emphysema.  She follows up with pulmonologist. In 01/2025, she had influenza A.     CT chest on 02/07/2025:  1. ACR Assessment Category: Lung-RADS Category 4A. Suspicious. Additional diagnostic testing and/or tissue sampling is recommended. 3 month LDCT; PET/CT may be used when there is a >/= 8 mm solid component. Several new nodules are seen as above.  2. Significant Incidental Finding(s): Category S: No.     She is doing well. She is active.  No headache.  No dizziness. No chest pain.  No shortness of breath.  No abdominal pain, nausea or vomiting. Appetite is good.  No urinary or bowel complaints. No night sweats. No weight loss. All other review of systems is negative.     PHYSICAL EXAMINATION:   GENERAL:  Alert and oriented x 3.   VITAL SIGNS:  Reviewed.  ECOG PS of 0.     EYES:  No icterus.   NECK:  Supple. No lymphadenopathy. No thyromegaly.   AXILLAE:  No lymphadenopathy.   LUNGS:  Good air entry bilaterally.  No crackles or wheezing.   HEART:  Regular.  No murmur.   GI: Abdomen is soft.  Nontender. No mass.   EXTREMITIES:  No pedal edema.  No  calf swelling or tenderness.   SKIN:  No rash.      LABORATORY:  CBC, CMP and uric acid reviewed.     ASSESSMENT:    1.  An 74-year-old female with Webb stage 0 chronic lymphocytic leukemia on observation. CLL is stable.  2.  Pancreatic IPMN.  3.  Lung nodules.     PLAN:    -CBC every 3 months.  -CT abdomen and pelvis in 6 months.  -Follow-up in 6 months with CT and labs.     DISCUSSION:  1.  Patient is overall doing well.  CBC reviewed with her.  She has leukocytosis with WBC of 68.  Normal hemoglobin and platelet.    Explained to the patient that CLL is stable.  At this time no indication for treatment.  Will continue to monitor her.    She will get CBC every 3 months.    2.  Patient has lung nodules.  She follows up with pulmonologist.  CT chest in February revealed new lung nodules.  She had influenza A in January.  Could be because of that.  She is going to get follow-up CT chest in May.    3.  Patient has pancreatic IPMN.  For follow-up will get CT abdomen and pelvis in 6 months.    4.  She had few questions which were all answered.  I will see her in 6 months time.  Advised her to call us with any questions or concerns.      1.  Discussed with the patient regarding CLL.  Explained to her that CLL is stable.  CBC reveals leukocytosis/lymphocytosis.  No anemia or thrombocytopenia.     CT urogram reviewed with her.  There is mildly enlarged abdomino-pelvic lymphadenopathy and splenomegaly.  Explained to her that this is secondary to CLL. (We are still calling it Webb stage 0 as these are not clinically palpable.)     We discussed regarding treatment.  Different indication for treatment for CLL reviewed.  At this time, there is no indication for treatment.  Will continue to monitor her.     She had a few questions regarding treatment which were answered.  Explained to her that there are many different treatment options.  She can be on single agent BTK inhibitor like acalabrutinib.  Or we can give her fixed duration  treatment with venetoclax and obinutuzumab.  Further discussion when it is time to start treatment.     2.  She has pancreatic IPMN.  Will consider follow-up scan in 1 year.     3.  She had a few questions which were all answered.  I will see her in 6 months time.  She will have CBC checked in between.     Total visit time of 30 minutes.  Time spent in today's visit, review of chart/investigations today and documentation.

## 2025-04-17 DIAGNOSIS — E78.5 HYPERLIPIDEMIA WITH TARGET LDL LESS THAN 100: ICD-10-CM

## 2025-04-17 RX ORDER — EZETIMIBE 10 MG/1
10 TABLET ORAL
Qty: 90 TABLET | Refills: 2 | Status: SHIPPED | OUTPATIENT
Start: 2025-04-17

## 2025-04-28 ENCOUNTER — OFFICE VISIT (OUTPATIENT)
Dept: OTHER | Facility: CLINIC | Age: 75
End: 2025-04-28
Attending: SURGERY
Payer: COMMERCIAL

## 2025-04-28 ENCOUNTER — HOSPITAL ENCOUNTER (OUTPATIENT)
Dept: ULTRASOUND IMAGING | Facility: CLINIC | Age: 75
Discharge: HOME | End: 2025-04-28
Attending: SURGERY
Payer: COMMERCIAL

## 2025-04-28 VITALS — SYSTOLIC BLOOD PRESSURE: 122 MMHG | DIASTOLIC BLOOD PRESSURE: 64 MMHG | HEART RATE: 53 BPM

## 2025-04-28 DIAGNOSIS — I70.309 ATHEROSCLEROSIS OF AORTO-ILIAC BYPASS GRAFT: Primary | ICD-10-CM

## 2025-04-28 DIAGNOSIS — K21.9 GASTROESOPHAGEAL REFLUX DISEASE WITHOUT ESOPHAGITIS: ICD-10-CM

## 2025-04-28 DIAGNOSIS — R09.89 OTHER SPECIFIED SYMPTOMS AND SIGNS INVOLVING THE CIRCULATORY AND RESPIRATORY SYSTEMS: ICD-10-CM

## 2025-04-28 DIAGNOSIS — I70.309 ATHEROSCLEROSIS OF AORTO-ILIAC BYPASS GRAFT: ICD-10-CM

## 2025-04-28 PROCEDURE — G0463 HOSPITAL OUTPT CLINIC VISIT: HCPCS | Performed by: SURGERY

## 2025-04-28 PROCEDURE — 3078F DIAST BP <80 MM HG: CPT | Performed by: SURGERY

## 2025-04-28 PROCEDURE — 93978 VASCULAR STUDY: CPT

## 2025-04-28 PROCEDURE — 93978 VASCULAR STUDY: CPT | Mod: 26 | Performed by: SURGERY

## 2025-04-28 PROCEDURE — 99213 OFFICE O/P EST LOW 20 MIN: CPT | Performed by: SURGERY

## 2025-04-28 PROCEDURE — 3074F SYST BP LT 130 MM HG: CPT | Performed by: SURGERY

## 2025-04-28 NOTE — PATIENT INSTRUCTIONS
Thank you for allowing [facility] to provide your medical care.      Please see below for the follow up instructions pertaining to your medical appointment with Dr. Tran at the [facility].    Follow up plan:   1) US aorta/iliac in 6 months.  2) Visit with Dr. Tran after ultrasound.      Our office will send you a letter by mail, closer to your follow up time frame, with a reminder to call to schedule appointment(s).    Please call our office with any concerns or questions [phone].

## 2025-04-28 NOTE — PROGRESS NOTES
Ms. Humaira Caban is now 74 years old.  She has a history of aorto by iliac bypass.  In November 2022 we did stent grafting of the proximal aspect of the right limb of the aortobiiliac bypass and also did stent grafting into the mid right external iliac artery.  Both of these were done with [facility] VBX stent grafts.  Since that time we have kept a close eye on the situation.    She has no complaints.    Today's sonography shows patent stent grafts bilaterally.    She remains on Brilinta and baby aspirin which we will continue indefinitely.    We will see her back in 6 months with repeat sonography.

## 2025-04-28 NOTE — PROGRESS NOTES
[facility]        Patient is here for a  follow up.    Pt is currently taking Aspirin and Statin.    /64 (BP Location: Left arm, Patient Position: Chair, Cuff Size: Adult Regular)   Pulse 53     The provider has been notified that the patient has no concerns.     Questions patient would like addressed today are: N/A.    Refills are needed: N/A    Has homecare services and agency name:  Ava Fuller MA

## 2025-04-29 RX ORDER — PANTOPRAZOLE SODIUM 40 MG/1
40 TABLET, DELAYED RELEASE ORAL DAILY
Qty: 90 TABLET | Refills: 1 | Status: SHIPPED | OUTPATIENT
Start: 2025-04-29

## 2025-05-01 DIAGNOSIS — I49.3 PVC'S (PREMATURE VENTRICULAR CONTRACTIONS): ICD-10-CM

## 2025-05-01 RX ORDER — METOPROLOL SUCCINATE 100 MG/1
100 TABLET, EXTENDED RELEASE ORAL DAILY
Qty: 90 TABLET | Refills: 2 | Status: SHIPPED | OUTPATIENT
Start: 2025-05-01

## 2025-05-05 DIAGNOSIS — I10 HTN (HYPERTENSION), BENIGN: ICD-10-CM

## 2025-05-05 RX ORDER — CHLORTHALIDONE 25 MG/1
25 TABLET ORAL DAILY
Qty: 90 TABLET | Refills: 2 | Status: SHIPPED | OUTPATIENT
Start: 2025-05-05

## 2025-05-21 ENCOUNTER — ANCILLARY PROCEDURE (OUTPATIENT)
Dept: CT IMAGING | Facility: CLINIC | Age: 75
End: 2025-05-21
Attending: PHYSICIAN ASSISTANT
Payer: COMMERCIAL

## 2025-05-21 DIAGNOSIS — R91.8 PULMONARY NODULES: ICD-10-CM

## 2025-05-21 PROCEDURE — 71250 CT THORAX DX C-: CPT

## 2025-05-27 ENCOUNTER — VIRTUAL VISIT (OUTPATIENT)
Dept: PULMONOLOGY | Facility: CLINIC | Age: 75
End: 2025-05-27
Attending: PHYSICIAN ASSISTANT
Payer: COMMERCIAL

## 2025-05-27 DIAGNOSIS — F17.210 CIGARETTE NICOTINE DEPENDENCE WITHOUT COMPLICATION: Primary | ICD-10-CM

## 2025-05-27 PROCEDURE — 1126F AMNT PAIN NOTED NONE PRSNT: CPT | Mod: 93 | Performed by: PHYSICIAN ASSISTANT

## 2025-05-27 PROCEDURE — 98014 SYNCH AUDIO-ONLY EST MOD 30: CPT | Performed by: PHYSICIAN ASSISTANT

## 2025-05-27 NOTE — PROGRESS NOTES
Virtual Visit Details    Type of service:  Telephone Visit   Phone call duration: 5 minutes   Originating Location (pt. Location): Home    Distant Location (provider location):  Off-site  Telephone visit completed due to the patient did not have access to video, while the distant provider did.        Pulmonary Clinic Note    Date of Service: 05/27/25     Chief Complaint   Patient presents with    RECHECK       A/P:  74F being seen for COPD and lung nodules. PFTs w/ mild obstruction and evidence of air-trapping consistent with COPD. CT w/ moderate emphysema. Benefiting from LAMA-LABA. No history of AECOPD. I personally reviewed her CT chest from 2/7/2025 and note multiple new lung nodules measuring up to 7mm when compared to LDCT 2/2024. Previous tiny nodules are stable. Notably, she recently had influenza A. I suspect these are infectious/inflammatory. Stable to decreased/resolved on CT chest 5/2025. Resume annual lung cancer screening, ordered for 5/2026.   <3 min  reviewing tobacco cessation, ordered 7mg patches.   - Continue LABA-LAMA (Stiolto) daily  - may use Combivent prn  - LDCT 5/2026  - recommended smoking cessation, 7mg/24h patches  - OK to substitute medications based on formulary     F/up 12 months with CT chest prior    History:  74F CLL (on observation), HTN, HLD, smoker being seen for follow up of COPD and lung nodules. Last seen in clinic with me 2/2025, previously followed with Dr. Keys. Doing great, no recent URIs. Benefits from Stiolto daily,. Using combivent once daily or less.  No longer has dyspnea with stairs. Able to walk her dog at least 2 miles /day without issue. No SOB at rest. Rarely wheezing. No chest pain or tightness. No cough. No orthopnea or PND. No LE edema. Has mild seasonal allergies, which she doesn't feel are bad enough to warrant taking medication for. She is working on quitting smoking, using the 14mg nicotine patch daily which is helping.     PCP ordered lung cancer screen,  done 2/7/2025, noting multiple new lung nodules compared to 2/2024. Had the flu at that time. Here to review follow up CT chest 5/2025 which I ordered.     Smoking: current, 55 years, 5 cigarettes per day, previously 1PPD. Using an electronic cigarette as needed. Chantix caused awful nightmares. Using 14mg patches. Zyban didn't work for her. Doesn't want to quit currently.   Bird exposure: no             Animal exposure: dog        Inhalation exposure: no    Occupation: retired, former                           10 point review of systems negative, aside from that mentioned in HPI.    There were no vitals taken for this visit.  Gen: well-appearing  Pulm: normal respiratory effort on room air  MSK: no edema, no acute joint abnormality   Skin: no obvious rash  Psych: normal affect  Neuro: alert and oriented     Labs:  Personally reviewed    Imaging/Studies: Personally reviewed  PFTs (8/2023) - mild obstruction, no significant BD response, e/o air-trapping w/o hyperinflation, normal DLCOunc  CXR (7/2023) - hyperinflation   Lung Ca screening CT (2/2025) - multiple new nodules measuring up to 7mm. Previous nodules are stable. Moderate emphysema       Past Medical History:   Diagnosis Date    Ankle fracture 2009    L    Anxiety     Chronic back pain     Chronic lymphocytic leukemia (H)     Colon polyp 2012    repeat colonoscopy 5 years.    Fx low femur epiphy-closed (H)     GERD (gastroesophageal reflux disease)     History of UTI 2017    Cysto by Dr Agustin neg    HTN (hypertension), benign     Hyperlipidemia LDL goal < 100     Mumps     Normal nuclear stress test 12/2009    EF 67%    Osteopenia     PAD (peripheral artery disease)     s/p fem pop bypass; embolectomy    Polycythemia vera (H) 06/15/2022    PONV (postoperative nausea and vomiting)     PUD (peptic ulcer disease) 1980s    DU    Pulmonary emphysema, unspecified emphysema type (H) 01/12/2023    Smoker     Spider veins     Vitamin D deficiency       Past Surgical History:   Procedure Laterality Date    ABDOMEN SURGERY      ANGIOGRAM Right 11/18/2022    Procedure: ANGIOGRAM AORTO ILIAC ANGIOGRAM;  Surgeon: Shaun Tran MD;  Location:  OR    BIOPSY      Blood clot removal from Stent      2011    BONE MARROW BIOPSY, BONE SPECIMEN, NEEDLE/TROCAR N/A 02/02/2021    Procedure: bone marrow biopsy;  Surgeon: Kailey Cota MD;  Location:  GI    BYPASS GRAFT AORTOFEMORAL  05/2002    Dr. Turner    BYPASS GRAFT FEMOROPOPLITEAL  12/16/2011    COLONOSCOPY N/A 01/18/2018    Procedure: COMBINED COLONOSCOPY, SINGLE OR MULTIPLE BIOPSY/POLYPECTOMY BY BIOPSY;  COLONOSCOPY;  Surgeon: Efrain Hernadez MD;  Location:  GI    COLONOSCOPY N/A 03/29/2023    Procedure: COLONOSCOPY, FLEXIBLE, WITH LESION REMOVAL USING SNARE;  Surgeon: Jony Manriquez MD;  Location:  GI    DILATION AND CURETTAGE, OPERATIVE HYSTEROSCOPY, COMBINED N/A 09/15/2022    Procedure: HYSTEROSCOPY, WITH DILATION AND CURETTAGE OF UTERUS;  Surgeon: Destiney Schaefer MD;  Location:  OR    EMBOLECTOMY LOWER EXTREMITY  12/16/2011    Procedure:EMBOLECTOMY LOWER EXTREMITY; EMBOLECTOMY, RIGHT POPLITEAL WITH PATCH ANGIOPLASTY. EXCISION SKIN LESION RIGHT LEG. ; Surgeon:PARMINDER VELAZCO; Location: OR    ENDARTERECTOMY FEMORAL Right 11/18/2022    Procedure: ENDARTERECTOMY, FEMORAL RIGHT FEMORAL CUTDOWN, RIGHT ILIAC ARTERY STENTING.;  Surgeon: Shaun Tran MD;  Location:  OR    ESOPHAGOSCOPY, GASTROSCOPY, DUODENOSCOPY (EGD), COMBINED N/A 10/07/2022    Procedure: ESOPHAGOGASTRODUODENOSCOPY, WITH BIOPSY;  Surgeon: Felix Carmona MD;  Location:  GI    EXCISE LESION LOWER EXTREMITY  12/16/2011    Procedure:EXCISE LESION LOWER EXTREMITY; Surgeon:PARMINDER VELAZCO; Location: OR    HERNIA REPAIR  11/04/2008    x2 umbilical    IR OR ANGIOGRAM  11/18/2022    L achilles repair  12/04/2008    LAPAROSCOPIC OOPHORECTOMY Left     L for cyst age 26    miscarriages x 3       tubal ligation and reversal       Family History   Problem Relation Age of Onset    Alzheimer Disease Mother          of panc/GI ca age 83    Osteoporosis Mother     Other Cancer Mother     Cancer Father          age 64 lymphoma    Hyperlipidemia Father     Depression Father         None    Colon Cancer Maternal Grandfather     Hypertension Sister      Social History     Socioeconomic History    Marital status:      Spouse name: Not on file    Number of children: Not on file    Years of education: Not on file    Highest education level: Not on file   Occupational History    Not on file   Tobacco Use    Smoking status: Some Days     Current packs/day: 0.25     Average packs/day: 0.3 packs/day for 50.0 years (12.5 ttl pk-yrs)     Types: Cigarettes     Passive exposure: Current    Smokeless tobacco: Never    Tobacco comments:     Nicotine patches on and off   Vaping Use    Vaping status: Some Days   Substance and Sexual Activity    Alcohol use: Yes     Comment: Beer once in a while    Drug use: No    Sexual activity: Not Currently     Partners: Male     Birth control/protection: None   Other Topics Concern    Parent/sibling w/ CABG, MI or angioplasty before 65F 55M? No   Social History Narrative    , working FT for a [employer] company.  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  Had 3 miscarriages.  No living children.   is [place].  Walks 2miles per day.     Social Drivers of Health     Financial Resource Strain: Low Risk  (2025)    [facility]    Financial Resource Strain     Difficulty of Paying Living Expenses: 3     Difficulty of Paying Living Expenses: Not on file   Food Insecurity: Low Risk  (2025)    Food Insecurity     Within the past 12 months, did you worry that your food would run out before you got money to buy more?: No     Within the past 12 months, did the food you bought just not last and you didn t have money to get more?: No    Transportation Needs: Low Risk  (1/24/2025)    Transportation Needs     Within the past 12 months, has lack of transportation kept you from medical appointments, getting your medicines, non-medical meetings or appointments, work, or from getting things that you need?: No   Physical Activity: Sufficiently Active (1/24/2025)    Exercise Vital Sign     Days of Exercise per Week: 7 days     Minutes of Exercise per Session: 30 min   Stress: Patient Declined (1/24/2025)    [employer] - Occupational Stress Questionnaire     Feeling of Stress : Patient declined   Social Connections: Unknown (1/24/2025)    Social Connection and Isolation Panel [NHANES]     Frequency of Communication with Friends and Family: Not on file     Frequency of Social Gatherings with Friends and Family: More than three times a week     Attends Anabaptism Services: Not on file     Active Member of Clubs or Organizations: Not on file     Attends Club or Organization Meetings: Not on file     Marital Status: Not on file   Interpersonal Safety: Low Risk  (1/29/2025)    Interpersonal Safety     Do you feel physically and emotionally safe where you currently live?: Yes     Within the past 12 months, have you been hit, slapped, kicked or otherwise physically hurt by someone?: No     Within the past 12 months, have you been humiliated or emotionally abused in other ways by your partner or ex-partner?: No   Housing Stability: Low Risk  (1/24/2025)    Housing Stability     Do you have housing? : Yes     Are you worried about losing your housing?: No     Lung Cancer Screening Shared Decision Making Visit     Karen Caban is eligible for lung cancer screening on the basis of:   has not not experienced symptoms suggestive of lung cancer.   born on 1950, 74 year old years old.   smoked 1 packs of cigarettes for 55 years for a total of 55 pack-years   has not quit smoking      I have discussed with patient the risks and  benefits of screening for lung cancer with low-dose CT.     The risks include:   radiation exposure    false positives     over-diagnosis    The benefit of early detection of lung cancer is contingent upon adherence to annual screening or more frequent follow up if indicated.     Furthermore, reaping the benefits of screening requires Karen A Arsh to be willing and able to undergo diagnostic procedures, if indicated.     We did discuss that the only way to prevent lung cancer is to not smoke. Smoking cessation assistance was offered.     35 minutes spent reviewing chart, reviewing test results, talking with and examining patient, formulating plan, and documentation on the day of the encounter.    Jolie Garsia PA-C  General Pulmonology

## 2025-05-27 NOTE — NURSING NOTE
Current patient location: [address] [place] [address] [id number]  [facility] [id number]      Is the patient currently in the state of [place]? YES    Visit mode:TELEPHONE    If the visit is dropped, the patient can be reconnected by: TELEPHONE VISIT: Phone number:   Telephone Information:   Mobile [phone]       Will anyone else be joining the visit? NO  (If patient encounters technical issues they should call [phone])    How would you like to obtain your AVS? MyChart    Are changes needed to the allergy or medication list? Pt stated no changes to allergies and Pt stated no med changes    Are refills needed on medications prescribed by this physician? NO    Rooming Documentation:  Questionnaire(s) completed    Reason for visit: RECHECK      Kami CONTEH

## 2025-05-27 NOTE — LETTER
5/27/2025      Karen Caban  [address]  [facility] [address]      Dear Colleague,    Thank you for referring your patient, Karen Caban, to the [facility] [place]. Please see a copy of my visit note below.    Virtual Visit Details    Type of service:  Telephone Visit   Phone call duration: 5 minutes   Originating Location (pt. Location): Home    Distant Location (provider location):  Off-site  Telephone visit completed due to the patient did not have access to video, while the distant provider did.        Pulmonary Clinic Note    Date of Service: 05/27/25     Chief Complaint   Patient presents with     RECHECK       A/P:  74F being seen for COPD and lung nodules. PFTs w/ mild obstruction and evidence of air-trapping consistent with COPD. CT w/ moderate emphysema. Benefiting from LAMA-LABA. No history of AECOPD. I personally reviewed her CT chest from 2/7/2025 and note multiple new lung nodules measuring up to 7mm when compared to LDCT 2/2024. Previous tiny nodules are stable. Notably, she recently had influenza A. I suspect these are infectious/inflammatory. Stable to decreased/resolved on CT chest 5/2025. Resume annual lung cancer screening, ordered for 5/2026.   <3 min  reviewing tobacco cessation, ordered 7mg patches.   - Continue LABA-LAMA (Stiolto) daily  - may use Combivent prn  - LDCT 5/2026  - recommended smoking cessation, 7mg/24h patches  - OK to substitute medications based on formulary     F/up 12 months with CT chest prior    History:  74F CLL (on observation), HTN, HLD, smoker being seen for follow up of COPD and lung nodules. Last seen in clinic with me 2/2025, previously followed with Dr. Keys. Doing great, no recent URIs. Benefits from Stiolto daily,. Using combivent once daily or less.  No longer has dyspnea with stairs. Able to walk her dog at least 2 miles /day without issue. No SOB at rest. Rarely wheezing. No chest pain or tightness. No cough. No  orthopnea or PND. No LE edema. Has mild seasonal allergies, which she doesn't feel are bad enough to warrant taking medication for. She is working on quitting smoking, using the 14mg nicotine patch daily which is helping.     PCP ordered lung cancer screen, done 2/7/2025, noting multiple new lung nodules compared to 2/2024. Had the flu at that time. Here to review follow up CT chest 5/2025 which I ordered.     Smoking: current, 55 years, 5 cigarettes per day, previously 1PPD. Using an electronic cigarette as needed. Chantix caused awful nightmares. Using 14mg patches. Zyban didn't work for her. Doesn't want to quit currently.   Bird exposure: no             Animal exposure: dog        Inhalation exposure: no    Occupation: retired, former                           10 point review of systems negative, aside from that mentioned in HPI.    There were no vitals taken for this visit.  Gen: well-appearing  Pulm: normal respiratory effort on room air  MSK: no edema, no acute joint abnormality   Skin: no obvious rash  Psych: normal affect  Neuro: alert and oriented     Labs:  Personally reviewed    Imaging/Studies: Personally reviewed  PFTs (8/2023) - mild obstruction, no significant BD response, e/o air-trapping w/o hyperinflation, normal DLCOunc  CXR (7/2023) - hyperinflation   Lung Ca screening CT (2/2025) - multiple new nodules measuring up to 7mm. Previous nodules are stable. Moderate emphysema       Past Medical History:   Diagnosis Date     Ankle fracture 2009    L     Anxiety      Chronic back pain      Chronic lymphocytic leukemia (H)      Colon polyp 2012    repeat colonoscopy 5 years.     Fx low femur epiphy-closed (H)      GERD (gastroesophageal reflux disease)      History of UTI 2017    Cysto by Dr Agustin neg     HTN (hypertension), benign      Hyperlipidemia LDL goal < 100      Mumps      Normal nuclear stress test 12/2009    EF 67%     Osteopenia      PAD (peripheral artery disease)     s/p fem  pop bypass; embolectomy     Polycythemia vera (H) 06/15/2022     PONV (postoperative nausea and vomiting)      PUD (peptic ulcer disease) 1980s    DU     Pulmonary emphysema, unspecified emphysema type (H) 01/12/2023     Smoker      Spider veins      Vitamin D deficiency      Past Surgical History:   Procedure Laterality Date     ABDOMEN SURGERY       ANGIOGRAM Right 11/18/2022    Procedure: ANGIOGRAM AORTO ILIAC ANGIOGRAM;  Surgeon: Shaun Tran MD;  Location:  OR     BIOPSY       Blood clot removal from Stent      2011     BONE MARROW BIOPSY, BONE SPECIMEN, NEEDLE/TROCAR N/A 02/02/2021    Procedure: bone marrow biopsy;  Surgeon: Kailey Cota MD;  Location:  GI     BYPASS GRAFT AORTOFEMORAL  05/2002    Dr. Turner     BYPASS GRAFT FEMOROPOPLITEAL  12/16/2011     COLONOSCOPY N/A 01/18/2018    Procedure: COMBINED COLONOSCOPY, SINGLE OR MULTIPLE BIOPSY/POLYPECTOMY BY BIOPSY;  COLONOSCOPY;  Surgeon: Efrain Hernadez MD;  Location:  GI     COLONOSCOPY N/A 03/29/2023    Procedure: COLONOSCOPY, FLEXIBLE, WITH LESION REMOVAL USING SNARE;  Surgeon: Jony Manriquez MD;  Location:  GI     DILATION AND CURETTAGE, OPERATIVE HYSTEROSCOPY, COMBINED N/A 09/15/2022    Procedure: HYSTEROSCOPY, WITH DILATION AND CURETTAGE OF UTERUS;  Surgeon: Destiney Schaefer MD;  Location:  OR     EMBOLECTOMY LOWER EXTREMITY  12/16/2011    Procedure:EMBOLECTOMY LOWER EXTREMITY; EMBOLECTOMY, RIGHT POPLITEAL WITH PATCH ANGIOPLASTY. EXCISION SKIN LESION RIGHT LEG. ; Surgeon:PARMINDER VELAZCO; Location: OR     ENDARTERECTOMY FEMORAL Right 11/18/2022    Procedure: ENDARTERECTOMY, FEMORAL RIGHT FEMORAL CUTDOWN, RIGHT ILIAC ARTERY STENTING.;  Surgeon: Shaun Tran MD;  Location:  OR     ESOPHAGOSCOPY, GASTROSCOPY, DUODENOSCOPY (EGD), COMBINED N/A 10/07/2022    Procedure: ESOPHAGOGASTRODUODENOSCOPY, WITH BIOPSY;  Surgeon: Felix Carmona MD;  Location:  GI     EXCISE LESION LOWER EXTREMITY   2011    Procedure:EXCISE LESION LOWER EXTREMITY; Surgeon:PARMINDER VELAZCO; Location:[facility] OR     HERNIA REPAIR  11/04/2008    x2 umbilical     IR OR ANGIOGRAM  2022     L achilles repair  2008     LAPAROSCOPIC OOPHORECTOMY Left     L for cyst age 26     miscarriages x 3       tubal ligation and reversal       Family History   Problem Relation Age of Onset     Alzheimer Disease Mother          of panc/GI ca age 83     Osteoporosis Mother      Other Cancer Mother      Cancer Father          age 64 lymphoma     Hyperlipidemia Father      Depression Father         None     Colon Cancer Maternal Grandfather      Hypertension Sister      Social History     Socioeconomic History     Marital status:      Spouse name: Not on file     Number of children: Not on file     Years of education: Not on file     Highest education level: Not on file   Occupational History     Not on file   Tobacco Use     Smoking status: Some Days     Current packs/day: 0.25     Average packs/day: 0.3 packs/day for 50.0 years (12.5 ttl pk-yrs)     Types: Cigarettes     Passive exposure: Current     Smokeless tobacco: Never     Tobacco comments:     Nicotine patches on and off   Vaping Use     Vaping status: Some Days   Substance and Sexual Activity     Alcohol use: Yes     Comment: Beer once in a while     Drug use: No     Sexual activity: Not Currently     Partners: Male     Birth control/protection: None   Other Topics Concern     Parent/sibling w/ CABG, MI or angioplasty before 65F 55M? 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  Had 3 miscarriages.  No living children.   is Dust[place].  Walks 2miles per day.     Social Drivers of Health     Financial Resource Strain: Low Risk  (2025)    [facility]    Financial Resource Strain      Difficulty of Paying Living Expenses: 3      Difficulty of Paying Living  Expenses: Not on file   Food Insecurity: Low Risk  (1/24/2025)    Food Insecurity      Within the past 12 months, did you worry that your food would run out before you got money to buy more?: No      Within the past 12 months, did the food you bought just not last and you didn t have money to get more?: No   Transportation Needs: Low Risk  (1/24/2025)    Transportation Needs      Within the past 12 months, has lack of transportation kept you from medical appointments, getting your medicines, non-medical meetings or appointments, work, or from getting things that you need?: No   Physical Activity: Sufficiently Active (1/24/2025)    Exercise Vital Sign      Days of Exercise per Week: 7 days      Minutes of Exercise per Session: 30 min   Stress: Patient Declined (1/24/2025)    [place] [employer] - Occupational Stress Questionnaire      Feeling of Stress : Patient declined   Social Connections: Unknown (1/24/2025)    Social Connection and Isolation Panel [NHANES]      Frequency of Communication with Friends and Family: Not on file      Frequency of Social Gatherings with Friends and Family: More than three times a week      Attends Restorationist Services: Not on file      Active Member of Clubs or Organizations: Not on file      Attends Club or Organization Meetings: Not on file      Marital Status: Not on file   Interpersonal Safety: Low Risk  (1/29/2025)    Interpersonal Safety      Do you feel physically and emotionally safe where you currently live?: Yes      Within the past 12 months, have you been hit, slapped, kicked or otherwise physically hurt by someone?: No      Within the past 12 months, have you been humiliated or emotionally abused in other ways by your partner or ex-partner?: No   Housing Stability: Low Risk  (1/24/2025)    Housing Stability      Do you have housing? : Yes      Are you worried about losing your housing?: No     Lung Cancer Screening Shared Decision Making Visit      Karen Caban is eligible for lung cancer screening on the basis of:   has not not experienced symptoms suggestive of lung cancer.   born on 1950, 74 year old years old.   smoked 1 packs of cigarettes for 55 years for a total of 55 pack-years   has not quit smoking      I have discussed with patient the risks and benefits of screening for lung cancer with low-dose CT.     The risks include:   radiation exposure    false positives     over-diagnosis    The benefit of early detection of lung cancer is contingent upon adherence to annual screening or more frequent follow up if indicated.     Furthermore, reaping the benefits of screening requires Karen Caban to be willing and able to undergo diagnostic procedures, if indicated.     We did discuss that the only way to prevent lung cancer is to not smoke. Smoking cessation assistance was offered.     35 minutes spent reviewing chart, reviewing test results, talking with and examining patient, formulating plan, and documentation on the day of the encounter.    Jolie Garsia PA-C  General Pulmonology       Again, thank you for allowing me to participate in the care of your patient.        Sincerely,        Jolie Garsia PA-C    Electronically signed

## 2025-06-18 ENCOUNTER — OFFICE VISIT (OUTPATIENT)
Dept: UROLOGY | Facility: CLINIC | Age: 75
End: 2025-06-18
Payer: COMMERCIAL

## 2025-06-18 VITALS
HEART RATE: 52 BPM | DIASTOLIC BLOOD PRESSURE: 66 MMHG | BODY MASS INDEX: 20.66 KG/M2 | WEIGHT: 124 LBS | OXYGEN SATURATION: 100 % | HEIGHT: 65 IN | SYSTOLIC BLOOD PRESSURE: 116 MMHG

## 2025-06-18 DIAGNOSIS — N39.0 RECURRENT UTI: Primary | ICD-10-CM

## 2025-06-18 DIAGNOSIS — R35.0 URINARY FREQUENCY: ICD-10-CM

## 2025-06-18 LAB
ALBUMIN UR-MCNC: NEGATIVE MG/DL
APPEARANCE UR: CLEAR
BILIRUB UR QL STRIP: NEGATIVE
COLOR UR AUTO: YELLOW
GLUCOSE UR STRIP-MCNC: NEGATIVE MG/DL
HGB UR QL STRIP: NEGATIVE
KETONES UR STRIP-MCNC: NEGATIVE MG/DL
LEUKOCYTE ESTERASE UR QL STRIP: NEGATIVE
NITRATE UR QL: NEGATIVE
PH UR STRIP: 6.5 [PH] (ref 5–7)
RESIDUAL VOLUME (RV) (EXTERNAL): 6
SP GR UR STRIP: 1.01 (ref 1–1.03)
UROBILINOGEN UR STRIP-ACNC: 0.2 E.U./DL

## 2025-06-18 PROCEDURE — 81003 URINALYSIS AUTO W/O SCOPE: CPT | Mod: QW | Performed by: UROLOGY

## 2025-06-18 NOTE — PROGRESS NOTES
June 18, 2025    Humaira was seen today for urinary frequency and recurrent utis.    Diagnoses and all orders for this visit:    Recurrent UTI  -     UA without Microscopic [[id number]]; Future  -     UA without Microscopic [[id number]]    Urinary frequency  -     MEASURE POST-VOID RESIDUAL URINE/BLADDER CAPACITY, US NON-IMAGING ([id number])         *** minutes were spent today on the day of the encounter in reviewing the EMR ***, direct patient care ***, coordination of care and documentation    Karen Griffith MD MPH  (she/her/hers)   of Urology  [facility]    Subjective    ***.  She denies any changes in health since last visit    There were no vitals taken for this visit.  GENERAL: healthy, alert and no distress  EYES: Eyes grossly normal to inspection, conjunctivae and sclerae normal  HENT: normal cephalic/atraumatic.  External ears, nose and mouth without ulcers or lesions.  RESP: no audible wheeze, cough, or visible cyanosis.  No visible retractions or increased work of breathing.  Able to speak fully in complete sentences.  NEURO: Cranial nerves grossly intact, mentation intact and speech normal  PSYCH: mentation appears normal, affect normal/bright, judgement and insight intact, normal speech and appearance well-groomed    Labs/imaging/pathology  ***      CC  Patient Care Team:  Liane Claudio MD as PCP - General (Internal Medicine)  Liane Claudio MD as Assigned PCP  Abril Lopez, PharmD as Pharmacist (Pharmacist)  Sancho Osorio MD as Assigned Cancer Care Provider  Abril Lopez, PharmD as Assigned [facility] Pharmacist  Julia Thompson PA-C as Physician Assistant  Lalo Perez MD as MD (OB/Gyn)  Julia Thompson PA-C as Assigned Surgical Provider  Lalo Perez MD as Assigned OBGYN Provider  Natalie Durbin MD as MD (Cardiovascular Disease)  Darrel Kumari DO as Physician (Gastroenterology)  Mary Kay Ellison MD as Physician (Student in  organized health care education/training program)  Jasmina Orona DO as Physician (Gastroenterology)  Natalie Durbin MD as Assigned Heart and Vascular Provider  Shaun Tran MD as Assigned Heart and Vascular Surgical Provider         Yes

## 2025-06-18 NOTE — NURSING NOTE
Chief Complaint   Patient presents with    Urinary Frequency    Recurrent UTIs    Kalina Kennedy LPN

## 2025-06-18 NOTE — PATIENT INSTRUCTIONS
Continue the vaginal estrogen cream    Websites with free information:    American Urogynec[employer] Society patient website: www.voicesforpfd.org    Total Control Program: www.totalcontrolprogram.com    Supplements to prevent UTI to consider  -Probiotics  -Cranberry (for these products let them know a doctor is recommending them)   GennaMD: https://[employer]/   Theracran HP by [employer] [place] [address]  -d-mannose 2gm daily  -Vitamin C 500-1000mg twice a day    It was a pleasure meeting with you today.  Thank you for allowing me and my team the privilege of caring for you today.  YOU are the reason we are here, and I truly hope we provided you with the excellent service you deserve.  Please let us know if there is anything else we can do for you so that we can be sure you are leaving completely satisfied with your care experience.

## 2025-07-14 ENCOUNTER — LAB (OUTPATIENT)
Dept: LAB | Facility: CLINIC | Age: 75
End: 2025-07-14
Payer: COMMERCIAL

## 2025-07-14 ENCOUNTER — TELEPHONE (OUTPATIENT)
Dept: ONCOLOGY | Facility: CLINIC | Age: 75
End: 2025-07-14

## 2025-07-14 DIAGNOSIS — C91.10 CLL (CHRONIC LYMPHOCYTIC LEUKEMIA) (H): ICD-10-CM

## 2025-07-14 LAB
BASOPHILS # BLD MANUAL: 0 10E3/UL (ref 0–0.2)
BASOPHILS NFR BLD MANUAL: 0 %
EOSINOPHIL # BLD MANUAL: 0 10E3/UL (ref 0–0.7)
EOSINOPHIL NFR BLD MANUAL: 0 %
ERYTHROCYTE [DISTWIDTH] IN BLOOD BY AUTOMATED COUNT: 14.1 % (ref 10–15)
HCT VFR BLD AUTO: 44.6 % (ref 35–47)
HGB BLD-MCNC: 14.9 G/DL (ref 11.7–15.7)
LYMPHOCYTES # BLD MANUAL: 112.7 10E3/UL (ref 0.8–5.3)
LYMPHOCYTES NFR BLD MANUAL: 97 %
MCH RBC QN AUTO: 31.3 PG (ref 26.5–33)
MCHC RBC AUTO-ENTMCNC: 33.4 G/DL (ref 31.5–36.5)
MCV RBC AUTO: 94 FL (ref 78–100)
MONOCYTES # BLD MANUAL: 1.2 10E3/UL (ref 0–1.3)
MONOCYTES NFR BLD MANUAL: 1 %
NEUTROPHILS # BLD MANUAL: 2.3 10E3/UL (ref 1.6–8.3)
NEUTROPHILS NFR BLD MANUAL: 2 %
PLAT MORPH BLD: ABNORMAL
PLATELET # BLD AUTO: 169 10E3/UL (ref 150–450)
RBC # BLD AUTO: 4.76 10E6/UL (ref 3.8–5.2)
RBC MORPH BLD: ABNORMAL
SMUDGE CELLS BLD QL SMEAR: PRESENT
WBC # BLD AUTO: 116.2 10E3/UL (ref 4–11)

## 2025-07-14 PROCEDURE — 85027 COMPLETE CBC AUTOMATED: CPT

## 2025-07-14 PROCEDURE — 36415 COLL VENOUS BLD VENIPUNCTURE: CPT

## 2025-07-14 PROCEDURE — 85007 BL SMEAR W/DIFF WBC COUNT: CPT

## 2025-07-28 ENCOUNTER — TELEPHONE (OUTPATIENT)
Dept: ONCOLOGY | Facility: CLINIC | Age: 75
End: 2025-07-28

## 2025-07-28 ENCOUNTER — ONCOLOGY VISIT (OUTPATIENT)
Dept: ONCOLOGY | Facility: CLINIC | Age: 75
End: 2025-07-28
Attending: INTERNAL MEDICINE
Payer: COMMERCIAL

## 2025-07-28 VITALS
RESPIRATION RATE: 16 BRPM | BODY MASS INDEX: 20.97 KG/M2 | SYSTOLIC BLOOD PRESSURE: 146 MMHG | WEIGHT: 126 LBS | HEART RATE: 55 BPM | DIASTOLIC BLOOD PRESSURE: 79 MMHG

## 2025-07-28 DIAGNOSIS — C91.10 CLL (CHRONIC LYMPHOCYTIC LEUKEMIA) (H): Primary | ICD-10-CM

## 2025-07-28 LAB
ALBUMIN SERPL BCG-MCNC: 4.5 G/DL (ref 3.5–5.2)
ALP SERPL-CCNC: 76 U/L (ref 40–150)
ALT SERPL W P-5'-P-CCNC: 40 U/L (ref 0–50)
ANION GAP SERPL CALCULATED.3IONS-SCNC: 8 MMOL/L (ref 7–15)
AST SERPL W P-5'-P-CCNC: ABNORMAL U/L
BASOPHILS # BLD MANUAL: 0 10E3/UL (ref 0–0.2)
BASOPHILS NFR BLD MANUAL: 0 %
BILIRUB SERPL-MCNC: 0.6 MG/DL
BUN SERPL-MCNC: 10.9 MG/DL (ref 8–23)
CALCIUM SERPL-MCNC: 9.2 MG/DL (ref 8.8–10.4)
CHLORIDE SERPL-SCNC: 98 MMOL/L (ref 98–107)
CREAT SERPL-MCNC: 0.63 MG/DL (ref 0.51–0.95)
EGFRCR SERPLBLD CKD-EPI 2021: >90 ML/MIN/1.73M2
EOSINOPHIL # BLD MANUAL: 0 10E3/UL (ref 0–0.7)
EOSINOPHIL NFR BLD MANUAL: 0 %
ERYTHROCYTE [DISTWIDTH] IN BLOOD BY AUTOMATED COUNT: 14.1 % (ref 10–15)
GLUCOSE SERPL-MCNC: 103 MG/DL (ref 70–99)
HCO3 SERPL-SCNC: 28 MMOL/L (ref 22–29)
HCT VFR BLD AUTO: 43.7 % (ref 35–47)
HGB BLD-MCNC: 14.6 G/DL (ref 11.7–15.7)
LYMPHOCYTES # BLD MANUAL: 133 10E3/UL (ref 0.8–5.3)
LYMPHOCYTES NFR BLD MANUAL: 95 %
MCH RBC QN AUTO: 31.5 PG (ref 26.5–33)
MCHC RBC AUTO-ENTMCNC: 33.4 G/DL (ref 31.5–36.5)
MCV RBC AUTO: 94 FL (ref 78–100)
MONOCYTES # BLD MANUAL: 1.4 10E3/UL (ref 0–1.3)
MONOCYTES NFR BLD MANUAL: 1 %
NEUTROPHILS # BLD MANUAL: 5.6 10E3/UL (ref 1.6–8.3)
NEUTROPHILS NFR BLD MANUAL: 4 %
PLAT MORPH BLD: ABNORMAL
PLATELET # BLD AUTO: 153 10E3/UL (ref 150–450)
POTASSIUM SERPL-SCNC: ABNORMAL MMOL/L
PROT SERPL-MCNC: 6.4 G/DL (ref 6.4–8.3)
RBC # BLD AUTO: 4.63 10E6/UL (ref 3.8–5.2)
RBC MORPH BLD: ABNORMAL
SMUDGE CELLS BLD QL SMEAR: PRESENT
SODIUM SERPL-SCNC: 134 MMOL/L (ref 135–145)
URATE SERPL-MCNC: 4.7 MG/DL (ref 2.4–5.7)
VARIANT LYMPHS BLD QL SMEAR: PRESENT
WBC # BLD AUTO: 140 10E3/UL (ref 4–11)

## 2025-07-28 PROCEDURE — 36415 COLL VENOUS BLD VENIPUNCTURE: CPT | Performed by: INTERNAL MEDICINE

## 2025-07-28 PROCEDURE — 85018 HEMOGLOBIN: CPT | Performed by: INTERNAL MEDICINE

## 2025-07-28 PROCEDURE — 85007 BL SMEAR W/DIFF WBC COUNT: CPT | Performed by: INTERNAL MEDICINE

## 2025-07-28 PROCEDURE — 85014 HEMATOCRIT: CPT | Performed by: INTERNAL MEDICINE

## 2025-07-28 PROCEDURE — 84155 ASSAY OF PROTEIN SERUM: CPT | Performed by: INTERNAL MEDICINE

## 2025-07-28 PROCEDURE — 99215 OFFICE O/P EST HI 40 MIN: CPT | Performed by: INTERNAL MEDICINE

## 2025-07-28 PROCEDURE — 84075 ASSAY ALKALINE PHOSPHATASE: CPT | Performed by: INTERNAL MEDICINE

## 2025-07-28 PROCEDURE — G2211 COMPLEX E/M VISIT ADD ON: HCPCS | Performed by: INTERNAL MEDICINE

## 2025-07-28 PROCEDURE — G0463 HOSPITAL OUTPT CLINIC VISIT: HCPCS | Performed by: INTERNAL MEDICINE

## 2025-07-28 PROCEDURE — 84550 ASSAY OF BLOOD/URIC ACID: CPT | Performed by: INTERNAL MEDICINE

## 2025-07-28 ASSESSMENT — PAIN SCALES - GENERAL: PAINLEVEL_OUTOF10: NO PAIN (0)

## 2025-07-28 NOTE — LETTER
7/28/2025      Karen Caban  [address]  [place] [address]      Dear Colleague,    Thank you for referring your patient, Karen Caban, to the [facility]. Please see a copy of my visit note below.    HEMATOLOGIC HISTORY:  Ms. Caban is a 71-year-old female with CLL.  1.  On 12/11/2019, WBC of 9.8.  -On 12/17/2020, WBC of 16.3.  -On 01/06/2021, WBC of 18.6, hemoglobin of 16.5 and platelet of 206.  Neutrophil of 19%, lymphocyte of 74%.  2.  Flow cytometry on peripheral blood on 01/06/2021 revealed CD5 positive kappa monotypic B cells with immunophenotype of CLL/SLL .  3.  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  CT chest, abdomen, and pelvis on 02/03/2021 did not reveal any lymphadenopathy or splenomegaly.     SUBJECTIVE:   Ms. Caban is a 74-year-old female with Webb stage 0 chronic lymphocytic leukemia on observation.  WBC increased to 116 on 07/14/2025.  Normal hemoglobin and platelet.     She has lung nodule and emphysema.  She follows up with pulmonologist. In 01/2025, she had influenza A.      She is doing well. She is active.  No excessive fatigue.  No headache.  No dizziness. No chest pain.  No shortness of breath.  No abdominal pain, nausea or vomiting. Appetite is good.  No urinary or bowel complaints. No night sweats. No weight loss. All other review of systems is negative.     PHYSICAL EXAMINATION:   GENERAL:  Alert and oriented x 3.   VITAL SIGNS:  Reviewed.  ECOG PS of 0.     EYES:  No icterus.   NECK:  Supple. No lymphadenopathy. No thyromegaly.   AXILLAE:  No lymphadenopathy.   LUNGS:  Good air entry bilaterally.  No crackles or wheezing.   HEART:  Regular.  No murmur.   GI: Abdomen is soft.  Nontender. No mass.   EXTREMITIES:  No pedal  "edema.  No calf swelling or tenderness.   SKIN:  No rash.      LABORATORY:  Reviewed.     ASSESSMENT:    1.  An 74-year-old female with Webb stage 0 chronic lymphocytic leukemia on observation. CLL is progressing.  2.  Pancreatic IPMN.  3.  Lung nodules.     PLAN:  - CT chest, abdomen and pelvis in the next 1 to 2 weeks.  - Bone marrow biopsy in 1 to 2 weeks.  - See me after bone marrow biopsy.    DISCUSSION:  1.  Patient is doing well.  CBC was reviewed with her.  Explained to her that WBC has continued to increase.  It has doubled in 3 months.  Hemoglobin and platelet are normal.  Explained to the patient that CLL is progressing.    2.  We discussed regarding further investigation.  Will get a bone marrow biopsy.  Procedure was explained.  She is agreeable for it.  Sample will be sent for flow cytometry, cytogenetics and FISH panel.    Will also get a CT chest, abdomen and pelvis.    3.  I will see her after the above investigation.  At that time we will discuss regarding treatment.  Although she is asymptomatic, because of rapid increase in WBC, treatment will be recommended. She had a few questions which were all answered.       Total visit time of 40 minutes.  Time spent in today's visit, review of chart/investigations today and documentation today.    Oncology Rooming Note    July 28, 2025 1:42 PM   Karen Caban is a 74 year old female who presents for:    Chief Complaint   Patient presents with     Oncology Clinic Visit     Initial Vitals: BP (!) 146/79   Pulse 55   Resp 16   Wt 57.2 kg (126 lb)   BMI 20.97 kg/m   Estimated body mass index is 20.97 kg/m  as calculated from the following:    Height as of 6/18/25: 1.651 m (5' 5\").    Weight as of this encounter: 57.2 kg (126 lb). Body surface area is 1.62 meters squared.  No Pain (0) Comment: Data Unavailable   No LMP recorded. Patient is postmenopausal.  Allergies reviewed: Yes  Medications reviewed: Yes    Medications: Medication refills not needed " today.  Pharmacy name entered into [employer]: [facility] #[id number] - [place], [place] - [address]    PHQ9:  Did this patient require a PHQ9?: No        Mila Draper, CMA                Again, thank you for allowing me to participate in the care of your patient.        Sincerely,        Sancho Osorio MD    Electronically signed

## 2025-07-28 NOTE — TELEPHONE ENCOUNTER
[facility] lab is calling regarding labs from today.    LDH and AST will not be reported due to hemolysis.    Potassium will not be reported due to elevated WBC count.    Will route to Dr Osorio to see if labs need to be redrawn.    Lizbeth Silva RN on 7/28/2025 at 2:34 PM

## 2025-07-28 NOTE — TELEPHONE ENCOUNTER
DATE/TIME OF CALL RECEIVED FROM LAB:  07/28/25 at 2:08 PM     LAB TEST & LAB VALUE:      Previous Labs from 7/14/25 wbc 116.2    PROVIDER NOTIFIED?: Yes    PROVIDER NAME: Dr. Osorio    TIME LAB VALUE REPORTED TO PROVIDER:   2:08 PM      MECHANISM OF PROVIDER NOTIFICATION: msg sent    PROVIDER RESPONSE:  Pending. Patient has appointment with provider today.     Precious Green RN, BSN, PHN, OCN  .[facility]

## 2025-07-28 NOTE — TELEPHONE ENCOUNTER
Sancho Osorio MD to Me  Mildred Duke RN (Selected Message      7/28/25  3:43 PM  No need to repeat it.     Bk    No further action needed.   Lizbeth Silva RN on 7/28/2025 at 3:47 PM

## 2025-07-28 NOTE — PROGRESS NOTES
"Oncology Rooming Note    July 28, 2025 1:42 PM   Karen Caban is a 74 year old female who presents for:    Chief Complaint   Patient presents with    Oncology Clinic Visit     Initial Vitals: BP (!) 146/79   Pulse 55   Resp 16   Wt 57.2 kg (126 lb)   BMI 20.97 kg/m   Estimated body mass index is 20.97 kg/m  as calculated from the following:    Height as of 6/18/25: 1.651 m (5' 5\").    Weight as of this encounter: 57.2 kg (126 lb). Body surface area is 1.62 meters squared.  No Pain (0) Comment: Data Unavailable   No LMP recorded. Patient is postmenopausal.  Allergies reviewed: Yes  Medications reviewed: Yes    Medications: Medication refills not needed today.  Pharmacy name entered into [employer]: [id number] - [place], [place] - [address]    PHQ9:  Did this patient require a PHQ9?: No        Mila Draper CMA              "

## 2025-07-28 NOTE — PROGRESS NOTES
HEMATOLOGIC HISTORY:  Ms. Caban is a 71-year-old female with CLL.  1.  On 12/11/2019, WBC of 9.8.  -On 12/17/2020, WBC of 16.3.  -On 01/06/2021, WBC of 18.6, hemoglobin of 16.5 and platelet of 206.  Neutrophil of 19%, lymphocyte of 74%.  2.  Flow cytometry on peripheral blood on 01/06/2021 revealed CD5 positive kappa monotypic B cells with immunophenotype of CLL/SLL .  3.  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  CT chest, abdomen, and pelvis on 02/03/2021 did not reveal any lymphadenopathy or splenomegaly.     SUBJECTIVE:   Ms. Caban is a 74-year-old female with Webb stage 0 chronic lymphocytic leukemia on observation.  WBC increased to 116 on 07/14/2025.  Normal hemoglobin and platelet.     She has lung nodule and emphysema.  She follows up with pulmonologist. In 01/2025, she had influenza A.      She is doing well. She is active.  No excessive fatigue.  No headache.  No dizziness. No chest pain.  No shortness of breath.  No abdominal pain, nausea or vomiting. Appetite is good.  No urinary or bowel complaints. No night sweats. No weight loss. All other review of systems is negative.     PHYSICAL EXAMINATION:   GENERAL:  Alert and oriented x 3.   VITAL SIGNS:  Reviewed.  ECOG PS of 0.     EYES:  No icterus.   NECK:  Supple. No lymphadenopathy. No thyromegaly.   AXILLAE:  No lymphadenopathy.   LUNGS:  Good air entry bilaterally.  No crackles or wheezing.   HEART:  Regular.  No murmur.   GI: Abdomen is soft.  Nontender. No mass.   EXTREMITIES:  No pedal edema.  No calf swelling or tenderness.   SKIN:  No rash.      LABORATORY:  Reviewed.     ASSESSMENT:    1.  An 74-year-old female with Webb stage 0 chronic lymphocytic leukemia on observation. CLL is progressing.  2.  Pancreatic IPMN.  3.  Lung  nodules.     PLAN:  - CT chest, abdomen and pelvis in the next 1 to 2 weeks.  - Bone marrow biopsy in 1 to 2 weeks.  - See me after bone marrow biopsy.    DISCUSSION:  1.  Patient is doing well.  CBC was reviewed with her.  Explained to her that WBC has continued to increase.  It has doubled in 3 months.  Hemoglobin and platelet are normal.  Explained to the patient that CLL is progressing.    2.  We discussed regarding further investigation.  Will get a bone marrow biopsy.  Procedure was explained.  She is agreeable for it.  Sample will be sent for flow cytometry, cytogenetics and FISH panel.    Will also get a CT chest, abdomen and pelvis.    3.  I will see her after the above investigation.  At that time we will discuss regarding treatment.  Although she is asymptomatic, because of rapid increase in WBC, treatment will be recommended. She had a few questions which were all answered.       Total visit time of 40 minutes.  Time spent in today's visit, review of chart/investigations today and documentation today.

## 2025-07-31 NOTE — TELEPHONE ENCOUNTER
Labs reviewed by provider. No action needed from triage at this time.     Precious Green, RN, BSN, PHN, OCN  [facility]

## 2025-08-07 ENCOUNTER — PATIENT OUTREACH (OUTPATIENT)
Dept: ONCOLOGY | Facility: CLINIC | Age: 75
End: 2025-08-07
Payer: COMMERCIAL

## 2025-08-11 DIAGNOSIS — C91.10 CLL (CHRONIC LYMPHOCYTIC LEUKEMIA) (H): Primary | ICD-10-CM

## 2025-08-12 ENCOUNTER — INFUSION THERAPY VISIT (OUTPATIENT)
Dept: INFUSION THERAPY | Facility: CLINIC | Age: 75
End: 2025-08-12
Attending: INTERNAL MEDICINE
Payer: COMMERCIAL

## 2025-08-12 ENCOUNTER — PROCEDURE ONLY VISIT (OUTPATIENT)
Dept: ONCOLOGY | Facility: CLINIC | Age: 75
End: 2025-08-12
Attending: PHYSICIAN ASSISTANT
Payer: COMMERCIAL

## 2025-08-12 VITALS
RESPIRATION RATE: 16 BRPM | HEART RATE: 65 BPM | SYSTOLIC BLOOD PRESSURE: 103 MMHG | OXYGEN SATURATION: 93 % | TEMPERATURE: 97 F | DIASTOLIC BLOOD PRESSURE: 63 MMHG

## 2025-08-12 VITALS
DIASTOLIC BLOOD PRESSURE: 71 MMHG | HEART RATE: 63 BPM | OXYGEN SATURATION: 92 % | RESPIRATION RATE: 16 BRPM | TEMPERATURE: 97 F | SYSTOLIC BLOOD PRESSURE: 108 MMHG

## 2025-08-12 DIAGNOSIS — C91.10 CLL (CHRONIC LYMPHOCYTIC LEUKEMIA) (H): ICD-10-CM

## 2025-08-12 DIAGNOSIS — C91.10 CLL (CHRONIC LYMPHOCYTIC LEUKEMIA) (H): Primary | ICD-10-CM

## 2025-08-12 LAB
BASOPHILS # BLD MANUAL: 0 10E3/UL (ref 0–0.2)
BASOPHILS NFR BLD MANUAL: 0 %
EOSINOPHIL # BLD MANUAL: 0 10E3/UL (ref 0–0.7)
EOSINOPHIL NFR BLD MANUAL: 0 %
ERYTHROCYTE [DISTWIDTH] IN BLOOD BY AUTOMATED COUNT: 14.6 % (ref 10–15)
FRAGMENTS BLD QL SMEAR: ABNORMAL
HCT VFR BLD AUTO: 46.6 % (ref 35–47)
HGB BLD-MCNC: 15.2 G/DL (ref 11.7–15.7)
LYMPHOCYTES # BLD MANUAL: 135.7 10E3/UL (ref 0.8–5.3)
LYMPHOCYTES NFR BLD MANUAL: 92 %
MCH RBC QN AUTO: 31.7 PG (ref 26.5–33)
MCHC RBC AUTO-ENTMCNC: 32.6 G/DL (ref 31.5–36.5)
MCV RBC AUTO: 97 FL (ref 78–100)
MONOCYTES # BLD MANUAL: 1.48 10E3/UL (ref 0–1.3)
MONOCYTES NFR BLD MANUAL: 1 %
NEUTROPHILS # BLD MANUAL: 10.33 10E3/UL (ref 1.6–8.3)
NEUTROPHILS NFR BLD MANUAL: 7 %
PLAT MORPH BLD: ABNORMAL
PLATELET # BLD AUTO: 180 10E3/UL (ref 150–450)
RBC # BLD AUTO: 4.8 10E6/UL (ref 3.8–5.2)
RBC MORPH BLD: ABNORMAL
RETICS # AUTO: 0.06 10E6/UL (ref 0.03–0.1)
RETICS/RBC NFR AUTO: 1.3 % (ref 0.5–2)
SMUDGE CELLS BLD QL SMEAR: PRESENT
WBC # BLD AUTO: 147.5 10E3/UL (ref 4–11)

## 2025-08-12 PROCEDURE — 85045 AUTOMATED RETICULOCYTE COUNT: CPT | Performed by: PHYSICIAN ASSISTANT

## 2025-08-12 PROCEDURE — 38222 DX BONE MARROW BX & ASPIR: CPT | Performed by: PHYSICIAN ASSISTANT

## 2025-08-12 PROCEDURE — 88237 TISSUE CULTURE BONE MARROW: CPT | Performed by: INTERNAL MEDICINE

## 2025-08-12 PROCEDURE — 88271 CYTOGENETICS DNA PROBE: CPT | Performed by: INTERNAL MEDICINE

## 2025-08-12 PROCEDURE — 85007 BL SMEAR W/DIFF WBC COUNT: CPT | Performed by: PHYSICIAN ASSISTANT

## 2025-08-12 PROCEDURE — 85014 HEMATOCRIT: CPT | Performed by: PHYSICIAN ASSISTANT

## 2025-08-13 LAB
PATH REPORT.COMMENTS IMP SPEC: ABNORMAL
PATH REPORT.COMMENTS IMP SPEC: ABNORMAL
PATH REPORT.COMMENTS IMP SPEC: YES
PATH REPORT.FINAL DX SPEC: ABNORMAL
PATH REPORT.MICROSCOPIC SPEC OTHER STN: ABNORMAL
PATH REPORT.MICROSCOPIC SPEC OTHER STN: ABNORMAL
PATH REPORT.RELEVANT HX SPEC: ABNORMAL
PATH REPORT.SUPPLEMENTAL REPORTS SPEC: ABNORMAL

## 2025-08-13 PROCEDURE — 88305 TISSUE EXAM BY PATHOLOGIST: CPT | Mod: 26 | Performed by: PATHOLOGY

## 2025-08-13 PROCEDURE — 85097 BONE MARROW INTERPRETATION: CPT | Performed by: PATHOLOGY

## 2025-08-13 PROCEDURE — 88311 DECALCIFY TISSUE: CPT | Mod: 26 | Performed by: PATHOLOGY

## 2025-08-13 PROCEDURE — 88341 IMHCHEM/IMCYTCHM EA ADD ANTB: CPT | Mod: TC | Performed by: INTERNAL MEDICINE

## 2025-08-13 PROCEDURE — 88313 SPECIAL STAINS GROUP 2: CPT | Mod: 26 | Performed by: PATHOLOGY

## 2025-08-13 PROCEDURE — 88342 IMHCHEM/IMCYTCHM 1ST ANTB: CPT | Mod: 26 | Performed by: PATHOLOGY

## 2025-08-13 PROCEDURE — 88341 IMHCHEM/IMCYTCHM EA ADD ANTB: CPT | Mod: 26 | Performed by: PATHOLOGY

## 2025-08-14 LAB
PATH REPORT.COMMENTS IMP SPEC: ABNORMAL
PATH REPORT.COMMENTS IMP SPEC: YES
PATH REPORT.FINAL DX SPEC: ABNORMAL
PATH REPORT.MICROSCOPIC SPEC OTHER STN: ABNORMAL
PATH REPORT.RELEVANT HX SPEC: ABNORMAL

## 2025-08-14 PROCEDURE — 88188 FLOWCYTOMETRY/READ 9-15: CPT | Performed by: STUDENT IN AN ORGANIZED HEALTH CARE EDUCATION/TRAINING PROGRAM

## 2025-08-14 PROCEDURE — 88185 FLOWCYTOMETRY/TC ADD-ON: CPT | Performed by: INTERNAL MEDICINE

## 2025-08-18 ENCOUNTER — ANCILLARY PROCEDURE (OUTPATIENT)
Dept: CT IMAGING | Facility: CLINIC | Age: 75
End: 2025-08-18
Attending: INTERNAL MEDICINE
Payer: COMMERCIAL

## 2025-08-18 DIAGNOSIS — C91.10 CLL (CHRONIC LYMPHOCYTIC LEUKEMIA) (H): ICD-10-CM

## 2025-08-18 DIAGNOSIS — D49.0 IPMN (INTRADUCTAL PAPILLARY MUCINOUS NEOPLASM): ICD-10-CM

## 2025-08-18 PROCEDURE — 250N000011 HC RX IP 250 OP 636: Performed by: INTERNAL MEDICINE

## 2025-08-18 PROCEDURE — 250N000009 HC RX 250: Performed by: INTERNAL MEDICINE

## 2025-08-18 PROCEDURE — 74177 CT ABD & PELVIS W/CONTRAST: CPT

## 2025-08-18 RX ORDER — IOPAMIDOL 755 MG/ML
61 INJECTION, SOLUTION INTRAVASCULAR ONCE
Status: COMPLETED | OUTPATIENT
Start: 2025-08-18 | End: 2025-08-18

## 2025-08-18 RX ADMIN — SODIUM CHLORIDE 50 ML: 9 INJECTION, SOLUTION INTRAVENOUS at 07:20

## 2025-08-18 RX ADMIN — IOPAMIDOL 61 ML: 755 INJECTION, SOLUTION INTRAVENOUS at 07:20

## 2025-08-19 ENCOUNTER — ONCOLOGY VISIT (OUTPATIENT)
Dept: ONCOLOGY | Facility: CLINIC | Age: 75
End: 2025-08-19
Attending: INTERNAL MEDICINE
Payer: COMMERCIAL

## 2025-08-19 VITALS
BODY MASS INDEX: 21.13 KG/M2 | SYSTOLIC BLOOD PRESSURE: 117 MMHG | DIASTOLIC BLOOD PRESSURE: 76 MMHG | OXYGEN SATURATION: 98 % | WEIGHT: 127 LBS | RESPIRATION RATE: 16 BRPM | HEART RATE: 64 BPM

## 2025-08-19 DIAGNOSIS — C91.10 CLL (CHRONIC LYMPHOCYTIC LEUKEMIA) (H): Primary | ICD-10-CM

## 2025-08-19 PROCEDURE — 99214 OFFICE O/P EST MOD 30 MIN: CPT | Performed by: INTERNAL MEDICINE

## 2025-08-19 PROCEDURE — G0463 HOSPITAL OUTPT CLINIC VISIT: HCPCS | Performed by: INTERNAL MEDICINE

## 2025-08-19 PROCEDURE — G2211 COMPLEX E/M VISIT ADD ON: HCPCS | Performed by: INTERNAL MEDICINE

## 2025-08-19 ASSESSMENT — PAIN SCALES - GENERAL: PAINLEVEL_OUTOF10: NO PAIN (0)

## 2025-08-21 LAB
CULTURE HARVEST COMPLETE DATE: NORMAL

## 2025-09-02 LAB
CULTURE HARVEST COMPLETE DATE: NORMAL
INTERPRETATION: NORMAL

## (undated) DEVICE — LINEN LEG ROLL 5489

## (undated) DEVICE — SU VICRYL 3-0 TIE 12X18" J904T

## (undated) DEVICE — SURGICEL HEMOSTAT 3X4" NUKNIT 1943

## (undated) DEVICE — PACK SPECIAL PROCEDURE CUSTOM

## (undated) DEVICE — SU VICRYL 3-0 SH 27" J316H

## (undated) DEVICE — SU VICRYL 2-0 CT-1 18' J739D

## (undated) DEVICE — RAD INFLATOR BASIC COMPAK  IN4130

## (undated) DEVICE — DEVICE MULTI TORQUE TD01

## (undated) DEVICE — CONTRAST MEDIA ISOVUE 300 61% IOPAMIDOL  CHRG PER 1ML 131535

## (undated) DEVICE — GOWN IMPERVIOUS ZONED LG

## (undated) DEVICE — DECANTER BAG 2002S

## (undated) DEVICE — RAD RX ISOVUE 300 (50ML) 61% IOPAMIDOL CHARGE PER ML

## (undated) DEVICE — SUCTION CANISTER MEDIVAC LINER 3000ML W/LID 65651-530

## (undated) DEVICE — CLIP APPLIER LIGACLIP 11" MED SHORT 20 CT MSM20

## (undated) DEVICE — COVER PROBE ULTRASOUND W/GEL 6X96" 20-P3D696

## (undated) DEVICE — GLOVE BIOGEL PI MICRO INDICATOR UNDERGLOVE SZ 8.0 48980

## (undated) DEVICE — SPECIMEN CONTAINER 60MLW/10% FORMALIN 59601

## (undated) DEVICE — VESSEL LOOPS RED MAXI

## (undated) DEVICE — SOL WATER IRRIG 1000ML BOTTLE 2F7114

## (undated) DEVICE — PACK TVT HYSTEROSCOPY SMA15HYFSE

## (undated) DEVICE — SU SILK 3-0 TIE 24" SA74H

## (undated) DEVICE — SOL NACL 0.9% IRRIG 1000ML BOTTLE 2F7124

## (undated) DEVICE — TUBING IRRIG TUR Y TYPE 96" LF 6543-01

## (undated) DEVICE — PREP CHLORAPREP 26ML TINTED HI-LITE ORANGE 930815

## (undated) DEVICE — SYR 03ML LL W/O NDL 309657

## (undated) DEVICE — RAD CATH SOFT-VU OMNI FLUSH 5FRX90CM

## (undated) DEVICE — STOPCOCK HIGH PRESSURE 3-WAY

## (undated) DEVICE — PEN MARKING SKIN

## (undated) DEVICE — SU PROLENE 5-0 RB-2DA 30" 8710H

## (undated) DEVICE — GUIDEWIRE ENROUTE .014 SR014-GW

## (undated) DEVICE — NDL 22GA 1.5"

## (undated) DEVICE — SU PROLENE 6-0 C-1DA 30" M8706

## (undated) DEVICE — CATH PIGTAIL W/MARKERS 5FRX65CM 7602-20M65SH

## (undated) DEVICE — LINEN TOWEL PACK X5 5464

## (undated) DEVICE — SU MONOCRYL 4-0 PS-2 18" UND Y496G

## (undated) DEVICE — VESSEL LOOPS YELLOW MINI 31145660

## (undated) DEVICE — CATH INTERMITTENT CLEAN-CATH FEMALE 14FR 6" VINYL LF 420614

## (undated) DEVICE — GLOVE PROTEXIS BLUE W/NEU-THERA 7.0  2D73EB70

## (undated) DEVICE — KIT LG BORE TOUHY ACCESS PLUS MAP152

## (undated) DEVICE — SU SILK 2-0 TIE 24" SA75H

## (undated) DEVICE — TUBING SUCTION 12"X1/4" N612

## (undated) DEVICE — Device

## (undated) DEVICE — INTRO TERUMO 8FRX25CM W/MARKER RSB803

## (undated) DEVICE — SU VICRYL 3-0 SH CR 8X18" J774

## (undated) DEVICE — NDL 18GA 1.5" 305196

## (undated) DEVICE — INTRO SHEATH 5FRX10CM PINNACLE MARKER RSB512

## (undated) DEVICE — SYR MEDRAD 150ML MARK 7 ARTERION ART700SYR

## (undated) DEVICE — DRAPE IOBAN INCISE 23X17" 6650EZ

## (undated) DEVICE — DRAPE XRAY CASSETTE 20X23

## (undated) DEVICE — GLOVE PROTEXIS MICRO 7.0  2D73PM70

## (undated) DEVICE — DRSG STERI STRIP 1/2X4" R1547

## (undated) DEVICE — SOL NACL 0.9% INJ 1000ML BAG 2B1324X

## (undated) DEVICE — PACK VASCULAR SCV15VAFSB

## (undated) DEVICE — SOL NACL 0.9% INJ 1000ML BAG 07983-09

## (undated) DEVICE — SU MONOCRYL 4-0 P-3 18" UND Y494G

## (undated) DEVICE — ESU GROUND PAD UNIVERSAL W/O CORD

## (undated) DEVICE — SURGICEL HEMOSTAT 2X3" 1953

## (undated) DEVICE — GUIDEWIRE TERUMO ADVANTAGE .035X260CM ANG GA3502

## (undated) DEVICE — SU PROLENE 6-0 RB-2DA 30" 8711H

## (undated) DEVICE — ADH LIQUID MASTISOL TOPICAL VIAL 2-3ML 0523-48

## (undated) DEVICE — RAD INTRODUCER KIT MICRO 5FRX10CM .018 NITINOL G/W

## (undated) RX ORDER — FENTANYL CITRATE 50 UG/ML
INJECTION, SOLUTION INTRAMUSCULAR; INTRAVENOUS
Status: DISPENSED
Start: 2022-09-15

## (undated) RX ORDER — FENTANYL CITRATE 50 UG/ML
INJECTION, SOLUTION INTRAMUSCULAR; INTRAVENOUS
Status: DISPENSED
Start: 2023-03-29

## (undated) RX ORDER — LIDOCAINE HYDROCHLORIDE 20 MG/ML
INJECTION, SOLUTION EPIDURAL; INFILTRATION; INTRACAUDAL; PERINEURAL
Status: DISPENSED
Start: 2022-09-15

## (undated) RX ORDER — FENTANYL CITRATE 50 UG/ML
INJECTION, SOLUTION INTRAMUSCULAR; INTRAVENOUS
Status: DISPENSED
Start: 2022-11-18

## (undated) RX ORDER — LIDOCAINE HYDROCHLORIDE 20 MG/ML
INJECTION, SOLUTION EPIDURAL; INFILTRATION; INTRACAUDAL; PERINEURAL
Status: DISPENSED
Start: 2022-11-18

## (undated) RX ORDER — PROPOFOL 10 MG/ML
INJECTION, EMULSION INTRAVENOUS
Status: DISPENSED
Start: 2022-11-18

## (undated) RX ORDER — CEFAZOLIN SODIUM/WATER 2 G/20 ML
SYRINGE (ML) INTRAVENOUS
Status: DISPENSED
Start: 2022-09-15

## (undated) RX ORDER — NEOSTIGMINE METHYLSULFATE 1 MG/ML
VIAL (ML) INJECTION
Status: DISPENSED
Start: 2022-11-18

## (undated) RX ORDER — ONDANSETRON 2 MG/ML
INJECTION INTRAMUSCULAR; INTRAVENOUS
Status: DISPENSED
Start: 2022-11-18

## (undated) RX ORDER — GLYCOPYRROLATE 0.2 MG/ML
INJECTION, SOLUTION INTRAMUSCULAR; INTRAVENOUS
Status: DISPENSED
Start: 2022-11-18

## (undated) RX ORDER — PROPOFOL 10 MG/ML
INJECTION, EMULSION INTRAVENOUS
Status: DISPENSED
Start: 2022-09-15

## (undated) RX ORDER — FENTANYL CITRATE 50 UG/ML
INJECTION, SOLUTION INTRAMUSCULAR; INTRAVENOUS
Status: DISPENSED
Start: 2022-10-07

## (undated) RX ORDER — ALBUTEROL SULFATE 90 UG/1
AEROSOL, METERED RESPIRATORY (INHALATION)
Status: DISPENSED
Start: 2022-11-18

## (undated) RX ORDER — HEPARIN SODIUM 1000 [USP'U]/ML
INJECTION, SOLUTION INTRAVENOUS; SUBCUTANEOUS
Status: DISPENSED
Start: 2022-11-18

## (undated) RX ORDER — DEXAMETHASONE SODIUM PHOSPHATE 4 MG/ML
INJECTION, SOLUTION INTRA-ARTICULAR; INTRALESIONAL; INTRAMUSCULAR; INTRAVENOUS; SOFT TISSUE
Status: DISPENSED
Start: 2022-11-18

## (undated) RX ORDER — DEXAMETHASONE SODIUM PHOSPHATE 4 MG/ML
INJECTION, SOLUTION INTRA-ARTICULAR; INTRALESIONAL; INTRAMUSCULAR; INTRAVENOUS; SOFT TISSUE
Status: DISPENSED
Start: 2022-09-15

## (undated) RX ORDER — FENTANYL CITRATE 50 UG/ML
INJECTION, SOLUTION INTRAMUSCULAR; INTRAVENOUS
Status: DISPENSED
Start: 2018-01-18

## (undated) RX ORDER — CEFAZOLIN SODIUM/WATER 2 G/20 ML
SYRINGE (ML) INTRAVENOUS
Status: DISPENSED
Start: 2022-11-18

## (undated) RX ORDER — ONDANSETRON 2 MG/ML
INJECTION INTRAMUSCULAR; INTRAVENOUS
Status: DISPENSED
Start: 2022-09-15

## (undated) RX ORDER — HYDROMORPHONE HYDROCHLORIDE 1 MG/ML
INJECTION, SOLUTION INTRAMUSCULAR; INTRAVENOUS; SUBCUTANEOUS
Status: DISPENSED
Start: 2022-11-18

## (undated) RX ORDER — REGADENOSON 0.08 MG/ML
INJECTION, SOLUTION INTRAVENOUS
Status: DISPENSED
Start: 2022-10-06